# Patient Record
Sex: MALE | Race: WHITE | NOT HISPANIC OR LATINO | Employment: OTHER | URBAN - METROPOLITAN AREA
[De-identification: names, ages, dates, MRNs, and addresses within clinical notes are randomized per-mention and may not be internally consistent; named-entity substitution may affect disease eponyms.]

---

## 2017-01-02 DIAGNOSIS — E78.5 HYPERLIPIDEMIA WITH TARGET LDL LESS THAN 130: Primary | ICD-10-CM

## 2017-01-02 NOTE — TELEPHONE ENCOUNTER
zocor     Last Written Prescription Date: 06/10/16  Last Fill Quantity: 90, # refills: 0  Last Office Visit with [facility], [facility] or  Health prescribing provider: 06/21/16  Next 5 appointments (look out 90 days)     Ruddy 10, 2017  9:00 AM   Return Visit with Peggy Williamson PA-C   [facility]    [address] [place] [id number]   [phone]                   CHOL      121   9/17/2015  HDL       28   9/17/2015  LDL       64   9/17/2015  TRIG      147   9/17/2015  CHOLHDLRATIO      4.3   9/17/2015

## 2017-01-04 RX ORDER — SIMVASTATIN 20 MG
TABLET ORAL
Qty: 30 TABLET | Refills: 0 | Status: SHIPPED | OUTPATIENT
Start: 2017-01-04 | End: 2017-02-10

## 2017-01-04 NOTE — TELEPHONE ENCOUNTER
Routing refill request to provider for review/approval because:  Labs not current:  FLP  A break in medication-should've been out 9/2016.    Cora Jauregui, RN, BSN

## 2017-01-25 ENCOUNTER — ONCOLOGY VISIT (OUTPATIENT)
Dept: ONCOLOGY | Facility: CLINIC | Age: 67
End: 2017-01-25
Payer: MEDICARE

## 2017-01-25 VITALS
SYSTOLIC BLOOD PRESSURE: 134 MMHG | HEIGHT: 74 IN | TEMPERATURE: 98.2 F | DIASTOLIC BLOOD PRESSURE: 70 MMHG | RESPIRATION RATE: 18 BRPM | HEART RATE: 84 BPM | BODY MASS INDEX: 36.46 KG/M2 | OXYGEN SATURATION: 94 % | WEIGHT: 284.1 LBS

## 2017-01-25 DIAGNOSIS — C82.80 LYMPHOMA, SMALL LYMPHOID, FOLLICULAR (H): Primary | ICD-10-CM

## 2017-01-25 LAB
ALBUMIN SERPL-MCNC: 3.9 G/DL (ref 3.4–5)
ALP SERPL-CCNC: 126 U/L (ref 40–150)
ALT SERPL W P-5'-P-CCNC: 52 U/L (ref 0–70)
ANION GAP SERPL CALCULATED.3IONS-SCNC: 9 MMOL/L (ref 3–14)
AST SERPL W P-5'-P-CCNC: 40 U/L (ref 0–45)
BASOPHILS # BLD AUTO: 0 10E9/L (ref 0–0.2)
BASOPHILS NFR BLD AUTO: 1.9 %
BILIRUB SERPL-MCNC: 0.6 MG/DL (ref 0.2–1.3)
BUN SERPL-MCNC: 18 MG/DL (ref 7–30)
CALCIUM SERPL-MCNC: 8.5 MG/DL (ref 8.5–10.1)
CHLORIDE SERPL-SCNC: 106 MMOL/L (ref 94–109)
CO2 SERPL-SCNC: 27 MMOL/L (ref 20–32)
CREAT SERPL-MCNC: 0.94 MG/DL (ref 0.66–1.25)
DIFFERENTIAL METHOD BLD: ABNORMAL
EOSINOPHIL # BLD AUTO: 0.1 10E9/L (ref 0–0.7)
EOSINOPHIL NFR BLD AUTO: 6.3 %
ERYTHROCYTE [DISTWIDTH] IN BLOOD BY AUTOMATED COUNT: 13.4 % (ref 10–15)
GFR SERPL CREATININE-BSD FRML MDRD: 80 ML/MIN/1.7M2
GLUCOSE SERPL-MCNC: 116 MG/DL (ref 70–99)
HCT VFR BLD AUTO: 41.1 % (ref 40–53)
HGB BLD-MCNC: 14.2 G/DL (ref 13.3–17.7)
IMM GRANULOCYTES # BLD: 0 10E9/L (ref 0–0.4)
IMM GRANULOCYTES NFR BLD: 0.5 %
LDH SERPL L TO P-CCNC: 174 U/L (ref 85–227)
LYMPHOCYTES # BLD AUTO: 0.4 10E9/L (ref 0.8–5.3)
LYMPHOCYTES NFR BLD AUTO: 19.8 %
MCH RBC QN AUTO: 30.3 PG (ref 26.5–33)
MCHC RBC AUTO-ENTMCNC: 34.5 G/DL (ref 31.5–36.5)
MCV RBC AUTO: 88 FL (ref 78–100)
MONOCYTES # BLD AUTO: 0.6 10E9/L (ref 0–1.3)
MONOCYTES NFR BLD AUTO: 27.1 %
NEUTROPHILS # BLD AUTO: 0.9 10E9/L (ref 1.6–8.3)
NEUTROPHILS NFR BLD AUTO: 44.4 %
PLATELET # BLD AUTO: 151 10E9/L (ref 150–450)
PLATELET # BLD EST: NORMAL 10*3/UL
POTASSIUM SERPL-SCNC: 4.1 MMOL/L (ref 3.4–5.3)
PROT SERPL-MCNC: 6.5 G/DL (ref 6.8–8.8)
RBC # BLD AUTO: 4.68 10E12/L (ref 4.4–5.9)
RBC MORPH BLD: ABNORMAL
SODIUM SERPL-SCNC: 142 MMOL/L (ref 133–144)
WBC # BLD AUTO: 2.1 10E9/L (ref 4–11)

## 2017-01-25 PROCEDURE — 86704 HEP B CORE ANTIBODY TOTAL: CPT | Performed by: INTERNAL MEDICINE

## 2017-01-25 PROCEDURE — 85025 COMPLETE CBC W/AUTO DIFF WBC: CPT | Performed by: INTERNAL MEDICINE

## 2017-01-25 PROCEDURE — 99214 OFFICE O/P EST MOD 30 MIN: CPT | Performed by: INTERNAL MEDICINE

## 2017-01-25 PROCEDURE — 83615 LACTATE (LD) (LDH) ENZYME: CPT | Performed by: INTERNAL MEDICINE

## 2017-01-25 PROCEDURE — 36415 COLL VENOUS BLD VENIPUNCTURE: CPT | Performed by: INTERNAL MEDICINE

## 2017-01-25 PROCEDURE — 86803 HEPATITIS C AB TEST: CPT | Performed by: INTERNAL MEDICINE

## 2017-01-25 PROCEDURE — 80053 COMPREHEN METABOLIC PANEL: CPT | Performed by: INTERNAL MEDICINE

## 2017-01-25 ASSESSMENT — PAIN SCALES - GENERAL: PAINLEVEL: NO PAIN (0)

## 2017-01-25 NOTE — MR AVS SNAPSHOT
"              After Visit Summary   1/25/2017    Deandre Merchant    MRN: [id number]           Patient Information     Date Of Birth          1950        Visit Information        Provider Department      1/25/2017 10:00 AM Yasmani Coello MD [facility]        Today's Diagnoses     Lymphoma, small lymphoid, follicular (H)    -  1       Care Instructions      Please follow up with Dr. Ballard or Dr. Coello in 3 months with lab and CT review.  Please schedule labs prior to CT scan.    Lab Date/Time: 1/24/17 at 10:00    CT Scan Date/Time: 4/24/17 at 10:45 - Check in at 10:30  Please see \"Getting Ready for Your CT Scan\" for details.  Nothing to eat or drink for 4 hours prior to scan except oral prep.  You will need to  the prep from the Radiology department prior to the day of the scan.    Follow Up Date/Time:     If you have any questions or concerns please feel free to call.    Markell Mistry RN, BSN   Oncology Care Coordinator [facility]  [phone]            Follow-ups after your visit        Your next 10 appointments already scheduled     Jan 25, 2017 10:00 AM   Return Visit with Yasmani Coello MD   [facility] ([address] [address]   [phone]            Apr 24, 2017 10:00 AM   LAB with [facility] [address] [address]   [phone]           Patient must bring picture ID.  Patient should be prepared to give a urine specimen  Please do not eat 10-12 hours before your appointment if you are coming in fasting for labs on lipids, cholesterol, or glucose (sugar).  Pregnant women should follow their Care Team instructions. Water with medications is okay. Do not drink coffee or other fluids.   If you have concerns about taking  your medications, please ask at office or if scheduling via SPARQ, send a message by " clicking on Secure Messaging, Message Your Care Team.            Apr 24, 2017 10:45 AM   CT CHEST ABDOMEN PELVIS W/O & W CONTRAST with PHCT1   [facility] CT Scan ([facility])     [facility] [address] [phone]           Please bring any scans or X-rays taken at other hospitals, if similar tests were done. Also bring a list of your medicines, including vitamins, minerals and over-the-counter drugs. It is safest to leave personal items at home.  Be sure to tell your doctor:   If you have any allergies.   If there s any chance you are pregnant.   If you are breastfeeding.   If you have any special needs.  You may have contrast for this exam. To prepare:   Do not eat or drink for 2 hours before your exam. If you need to take medicine, you may take it with small sips of water. (We may ask you to take liquid medicine as well.)   The day before your exam, drink extra fluids at least six 8-ounce glasses (unless your doctor tells you to restrict your fluids).  Patients over 70 or patients with diabetes or kidney problems:   If you haven t had a blood test (creatinine test) within the last 30 days, go to your clinic or Diagnostic Imaging Department for this test.  If you have diabetes:   If your kidney function is normal, continue taking your metformin (Avandamet, Glucophage, Glucovance, Metaglip) on the day of your exam.   If your kidney function is abnormal, wait 48 hours before restarting this medicine.  You will have oral contrast for this exam:   You will drink the contrast at home. Get this from your clinic or Diagnostic Imaging Department. Please follow the directions given.  Please wear loose clothing, such as a sweat suit or jogging clothes. Avoid snaps, zippers and other metal. We may ask you to undress and put on a hospital gown.  If you have any questions, please call the Imaging Department where you will have your exam.              Future tests that were  "ordered for you today     Open Future Orders        Priority Expected Expires Ordered    CT Chest Abdomen Pelvis w/o & w Contrast Routine 2017    CBC with platelets and differential Routine 2017    Comprehensive metabolic panel Routine 2017            Who to contact     If you have questions or need follow up information about today's clinic visit or your schedule please contact [facility] directly at [phone].  Normal or non-critical lab and imaging results will be communicated to you by [email or web address], letter or phone within 4 business days after the clinic has received the results. If you do not hear from us within 7 days, please contact the clinic through HEXIO or phone. If you have a critical or abnormal lab result, we will notify you by phone as soon as possible.  Submit refill requests through HEXIO or call your pharmacy and they will forward the refill request to us. Please allow 3 business days for your refill to be completed.          Additional Information About Your Visit        HEXIO Information     HEXIO lets you send messages to your doctor, view your test results, renew your prescriptions, schedule appointments and more. To sign up, go to [email or web address] . Click on \"Log in\" on the left side of the screen, which will take you to the Welcome page. Then click on \"Sign up Now\" on the right side of the page.     You will be asked to enter the access code listed below, as well as some personal information. Please follow the directions to create your username and password.     Your access code is: [email or web address]  Expires: 2017  8:37 AM     Your access code will  in 90 days. If you need help or a new code, please call your [place] clinic or [phone].        Care EveryWhere ID     This is your Care EveryWhere ID. This could be used by other organizations to access your [place] medical " "records  [id number]        Your Vitals Were     Pulse Temperature Respirations Height BMI (Body Mass Index) Pulse Oximetry    84 98.2  F (36.8  C) (Temporal) 18 1.88 m (6' 2\") 36.46 kg/m2 94%       Blood Pressure from Last 3 Encounters:   01/25/17 134/70   10/04/16 124/69   10/04/16 136/74    Weight from Last 3 Encounters:   01/25/17 128.867 kg (284 lb 1.6 oz)   10/04/16 125.465 kg (276 lb 9.6 oz)   10/04/16 125.465 kg (276 lb 9.6 oz)              We Performed the Following     CBC with platelets differential     Comprehensive metabolic panel     Hepatitis B core antibody     Hepatitis C antibody     Lactate Dehydrogenase          Today's Medication Changes          These changes are accurate as of: 1/25/17  8:37 AM.  If you have any questions, ask your nurse or doctor.               These medicines have changed or have updated prescriptions.        Dose/Directions    triamcinolone 0.1 % paste   Commonly known as:  KENALOG   This may have changed:    - when to take this  - reasons to take this   Used for:  Ulcer mouth        Take by mouth 2 times daily   Quantity:  5 g   Refills:  0                Primary Care Provider Office Phone # Fax #    Felix Sevilla [phone]       [facility] [address] [place] [address]        Thank you!     Thank you for choosing [facility]  for your care. Our goal is always to provide you with excellent care. Hearing back from our patients is one way we can continue to improve our services. Please take a few minutes to complete the written survey that you may receive in the mail after your visit with us. Thank you!             Your Updated Medication List - Protect others around you: Learn how to safely use, store and throw away your medicines at www.disposemymeds.org.          This list is accurate as of: 1/25/17  8:37 AM.  Always use your most recent med list.                   Brand Name Dispense Instructions for use    " aspirin 81 MG tablet      Take 81 mg by mouth daily       cetirizine 10 MG tablet    zyrTEC     Take 10 mg by mouth daily       lidocaine 2 % solution    XYLOCAINE    100 mL    Take 15 mLs by mouth every 2 hours as needed for moderate pain swish and spit every 3-8 hours as needed; max 8 doses/24 hour period       simvastatin 20 MG tablet    ZOCOR    30 tablet    TAKE 1 TABLET BY MOUTH ISSA Y AT BEDTIME       triamcinolone 0.1 % paste    KENALOG    5 g    Take by mouth 2 times daily       TYLENOL PO      Take 1,000 mg by mouth       valACYclovir 500 MG tablet    VALTREX    90 tablet    Take 1 tablet (500 mg) by mouth daily

## 2017-01-25 NOTE — PATIENT INSTRUCTIONS
"  Please follow up with Dr. Ballard or Dr. Coello in 3 months with lab and CT review.  Please schedule labs prior to CT scan.    Lab Date/Time: 1/24/17 at 10:00    CT Scan Date/Time: 4/24/17 at 10:45 - Check in at 10:30  Please see \"Getting Ready for Your CT Scan\" for details.  Nothing to eat or drink for 4 hours prior to scan except oral prep.  You will need to  the prep from the Radiology department prior to the day of the scan.    Follow Up Date/Time:     If you have any questions or concerns please feel free to call.    Markell Mistry, RN, BSN   Oncology Care Coordinator RN  [facility]  [phone]      "

## 2017-01-25 NOTE — PROGRESS NOTES
FOLLOW-UP VISIT NOTE    PATIENT NAME: Deandre Merchant MRN # [id number]  DATE OF VISIT: Jan 25, 2017 YOB: 1950    REFERRING PROVIDER: No referring provider defined for this encounter.    CANCER TYPE:  Non-Hodgkin's lymphoma      SUBJECTIVE     Deandre Merchant is a 66 year old male.  History includes:    #1.  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    #2April 2014 through September 2014 had bendamustine and Rituxan and went into remission.    #3 since December 2014 has been on every 2 month months maintenance Rituxan.      Patient is currently doing well he has no symptoms of concern. He has questions about hepatitis testing, from what he has seen on the television. He is planning to go to [facility] on a mission trip and will be having a physical prior to travel.        PAST MEDICAL HISTORY     Past Medical History   Diagnosis Date     Lymphadenopathy      mediastinal & retroperitoneal     Polyps, colonic      BPH (benign prostatic hyperplasia)      Retinal detachment            CURRENT OUTPATIENT MEDICATIONS     Current Outpatient Prescriptions   Medication Sig Dispense Refill     simvastatin (ZOCOR) 20 MG tablet TAKE 1 TABLET BY MOUTH ISSA Y AT BEDTIME 30 tablet 0     triamcinolone (KENALOG) 0.1 % paste Take by mouth 2 times daily (Patient taking differently: Take by mouth 2 times daily as needed ) 5 g 0     lidocaine (XYLOCAINE) 2 % solution (viscous) Take 15 mLs by mouth every 2 hours as needed for moderate pain swish and spit every 3-8 hours as needed; max 8 doses/24 hour period 100 mL 0     cetirizine (ZYRTEC) 10 MG tablet Take 10 mg by mouth daily       valACYclovir (VALTREX) 500 MG tablet Take 1 tablet (500 mg) by mouth daily 90 tablet 3     aspirin 81 MG tablet Take 81 mg by mouth daily       Acetaminophen (TYLENOL PO) Take 1,000 mg by mouth          ALLERGIES     Allergies   Allergen Reactions     Allopurinol Rash         REVIEW OF SYSTEMS   As above in the HPI, remainder of the review of systems is negative.     PHYSICAL EXAM   B/P: 134/70, T: 98.2, P: 84, R: 18  SpO2 Readings from Last 4 Encounters:   01/25/17 94%   10/04/16 97%   08/02/16 97%   05/24/16 97%     Wt Readings from Last 3 Encounters:   01/25/17 128.867 kg (284 lb 1.6 oz)   10/04/16 125.465 kg (276 lb 9.6 oz)   10/04/16 125.465 kg (276 lb 9.6 oz)     GEN: NAD  HEENT: Eyes visibly normal, no icterus, injection or pallor. Oropharynx is clear.  NECK: no cervical or supraclavicular lymphadenopathy  LUNGS: clear bilaterally  CV: regular, no murmurs, rubs, or gallops  ABDOMEN: soft, non-tender, non-distended, normal bowel sounds, no hepatosplenomegaly by percussion or palpation  EXT: warm, well perfused, no edema  NEURO: alert  SKIN: no rashes   LABORATORY AND IMAGING STUDIES     Recent Labs   Lab Test  01/25/17   0739  10/04/16   0924   05/06/14   0752   05/02/13   0731   NA  142  140   < >  140   < >   --    POTASSIUM  4.1  3.9   < >  4.3   < >   --    CHLORIDE  106  107   < >  103   < >   --    BUN  18  22   < >  28   < >   --    CR  0.94  0.90   < >  1.06   < >   --    GLC  116*  153*   < >  137*   < >   --    ANDRÉS  8.5  8.2*   < >  8.8   < >   --    MAG   --    --    --   2.1   --   2.3   PHOS   --    --    --   3.7   --   4.3    < > = values in this interval not displayed.     Recent Labs   Lab Test  01/25/17   0739  10/04/16   0924  08/02/16   0753  05/24/16   0859   WBC  2.1*  2.6*  2.4*  2.0*   HGB  14.2  14.0  13.4  13.6   PLT  151  163  146*  139*   MCV  88  89  90  90   NEUTROPHIL  44.4   --   60.2  60.3     Recent Labs   Lab Test  01/25/17   0739  10/04/16   0924  08/02/16   0753   BILITOTAL  0.6  0.8  0.9   ALKPHOS  126  109  108   ALT  52  43  39   AST  40  33  34   ALBUMIN  3.9  3.7  3.7   LDH  174  179  172     TSH   Date Value Ref Range Status   05/06/2014 1.60 0.4 - 5.0 mU/L Final   ]       ASSESSMENT AND PLAN     1. Stage IV low-grade lymphoma  currently completed chemotherapy followed by maintenance Rituxan. Clinically he appears to remain in remission.    2.  Chronic neutropenia. This could still be the residual of his Rituxan treatment or chemotherapy. This will be further monitored.    3.  Questions about hepatitis status.His liver function tests have always been normal. I added a hepatitis B and C antibody to today's labs. It would be valuable to know his status, and he may be a candidate for vaccination prior to going to [facility].    We'll plan to see him back in 3 months with lab and CT scan.    Yasmani Coello MD

## 2017-01-25 NOTE — NURSING NOTE
"Deandre Merchant is a 66 year old male who presents for:  Chief Complaint   Patient presents with     Oncology Clinic Visit     follow up- Lymphoma, small lymphoid, follicular         Initial Vitals:  /70 mmHg  Pulse 84  Temp(Src) 98.2  F (36.8  C) (Temporal)  Resp 18  Ht 1.88 m (6' 2\")  Wt 128.867 kg (284 lb 1.6 oz)  BMI 36.46 kg/m2  SpO2 94% Estimated body mass index is 36.46 kg/(m^2) as calculated from the following:    Height as of this encounter: 1.88 m (6' 2\").    Weight as of this encounter: 128.867 kg (284 lb 1.6 oz).. Body surface area is 2.59 meters squared. BP completed using cuff size: regular  No Pain (0) No LMP for male patient. Allergies and medications reviewed.     Medications: Medication refills not needed today.  Pharmacy name entered into EPIC:      [id number] - [address]    Comments:      minutes for nursing intake (face to face time)   Daly Welsh          "

## 2017-01-26 LAB
HBV CORE AB SERPL QL IA: NONREACTIVE
HCV AB SERPL QL IA: NORMAL

## 2017-02-03 NOTE — PATIENT INSTRUCTIONS
Take the typhoid vaccine at least 2 weeks before you leave.    Start doxycycline 2 days before your trip and take daily until 4 weeks after your trip.    If you need the Cipro for traveller's diarrhea, and it's not improving, then you should be evaluated (possibly for cholera).    If you'd like MMR, cholera, or polio vaccination, let me know.    Try patellar tracking brace for your knee for now.  If not improving, we could do some more investigation or have you see ortho.    We'll let you know your lab results as soon as we can.     Have a nice day!    Dr. Sevilla       Preventive Health Recommendations:       Male Ages 65 and over    Yearly exam:             See your health care provider every year in order to  o   Review health changes.   o   Discuss preventive care.    o   Review your medicines if your doctor has prescribed any.    Talk with your health care provider about whether you should have a test to screen for prostate cancer (PSA).    Every 3 years, have a diabetes test (fasting glucose). If you are at risk for diabetes, you should have this test more often.    Every 5 years, have a cholesterol test. Have this test more often if you are at risk for high cholesterol or heart disease.     Every 10 years, have a colonoscopy. Or, have a yearly FIT test (stool test). These exams will check for colon cancer.    Talk to with your health care provider about screening for Abdominal Aortic Aneurysm if you have a family history of AAA or have a history of smoking.  Shots:     Get a flu shot each year.     Get a tetanus shot every 10 years.     Talk to your doctor about your pneumonia vaccines. There are now two you should receive - Pneumovax (PPSV 23) and Prevnar (PCV 13).    Talk to your doctor about a shingles vaccine.     Talk to your doctor about the hepatitis B vaccine.  Nutrition:     Eat at least 5 servings of fruits and vegetables each day.     Eat whole-grain bread, whole-wheat pasta and brown rice instead of  white grains and rice.     Talk to your doctor about Calcium and Vitamin D.   Lifestyle    Exercise for at least 150 minutes a week (30 minutes a day, 5 days a week). This will help you control your weight and prevent disease.     Limit alcohol to one drink per day.     No smoking.     Wear sunscreen to prevent skin cancer.     See your dentist every six months for an exam and cleaning.     See your eye doctor every 1 to 2 years to screen for conditions such as glaucoma, macular degeneration and cataracts.

## 2017-02-03 NOTE — PROGRESS NOTES
SUBJECTIVE:                                                            Deandre Merchant is a 66 year old male who presents for Preventive Visit.    Are you in the first 12 months of your Medicare Part B coverage?  No  Healthy Habits:    Do you get at least three servings of calcium containing foods daily (dairy, green leafy vegetables, etc.)? yes    Amount of exercise or daily activities, outside of work: last week and a half has been getting on stationary bike    Problems taking medications regularly No    Medication side effects: No    Have you had an eye exam in the past two years? Yes 2 years ago    Do you see a dentist twice per year? Yes actually 4 times    Do you have sleep apnea, excessive snoring or daytime drowsiness?daytime drowsiness in winter because of cold weather    COGNITIVE SCREEN  1) Repeat 3 items (Banana, Sunrise, Chair)      2) Clock draw:   NORMAL  3) 3 item recall: Recalls 1 object   Results: NORMAL clock, 1-2 items recalled: COGNITIVE IMPAIRMENT LESS LIKELY    Mini-CogTM Copyright AAKASH Gagnon. Licensed by the author for use in [facility]; reprinted with permission (emmanuelle@[facility]). All rights reserved.        Eye exam with ophthalmology on this date: 2 years ago      Hyperlipidemia Follow-Up      Rate your low fat/cholesterol diet?: good    Taking statin?  Yes, no muscle aches from statin    Other lipid medications/supplements?:  none       All Histories reviewed and updated in [facility] as appropriate.  Social History   Substance Use Topics     Smoking status: Never Smoker      Smokeless tobacco: Never Used     Alcohol Use: 2.0 oz/week      Comment: occasionally weekends       The patient does not drink >3 drinks per day nor >7 drinks per week.    Today's PHQ-2 Score:   PHQ-2 ( 1999 [employer]) 2/10/2017 6/10/2016   Q1: Little interest or pleasure in doing things 0 0   Q2: Feeling down, depressed or hopeless 0 0   PHQ-2 Score 0 0       Do you feel safe in your environment - Yes    Do you  have a Health Care Directive?: Yes: Patient states has Advance Directive and will bring in a copy to clinic.    Current providers sharing in care for this patient include:   Patient Care Team:  Felix Sevilla MD as PCP - General (Family Practice)  Néstor Ballard MD as MD (Hematology & Oncology)  Markell Mistry, RN as Registered Nurse (Hematology & Oncology)      Hearing impairment: Yes, Difficulty following a conversation in a noisy restaurant or crowded room.    Feel that people are mumbling or not speaking clearly.    Need to ask people to speak up or repeat themselves.    Difficulty understanding soft or whispered speech.    Ability to successfully perform activities of daily living: Yes, no assistance needed     Fall risk:  Fallen 2 or more times in the past year?: No  Any fall with injury in the past year?: No    Home safety:  none identified      The following health maintenance items are reviewed in Epic and correct as of today:  Health Maintenance   Topic Date Due     OP ANNUAL INR REFERRAL  05/06/2015     FALL RISK ASSESSMENT  02/26/2016     INFLUENZA VACCINE (SYSTEM ASSIGNED)  09/01/2016     LIPID MONITORING Q1 YEAR( NO INBASKET)  09/17/2016     PNEUMOCOCCAL (2 of 2 - PPSV23) 09/17/2016     COLONOSCOPY Q5 YR INBASKET MESSAGE  03/20/2017     ADVANCE DIRECTIVE PLANNING Q5 YRS (NO INBASKET)  09/17/2020     TETANUS IMMUNIZATION (SYSTEM ASSIGNED)  09/17/2025     AORTIC ANEURYSM SCREENING (SYSTEM ASSIGNED)  Completed     HEPATITIS C SCREENING  Completed           ROS:  Constitutional, HEENT, cardiovascular, pulmonary, gi and gu systems are negative, except as otherwise noted.  Sore knee    BP Readings from Last 3 Encounters:   02/10/17 120/84   01/25/17 134/70   10/04/16 124/69    Wt Readings from Last 3 Encounters:   02/10/17 279 lb 6.4 oz (126.735 kg)   01/25/17 284 lb 1.6 oz (128.867 kg)   10/04/16 276 lb 9.6 oz (125.465 kg)                  Current Outpatient Prescriptions   Medication Sig  Dispense Refill     Loratadine (CLARITIN PO)        simvastatin (ZOCOR) 20 MG tablet TAKE 1 TABLET BY MOUTH ISSA Y AT BEDTIME 90 tablet 3     typhoid (VIVOTIF) CR capsule Take 1 capsule by mouth every other day 4 capsule 0     doxycycline (VIBRAMYCIN) 100 MG capsule Take 1 capsule (100 mg) by mouth daily 40 capsule 0     ciprofloxacin (CIPRO) 500 MG tablet Take 1 tablet (500 mg) by mouth 2 times daily 6 tablet 0     aspirin 81 MG tablet Take 81 mg by mouth daily       [DISCONTINUED] simvastatin (ZOCOR) 20 MG tablet TAKE 1 TABLET BY MOUTH ISSA Y AT BEDTIME 30 tablet 0     triamcinolone (KENALOG) 0.1 % paste Take by mouth 2 times daily (Patient taking differently: Take by mouth 2 times daily as needed ) 5 g 0     lidocaine (XYLOCAINE) 2 % solution (viscous) Take 15 mLs by mouth every 2 hours as needed for moderate pain swish and spit every 3-8 hours as needed; max 8 doses/24 hour period 100 mL 0     cetirizine (ZYRTEC) 10 MG tablet Take 10 mg by mouth daily       valACYclovir (VALTREX) 500 MG tablet Take 1 tablet (500 mg) by mouth daily 90 tablet 3     Acetaminophen (TYLENOL PO) Take 1,000 mg by mouth       Recent Labs   Lab Test  01/25/17   0739  10/04/16   0924  08/02/16   0753   09/17/15   1038   05/06/14   0855   04/17/13   1821   02/16/12   0931   02/23/10   1051   A1C   --    --    --    --   5.4   --    --    --    --    --    --    --    --    LDL   --    --    --    --   64   --    --    --    --    --   93   --   103   HDL   --    --    --    --   28*   --    --    --    --    --   31*   --   36*   TRIG   --    --    --    --   147   --    --    --    --    --   117   --   166*   ALT  52  43  39   < >   --    < >   --    < >  35   < >  31   --    --    CR  0.94  0.90  0.90   < >   --    < >   --    < >  2.41*   < >  1.00   --    --    GFRESTIMATED  80  85  85   < >   --    < >   --    < >  27*   < >  76   < >   --    GFRESTBLACK  >90   GFR Calc    >90   GFR Calc     ">90   GFR Calc     < >   --    < >   --    < >  33*   < >  >90   < >   --    POTASSIUM  4.1  3.9  3.9   < >   --    < >   --    < >  4.0   < >  4.6   --    --    TSH   --    --    --    --    --    --   1.60   --   3.57   --    --    --   2.15    < > = values in this interval not displayed.      OBJECTIVE:                                                            /84 mmHg  Pulse 84  Temp(Src) 98.3  F (36.8  C) (Temporal)  Resp 20  Ht 6' 0.5\" (1.842 m)  Wt 279 lb 6.4 oz (126.735 kg)  BMI 37.35 kg/m2 Estimated body mass index is 37.35 kg/(m^2) as calculated from the following:    Height as of this encounter: 6' 0.5\" (1.842 m).    Weight as of this encounter: 279 lb 6.4 oz (126.735 kg).  EXAM:   GENERAL: healthy, alert and no distress  EYES: Eyes grossly normal to inspection, PERRL and conjunctivae and sclerae normal  HENT: ear canals and TM's normal, nose and mouth without ulcers or lesions  NECK: no adenopathy, no asymmetry, masses, or scars and thyroid normal to palpation  RESP: lungs clear to auscultation - no rales, rhonchi or wheezes  CV: regular rate and rhythm, normal S1 S2, no S3 or S4, no murmur, click or rub, no peripheral edema and peripheral pulses strong  ABDOMEN: soft, nontender, no hepatosplenomegaly, no masses and bowel sounds normal  RECTAL: normal sphincter tone, no rectal masses, prostate normal size, smooth, nontender without nodules or masses  MS: no gross musculoskeletal defects noted, no edema  SKIN: no suspicious lesions or rashes  NEURO: Normal strength and tone, mentation intact and speech normal  PSYCH: mentation appears normal, affect normal/bright    ASSESSMENT / PLAN:                                                            1. Routine general medical examination at a health care facility  Reviewed recommended screenings and ordered appropriate testing for pt's risks and per pt's request(s).     2. Benign neoplasm of colon, unspecified part of colon  Will " refer for colonoscopy.  Due in March.  - GASTROENTEROLOGY ADULT REF PROCEDURE ONLY    3. Hyperlipidemia with target LDL less than 130  Currently Controlled.  Continue current regimen.  Check labs.  Call/return if any problems or questions arise.   - Lipid panel reflex to direct LDL  - simvastatin (ZOCOR) 20 MG tablet; TAKE 1 TABLET BY MOUTH ISSA Y AT BEDTIME  Dispense: 90 tablet; Refill: 3  - Comprehensive metabolic panel    4. Travel advice encounter  Discussed risks in [place].  Will give Cipro for traveller's diarrhea if this occurs.  Recommended caution with drinking water.  Will also provider malaria prophylaxis and other immunizations.  Recommended he check in his MMR and polio vaccinations to be sure.  - typhoid (VIVOTIF) CR capsule; Take 1 capsule by mouth every other day  Dispense: 4 capsule; Refill: 0  - doxycycline (VIBRAMYCIN) 100 MG capsule; Take 1 capsule (100 mg) by mouth daily  Dispense: 40 capsule; Refill: 0  - ciprofloxacin (CIPRO) 500 MG tablet; Take 1 tablet (500 mg) by mouth 2 times daily  Dispense: 6 tablet; Refill: 0    5. Need for malaria prophylaxis  Discussed options. Pt was interested in doxycycline given his history of lymphoma (was worried about effects on bone marrow of chloroquine)  - doxycycline (VIBRAMYCIN) 100 MG capsule; Take 1 capsule (100 mg) by mouth daily  Dispense: 40 capsule; Refill: 0    6. Requires typhoid vaccination  Discussed options.  He wanted to try oral vaccine.  Discussed how to take it.  - typhoid (VIVOTIF) CR capsule; Take 1 capsule by mouth every other day  Dispense: 4 capsule; Refill: 0    7. Need for prophylactic vaccination and inoculation against influenza  - FLU VACCINE, INCREASED ANTIGEN, PRESV FREE, AGE 65+ [43698]  - ADMIN INFLUENZA[] (For MEDICARE Patients ONLY)     8. Need for prophylactic vaccination against Streptococcus pneumoniae (pneumococcus)    9. Need for vaccination  - HEPATITIS A VACCINE, ADULT  [59675]  - HEPATITIS B VACCINE, Adult   "[28711]  - Pneumococcal vaccine 23 valent (Pneumovax) [84907]  - 1st  Administration  [82981]  - Each additional admin.  (Right click and add QUANTITY)  [49809]    End of Life Planning:  Patient currently has an advanced directive: Yes.  Practitioner is supportive of decision.    COUNSELING:  Reviewed preventive health counseling, as reflected in patient instructions       Regular exercise       Healthy diet/nutrition       Vaccinated for: Hepatitis A, Hepatitis B, Influenza and Pneumococcal       Aspirin Prophylaxsis       Colon cancer screening       Osteoporosis Prevention/Bone Health       The ASCVD Risk score ([place] HERNÁNDEZ Jr., et al., 2013) failed to calculate for the following reasons:    The valid total cholesterol range is 130 to 320 mg/dL        Estimated body mass index is 37.35 kg/(m^2) as calculated from the following:    Height as of this encounter: 6' 0.5\" (1.842 m).    Weight as of this encounter: 279 lb 6.4 oz (126.735 kg).  Weight management plan: Discussed healthy diet and exercise guidelines and patient will follow up in 6 months in clinic to re-evaluate.   reports that he has never smoked. He has never used smokeless tobacco.      Appropriate preventive services were discussed with this patient, including applicable screening as appropriate for cardiovascular disease, diabetes, osteopenia/osteoporosis, and glaucoma.  As appropriate for age/gender, discussed screening for colorectal cancer, prostate cancer, breast cancer, and cervical cancer. Checklist reviewing preventive services available has been given to the patient.    Reviewed patients plan of care and provided an AVS. The Basic Care Plan (routine screening as documented in Health Maintenance) for Deandre meets the Care Plan requirement. This Care Plan has been established and reviewed with the Patient.    Counseling Resources:  ATP IV Guidelines  Pooled Cohorts Equation Calculator  Breast Cancer Risk Calculator  FRAX Risk Assessment  ICSI " Preventive Guidelines  Dietary Guidelines for Americans, 2010  USDA's MyPlate  ASA Prophylaxis  Lung CA Screening    Felix Sevilla MD, MD  [facility]

## 2017-02-10 ENCOUNTER — OFFICE VISIT (OUTPATIENT)
Dept: FAMILY MEDICINE | Facility: OTHER | Age: 67
End: 2017-02-10
Payer: MEDICARE

## 2017-02-10 ENCOUNTER — TELEPHONE (OUTPATIENT)
Dept: FAMILY MEDICINE | Facility: OTHER | Age: 67
End: 2017-02-10

## 2017-02-10 VITALS
SYSTOLIC BLOOD PRESSURE: 120 MMHG | HEART RATE: 84 BPM | BODY MASS INDEX: 37.03 KG/M2 | TEMPERATURE: 98.3 F | RESPIRATION RATE: 20 BRPM | WEIGHT: 279.4 LBS | HEIGHT: 73 IN | DIASTOLIC BLOOD PRESSURE: 84 MMHG

## 2017-02-10 DIAGNOSIS — Z23 REQUIRES TYPHOID VACCINATION: ICD-10-CM

## 2017-02-10 DIAGNOSIS — Z29.89 NEED FOR MALARIA PROPHYLAXIS: ICD-10-CM

## 2017-02-10 DIAGNOSIS — E78.5 HYPERLIPIDEMIA WITH TARGET LDL LESS THAN 130: ICD-10-CM

## 2017-02-10 DIAGNOSIS — Z00.00 ROUTINE GENERAL MEDICAL EXAMINATION AT A HEALTH CARE FACILITY: Primary | ICD-10-CM

## 2017-02-10 DIAGNOSIS — Z23 NEED FOR PROPHYLACTIC VACCINATION AND INOCULATION AGAINST INFLUENZA: ICD-10-CM

## 2017-02-10 DIAGNOSIS — Z71.84 TRAVEL ADVICE ENCOUNTER: ICD-10-CM

## 2017-02-10 DIAGNOSIS — Z23 NEED FOR PROPHYLACTIC VACCINATION AGAINST STREPTOCOCCUS PNEUMONIAE (PNEUMOCOCCUS): ICD-10-CM

## 2017-02-10 DIAGNOSIS — D12.6 BENIGN NEOPLASM OF COLON, UNSPECIFIED PART OF COLON: ICD-10-CM

## 2017-02-10 DIAGNOSIS — Z23 NEED FOR VACCINATION: ICD-10-CM

## 2017-02-10 LAB
ALBUMIN SERPL-MCNC: 4.1 G/DL (ref 3.4–5)
ALP SERPL-CCNC: 116 U/L (ref 40–150)
ALT SERPL W P-5'-P-CCNC: 55 U/L (ref 0–70)
ANION GAP SERPL CALCULATED.3IONS-SCNC: 6 MMOL/L (ref 3–14)
AST SERPL W P-5'-P-CCNC: 44 U/L (ref 0–45)
BILIRUB SERPL-MCNC: 0.9 MG/DL (ref 0.2–1.3)
BUN SERPL-MCNC: 19 MG/DL (ref 7–30)
CALCIUM SERPL-MCNC: 8.8 MG/DL (ref 8.5–10.1)
CHLORIDE SERPL-SCNC: 106 MMOL/L (ref 94–109)
CHOLEST SERPL-MCNC: 121 MG/DL
CO2 SERPL-SCNC: 28 MMOL/L (ref 20–32)
CREAT SERPL-MCNC: 0.86 MG/DL (ref 0.66–1.25)
GFR SERPL CREATININE-BSD FRML MDRD: 90 ML/MIN/1.7M2
GLUCOSE SERPL-MCNC: 99 MG/DL (ref 70–99)
HDLC SERPL-MCNC: 27 MG/DL
LDLC SERPL CALC-MCNC: 48 MG/DL
NONHDLC SERPL-MCNC: 94 MG/DL
POTASSIUM SERPL-SCNC: 4.4 MMOL/L (ref 3.4–5.3)
PROT SERPL-MCNC: 6.8 G/DL (ref 6.8–8.8)
SODIUM SERPL-SCNC: 140 MMOL/L (ref 133–144)
TRIGL SERPL-MCNC: 229 MG/DL

## 2017-02-10 PROCEDURE — 90662 IIV NO PRSV INCREASED AG IM: CPT | Performed by: FAMILY MEDICINE

## 2017-02-10 PROCEDURE — 90632 HEPA VACCINE ADULT IM: CPT | Performed by: FAMILY MEDICINE

## 2017-02-10 PROCEDURE — 90746 HEPB VACCINE 3 DOSE ADULT IM: CPT | Performed by: FAMILY MEDICINE

## 2017-02-10 PROCEDURE — 90732 PPSV23 VACC 2 YRS+ SUBQ/IM: CPT | Performed by: FAMILY MEDICINE

## 2017-02-10 PROCEDURE — 80061 LIPID PANEL: CPT | Performed by: FAMILY MEDICINE

## 2017-02-10 PROCEDURE — 36415 COLL VENOUS BLD VENIPUNCTURE: CPT | Performed by: FAMILY MEDICINE

## 2017-02-10 PROCEDURE — G0009 ADMIN PNEUMOCOCCAL VACCINE: HCPCS | Performed by: FAMILY MEDICINE

## 2017-02-10 PROCEDURE — 90472 IMMUNIZATION ADMIN EACH ADD: CPT | Performed by: FAMILY MEDICINE

## 2017-02-10 PROCEDURE — G0008 ADMIN INFLUENZA VIRUS VAC: HCPCS | Performed by: FAMILY MEDICINE

## 2017-02-10 PROCEDURE — G0438 PPPS, INITIAL VISIT: HCPCS | Performed by: FAMILY MEDICINE

## 2017-02-10 PROCEDURE — 99213 OFFICE O/P EST LOW 20 MIN: CPT | Mod: 25 | Performed by: FAMILY MEDICINE

## 2017-02-10 PROCEDURE — 80053 COMPREHEN METABOLIC PANEL: CPT | Performed by: FAMILY MEDICINE

## 2017-02-10 RX ORDER — DOXYCYCLINE 100 MG/1
100 CAPSULE ORAL DAILY
Qty: 40 CAPSULE | Refills: 0 | Status: SHIPPED | OUTPATIENT
Start: 2017-02-10 | End: 2017-06-19

## 2017-02-10 RX ORDER — CIPROFLOXACIN 500 MG/1
500 TABLET, FILM COATED ORAL 2 TIMES DAILY
Qty: 6 TABLET | Refills: 0 | Status: SHIPPED | OUTPATIENT
Start: 2017-02-10 | End: 2017-06-19

## 2017-02-10 RX ORDER — SIMVASTATIN 20 MG
TABLET ORAL
Qty: 90 TABLET | Refills: 3 | Status: SHIPPED | OUTPATIENT
Start: 2017-02-10 | End: 2018-03-28

## 2017-02-10 ASSESSMENT — PAIN SCALES - GENERAL: PAINLEVEL: NO PAIN (0)

## 2017-02-10 NOTE — NURSING NOTE
"Chief Complaint   Patient presents with     Wellness Visit     Panel Management     pneumovax, flu shot, fall risk, INR referral, lipids       Initial /84 mmHg  Pulse 84  Temp(Src) 98.3  F (36.8  C) (Temporal)  Resp 20  Ht 6' 0.5\" (1.842 m)  Wt 279 lb 6.4 oz (126.735 kg)  BMI 37.35 kg/m2 Estimated body mass index is 37.35 kg/(m^2) as calculated from the following:    Height as of this encounter: 6' 0.5\" (1.842 m).    Weight as of this encounter: 279 lb 6.4 oz (126.735 kg).  Medication Reconciliation: complete]  Siva Montague, CMA    "

## 2017-02-10 NOTE — PROGRESS NOTES
Injectable Influenza Immunization Documentation    1.  Is the person to be vaccinated sick today?  No    2. Does the person to be vaccinated have an allergy to eggs or to a component of the vaccine?  No    3. Has the person to be vaccinated today ever had a serious reaction to influenza vaccine in the past?  No    4. Has the person to be vaccinated ever had Guillain-Franklin syndrome?  No     Form completed by Siva Montague CMA  Prior to injection verified patient identity using patient's name and date of birth.  Per orders of Dr. Sevilla, injection of Fluzone given by Siva Montague. Patient instructed to remain in clinic for 20 minutes afterwards, and to report any adverse reaction to me immediately.

## 2017-02-10 NOTE — MR AVS SNAPSHOT
After Visit Summary   2/10/2017    Deandre Merchant    MRN: [id number]           Patient Information     Date Of Birth          1950        Visit Information        Provider Department      2/10/2017 9:45 AM Felix Sevilla MD [facility]        Today's Diagnoses     Need for prophylactic vaccination and inoculation against influenza    -  1     Need for prophylactic vaccination against Streptococcus pneumoniae (pneumococcus)         Hyperlipidemia with target LDL less than 130         Requires typhoid vaccination         Travel advice encounter         Need for malaria prophylaxis         Routine general medical examination at a health care facility         Benign neoplasm of colon, unspecified part of colon         Need for vaccination           Care Instructions    Take the typhoid vaccine at least 2 weeks before you leave.    Start doxycycline 2 days before your trip and take daily until 4 weeks after your trip.    If you need the Cipro for traveller's diarrhea, and it's not improving, then you should be evaluated (possibly for cholera).    If you'd like MMR, cholera, or polio vaccination, let me know.    Try patellar tracking brace for your knee for now.  If not improving, we could do some more investigation or have you see ortho.    We'll let you know your lab results as soon as we can.     Have a nice day!    Dr. Sevilla       Preventive Health Recommendations:       Male Ages 65 and over    Yearly exam:             See your health care provider every year in order to  o   Review health changes.   o   Discuss preventive care.    o   Review your medicines if your doctor has prescribed any.    Talk with your health care provider about whether you should have a test to screen for prostate cancer (PSA).    Every 3 years, have a diabetes test (fasting glucose). If you are at risk for diabetes, you should have this test more often.    Every 5 years, have a cholesterol test. Have  this test more often if you are at risk for high cholesterol or heart disease.     Every 10 years, have a colonoscopy. Or, have a yearly FIT test (stool test). These exams will check for colon cancer.    Talk to with your health care provider about screening for Abdominal Aortic Aneurysm if you have a family history of AAA or have a history of smoking.  Shots:     Get a flu shot each year.     Get a tetanus shot every 10 years.     Talk to your doctor about your pneumonia vaccines. There are now two you should receive - Pneumovax (PPSV 23) and Prevnar (PCV 13).    Talk to your doctor about a shingles vaccine.     Talk to your doctor about the hepatitis B vaccine.  Nutrition:     Eat at least 5 servings of fruits and vegetables each day.     Eat whole-grain bread, whole-wheat pasta and brown rice instead of white grains and rice.     Talk to your doctor about Calcium and Vitamin D.   Lifestyle    Exercise for at least 150 minutes a week (30 minutes a day, 5 days a week). This will help you control your weight and prevent disease.     Limit alcohol to one drink per day.     No smoking.     Wear sunscreen to prevent skin cancer.     See your dentist every six months for an exam and cleaning.     See your eye doctor every 1 to 2 years to screen for conditions such as glaucoma, macular degeneration and cataracts.        Follow-ups after your visit        Additional Services     GASTROENTEROLOGY ADULT REF PROCEDURE ONLY       Last Lab Result: CREATININE (mg/dL)       Date                     Value                 01/25/2017               0.94             ----------  Body mass index is 37.35 kg/(m^2).     Needed:  No  Language:  English    Patient will be contacted to schedule procedure.     Please be aware that coverage of these services is subject to the terms and limitations of your health insurance plan.  Call member services at your health plan with any benefit or coverage questions.  Any procedures must be  performed at a [facility] OR coordinated by your clinic's referral office.    Please bring the following with you to your appointment:    (1) Any X-Rays, CTs or MRIs which have been performed.  Contact the facility where they were done to arrange for  prior to your scheduled appointment.    (2) List of current medications   (3) This referral request   (4) Any documents/labs given to you for this referral                  Your next 10 appointments already scheduled     Apr 24, 2017 10:00 AM   LAB with NL LAB [facility] ([facility])    [address] [place] [id number] [phone]           Patient must bring picture ID.  Patient should be prepared to give a urine specimen  Please do not eat 10-12 hours before your appointment if you are coming in fasting for labs on lipids, cholesterol, or glucose (sugar).  Pregnant women should follow their Care Team instructions. Water with medications is okay. Do not drink coffee or other fluids.   If you have concerns about taking  your medications, please ask at office or if scheduling via TeleDNA, send a message by clicking on Secure Messaging, Message Your Care Team.            Apr 24, 2017 10:45 AM   CT CHEST ABDOMEN PELVIS W/O & W CONTRAST with PHCT1   [facility] CT Scan ([facility])    [address] [place] [id number] [phone]           Please bring any scans or X-rays taken at other hospitals, if similar tests were done. Also bring a list of your medicines, including vitamins, minerals and over-the-counter drugs. It is safest to leave personal items at home.  Be sure to tell your doctor:   If you have any allergies.   If there s any chance you are pregnant.   If you are breastfeeding.   If you have any special needs.  You may have contrast for this exam. To prepare:   Do not eat or drink for 2 hours before your exam. If you need to take medicine, you may take it  with small sips of water. (We may ask you to take liquid medicine as well.)   The day before your exam, drink extra fluids at least six 8-ounce glasses (unless your doctor tells you to restrict your fluids).  Patients over 70 or patients with diabetes or kidney problems:   If you haven t had a blood test (creatinine test) within the last 30 days, go to your clinic or Diagnostic Imaging Department for this test.  If you have diabetes:   If your kidney function is normal, continue taking your metformin (Avandamet, Glucophage, Glucovance, Metaglip) on the day of your exam.   If your kidney function is abnormal, wait 48 hours before restarting this medicine.  You will have oral contrast for this exam:   You will drink the contrast at home. Get this from your clinic or Diagnostic Imaging Department. Please follow the directions given.  Please wear loose clothing, such as a sweat suit or jogging clothes. Avoid snaps, zippers and other metal. We may ask you to undress and put on a hospital gown.  If you have any questions, please call the Imaging Department where you will have your exam.            Apr 27, 2017  8:00 AM   [facility] Visit with [facility]mani Coello MD   [facility]    [address] [place] [id number] [phone]              Who to contact     If you have questions or need follow up information about today's clinic visit or your schedule please contact [facility] directly at [phone].  Normal or non-critical lab and imaging results will be communicated to you by MyChart, letter or phone within 4 business days after the clinic has received the results. If you do not hear from us within 7 days, please contact the clinic through MyChart or phone. If you have a critical or abnormal lab result, we will notify you by phone as soon as possible.  Submit refill requests through Peer[employer] or call your pharmacy and they will forward the refill  "request to us. Please allow 3 business days for your refill to be completed.          Additional Information About Your Visit        [email or web address] Information     [employer] lets you send messages to your doctor, view your test results, renew your prescriptions, schedule appointments and more. To sign up, go to [email or web address] . Click on \"Log in\" on the left side of the screen, which will take you to the Welcome page. Then click on \"Sign up Now\" on the right side of the page.     You will be asked to enter the access code listed below, as well as some personal information. Please follow the directions to create your username and password.     Your access code is: [email or web address]  Expires: 2017  8:37 AM     Your access code will  in 90 days. If you need help or a new code, please call your [facility] clinic or [phone].        Care EveryWhere ID     This is your Care EveryWhere ID. This could be used by other organizations to access your [facility] [place] medical records  [id number]        Your Vitals Were     Pulse Temperature Respirations Height BMI (Body Mass Index)       84 98.3  F (36.8  C) (Temporal) 20 6' 0.5\" (1.842 m) 37.35 kg/m2        Blood Pressure from Last 3 Encounters:   02/10/17 120/84   17 134/70   10/04/16 124/69    Weight from Last 3 Encounters:   02/10/17 279 lb 6.4 oz (126.735 kg)   17 284 lb 1.6 oz (128.867 kg)   10/04/16 276 lb 9.6 oz (125.465 kg)              We Performed the Following     1st  Administration  [09715]     ADMIN INFLUENZA[] (For MEDICARE Patients ONLY)      Comprehensive metabolic panel     Each additional admin.  (Right click and add QUANTITY)  [40121]     FLU VACCINE, INCREASED ANTIGEN, PRESV FREE, AGE 65+ [77954]     GASTROENTEROLOGY ADULT REF PROCEDURE ONLY     HEPATITIS A VACCINE, ADULT  [73007]     HEPATITIS B VACCINE, Adult  [37619]     Lipid panel reflex to direct LDL     Pneumococcal vaccine 23 valent (Pneumovax) [09137]          Today's Medication " Changes          These changes are accurate as of: 2/10/17 11:01 AM.  If you have any questions, ask your nurse or doctor.               Start taking these medicines.        Dose/Directions    ciprofloxacin 500 MG tablet   Commonly known as:  CIPRO   Used for:  Travel advice encounter   Started by:  Felix Sevilla MD        Dose:  500 mg   Take 1 tablet (500 mg) by mouth 2 times daily   Quantity:  6 tablet   Refills:  0       doxycycline 100 MG capsule   Commonly known as:  VIBRAMYCIN   Used for:  Need for malaria prophylaxis, Travel advice encounter   Started by:  Felix Sevilla MD        Dose:  100 mg   Take 1 capsule (100 mg) by mouth daily   Quantity:  40 capsule   Refills:  0       typhoid CR capsule   Commonly known as:  VIVOTIF   Used for:  Requires typhoid vaccination, Travel advice encounter   Started by:  Felix Sevilla MD        Dose:  1 capsule   Take 1 capsule by mouth every other day   Quantity:  4 capsule   Refills:  0         These medicines have changed or have updated prescriptions.        Dose/Directions    simvastatin 20 MG tablet   Commonly known as:  ZOCOR   This may have changed:  See the new instructions.   Used for:  Hyperlipidemia with target LDL less than 130   Changed by:  Felix Sevilla MD        TAKE 1 TABLET BY MOUTH ISSA Y AT BEDTIME   Quantity:  90 tablet   Refills:  3       triamcinolone 0.1 % paste   Commonly known as:  KENALOG   This may have changed:    - when to take this  - reasons to take this   Used for:  Ulcer mouth        Take by mouth 2 times daily   Quantity:  5 g   Refills:  0            Where to get your medicines      These medications were sent to [id number] - [place], [place] - [address] [place] [address]  [address] [place], [place] [address] [address]    Hours:  M-F 8:30-6:30; Sat 9-4; closed Sunday Phone:  [phone]    - ciprofloxacin 500 MG tablet  - doxycycline 100 MG capsule  - simvastatin 20 MG tablet  - typhoid CR capsule             Primary  Care Provider Office Phone # Fax #    Felix Sevilla [phone]       [facility] [address] [place] [address]        Thank you!     Thank you for choosing [facility]  for your care. Our goal is always to provide you with excellent care. Hearing back from our patients is one way we can continue to improve our services. Please take a few minutes to complete the written survey that you may receive in the mail after your visit with us. Thank you!             Your Updated Medication List - Protect others around you: Learn how to safely use, store and throw away your medicines at www.disposemymeds.org.          This list is accurate as of: 2/10/17 11:01 AM.  Always use your most recent med list.                   Brand Name Dispense Instructions for use    aspirin 81 MG tablet      Take 81 mg by mouth daily       cetirizine 10 MG tablet    zyrTEC     Take 10 mg by mouth daily       ciprofloxacin 500 MG tablet    CIPRO    6 tablet    Take 1 tablet (500 mg) by mouth 2 times daily       CLARITIN PO          doxycycline 100 MG capsule    VIBRAMYCIN    40 capsule    Take 1 capsule (100 mg) by mouth daily       lidocaine 2 % solution    XYLOCAINE    100 mL    Take 15 mLs by mouth every 2 hours as needed for moderate pain swish and spit every 3-8 hours as needed; max 8 doses/24 hour period       simvastatin 20 MG tablet    ZOCOR    90 tablet    TAKE 1 TABLET BY MOUTH ISSA Y AT BEDTIME       triamcinolone 0.1 % paste    KENALOG    5 g    Take by mouth 2 times daily       TYLENOL PO      Take 1,000 mg by mouth       typhoid CR capsule    VIVOTIF    4 capsule    Take 1 capsule by mouth every other day       valACYclovir 500 MG tablet    VALTREX    90 tablet    Take 1 tablet (500 mg) by mouth daily

## 2017-02-10 NOTE — PROGRESS NOTES
Quick Note:    All of your labs were normal for you.    Have a nice day!    Dr. Sevilla     ______

## 2017-02-10 NOTE — Clinical Note
[facility]  [address] [place] [id number] [phone]             February 10, 2017    Deandre Merchant  [address] [place] [address]              Dear Deandre,    The results of your recent tests were normal for you.  Enclosed is a copy of the results.  It was a pleasure to see you at your last appointment.  Results for orders placed or performed in visit on 02/10/17   Lipid panel reflex to direct LDL   Result Value Ref Range    Cholesterol 121 <200 mg/dL    Triglycerides 229 (H) <150 mg/dL    HDL Cholesterol 27 (L) >39 mg/dL    LDL Cholesterol Calculated 48 <100 mg/dL    Non HDL Cholesterol 94 <130 mg/dL   Comprehensive metabolic panel   Result Value Ref Range    Sodium 140 133 - 144 mmol/L    Potassium 4.4 3.4 - 5.3 mmol/L    Chloride 106 94 - 109 mmol/L    Carbon Dioxide 28 20 - 32 mmol/L    Anion Gap 6 3 - 14 mmol/L    Glucose 99 70 - 99 mg/dL    Urea Nitrogen 19 7 - 30 mg/dL    Creatinine 0.86 0.66 - 1.25 mg/dL    GFR Estimate 90 >60 mL/min/1.7m2    GFR Estimate If Black >90   GFR Calc   >60 mL/min/1.7m2    Calcium 8.8 8.5 - 10.1 mg/dL    Bilirubin Total 0.9 0.2 - 1.3 mg/dL    Albumin 4.1 3.4 - 5.0 g/dL    Protein Total 6.8 6.8 - 8.8 g/dL    Alkaline Phosphatase 116 40 - 150 U/L    ALT 55 0 - 70 U/L    AST 44 0 - 45 U/L         If you have any questions or concerns, please call myself or my nurse at [phone].      Sincerely,      Felix Sevilla MD

## 2017-02-10 NOTE — NURSING NOTE
Prior to injection verified patient identity using patient's name and date of birth.  Screening Questionnaire for Adult Immunization    Are you sick today?   No   Do you have allergies to medications, food, a vaccine component or latex?   No   Have you ever had a serious reaction after receiving a vaccination?   No   Do you have a long-term health problem with heart disease, lung disease, asthma, kidney disease, metabolic disease (e.g. diabetes), anemia, or other blood disorder?   No   Do you have cancer, leukemia, HIV/AIDS, or any other immune system problem?   No   In the past 3 months, have you taken medications that affect  your immune system, such as prednisone, other steroids, or anticancer drugs; drugs for the treatment of rheumatoid arthritis, Crohn s disease, or psoriasis; or have you had radiation treatments?   Yes, chemo in September   Have you had a seizure, or a brain or other nervous system problem?   No   During the past year, have you received a transfusion of blood or blood     products, or been given immune (gamma) globulin or antiviral drug?   No   For women: Are you pregnant or is there a chance you could become        pregnant during the next month?   No   Have you received any vaccinations in the past 4 weeks?   No     Immunization questionnaire answers: one was yes and Dr. Sevilla is aware of his cancer.     MNVFC doesn't apply on this patient    Per orders of Dr. Sevilla, injection of Hep A, Hep B, Pneumovax 23, Flu shot given by Siva Montague. Patient instructed to remain in clinic for 20 minutes afterwards, and to report any adverse reaction to me immediately.       Screening performed by Siva Montague on 2/10/2017 at 10:57 AM.  Siva Montague, [facility]

## 2017-02-13 ENCOUNTER — TELEPHONE (OUTPATIENT)
Dept: FAMILY MEDICINE | Facility: OTHER | Age: 67
End: 2017-02-13

## 2017-02-20 ENCOUNTER — TELEPHONE (OUTPATIENT)
Dept: FAMILY MEDICINE | Facility: OTHER | Age: 67
End: 2017-02-20

## 2017-02-20 DIAGNOSIS — K12.0 CANKER SORES ORAL: ICD-10-CM

## 2017-02-20 RX ORDER — DIPHENHYDRAMINE HYDROCHLORIDE AND LIDOCAINE HYDROCHLORIDE AND ALUMINUM HYDROXIDE AND MAGNESIUM HYDRO
5-10 KIT EVERY 4 HOURS PRN
Qty: 237 ML | Refills: 1 | Status: SHIPPED | OUTPATIENT
Start: 2017-02-20 | End: 2017-07-28

## 2017-02-20 NOTE — TELEPHONE ENCOUNTER
Reason for call:  Medication  Reason for Call:  Medication or medication refill:    Do you use a [facility]?  Name of the pharmacy and phone number for the current request:  [facility] [place]    Name of the medication requested: He would like to get medication for sores in his mouth.  He states he has received this medication 1 1/2 years ago    Other request: no    Can we leave a detailed message on this number? YES    Phone number patient can be reached at: Cell number on file:    Telephone Information:   Mobile [phone]       Best Time:  anytime    Call taken on 2/20/2017 at 9:25 AM by Ruddy Spann

## 2017-02-20 NOTE — TELEPHONE ENCOUNTER
Pharmacy calling in regards to the Mouth wash that was just sent over. Pharmacy would like to know how much of each ingredient should be used every 4 hours? Provider please review and advise.  Taylor Jordan MA

## 2017-03-01 ENCOUNTER — SURGERY (OUTPATIENT)
Age: 67
End: 2017-03-01

## 2017-03-01 ENCOUNTER — HOSPITAL ENCOUNTER (OUTPATIENT)
Facility: CLINIC | Age: 67
Discharge: HOME | End: 2017-03-01
Attending: INTERNAL MEDICINE | Admitting: INTERNAL MEDICINE
Payer: MEDICARE

## 2017-03-01 VITALS
WEIGHT: 279.4 LBS | BODY MASS INDEX: 37.03 KG/M2 | OXYGEN SATURATION: 94 % | HEIGHT: 73 IN | DIASTOLIC BLOOD PRESSURE: 98 MMHG | TEMPERATURE: 97.5 F | RESPIRATION RATE: 20 BRPM | SYSTOLIC BLOOD PRESSURE: 114 MMHG

## 2017-03-01 LAB — COLONOSCOPY: NORMAL

## 2017-03-01 PROCEDURE — 45378 DIAGNOSTIC COLONOSCOPY: CPT | Performed by: INTERNAL MEDICINE

## 2017-03-01 PROCEDURE — 25000125 ZZHC RX 250: Performed by: INTERNAL MEDICINE

## 2017-03-01 PROCEDURE — 25000128 H RX IP 250 OP 636: Performed by: INTERNAL MEDICINE

## 2017-03-01 PROCEDURE — 40000296 ZZH STATISTIC ENDO RECOVERY CLASS 1:2 FIRST HOUR: Performed by: INTERNAL MEDICINE

## 2017-03-01 PROCEDURE — G0105 COLORECTAL SCRN; HI RISK IND: HCPCS | Performed by: INTERNAL MEDICINE

## 2017-03-01 RX ORDER — LIDOCAINE 40 MG/G
CREAM TOPICAL
Status: DISCONTINUED | OUTPATIENT
Start: 2017-03-01 | End: 2017-03-01 | Stop reason: HOSPADM

## 2017-03-01 RX ORDER — FENTANYL CITRATE 50 UG/ML
INJECTION, SOLUTION INTRAMUSCULAR; INTRAVENOUS PRN
Status: DISCONTINUED | OUTPATIENT
Start: 2017-03-01 | End: 2017-03-01 | Stop reason: HOSPADM

## 2017-03-01 RX ORDER — ONDANSETRON 2 MG/ML
4 INJECTION INTRAMUSCULAR; INTRAVENOUS
Status: DISCONTINUED | OUTPATIENT
Start: 2017-03-01 | End: 2017-03-01 | Stop reason: HOSPADM

## 2017-03-01 RX ADMIN — MIDAZOLAM HYDROCHLORIDE 1 MG: 1 INJECTION, SOLUTION INTRAMUSCULAR; INTRAVENOUS at 11:02

## 2017-03-01 RX ADMIN — MIDAZOLAM HYDROCHLORIDE 1 MG: 1 INJECTION, SOLUTION INTRAMUSCULAR; INTRAVENOUS at 11:06

## 2017-03-01 RX ADMIN — MIDAZOLAM HYDROCHLORIDE 1 MG: 1 INJECTION, SOLUTION INTRAMUSCULAR; INTRAVENOUS at 11:05

## 2017-03-01 RX ADMIN — MIDAZOLAM HYDROCHLORIDE 1 MG: 1 INJECTION, SOLUTION INTRAMUSCULAR; INTRAVENOUS at 11:11

## 2017-03-01 RX ADMIN — LIDOCAINE HYDROCHLORIDE 1 ML: 10 INJECTION, SOLUTION EPIDURAL; INFILTRATION; INTRACAUDAL; PERINEURAL at 10:02

## 2017-03-01 RX ADMIN — FENTANYL CITRATE 50 MCG: 50 INJECTION, SOLUTION INTRAMUSCULAR; INTRAVENOUS at 11:02

## 2017-03-01 RX ADMIN — MIDAZOLAM HYDROCHLORIDE 1 MG: 1 INJECTION, SOLUTION INTRAMUSCULAR; INTRAVENOUS at 11:03

## 2017-03-01 NOTE — CONSULTS
"[facility] GI Pre-Procedure Physical Assessment    Deandre Merchant MRN# [id number]   Age: 67 year old YOB: 1950      Date of Surgery: 3/1/2017  Location [facility]      Date of Exam 3/1/2017 Facility (Same day)       Primary care provider: Felix Sevilla         Active problem list:   Patient Active Problem List   Diagnosis     Impotence of organic origin     Retinal detachment     Dermatophytosis of body     BPH (benign prostatic hyperplasia)     Hyperlipidemia with target LDL less than 130     Coloboma, optic disc, congenital, bilateral     Mediastinal lymphadenopathy     Mesenteric lymphadenopathy     Intra-abdominal lymphadenopathy     Non-Hodgkin's lymphoma of multiple sites (H)     Lymphoma, small lymphoid, follicular (H)     Inguinal hernia, incarcerated     Acute renal failure (ARF) (H)     New onset atrial fibrillation (H)     Acute respiratory distress (H)     Small bowel obstruction (H)     Follicular lymphoma (H)     Atrial fibrillation (H)     Counseling regarding advanced directives            Medications (include herbals and vitamins):   Any Plavix use in the last 7 days?  No     Current Facility-Administered Medications   Medication     lidocaine 1 % 1 mL     lidocaine (LMX4) kit     sodium chloride (PF) 0.9% PF flush 3 mL     sodium chloride (PF) 0.9% PF flush 3 mL     sodium chloride (PF) 0.9% PF flush 3 mL     ondansetron (ZOFRAN) injection 4 mg             Allergies:      Allergies   Allergen Reactions     Allopurinol Rash     Allergy to Latex?  No  Allergy to tape?    No          Social History:     Social History   Substance Use Topics     Smoking status: Never Smoker     Smokeless tobacco: Never Used     Alcohol use 2.0 oz/week      Comment: occasionally weekends            Physical Exam:   All vitals have been reviewed  Blood pressure (!) 146/91, temperature 97.5  F (36.4  C), temperature source Oral, resp. rate 14, height 6' 0.5\" (1.842 m), weight " 279 lb 6.4 oz (126.7 kg), SpO2 96 %.  Airway assessment:   Patient is able to stick out tongue      Lungs:   No increased work of breathing, good air exchange, clear to auscultation bilaterally, no crackles or wheezing      Cardiovascular:   Normal apical impulse, regular rate and rhythm, normal S1 and S2, no S3 or S4, and no murmur noted           Lab / Radiology Results:   All laboratory data reviewed          Assessment:   Appropriately NPO  Chief complaint or anatomic assessment of involved area: Screening         Plan:   Moderate (conscious) sedation     Risks, benefits, alternatives to sedation and blood explained and consent obtained  Risks, benefits, alternatives to procedure explained and consent obtained  Orders and progress notes are in the chart  Discharge from Phase 1 and / or Phase 2 recovery when patient meets criteria    I have reviewed the history and physical, lab finding(s), diagnostic data, medicaitons, and the plan for sedation.  I have determined this patient to be an appropriate candidate for the planned sedation / procedure and have reassessed the patient immediately prior to sedation / procedure.    I have personally and medically directed the administration of medications used.    Dakota Wall MD, MD

## 2017-03-01 NOTE — IP AVS SNAPSHOT
MRN:[id number]                      After Visit Summary   3/1/2017    Deandre Merchant    MRN: [id number]           Thank you!     Thank you for choosing [facility] for your care. Our goal is always to provide you with excellent care. Hearing back from our patients is one way we can continue to improve our services. Please take a few minutes to complete the written survey that you may receive in the mail after you visit with us. Thank you!        Patient Information     Date Of Birth          1950        About your hospital stay     You were admitted on:  March 1, 2017 You last received care in the:  [facility]    You were discharged on:  March 1, 2017       Who to Call     For medical emergencies, please call 911.  For non-urgent questions about your medical care, please call your primary care provider or clinic, [phone]  For questions related to your surgery, please call your surgery clinic        Attending Provider     Provider Dakota Garnett MD Gastroenterology       Primary Care Provider Office Phone # Fax #    Felix Sevilla [phone]        [facility] [address] [place] [address]        Your next 10 appointments already scheduled     Apr 24, 2017 10:00 AM CDT   LAB with NL LAB [facility] ([facility])    [address] [place] [address] [phone]           Patient must bring picture ID.  Patient should be prepared to give a urine specimen  Please do not eat 10-12 hours before your appointment if you are coming in fasting for labs on lipids, cholesterol, or glucose (sugar).  Pregnant women should follow their Care Team instructions. Water with medications is okay. Do not drink coffee or other fluids.   If you have concerns about taking  your medications, please ask at office or if scheduling via [employer], send a message by clicking on Secure Messaging, Message  Your Care Team.            Apr 24, 2017 10:45 AM CDT   CT CHEST ABDOMEN PELVIS W/O & W CONTRAST with PHCT1   [facility] CT Scan ([facility])    [address] [facility] [address] [phone]           Please bring any scans or X-rays taken at other hospitals, if similar tests were done. Also bring a list of your medicines, including vitamins, minerals and over-the-counter drugs. It is safest to leave personal items at home.  Be sure to tell your doctor:   If you have any allergies.   If there s any chance you are pregnant.   If you are breastfeeding.   If you have any special needs.  You may have contrast for this exam. To prepare:   Do not eat or drink for 2 hours before your exam. If you need to take medicine, you may take it with small sips of water. (We may ask you to take liquid medicine as well.)   The day before your exam, drink extra fluids at least six 8-ounce glasses (unless your doctor tells you to restrict your fluids).  Patients over 70 or patients with diabetes or kidney problems:   If you haven t had a blood test (creatinine test) within the last 30 days, go to your clinic or Diagnostic Imaging Department for this test.  If you have diabetes:   If your kidney function is normal, continue taking your metformin (Avandamet, Glucophage, Glucovance, Metaglip) on the day of your exam.   If your kidney function is abnormal, wait 48 hours before restarting this medicine.  You will have oral contrast for this exam:   You will drink the contrast at home. Get this from your clinic or Diagnostic Imaging Department. Please follow the directions given.  Please wear loose clothing, such as a sweat suit or jogging clothes. Avoid snaps, zippers and other metal. We may ask you to undress and put on a hospital gown.  If you have any questions, please call the Imaging Department where you will have your exam.            Apr 27, 2017  8:00 AM CDT   [facility] with Yasmani [facility]ello,  "[facility]   [facility]    [address] [facility] [address]   [phone]              Pending Results     No orders found from 2017 to 3/2/2017.            Admission Information     Date & Time Provider Department Dept. Phone    3/1/2017 Dakota Wall MD [facility] [phone]      Your Vitals Were     Blood Pressure Temperature Respirations Height Weight Pulse Oximetry    129/77 97.5  F (36.4  C) (Oral) 16 1.842 m (6' 0.5\") 126.7 kg (279 lb 6.4 oz) 94%    BMI (Body Mass Index)                   37.37 kg/m2           MyChart Information     [employer] lets you send messages to your doctor, view your test results, renew your prescriptions, schedule appointments and more. To sign up, go to [email or web address] . Click on \"Log in\" on the left side of the screen, which will take you to the Welcome page. Then click on \"Sign up Now\" on the right side of the page.     You will be asked to enter the access code listed below, as well as some personal information. Please follow the directions to create your username and password.     Your access code is: [email or web address]  Expires: 2017  8:37 AM     Your access code will  in 90 days. If you need help or a new code, please call your [facility] clinic or [phone].        Care EveryWhere ID     This is your Care EveryWhere ID. This could be used by other organizations to access your [facility] medical records  [id number]           Review of your medicines      CONTINUE these medicines which may have CHANGED, or have new prescriptions. If we are uncertain of the size of tablets/capsules you have at home, strength may be listed as something that might have changed.        Dose / Directions    triamcinolone 0.1 % paste   Commonly known as:  KENALOG   This may have changed:    - when to take this  - reasons to take this   Used for:  Ulcer mouth        Take by mouth 2 times daily   Quantity:  5 g "   Refills:  0         CONTINUE these medicines which have NOT CHANGED        Dose / Directions    aspirin 81 MG tablet        Dose:  81 mg   Take 81 mg by mouth daily   Refills:  0       cetirizine 10 MG tablet   Commonly known as:  zyrTEC        Dose:  10 mg   Take 10 mg by mouth daily   Refills:  0       ciprofloxacin 500 MG tablet   Commonly known as:  CIPRO   Used for:  Travel advice encounter        Dose:  500 mg   Take 1 tablet (500 mg) by mouth 2 times daily   Quantity:  6 tablet   Refills:  0       CLARITIN PO        Refills:  0       doxycycline 100 MG capsule   Commonly known as:  VIBRAMYCIN   Used for:  Need for malaria prophylaxis, Travel advice encounter        Dose:  100 mg   Take 1 capsule (100 mg) by mouth daily   Quantity:  40 capsule   Refills:  0       FIRST-MOUTHWASH BLM Susp   Used for:  Canker sores oral        Dose:  5-10 mL   Swish and spit 5-10 mLs in mouth every 4 hours as needed   Quantity:  237 mL   Refills:  1       lidocaine 2 % solution   Commonly known as:  XYLOCAINE   Used for:  Ulcer mouth        Dose:  15 mL   Take 15 mLs by mouth every 2 hours as needed for moderate pain swish and spit every 3-8 hours as needed; max 8 doses/24 hour period   Quantity:  100 mL   Refills:  0       simvastatin 20 MG tablet   Commonly known as:  ZOCOR   Used for:  Hyperlipidemia with target LDL less than 130        TAKE 1 TABLET BY MOUTH ISSA Y AT BEDTIME   Quantity:  90 tablet   Refills:  3       TYLENOL PO        Dose:  1000 mg   Take 1,000 mg by mouth   Refills:  0       typhoid CR capsule   Commonly known as:  VIVOTIF   Used for:  Requires typhoid vaccination, Travel advice encounter        Dose:  1 capsule   Take 1 capsule by mouth every other day   Quantity:  4 capsule   Refills:  0       valACYclovir 500 MG tablet   Commonly known as:  VALTREX   Used for:  Herpes simplex virus infection        Dose:  500 mg   Take 1 tablet (500 mg) by mouth daily   Quantity:  90 tablet   Refills:  3                 Protect others around you: Learn how to safely use, store and throw away your medicines at www.disposemymeds.org.             Medication List: This is a list of all your medications and when to take them. Check marks below indicate your daily home schedule. Keep this list as a reference.      Medications           Morning Afternoon Evening Bedtime As Needed    aspirin 81 MG tablet   Take 81 mg by mouth daily                                cetirizine 10 MG tablet   Commonly known as:  zyrTEC   Take 10 mg by mouth daily                                ciprofloxacin 500 MG tablet   Commonly known as:  CIPRO   Take 1 tablet (500 mg) by mouth 2 times daily                                CLARITIN PO                                doxycycline 100 MG capsule   Commonly known as:  VIBRAMYCIN   Take 1 capsule (100 mg) by mouth daily                                FIRST-MOUTHWASH BLM Susp   Swish and spit 5-10 mLs in mouth every 4 hours as needed                                lidocaine 2 % solution   Commonly known as:  XYLOCAINE   Take 15 mLs by mouth every 2 hours as needed for moderate pain swish and spit every 3-8 hours as needed; max 8 doses/24 hour period                                simvastatin 20 MG tablet   Commonly known as:  ZOCOR   TAKE 1 TABLET BY MOUTH ISSA Y AT BEDTIME                                triamcinolone 0.1 % paste   Commonly known as:  KENALOG   Take by mouth 2 times daily                                TYLENOL PO   Take 1,000 mg by mouth                                typhoid CR capsule   Commonly known as:  VIVOTIF   Take 1 capsule by mouth every other day                                valACYclovir 500 MG tablet   Commonly known as:  VALTREX   Take 1 tablet (500 mg) by mouth daily

## 2017-03-01 NOTE — IP AVS SNAPSHOT
[facility]    [address] [facility] [address]    Phone:  [phone]                                       After Visit Summary   3/1/2017    Deandre Merchant    MRN: [id number]           After Visit Summary Signature Page     I have received my discharge instructions, and my questions have been answered. I have discussed any challenges I see with this plan with the nurse or doctor.    ..........................................................................................................................................  Patient/Patient Representative Signature      ..........................................................................................................................................  Patient Representative Print Name and Relationship to Patient    ..................................................               ................................................  Date                                            Time    ..........................................................................................................................................  Reviewed by Signature/Title    ...................................................              ..............................................  Date                                                            Time

## 2017-04-24 ENCOUNTER — HOSPITAL ENCOUNTER (OUTPATIENT)
Dept: CT IMAGING | Facility: CLINIC | Age: 67
Discharge: HOME | End: 2017-04-24
Attending: INTERNAL MEDICINE | Admitting: INTERNAL MEDICINE
Payer: MEDICARE

## 2017-04-24 DIAGNOSIS — C82.80 LYMPHOMA, SMALL LYMPHOID, FOLLICULAR (H): ICD-10-CM

## 2017-04-24 LAB
ALBUMIN SERPL-MCNC: 3.7 G/DL (ref 3.4–5)
ALP SERPL-CCNC: 111 U/L (ref 40–150)
ALT SERPL W P-5'-P-CCNC: 35 U/L (ref 0–70)
ANION GAP SERPL CALCULATED.3IONS-SCNC: 10 MMOL/L (ref 3–14)
AST SERPL W P-5'-P-CCNC: 26 U/L (ref 0–45)
BASOPHILS # BLD AUTO: 0 10E9/L (ref 0–0.2)
BASOPHILS NFR BLD AUTO: 1.1 %
BILIRUB SERPL-MCNC: 0.7 MG/DL (ref 0.2–1.3)
BUN SERPL-MCNC: 18 MG/DL (ref 7–30)
CALCIUM SERPL-MCNC: 8.6 MG/DL (ref 8.5–10.1)
CHLORIDE SERPL-SCNC: 106 MMOL/L (ref 94–109)
CO2 SERPL-SCNC: 24 MMOL/L (ref 20–32)
CREAT SERPL-MCNC: 0.81 MG/DL (ref 0.66–1.25)
DIFFERENTIAL METHOD BLD: ABNORMAL
EOSINOPHIL # BLD AUTO: 0.2 10E9/L (ref 0–0.7)
EOSINOPHIL NFR BLD AUTO: 5.7 %
ERYTHROCYTE [DISTWIDTH] IN BLOOD BY AUTOMATED COUNT: 13.6 % (ref 10–15)
GFR SERPL CREATININE-BSD FRML MDRD: ABNORMAL ML/MIN/1.7M2
GLUCOSE SERPL-MCNC: 106 MG/DL (ref 70–99)
HCT VFR BLD AUTO: 40.4 % (ref 40–53)
HGB BLD-MCNC: 13.4 G/DL (ref 13.3–17.7)
IMM GRANULOCYTES # BLD: 0 10E9/L (ref 0–0.4)
IMM GRANULOCYTES NFR BLD: 1.1 %
LYMPHOCYTES # BLD AUTO: 0.5 10E9/L (ref 0.8–5.3)
LYMPHOCYTES NFR BLD AUTO: 18.1 %
MCH RBC QN AUTO: 29.9 PG (ref 26.5–33)
MCHC RBC AUTO-ENTMCNC: 33.2 G/DL (ref 31.5–36.5)
MCV RBC AUTO: 90 FL (ref 78–100)
MONOCYTES # BLD AUTO: 0.7 10E9/L (ref 0–1.3)
MONOCYTES NFR BLD AUTO: 24.5 %
NEUTROPHILS # BLD AUTO: 1.3 10E9/L (ref 1.6–8.3)
NEUTROPHILS NFR BLD AUTO: 49.5 %
PLATELET # BLD AUTO: 160 10E9/L (ref 150–450)
PLATELET # BLD EST: NORMAL 10*3/UL
POTASSIUM SERPL-SCNC: 4.4 MMOL/L (ref 3.4–5.3)
PROT SERPL-MCNC: 6.3 G/DL (ref 6.8–8.8)
RBC # BLD AUTO: 4.48 10E12/L (ref 4.4–5.9)
RBC MORPH BLD: NORMAL
SODIUM SERPL-SCNC: 140 MMOL/L (ref 133–144)
WBC # BLD AUTO: 2.7 10E9/L (ref 4–11)

## 2017-04-24 PROCEDURE — 85025 COMPLETE CBC W/AUTO DIFF WBC: CPT | Performed by: INTERNAL MEDICINE

## 2017-04-24 PROCEDURE — 36415 COLL VENOUS BLD VENIPUNCTURE: CPT | Performed by: INTERNAL MEDICINE

## 2017-04-24 PROCEDURE — 71260 CT THORAX DX C+: CPT

## 2017-04-24 PROCEDURE — 25500064 ZZH RX 255 OP 636: Performed by: RADIOLOGY

## 2017-04-24 PROCEDURE — 80053 COMPREHEN METABOLIC PANEL: CPT | Performed by: INTERNAL MEDICINE

## 2017-04-24 PROCEDURE — 74177 CT ABD & PELVIS W/CONTRAST: CPT

## 2017-04-24 PROCEDURE — 25000125 ZZHC RX 250: Performed by: RADIOLOGY

## 2017-04-24 RX ORDER — IOPAMIDOL 755 MG/ML
100 INJECTION, SOLUTION INTRAVASCULAR ONCE
Status: COMPLETED | OUTPATIENT
Start: 2017-04-24 | End: 2017-04-24

## 2017-04-24 RX ADMIN — IOPAMIDOL 100 ML: 755 INJECTION, SOLUTION INTRAVENOUS at 10:54

## 2017-04-24 RX ADMIN — SODIUM CHLORIDE 60 ML: 9 INJECTION, SOLUTION INTRAVENOUS at 10:54

## 2017-04-27 ENCOUNTER — ONCOLOGY VISIT (OUTPATIENT)
Dept: ONCOLOGY | Facility: CLINIC | Age: 67
End: 2017-04-27
Payer: MEDICARE

## 2017-04-27 VITALS
HEART RATE: 87 BPM | TEMPERATURE: 97.7 F | RESPIRATION RATE: 18 BRPM | WEIGHT: 277.2 LBS | SYSTOLIC BLOOD PRESSURE: 124 MMHG | OXYGEN SATURATION: 95 % | HEIGHT: 73 IN | DIASTOLIC BLOOD PRESSURE: 80 MMHG | BODY MASS INDEX: 36.74 KG/M2

## 2017-04-27 DIAGNOSIS — B00.1 HERPES LABIALIS: Primary | ICD-10-CM

## 2017-04-27 PROCEDURE — 99214 OFFICE O/P EST MOD 30 MIN: CPT | Performed by: INTERNAL MEDICINE

## 2017-04-27 RX ORDER — VALACYCLOVIR HYDROCHLORIDE 1 G/1
2000 TABLET, FILM COATED ORAL 2 TIMES DAILY
Qty: 4 TABLET | Refills: 3 | Status: SHIPPED | OUTPATIENT
Start: 2017-04-27 | End: 2017-07-28

## 2017-04-27 ASSESSMENT — PAIN SCALES - GENERAL: PAINLEVEL: MILD PAIN (2)

## 2017-04-27 NOTE — NURSING NOTE
DISCHARGE PLAN:  Next appointments: See patient instruction section  Departure Mode: Ambulatory  Accompanied by: self  5 minutes for nursing discharge (face to face time)     Deandre Merchant is here today for 3 month follow up.  Writing nurse seen patient after Medical Oncology appointment to address questions/concerns/coordinate care. Patient to follow up in 6 months with labs prior. Patient ambulated by nurse to  to schedule follow up and/or lab appointments. See patient instructions and Oncologist's Progress note for further details. Questions and concerns addressed to patient's satisfaction. Patient verbalized and demonstrated understanding of plan.  Contact information provided and patient is encouraged to call with any that arise in the interim of care.    Markell Mistry, RN, BSN, OCN   Oncology Care Coordinator RN  [facility]  [phone]  4/27/2017, 8:38 AM

## 2017-04-27 NOTE — NURSING NOTE
"Oncology Rooming Note    April 27, 2017 8:03 AM   Deandre Merchant is a 53 year old male who presents for: Oncology Clinic Visit and Results    Initial Vitals: /80 (BP Location: Right arm, Patient Position: Chair, Cuff Size: Adult Large)  Pulse 87  Temp 97.7  F (36.5  C) (Temporal)  Resp 18  Ht 1.842 m (6' 0.5\")  Wt 125.7 kg (277 lb 3.2 oz)  SpO2 95%  BMI 37.08 kg/m2 Estimated body mass index is 37.08 kg/(m^2) as calculated from the following:    Height as of this encounter: 1.842 m (6' 0.5\").    Weight as of this encounter: 125.7 kg (277 lb 3.2 oz). Body surface area is 2.54 meters squared.  Mild Pain (2) Comment: mouth   No LMP for male patient.  Allergies reviewed: Yes  Medications reviewed: Yes    Medications: Medication refills not needed today.  Pharmacy name entered into [employer]:    [facility] - [place], [place] - [address] [place] [address] [address] - [place], [place] - [address] [place] [address]    Clinical concerns:Concerned about sores in mouth. Dr. Coello was notified.        Vika Licea MA                "

## 2017-04-27 NOTE — PATIENT INSTRUCTIONS
Please follow up in 6 months with labs.  Please come 15-30 minutes prior to follow up for labs or schedule 1-5 days prior.    Lab Date/Time:    Follow Up Date/Time:     If you have any questions or concerns please feel free to call.    Markell Mistry RN, BSN   Oncology Care Coordinator RN  [facility]  [phone]

## 2017-04-27 NOTE — PROGRESS NOTES
"FOLLOW-UP VISIT NOTE    PATIENT NAME: Deandre Merchant MRN # [id number]  DATE OF VISIT: Apr 27, 2017 YOB: 1950    REFERRING PROVIDER: No referring provider defined for this encounter.    CANCER TYPE: Stage IV low-grade lymphoma      SUBJECTIVE     Deandre Merchant is a 67 year old male returns for his first follow-up after completing chemotherapy and maintenance Rituxan for stage IV low-grade lymphoma.  #1. 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     #2April 2014 through September 2014 had bendamustine and Rituxan and went into remission.     #3 since December 2014 has been on every 2 month months maintenance Rituxan. He completed that treatment with last dose 10/4/16.    Deandre is currently doing well. He is bothered by recurring \"cold sores\" he currently has a sore on his left tongue and left lip. He did a one-week trip to [facility]. He denies fever night sweats or weight loss.       PAST MEDICAL HISTORY     Past Medical History:   Diagnosis Date     BPH (benign prostatic hyperplasia)      Lymphadenopathy     mediastinal & retroperitoneal     Polyps, colonic      Retinal detachment            CURRENT OUTPATIENT MEDICATIONS     Current Outpatient Prescriptions   Medication Sig Dispense Refill     valACYclovir (VALTREX) 1000 mg tablet Take 2 tablets (2,000 mg) by mouth 2 times daily For 2 doses 4 tablet 3     DPH-Lido-AlHydr-MgHydr-Simeth (FIRST-MOUTHWASH BLM) SUSP Swish and spit 5-10 mLs in mouth every 4 hours as needed 237 mL 1     Loratadine (CLARITIN PO)        simvastatin (ZOCOR) 20 MG tablet TAKE 1 TABLET BY MOUTH ISSA Y AT BEDTIME 90 tablet 3     typhoid (VIVOTIF) CR capsule Take 1 capsule by mouth every other day 4 capsule 0     doxycycline (VIBRAMYCIN) 100 MG capsule Take 1 capsule (100 mg) by mouth daily 40 capsule 0     ciprofloxacin (CIPRO) 500 MG tablet Take 1 tablet (500 mg) by mouth 2 times daily 6 tablet 0     " triamcinolone (KENALOG) 0.1 % paste Take by mouth 2 times daily (Patient taking differently: Take by mouth 2 times daily as needed ) 5 g 0     lidocaine (XYLOCAINE) 2 % solution (viscous) Take 15 mLs by mouth every 2 hours as needed for moderate pain swish and spit every 3-8 hours as needed; max 8 doses/24 hour period 100 mL 0     cetirizine (ZYRTEC) 10 MG tablet Take 10 mg by mouth daily       aspirin 81 MG tablet Take 81 mg by mouth daily       Acetaminophen (TYLENOL PO) Take 1,000 mg by mouth       [DISCONTINUED] valACYclovir (VALTREX) 500 MG tablet Take 1 tablet (500 mg) by mouth daily 90 tablet 3        ALLERGIES     Allergies   Allergen Reactions     Allopurinol Rash        REVIEW OF SYSTEMS   As above in the HPI, remainder of the review of systems is negative.     PHYSICAL EXAM   B/P: 124/80, T: 97.7, P: 87, R: 18  SpO2 Readings from Last 4 Encounters:   04/27/17 95%   03/01/17 94%   01/25/17 94%   10/04/16 97%     Wt Readings from Last 3 Encounters:   04/27/17 125.7 kg (277 lb 3.2 oz)   03/01/17 126.7 kg (279 lb 6.4 oz)   02/10/17 126.7 kg (279 lb 6.4 oz)     GEN: NAD  HEENT: Eyes visibly normal, no icterus, injection or pallor. Left lower lip with small herpetic appearing sore.  NECK: no cervical or supraclavicular lymphadenopathy  LUNGS: clear bilaterally  CV: regular, no murmurs, rubs, or gallops  ABDOMEN: soft, non-tender, non-distended, normal bowel sounds, no hepatosplenomegaly by percussion or palpation  EXT: warm, well perfused, no edema  NEURO: alert  SKIN: no rashes   LABORATORY AND IMAGING STUDIES     Recent Labs   Lab Test  04/24/17   1004  02/10/17   1107   05/06/14   0752   05/02/13   0731   NA  140  140   < >  140   < >   --    POTASSIUM  4.4  4.4   < >  4.3   < >   --    CHLORIDE  106  106   < >  103   < >   --    BUN  18  19   < >  28   < >   --    CR  0.81  0.86   < >  1.06   < >   --    GLC  106*  99   < >  137*   < >   --    ANDRÉS  8.6  8.8   < >  8.8   < >   --    MAG   --    --    --    2.1   --   2.3   PHOS   --    --    --   3.7   --   4.3    < > = values in this interval not displayed.     Recent Labs   Lab Test  04/24/17   1004  01/25/17   0739  10/04/16   0924  08/02/16   0753   WBC  2.7*  2.1*  2.6*  2.4*   HGB  13.4  14.2  14.0  13.4   PLT  160  151  163  146*   MCV  90  88  89  90   NEUTROPHIL  49.5  44.4   --   60.2     Recent Labs   Lab Test  04/24/17   1004  02/10/17   1107  01/25/17   0739  10/04/16   0924  08/02/16   0753   BILITOTAL  0.7  0.9  0.6  0.8  0.9   ALKPHOS  111  116  126  109  108   ALT  35  55  52  43  39   AST  26  44  40  33  34   ALBUMIN  3.7  4.1  3.9  3.7  3.7   LDH   --    --   174  179  172     TSH   Date Value Ref Range Status   05/06/2014 1.60 0.4 - 5.0 mU/L Final   ]    All laboratory data reviewed    Results for orders placed or performed during the hospital encounter of 04/24/17   CT Chest/Abdomen/Pelvis w Contrast    Narrative    CT CHEST/ABDOMEN/PELVIS WITH CONTRAST  4/24/2017 11:14 AM    HISTORY:  Lymphoma. Other types of follicular lymphoma, unspecified  site.    TECHNIQUE: Scans obtained of the chest, abdomen, and pelvis with oral  and IV contrast. 100 mL, Isovue 370 injected. Radiation dose for this  scan was reduced using automated exposure control, adjustment of the  mA and/or kV according to patient size, or iterative reconstruction  technique.    COMPARISON: CT chest, abdomen and pelvis dated 4/2/2015 and 3/31/2014.    FINDINGS:   Chest: Atelectasis or scarring is slightly more prominent in the  anterior left upper lobe superiorly (image 36 series 3). Small nodular  density in the right upper lobe (image 27 series 3) measures 0.2 x 0.7  cm, unchanged since the prior CT dated 4/2/2015 and significantly  improved since the prior study dated 3/31/2014. The lungs are  otherwise grossly clear without other significant nodule, mass,  infiltrate, effusion, or pneumothorax.    The heart is normal in size. Prominence of the ascending aorta is  again noted  measuring up to 4.6 cm in diameter. The descending aorta  is of normal caliber and there are atherosclerotic calcifications of  the descending aorta. Central pulmonary arteries demonstrate no  filling defects to suggest pulmonary artery embolism. No mediastinal,  hilar, or axillary lymphadenopathy is identified.    Sclerosis is seen in the bilateral SI joints indicating chronic  sacroiliitis and mild degenerative change. Degenerative changes are  seen throughout the spine, but are worst in the lower lumbar spine. No  aggressive osseous lesions are seen. Evidence of chronic left anterior  rib fracture (through the chondral cartilage) is again seen at the  sixth rib. No acute fractures are identified.    Abdomen and pelvis:  Diffuse fatty infiltration of the liver is again  noted. The liver, gallbladder, pancreas, spleen, bilateral adrenal  glands, and bilateral kidneys otherwise enhance normally. No  hydronephrosis, nephrolithiasis, hydroureter, or ureteral calculus is  identified. Urinary bladder is grossly unremarkable.    Retroperitoneal lymph nodes measure up to 0.9 cm (image 82 series 2)  unchanged since the most recent prior study. Minimally prominent  mesenteric lymph nodes are noted and are slightly less prominent than  on the most recent prior study. No other evidence for lymphadenopathy  is identified.    There is haziness in the adipose tissue of the mesenteric root.      Impression    IMPRESSION:  1. Minimal lymph node prominence in the retroperitoneum is stable as  compared to the most recent prior CT dated 4/2/2015 measuring up to  0.9 cm in short axis. No mesenteric lymphadenopathy is identified.  This has significantly improved since the prior study dated 3/31/2014.  2. Mild haziness of the adipose tissue in the mesenteric root is  similar to the most recent prior study.  3. No new metastases are identified.  4. Diffuse fatty infiltration of the liver is again noted.  5. Nonaneurysmal  atherosclerosis.  6. Stable tiny nodular density in the right middle lobe since the most  recent prior study dated 4/2/2015. This is significantly improved as  compared to the prior study dated 3/31/2014.  7. Ectasia of the ascending thoracic aorta (measuring up to 4.6 cm in  transaxial dimension).    SEBASTIEN LARA MD         ASSESSMENT AND PLAN     1. Stage IV low-grade lymphoma status post bendamustine with Rituxan and 2 years of maintenance Rituxan. Currently remaining in remission.  2. Persistent mild leukopenia, improving, likely secondary to his treatment.  3. Recurring herpes labialis, possibly related to his immunosuppression. Will treat with Valtrex 2 g b.i.d. for one day, and make the prescription refillable if he has further recurrence.    Plan he'll return in 6 months for follow-up with exam and lab.    Sebastien Coello MD

## 2017-04-27 NOTE — MR AVS SNAPSHOT
"              After Visit Summary   4/27/2017    Deandre Merchant    MRN: [id number]           Patient Information     Date Of Birth          1950        Visit Information        Provider Department      4/27/2017 8:00 AM Yasmani Coello MD [facility]        Care Instructions      Please follow up in 6 months with labs.  Please come 15-30 minutes prior to follow up for labs or schedule 1-5 days prior.    Lab Date/Time:    Follow Up Date/Time:     If you have any questions or concerns please feel free to call.    Markell Mistry RN, BSN   Oncology Care Coordinator RN  [facility]  [phone]            Follow-ups after your visit        Who to contact     If you have questions or need follow up information about today's clinic visit or your schedule please contact [facility] directly at [phone].  Normal or non-critical lab and imaging results will be communicated to you by MyChart, letter or phone within 4 business days after the clinic has received the results. If you do not hear from us within 7 days, please contact the clinic through MyChart or phone. If you have a critical or abnormal lab result, we will notify you by phone as soon as possible.  Submit refill requests through [employer] or call your pharmacy and they will forward the refill request to us. Please allow 3 business days for your refill to be completed.          Additional Information About Your Visit        MyChart Information     [employer] lets you send messages to your doctor, view your test results, renew your prescriptions, schedule appointments and more. To sign up, go to [email or web address] . Click on \"Log in\" on the left side of the screen, which will take you to the Welcome page. Then click on \"Sign up Now\" on the right side of the page.     You will be asked to enter the access code listed below, as well as some personal information. Please follow the directions to create your username and " "password.     Your access code is: [email or web address]  Expires: 2017  8:24 AM     Your access code will  in 90 days. If you need help or a new code, please call your [facility] or [phone].        Care EveryWhere ID     This is your Care EveryWhere ID. This could be used by other organizations to access your [place] medical records  [id number]        Your Vitals Were     Pulse Temperature Respirations Height Pulse Oximetry BMI (Body Mass Index)    87 97.7  F (36.5  C) (Temporal) 18 1.842 m (6' 0.5\") 95% 37.08 kg/m2       Blood Pressure from Last 3 Encounters:   17 124/80   17 (!) 114/98   02/10/17 120/84    Weight from Last 3 Encounters:   17 125.7 kg (277 lb 3.2 oz)   17 126.7 kg (279 lb 6.4 oz)   02/10/17 126.7 kg (279 lb 6.4 oz)              Today, you had the following     No orders found for display         Today's Medication Changes          These changes are accurate as of: 17  8:24 AM.  If you have any questions, ask your nurse or doctor.               These medicines have changed or have updated prescriptions.        Dose/Directions    triamcinolone 0.1 % paste   Commonly known as:  KENALOG   This may have changed:    - when to take this  - reasons to take this   Used for:  Ulcer mouth        Take by mouth 2 times daily   Quantity:  5 g   Refills:  0                Primary Care Provider Office Phone # Fax #    Felix Sevilla [phone]       [facility] [address] [place] [address]        Thank you!     Thank you for choosing [facility]  for your care. Our goal is always to provide you with excellent care. Hearing back from our patients is one way we can continue to improve our services. Please take a few minutes to complete the written survey that you may receive in the mail after your visit with us. Thank you!             Your Updated Medication List - Protect others around you: Learn how to safely use, " store and throw away your medicines at www.disposemymeds.org.          This list is accurate as of: 4/27/17  8:24 AM.  Always use your most recent med list.                   Brand Name Dispense Instructions for use    aspirin 81 MG tablet      Take 81 mg by mouth daily       cetirizine 10 MG tablet    zyrTEC     Take 10 mg by mouth daily       ciprofloxacin 500 MG tablet    CIPRO    6 tablet    Take 1 tablet (500 mg) by mouth 2 times daily       CLARITIN PO          doxycycline 100 MG capsule    VIBRAMYCIN    40 capsule    Take 1 capsule (100 mg) by mouth daily       lidocaine 2 % solution    XYLOCAINE    100 mL    Take 15 mLs by mouth every 2 hours as needed for moderate pain swish and spit every 3-8 hours as needed; max 8 doses/24 hour period       lidocaine visc 2% & diphenhydramine 12.5mg/5mL & maalox/mylanta w/simethicone (1:1:1 v/v/v) Susp compounding kit     237 mL    Swish and spit 5-10 mLs in mouth every 4 hours as needed       simvastatin 20 MG tablet    ZOCOR    90 tablet    TAKE 1 TABLET BY MOUTH ISSA Y AT BEDTIME       triamcinolone 0.1 % paste    KENALOG    5 g    Take by mouth 2 times daily       TYLENOL PO      Take 1,000 mg by mouth       typhoid CR capsule    VIVOTIF    4 capsule    Take 1 capsule by mouth every other day       valACYclovir 500 MG tablet    VALTREX    90 tablet    Take 1 tablet (500 mg) by mouth daily

## 2017-06-14 NOTE — PROGRESS NOTES
SUBJECTIVE:                                                    Deandre Merchant is a 67 year old male who presents to clinic today for the following health issues:  /64 (Cuff Size: Adult Large)  Pulse 76  Temp 98.3  F (36.8  C) (Temporal)  Resp 18  Wt 280 lb (127 kg)  BMI 37.45 kg/m2    Rash     Onset: 2 months    Description:   Location: both legs  Character: round, flakey, dark in color  Itching (Pruritis): YES    Progression of Symptoms:  same    Accompanying Signs & Symptoms:  Fever: no   Body aches or joint pain: no   Sore throat symptoms: no   Recent cold symptoms: no    History:   Previous similar rash: YES    Precipitating factors:   Exposure to similar rash: no   New exposures: None   Recent travel: YES- April- went to [facility], Just returned from [place]    Alleviating factors:  none     Therapies Tried and outcome: lotion, Benadryl      Problem list and histories reviewed & adjusted, as indicated.  Additional history: as documented    Current Outpatient Prescriptions   Medication Sig Dispense Refill     triamcinolone (KENALOG) 0.1 % cream Apply sparingly to affected area two times daily for 5 to 7 days. 30 g 0     simvastatin (ZOCOR) 20 MG tablet TAKE 1 TABLET BY MOUTH ISSA Y AT BEDTIME 90 tablet 3     cetirizine (ZYRTEC) 10 MG tablet Take 10 mg by mouth daily       aspirin 81 MG tablet Take 81 mg by mouth daily       Acetaminophen (TYLENOL PO) Take 1,000 mg by mouth       valACYclovir (VALTREX) 1000 mg tablet Take 2 tablets (2,000 mg) by mouth 2 times daily For 2 doses (Patient not taking: Reported on 6/19/2017) 4 tablet 3     DPH-Lido-AlHydr-MgHydr-Simeth (FIRST-MOUTHWASH BLM) SUSP Swish and spit 5-10 mLs in mouth every 4 hours as needed (Patient not taking: Reported on 6/19/2017) 237 mL 1     Loratadine (CLARITIN PO)        typhoid (VIVOTIF) CR capsule Take 1 capsule by mouth every other day (Patient not taking: Reported on 6/19/2017) 4 capsule 0     triamcinolone (KENALOG) 0.1 % paste Take by  mouth 2 times daily (Patient not taking: Reported on 6/19/2017) 5 g 0     lidocaine (XYLOCAINE) 2 % solution (viscous) Take 15 mLs by mouth every 2 hours as needed for moderate pain swish and spit every 3-8 hours as needed; max 8 doses/24 hour period (Patient not taking: Reported on 6/19/2017) 100 mL 0     Allergies   Allergen Reactions     Allopurinol Rash       Reviewed and updated as needed this visit by clinical staff       Reviewed and updated as needed this visit by Provider         ROS:  Constitutional, HEENT, cardiovascular, pulmonary, gi and gu systems are negative, except as otherwise noted. Patient is being treated for Lymphoma, also complain of ingrown toe nail, left great toe, going on on and off for several days.    OBJECTIVE:                                                    /64 (Cuff Size: Adult Large)  Pulse 76  Temp 98.3  F (36.8  C) (Temporal)  Resp 18  Wt 280 lb (127 kg)  BMI 37.45 kg/m2  Body mass index is 37.45 kg/(m^2).  GENERAL: healthy, alert and no distress  MS: no gross musculoskeletal defects noted, no edema  NEURO: Normal strength and tone, mentation intact and speech normal  Location: both legs, Character: round, flakey, dark in color, Itching (Pruritis): YES  Ingrown left great toenail with mild erythema     ASSESSMENT/PLAN:                                                        ICD-10-CM    1. Rash and nonspecific skin eruption R21 triamcinolone (KENALOG) 0.1 % cream   2. Ingrowing left great toenail L60.0      The patient understood the rational for the diagnosis and treatment plan.   All questions were answered to best of my ability and the patient's satisfaction.  Risks, benefits and alternatives of treatments discussed. Plan agreed on.    Will call, return to clinic, or go to ED if worsening or symptoms not improving as discussed. See patient instructions.       Patient Instructions       Ingrown Toenail, Not Infected (Home Treatment)  An ingrown toenail occurs when  the nail grows sideways into the skin next to the nail. This can cause pain, especially when wearing tight shoes. It can also lead to an infection with redness, swelling, and pus drainage. Most people respond to the treatments described here. Sometimes surgery is needed, however.  Home care  The following guidelines will help you care for your toenail at home:    Soak the painful toe in warm water twice a day for 10 to 20 minutes each time. Wash the entire foot with an antibacterial soap.    If there is redness or swelling around the toenail, apply an antibiotic ointment three times a day.    Insert a small piece of rolled-up cotton under the corner of the nail to promote growth of the nail outward, away from the cuticle.    Wear shoes that do not put pressure on the toes, such as a sandal or open shoe. Closed shoes should be big enough in the toes so that there is no pressure on the painful toe.    You may use acetaminophen or ibuprofen for pain, unless another pain medicine was prescribed. Talk with your healthcare provider before using these medicines if you have chronic liver or kidney disease. Also tell your healthcare provider if you have ever had a stomach ulcer or GI bleeding.  Prevention  The following tips will help you prevent ingrown toenails:    Avoid pointed, tight, or narrow shoes.    Trim toenails once a month so they don t grow too long. Cut the nail straight across.  Follow-up care  Follow up with your healthcare provider or as advised.  When to seek medical advice  Call your health care provider right away if any of these occur:    Increasing redness, pain, or swelling of the toe    Tender red streaks in the skin leading toward the ankle    Pus or fluid drainage from the toe    Fever of 100.4 F (38 C) or higher  Date Last Reviewed: 5/14/2015 2000-2017 [employer]. [address] [place] [address], [place] [address]. All rights reserved. This information is not intended as a substitute for  professional medical care. Always follow your healthcare professional's instructions.        Understanding Ingrown Toenails    An ingrown nail is the result of a nail growing into the skin that surrounds it. This often occurs at either edge of the big toe. Ingrown nails may be caused by improper trimming, inherited nail deformities, injuries, fungal infections, or pressure.  Symptoms  Ingrown nails may cause pain at the tip of the toe or all the way to the base of the toe. The pain is often worse while walking. An ingrown nail may also lead to infection, inflammation, or a more serious condition. If it s infected, you might see pus or redness.  Evaluation  To determine the extent of your problem, your healthcare provider examines and possibly presses the painful area. If other problems are suspected, blood tests, cultures, and X-rays may be done as well.  Treatment  If the nail isn t infected, your healthcare provider may trim the corner of it to help relieve your symptoms. He or she may need to remove one side of your nail back to the cuticle. The base of the nail may then be treated with a chemical to keep the ingrown part from growing back. Severe infections or ingrown nails may require antibiotics and temporary or permanent removal of a portion of the nail. To prevent pain, a local anesthetic may be used in these procedures. This treatment is usually done at your healthcare provider's office.  Prevention  Many nail problems can be prevented by wearing the right shoes and trimming your nails properly. To help avoid infection, keep your feet clean and dry. If you have diabetes, talk with your healthcare provider before doing any foot self-care.    The right shoes: Get your feet measured (your size may change as you age). Wear shoes that are supportive and roomy enough for your toes to wiggle. Look for shoes made of natural materials such as leather, which allow your feet to breathe.    Proper trimming: To avoid  problems, trim your toenails straight across without cutting down into the corners. If you can t trim your own nails, ask your healthcare provider to do so for you.  Date Last Reviewed: 10/1/2016    7147-5897 [employer]. [address] [place] [address], [place] [address]. All rights reserved. This information is not intended as a substitute for professional medical care. Always follow your healthcare professional's instructions.        Chigger Bite  Chiggers are tiny mites that attach themselves to the skin to feed on skin cells. It is only the larvae (babies) of the chigger that bite people. When a chigger bites you, it attaches tiny mouth parts to the skin, usually near a skin pore or hair follicle. The bite is painless. They do not burrow under your skin or suck your blood.  After biting, they inject saliva (spit) into the skin. The saliva has an enzyme that breaks down skin cells into a liquid. This is what they eat. To prevent the saliva from spreading, the skin cells around the saliva harden. This causes the redness, swelling, and severe itching. In [place], chigger bites do not transmit disease. But they do cause significant itching as the bite heals. This may take a week or longer.     Chiggers often bite on the feet or around the waist as they crawl until they reach constrictive clothing.   Home care  The healthcare provider may recommend over-the-counter (OTC) medicines containing antihistamines to help relieve itching and swelling. Use each medicine according to the directions on the package. If the bite becomes infected, an antibiotic will be prescribed. This may be in pill form taken by mouth or as an ointment or cream put directly on the skin. Be sure to use this medicine exactly as prescribed. You should continue using your antibiotic until you are told you can stop, even if you re feeling better.  The following are general care guidelines:      Symptoms usually go away on their own within  1 to 2 weeks.    Wash all the clothing you were using when you were bitten, since there may still be mites in them. The same goes for blankets or sleeping bags. Use hot water.    To help prevent infection, avoid scratching and picking as much as possible.    To help relieve itching and swelling, apply ice wrapped in a thin towel to the bites. Do this for up to 10 minutes at a time. Be careful not to freeze your skin, as this will damage it.    An OTC anti-itch cream like calamine, diphenhydramine, or hydrocortisone may be helpful.    Avoid hot baths or showers. This can make itching worse.    Contrary to popular belief, chiggers do not burrow in the skin. Do not apply alcohol, heat, or other home remedies to the skin in an attempt to remove chiggers.  Avoiding chigger bites  These are things you can do to prevent chigger bites:    Wear socks, long sleeves and long pants when you are outdoors where chiggers have likely gathered, such as in tall grass. Also use insect repellent. The most effective repellents contain DEET (10% to 30%). Children should not use more than 10% strength of DEET. Infants and pregnant women in the first trimester should not use DEET.    You can spray your clothing with a repellent containing DEET or permethrin. If you do this, you do not need to put repellent on the skin under clothing that has been sprayed. [place] it around the openings of your clothes. This includes the cuffs, waistband, shirt neck, and tops of boots.    Sulfur powder can also be applied to clothing. But sulfur can have a strong odor, especially when mixed with sweat. It can also irritate sensitive skin.  Follow-up care  Follow up with your healthcare provider, or as advised.  When to seek medical advice  Call your healthcare provider right away if any of the following occur:    Shortness of breath or difficulty breathing    Fever of 100.4 F (38 C) or higher    Dizziness, weakness, or fainting    Headache, fever, chills,  muscle or joint aching, or vomiting    New rash    Signs of infection, such as increased swelling and pain, warmth, red streaks, or drainage    Drainage from the bite area  Date Last Reviewed: 1/12/2016 2000-2017 [employer]. [address] [place] [address], [place] [address]. All rights reserved. This information is not intended as a substitute for professional medical care. Always follow your healthcare professional's instructions.        Self-Care for Skin Rashes     Pat your skin dry. Do not rub.     When your skin reacts to a substance your body is sensitive to, it can cause a rash. You can treat most rashes at home by keeping the skin clean and dry. Many rashes may get better on their own within 2 to 3 days. You may need medical attention if your rash itches, drains, or hurts, particularly if the rash is getting worse.  What can cause a skin rash?    Sun poisoning, caused by too much exposure to the sun    An irritant or allergic reaction to a certain type of food, plant, or chemical, such as  shellfish, poison ivy, and or cleaning products    An infection caused by a fungus (ringworm), virus (chickenpox), or bacteria (strep)    Bites or infestation caused by insects or pests, such as ticks, lice, or mites    Dry skin, which is often seen during the winter months and in older people  How can I control itching and skin damage?    Take soothing lukewarm baths in a colloidal oatmeal product. You can buy this at the [employer].    Do your best not to scratch. Clip fingernails short, especially in young children, to reduce skin damage if scratching does occur.    Use moisturizing skin lotion instead of scratching your dry skin.    Use sunscreen whenever going out into direct sun.    Use only mild cleansing agents whenever possible.    Wash with mild, nonirritating soap and warm water.    Wear clothing that breathes, such as cotton shirts or canvas shoes.    If fluid is seeping from the rash, cover it loosely  with clean gauze to absorb the discharge.    Many rashes are contagious. Prevent the rash from spreading to others by washing your hands often before or after touching others with any skin rash.  Use medicine    Antihistamines such as diphenhydramine can help control itching. But use with caution because they can make you drowsy.    Using over-the-counter hydrocortisone cream on small rashes may help reduce swelling and itching    Most over-the-counter antifungal medicines can treat athlete s foot and many other fungal infections of the skin.  Check with your healthcare provider  Call your healthcare provider if:    You were told that you have a fungal infection on your skin to make sure you have the correct type of medicine.    You have questions or concerns about medicines or their side effects.     Call 911  Call 911 if either of these occur:    Your tongue or lips start to swell    You have difficulty breathing      Call your healthcare provider  Call your healthcare provider if any of these occur:    Temperature of more than 101.0 F (38.3 C), or as directed    Sore throat, a cough, or unusual fatigue    Red, oozy, or painful rash gets worse. These are signs of infection.    Rash covers your face, genitals, or most of your body    Crusty sores or red rings that begin to spread    You were exposed to someone who has a contagious rash, such as scabies or lice.    Red bull s-eye rash with a white center (a sign of Lyme disease)    You were told that you have resistant bacteria (MRSA) on your skin.   Date Last Reviewed: 5/12/2015 2000-2017 [employer]. [address] [place] [address], [place] [address]. All rights reserved. This information is not intended as a substitute for professional medical care. Always follow your healthcare professional's instructions.            Sarah Holloway MD  [facility]

## 2017-06-19 ENCOUNTER — OFFICE VISIT (OUTPATIENT)
Dept: FAMILY MEDICINE | Facility: OTHER | Age: 67
End: 2017-06-19
Payer: MEDICARE

## 2017-06-19 VITALS
DIASTOLIC BLOOD PRESSURE: 64 MMHG | TEMPERATURE: 98.3 F | SYSTOLIC BLOOD PRESSURE: 120 MMHG | WEIGHT: 280 LBS | BODY MASS INDEX: 37.45 KG/M2 | HEART RATE: 76 BPM | RESPIRATION RATE: 18 BRPM

## 2017-06-19 DIAGNOSIS — R21 RASH AND NONSPECIFIC SKIN ERUPTION: Primary | ICD-10-CM

## 2017-06-19 DIAGNOSIS — L60.0 INGROWING LEFT GREAT TOENAIL: ICD-10-CM

## 2017-06-19 PROCEDURE — 99213 OFFICE O/P EST LOW 20 MIN: CPT | Performed by: FAMILY MEDICINE

## 2017-06-19 RX ORDER — TRIAMCINOLONE ACETONIDE 1 MG/G
CREAM TOPICAL
Qty: 30 G | Refills: 0 | Status: SHIPPED | OUTPATIENT
Start: 2017-06-19 | End: 2018-01-29

## 2017-06-19 ASSESSMENT — PAIN SCALES - GENERAL: PAINLEVEL: NO PAIN (0)

## 2017-06-19 NOTE — MR AVS SNAPSHOT
After Visit Summary   6/19/2017    Deandre Merchant    MRN: [id number]           Patient Information     Date Of Birth          1950        Visit Information        Provider Department      6/19/2017 11:30 AM Sarah Holloway MD [facility]        Care Instructions      Ingrown Toenail, Not Infected (Home Treatment)  An ingrown toenail occurs when the nail grows sideways into the skin next to the nail. This can cause pain, especially when wearing tight shoes. It can also lead to an infection with redness, swelling, and pus drainage. Most people respond to the treatments described here. Sometimes surgery is needed, however.  Home care  The following guidelines will help you care for your toenail at home:    Soak the painful toe in warm water twice a day for 10 to 20 minutes each time. Wash the entire foot with an antibacterial soap.    If there is redness or swelling around the toenail, apply an antibiotic ointment three times a day.    Insert a small piece of rolled-up cotton under the corner of the nail to promote growth of the nail outward, away from the cuticle.    Wear shoes that do not put pressure on the toes, such as a sandal or open shoe. Closed shoes should be big enough in the toes so that there is no pressure on the painful toe.    You may use acetaminophen or ibuprofen for pain, unless another pain medicine was prescribed. Talk with your healthcare provider before using these medicines if you have chronic liver or kidney disease. Also tell your healthcare provider if you have ever had a stomach ulcer or GI bleeding.  Prevention  The following tips will help you prevent ingrown toenails:    Avoid pointed, tight, or narrow shoes.    Trim toenails once a month so they don t grow too long. Cut the nail straight across.  Follow-up care  Follow up with your healthcare provider or as advised.  When to seek medical advice  Call your health care provider right away if any of  these occur:    Increasing redness, pain, or swelling of the toe    Tender red streaks in the skin leading toward the ankle    Pus or fluid drainage from the toe    Fever of 100.4 F (38 C) or higher  Date Last Reviewed: 5/14/2015 2000-2017 [employer]. [address] [place] [id number]. All rights reserved. This information is not intended as a substitute for professional medical care. Always follow your healthcare professional's instructions.        Understanding Ingrown Toenails    An ingrown nail is the result of a nail growing into the skin that surrounds it. This often occurs at either edge of the big toe. Ingrown nails may be caused by improper trimming, inherited nail deformities, injuries, fungal infections, or pressure.  Symptoms  Ingrown nails may cause pain at the tip of the toe or all the way to the base of the toe. The pain is often worse while walking. An ingrown nail may also lead to infection, inflammation, or a more serious condition. If it s infected, you might see pus or redness.  Evaluation  To determine the extent of your problem, your healthcare provider examines and possibly presses the painful area. If other problems are suspected, blood tests, cultures, and X-rays may be done as well.  Treatment  If the nail isn t infected, your healthcare provider may trim the corner of it to help relieve your symptoms. He or she may need to remove one side of your nail back to the cuticle. The base of the nail may then be treated with a chemical to keep the ingrown part from growing back. Severe infections or ingrown nails may require antibiotics and temporary or permanent removal of a portion of the nail. To prevent pain, a local anesthetic may be used in these procedures. This treatment is usually done at your healthcare provider's office.  Prevention  Many nail problems can be prevented by wearing the right shoes and trimming your nails properly. To help avoid infection, keep  your feet clean and dry. If you have diabetes, talk with your healthcare provider before doing any foot self-care.    The right shoes: Get your feet measured (your size may change as you age). Wear shoes that are supportive and roomy enough for your toes to wiggle. Look for shoes made of natural materials such as leather, which allow your feet to breathe.    Proper trimming: To avoid problems, trim your toenails straight across without cutting down into the corners. If you can t trim your own nails, ask your healthcare provider to do so for you.  Date Last Reviewed: 10/1/2016    8110-6757 [employer]. [address] [place] [address], [place] [address]. All rights reserved. This information is not intended as a substitute for professional medical care. Always follow your healthcare professional's instructions.        Chigger Bite  Chiggers are tiny mites that attach themselves to the skin to feed on skin cells. It is only the larvae (babies) of the chigger that bite people. When a chigger bites you, it attaches tiny mouth parts to the skin, usually near a skin pore or hair follicle. The bite is painless. They do not burrow under your skin or suck your blood.  After biting, they inject saliva (spit) into the skin. The saliva has an enzyme that breaks down skin cells into a liquid. This is what they eat. To prevent the saliva from spreading, the skin cells around the saliva harden. This causes the redness, swelling, and severe itching. In [place], chigger bites do not transmit disease. But they do cause significant itching as the bite heals. This may take a week or longer.     Chiggers often bite on the feet or around the waist as they crawl until they reach constrictive clothing.   Home care  The healthcare provider may recommend over-the-counter (OTC) medicines containing antihistamines to help relieve itching and swelling. Use each medicine according to the directions on the package. If the bite becomes  infected, an antibiotic will be prescribed. This may be in pill form taken by mouth or as an ointment or cream put directly on the skin. Be sure to use this medicine exactly as prescribed. You should continue using your antibiotic until you are told you can stop, even if you re feeling better.  The following are general care guidelines:      Symptoms usually go away on their own within 1 to 2 weeks.    Wash all the clothing you were using when you were bitten, since there may still be mites in them. The same goes for blankets or sleeping bags. Use hot water.    To help prevent infection, avoid scratching and picking as much as possible.    To help relieve itching and swelling, apply ice wrapped in a thin towel to the bites. Do this for up to 10 minutes at a time. Be careful not to freeze your skin, as this will damage it.    An OTC anti-itch cream like calamine, diphenhydramine, or hydrocortisone may be helpful.    Avoid hot baths or showers. This can make itching worse.    Contrary to popular belief, chiggers do not burrow in the skin. Do not apply alcohol, heat, or other home remedies to the skin in an attempt to remove chiggers.  Avoiding chigger bites  These are things you can do to prevent chigger bites:    Wear socks, long sleeves and long pants when you are outdoors where chiggers have likely gathered, such as in tall grass. Also use insect repellent. The most effective repellents contain DEET (10% to 30%). Children should not use more than 10% strength of DEET. Infants and pregnant women in the first trimester should not use DEET.    You can spray your clothing with a repellent containing DEET or permethrin. If you do this, you do not need to put repellent on the skin under clothing that has been sprayed. [place] it around the openings of your clothes. This includes the cuffs, waistband, shirt neck, and tops of boots.    Sulfur powder can also be applied to clothing. But sulfur can have a strong odor,  especially when mixed with sweat. It can also irritate sensitive skin.  Follow-up care  Follow up with your healthcare provider, or as advised.  When to seek medical advice  Call your healthcare provider right away if any of the following occur:    Shortness of breath or difficulty breathing    Fever of 100.4 F (38 C) or higher    Dizziness, weakness, or fainting    Headache, fever, chills, muscle or joint aching, or vomiting    New rash    Signs of infection, such as increased swelling and pain, warmth, red streaks, or drainage    Drainage from the bite area  Date Last Reviewed: 1/12/2016 2000-2017 [employer]. [address] [place], [place] [id number]. All rights reserved. This information is not intended as a substitute for professional medical care. Always follow your healthcare professional's instructions.        Self-Care for Skin Rashes     Pat your skin dry. Do not rub.     When your skin reacts to a substance your body is sensitive to, it can cause a rash. You can treat most rashes at home by keeping the skin clean and dry. Many rashes may get better on their own within 2 to 3 days. You may need medical attention if your rash itches, drains, or hurts, particularly if the rash is getting worse.  What can cause a skin rash?    Sun poisoning, caused by too much exposure to the sun    An irritant or allergic reaction to a certain type of food, plant, or chemical, such as  shellfish, poison ivy, and or cleaning products    An infection caused by a fungus (ringworm), virus (chickenpox), or bacteria (strep)    Bites or infestation caused by insects or pests, such as ticks, lice, or mites    Dry skin, which is often seen during the winter months and in older people  How can I control itching and skin damage?    Take soothing lukewarm baths in a colloidal oatmeal product. You can buy this at the [employer].    Do your best not to scratch. Clip fingernails short, especially in young children, to  reduce skin damage if scratching does occur.    Use moisturizing skin lotion instead of scratching your dry skin.    Use sunscreen whenever going out into direct sun.    Use only mild cleansing agents whenever possible.    Wash with mild, nonirritating soap and warm water.    Wear clothing that breathes, such as cotton shirts or canvas shoes.    If fluid is seeping from the rash, cover it loosely with clean gauze to absorb the discharge.    Many rashes are contagious. Prevent the rash from spreading to others by washing your hands often before or after touching others with any skin rash.  Use medicine    Antihistamines such as diphenhydramine can help control itching. But use with caution because they can make you drowsy.    Using over-the-counter hydrocortisone cream on small rashes may help reduce swelling and itching    Most over-the-counter antifungal medicines can treat athlete s foot and many other fungal infections of the skin.  Check with your healthcare provider  Call your healthcare provider if:    You were told that you have a fungal infection on your skin to make sure you have the correct type of medicine.    You have questions or concerns about medicines or their side effects.     Call 911  Call 911 if either of these occur:    Your tongue or lips start to swell    You have difficulty breathing      Call your healthcare provider  Call your healthcare provider if any of these occur:    Temperature of more than 101.0 F (38.3 C), or as directed    Sore throat, a cough, or unusual fatigue    Red, oozy, or painful rash gets worse. These are signs of infection.    Rash covers your face, genitals, or most of your body    Crusty sores or red rings that begin to spread    You were exposed to someone who has a contagious rash, such as scabies or lice.    Red bull s-eye rash with a white center (a sign of Lyme disease)    You were told that you have resistant bacteria (MRSA) on your skin.   Date Last Reviewed:  "2015-2017 [employer]. [address] [place] [address], [place] [address]. All rights reserved. This information is not intended as a substitute for professional medical care. Always follow your healthcare professional's instructions.                Follow-ups after your visit        Who to contact     If you have questions or need follow up information about today's clinic visit or your schedule please contact [facility] directly at [phone].  Normal or non-critical lab and imaging results will be communicated to you by MyChart, letter or phone within 4 business days after the clinic has received the results. If you do not hear from us within 7 days, please contact the clinic through [employer] or phone. If you have a critical or abnormal lab result, we will notify you by phone as soon as possible.  Submit refill requests through [employer] or call your pharmacy and they will forward the refill request to us. Please allow 3 business days for your refill to be completed.          Additional Information About Your Visit        [employer] Information     [employer] lets you send messages to your doctor, view your test results, renew your prescriptions, schedule appointments and more. To sign up, go to [email or web address] . Click on \"Log in\" on the left side of the screen, which will take you to the Welcome page. Then click on \"Sign up Now\" on the right side of the page.     You will be asked to enter the access code listed below, as well as some personal information. Please follow the directions to create your username and password.     Your access code is: [email or web address]  Expires: 2017  8:24 AM     Your access code will  in 90 days. If you need help or a new code, please call your [facility] or [phone].        Care EveryWhere ID     This is your Care EveryWhere ID. This could be used by other organizations to access your [facility] medical records  [id number]      "   Your Vitals Were     Pulse Temperature Respirations BMI (Body Mass Index)          76 98.3  F (36.8  C) (Temporal) 18 37.45 kg/m2         Blood Pressure from Last 3 Encounters:   06/19/17 120/64   04/27/17 124/80   03/01/17 (!) 114/98    Weight from Last 3 Encounters:   06/19/17 280 lb (127 kg)   04/27/17 277 lb 3.2 oz (125.7 kg)   03/01/17 279 lb 6.4 oz (126.7 kg)              Today, you had the following     No orders found for display       Primary Care Provider Office Phone # Fax #    Felix Sevilla [phone]       [facility] [address] [place] [address]        Thank you!     Thank you for choosing [facility]  for your care. Our goal is always to provide you with excellent care. Hearing back from our patients is one way we can continue to improve our services. Please take a few minutes to complete the written survey that you may receive in the mail after your visit with us. Thank you!             Your Updated Medication List - Protect others around you: Learn how to safely use, store and throw away your medicines at www.disposemymeds.org.          This list is accurate as of: 6/19/17 12:00 PM.  Always use your most recent med list.                   Brand Name Dispense Instructions for use    aspirin 81 MG tablet      Take 81 mg by mouth daily       cetirizine 10 MG tablet    zyrTEC     Take 10 mg by mouth daily       CLARITIN PO          lidocaine 2 % solution    XYLOCAINE    100 mL    Take 15 mLs by mouth every 2 hours as needed for moderate pain swish and spit every 3-8 hours as needed; max 8 doses/24 hour period       lidocaine visc 2% & diphenhydramine 12.5mg/5mL & maalox/mylanta w/simethicone (1:1:1 v/v/v) Susp compounding kit     237 mL    Swish and spit 5-10 mLs in mouth every 4 hours as needed       simvastatin 20 MG tablet    ZOCOR    90 tablet    TAKE 1 TABLET BY MOUTH ISSA Y AT BEDTIME       triamcinolone 0.1 % paste    KENALOG    5 g     Take by mouth 2 times daily       TYLENOL PO      Take 1,000 mg by mouth       typhoid CR capsule    VIVOTIF    4 capsule    Take 1 capsule by mouth every other day       valACYclovir 1000 mg tablet    VALTREX    4 tablet    Take 2 tablets (2,000 mg) by mouth 2 times daily For 2 doses

## 2017-06-19 NOTE — PATIENT INSTRUCTIONS
Ingrown Toenail, Not Infected (Home Treatment)  An ingrown toenail occurs when the nail grows sideways into the skin next to the nail. This can cause pain, especially when wearing tight shoes. It can also lead to an infection with redness, swelling, and pus drainage. Most people respond to the treatments described here. Sometimes surgery is needed, however.  Home care  The following guidelines will help you care for your toenail at home:    Soak the painful toe in warm water twice a day for 10 to 20 minutes each time. Wash the entire foot with an antibacterial soap.    If there is redness or swelling around the toenail, apply an antibiotic ointment three times a day.    Insert a small piece of rolled-up cotton under the corner of the nail to promote growth of the nail outward, away from the cuticle.    Wear shoes that do not put pressure on the toes, such as a sandal or open shoe. Closed shoes should be big enough in the toes so that there is no pressure on the painful toe.    You may use acetaminophen or ibuprofen for pain, unless another pain medicine was prescribed. Talk with your healthcare provider before using these medicines if you have chronic liver or kidney disease. Also tell your healthcare provider if you have ever had a stomach ulcer or GI bleeding.  Prevention  The following tips will help you prevent ingrown toenails:    Avoid pointed, tight, or narrow shoes.    Trim toenails once a month so they don t grow too long. Cut the nail straight across.  Follow-up care  Follow up with your healthcare provider or as advised.  When to seek medical advice  Call your health care provider right away if any of these occur:    Increasing redness, pain, or swelling of the toe    Tender red streaks in the skin leading toward the ankle    Pus or fluid drainage from the toe    Fever of 100.4 F (38 C) or higher  Date Last Reviewed: 5/14/2015 2000-2017 [employer]. [address] [place] [address], [place]  [address]. All rights reserved. This information is not intended as a substitute for professional medical care. Always follow your healthcare professional's instructions.        Understanding Ingrown Toenails    An ingrown nail is the result of a nail growing into the skin that surrounds it. This often occurs at either edge of the big toe. Ingrown nails may be caused by improper trimming, inherited nail deformities, injuries, fungal infections, or pressure.  Symptoms  Ingrown nails may cause pain at the tip of the toe or all the way to the base of the toe. The pain is often worse while walking. An ingrown nail may also lead to infection, inflammation, or a more serious condition. If it s infected, you might see pus or redness.  Evaluation  To determine the extent of your problem, your healthcare provider examines and possibly presses the painful area. If other problems are suspected, blood tests, cultures, and X-rays may be done as well.  Treatment  If the nail isn t infected, your healthcare provider may trim the corner of it to help relieve your symptoms. He or she may need to remove one side of your nail back to the cuticle. The base of the nail may then be treated with a chemical to keep the ingrown part from growing back. Severe infections or ingrown nails may require antibiotics and temporary or permanent removal of a portion of the nail. To prevent pain, a local anesthetic may be used in these procedures. This treatment is usually done at your healthcare provider's office.  Prevention  Many nail problems can be prevented by wearing the right shoes and trimming your nails properly. To help avoid infection, keep your feet clean and dry. If you have diabetes, talk with your healthcare provider before doing any foot self-care.    The right shoes: Get your feet measured (your size may change as you age). Wear shoes that are supportive and roomy enough for your toes to wiggle. Look for shoes made of natural materials  such as leather, which allow your feet to breathe.    Proper trimming: To avoid problems, trim your toenails straight across without cutting down into the corners. If you can t trim your own nails, ask your healthcare provider to do so for you.  Date Last Reviewed: 10/1/2016    4929-5589 [employer]. [address] [place] [address], [place] [address]. All rights reserved. This information is not intended as a substitute for professional medical care. Always follow your healthcare professional's instructions.        Chigger Bite  Chiggers are tiny mites that attach themselves to the skin to feed on skin cells. It is only the larvae (babies) of the chigger that bite people. When a chigger bites you, it attaches tiny mouth parts to the skin, usually near a skin pore or hair follicle. The bite is painless. They do not burrow under your skin or suck your blood.  After biting, they inject saliva (spit) into the skin. The saliva has an enzyme that breaks down skin cells into a liquid. This is what they eat. To prevent the saliva from spreading, the skin cells around the saliva harden. This causes the redness, swelling, and severe itching. In [place], chigger bites do not transmit disease. But they do cause significant itching as the bite heals. This may take a week or longer.     Chiggers often bite on the feet or around the waist as they crawl until they reach constrictive clothing.   Home care  The healthcare provider may recommend over-the-counter (OTC) medicines containing antihistamines to help relieve itching and swelling. Use each medicine according to the directions on the package. If the bite becomes infected, an antibiotic will be prescribed. This may be in pill form taken by mouth or as an ointment or cream put directly on the skin. Be sure to use this medicine exactly as prescribed. You should continue using your antibiotic until you are told you can stop, even if you re feeling better.  The  following are general care guidelines:      Symptoms usually go away on their own within 1 to 2 weeks.    Wash all the clothing you were using when you were bitten, since there may still be mites in them. The same goes for blankets or sleeping bags. Use hot water.    To help prevent infection, avoid scratching and picking as much as possible.    To help relieve itching and swelling, apply ice wrapped in a thin towel to the bites. Do this for up to 10 minutes at a time. Be careful not to freeze your skin, as this will damage it.    An OTC anti-itch cream like calamine, diphenhydramine, or hydrocortisone may be helpful.    Avoid hot baths or showers. This can make itching worse.    Contrary to popular belief, chiggers do not burrow in the skin. Do not apply alcohol, heat, or other home remedies to the skin in an attempt to remove chiggers.  Avoiding chigger bites  These are things you can do to prevent chigger bites:    Wear socks, long sleeves and long pants when you are outdoors where chiggers have likely gathered, such as in tall grass. Also use insect repellent. The most effective repellents contain DEET (10% to 30%). Children should not use more than 10% strength of DEET. Infants and pregnant women in the first trimester should not use DEET.    You can spray your clothing with a repellent containing DEET or permethrin. If you do this, you do not need to put repellent on the skin under clothing that has been sprayed. [place] it around the openings of your clothes. This includes the cuffs, waistband, shirt neck, and tops of boots.    Sulfur powder can also be applied to clothing. But sulfur can have a strong odor, especially when mixed with sweat. It can also irritate sensitive skin.  Follow-up care  Follow up with your healthcare provider, or as advised.  When to seek medical advice  Call your healthcare provider right away if any of the following occur:    Shortness of breath or difficulty breathing    Fever of  100.4 F (38 C) or higher    Dizziness, weakness, or fainting    Headache, fever, chills, muscle or joint aching, or vomiting    New rash    Signs of infection, such as increased swelling and pain, warmth, red streaks, or drainage    Drainage from the bite area  Date Last Reviewed: 1/12/2016 2000-2017 [employer]. [address] [place] [address]. All rights reserved. This information is not intended as a substitute for professional medical care. Always follow your healthcare professional's instructions.        Self-Care for Skin Rashes     Pat your skin dry. Do not rub.     When your skin reacts to a substance your body is sensitive to, it can cause a rash. You can treat most rashes at home by keeping the skin clean and dry. Many rashes may get better on their own within 2 to 3 days. You may need medical attention if your rash itches, drains, or hurts, particularly if the rash is getting worse.  What can cause a skin rash?    Sun poisoning, caused by too much exposure to the sun    An irritant or allergic reaction to a certain type of food, plant, or chemical, such as  shellfish, poison ivy, and or cleaning products    An infection caused by a fungus (ringworm), virus (chickenpox), or bacteria (strep)    Bites or infestation caused by insects or pests, such as ticks, lice, or mites    Dry skin, which is often seen during the winter months and in older people  How can I control itching and skin damage?    Take soothing lukewarm baths in a colloidal oatmeal product. You can buy this at the [employer].    Do your best not to scratch. Clip fingernails short, especially in young children, to reduce skin damage if scratching does occur.    Use moisturizing skin lotion instead of scratching your dry skin.    Use sunscreen whenever going out into direct sun.    Use only mild cleansing agents whenever possible.    Wash with mild, nonirritating soap and warm water.    Wear clothing that breathes, such  as cotton shirts or canvas shoes.    If fluid is seeping from the rash, cover it loosely with clean gauze to absorb the discharge.    Many rashes are contagious. Prevent the rash from spreading to others by washing your hands often before or after touching others with any skin rash.  Use medicine    Antihistamines such as diphenhydramine can help control itching. But use with caution because they can make you drowsy.    Using over-the-counter hydrocortisone cream on small rashes may help reduce swelling and itching    Most over-the-counter antifungal medicines can treat athlete s foot and many other fungal infections of the skin.  Check with your healthcare provider  Call your healthcare provider if:    You were told that you have a fungal infection on your skin to make sure you have the correct type of medicine.    You have questions or concerns about medicines or their side effects.     Call 911  Call 911 if either of these occur:    Your tongue or lips start to swell    You have difficulty breathing      Call your healthcare provider  Call your healthcare provider if any of these occur:    Temperature of more than 101.0 F (38.3 C), or as directed    Sore throat, a cough, or unusual fatigue    Red, oozy, or painful rash gets worse. These are signs of infection.    Rash covers your face, genitals, or most of your body    Crusty sores or red rings that begin to spread    You were exposed to someone who has a contagious rash, such as scabies or lice.    Red bull s-eye rash with a white center (a sign of Lyme disease)    You were told that you have resistant bacteria (MRSA) on your skin.   Date Last Reviewed: 5/12/2015 2000-2017 [employer]. [address] [place] [address], [place] [address]. All rights reserved. This information is not intended as a substitute for professional medical care. Always follow your healthcare professional's instructions.

## 2017-06-19 NOTE — NURSING NOTE
"Chief Complaint   Patient presents with     Derm Problem     Panel Management     INR, mychart        Initial /64 (Cuff Size: Adult Large)  Pulse 76  Temp 98.3  F (36.8  C) (Temporal)  Resp 18  Wt 280 lb (127 kg)  BMI 37.45 kg/m2 Estimated body mass index is 37.45 kg/(m^2) as calculated from the following:    Height as of 4/27/17: 6' 0.5\" (1.842 m).    Weight as of this encounter: 280 lb (127 kg).  Medication Reconciliation: complete   Aishwarya Gonzalez CMA (AAMA)    "

## 2017-07-21 ENCOUNTER — TELEPHONE (OUTPATIENT)
Dept: FAMILY MEDICINE | Facility: OTHER | Age: 67
End: 2017-07-21

## 2017-07-21 DIAGNOSIS — M25.562 CHRONIC PAIN OF LEFT KNEE: Primary | ICD-10-CM

## 2017-07-21 DIAGNOSIS — G89.29 CHRONIC PAIN OF LEFT KNEE: Primary | ICD-10-CM

## 2017-07-21 NOTE — TELEPHONE ENCOUNTER
Patient stated his left knee has been bothering him since January. He has difficulty bending and has noticed some stiffness. He states he had a physical with Dr. Sevilla and discussed this. Please advise on referral for patient since DJ is out of office.     Penelope Spann MA

## 2017-07-21 NOTE — TELEPHONE ENCOUNTER
ICD-10-CM    1. Chronic pain of left knee M25.562 ORTHO  REFERRAL    G89.29      I have placed the order.  Usually I encourage patient to see a PCP and evaluate vefore sending to Ortho for referral.  If any questions, I can call.  Thank you.    Electronically signed:  Sarah Holloway M.D.

## 2017-07-21 NOTE — TELEPHONE ENCOUNTER
Last OV 06/19/2017 with AM.  Will route to AM as DJ is out of clinic.    Adrian Frost, RN, BSN

## 2017-07-21 NOTE — TELEPHONE ENCOUNTER
Reason for Call: Request for an order or referral:    Order or referral being requested: referral    Date needed: as soon as possible    Has the patient been seen by the PCP for this problem? YES    Additional comments: patient would like to get a referral to ortho for his knee    Phone number Patient can be reached at:    Telephone Information:   Mobile [phone]       Best Time:  Any time    Can we leave a detailed message on this number?  YES    Call taken on 7/21/2017 at 8:38 AM by Malika Palomares

## 2017-07-21 NOTE — TELEPHONE ENCOUNTER
Please ask which leg is hurting and if he has any other symptoms.  If any questions, I can call him.  Thank you.  Electronically signed:  Sarah Holloway M.D.

## 2017-07-28 ENCOUNTER — RADIANT APPOINTMENT (OUTPATIENT)
Dept: GENERAL RADIOLOGY | Facility: OTHER | Age: 67
End: 2017-07-28
Attending: PHYSICAL MEDICINE & REHABILITATION
Payer: MEDICARE

## 2017-07-28 ENCOUNTER — OFFICE VISIT (OUTPATIENT)
Dept: ORTHOPEDICS | Facility: OTHER | Age: 67
End: 2017-07-28
Payer: MEDICARE

## 2017-07-28 VITALS
DIASTOLIC BLOOD PRESSURE: 80 MMHG | WEIGHT: 276 LBS | BODY MASS INDEX: 36.58 KG/M2 | HEIGHT: 73 IN | SYSTOLIC BLOOD PRESSURE: 136 MMHG

## 2017-07-28 DIAGNOSIS — M17.0 BILATERAL PRIMARY OSTEOARTHRITIS OF KNEE: ICD-10-CM

## 2017-07-28 DIAGNOSIS — M25.562 CHRONIC PAIN OF LEFT KNEE: ICD-10-CM

## 2017-07-28 DIAGNOSIS — G89.29 CHRONIC PAIN OF LEFT KNEE: ICD-10-CM

## 2017-07-28 DIAGNOSIS — G89.29 CHRONIC PAIN OF LEFT KNEE: Primary | ICD-10-CM

## 2017-07-28 DIAGNOSIS — M25.562 CHRONIC PAIN OF LEFT KNEE: Primary | ICD-10-CM

## 2017-07-28 PROCEDURE — 73562 X-RAY EXAM OF KNEE 3: CPT

## 2017-07-28 PROCEDURE — 99203 OFFICE O/P NEW LOW 30 MIN: CPT | Mod: 25 | Performed by: PHYSICAL MEDICINE & REHABILITATION

## 2017-07-28 PROCEDURE — 20610 DRAIN/INJ JOINT/BURSA W/O US: CPT | Mod: LT | Performed by: PHYSICAL MEDICINE & REHABILITATION

## 2017-07-28 RX ORDER — TRIAMCINOLONE ACETONIDE 40 MG/ML
40 INJECTION, SUSPENSION INTRA-ARTICULAR; INTRAMUSCULAR ONCE
Qty: 1 ML | Refills: 0 | OUTPATIENT
Start: 2017-07-28 | End: 2017-07-28

## 2017-07-28 NOTE — PROGRESS NOTES
Sports Medicine Clinic Visit    PCP: Felix Sevilla    CC: Patient presents with:  Knee left      HPI:  Deandre Merchant is a 67 year old male who is seen in consultation at the request of Sarah Holloway MD.   He notes pain that began 7 months ago with insidious onset.  Pain is located on the left knee, anteriorly.  He rates the pain at a 6/10 at its worst and a 2/10 currently. Symptoms are relieved with compression and worsened by stairs and uneven ground. He endorses snapping and denies swelling, bruising, popping, grinding, catching, locking, instability, numbness, tingling, weakness, pain in other joints and fever, chills.  Other treatment has included bracing (not helpful) and Tylenol.       Review of Systems:  Musculoskeletal: as above  Remainder of review of systems is negative including constitutional, eyes, ENT, CV, pulmonary, GI, , endocrine, skin, hematologic, and neurologic except as noted in HPI or medical history.    History reviewed. No pertinent past surgical/medical/family/social history.    Past Medical History:   Diagnosis Date     BPH (benign prostatic hyperplasia)      Lymphadenopathy     mediastinal & retroperitoneal     Polyps, colonic      Retinal detachment      Past Surgical History:   Procedure Laterality Date     COLONOSCOPY N/A 3/1/2017    Procedure: COLONOSCOPY;  Surgeon: Dakota Wall MD;  Location:  GI     ESOPHAGOSCOPY, GASTROSCOPY, DUODENOSCOPY (EGD), COMBINED  4/21/2013    Procedure: COMBINED ESOPHAGOSCOPY, GASTROSCOPY, DUODENOSCOPY (EGD), BIOPSY SINGLE OR MULTIPLE;;  Surgeon: Torrey Cosme MD;  Location:  GI     ESOPHAGOSCOPY, GASTROSCOPY, DUODENOSCOPY (EGD), COMBINED  10/9/2013    Procedure: COMBINED ESOPHAGOSCOPY, GASTROSCOPY, DUODENOSCOPY (EGD), BIOPSY SINGLE OR MULTIPLE;  ESOPHAGOSCOPY, GASTROSCOPY, DUODENOSCOPY (EGD) with multiple biopsies;  Surgeon: Shay Sauer MD;  Location:  GI     HC COLONOSCOPY W/WO BRUSH/WASH  07/12/06     LAPAROTOMY  "EXPLORATORY  4/24/2013    Procedure: LAPAROTOMY EXPLORATORY;  Exploratory Laparotomy and lysis of adhesions.;  Surgeon: Genevieve Barros MD;  Location: [facility] OR     Family History   Problem Relation Age of Onset     CANCER Father      liver/kidney     C.A.D. Father      Pacemaker Brother      Arthritis Mother      RA     Social History     Social History     Marital status:      Spouse name: Cristel     Number of children: 2     Years of education: N/A     Occupational History     Not on file.     Social History Main Topics     Smoking status: Never Smoker     Smokeless tobacco: Never Used     Alcohol use 2.0 oz/week      Comment: occasionally weekends     Drug use: No     Sexual activity: Yes     Partners: Female     Other Topics Concern     Caffeine Concern No     Sleep Concern No     Stress Concern No     Weight Concern No     Exercise Yes     Seat Belt Yes     Social History Narrative       He is retired.   At baseline, he does not need assistance with ambulation      Current Outpatient Prescriptions   Medication     triamcinolone (KENALOG) 0.1 % cream     Loratadine (CLARITIN PO)     simvastatin (ZOCOR) 20 MG tablet     triamcinolone (KENALOG) 0.1 % paste     cetirizine (ZYRTEC) 10 MG tablet     aspirin 81 MG tablet     Acetaminophen (TYLENOL PO)     No current facility-administered medications for this visit.      Allergies   Allergen Reactions     Allopurinol Rash         Objective:  /80  Ht 6' 0.5\" (1.842 m)  Wt 276 lb (125.2 kg)  BMI 36.92 kg/m2    General: Alert and in no distress    Head: Normocephalic, atraumatic  Eyes: no scleral icterus or conjunctival erythema   Oropharynx:  Mucous membranes moist  Skin: no erythema, ecchymosis, petechiae, or jaundice  CV: regular rhythm by palpation, 2+ distal pulses  Resp: normal respiratory effort without conversational dyspnea   Psych: normal mood and affect    Gait: Antalgic  Neuro: Motor strength and sensation as noted " below    Musculoskeletal:    Bilateral Knee exam    Inspection:  Left knee joint effusion    Palpation: Mildly tender over the left lateral patellar facet    Knee ROM: Five-ten degree extension lag on the left (actively and passively).  Left knee flexion is decreased actively, full passively.  Left knee flexion is painful.    Hip ROM: Decreased internal rotation bilaterally.  External rotation is full bilaterally.  Hip ROM is not painful.      Patellar Motion:      Normal patellar tracking noted through range of motion    Strength:  5/5 Hip flexion/abduction/adduction, knee extension/flexion, ankle plantarflexion/dorsiflexion, great toe extension, toe flexion    Special Tests: Negative log roll, negative anterior/posterior drawer, negative Lachman's, no varus/valgus instability at 0 and 30 degrees, Leslie's test negative for pain or popping at the lateral/medial joint line    Sensation:  Reports tingling in the left posterior distal thigh    Radiology:  X-rays ordered and independent visualization of images performed.  Shows mild medial joint space narrowing, patellofemoral narrowing, lateral sitting patellae, and patellar irregularities suggestive of degenerative changes.      Procedure:  Discussed steroid injection, including risks, potential benefits, and alternatives.  The patient expressed understanding.  Obtained verbal and written consent and the patient elected to proceed.  Procedure: A steroid injection was performed under aseptic technique at the left knee using 2 mL of 1% plain Lidocaine, 2 mL of 0.5% Marcaine, and 1 mL of 40 mg/mL Kenalog.  Bandage applied. This was well tolerated.    Assessment:  1. Chronic pain of left knee    2. Bilateral primary osteoarthritis of knee        Plan:  Discussed the assessment with the patient. We discussed the following treatment options: symptom treatment, activity modification/rest, imaging and rehab. Following discussion, plan:  -Steroid injection performed today.   We also discussed aspiration to remove some of the swelling but he elected to just do the steroid injection today. Take it easy over the next few days. Keep in mind that the steroid may take up to 3 days to start working and up to 2 weeks to reach maximal effect.  Ice 15-20 minutes as needed for soreness.  Tylenol as needed for pain relief.  -Rehab: Physical Therapy: [facility] - [phone]. Please do 5-6 days of exercises per week between formal sessions and the home exercises they provide.  -Recommend using the brace as needed for comfort and stability.    -Ice 15-20 minutes for pain relief or swelling as needed  -Elevate the knees much as possible above the heart    Follow Up: 4-6 weeks or sooner if symptoms fail to improve or worsen. Call with any questions or concerns.     Christelle Ureña MD, CAQ  [facility]

## 2017-07-28 NOTE — NURSING NOTE
"Chief Complaint   Patient presents with     Knee left       Initial /80  Ht 6' 0.5\" (1.842 m) Estimated body mass index is 37.45 kg/(m^2) as calculated from the following:    Height as of 4/27/17: 6' 0.5\" (1.842 m).    Weight as of 6/19/17: 280 lb (127 kg).  Medication Reconciliation: complete     Skylar Claros M.Ed., ATR, ATC      "

## 2017-07-28 NOTE — PATIENT INSTRUCTIONS
Today's Plan of Care:  -Steroid injection performed today.  Take it easy over the next few days. Keep in mind that the steroid may take up to 3 days to start working and up to 2 weeks to reach maximal effect.  Ice 15-20 minutes as needed for soreness.  Tylenol as needed for pain relief.  -Rehab: Physical Therapy: [facility] - [phone]. Please do 5-6 days of exercises per week between formal sessions and the home exercises they provide.  -Brace as needed. Recommended for activity  -Ice 15-20 minutes for pain relief or swelling as needed  -Elevate the kneeas much as possible above the heart    Follow Up: 4-6 weeks or sooner if symptoms fail to improve or worsen. Call with any questions or concerns.

## 2017-07-28 NOTE — MR AVS SNAPSHOT
After Visit Summary   7/28/2017    Deandre Merchant    MRN: [id number]           Patient Information     Date Of Birth          1950        Visit Information        Provider Department      7/28/2017 8:40 AM Marialuisa Ureña MD [facility]        Today's Diagnoses     Chronic pain of left knee    -  1      Care Instructions    Today's Plan of Care:  -Steroid injection performed today.  Take it easy over the next few days. Keep in mind that the steroid may take up to 3 days to start working and up to 2 weeks to reach maximal effect.  Ice 15-20 minutes as needed for soreness.  Tylenol as needed for pain relief.  -Rehab: Physical Therapy: [facility] - [phone]. Please do 5-6 days of exercises per week between formal sessions and the home exercises they provide.  -Brace as needed. Recommended for activity  -Ice 15-20 minutes for pain relief or swelling as needed  -Elevate the kneeas much as possible above the heart    Follow Up: 4-6 weeks or sooner if symptoms fail to improve or worsen. Call with any questions or concerns.               Follow-ups after your visit        Additional Services     PHYSICAL THERAPY REFERRAL       *This therapy referral will be filtered to a centralized scheduling office at [facility] and the patient will receive a call to schedule an appointment at a [facility] location most convenient for them. *     [facility] provides Physical Therapy evaluation and treatment and many specialty services across the [facility] system.  If requesting a specialty program, please choose from the list below.    If you have not heard from the scheduling office within 2 business days, please call [phone] for all locations, with the exception of [place], please call [phone].  Treatment: Evaluation & Treatment  Special Instructions/Modalities: none  Special Programs: None    Please be aware that coverage of  "these services is subject to the terms and limitations of your health insurance plan.  Call member services at your health plan with any benefit or coverage questions.      **Note to Provider:  If you are referring outside of [place] for the therapy appointment, please list the name of the location in the \"special instructions\" above, print the referral and give to the patient to schedule the appointment.                  Future tests that were ordered for you today     Open Future Orders        Priority Expected Expires Ordered    XR Knee Standing AP Bilat [place] Bilat Lat Left Routine 7/28/2017 7/28/2018 7/28/2017    XR Knee Standing AP Bilat [place] Bilat Lat Left Routine 7/28/2017 7/28/2018 7/28/2017            Who to contact     If you have questions or need follow up information about today's clinic visit or your schedule please contact [facility] directly at [phone].  Normal or non-critical lab and imaging results will be communicated to you by MyChart, letter or phone within 4 business days after the clinic has received the results. If you do not hear from us within 7 days, please contact the clinic through InLive Interactivehart or phone. If you have a critical or abnormal lab result, we will notify you by phone as soon as possible.  Submit refill requests through [email or web address] or call your pharmacy and they will forward the refill request to us. Please allow 3 business days for your refill to be completed.          Additional Information About Your Visit        InLive Interactivehart Information     [email or web address] lets you send messages to your doctor, view your test results, renew your prescriptions, schedule appointments and more. To sign up, go to [email or web address] . Click on \"Log in\" on the left side of the screen, which will take you to the Welcome page. Then click on \"Sign up Now\" on the right side of the page.     You will be asked to enter the access code listed below, as well as some personal information. " "Please follow the directions to create your username and password.     Your access code is: [email or web address]  Expires: 10/26/2017  9:44 AM     Your access code will  in 90 days. If you need help or a new code, please call your [place] clinic or [phone].        Care EveryWhere ID     This is your Care EveryWhere ID. This could be used by other organizations to access your [place] medical records  [id number]        Your Vitals Were     Height                   6' 0.5\" (1.842 m)            Blood Pressure from Last 3 Encounters:   17 136/80   17 120/64   17 124/80    Weight from Last 3 Encounters:   17 280 lb (127 kg)   17 277 lb 3.2 oz (125.7 kg)   17 279 lb 6.4 oz (126.7 kg)              We Performed the Following     PHYSICAL THERAPY REFERRAL        Primary Care Provider Office Phone # Fax #    Felix Edgar Sevilla [phone]       [facility] [address] [place] [address]        Equal Access to Services     [facility] AH: Hadii aad ku hadasho Soomaali, waaxda luqadaha, qaybta kaalmada adeegyada, rajat wood hayolgan junior mares . So [facility] [phone].    ATENCIÓN: Si habla español, tiene a kerr disposición servicios gratuitos de asistencia lingüística. Llame al [phone].    We comply with applicable federal civil rights laws and [place] laws. We do not discriminate on the basis of race, color, national origin, age, disability sex, sexual orientation or gender identity.            Thank you!     Thank you for choosing [facility]  for your care. Our goal is always to provide you with excellent care. Hearing back from our patients is one way we can continue to improve our services. Please take a few minutes to complete the written survey that you may receive in the mail after your visit with us. Thank you!             Your Updated Medication List - Protect others around you: Learn how to safely use, store and " throw away your medicines at www.disposemymeds.org.          This list is accurate as of: 7/28/17  9:44 AM.  Always use your most recent med list.                   Brand Name Dispense Instructions for use Diagnosis    aspirin 81 MG tablet      Take 81 mg by mouth daily        cetirizine 10 MG tablet    zyrTEC     Take 10 mg by mouth daily        CLARITIN PO           simvastatin 20 MG tablet    ZOCOR    90 tablet    TAKE 1 TABLET BY MOUTH ISSA Y AT BEDTIME    Hyperlipidemia with target LDL less than 130       triamcinolone 0.1 % cream    KENALOG    30 g    Apply sparingly to affected area two times daily for 5 to 7 days.    Rash and nonspecific skin eruption       triamcinolone 0.1 % paste    KENALOG    5 g    Take by mouth 2 times daily    Ulcer mouth       TYLENOL PO      Take 1,000 mg by mouth

## 2017-10-09 ENCOUNTER — OFFICE VISIT (OUTPATIENT)
Dept: PODIATRY | Facility: CLINIC | Age: 67
End: 2017-10-09
Payer: MEDICARE

## 2017-10-09 ENCOUNTER — TELEPHONE (OUTPATIENT)
Dept: ONCOLOGY | Facility: CLINIC | Age: 67
End: 2017-10-09

## 2017-10-09 VITALS — HEIGHT: 73 IN | WEIGHT: 284 LBS | TEMPERATURE: 97.4 F | BODY MASS INDEX: 37.64 KG/M2

## 2017-10-09 DIAGNOSIS — L60.0 INGROWING NAIL: Primary | ICD-10-CM

## 2017-10-09 DIAGNOSIS — L60.3 ONYCHODYSTROPHY: ICD-10-CM

## 2017-10-09 PROCEDURE — 99203 OFFICE O/P NEW LOW 30 MIN: CPT | Performed by: PODIATRIST

## 2017-10-09 ASSESSMENT — PAIN SCALES - GENERAL: PAINLEVEL: NO PAIN (0)

## 2017-10-09 NOTE — TELEPHONE ENCOUNTER
Reason for Call:  Other call back    Detailed comments: Patient stopped in to schedule a 6 month follow up with Dr Krishnamurthy and asked if he should come early for labs or if he needs a CT scan?  I informed him we would call and let him know.    Phone Number Patient can be reached at: Home number on file [phone] (home)    Best Time: any    Can we leave a detailed message on this number? YES    Call taken on 10/9/2017 at 9:19 AM by Modesta Mata

## 2017-10-09 NOTE — MR AVS SNAPSHOT
"              After Visit Summary   10/9/2017    Deandre Merchant    MRN: [id number]           Patient Information     Date Of Birth          1950        Visit Information        Provider Department      10/9/2017 8:45 AM Dedrick Saucedo DPM [facility]        Today's Diagnoses     Ingrowing nail    -  1    Onychodystrophy          Care Instructions    Nail Debridement    A high quality instrument makes trimming toenails MUCH easier.  Search ebay for any 5\" nail nipper manufactured by reliable brands such as Miltex, Integra or Jarit as these quality instruments will help manage difficult nails more effectively and comfortably. A physician is not necessary to trim nails even if you are taking blood thinners or are diabetic.  Your family or care givers may help manage your toenails.      Trim or sand the nails once weekly.  Do not wait until they are long and painful or trimming will become too difficult and painful and will increase your risk of complications or infection.  A course file or 120 grit sandpaper on a block can be helpful.  For very thick nails many people prefer battery operated rodas such as an Amope', Personal Pedi and Emjoi for regular use or heavy painful callouses or thick toenails.    Trim or skive any portion of nail that is thick, loose, crumbling, or not well attached. Do not tear the nail away, but rather cut them with a nail nipper.  You may follow up with your Podiatric Physician if you have pain, bleeding, infection, questions or other concerns.      You may also contact the following Registered Nurses for further help with nail debridement and minor hygiene concnerns.  They will come to your home, trim your toenails and soak your feet, as well as monitor for any complications that would require evaluation by a Physician.        [facility]  Maggy Muñoz RN  Office [phone]    [facility] by Marcela Rothman RN  [phone]  Call or text " for appointment    [facility]  [phone]  [facility]  Mikhail    [facility]  [phone]  [email or web address]  [facility]          INGROWN TOENAIL POSTOPERATIVE INSTRUCTIONS   (Nail avulsion or chemical matrixectomy)   1.  Go directly home and elevate the affected foot on one or two pillows for the remainder of the day & evening. Your toe may stay numb for 2-8 hours.   2.  Take Tylenol, ibuprofen or another anti-inflammatory as needed for pain.   3.  Use oral antibiotic if that was prescribed at your doctor visit. Take the entire prescription even if your symptoms have improved.   4.  Keep dressing dry and intact the day of the procedure. The morning after the procedure, remove entire dressing and soak or wash the affected area in lukewarm water for 5-10 minutes.  You may add Epsom salt to soothe the area and help it become drier. Do this twice a day or more until the surgical site remains dry without drainage.  This may take 1-2 weeks if a small part of your nail was removed or 4-8 weeks if the entire nail was removed. You may count showering or bathing as one soak.  After each soak, pat the area dry with a clean towel or gauze and then allow to air dry for a few minutes. You may apply topical antibiotics, 3-4 drops of Cortisporin if it was prescribed at your visit or apply a very small amount of ointment like Bacitracin or Neosporin to the area.  Then cover with a cloth or fabric bandaid.    5.  You may walk and pursue everyday activities as tolerated with either an open toe shoe or cut-out shoe as needed. You may wear regular roomy shoes if no pain is noted.  No swimming in the lake, river, pool or hot tub until the wound has been dry for 3 days.   7.  Watch for any signs or symptoms of infection such as: red streaks going up the foot/leg, swelling, pus or foul odor. For patients that have had a phenol procedure, the toe will drain longer and may look similar to infection  "because it is a chemical burn.  Please call with questions.  8.  Follow up in my office in 1-2 weeks if the surgical site has not become dry or if other complications occur.  9.  There is 5-10% chance of complications such as infection or formation of another nail or a thick scared nail.              Follow-ups after your visit        Who to contact     If you have questions or need follow up information about today's clinic visit or your schedule please contact [facility] directly at [phone].  Normal or non-critical lab and imaging results will be communicated to you by Portable Internethart, letter or phone within 4 business days after the clinic has received the results. If you do not hear from us within 7 days, please contact the clinic through Backchannelmediat or phone. If you have a critical or abnormal lab result, we will notify you by phone as soon as possible.  Submit refill requests through Yorder or call your pharmacy and they will forward the refill request to us. Please allow 3 business days for your refill to be completed.          Additional Information About Your Visit        Yorder Information     Yorder lets you send messages to your doctor, view your test results, renew your prescriptions, schedule appointments and more. To sign up, go to [email or web address] . Click on \"Log in\" on the left side of the screen, which will take you to the Welcome page. Then click on \"Sign up Now\" on the right side of the page.     You will be asked to enter the access code listed below, as well as some personal information. Please follow the directions to create your username and password.     Your access code is: [email or web address]  Expires: 10/26/2017  9:44 AM     Your access code will  in 90 days. If you need help or a new code, please call your [place] clinic or [phone].        Care EveryWhere ID     This is your Care EveryWhere ID. This could be used by other organizations to access your [place] " "medical records  [id number]        Your Vitals Were     Temperature Height BMI (Body Mass Index)             97.4  F (36.3  C) (Temporal) 6' 0.5\" (1.842 m) 37.99 kg/m2          Blood Pressure from Last 3 Encounters:   07/28/17 136/80   06/19/17 120/64   04/27/17 124/80    Weight from Last 3 Encounters:   10/09/17 284 lb (128.8 kg)   07/28/17 276 lb (125.2 kg)   06/19/17 280 lb (127 kg)              Today, you had the following     No orders found for display       Primary Care Provider Office Phone # Fax #    Felix Sevilla [phone] [address] [place] [address]        Equal Access to Services     [facility]: Jasson barker Sokelli, waaxda luqadaha, qaybta kaalmada adeegyada, rajat mares . So [facility] [phone].    ATENCIÓN: Si habla español, tiene a kerr disposición servicios gratuitos de asistencia lingüística. Llame al [phone].    We comply with applicable federal civil rights laws and [place] laws. We do not discriminate on the basis of race, color, national origin, age, disability, sex, sexual orientation, or gender identity.            Thank you!     Thank you for choosing [facility]  for your care. Our goal is always to provide you with excellent care. Hearing back from our patients is one way we can continue to improve our services. Please take a few minutes to complete the written survey that you may receive in the mail after your visit with us. Thank you!             Your Updated Medication List - Protect others around you: Learn how to safely use, store and throw away your medicines at www.disposemymeds.org.          This list is accurate as of: 10/9/17  9:07 AM.  Always use your most recent med list.                   Brand Name Dispense Instructions for use Diagnosis    aspirin 81 MG tablet      Take 81 mg by mouth daily        cetirizine 10 MG tablet    zyrTEC     Take 10 mg by mouth daily        " CLARITIN PO           order for DME     1 Device    Equipment being ordered: knee sleeve with horseshoe, XL    Chronic pain of left knee       simvastatin 20 MG tablet    ZOCOR    90 tablet    TAKE 1 TABLET BY MOUTH ISSA Y AT BEDTIME    Hyperlipidemia with target LDL less than 130       triamcinolone 0.1 % cream    KENALOG    30 g    Apply sparingly to affected area two times daily for 5 to 7 days.    Rash and nonspecific skin eruption       triamcinolone 0.1 % paste    KENALOG    5 g    Take by mouth 2 times daily    Ulcer mouth       TYLENOL PO      Take 1,000 mg by mouth

## 2017-10-09 NOTE — PATIENT INSTRUCTIONS
"Nail Debridement    A high quality instrument makes trimming toenails MUCH easier.  Search ebay for any 5\" nail nipper manufactured by reliable brands such as Miltex, Integra or Jarit as these quality instruments will help manage difficult nails more effectively and comfortably. A physician is not necessary to trim nails even if you are taking blood thinners or are diabetic.  Your family or care givers may help manage your toenails.      Trim or sand the nails once weekly.  Do not wait until they are long and painful or trimming will become too difficult and painful and will increase your risk of complications or infection.  A course file or 120 grit sandpaper on a block can be helpful.  For very thick nails many people prefer battery operated rodas such as an Amope', Personal Pedi and Emjoi for regular use or heavy painful callouses or thick toenails.    Trim or skive any portion of nail that is thick, loose, crumbling, or not well attached. Do not tear the nail away, but rather cut them with a nail nipper.  You may follow up with your Podiatric Physician if you have pain, bleeding, infection, questions or other concerns.      You may also contact the following Registered Nurses for further help with nail debridement and minor hygiene concnerns.  They will come to your home, trim your toenails and soak your feet, as well as monitor for any complications that would require evaluation by a Physician.        [facility]  Maggy Muñoz RN  Office [phone]    [facility] by Marcela Rothman RN  [phone]  Call or text for appointment    [facility]  [phone]  [facility]  [phone]  [email or web address]  [facility]          INGROWN TOENAIL POSTOPERATIVE INSTRUCTIONS   (Nail avulsion or chemical matrixectomy)   1.  Go directly home and elevate the affected foot on one or two pillows for the remainder of the day & evening. Your " toe may stay numb for 2-8 hours.   2.  Take Tylenol, ibuprofen or another anti-inflammatory as needed for pain.   3.  Use oral antibiotic if that was prescribed at your doctor visit. Take the entire prescription even if your symptoms have improved.   4.  Keep dressing dry and intact the day of the procedure. The morning after the procedure, remove entire dressing and soak or wash the affected area in lukewarm water for 5-10 minutes.  You may add Epsom salt to soothe the area and help it become drier. Do this twice a day or more until the surgical site remains dry without drainage.  This may take 1-2 weeks if a small part of your nail was removed or 4-8 weeks if the entire nail was removed. You may count showering or bathing as one soak.  After each soak, pat the area dry with a clean towel or gauze and then allow to air dry for a few minutes. You may apply topical antibiotics, 3-4 drops of Cortisporin if it was prescribed at your visit or apply a very small amount of ointment like Bacitracin or Neosporin to the area.  Then cover with a cloth or fabric bandaid.    5.  You may walk and pursue everyday activities as tolerated with either an open toe shoe or cut-out shoe as needed. You may wear regular roomy shoes if no pain is noted.  No swimming in the lake, river, pool or hot tub until the wound has been dry for 3 days.   7.  Watch for any signs or symptoms of infection such as: red streaks going up the foot/leg, swelling, pus or foul odor. For patients that have had a phenol procedure, the toe will drain longer and may look similar to infection because it is a chemical burn.  Please call with questions.  8.  Follow up in my office in 1-2 weeks if the surgical site has not become dry or if other complications occur.  9.  There is 5-10% chance of complications such as infection or formation of another nail or a thick scared nail.

## 2017-10-09 NOTE — NURSING NOTE
"Chief Complaint   Patient presents with     Consult     Left great toenail curving inward; new pt       Initial Temp 97.4  F (36.3  C) (Temporal)  Ht 6' 0.5\" (1.842 m)  Wt 284 lb (128.8 kg)  BMI 37.99 kg/m2 Estimated body mass index is 37.99 kg/(m^2) as calculated from the following:    Height as of this encounter: 6' 0.5\" (1.842 m).    Weight as of this encounter: 284 lb (128.8 kg).  BP completed using cuff size: NA (Not Taken)  Medication Reconciliation: complete    Carina Trinh CMA, October 9, 2017    "

## 2017-10-09 NOTE — PROGRESS NOTES
HPI:  Deandre Merchant is a 67 year old male who is seen in consultation at the request of self.    Pt presents for eval of:   (Onset, Location, L/R, Character, Treatments, Injury if yes)     Ongoing for about a year, Left great toenail curving inward. Getting worse over past few months.  Intermittent redness, discolored, pain w/pressure    Retired.    Weight management plan: Patient was referred to their PCP to discuss a diet and exercise plan.     ROS:  10 point ROS neg other than the symptoms noted above in the HPI.    PAST MEDICAL HISTORY:   Past Medical History:   Diagnosis Date     BPH (benign prostatic hyperplasia)      Lymphadenopathy     mediastinal & retroperitoneal     Polyps, colonic      Retinal detachment         PAST SURGICAL HISTORY:   Past Surgical History:   Procedure Laterality Date     COLONOSCOPY N/A 3/1/2017    Procedure: COLONOSCOPY;  Surgeon: Dakota Wall MD;  Location:  GI     ESOPHAGOSCOPY, GASTROSCOPY, DUODENOSCOPY (EGD), COMBINED  4/21/2013    Procedure: COMBINED ESOPHAGOSCOPY, GASTROSCOPY, DUODENOSCOPY (EGD), BIOPSY SINGLE OR MULTIPLE;;  Surgeon: Torrey Cosme MD;  Location:  GI     ESOPHAGOSCOPY, GASTROSCOPY, DUODENOSCOPY (EGD), COMBINED  10/9/2013    Procedure: COMBINED ESOPHAGOSCOPY, GASTROSCOPY, DUODENOSCOPY (EGD), BIOPSY SINGLE OR MULTIPLE;  ESOPHAGOSCOPY, GASTROSCOPY, DUODENOSCOPY (EGD) with multiple biopsies;  Surgeon: Shay Sauer MD;  Location:  GI     HC COLONOSCOPY W/WO BRUSH/WASH  07/12/06     LAPAROTOMY EXPLORATORY  4/24/2013    Procedure: LAPAROTOMY EXPLORATORY;  Exploratory Laparotomy and lysis of adhesions.;  Surgeon: Genevieve Barros MD;  Location:  OR        MEDICATIONS:   Current Outpatient Prescriptions:      simvastatin (ZOCOR) 20 MG tablet, TAKE 1 TABLET BY MOUTH ISSA Y AT BEDTIME, Disp: 90 tablet, Rfl: 3     cetirizine (ZYRTEC) 10 MG tablet, Take 10 mg by mouth daily, Disp: , Rfl:      aspirin 81 MG tablet, Take 81 mg by mouth daily,  "Disp: , Rfl:      order for DME, Equipment being ordered: knee sleeve with horseshoe, XL, Disp: 1 Device, Rfl: 0     triamcinolone (KENALOG) 0.1 % cream, Apply sparingly to affected area two times daily for 5 to 7 days., Disp: 30 g, Rfl: 0     Loratadine (CLARITIN PO), , Disp: , Rfl:      triamcinolone (KENALOG) 0.1 % paste, Take by mouth 2 times daily (Patient not taking: Reported on 6/19/2017), Disp: 5 g, Rfl: 0     Acetaminophen (TYLENOL PO), Take 1,000 mg by mouth, Disp: , Rfl:      ALLERGIES:    Allergies   Allergen Reactions     Allopurinol Rash        SOCIAL HISTORY:   Social History     Social History     Marital status:      Spouse name: Cristel     Number of children: 2     Years of education: N/A     Occupational History     Not on file.     Social History Main Topics     Smoking status: Never Smoker     Smokeless tobacco: Never Used     Alcohol use 2.0 oz/week      Comment: occasionally weekends     Drug use: No     Sexual activity: Yes     Partners: Female     Other Topics Concern     Caffeine Concern No     Sleep Concern No     Stress Concern No     Weight Concern No     Exercise Yes     Seat Belt Yes     Social History Narrative        FAMILY HISTORY:   Family History   Problem Relation Age of Onset     CANCER Father      liver/kidney     C.A.D. Father      Pacemaker Brother      Arthritis Mother      RA        EXAM:Vitals: Temp 97.4  F (36.3  C) (Temporal)  Ht 6' 0.5\" (1.842 m)  Wt 284 lb (128.8 kg)  BMI 37.99 kg/m2  BMI= Body mass index is 37.99 kg/(m^2).    General appearance: Patient is alert and fully cooperative with history & exam.  No sign of distress is noted during the visit.     Psychiatric: Affect is pleasant & appropriate.  Patient appears motivated to improve health.     Respiratory: Breathing is regular & unlabored while sitting.     HEENT: Hearing is intact to spoken word.  Speech is clear.  No gross evidence of visual impairment that would impact ambulation.     Vascular: DP " & PT pulses are intact & regular bilaterally.  No significant edema or varicosities noted.  CFT and skin temperature is normal to both lower extremities.     Neurologic: Lower extremity sensation is intact to light touch.  No evidence of weakness or contracture in the lower extremities.  No evidence of neuropathy.    Dermatologic: Skin is intact to both lower extremities with adequate texture, turgor and tone about the integument.  Left hallux nail is quite dystrophic incurvated causing a pincer nail. No significant subungual debris are lysing. Mild localized erythema but no abscess or drainage or paronychia.    Musculoskeletal: Patient is ambulatory without assistive device or brace.  No gross ankle deformity noted.  No foot or ankle joint effusion is noted.       ASSESSMENT:       ICD-10-CM    1. Ingrowing nail L60.0    2. Onychodystrophy L60.3         PLAN:  Reviewed patient's chart in [facility].      10/9/2017   Recommended matrixectomy of any border that is chronically ingrown are difficult to manage. Discussed appropriate hygiene with written instructions. Follow up for matrixectomy if he is unable to manage this with these changes and hygiene. Offered matrixectomy today but he would rather continue hygiene. All questions answered.    Dedrick Saucedo DPM

## 2017-10-10 NOTE — TELEPHONE ENCOUNTER
Patient only needs labs prior to follow up.  Called patient and instructed him to come 15-30 minutes early.    Markell Mistry RN, BSN, OCN  10/10/2017, 1:03 PM

## 2017-10-11 DIAGNOSIS — C85.90 NON-HODGKIN'S LYMPHOMA, UNSPECIFIED BODY REGION, UNSPECIFIED NON-HODGKIN LYMPHOMA TYPE (H): Primary | ICD-10-CM

## 2017-10-18 ENCOUNTER — ONCOLOGY VISIT (OUTPATIENT)
Dept: ONCOLOGY | Facility: CLINIC | Age: 67
End: 2017-10-18
Payer: MEDICARE

## 2017-10-18 VITALS
WEIGHT: 281.6 LBS | HEIGHT: 73 IN | SYSTOLIC BLOOD PRESSURE: 129 MMHG | RESPIRATION RATE: 18 BRPM | DIASTOLIC BLOOD PRESSURE: 76 MMHG | OXYGEN SATURATION: 95 % | BODY MASS INDEX: 37.32 KG/M2 | HEART RATE: 78 BPM | TEMPERATURE: 97.5 F

## 2017-10-18 DIAGNOSIS — C85.90 NON-HODGKIN'S LYMPHOMA, UNSPECIFIED BODY REGION, UNSPECIFIED NON-HODGKIN LYMPHOMA TYPE (H): ICD-10-CM

## 2017-10-18 LAB
ALBUMIN SERPL-MCNC: 3.9 G/DL (ref 3.4–5)
ALP SERPL-CCNC: 102 U/L (ref 40–150)
ALT SERPL W P-5'-P-CCNC: 29 U/L (ref 0–70)
ANION GAP SERPL CALCULATED.3IONS-SCNC: 6 MMOL/L (ref 3–14)
AST SERPL W P-5'-P-CCNC: 26 U/L (ref 0–45)
BASOPHILS # BLD AUTO: 0 10E9/L (ref 0–0.2)
BASOPHILS NFR BLD AUTO: 0.6 %
BILIRUB SERPL-MCNC: 0.9 MG/DL (ref 0.2–1.3)
BUN SERPL-MCNC: 20 MG/DL (ref 7–30)
CALCIUM SERPL-MCNC: 8.5 MG/DL (ref 8.5–10.1)
CHLORIDE SERPL-SCNC: 106 MMOL/L (ref 94–109)
CO2 SERPL-SCNC: 28 MMOL/L (ref 20–32)
CREAT SERPL-MCNC: 1 MG/DL (ref 0.66–1.25)
DIFFERENTIAL METHOD BLD: ABNORMAL
EOSINOPHIL # BLD AUTO: 0.1 10E9/L (ref 0–0.7)
EOSINOPHIL NFR BLD AUTO: 2.8 %
ERYTHROCYTE [DISTWIDTH] IN BLOOD BY AUTOMATED COUNT: 13.5 % (ref 10–15)
GFR SERPL CREATININE-BSD FRML MDRD: 75 ML/MIN/1.7M2
GLUCOSE SERPL-MCNC: 125 MG/DL (ref 70–99)
HCT VFR BLD AUTO: 41.4 % (ref 40–53)
HGB BLD-MCNC: 13.7 G/DL (ref 13.3–17.7)
IMM GRANULOCYTES # BLD: 0 10E9/L (ref 0–0.4)
IMM GRANULOCYTES NFR BLD: 0.2 %
LYMPHOCYTES # BLD AUTO: 0.6 10E9/L (ref 0.8–5.3)
LYMPHOCYTES NFR BLD AUTO: 13.2 %
MCH RBC QN AUTO: 30.2 PG (ref 26.5–33)
MCHC RBC AUTO-ENTMCNC: 33.1 G/DL (ref 31.5–36.5)
MCV RBC AUTO: 91 FL (ref 78–100)
MONOCYTES # BLD AUTO: 0.6 10E9/L (ref 0–1.3)
MONOCYTES NFR BLD AUTO: 12.6 %
NEUTROPHILS # BLD AUTO: 3.3 10E9/L (ref 1.6–8.3)
NEUTROPHILS NFR BLD AUTO: 70.6 %
PLATELET # BLD AUTO: 167 10E9/L (ref 150–450)
POTASSIUM SERPL-SCNC: 3.8 MMOL/L (ref 3.4–5.3)
PROT SERPL-MCNC: 6.5 G/DL (ref 6.8–8.8)
RBC # BLD AUTO: 4.54 10E12/L (ref 4.4–5.9)
SODIUM SERPL-SCNC: 140 MMOL/L (ref 133–144)
WBC # BLD AUTO: 4.6 10E9/L (ref 4–11)

## 2017-10-18 PROCEDURE — 99214 OFFICE O/P EST MOD 30 MIN: CPT | Performed by: INTERNAL MEDICINE

## 2017-10-18 PROCEDURE — 85025 COMPLETE CBC W/AUTO DIFF WBC: CPT | Performed by: INTERNAL MEDICINE

## 2017-10-18 PROCEDURE — 36415 COLL VENOUS BLD VENIPUNCTURE: CPT | Performed by: INTERNAL MEDICINE

## 2017-10-18 PROCEDURE — 80053 COMPREHEN METABOLIC PANEL: CPT | Performed by: INTERNAL MEDICINE

## 2017-10-18 ASSESSMENT — PAIN SCALES - GENERAL: PAINLEVEL: NO PAIN (0)

## 2017-10-18 NOTE — MR AVS SNAPSHOT
"              After Visit Summary   10/18/2017    Deandre Merchant    MRN: [id number]           Patient Information     Date Of Birth          1950        Visit Information        Provider Department      10/18/2017 9:00 AM Ge Krishnamurthy MD [facility]        Today's Diagnoses     Non-Hodgkin's lymphoma, unspecified body region, unspecified non-Hodgkin lymphoma type (H)          Care Instructions      Please follow up with Dr. Krishnamurthy in 6 months with Scan and lab results.  Please schedule follow up about a week after CT Scan.      Lab Date/Time:  4/18/18 at 8:00    CT Scan Date/Time:  4/18/18 at 8:30  Please see \"Getting Ready for Your CT Scan\" for details.  Nothing to eat or drink for 2 hours prior to scan except contrast prep which will start at 6:00.  Please  oral prep from the Radiology Department prior to day of scan.    Follow Up Date/Time:     If you have any questions or concerns please feel free to call.    If you need to reschedule please call:  Clinic or Lab Appointment - [phone]  Infusion - [phone]  Imaging - [phone]    Markell Mistry RN, BSN, OCN   Oncology Care Coordinator RN  [facility]  [phone]              Follow-ups after your visit        Follow-up notes from your care team     Return in about 6 months (around 4/18/2018) for Lab Work, Routine Visit.      Your next 10 appointments already scheduled     Apr 18, 2018  8:00 AM CDT   CT ABDOMEN PELVIS W CONTRAST with PHCT1   [facility] CT Scan ([facility])    [address] [place] [id number] [phone]           Please bring any scans or X-rays taken at other hospitals, if similar tests were done. Also bring a list of your medicines, including vitamins, minerals and over-the-counter drugs. It is safest to leave personal items at home.  Be sure to tell your doctor:   If you have any allergies.   If there s any chance you are pregnant.   If you are " breastfeeding.   If you have any special needs.  You may have contrast for this exam. To prepare:   Do not eat or drink for 2 hours before your exam. If you need to take medicine, you may take it with small sips of water. (We may ask you to take liquid medicine as well.)   The day before your exam, drink extra fluids at least six 8-ounce glasses (unless your doctor tells you to restrict your fluids).  Patients over 70 or patients with diabetes or kidney problems:   If you haven t had a blood test (creatinine test) within the last 30 days, go to your clinic or Diagnostic Imaging Department for this test.  If you have diabetes:   If your kidney function is normal, continue taking your metformin (Avandamet, Glucophage, Glucovance, Metaglip) on the day of your exam.   If your kidney function is abnormal, wait 48 hours before restarting this medicine.  You will have oral contrast for this exam:   You will drink the contrast at home. Get this from your clinic or Diagnostic Imaging Department. Please follow the directions given.  Please wear loose clothing, such as a sweat suit or jogging clothes. Avoid snaps, zippers and other metal. We may ask you to undress and put on a hospital gown.  If you have any questions, please call the Imaging Department where you will have your exam.            Apr 18, 2018  8:00 AM CDT   LAB with NL LAB PMC   [facility]    [address] [place] [id number] [phone]           Patient must bring picture ID. Patient should be prepared to give a urine specimen  Please do not eat 10-12 hours before your appointment if you are coming in fasting for labs on lipids, cholesterol, or glucose (sugar). Pregnant women should follow their Care Team instructions. Water with medications is okay. Do not drink coffee or other fluids. If you have concerns about taking  your medications, please ask at office or if scheduling via [employer], send a  message by clicking on Secure Messaging, Message Your Care Team.            Apr 18, 2018  8:30 AM CDT   CT CHEST W CONTRAST with PHCT1   [facility] CT Scan ([facility])    [address] [place] [id number] [phone]           Please bring any scans or X-rays taken at other hospitals, if similar tests were done. Also bring a list of your medicines, including vitamins, minerals and over-the-counter drugs. It is safest to leave personal items at home.  Be sure to tell your doctor:   If you have any allergies.   If there s any chance you are pregnant.   If you are breastfeeding.   If you have any special needs.  You will have contrast for this exam. To prepare:   Do not eat or drink for 2 hours before your exam. If you need to take medicine, you may take it with small sips of water. (We may ask you to take liquid medicine as well.)   The day before your exam, drink extra fluids at least six 8-ounce glasses (unless your doctor tells you to restrict your fluids).  Patients over 70 or patients with diabetes or kidney problems:   If you haven t had a blood test (creatinine test) within the last 30 days, go to your clinic or Diagnostic Imaging Department for this test.  If you have diabetes:   If your kidney function is normal, continue taking your metformin (Avandamet, Glucophage, Glucovance, Metaglip) on the day of your exam.   If your kidney function is abnormal, wait 48 hours before restarting this medicine.  Please wear loose clothing, such as a sweat suit or jogging clothes. Avoid snaps, zippers and other metal. We may ask you to undress and put on a hospital gown.  If you have any questions, please call the Imaging Department where you will have your exam.              Future tests that were ordered for you today     Open Future Orders        Priority Expected Expires Ordered    CBC with platelets differential Routine 4/11/2018 10/18/2018 10/18/2017    Comprehensive metabolic  "panel Routine 2018 10/18/2018 10/18/2017    Lactate Dehydrogenase Routine 2018 10/18/2018 10/18/2017    CT Abdomen Pelvis w Contrast Routine  10/18/2018 10/18/2017    CT Chest w Contrast Routine  10/18/2018 10/18/2017            Who to contact     If you have questions or need follow up information about today's clinic visit or your schedule please contact [facility] directly at [phone].  Normal or non-critical lab and imaging results will be communicated to you by SteelCloud[employer], letter or phone within 4 business days after the clinic has received the results. If you do not hear from us within 7 days, please contact the clinic through [employer] or phone. If you have a critical or abnormal lab result, we will notify you by phone as soon as possible.  Submit refill requests through [employer] or call your pharmacy and they will forward the refill request to us. Please allow 3 business days for your refill to be completed.          Additional Information About Your Visit        [facility] Information     Wordeo lets you send messages to your doctor, view your test results, renew your prescriptions, schedule appointments and more. To sign up, go to [email or web address] . Click on \"Log in\" on the left side of the screen, which will take you to the Welcome page. Then click on \"Sign up Now\" on the right side of the page.     You will be asked to enter the access code listed below, as well as some personal information. Please follow the directions to create your username and password.     Your access code is: [email or web address]  Expires: 10/26/2017  9:44 AM     Your access code will  in 90 days. If you need help or a new code, please call your [facility] clinic or [phone].        Care EveryWhere ID     This is your Care EveryWhere ID. This could be used by other organizations to access your [facility] medical records  [id number]        Your Vitals Were     Pulse Temperature Respirations Height Pulse " "Oximetry BMI (Body Mass Index)    78 97.5  F (36.4  C) (Temporal) 18 1.842 m (6' 0.5\") 95% 37.67 kg/m2       Blood Pressure from Last 3 Encounters:   10/18/17 129/76   07/28/17 136/80   06/19/17 120/64    Weight from Last 3 Encounters:   10/18/17 127.7 kg (281 lb 9.6 oz)   10/09/17 128.8 kg (284 lb)   07/28/17 125.2 kg (276 lb)              We Performed the Following     CBC with platelets differential     Comprehensive metabolic panel        Primary Care Provider Office Phone # Fax #    Felix Sevilla [phone] [address] [place] [address]        Equal Access to Services     MARISELA LANDRUM : Hadii aad ku hadasho Soomaali, waaxda luqadaha, qaybta kaalmada adeegyada, rajat wood hayceline mares . So [facility] [phone].    ATENCIÓN: Si habla español, tiene a kerr disposición servicios gratuitos de asistencia lingüística. Llame al [phone].    We comply with applicable federal civil rights laws and [place] laws. We do not discriminate on the basis of race, color, national origin, age, disability, sex, sexual orientation, or gender identity.            Thank you!     Thank you for choosing [facility]  for your care. Our goal is always to provide you with excellent care. Hearing back from our patients is one way we can continue to improve our services. Please take a few minutes to complete the written survey that you may receive in the mail after your visit with us. Thank you!             Your Updated Medication List - Protect others around you: Learn how to safely use, store and throw away your medicines at www.disposemymeds.org.          This list is accurate as of: 10/18/17  9:36 AM.  Always use your most recent med list.                   Brand Name Dispense Instructions for use Diagnosis    aspirin 81 MG tablet      Take 81 mg by mouth daily        cetirizine 10 MG tablet    zyrTEC     Take 10 mg by mouth daily        CLARITIN PO           order " for DME     1 Device    Equipment being ordered: knee sleeve with horseshoe, XL    Chronic pain of left knee       simvastatin 20 MG tablet    ZOCOR    90 tablet    TAKE 1 TABLET BY MOUTH ISSA Y AT BEDTIME    Hyperlipidemia with target LDL less than 130       triamcinolone 0.1 % cream    KENALOG    30 g    Apply sparingly to affected area two times daily for 5 to 7 days.    Rash and nonspecific skin eruption       triamcinolone 0.1 % paste    KENALOG    5 g    Take by mouth 2 times daily    Ulcer mouth       TYLENOL PO      Take 1,000 mg by mouth

## 2017-10-18 NOTE — PATIENT INSTRUCTIONS
"  Please follow up with Dr. Krishnamurthy in 6 months with Scan and lab results.  Please schedule follow up about a week after CT Scan.      Lab Date/Time:  4/18/18 at 8:00    CT Scan Date/Time:  4/18/18 at 8:30  Please see \"Getting Ready for Your CT Scan\" for details.  Nothing to eat or drink for 2 hours prior to scan except contrast prep which will start at 6:00.  Please  oral prep from the Radiology Department prior to day of scan.    Follow Up Date/Time:     If you have any questions or concerns please feel free to call.    If you need to reschedule please call:  Clinic or Lab Appointment - [phone]  Infusion - [phone]  Imaging - [phone]    Markell Mistry RN, BSN, OCN   Oncology Care Coordinator RN  [facility]  [phone]      "

## 2017-10-18 NOTE — PROGRESS NOTES
FOLLOW-UP VISIT NOTE    PATIENT NAME: Deandre Merchant MRN # [id number]  DATE OF VISIT: Oct 18, 2017 YOB: 1950    REFERRING PROVIDER: No referring provider defined for this encounter.    CANCER TYPE:Follicular Lymphoma- In Remission  STAGE: IV( axillary, mediastinal, retroperitoneal, stomach, bone marrow)  ECOG PS: 0    ONCOLOGY HISTORY:  67-year-old male who in 2012 presented with mediastinal, retroperitoneal and mesenteric lymph nodes. Was clinically asymptomatic with normal blood counts. CT-guided lymph node biopsy on 6/21/2012 was consistent with follicular lymphoma, grade 1-2. He was observed at that point.    In 2013, presented to the hospital with ileus and  abdominal distention. EGD showed gastric antrum and duodenal ulcers which were biopsied and showed follicular lymphoma. CT scans showed bulky  lymphadenopathy with increasing size. 04/13 Bone marrow biopsy was positive with evidence of follicular lymphoma. He received bendamustine and Rituxan regimen april -September 2014.   In December 14 , he was started on maintenance Rituxan every 2 months for 2 years which he completed in October 2016.   He was last seen in April 2017 and was in remission.      SUBJECTIVE   Presents to the clinic today for a a six-month follow-up and transfer care. Continues to do well with no active complaints. Denies abdominal pain, nausea vomiting, fatigue, fever, night sweats, weight loss.      PAST MEDICAL HISTORY     Past Medical History:   Diagnosis Date     BPH (benign prostatic hyperplasia)      Lymphadenopathy     mediastinal & retroperitoneal     Polyps, colonic      Retinal detachment          CURRENT OUTPATIENT MEDICATIONS     Current Outpatient Prescriptions   Medication Sig Dispense Refill     order for DME Equipment being ordered: knee sleeve with horseshoe, XL 1 Device 0     triamcinolone (KENALOG) 0.1 % cream Apply sparingly to affected area two times daily for 5 to 7 days. 30 g 0     Loratadine  (CLARITIN PO)        simvastatin (ZOCOR) 20 MG tablet TAKE 1 TABLET BY MOUTH ISSA Y AT BEDTIME 90 tablet 3     triamcinolone (KENALOG) 0.1 % paste Take by mouth 2 times daily 5 g 0     cetirizine (ZYRTEC) 10 MG tablet Take 10 mg by mouth daily       aspirin 81 MG tablet Take 81 mg by mouth daily       Acetaminophen (TYLENOL PO) Take 1,000 mg by mouth          ALLERGIES     Allergies   Allergen Reactions     Allopurinol Rash        REVIEW OF SYSTEMS   As above in the HPI, o/w complete 14-point ROS was negative.     PHYSICAL EXAM   B/P: 129/76, T: 97.5, P: 78, R: 18  SpO2 Readings from Last 4 Encounters:   10/18/17 95%   04/27/17 95%   03/01/17 94%   01/25/17 94%     Wt Readings from Last 3 Encounters:   10/18/17 127.7 kg (281 lb 9.6 oz)   10/09/17 128.8 kg (284 lb)   07/28/17 125.2 kg (276 lb)     GEN: NAD  EYES:PERRLA  Mouth/ENT: Oropharynx is clear.  NECK: no cervical or supraclavicular lymphadenopathy  LUNGS: clear bilaterally  CV: regular, no murmurs, rubs, or gallops  ABDOMEN: soft, non-tender, non-distended, normal bowel sounds, no hepatosplenomegaly by percussion or palpation  EXT: warm, well perfused, no edema  NEURO: alert  SKIN: no rashes     LABORATORY AND IMAGING STUDIES     Recent Labs   Lab Test  10/18/17   0828  04/24/17   1004   05/06/14   0752   05/02/13   0731   NA  140  140   < >  140   < >   --    POTASSIUM  3.8  4.4   < >  4.3   < >   --    CHLORIDE  106  106   < >  103   < >   --    CO2  28  24   < >  22   < >   --    ANIONGAP  6  10   < >  14.2   < >   --    BUN  20  18   < >  28   < >   --    CR  1.00  0.81   < >  1.06   < >   --    GLC  125*  106*   < >  137*   < >   --    ANDRÉS  8.5  8.6   < >  8.8   < >   --    MAG   --    --    --   2.1   --   2.3   PHOS   --    --    --   3.7   --   4.3    < > = values in this interval not displayed.     Recent Labs   Lab Test  10/18/17   0828  04/24/17   1004  01/25/17   0739   WBC  4.6  2.7*  2.1*   HGB  13.7  13.4  14.2   PLT  167  160  151   MCV  91  90   88   NEUTROPHIL  70.6  49.5  44.4     Recent Labs   Lab Test  10/18/17   0828  04/24/17   1004  02/10/17   1107  01/25/17   0739  10/04/16   0924  08/02/16   0753   BILITOTAL  0.9  0.7  0.9  0.6  0.8  0.9   ALKPHOS  102  111  116  126  109  108   ALT  29  35  55  52  43  39   AST  26  26  44  40  33  34   ALBUMIN  3.9  3.7  4.1  3.9  3.7  3.7   LDH   --    --    --   174  179  172     TSH   Date Value Ref Range Status   05/06/2014 1.60 0.4 - 5.0 mU/L Final   ]    All laboratory data reviewed    CT 04/24/17  IMPRESSION:  1. Minimal lymph node prominence in the retroperitoneum is stable as  compared to the most recent prior CT dated 4/2/2015 measuring up to  0.9 cm in short axis. No mesenteric lymphadenopathy is identified.  This has significantly improved since the prior study dated 3/31/2014.  2. Mild haziness of the adipose tissue in the mesenteric root is  similar to the most recent prior study.  3. No new metastases are identified.  4. Diffuse fatty infiltration of the liver is again noted.  5. Nonaneurysmal atherosclerosis.  6. Stable tiny nodular density in the right middle lobe since the most  recent prior study dated 4/2/2015. This is significantly improved as  compared to the prior study dated 3/31/2014.  7. Ectasia of the ascending thoracic aorta (measuring up to 4.6 cm in  transaxial dimension).      ASSESSMENT AND PLAN     67-year-old male with stage IV follicular lymphoma who is status post treatment with bendamustine/Rituxan (04-09/2014) followed by maintenance rituximab for two years- completed in October 2016.    Clinically in remission with no active complaints.    NCCN guidelines were reviewed.    We'll see him back in 6 months with labs and CT scans prior  Patient understands and agrees with the plan of care      Ge Krishnamurthy MD  Attending Physician   Hematology/Medical Oncology

## 2017-10-18 NOTE — NURSING NOTE
"Oncology Rooming Note    October 18, 2017 9:09 AM   Deandre Merchant is a 67 year old male who presents for:    Chief Complaint   Patient presents with     Oncology Clinic Visit     6 month follow up for NHL     Results     Labs today     Initial Vitals: /76 (BP Location: Right arm, Patient Position: Chair, Cuff Size: Adult Large)  Pulse 78  Temp 97.5  F (36.4  C) (Temporal)  Resp 18  Ht 1.842 m (6' 0.5\")  Wt 127.7 kg (281 lb 9.6 oz)  SpO2 95%  BMI 37.67 kg/m2 Estimated body mass index is 37.67 kg/(m^2) as calculated from the following:    Height as of this encounter: 1.842 m (6' 0.5\").    Weight as of this encounter: 127.7 kg (281 lb 9.6 oz). Body surface area is 2.56 meters squared.  No Pain (0) Comment: Data Unavailable   No LMP for male patient.  Allergies reviewed: Yes  Medications reviewed: Yes    Medications: Medication refills not needed today.  Pharmacy name entered into [employer]:    [facility] [place] - [address] [facility] [facility] - [place], [place] - [address] [place] [address]    Clinical concerns: None. Dr. Krishnamurthy was notified.      Vika Licea MA              "

## 2017-10-18 NOTE — NURSING NOTE
"DISCHARGE PLAN:  Next appointments: See patient instruction section  Departure Mode: Ambulatory  Accompanied by: self  8 minutes for nursing discharge (face to face time)     Deandre Merchant is here today for Oncology follow up.  Writing nurse seen patient after Medical Oncology appointment to address questions/concerns/coordinate care. Patient to follow up in 6 months with CT and lab results.  CT and lab scheduled with instructions and \"Getting Ready for Your CT Scan\". Patient ambulated by nurse to  to schedule follow up and/or lab appointments. See patient instructions and Oncologist's Progress note for further details. Questions and concerns addressed to patient's satisfaction. Patient verbalized and demonstrated understanding of plan.  Contact information provided and patient is encouraged to call with any that arise in the interim of care.    Markell Mistry, RN, BSN, OCN   Oncology Care Coordinator RN  [facility]  [phone]  10/18/2017, 11:26 AM        "

## 2018-01-29 ENCOUNTER — OFFICE VISIT (OUTPATIENT)
Dept: FAMILY MEDICINE | Facility: OTHER | Age: 68
End: 2018-01-29
Payer: MEDICARE

## 2018-01-29 VITALS
SYSTOLIC BLOOD PRESSURE: 140 MMHG | BODY MASS INDEX: 37.64 KG/M2 | RESPIRATION RATE: 20 BRPM | TEMPERATURE: 98.5 F | WEIGHT: 284 LBS | HEART RATE: 86 BPM | HEIGHT: 73 IN | OXYGEN SATURATION: 95 % | DIASTOLIC BLOOD PRESSURE: 76 MMHG

## 2018-01-29 DIAGNOSIS — Z23 NEED FOR PROPHYLACTIC VACCINATION AND INOCULATION AGAINST INFLUENZA: ICD-10-CM

## 2018-01-29 DIAGNOSIS — R05.8 UPPER AIRWAY COUGH SYNDROME: ICD-10-CM

## 2018-01-29 DIAGNOSIS — C82.80 LYMPHOMA, SMALL LYMPHOID, FOLLICULAR (H): ICD-10-CM

## 2018-01-29 DIAGNOSIS — J06.9 UPPER RESPIRATORY TRACT INFECTION, UNSPECIFIED TYPE: Primary | ICD-10-CM

## 2018-01-29 DIAGNOSIS — R03.0 ELEVATED BLOOD PRESSURE READING WITHOUT DIAGNOSIS OF HYPERTENSION: ICD-10-CM

## 2018-01-29 PROCEDURE — G0008 ADMIN INFLUENZA VIRUS VAC: HCPCS | Performed by: FAMILY MEDICINE

## 2018-01-29 PROCEDURE — 90662 IIV NO PRSV INCREASED AG IM: CPT | Performed by: FAMILY MEDICINE

## 2018-01-29 PROCEDURE — 99214 OFFICE O/P EST MOD 30 MIN: CPT | Mod: 25 | Performed by: FAMILY MEDICINE

## 2018-01-29 RX ORDER — FLUTICASONE PROPIONATE 50 MCG
1-2 SPRAY, SUSPENSION (ML) NASAL DAILY
Qty: 16 G | Refills: 11 | Status: SHIPPED | OUTPATIENT
Start: 2018-01-29 | End: 2019-07-09

## 2018-01-29 RX ORDER — VALACYCLOVIR HYDROCHLORIDE 1 G/1
TABLET, FILM COATED ORAL
Refills: 3 | COMMUNITY
Start: 2017-04-27 | End: 2018-02-22

## 2018-01-29 RX ORDER — ALBUTEROL SULFATE 90 UG/1
2 AEROSOL, METERED RESPIRATORY (INHALATION) EVERY 6 HOURS PRN
Qty: 1 INHALER | Refills: 1 | Status: SHIPPED | OUTPATIENT
Start: 2018-01-29 | End: 2018-03-28

## 2018-01-29 ASSESSMENT — PAIN SCALES - GENERAL: PAINLEVEL: MILD PAIN (2)

## 2018-01-29 NOTE — MR AVS SNAPSHOT
After Visit Summary   1/29/2018    Deandre Merchant    MRN: [id number]           Patient Information     Date Of Birth          1950        Visit Information        Provider Department      1/29/2018 10:00 AM Felix Sevilla MD [facility]        Today's Diagnoses     Upper respiratory tract infection, unspecified type    -  1    Upper airway cough syndrome        Lymphoma, small lymphoid, follicular (H)          Care Instructions    Thank you for visiting [facility]    Try a nasal steroid spray like Flonase to help with your cough.  It will take a few days for this to fully kick in.      Can use albuterol to help with your wheezing and cough as well.    If not improving by later this week, would consider a course of Zithromax or some prednisone.    Please see me in 1-2 weeks for follow up if not improving.  Also recommend follow up on cholesterol in about a month.    Your blood pressure was high today.  Please come back in 1-4 weeks for a nurse visit blood pressure check.     If you had imaging scheduled please refer to your radiology prep sheet.    Appointment    Date_______________     Time_____________    Day:   M TU W TH F    With____________________________    Location_________________________    If you need medication refills, please contact your pharmacy 3 days before your prescriptions runs out. If you are out of refills, your pharmacy will contact contact the clinic.    Contact us or return if questions or concerns.     -Your Care Team:  MD Debi Olivera PA-C Joel De Haan, PA-C Elizabeth McLean, APRN CNP    General information about your clinic      Clinic hours:     Lab hours:  Phone [phone]  Monday 7:30 am-7 pm    Monday 8:30 am-6:30 pm  Tuesday-Friday 7:30 am-5 pm   Tuesday-Friday 8:30 am-4:30 pm    Pharmacy hours:  Phone [phone]  Monday 8:30 am-7pm  Tuesday-Friday 8:30am-6 pm                                        Zulay assistance [phone]        We would like to hear from you, how was your visit today?    Heather Weiner  Patient Information Supervisor   Patient Care Supervisor  [facility] [place], [facility] [place], and [facility]  [phone]             Follow-ups after your visit        Your next 10 appointments already scheduled     Apr 18, 2018  8:00 AM CDT   LAB with NL LAB PMC   [facility] ([facility]    [address] [place] [id number] [phone]           Please do not eat 10-12 hours before your appointment if you are coming in fasting for labs on lipids, cholesterol, or glucose (sugar). This does not apply to pregnant women. Water, hot tea and black coffee (with nothing added) are okay. Do not drink other fluids, diet soda or chew gum.            Apr 18, 2018  8:30 AM CDT   CT ABDOMEN PELVIS W CONTRAST with PHCT1   [facility] CT Scan ([facility])    [address] [id number] [phone]           Please bring any scans or X-rays taken at other hospitals, if similar tests were done. Also bring a list of your medicines, including vitamins, minerals and over-the-counter drugs. It is safest to leave personal items at home.  Be sure to tell your doctor:   If you have any allergies.   If there s any chance you are pregnant.   If you are breastfeeding.   If you have any special needs.  You may have contrast for this exam. To prepare:   Do not eat or drink for 2 hours before your exam. If you need to take medicine, you may take it with small sips of water. (We may ask you to take liquid medicine as well.)   The day before your exam, drink extra fluids at least six 8-ounce glasses (unless your doctor tells you to restrict your fluids).  Patients over 70 or patients with diabetes or kidney problems:   If you haven t had a blood test (creatinine  test) within the last 30 days, go to your clinic or Diagnostic Imaging Department for this test.  If you have diabetes:   If your kidney function is normal, continue taking your metformin (Avandamet, Glucophage, Glucovance, Metaglip) on the day of your exam.   If your kidney function is abnormal, wait 48 hours before restarting this medicine.  You will have oral contrast for this exam:   You will drink the contrast at home. Get this from your clinic or Diagnostic Imaging Department. Please follow the directions given.  Please wear loose clothing, such as a sweat suit or jogging clothes. Avoid snaps, zippers and other metal. We may ask you to undress and put on a hospital gown.  If you have any questions, please call the Imaging Department where you will have your exam.            Apr 18, 2018  9:00 AM CDT   CT CHEST W CONTRAST with PHCT1   [facility] CT Scan ([facility])    [address] [place] [id number] [phone]           Please bring any scans or X-rays taken at other hospitals, if similar tests were done. Also bring a list of your medicines, including vitamins, minerals and over-the-counter drugs. It is safest to leave personal items at home.  Be sure to tell your doctor:   If you have any allergies.   If there s any chance you are pregnant.   If you are breastfeeding.   If you have any special needs.  You will have contrast for this exam. To prepare:   Do not eat or drink for 2 hours before your exam. If you need to take medicine, you may take it with small sips of water. (We may ask you to take liquid medicine as well.)   The day before your exam, drink extra fluids at least six 8-ounce glasses (unless your doctor tells you to restrict your fluids).  Patients over 70 or patients with diabetes or kidney problems:   If you haven t had a blood test (creatinine test) within the last 30 days, go to your clinic or Diagnostic Imaging Department for this test.  If you have  "diabetes:   If your kidney function is normal, continue taking your metformin (Avandamet, Glucophage, Glucovance, Metaglip) on the day of your exam.   If your kidney function is abnormal, wait 48 hours before restarting this medicine.  Please wear loose clothing, such as a sweat suit or jogging clothes. Avoid snaps, zippers and other metal. We may ask you to undress and put on a hospital gown.  If you have any questions, please call the Imaging Department where you will have your exam.            Apr 25, 2018  8:00 AM CDT   [facility] Visit with [facility]   [facility]    [address] [place] [id number] [phone]              Who to contact     If you have questions or need follow up information about today's clinic visit or your schedule please contact [facility] directly at [phone].  Normal or non-critical lab and imaging results will be communicated to you by MyChart, letter or phone within 4 business days after the clinic has received the results. If you do not hear from us within 7 days, please contact the clinic through [employer] or phone. If you have a critical or abnormal lab result, we will notify you by phone as soon as possible.  Submit refill requests through [employer] or call your pharmacy and they will forward the refill request to us. Please allow 3 business days for your refill to be completed.          Additional Information About Your Visit        [employer] Information     [employer] lets you send messages to your doctor, view your test results, renew your prescriptions, schedule appointments and more. To sign up, go to [email or web address] . Click on \"Log in\" on the left side of the screen, which will take you to the Welcome page. Then click on \"Sign up Now\" on the right side of the page.     You will be asked to enter the access code listed below, as well as some personal information. Please follow the directions to " "create your username and password.     Your access code is: [email or web address]  Expires: 2018 10:26 AM     Your access code will  in 90 days. If you need help or a new code, please call your [place] clinic or [phone].        Care EveryWhere ID     This is your Care EveryWhere ID. This could be used by other organizations to access your [place] medical records  [id number]        Your Vitals Were     Pulse Temperature Respirations Height Pulse Oximetry BMI (Body Mass Index)    86 98.5  F (36.9  C) (Temporal) 20 6' 0.5\" (1.842 m) 95% 37.99 kg/m2       Blood Pressure from Last 3 Encounters:   18 144/76   10/18/17 129/76   17 136/80    Weight from Last 3 Encounters:   18 284 lb (128.8 kg)   10/18/17 281 lb 9.6 oz (127.7 kg)   10/09/17 284 lb (128.8 kg)              Today, you had the following     No orders found for display         Today's Medication Changes          These changes are accurate as of 18 10:26 AM.  If you have any questions, ask your nurse or doctor.               Start taking these medicines.        Dose/Directions    albuterol 108 (90 BASE) MCG/ACT Inhaler   Commonly known as:  PROAIR HFA/PROVENTIL HFA/VENTOLIN HFA   Used for:  Upper respiratory tract infection, unspecified type, Upper airway cough syndrome   Started by:  Felix Sevilla MD        Dose:  2 puff   Inhale 2 puffs into the lungs every 6 hours as needed for shortness of breath / dyspnea or wheezing   Quantity:  1 Inhaler   Refills:  1       fluticasone 50 MCG/ACT spray   Commonly known as:  FLONASE   Used for:  Upper respiratory tract infection, unspecified type, Upper airway cough syndrome   Started by:  Felix Sevilla MD        Dose:  1-2 spray   Spray 1-2 sprays into both nostrils daily   Quantity:  16 g   Refills:  11            Where to get your medicines      These medications were sent to [id number] - [place], [place] - [address] [place] [address]  [address] [place] [address]    " Hours:  M-F 8:30-6:30; Sat 9-4; closed Sunday Phone:  [phone]     albuterol 108 (90 BASE) MCG/ACT Inhaler    fluticasone 50 MCG/ACT spray                Primary Care Provider Office Phone # Fax #    Felix Edgar Sevilla [phone] [address] [place] [address]        Equal Access to Services     [facility]: Hadii aad ku hadasho Soomaali, waaxda luqadaha, qaybta kaalmada adeegyada, waxay idiin hayaan adeeg khdoritash la'aan ah. So [facility] [phone].    ATENCIÓN: Si jazmyne espshae, tiene a kerr disposición servicios gratuitos de asistencia lingüística. Llame al [phone].    We comply with applicable federal civil rights laws and [place] laws. We do not discriminate on the basis of race, color, national origin, age, disability, sex, sexual orientation, or gender identity.            Thank you!     Thank you for choosing [facility]  for your care. Our goal is always to provide you with excellent care. Hearing back from our patients is one way we can continue to improve our services. Please take a few minutes to complete the written survey that you may receive in the mail after your visit with us. Thank you!             Your Updated Medication List - Protect others around you: Learn how to safely use, store and throw away your medicines at www.disposemymeds.org.          This list is accurate as of 1/29/18 10:26 AM.  Always use your most recent med list.                   Brand Name Dispense Instructions for use Diagnosis    albuterol 108 (90 BASE) MCG/ACT Inhaler    PROAIR HFA/PROVENTIL HFA/VENTOLIN HFA    1 Inhaler    Inhale 2 puffs into the lungs every 6 hours as needed for shortness of breath / dyspnea or wheezing    Upper respiratory tract infection, unspecified type, Upper airway cough syndrome       aspirin 81 MG tablet      Take 81 mg by mouth daily        cetirizine 10 MG tablet    zyrTEC     Take 10 mg by mouth daily        CLARITIN PO           fluticasone  50 MCG/ACT spray    FLONASE    16 g    Spray 1-2 sprays into both nostrils daily    Upper respiratory tract infection, unspecified type, Upper airway cough syndrome       order for DME     1 Device    Equipment being ordered: knee sleeve with horseshoe, XL    Chronic pain of left knee       simvastatin 20 MG tablet    ZOCOR    90 tablet    TAKE 1 TABLET BY MOUTH ISSA Y AT BEDTIME    Hyperlipidemia with target LDL less than 130       TYLENOL PO      Take 1,000 mg by mouth        valACYclovir 1000 mg tablet    VALTREX     TAKE 2 TABLETS (2,000 MG) BY MOUTH 2 TIMES DAILY FOR 2 DOSES

## 2018-01-29 NOTE — NURSING NOTE
"Chief Complaint   Patient presents with     URI     Cough     Panel Management       Initial /76 (BP Location: Left arm, Patient Position: Chair, Cuff Size: Adult Large)  Pulse 86  Temp 98.5  F (36.9  C) (Temporal)  Resp 20  Ht 6' 0.5\" (1.842 m)  Wt 284 lb (128.8 kg)  SpO2 95%  BMI 37.99 kg/m2 Estimated body mass index is 37.99 kg/(m^2) as calculated from the following:    Height as of this encounter: 6' 0.5\" (1.842 m).    Weight as of this encounter: 284 lb (128.8 kg).  Medication Reconciliation: complete  Siva Myers, PAUL    "

## 2018-01-29 NOTE — PROGRESS NOTES
Prior to injection verified patient identity using patient's name and date of birth.  Injectable Influenza Immunization Documentation    1.  Is the person to be vaccinated sick today?   No    2. Does the person to be vaccinated have an allergy to a component   of the vaccine?   No  Egg Allergy Algorithm Link    3. Has the person to be vaccinated ever had a serious reaction   to influenza vaccine in the past?   No    4. Has the person to be vaccinated ever had Guillain-Barré syndrome?   No    Form completed by Yvonne Garcia CMA ([facility])    Due to injection administration, patient instructed to remain in clinic for 15 minutes  afterwards, and to report any adverse reaction to me immediately.    Yvonne Garcia CMA (AAMA)

## 2018-02-22 ENCOUNTER — RADIANT APPOINTMENT (OUTPATIENT)
Dept: GENERAL RADIOLOGY | Facility: OTHER | Age: 68
End: 2018-02-22
Attending: FAMILY MEDICINE
Payer: MEDICARE

## 2018-02-22 ENCOUNTER — OFFICE VISIT (OUTPATIENT)
Dept: FAMILY MEDICINE | Facility: OTHER | Age: 68
End: 2018-02-22
Payer: MEDICARE

## 2018-02-22 ENCOUNTER — TELEPHONE (OUTPATIENT)
Dept: FAMILY MEDICINE | Facility: OTHER | Age: 68
End: 2018-02-22

## 2018-02-22 VITALS
TEMPERATURE: 97.8 F | DIASTOLIC BLOOD PRESSURE: 68 MMHG | SYSTOLIC BLOOD PRESSURE: 110 MMHG | OXYGEN SATURATION: 96 % | RESPIRATION RATE: 24 BRPM | WEIGHT: 283.5 LBS | HEART RATE: 80 BPM | HEIGHT: 73 IN | BODY MASS INDEX: 37.57 KG/M2

## 2018-02-22 DIAGNOSIS — R05.9 COUGH: ICD-10-CM

## 2018-02-22 DIAGNOSIS — J18.9 PNEUMONIA OF RIGHT LOWER LOBE DUE TO INFECTIOUS ORGANISM: Primary | ICD-10-CM

## 2018-02-22 DIAGNOSIS — R09.89 LUNG CRACKLES: ICD-10-CM

## 2018-02-22 DIAGNOSIS — E66.01 MORBID OBESITY (H): ICD-10-CM

## 2018-02-22 DIAGNOSIS — C85.98 NON-HODGKIN'S LYMPHOMA OF MULTIPLE SITES (H): ICD-10-CM

## 2018-02-22 DIAGNOSIS — E78.5 HYPERLIPIDEMIA WITH TARGET LDL LESS THAN 130: ICD-10-CM

## 2018-02-22 PROCEDURE — 99214 OFFICE O/P EST MOD 30 MIN: CPT | Performed by: FAMILY MEDICINE

## 2018-02-22 PROCEDURE — 71046 X-RAY EXAM CHEST 2 VIEWS: CPT | Mod: FY

## 2018-02-22 RX ORDER — AZITHROMYCIN 250 MG/1
TABLET, FILM COATED ORAL
Qty: 6 TABLET | Refills: 0 | Status: SHIPPED | OUTPATIENT
Start: 2018-02-22 | End: 2018-03-28

## 2018-02-22 ASSESSMENT — PAIN SCALES - GENERAL: PAINLEVEL: MILD PAIN (2)

## 2018-02-22 NOTE — PATIENT INSTRUCTIONS
Thank you for visiting [facility]    Take the antibiotics until they're gone.    OK to continue your other medications.    Let me know if problems or not improving.    Please see me in 1-2 weeks for follow up if needed.       If you had imaging scheduled please refer to your radiology prep sheet.    Appointment    Date_______________     Time_____________    Day:   M TU W TH F    With____________________________    Location_________________________    If you need medication refills, please contact your pharmacy 3 days before your prescriptions runs out. If you are out of refills, your pharmacy will contact contact the clinic.    Contact us or return if questions or concerns.     -Your Care Team:  MD Debi Olivera PA-C Joel De Haan, PA-C Elizabeth McLean, APRN CNP    General information about your clinic      Clinic hours:     Lab hours:  Phone [phone]  Monday 7:30 am-7 pm    Monday 8:30 am-6:30 pm  Tuesday-Friday 7:30 am-5 pm   Tuesday-Friday 8:30 am-4:30 pm    Pharmacy hours:  Phone [phone]  Monday 8:30 am-7pm  Tuesday-Friday 8:30am-6 pm                                       Mych[employer] assistance [phone]        We would like to hear from you, how was your visit today?    Heather Weiner  Patient Information Supervisor   Patient Care Supervisor  [facility], and \[facility]\"", and [facility]  [phone]

## 2018-02-22 NOTE — TELEPHONE ENCOUNTER
Left message asking pt to return our call.  Please inform him of the message below.  Siva Myers CMA  Official x-ray report indicates no evidence of pneumonia.  If not improving, let me know.    Have a nice day!    Dr. Sevilla

## 2018-02-22 NOTE — PROGRESS NOTES
Official x-ray report indicates no evidence of pneumonia.  If not improving, let me know.    Have a nice day!    Dr. Sevilla

## 2018-02-22 NOTE — PROGRESS NOTES
SUBJECTIVE:   Deandre Merchant is a 67 year old male who presents to clinic today for the following health issues:    HPI  Acute Illness   Acute illness concerns: cough and now causing little black outs  Onset: 3+ weeks going on now    Fever: no    Chills/Sweats: no    Headache (location?): YES    Sinus Pressure:YES    Conjunctivitis:  no    Ear Pain: YES    Rhinorrhea: no    Congestion: YES    Sore Throat: YES- little bit     Cough: YES    Wheeze: YES- little bit    Decreased Appetite: YES- little bit    Nausea: no    Vomiting: no    Diarrhea:  YES    Dysuria/Freq.: no    Fatigue/Achiness: YES    Sick/Strep Exposure: no     Therapies Tried and outcome: flonase, inhaler, delsum, tylenol    Problem list and histories reviewed & adjusted, as indicated.  Additional history: as documented    Pt has been coughing for 3 weeks now.  Lots of phlegm production.  Has been using Delsym, Claritin, ASA, Flonase, and his inhaler.  Cough syrup helps a bit.  Not sure if the others are helping enough.      He had felt nearly back to normal after his last visit and treatments, but then got much worse starting Saturday.  Hasn't improved since then much at all.      Current Outpatient Prescriptions   Medication Sig Dispense Refill     fluticasone (FLONASE) 50 MCG/ACT spray Spray 1-2 sprays into both nostrils daily 16 g 11     albuterol (PROAIR HFA/PROVENTIL HFA/VENTOLIN HFA) 108 (90 BASE) MCG/ACT Inhaler Inhale 2 puffs into the lungs every 6 hours as needed for shortness of breath / dyspnea or wheezing 1 Inhaler 1     order for DME Equipment being ordered: knee sleeve with horseshoe, XL 1 Device 0     Loratadine (CLARITIN PO)        simvastatin (ZOCOR) 20 MG tablet TAKE 1 TABLET BY MOUTH ISSA Y AT BEDTIME 90 tablet 3     cetirizine (ZYRTEC) 10 MG tablet Take 10 mg by mouth daily       aspirin 81 MG tablet Take 81 mg by mouth daily       Acetaminophen (TYLENOL PO) Take 1,000 mg by mouth       Recent Labs   Lab Test  10/18/17   5151   "04/24/17   1004  02/10/17   1107   09/17/15   1038   05/06/14   0855   04/17/13   1821   02/16/12   0931   A1C   --    --    --    --   5.4   --    --    --    --    --    --    LDL   --    --   48   --   64   --    --    --    --    --   93   HDL   --    --   27*   --   28*   --    --    --    --    --   31*   TRIG   --    --   229*   --   147   --    --    --    --    --   117   ALT  29  35  55   < >   --    < >   --    < >  35   < >  31   CR  1.00  0.81  0.86   < >   --    < >   --    < >  2.41*   < >  1.00   GFRESTIMATED  75  >90  Non  GFR Calc    90   < >   --    < >   --    < >  27*   < >  76   GFRESTBLACK  >90  >90  African American GFR Calc    >90   GFR Calc     < >   --    < >   --    < >  33*   < >  >90   POTASSIUM  3.8  4.4  4.4   < >   --    < >   --    < >  4.0   < >  4.6   TSH   --    --    --    --    --    --   1.60   --   3.57   --    --     < > = values in this interval not displayed.      BP Readings from Last 3 Encounters:   02/22/18 110/68   01/29/18 140/76   10/18/17 129/76    Wt Readings from Last 3 Encounters:   02/22/18 283 lb 8 oz (128.6 kg)   01/29/18 284 lb (128.8 kg)   10/18/17 281 lb 9.6 oz (127.7 kg)                    ROS:  Constitutional, HEENT, cardiovascular, pulmonary, gi and gu systems are negative, except as otherwise noted.  As above.  Some loose BMs.      OBJECTIVE:     /68 (BP Location: Left arm, Patient Position: Chair, Cuff Size: Adult Large)  Pulse 80  Temp 97.8  F (36.6  C) (Temporal)  Resp 24  Ht 6' 0.5\" (1.842 m)  Wt 283 lb 8 oz (128.6 kg)  BMI 37.92 kg/m2  Body mass index is 37.92 kg/(m^2).  GENERAL: healthy, alert and no distress  NECK: no adenopathy, no asymmetry, masses, or scars and thyroid normal to palpation  RESP: bibasilar crackles, left greater than right.  CV: regular rate and rhythm, normal S1 S2, no S3 or S4, no murmur, click or rub, no peripheral edema and peripheral pulses strong  ABDOMEN: soft, nontender, no " "hepatosplenomegaly, no masses and bowel sounds normal  MS: no gross musculoskeletal defects noted, no edema    Diagnostic Test Results:  Results for orders placed or performed in visit on 10/18/17   CBC with platelets differential   Result Value Ref Range    WBC 4.6 4.0 - 11.0 10e9/L    RBC Count 4.54 4.4 - 5.9 10e12/L    Hemoglobin 13.7 13.3 - 17.7 g/dL    Hematocrit 41.4 40.0 - 53.0 %    MCV 91 78 - 100 fl    MCH 30.2 26.5 - 33.0 pg    MCHC 33.1 31.5 - 36.5 g/dL    RDW 13.5 10.0 - 15.0 %    Platelet Count 167 150 - 450 10e9/L    Diff Method Automated Method     % Neutrophils 70.6 %    % Lymphocytes 13.2 %    % Monocytes 12.6 %    % Eosinophils 2.8 %    % Basophils 0.6 %    % Immature Granulocytes 0.2 %    Absolute Neutrophil 3.3 1.6 - 8.3 10e9/L    Absolute Lymphocytes 0.6 (L) 0.8 - 5.3 10e9/L    Absolute Monocytes 0.6 0.0 - 1.3 10e9/L    Absolute Eosinophils 0.1 0.0 - 0.7 10e9/L    Absolute Basophils 0.0 0.0 - 0.2 10e9/L    Abs Immature Granulocytes 0.0 0 - 0.4 10e9/L   Comprehensive metabolic panel   Result Value Ref Range    Sodium 140 133 - 144 mmol/L    Potassium 3.8 3.4 - 5.3 mmol/L    Chloride 106 94 - 109 mmol/L    Carbon Dioxide 28 20 - 32 mmol/L    Anion Gap 6 3 - 14 mmol/L    Glucose 125 (H) 70 - 99 mg/dL    Urea Nitrogen 20 7 - 30 mg/dL    Creatinine 1.00 0.66 - 1.25 mg/dL    GFR Estimate 75 >60 mL/min/1.7m2    GFR Estimate If Black >90 >60 mL/min/1.7m2    Calcium 8.5 8.5 - 10.1 mg/dL    Bilirubin Total 0.9 0.2 - 1.3 mg/dL    Albumin 3.9 3.4 - 5.0 g/dL    Protein Total 6.5 (L) 6.8 - 8.8 g/dL    Alkaline Phosphatase 102 40 - 150 U/L    ALT 29 0 - 70 U/L    AST 26 0 - 45 U/L       ASSESSMENT/PLAN:     BMI:   Estimated body mass index is 37.92 kg/(m^2) as calculated from the following:    Height as of this encounter: 6' 0.5\" (1.842 m).    Weight as of this encounter: 283 lb 8 oz (128.6 kg).   Weight management plan: Discussed healthy diet and exercise guidelines and patient will follow up in 6 months in " clinic to re-evaluate.        ICD-10-CM    1. Pneumonia of right lower lobe due to infectious organism (H) J18.1 azithromycin (ZITHROMAX) 250 MG tablet   2. Cough R05 XR Chest 2 Views     azithromycin (ZITHROMAX) 250 MG tablet   3. Lung crackles R09.89 XR Chest 2 Views     azithromycin (ZITHROMAX) 250 MG tablet   4. Non-Hodgkin's lymphoma of multiple sites (H) C85.98 azithromycin (ZITHROMAX) 250 MG tablet   5. Morbid obesity (H) E66.01    6. Hyperlipidemia with target LDL less than 130 E78.5 Lipid panel reflex to direct LDL Fasting     1, 2, 3.  His exam and chest x-ray on my reading are suspicious for a pneumonia.  I also have a low threshold for treatment given his history of lymphoma.  Will treat with azithromycin.  Await formal report on his chest x-ray.  4.  In remission.  Consider in determining treatment regimen.  5.  Encouraged lifestyle changes to lower his weight.  We will follow and assist as able.  6.  Due for labs.  These were ordered today.  Follow-up after results.    Portions of this note were completed using Dragon dictation software.  Although reviewed, there may be typographical and other inadvertent errors that remain.               Patient Instructions   Thank you for visiting [facility]    Take the antibiotics until they're gone.    OK to continue your other medications.    Let me know if problems or not improving.    Please see me in 1-2 weeks for follow up if needed.       If you had imaging scheduled please refer to your radiology prep sheet.    Appointment    Date_______________     Time_____________    Day:   M TU W TH F    With____________________________    Location_________________________    If you need medication refills, please contact your pharmacy 3 days before your prescriptions runs out. If you are out of refills, your pharmacy will contact contact the clinic.    Contact us or return if questions or concerns.     -Your Care Team:  Felix Sevilla MD        DAVID Honeycutt PA-C Elizabeth McLean, APRN CNP    General information about your clinic      Clinic hours:     Lab hours:  Phone [phone]  Monday 7:30 am-7 pm    Monday 8:30 am-6:30 pm  Tuesday-Friday 7:30 am-5 pm   Tuesday-Friday 8:30 am-4:30 pm    Pharmacy hours:  Phone [phone]  Monday 8:30 am-7pm  Tuesday-Friday 8:30am-6 pm                                       Mychart assistance [phone]        We would like to hear from you, how was your visit today?    Heather Weiner  Patient Information Supervisor   Patient Care Supervisor  [facility]  [phone]         Felix Sevilla MD, MD  [facility]

## 2018-02-22 NOTE — NURSING NOTE
"Chief Complaint   Patient presents with     Cough     Panel Management       Initial /68 (BP Location: Left arm, Patient Position: Chair, Cuff Size: Adult Large)  Pulse 80  Temp 97.8  F (36.6  C) (Temporal)  Resp 24  Ht 6' 0.5\" (1.842 m)  Wt 283 lb 8 oz (128.6 kg)  SpO2 96%  BMI 37.92 kg/m2 Estimated body mass index is 37.92 kg/(m^2) as calculated from the following:    Height as of this encounter: 6' 0.5\" (1.842 m).    Weight as of this encounter: 283 lb 8 oz (128.6 kg).  Medication Reconciliation: complete  Siva Myers, PAUL    "

## 2018-02-23 NOTE — TELEPHONE ENCOUNTER
Called patient and informed of results.    Ludivina Sampson, [facility]  February 23, 2018

## 2018-03-01 DIAGNOSIS — E78.5 HYPERLIPIDEMIA WITH TARGET LDL LESS THAN 130: ICD-10-CM

## 2018-03-01 LAB
CHOLEST SERPL-MCNC: 92 MG/DL
HDLC SERPL-MCNC: 29 MG/DL
LDLC SERPL CALC-MCNC: 25 MG/DL
NONHDLC SERPL-MCNC: 63 MG/DL
TRIGL SERPL-MCNC: 192 MG/DL

## 2018-03-01 PROCEDURE — 36415 COLL VENOUS BLD VENIPUNCTURE: CPT | Performed by: FAMILY MEDICINE

## 2018-03-01 PROCEDURE — 80061 LIPID PANEL: CPT | Performed by: FAMILY MEDICINE

## 2018-03-01 NOTE — LETTER
March 1, 2018      Deandre RAE Mayur  [address] [place] [address]        Dear ,    We are writing to inform you of your test results.    All of your labs were normal for you.    Resulted Orders   Lipid panel reflex to direct LDL Fasting   Result Value Ref Range    Cholesterol 92 <200 mg/dL    Triglycerides 192 (H) <150 mg/dL      Comment:      Borderline high:  150-199 mg/dl  High:             200-499 mg/dl  Very high:       >499 mg/dl      HDL Cholesterol 29 (L) >39 mg/dL    LDL Cholesterol Calculated 25 <100 mg/dL      Comment:      Desirable:       <100 mg/dl    Non HDL Cholesterol 63 <130 mg/dL       If you have any questions or concerns, please call the clinic at the number listed above.       Sincerely,        Felix Sevilla MD

## 2018-03-26 ENCOUNTER — TELEPHONE (OUTPATIENT)
Dept: FAMILY MEDICINE | Facility: OTHER | Age: 68
End: 2018-03-26

## 2018-03-26 DIAGNOSIS — R05.3 CHRONIC COUGH: Primary | ICD-10-CM

## 2018-03-26 NOTE — TELEPHONE ENCOUNTER
Patient would like referral to a specialist for his pulmonary issues, he isn't sure what you would recommend for him to do as things are not any better. He saw you in February.   Please call when you can.  Thank you!

## 2018-03-26 NOTE — TELEPHONE ENCOUNTER
"Spoke with patient informed him of message below  Appointment scheduled 4/18/2018 11am, he would like to be worked in earlier if there are cancellations, or if you are willing to work him in, please advise  He has been using his flonase regularly, he states the only thing that seems to make it \"liveable\" is tylenol   Thanks  Alfreda Araujo RT (R)        "

## 2018-03-26 NOTE — TELEPHONE ENCOUNTER
Referral placed.  I also recommend that pt come in for follow up visit with me since he will probably have a long wait until he sees pulmonology.  Be sure that he's using his Flonase regularly.  Keep track of what makes his symptoms better or worse.

## 2018-03-28 ENCOUNTER — OFFICE VISIT (OUTPATIENT)
Dept: FAMILY MEDICINE | Facility: OTHER | Age: 68
End: 2018-03-28
Payer: MEDICARE

## 2018-03-28 VITALS
WEIGHT: 284.8 LBS | TEMPERATURE: 97.9 F | HEIGHT: 73 IN | RESPIRATION RATE: 20 BRPM | SYSTOLIC BLOOD PRESSURE: 139 MMHG | BODY MASS INDEX: 37.74 KG/M2 | DIASTOLIC BLOOD PRESSURE: 78 MMHG | HEART RATE: 80 BPM | OXYGEN SATURATION: 94 %

## 2018-03-28 DIAGNOSIS — Z00.00 ROUTINE GENERAL MEDICAL EXAMINATION AT A HEALTH CARE FACILITY: Primary | ICD-10-CM

## 2018-03-28 DIAGNOSIS — R06.2 WHEEZING: ICD-10-CM

## 2018-03-28 DIAGNOSIS — C85.98 NON-HODGKIN'S LYMPHOMA OF MULTIPLE SITES (H): ICD-10-CM

## 2018-03-28 DIAGNOSIS — E66.01 MORBID OBESITY (H): ICD-10-CM

## 2018-03-28 DIAGNOSIS — E78.5 HYPERLIPIDEMIA WITH TARGET LDL LESS THAN 130: ICD-10-CM

## 2018-03-28 DIAGNOSIS — R05.8 UPPER AIRWAY COUGH SYNDROME: ICD-10-CM

## 2018-03-28 LAB
FEF 25/75: NORMAL
FEV-1: NORMAL
FEV1/FVC: NORMAL
FVC: NORMAL

## 2018-03-28 PROCEDURE — G0439 PPPS, SUBSEQ VISIT: HCPCS | Performed by: FAMILY MEDICINE

## 2018-03-28 PROCEDURE — 99214 OFFICE O/P EST MOD 30 MIN: CPT | Mod: 25 | Performed by: FAMILY MEDICINE

## 2018-03-28 PROCEDURE — 94010 BREATHING CAPACITY TEST: CPT | Performed by: FAMILY MEDICINE

## 2018-03-28 RX ORDER — SIMVASTATIN 20 MG
TABLET ORAL
Qty: 90 TABLET | Refills: 3 | Status: SHIPPED | OUTPATIENT
Start: 2018-03-28 | End: 2019-05-13

## 2018-03-28 RX ORDER — PREDNISONE 20 MG/1
40 TABLET ORAL DAILY
Qty: 10 TABLET | Refills: 0 | Status: SHIPPED | OUTPATIENT
Start: 2018-03-28 | End: 2018-04-02

## 2018-03-28 RX ORDER — ALBUTEROL SULFATE 90 UG/1
2 AEROSOL, METERED RESPIRATORY (INHALATION) EVERY 6 HOURS PRN
Qty: 1 INHALER | Refills: 1 | Status: SHIPPED | OUTPATIENT
Start: 2018-03-28 | End: 2019-07-09

## 2018-03-28 ASSESSMENT — PAIN SCALES - GENERAL: PAINLEVEL: NO PAIN (1)

## 2018-03-28 NOTE — PATIENT INSTRUCTIONS
Try the prednisone to help with your symptoms.  Let me know if not improving at all.    Could also consider a trial of Pepcid or Zantac to see if that might help.  These will need to be used for up to a month before you see response.    Contact us or return if questions or concerns.     Have a nice day!    Dr. Sevilla       Preventive Health Recommendations:   Male Ages 65 and over    Yearly exam:             See your health care provider every year in order to  o   Review health changes.   o   Discuss preventive care.    o   Review your medicines if your doctor has prescribed any.    Talk with your health care provider about whether you should have a test to screen for prostate cancer (PSA).    Every 3 years, have a diabetes test (fasting glucose). If you are at risk for diabetes, you should have this test more often.    Every 5 years, have a cholesterol test. Have this test more often if you are at risk for high cholesterol or heart disease.     Every 10 years, have a colonoscopy. Or, have a yearly FIT test (stool test). These exams will check for colon cancer.    Talk to with your health care provider about screening for Abdominal Aortic Aneurysm if you have a family history of AAA or have a history of smoking.    Shots:     Get a flu shot each year.     Get a tetanus shot every 10 years.     Talk to your doctor about your pneumonia vaccines. There are now two you should receive - Pneumovax (PPSV 23) and Prevnar (PCV 13).     Talk to your doctor about a shingles vaccine.     Talk to your doctor about the hepatitis B vaccine.  Nutrition:     Eat at least 5 servings of fruits and vegetables each day.     Eat whole-grain bread, whole-wheat pasta and brown rice instead of white grains and rice.     Talk to your provider about Calcium and Vitamin D.   Lifestyle    Exercise for at least 150 minutes a week (30 minutes a day, 5 days a week). This will help you control your weight and prevent disease.     Limit alcohol to  one drink per day.     No smoking.     Wear sunscreen to prevent skin cancer.     See your dentist every six months for an exam and cleaning.     See your eye doctor every 1 to 2 years to screen for conditions such as glaucoma, macular degeneration, cataracts, etc     Preventive Health Recommendations:   Male Ages 65 and over    Yearly exam:             See your health care provider every year in order to  o   Review health changes.   o   Discuss preventive care.    o   Review your medicines if your doctor has prescribed any.    Talk with your health care provider about whether you should have a test to screen for prostate cancer (PSA).    Every 3 years, have a diabetes test (fasting glucose). If you are at risk for diabetes, you should have this test more often.    Every 5 years, have a cholesterol test. Have this test more often if you are at risk for high cholesterol or heart disease.     Every 10 years, have a colonoscopy. Or, have a yearly FIT test (stool test). These exams will check for colon cancer.    Talk to with your health care provider about screening for Abdominal Aortic Aneurysm if you have a family history of AAA or have a history of smoking.    Shots:     Get a flu shot each year.     Get a tetanus shot every 10 years.     Talk to your doctor about your pneumonia vaccines. There are now two you should receive - Pneumovax (PPSV 23) and Prevnar (PCV 13).     Talk to your doctor about a shingles vaccine.     Talk to your doctor about the hepatitis B vaccine.  Nutrition:     Eat at least 5 servings of fruits and vegetables each day.     Eat whole-grain bread, whole-wheat pasta and brown rice instead of white grains and rice.     Talk to your provider about Calcium and Vitamin D.   Lifestyle    Exercise for at least 150 minutes a week (30 minutes a day, 5 days a week). This will help you control your weight and prevent disease.     Limit alcohol to one drink per day.     No smoking.     Wear sunscreen to  prevent skin cancer.     See your dentist every six months for an exam and cleaning.     See your eye doctor every 1 to 2 years to screen for conditions such as glaucoma, macular degeneration, cataracts, etc

## 2018-03-28 NOTE — MR AVS SNAPSHOT
After Visit Summary   3/28/2018    Deandre RAE Myaur    MRN: [id number]           Patient Information     Date Of Birth          1950        Visit Information        Provider Department      3/28/2018 11:00 AM Felix Sevilla MD [facility]'s Diagnoses     Routine general medical examination at a health care facility    -  1    Hyperlipidemia with target LDL less than 130        Upper airway cough syndrome        Wheezing        Non-Hodgkin's lymphoma of multiple sites (H)        Morbid obesity (H)          Care Instructions    Try the prednisone to help with your symptoms.  Let me know if not improving at all.    Could also consider a trial of Pepcid or Zantac to see if that might help.  These will need to be used for up to a month before you see response.    Contact us or return if questions or concerns.     Have a nice day!    Dr. Sevilla       Preventive Health Recommendations:   Male Ages 65 and over    Yearly exam:             See your health care provider every year in order to  o   Review health changes.   o   Discuss preventive care.    o   Review your medicines if your doctor has prescribed any.    Talk with your health care provider about whether you should have a test to screen for prostate cancer (PSA).    Every 3 years, have a diabetes test (fasting glucose). If you are at risk for diabetes, you should have this test more often.    Every 5 years, have a cholesterol test. Have this test more often if you are at risk for high cholesterol or heart disease.     Every 10 years, have a colonoscopy. Or, have a yearly FIT test (stool test). These exams will check for colon cancer.    Talk to with your health care provider about screening for Abdominal Aortic Aneurysm if you have a family history of AAA or have a history of smoking.    Shots:     Get a flu shot each year.     Get a tetanus shot every 10 years.     Talk to your doctor about your pneumonia  vaccines. There are now two you should receive - Pneumovax (PPSV 23) and Prevnar (PCV 13).     Talk to your doctor about a shingles vaccine.     Talk to your doctor about the hepatitis B vaccine.  Nutrition:     Eat at least 5 servings of fruits and vegetables each day.     Eat whole-grain bread, whole-wheat pasta and brown rice instead of white grains and rice.     Talk to your provider about Calcium and Vitamin D.   Lifestyle    Exercise for at least 150 minutes a week (30 minutes a day, 5 days a week). This will help you control your weight and prevent disease.     Limit alcohol to one drink per day.     No smoking.     Wear sunscreen to prevent skin cancer.     See your dentist every six months for an exam and cleaning.     See your eye doctor every 1 to 2 years to screen for conditions such as glaucoma, macular degeneration, cataracts, etc     Preventive Health Recommendations:   Male Ages 65 and over    Yearly exam:             See your health care provider every year in order to  o   Review health changes.   o   Discuss preventive care.    o   Review your medicines if your doctor has prescribed any.    Talk with your health care provider about whether you should have a test to screen for prostate cancer (PSA).    Every 3 years, have a diabetes test (fasting glucose). If you are at risk for diabetes, you should have this test more often.    Every 5 years, have a cholesterol test. Have this test more often if you are at risk for high cholesterol or heart disease.     Every 10 years, have a colonoscopy. Or, have a yearly FIT test (stool test). These exams will check for colon cancer.    Talk to with your health care provider about screening for Abdominal Aortic Aneurysm if you have a family history of AAA or have a history of smoking.    Shots:     Get a flu shot each year.     Get a tetanus shot every 10 years.     Talk to your doctor about your pneumonia vaccines. There are now two you should receive -  Pneumovax (PPSV 23) and Prevnar (PCV 13).     Talk to your doctor about a shingles vaccine.     Talk to your doctor about the hepatitis B vaccine.  Nutrition:     Eat at least 5 servings of fruits and vegetables each day.     Eat whole-grain bread, whole-wheat pasta and brown rice instead of white grains and rice.     Talk to your provider about Calcium and Vitamin D.   Lifestyle    Exercise for at least 150 minutes a week (30 minutes a day, 5 days a week). This will help you control your weight and prevent disease.     Limit alcohol to one drink per day.     No smoking.     Wear sunscreen to prevent skin cancer.     See your dentist every six months for an exam and cleaning.     See your eye doctor every 1 to 2 years to screen for conditions such as glaucoma, macular degeneration, cataracts, etc           Follow-ups after your visit        Your next 10 appointments already scheduled     Apr 18, 2018  8:00 AM CDT   LAB with [facility] ([facility])    [address] [place] [id number] [phone]           Please do not eat 10-12 hours before your appointment if you are coming in fasting for labs on lipids, cholesterol, or glucose (sugar). This does not apply to pregnant women. Water, hot tea and black coffee (with nothing added) are okay. Do not drink other fluids, diet soda or chew gum.            Apr 18, 2018  8:30 AM CDT   CT ABDOMEN PELVIS W CONTRAST with PHCT1   [facility] CT Scan ([facility])    [address] [place] [id number]   [phone]           Please bring any scans or X-rays taken at other hospitals, if similar tests were done. Also bring a list of your medicines, including vitamins, minerals and over-the-counter drugs. It is safest to leave personal items at home.  Be sure to tell your doctor:   If you have any allergies.   If there s any chance you are pregnant.   If you are breastfeeding.  You may have  contrast for this exam. To prepare:   Do not eat or drink for 2 hours before your exam. If you need to take medicine, you may take it with small sips of water. (We may ask you to take liquid medicine as well.)   The day before your exam, drink extra fluids at least six 8-ounce glasses (unless your doctor tells you to restrict your fluids).   You will be given instructions on how to drink the contrast.  Patients over 70 or patients with diabetes or kidney problems:   If you haven t had a blood test (creatinine test) within the last 30 days, the Cardiologist/Radiologist may require you to get this test prior to your exam.  If you have diabetes:   Continue to take your metformin medication on the day of your exam  Please wear loose clothing, such as a sweat suit or jogging clothes. Avoid snaps, zippers and other metal. We may ask you to undress and put on a hospital gown.  If you have any questions, please call the Imaging Department where you will have your exam.            Apr 18, 2018  9:00 AM CDT   CT CHEST W CONTRAST with PHCT1   [facility] CT Scan ([facility])    [address] [place] [id number] [phone]           Please bring any scans or X-rays taken at other hospitals, if similar tests were done. Also bring a list of your medicines, including vitamins, minerals and over-the-counter drugs. It is safest to leave personal items at home.  Be sure to tell your doctor:   If you have any allergies.   If there s any chance you are pregnant.   If you are breastfeeding.    If you have diabetes as your medication may need to be adjusted for this exam.  You will have contrast for this exam. To prepare:   Do not eat or drink for 2 hours before your exam. If you need to take medicine, you may take it with small sips of water. (We may ask you to take liquid medicine as well.)   The day before your exam, drink extra fluids at least six 8-ounce glasses (unless your doctor tells you to  restrict your fluids).  Patients over 70 or patients with diabetes or kidney problems:   If you haven t had a blood test (creatinine test) within the last 30 days, the Cardiologist/Radiologist may require you to get this test prior to your exam.  Please wear loose clothing, such as a sweat suit or jogging clothes. Avoid snaps, zippers and other metal. We may ask you to undress and put on a hospital gown.  If you have any questions, please call the Imaging Department where you will have your exam.            Apr 18, 2018 11:00 AM CDT   Office Visit with Felix Sevilla MD   [facility] ([facility]    [id number] [place] [id number] [phone]           Bring a current list of meds and any records pertaining to this visit. For Physicals, please bring immunization records and any forms needing to be filled out. Please arrive 10 minutes early to complete paperwork.            Apr 25, 2018  8:00 AM CDT   Return Visit with Ge Krishnamurthy MD   [facility] ([facility])    [address] [place] [id number] [phone]            May 11, 2018  1:30 PM CDT   New Visit with Sarabjit Mahmood MD   [facility] ([address] [phone] [phone]              Who to contact     If you have questions or need follow up information about today's clinic visit or your schedule please contact [facility] directly at [phone].  Normal or non-critical lab and imaging results will be communicated to you by MyChart, letter or phone within 4 business days after the clinic has received the results. If you do not hear from us within 7 days, please contact the clinic through MyChart or phone. If you have a critical or abnormal lab result, we will notify you by phone as soon as possible.  Submit refill requests through [employer] or call your pharmacy and they  "will forward the refill request to us. Please allow 3 business days for your refill to be completed.          Additional Information About Your Visit        [email or web address] Information     [employer] lets you send messages to your doctor, view your test results, renew your prescriptions, schedule appointments and more. To sign up, go to [email or web address] . Click on \"Log in\" on the left side of the screen, which will take you to the Welcome page. Then click on \"Sign up Now\" on the right side of the page.     You will be asked to enter the access code listed below, as well as some personal information. Please follow the directions to create your username and password.     Your access code is: [email or web address]  Expires: 2018 11:26 AM     Your access code will  in 90 days. If you need help or a new code, please call your [facility] clinic or [phone].        Care EveryWhere ID     This is your [facility] EveryWhere ID. This could be used by other organizations to access your [facility] medical records  [id number]        Your Vitals Were     Pulse Temperature Respirations Height Pulse Oximetry BMI (Body Mass Index)    80 97.9  F (36.6  C) (Temporal) 20 6' 0.5\" (1.842 m) 94% 38.09 kg/m2       Blood Pressure from Last 3 Encounters:   18 139/78   18 110/68   18 140/76    Weight from Last 3 Encounters:   18 284 lb 12.8 oz (129.2 kg)   18 283 lb 8 oz (128.6 kg)   18 284 lb (128.8 kg)              We Performed the Following     Spirometry, Breathing Capacity: Normal Order, Clinic Performed          Today's Medication Changes          These changes are accurate as of 3/28/18 11:59 AM.  If you have any questions, ask your nurse or doctor.               Start taking these medicines.        Dose/Directions    predniSONE 20 MG tablet   Commonly known as:  DELTASONE   Used for:  Wheezing, Routine general medical examination at a health care facility, Upper airway cough syndrome   Started by:  Roel " Feilx Hernández MD        Dose:  40 mg   Take 2 tablets (40 mg) by mouth daily for 5 days   Quantity:  10 tablet   Refills:  0            Where to get your medicines      These medications were sent to [id number] - [place], [place] - [address] [place] [address]  [address] [place], [place] [address] [address]    Hours:  M-F 8:30-6:30; Sat 9-4; closed Sunday Phone:  [phone]     albuterol 108 (90 BASE) MCG/ACT Inhaler    predniSONE 20 MG tablet    simvastatin 20 MG tablet                Primary Care Provider Office Phone # Fax #    Felix Sevilla [phone] [address] [place] [address]        Equal Access to Services     [facility] : Hadii jo king hadasho Sokelli, waaxda luqadaha, qaybta kaalmada adeegyada, rajat osmanin joe mares . So [facility] [phone].    ATENCIÓN: Si habla español, tiene a kerr disposición servicios gratuitos de asistencia lingüística. [facility] [phone].    We comply with applicable federal civil rights laws and [place] laws. We do not discriminate on the basis of race, color, national origin, age, disability, sex, sexual orientation, or gender identity.            Thank you!     Thank you for choosing [facility]  for your care. Our goal is always to provide you with excellent care. Hearing back from our patients is one way we can continue to improve our services. Please take a few minutes to complete the written survey that you may receive in the mail after your visit with us. Thank you!             Your Updated Medication List - Protect others around you: Learn how to safely use, store and throw away your medicines at www.disposemymeds.org.          This list is accurate as of 3/28/18 11:59 AM.  Always use your most recent med list.                   Brand Name Dispense Instructions for use Diagnosis    albuterol 108 (90 BASE) MCG/ACT Inhaler    PROAIR HFA/PROVENTIL HFA/VENTOLIN HFA    1 Inhaler    Inhale 2 puffs into the lungs every 6  hours as needed for shortness of breath / dyspnea or wheezing    Upper airway cough syndrome       aspirin 81 MG tablet      Take 81 mg by mouth daily        cetirizine 10 MG tablet    zyrTEC     Take 10 mg by mouth daily        CLARITIN PO           fluticasone 50 MCG/ACT spray    FLONASE    16 g    Spray 1-2 sprays into both nostrils daily    Upper respiratory tract infection, unspecified type, Upper airway cough syndrome       order for DME     1 Device    Equipment being ordered: knee sleeve with horseshoe, XL    Chronic pain of left knee       predniSONE 20 MG tablet    DELTASONE    10 tablet    Take 2 tablets (40 mg) by mouth daily for 5 days    Wheezing, Routine general medical examination at a health care facility, Upper airway cough syndrome       simvastatin 20 MG tablet    ZOCOR    90 tablet    TAKE 1 TABLET BY MOUTH ISSA Y AT BEDTIME    Hyperlipidemia with target LDL less than 130       TYLENOL PO      Take 1,000 mg by mouth

## 2018-03-28 NOTE — PROGRESS NOTES
Acute Illness   Acute illness concerns: cough and phlegm  Onset: middle of January 2018 for this case    Fever: no    Chills/Sweats: no    Headache (location?): no    Sinus Pressure:no    Conjunctivitis:  no    Ear Pain: no    Rhinorrhea: no    Congestion: no    Sore Throat: no     Cough: YES and phlegm sometimes yellow most of the time white    Wheeze: YES last week    Decreased Appetite: no    Nausea: no    Vomiting: no    Diarrhea:  no    Dysuria/Freq.: no    Fatigue/Achiness: YES- tired    Sick/Strep Exposure: no     Therapies Tried and outcome: claritin, flonase, inhaler, tylenol, in the am takes aspirin with the zyrtec    Woke up this am at 5 and could feel phlegm building so took zyrtec good until 8 am then took a tylenol    Tylenol seems to be controlling his cough.  Claritin also seems to help, but only briefly.      He feels like he has a shelf in his throat that loads up, and then he'll have to unload the phlegm with a series of coughs.  If he gets the phlegm out, the cough will typically subside.    Cough drops help a bit.  Drinks lots of water.  He is taking Flonase regularly.      Albuterol helps with wheezing.  Takes 2-3 times/day.      Zithromax didn't help much with his symptoms at all.    His granddaughter spent the night at his house in his wife's bed.  He was in another room and woke them both up with his coughing fit.    His cough basically started in January like it is now.  He states he's always had a cough (for 20-30 years).  5 years ago, started Claritin and Flonase, which helped considerably.        SUBJECTIVE:   Deandre Merchant is a 68 year old male who presents for Preventive Visit.      Are you in the first 12 months of your Medicare Part B coverage?  No    Healthy Habits:    Do you get at least three servings of calcium containing foods daily (dairy, green leafy vegetables, etc.)? yes    Amount of exercise or daily activities, outside of work: active every day     Problems taking  medications regularly No    Medication side effects: No    Have you had an eye exam in the past two years? yes    Do you see a dentist twice per year? yes    Do you have sleep apnea, excessive snoring or daytime drowsiness?when he can get phlegm down he is going to see a sleep clinic-he doesn't think he has sleep apnea though      Ability to successfully perform activities of daily living: Yes, no assistance needed    Home safety:  none identified     Hearing impairment: No little hard of hearing from work    Fall risk:  Fall Risk Assessment not completed.        COGNITIVE SCREEN  1) Repeat 3 items (Banana, Sunrise, Chair)      2) Clock draw:   NORMAL  3) 3 item recall:   Recalls 1 object   Results: NORMAL clock, 1-2 items recalled: COGNITIVE IMPAIRMENT LESS LIKELY    Mini-CogTM Copyright S Kalin. Licensed by the author for use in [facility]; reprinted with permission (emmanuelle@[facility]). All rights reserved.              Reviewed and updated as needed this visit by clinical staff  Tobacco  Allergies  Med Hx  Surg Hx  Fam Hx  Soc Hx        Reviewed and updated as needed this visit by Provider        Social History   Substance Use Topics     Smoking status: Never Smoker     Smokeless tobacco: Never Used     Alcohol use 2.0 oz/week      Comment: occasionally weekends       If you drink alcohol do you typically have >3 drinks per day or >7 drinks per week? No                        Today's PHQ-2 Score:   PHQ-2 ( 1999 Pfizer) 3/28/2018 2/22/2018   Q1: Little interest or pleasure in doing things 0 0   Q2: Feeling down, depressed or hopeless 0 0   PHQ-2 Score 0 0       Do you feel safe in your environment - Yes    Do you have a Health Care Directive?: No: Advance care planning reviewed with patient; information given to patient to review.    Current providers sharing in care for this patient include:   Patient Care Team:  Felix Sevilla MD as PCP - General (Family Practice)  Néstor Ballard MD as  MD (Hematology & Oncology)  Markell Mistry, RN as Registered Nurse (Hematology & Oncology)    The following health maintenance items are reviewed in Epic and correct as of today:  Health Maintenance   Topic Date Due     OP ANNUAL INR REFERRAL  05/06/2015     INFLUENZA VACCINE (SYSTEM ASSIGNED)  09/01/2018     FALL RISK ASSESSMENT  01/29/2019     LIPID MONITORING Q1 YEAR  03/01/2019     ADVANCE DIRECTIVE PLANNING Q5 YRS  09/17/2020     COLONOSCOPY Q5 YR  03/01/2022     TETANUS IMMUNIZATION (SYSTEM ASSIGNED)  09/17/2025     PNEUMOCOCCAL  Completed     AORTIC ANEURYSM SCREENING (SYSTEM ASSIGNED)  Completed     HEPATITIS C SCREENING  Completed     BP Readings from Last 3 Encounters:   03/28/18 139/78   02/22/18 110/68   01/29/18 140/76    Wt Readings from Last 3 Encounters:   03/28/18 284 lb 12.8 oz (129.2 kg)   02/22/18 283 lb 8 oz (128.6 kg)   01/29/18 284 lb (128.8 kg)                  Current Outpatient Prescriptions   Medication Sig Dispense Refill     fluticasone (FLONASE) 50 MCG/ACT spray Spray 1-2 sprays into both nostrils daily 16 g 11     albuterol (PROAIR HFA/PROVENTIL HFA/VENTOLIN HFA) 108 (90 BASE) MCG/ACT Inhaler Inhale 2 puffs into the lungs every 6 hours as needed for shortness of breath / dyspnea or wheezing 1 Inhaler 1     order for DME Equipment being ordered: knee sleeve with horseshoe, XL 1 Device 0     Loratadine (CLARITIN PO)        simvastatin (ZOCOR) 20 MG tablet TAKE 1 TABLET BY MOUTH ISSA Y AT BEDTIME 90 tablet 3     aspirin 81 MG tablet Take 81 mg by mouth daily       Acetaminophen (TYLENOL PO) Take 1,000 mg by mouth       cetirizine (ZYRTEC) 10 MG tablet Take 10 mg by mouth daily       Recent Labs   Lab Test  03/01/18   0830  10/18/17   0828  04/24/17   1004  02/10/17   1107   09/17/15   1038   05/06/14   0855   04/17/13   1821   A1C   --    --    --    --    --   5.4   --    --    --    --    LDL  25   --    --   48   --   64   --    --    --    --    HDL  29*   --    --   27*   --   28*   " --    --    --    --    TRIG  192*   --    --   229*   --   147   --    --    --    --    ALT   --   29  35  55   < >   --    < >   --    < >  35   CR   --   1.00  0.81  0.86   < >   --    < >   --    < >  2.41*   GFRESTIMATED   --   75  >90  Non  GFR Calc    90   < >   --    < >   --    < >  27*   GFRESTBLACK   --   >90  >90  African American GFR Calc    >90   GFR Calc     < >   --    < >   --    < >  33*   POTASSIUM   --   3.8  4.4  4.4   < >   --    < >   --    < >  4.0   TSH   --    --    --    --    --    --    --   1.60   --   3.57    < > = values in this interval not displayed.          ROS:  Constitutional, HEENT, cardiovascular, pulmonary, gi and gu systems are negative, except as otherwise noted.  As above.  Sore throat.    OBJECTIVE:   /78 (BP Location: Left arm, Patient Position: Chair, Cuff Size: Adult Large)  Pulse 80  Temp 97.9  F (36.6  C) (Temporal)  Resp 20  Ht 6' 0.5\" (1.842 m)  Wt 284 lb 12.8 oz (129.2 kg)  SpO2 94%  BMI 38.09 kg/m2 Estimated body mass index is 38.09 kg/(m^2) as calculated from the following:    Height as of this encounter: 6' 0.5\" (1.842 m).    Weight as of this encounter: 284 lb 12.8 oz (129.2 kg).  EXAM:   GENERAL: healthy, alert and no distress  EYES: Eyes grossly normal to inspection, PERRL and conjunctivae and sclerae normal  HENT: ear canals and TM's normal, nose and mouth without ulcers or lesions  NECK: no adenopathy, no asymmetry, masses, or scars and thyroid normal to palpation  RESP: lungs clear to auscultation - no rales, rhonchi or wheezes  CV: regular rate and rhythm, normal S1 S2, no S3 or S4, no murmur, click or rub, no peripheral edema and peripheral pulses strong  ABDOMEN: soft, nontender, no hepatosplenomegaly, no masses and bowel sounds normal  MS: no gross musculoskeletal defects noted, no edema  SKIN: no suspicious lesions or rashes  NEURO: Normal strength and tone, mentation intact and speech normal  PSYCH: " mentation appears normal, affect normal/bright    ASSESSMENT / PLAN:   1. Routine general medical examination at a health care facility  Reviewed recommended screenings and ordered appropriate testing for pt's risks and per pt's request(s).   - predniSONE (DELTASONE) 20 MG tablet; Take 2 tablets (40 mg) by mouth daily for 5 days  Dispense: 10 tablet; Refill: 0    2. Upper airway cough syndrome  Still fits most with this, but may have additional current airway irritation.  Will do a  Trial of prednisone.  If not improving, consider treating for GERD/laryngeal reflux.  - albuterol (PROAIR HFA/PROVENTIL HFA/VENTOLIN HFA) 108 (90 BASE) MCG/ACT Inhaler; Inhale 2 puffs into the lungs every 6 hours as needed for shortness of breath / dyspnea or wheezing  Dispense: 1 Inhaler; Refill: 1  - Spirometry, Breathing Capacity: Normal Order, Clinic Performed  - predniSONE (DELTASONE) 20 MG tablet; Take 2 tablets (40 mg) by mouth daily for 5 days  Dispense: 10 tablet; Refill: 0    3. Wheezing  Likely related to recent infection and bronchospasm, but could also be some COPD.  Spirometry indicated more restrictive lung disease.  Encouraged pt to lose some weight to help with this, but I'm not sure this is the only contributor to his restrictive pattern.  - Spirometry, Breathing Capacity: Normal Order, Clinic Performed  - predniSONE (DELTASONE) 20 MG tablet; Take 2 tablets (40 mg) by mouth daily for 5 days  Dispense: 10 tablet; Refill: 0    4. Non-Hodgkin's lymphoma of multiple sites (H)  Currently quiescent.  Will continue to follow and intervene as needed.    5. Morbid obesity (H)  Encouraged lifestyle changes.  Will follow and assist.  Consider medications or surgical referral if clinically indicated moving forward.    6. Hyperlipidemia with target LDL less than 130  Currently Controlled.  Continue current regimen.  Call/return if any problems or questions arise.   - simvastatin (ZOCOR) 20 MG tablet; TAKE 1 TABLET BY MOUTH ISSA BELL  "BEDTIME  Dispense: 90 tablet; Refill: 3    End of Life Planning:  Patient currently has an advanced directive: Yes.  Practitioner is supportive of decision.    COUNSELING:  Reviewed preventive health counseling, as reflected in patient instructions       Regular exercise       Healthy diet/nutrition       Aspirin Prophylaxsis       Osteoporosis Prevention/Bone Health       The ASCVD Risk score (Mervin HERNÁNDEZ Jr, et al., 2013) failed to calculate for the following reasons:    The valid total cholesterol range is 130 to 320 mg/dL    BP Screening:   Last 3 BP Readings:    BP Readings from Last 3 Encounters:   03/28/18 139/78   02/22/18 110/68   01/29/18 140/76       The following was recommended to the patient:  Re-screen BP within a year and recommended lifestyle modifications    Estimated body mass index is 38.09 kg/(m^2) as calculated from the following:    Height as of this encounter: 6' 0.5\" (1.842 m).    Weight as of this encounter: 284 lb 12.8 oz (129.2 kg).  Weight management plan: Discussed healthy diet and exercise guidelines and patient will follow up in 6 months in clinic to re-evaluate.     reports that he has never smoked. He has never used smokeless tobacco.      Appropriate preventive services were discussed with this patient, including applicable screening as appropriate for cardiovascular disease, diabetes, osteopenia/osteoporosis, and glaucoma.  As appropriate for age/gender, discussed screening for colorectal cancer, prostate cancer, breast cancer, and cervical cancer. Checklist reviewing preventive services available has been given to the patient.    Reviewed patients plan of care and provided an AVS. The Basic Care Plan (routine screening as documented in Health Maintenance) for Deandre meets the Care Plan requirement. This Care Plan has been established and reviewed with the Patient.    Counseling Resources:  ATP IV Guidelines  Pooled Cohorts Equation Calculator  Breast Cancer Risk Calculator  FRAX " Risk Assessment  ICSI Preventive Guidelines  Dietary Guidelines for Americans, 2010  USDA's MyPlate  ASA Prophylaxis  Lung CA Screening    Felix eSvilla MD, MD  [facility]    Patient Instructions   Try the prednisone to help with your symptoms.  Let me know if not improving at all.    Could also consider a trial of Pepcid or Zantac to see if that might help.  These will need to be used for up to a month before you see response.

## 2018-03-28 NOTE — PROGRESS NOTES
"  SUBJECTIVE:   Deandre Merchant is a 68 year old male who presents to clinic today for the following health issues:      HPI  Acute Illness   Acute illness concerns: cough and phlegm  Onset: middle of January 2018 for this case    Fever: no    Chills/Sweats: no    Headache (location?): no    Sinus Pressure:no    Conjunctivitis:  no    Ear Pain: no    Rhinorrhea: no    Congestion: no    Sore Throat: no     Cough: YES and phlegm sometimes yellow most of the time white    Wheeze: YES last week    Decreased Appetite: no    Nausea: no    Vomiting: no    Diarrhea:  no    Dysuria/Freq.: no    Fatigue/Achiness: YES- tired    Sick/Strep Exposure: no     Therapies Tried and outcome: zyrtec, flonase, inhaler, tylenol, in the am takes aspirin with the zyrtec    Woke up this am at 5 and could feel phlegm building so took zyrtec good until 8 am then took a tylenol    Problem list and histories reviewed & adjusted, as indicated.  Additional history: {NONE - AS DOCUMENTED:794888::\"as documented\"}    {ACUTE Problem SUPERLIST - brief histories:686631}    {HIST REVIEW/ LINKS 2:251516}    {PROVIDER CHARTING PREFERENCE:856174}    SUBJECTIVE:   CC: Deandre Merchant is an 68 year old male who presents for preventative health visit.     Healthy Habits:    Do you get at least three servings of calcium containing foods daily (dairy, green leafy vegetables, etc.)? {YES/NO, DAIRY INTAKE:238410::\"yes\"}    Amount of exercise or daily activities, outside of work: {AMOUNT EXERCISE:875867}    Problems taking medications regularly {Yes /No default:178438::\"No\"}    Medication side effects: {Yes /No default.:452674::\"No\"}    Have you had an eye exam in the past two years? {YESNOBLANK:420504}    Do you see a dentist twice per year? {YESNOBLANK:448521}    Do you have sleep apnea, excessive snoring or daytime drowsiness?{YESNOBLANK:299399}  {Outside tests to abstract? :894034}     {additional problems to add (Optional):900940}    Today's PHQ-2 Score: " "  PHQ-2 ( 1999 Pfizer) 2/22/2018 1/29/2018   Q1: Little interest or pleasure in doing things 0 0   Q2: Feeling down, depressed or hopeless 0 0   PHQ-2 Score 0 0     {PHQ-2 LOOK IN ASSESSMENTS :693243}  Abuse: Current or Past(Physical, Sexual or Emotional)- {YES/NO/NA:329963}  Do you feel safe in your environment - {YES/NO/NA:364036}    Social History   Substance Use Topics     Smoking status: Never Smoker     Smokeless tobacco: Never Used     Alcohol use 2.0 oz/week      Comment: occasionally weekends      If you drink alcohol do you typically have >3 drinks per day or >7 drinks per week? {ETOH :486633}                      Last PSA:   PSA   Date Value Ref Range Status   09/17/2015 1.90 0 - 4 ug/L Final       Reviewed orders with patient. Reviewed health maintenance and updated orders accordingly - {Yes/No:856831::\"Yes\"}  {Chronicprobdata (Optional):698419}    Reviewed and updated as needed this visit by clinical staff         Reviewed and updated as needed this visit by Provider        {HISTORY OPTIONS (Optional):847304}    ROS:  {MALE ROS-adult preventive care package:441665::\"C: NEGATIVE for fever, chills, change in weight\",\"I: NEGATIVE for worrisome rashes, moles or lesions\",\"E: NEGATIVE for vision changes or irritation\",\"ENT: NEGATIVE for ear, mouth and throat problems\",\"R: NEGATIVE for significant cough or SOB\",\"CV: NEGATIVE for chest pain, palpitations or peripheral edema\",\"GI: NEGATIVE for nausea, abdominal pain, heartburn, or change in bowel habits\",\" male: negative for dysuria, hematuria, decreased urinary stream, erectile dysfunction, urethral discharge\",\"M: NEGATIVE for significant arthralgias or myalgia\",\"N: NEGATIVE for weakness, dizziness or paresthesias\",\"P: NEGATIVE for changes in mood or affect\"}    OBJECTIVE:   There were no vitals taken for this visit.  EXAM:  {Exam Choices:470992}    ASSESSMENT/PLAN:   {Diag Picklist:405629}    COUNSELING:  {MALE COUNSELING MESSAGES:317922::\"Reviewed " "preventive health counseling, as reflected in patient instructions\"}    {BP Counseling- Complete if BP >= 120/80  (Optional):105610}   reports that he has never smoked. He has never used smokeless tobacco.  {Tobacco Cessation -- Complete if patient is a smoker (Optional):661139}  Estimated body mass index is 37.92 kg/(m^2) as calculated from the following:    Height as of 2/22/18: 6' 0.5\" (1.842 m).    Weight as of 2/22/18: 283 lb 8 oz (128.6 kg).   {Weight Management Plan (ACO) Complete if BMI is abnormal-  Ages 18-64  BMI >24.9.  Age 65+ with BMI <23 or >30 (Optional):858418}    Counseling Resources:  ATP IV Guidelines  Pooled Cohorts Equation Calculator  FRAX Risk Assessment  ICSI Preventive Guidelines  Dietary Guidelines for Americans, 2010  USDA's MyPlate  ASA Prophylaxis  Lung CA Screening    Felix Sevilla MD, MD  [facility]  "

## 2018-03-28 NOTE — TELEPHONE ENCOUNTER
Spoke with patient.  Able to come in at 11 today.  Appointment made.    Adrian Frost, RN, BSN

## 2018-03-28 NOTE — NURSING NOTE
"Chief Complaint   Patient presents with     Cough     Nasal Congestion     Panel Management     op annual inr referral       Initial /78 (BP Location: Left arm, Patient Position: Chair, Cuff Size: Adult Large)  Pulse 80  Temp 97.9  F (36.6  C) (Temporal)  Resp 20  Ht 6' 0.5\" (1.842 m)  Wt 284 lb 12.8 oz (129.2 kg)  SpO2 94%  BMI 38.09 kg/m2 Estimated body mass index is 38.09 kg/(m^2) as calculated from the following:    Height as of this encounter: 6' 0.5\" (1.842 m).    Weight as of this encounter: 284 lb 12.8 oz (129.2 kg).  Medication Reconciliation: complete  Siva Myers, PAUL    "

## 2018-04-11 ENCOUNTER — TELEPHONE (OUTPATIENT)
Dept: FAMILY MEDICINE | Facility: OTHER | Age: 68
End: 2018-04-11

## 2018-04-11 DIAGNOSIS — K21.9 LARYNGOPHARYNGEAL REFLUX: Primary | ICD-10-CM

## 2018-04-11 RX ORDER — FAMOTIDINE 20 MG/1
20 TABLET, FILM COATED ORAL 2 TIMES DAILY
Qty: 60 TABLET | Refills: 1 | Status: SHIPPED | OUTPATIENT
Start: 2018-04-11 | End: 2019-07-09

## 2018-04-11 NOTE — TELEPHONE ENCOUNTER
Reason for Call:  Other FYI    Detailed comments: Patient is calling to let you know that the prednisone is didn't really help, he is starting to get a little better.  Still coughing some but he did get in to see Pulmonologist on 5/11/18.  He just wanted to give you a update if you have any concerns or questions please feel free to call him.  He also wanted to let you know that he was taking Zyrtec not Claritin.      Phone Number Patient can be reached at: Cell number on file:    Telephone Information:   Mobile [phone]       Best Time: any    Can we leave a detailed message on this number? YES    Call taken on 4/11/2018 at 8:52 AM by Maylin Phelps

## 2018-04-11 NOTE — TELEPHONE ENCOUNTER
Let me know if you want Rx for acid blockade for possible reflux.  Otherwise, can wait until you see the specialist.

## 2018-04-18 ENCOUNTER — HOSPITAL ENCOUNTER (OUTPATIENT)
Dept: CT IMAGING | Facility: CLINIC | Age: 68
End: 2018-04-18
Attending: INTERNAL MEDICINE
Payer: MEDICARE

## 2018-04-18 DIAGNOSIS — C85.90 NON-HODGKIN'S LYMPHOMA, UNSPECIFIED BODY REGION, UNSPECIFIED NON-HODGKIN LYMPHOMA TYPE (H): ICD-10-CM

## 2018-04-18 LAB
ALBUMIN SERPL-MCNC: 3.9 G/DL (ref 3.4–5)
ALP SERPL-CCNC: 114 U/L (ref 40–150)
ALT SERPL W P-5'-P-CCNC: 30 U/L (ref 0–70)
ANION GAP SERPL CALCULATED.3IONS-SCNC: 7 MMOL/L (ref 3–14)
AST SERPL W P-5'-P-CCNC: 24 U/L (ref 0–45)
BASOPHILS # BLD AUTO: 0.1 10E9/L (ref 0–0.2)
BASOPHILS NFR BLD AUTO: 1 %
BILIRUB SERPL-MCNC: 0.8 MG/DL (ref 0.2–1.3)
BUN SERPL-MCNC: 16 MG/DL (ref 7–30)
CALCIUM SERPL-MCNC: 8.3 MG/DL (ref 8.5–10.1)
CHLORIDE SERPL-SCNC: 105 MMOL/L (ref 94–109)
CO2 SERPL-SCNC: 27 MMOL/L (ref 20–32)
CREAT SERPL-MCNC: 0.82 MG/DL (ref 0.66–1.25)
DIFFERENTIAL METHOD BLD: NORMAL
EOSINOPHIL # BLD AUTO: 0.1 10E9/L (ref 0–0.7)
EOSINOPHIL NFR BLD AUTO: 2.9 %
ERYTHROCYTE [DISTWIDTH] IN BLOOD BY AUTOMATED COUNT: 13.3 % (ref 10–15)
GFR SERPL CREATININE-BSD FRML MDRD: >90 ML/MIN/1.7M2
GLUCOSE SERPL-MCNC: 114 MG/DL (ref 70–99)
HCT VFR BLD AUTO: 43.3 % (ref 40–53)
HGB BLD-MCNC: 14.6 G/DL (ref 13.3–17.7)
IMM GRANULOCYTES # BLD: 0 10E9/L (ref 0–0.4)
IMM GRANULOCYTES NFR BLD: 0.2 %
LDH SERPL L TO P-CCNC: 175 U/L (ref 85–227)
LYMPHOCYTES # BLD AUTO: 0.9 10E9/L (ref 0.8–5.3)
LYMPHOCYTES NFR BLD AUTO: 18.6 %
MCH RBC QN AUTO: 30.6 PG (ref 26.5–33)
MCHC RBC AUTO-ENTMCNC: 33.7 G/DL (ref 31.5–36.5)
MCV RBC AUTO: 91 FL (ref 78–100)
MONOCYTES # BLD AUTO: 0.6 10E9/L (ref 0–1.3)
MONOCYTES NFR BLD AUTO: 12.8 %
NEUTROPHILS # BLD AUTO: 3.1 10E9/L (ref 1.6–8.3)
NEUTROPHILS NFR BLD AUTO: 64.5 %
PLATELET # BLD AUTO: 156 10E9/L (ref 150–450)
POTASSIUM SERPL-SCNC: 4.3 MMOL/L (ref 3.4–5.3)
PROT SERPL-MCNC: 6.6 G/DL (ref 6.8–8.8)
RBC # BLD AUTO: 4.77 10E12/L (ref 4.4–5.9)
SODIUM SERPL-SCNC: 139 MMOL/L (ref 133–144)
WBC # BLD AUTO: 4.8 10E9/L (ref 4–11)

## 2018-04-18 PROCEDURE — 83615 LACTATE (LD) (LDH) ENZYME: CPT | Performed by: INTERNAL MEDICINE

## 2018-04-18 PROCEDURE — 85025 COMPLETE CBC W/AUTO DIFF WBC: CPT | Performed by: INTERNAL MEDICINE

## 2018-04-18 PROCEDURE — 25000128 H RX IP 250 OP 636: Performed by: RADIOLOGY

## 2018-04-18 PROCEDURE — 80053 COMPREHEN METABOLIC PANEL: CPT | Performed by: INTERNAL MEDICINE

## 2018-04-18 PROCEDURE — 25000125 ZZHC RX 250: Performed by: RADIOLOGY

## 2018-04-18 PROCEDURE — 36415 COLL VENOUS BLD VENIPUNCTURE: CPT | Performed by: INTERNAL MEDICINE

## 2018-04-18 PROCEDURE — 74177 CT ABD & PELVIS W/CONTRAST: CPT

## 2018-04-18 RX ORDER — IOPAMIDOL 755 MG/ML
500 INJECTION, SOLUTION INTRAVASCULAR ONCE
Status: COMPLETED | OUTPATIENT
Start: 2018-04-18 | End: 2018-04-18

## 2018-04-18 RX ADMIN — SODIUM CHLORIDE 60 ML: 9 INJECTION, SOLUTION INTRAVENOUS at 08:30

## 2018-04-18 RX ADMIN — IOPAMIDOL 100 ML: 755 INJECTION, SOLUTION INTRAVENOUS at 08:30

## 2018-04-25 ENCOUNTER — ONCOLOGY VISIT (OUTPATIENT)
Dept: ONCOLOGY | Facility: CLINIC | Age: 68
End: 2018-04-25
Payer: MEDICARE

## 2018-04-25 VITALS
TEMPERATURE: 97.8 F | OXYGEN SATURATION: 95 % | DIASTOLIC BLOOD PRESSURE: 74 MMHG | RESPIRATION RATE: 20 BRPM | WEIGHT: 283.8 LBS | HEART RATE: 82 BPM | SYSTOLIC BLOOD PRESSURE: 120 MMHG | BODY MASS INDEX: 37.61 KG/M2 | HEIGHT: 73 IN

## 2018-04-25 DIAGNOSIS — C82.90 FOLLICULAR LYMPHOMA, UNSPECIFIED FOLLICULAR LYMPHOMA TYPE, UNSPECIFIED BODY REGION (H): Primary | ICD-10-CM

## 2018-04-25 PROCEDURE — 99214 OFFICE O/P EST MOD 30 MIN: CPT | Performed by: INTERNAL MEDICINE

## 2018-04-25 ASSESSMENT — PAIN SCALES - GENERAL: PAINLEVEL: NO PAIN (0)

## 2018-04-25 NOTE — NURSING NOTE
"Oncology Rooming Note    April 25, 2018 8:10 AM   Deandre Merchant is a 68 year old male who presents for:    Chief Complaint   Patient presents with     Oncology Clinic Visit     6 month follow up for Non-Hodgkin's lymphoma     Results     Review CT scan and lab work done 4/18/18     Initial Vitals: /74 (BP Location: Left arm, Patient Position: Sitting, Cuff Size: Adult Large)  Pulse 82  Temp 97.8  F (36.6  C) (Temporal)  Resp 20  Ht 1.842 m (6' 0.5\")  Wt 128.7 kg (283 lb 12.8 oz)  SpO2 95%  BMI 37.96 kg/m2 Estimated body mass index is 37.96 kg/(m^2) as calculated from the following:    Height as of this encounter: 1.842 m (6' 0.5\").    Weight as of this encounter: 128.7 kg (283 lb 12.8 oz). Body surface area is 2.57 meters squared.  No Pain (0) Comment: Data Unavailable   No LMP for male patient.  Allergies reviewed: Yes  Medications reviewed: Yes    Medications: Medication refills not needed today.  Pharmacy name entered into [employer]:    [facility] [place], [place] - [address]   [facility] [place] [address] - [address]     Clinical concerns: none   provider was notified.    5 minutes for nursing intake (face to face time)     Chester Rodriguez MA              "

## 2018-04-25 NOTE — PROGRESS NOTES
FOLLOW-UP VISIT NOTE    PATIENT NAME: Deandre Merchant MRN # [id number]  DATE OF VISIT: Apr 25, 2018 YOB: 1950    REFERRING PROVIDER: No referring provider defined for this encounter.    CANCER TYPE:Follicular Lymphoma- In Remission  STAGE: IV( axillary, mediastinal, retroperitoneal, stomach, bone marrow)  ECOG PS: 0    ONCOLOGY HISTORY:  67-year-old male who in 2012 presented with mediastinal, retroperitoneal and mesenteric lymph nodes. Was clinically asymptomatic with normal blood counts. CT-guided lymph node biopsy on 6/21/2012 was consistent with follicular lymphoma, grade 1-2. He was observed at that point.    In 2013, presented to the hospital with ileus and  abdominal distention. EGD showed gastric antrum and duodenal ulcers which were biopsied and showed follicular lymphoma. CT scans showed bulky  lymphadenopathy with increasing size. 04/13 Bone marrow biopsy was positive with evidence of follicular lymphoma. He received bendamustine and Rituxan regimen april -September 2014.   In December 14 , he was started on maintenance Rituxan every 2 months for 2 years which he completed in October 2016.   He was last seen in April 2017 and was in remission.      SUBJECTIVE   Presents to the clinic today for a a six-month follow-up. No new complaints. Denies fever/chills, night sweats, weight loss, fatigue, abdominal pain, dyspnea or any other complaints. Current appetite and energy levels.      PAST MEDICAL HISTORY     Past Medical History:   Diagnosis Date     BPH (benign prostatic hyperplasia)      Lymphadenopathy     mediastinal & retroperitoneal     Polyps, colonic      Retinal detachment          CURRENT OUTPATIENT MEDICATIONS     Current Outpatient Prescriptions   Medication Sig Dispense Refill     Acetaminophen (TYLENOL PO) Take 1,000 mg by mouth       albuterol (PROAIR HFA/PROVENTIL HFA/VENTOLIN HFA) 108 (90 BASE) MCG/ACT Inhaler Inhale 2 puffs into the lungs every 6 hours as needed for  shortness of breath / dyspnea or wheezing 1 Inhaler 1     aspirin 81 MG tablet Take 81 mg by mouth daily       cetirizine (ZYRTEC) 10 MG tablet Take 10 mg by mouth daily       famotidine (PEPCID) 20 MG tablet Take 1 tablet (20 mg) by mouth 2 times daily 60 tablet 1     fluticasone (FLONASE) 50 MCG/ACT spray Spray 1-2 sprays into both nostrils daily 16 g 11     Loratadine (CLARITIN PO)        order for DME Equipment being ordered: knee sleeve with horseshoe, XL 1 Device 0     simvastatin (ZOCOR) 20 MG tablet TAKE 1 TABLET BY MOUTH ISSA Y AT BEDTIME 90 tablet 3        ALLERGIES     Allergies   Allergen Reactions     Allopurinol Rash        REVIEW OF SYSTEMS   As above in the HPI, o/w complete 14-point ROS was negative.     PHYSICAL EXAM   B/P: 129/76, T: 97.5, P: 78, R: 18  SpO2 Readings from Last 4 Encounters:   04/25/18 95%   03/28/18 94%   02/22/18 96%   01/29/18 95%     Wt Readings from Last 3 Encounters:   04/25/18 128.7 kg (283 lb 12.8 oz)   03/28/18 129.2 kg (284 lb 12.8 oz)   02/22/18 128.6 kg (283 lb 8 oz)     GEN: NAD  EYES:PERRLA  Mouth/ENT: Oropharynx is clear.  NECK: no cervical or supraclavicular lymphadenopathy  LUNGS: clear bilaterally  CV: regular, no murmurs, rubs, or gallops  ABDOMEN: soft, non-tender, non-distended, normal bowel sounds  EXT: warm, well perfused, no edema  NEURO: alert  SKIN: no rashes     LABORATORY AND IMAGING STUDIES     Lab Results   Component Value Date    WBC 4.8 04/18/2018     Lab Results   Component Value Date    RBC 4.77 04/18/2018     Lab Results   Component Value Date    HGB 14.6 04/18/2018     Lab Results   Component Value Date    HCT 43.3 04/18/2018     No components found for: MCT  Lab Results   Component Value Date    MCV 91 04/18/2018     Lab Results   Component Value Date    MCH 30.6 04/18/2018     Lab Results   Component Value Date    MCHC 33.7 04/18/2018     Lab Results   Component Value Date    RDW 13.3 04/18/2018     Lab Results   Component Value Date      04/18/2018     Recent Labs   Lab Test  04/18/18   0807  10/18/17   0828   NA  139  140   POTASSIUM  4.3  3.8   CHLORIDE  105  106   CO2  27  28   ANIONGAP  7  6   GLC  114*  125*   BUN  16  20   CR  0.82  1.00   ANDRÉS  8.3*  8.5       All laboratory data reviewed    04/18/18         IMPRESSION: Stable CT of the chest, abdomen, and pelvis.      ASSESSMENT AND PLAN     67-year-old male with stage IV follicular lymphoma who is status post treatment with bendamustine/Rituxan (04-09/2014) followed by maintenance rituximab for two years- completed in October 2016.    NCCN guidelines were reviewed.    Clinically in remission with no active complaints. CT scan and lab work reviewed with no evidence of recurrence/relapse. The patient was informed of the above findings    We'll see him back in 6 months with labs. CT scans in one year.    Patient understands and agrees with the plan of care      Ge Krishnamurthy MD  Attending Physician   Hematology/Medical Oncology

## 2018-04-25 NOTE — PATIENT INSTRUCTIONS
Please follow up with Dr. Krishnamurthy in 6 months with labs.      Follow Up Date/Time:   Please come 15-30 minutes early for labs    If you have any questions or concerns please feel free to call.    If you need to reschedule please call:  Clinic or Lab Appointment - [phone]  Infusion - [phone]  Imaging - [phone]    Markell Mistry, RN, BSN, OCN   Oncology Care Coordinator RN  [facility]  [phone]

## 2018-04-25 NOTE — MR AVS SNAPSHOT
After Visit Summary   4/25/2018    Deandre Merchant    MRN: [id number]           Patient Information     Date Of Birth          1950        Visit Information        Provider Department      4/25/2018 8:00 AM Ge Krishnamurthy MD [facility]        Today's Diagnoses     Follicular lymphoma, unspecified follicular lymphoma type, unspecified body region (H)    -  1      Care Instructions      Please follow up with Dr. Krishnamurthy in 6 months with labs.      Follow Up Date/Time:   Please come 15-30 minutes early for labs    If you have any questions or concerns please feel free to call.    If you need to reschedule please call:  Clinic or Lab Appointment - [phone]  Infusion - [phone]  Imaging - [phone]    Markell Mistry RN, BSN, OCN   Oncology Care Coordinator RN  [facility]  [phone]              Follow-ups after your visit        Your next 10 appointments already scheduled     May 11, 2018  1:30 PM CDT   New Visit with Sarabjit Mahmood MD   [facility]    [address] [place] [id number] [phone]              Future tests that were ordered for you today     Open Future Orders        Priority Expected Expires Ordered    CBC with platelets differential Routine 10/25/2018 4/25/2019 4/25/2018    Comprehensive metabolic panel Routine 10/25/2018 4/25/2019 4/25/2018    Lactate Dehydrogenase Routine 10/25/2018 4/25/2019 4/25/2018            Who to contact     If you have questions or need follow up information about today's clinic visit or your schedule please contact [facility] directly at [phone].  Normal or non-critical lab and imaging results will be communicated to you by MyChart, letter or phone within 4 business days after the clinic has received the results. If you do not hear from us within 7 days, please contact the clinic through MyChart or phone. If you have a critical or  "abnormal lab result, we will notify you by phone as soon as possible.  Submit refill requests through [employer] or call your pharmacy and they will forward the refill request to us. Please allow 3 business days for your refill to be completed.          Additional Information About Your Visit        MyChart Information     [employer] lets you send messages to your doctor, view your test results, renew your prescriptions, schedule appointments and more. To sign up, go to [email or web address] . Click on \"Log in\" on the left side of the screen, which will take you to the Welcome page. Then click on \"Sign up Now\" on the right side of the page.     You will be asked to enter the access code listed below, as well as some personal information. Please follow the directions to create your username and password.     Your access code is: [email or web address]  Expires: 2018 11:26 AM     Your access code will  in 90 days. If you need help or a new code, please call your [place] clinic or [phone].        Care EveryWhere ID     This is your Care EveryWhere ID. This could be used by other organizations to access your [place] medical records  [id number]        Your Vitals Were     Pulse Temperature Respirations Height Pulse Oximetry BMI (Body Mass Index)    82 97.8  F (36.6  C) (Temporal) 20 1.842 m (6' 0.5\") 95% 37.96 kg/m2       Blood Pressure from Last 3 Encounters:   18 120/74   18 139/78   18 110/68    Weight from Last 3 Encounters:   18 128.7 kg (283 lb 12.8 oz)   18 129.2 kg (284 lb 12.8 oz)   18 128.6 kg (283 lb 8 oz)               Primary Care Provider Office Phone # Fax #    Felix Sevilla [phone] [address] [place] [address]        Equal Access to Services     ANAID LANDRUM AH: Jasson Gonzalez, felicity brown, rajat milianarash la'aan ah. [facility] [phone].    ATENCIÓN: Si habla " español, tiene a kerr disposición servicios gratuitos de asistencia lingüística. Michael crenshaw [phone].    We comply with applicable federal civil rights laws and [place] laws. We do not discriminate on the basis of race, color, national origin, age, disability, sex, sexual orientation, or gender identity.            Thank you!     Thank you for choosing [facility]  for your care. Our goal is always to provide you with excellent care. Hearing back from our patients is one way we can continue to improve our services. Please take a few minutes to complete the written survey that you may receive in the mail after your visit with us. Thank you!             Your Updated Medication List - Protect others around you: Learn how to safely use, store and throw away your medicines at www.disposemymeds.org.          This list is accurate as of 4/25/18  8:34 AM.  Always use your most recent med list.                   Brand Name Dispense Instructions for use Diagnosis    albuterol 108 (90 Base) MCG/ACT Inhaler    PROAIR HFA/PROVENTIL HFA/VENTOLIN HFA    1 Inhaler    Inhale 2 puffs into the lungs every 6 hours as needed for shortness of breath / dyspnea or wheezing    Upper airway cough syndrome       aspirin 81 MG tablet      Take 81 mg by mouth daily        cetirizine 10 MG tablet    zyrTEC     Take 10 mg by mouth daily        CLARITIN PO           famotidine 20 MG tablet    PEPCID    60 tablet    Take 1 tablet (20 mg) by mouth 2 times daily    Laryngopharyngeal reflux       fluticasone 50 MCG/ACT spray    FLONASE    16 g    Spray 1-2 sprays into both nostrils daily    Upper respiratory tract infection, unspecified type, Upper airway cough syndrome       order for DME     1 Device    Equipment being ordered: knee sleeve with horseshoe, XL    Chronic pain of left knee       simvastatin 20 MG tablet    ZOCOR    90 tablet    TAKE 1 TABLET BY MOUTH ISSA Y AT BEDTIME    Hyperlipidemia with target LDL less than 130        TYLENOL PO      Take 1,000 mg by mouth

## 2018-04-25 NOTE — Clinical Note
4/25/2018         RE: Deandre Merchant  [id number]  [facility] [address]        Dear Colleague,    Thank you for referring your patient, Deandre Merchant, to the [facility]. Please see a copy of my visit note below.      FOLLOW-UP VISIT NOTE    PATIENT NAME: Deandre Merchant MRN # [id number]  DATE OF VISIT: Apr 25, 2018 YOB: 1950    REFERRING PROVIDER: No referring provider defined for this encounter.    CANCER TYPE:Follicular Lymphoma- In Remission  STAGE: IV( axillary, mediastinal, retroperitoneal, stomach, bone marrow)  ECOG PS: 0    ONCOLOGY HISTORY:  67-year-old male who in 2012 presented with mediastinal, retroperitoneal and mesenteric lymph nodes. Was clinically asymptomatic with normal blood counts. CT-guided lymph node biopsy on 6/21/2012 was consistent with follicular lymphoma, grade 1-2. He was observed at that point.    In 2013, presented to the hospital with ileus and  abdominal distention. EGD showed gastric antrum and duodenal ulcers which were biopsied and showed follicular lymphoma. CT scans showed bulky  lymphadenopathy with increasing size. 04/13 Bone marrow biopsy was positive with evidence of follicular lymphoma. He received bendamustine and Rituxan regimen april -September 2014.   In December 14 , he was started on maintenance Rituxan every 2 months for 2 years which he completed in October 2016.   He was last seen in April 2017 and was in remission.      SUBJECTIVE   Presents to the clinic today for a a six-month follow-up. No new complaints. Denies fever/chills, night sweats, weight loss, fatigue, abdominal pain, dyspnea or any other complaints. Current appetite and energy levels.      PAST MEDICAL HISTORY     Past Medical History:   Diagnosis Date     BPH (benign prostatic hyperplasia)      Lymphadenopathy     mediastinal & retroperitoneal     Polyps, colonic      Retinal detachment          CURRENT OUTPATIENT MEDICATIONS     Current Outpatient  Prescriptions   Medication Sig Dispense Refill     Acetaminophen (TYLENOL PO) Take 1,000 mg by mouth       albuterol (PROAIR HFA/PROVENTIL HFA/VENTOLIN HFA) 108 (90 BASE) MCG/ACT Inhaler Inhale 2 puffs into the lungs every 6 hours as needed for shortness of breath / dyspnea or wheezing 1 Inhaler 1     aspirin 81 MG tablet Take 81 mg by mouth daily       cetirizine (ZYRTEC) 10 MG tablet Take 10 mg by mouth daily       famotidine (PEPCID) 20 MG tablet Take 1 tablet (20 mg) by mouth 2 times daily 60 tablet 1     fluticasone (FLONASE) 50 MCG/ACT spray Spray 1-2 sprays into both nostrils daily 16 g 11     Loratadine (CLARITIN PO)        order for DME Equipment being ordered: knee sleeve with horseshoe, XL 1 Device 0     simvastatin (ZOCOR) 20 MG tablet TAKE 1 TABLET BY MOUTH ISSA Y AT BEDTIME 90 tablet 3        ALLERGIES     Allergies   Allergen Reactions     Allopurinol Rash        REVIEW OF SYSTEMS   As above in the HPI, o/w complete 14-point ROS was negative.     PHYSICAL EXAM   B/P: 129/76, T: 97.5, P: 78, R: 18  SpO2 Readings from Last 4 Encounters:   04/25/18 95%   03/28/18 94%   02/22/18 96%   01/29/18 95%     Wt Readings from Last 3 Encounters:   04/25/18 128.7 kg (283 lb 12.8 oz)   03/28/18 129.2 kg (284 lb 12.8 oz)   02/22/18 128.6 kg (283 lb 8 oz)     GEN: NAD  EYES:PERRLA  Mouth/ENT: Oropharynx is clear.  NECK: no cervical or supraclavicular lymphadenopathy  LUNGS: clear bilaterally  CV: regular, no murmurs, rubs, or gallops  ABDOMEN: soft, non-tender, non-distended, normal bowel sounds  EXT: warm, well perfused, no edema  NEURO: alert  SKIN: no rashes     LABORATORY AND IMAGING STUDIES     Lab Results   Component Value Date    WBC 4.8 04/18/2018     Lab Results   Component Value Date    RBC 4.77 04/18/2018     Lab Results   Component Value Date    HGB 14.6 04/18/2018     Lab Results   Component Value Date    HCT 43.3 04/18/2018     No components found for: MCT  Lab Results   Component Value Date    MCV 91  04/18/2018     Lab Results   Component Value Date    MCH 30.6 04/18/2018     Lab Results   Component Value Date    MCHC 33.7 04/18/2018     Lab Results   Component Value Date    RDW 13.3 04/18/2018     Lab Results   Component Value Date     04/18/2018     Recent Labs   Lab Test  04/18/18   0807  10/18/17   0828   NA  139  140   POTASSIUM  4.3  3.8   CHLORIDE  105  106   CO2  27  28   ANIONGAP  7  6   GLC  114*  125*   BUN  16  20   CR  0.82  1.00   ANDRÉS  8.3*  8.5       All laboratory data reviewed    04/18/18         IMPRESSION: Stable CT of the chest, abdomen, and pelvis.      ASSESSMENT AND PLAN     67-year-old male with stage IV follicular lymphoma who is status post treatment with bendamustine/Rituxan (04-09/2014) followed by maintenance rituximab for two years- completed in October 2016.    NCCN guidelines were reviewed.    Clinically in remission with no active complaints. CT scan and lab work reviewed with no evidence of recurrence/relapse. The patient was informed of the above findings    We'll see him back in 6 months with labs. CT scans in one year.    Patient understands and agrees with the plan of care      Ge Krishnamurthy MD  Attending Physician   Hematology/Medical Oncology    Again, thank you for allowing me to participate in the care of your patient.        Sincerely,        Ge Krishnamurthy MD

## 2018-04-25 NOTE — NURSING NOTE
DISCHARGE PLAN:  Next appointments: See patient instruction section  Departure Mode: Ambulatory  Accompanied by: self  8 minutes for nursing discharge (face to face time)     Deandre Merchant is here today for Oncology follow up.  Writing nurse seen patient after Medical Oncology appointment to address questions/concerns/coordinate care. Patient discussed troubles with cough lately.  He reports that he is seeing Pulmonology soon and that they did try antacids.  He reports that he thought it worked at first and then stopped.  Reviewed trying to sleep with the HOB elevated a bit and rationale.  Patient to follow up in 6 months with labs.  Will come early. Patient ambulated by nurse to  to schedule follow up and/or lab appointments. See patient instructions and Oncologist's Progress note for further details. Questions and concerns addressed to patient's satisfaction. Patient verbalized and demonstrated understanding of plan.  Contact information provided and patient is encouraged to call with any that arise in the interim of care.    Markell Mistry, RN, BSN, OCN   Oncology Care Coordinator RN  [facility]  [phone]  4/25/2018, 8:41 AM

## 2018-05-11 ENCOUNTER — HOSPITAL ENCOUNTER (OUTPATIENT)
Dept: CT IMAGING | Facility: CLINIC | Age: 68
Discharge: HOME | End: 2018-05-11
Payer: MEDICARE

## 2018-05-11 ENCOUNTER — OFFICE VISIT (OUTPATIENT)
Dept: PULMONOLOGY | Facility: CLINIC | Age: 68
End: 2018-05-11
Payer: MEDICARE

## 2018-05-11 VITALS
OXYGEN SATURATION: 94 % | HEIGHT: 73 IN | RESPIRATION RATE: 18 BRPM | WEIGHT: 284.5 LBS | BODY MASS INDEX: 37.71 KG/M2 | TEMPERATURE: 97.8 F | HEART RATE: 90 BPM | SYSTOLIC BLOOD PRESSURE: 138 MMHG | DIASTOLIC BLOOD PRESSURE: 80 MMHG

## 2018-05-11 DIAGNOSIS — R05.3 CHRONIC COUGH: ICD-10-CM

## 2018-05-11 DIAGNOSIS — R05.3 CHRONIC COUGH: Primary | ICD-10-CM

## 2018-05-11 DIAGNOSIS — J32.0 CHRONIC MAXILLARY SINUSITIS: ICD-10-CM

## 2018-05-11 PROCEDURE — 99205 OFFICE O/P NEW HI 60 MIN: CPT

## 2018-05-11 PROCEDURE — 70486 CT MAXILLOFACIAL W/O DYE: CPT

## 2018-05-11 ASSESSMENT — PAIN SCALES - GENERAL: PAINLEVEL: NO PAIN (0)

## 2018-05-11 NOTE — LETTER
5/11/2018         RE: Deandre Merchant  [id number]  [facility] [address]        Dear Colleague,    Thank you for referring your patient, Deandre Merchant, to the [facility]. Please see a copy of my visit note below.    [facility]  Pulmonary Medicine  Visit Clinic Note  May 11, 2018         ASSESSMENT & PLAN       Chronic cough: Reasonable possibilities include post viral infectious cough, sinusitis, or gastroesophageal reflux disease.  Although his symptoms are protracted after his initial viral infection that he caught from his wife, it is not unheard of for a post viral cough to last a handful of months.  Time will tell if this is the answer at should get better in time.  He does endorse some sinus pressure, and so I believe sinusitis would be a reasonable option to consider.  I will get a sinus CT scan.  If there is any sign for sinusitis, will treat with a 3 week course of antibiotics.  In the meantime he can start taking a first generation antihistamine called chlorpheniramine which has been helpful to treat upper airway causes of cough.    When I took a look at his CAT scan of his lungs, he had a patulous esophagus.  Looks fluid-filled in certain parts.  Gastroesophageal reflux disease can cause cough.  If his sinus workup is negative and he does not have any response to chlorpheniramine, we can try a trial of a proton pump inhibitor rather than his H2 blocker.    Finally I do believe we should get full pulmonary function testing to look for the possibility of lung restriction, and workup is low FEV1 and FVC.    Bolivar Mahmood MD     I spent a total of 60 minutes face-to-face with Deandre Merchant during today's office visit.  Over 50% of this time was spent counseling the patient and/or coordinating care regarding possibilities for cough, and a both a diagnostic and treatment strategy.  See note for details.       Today's visit note:     Referred by Dr. Roel Centeno  Complaint: Deandre Merchant is a 68 year old year old male who is being seen for Consult (chronic cough, cough since Janaury- states it has gotten better but it is still there)      HISTORY OF PRESENT ILLNESS:  This is a 68-year-old male with a history of non-Hodgkin's lymphoma who is presenting to the pulmonary clinic today for evaluation of chronic cough.    Says that he has had issues with cough for about 30 years.  However her several of the recent years he had pretty good handle on it.  However this last January things got bad again when his wife developed viral upper respiratory tract infection.  He ended up catching this as well, and since that time he is had a chronic cough with sputum production.     He says the cough is a little bit better with Tylenol.  He does take Tylenol PM at night to help him get to sleep.  However the cough is throughout the day as well.  Coughs up yellow green mucus at times, but it also can be white.  He notes when he wakes up in the morning he has to cough up a lot of mucus.    He is tried a lot of other therapies as well with his primary care physician.  He has been using cough syrup, cetirizine, Pepcid, Zantac, azithromycin, and prednisone.  He says the cough syrup may help a little bit, but the rest of the indications have not helped.    He notes some sinus pressure both in his maxillary region as well as his forehead.  He has been using Flonase to help with any sort of rhinitis, but he does not feel like it is been helping too much.  He does not believe that he has any allergies.    Denies any fevers chills or hemoptysis.           Past Medical and Surgical History:     Past Medical History:   Diagnosis Date     BPH (benign prostatic hyperplasia)      Lymphadenopathy     mediastinal & retroperitoneal     Non Hodgkin's lymphoma (H)      Polyps, colonic      Retinal detachment      Past Surgical History:   Procedure Laterality Date     COLONOSCOPY N/A 3/1/2017    Procedure:  COLONOSCOPY;  Surgeon: Dakota Wall MD;  Location: [facility]     ESOPHAGOSCOPY, GASTROSCOPY, DUODENOSCOPY (EGD), COMBINED  4/21/2013    Procedure: COMBINED ESOPHAGOSCOPY, GASTROSCOPY, DUODENOSCOPY (EGD), BIOPSY SINGLE OR MULTIPLE;;  Surgeon: Torrey Cosme MD;  Location: [facility]     ESOPHAGOSCOPY, GASTROSCOPY, DUODENOSCOPY (EGD), COMBINED  10/9/2013    Procedure: COMBINED ESOPHAGOSCOPY, GASTROSCOPY, DUODENOSCOPY (EGD), BIOPSY SINGLE OR MULTIPLE;  ESOPHAGOSCOPY, GASTROSCOPY, DUODENOSCOPY (EGD) with multiple biopsies;  Surgeon: Shay Sauer MD;  Location: [facility]     HC COLONOSCOPY W/WO BRUSH/WASH  07/12/06     LAPAROTOMY EXPLORATORY  4/24/2013    Procedure: LAPAROTOMY EXPLORATORY;  Exploratory Laparotomy and lysis of adhesions.;  Surgeon: Genevieve Barros MD;  Location:  OR           Family History:     Family History   Problem Relation Age of Onset     CANCER Father      liver/kidney     C.A.D. Father      Pacemaker Brother      Arthritis Mother      RA              Social History:     Social History     Social History     Marital status:      Spouse name: Cristel     Number of children: 2     Years of education: N/A     Occupational History     Not on file.     Social History Main Topics     Smoking status: Never Smoker     Smokeless tobacco: Never Used     Alcohol use 2.0 oz/week      Comment: occasionally weekends     Drug use: No     Sexual activity: Not Currently     Partners: Female     Other Topics Concern     Caffeine Concern No     Sleep Concern No     Stress Concern No     Weight Concern No     Exercise Yes     Seat Belt Yes     Social History Narrative   worked in a welding shop as youth.          Medications:     Current Outpatient Prescriptions   Medication     Acetaminophen (TYLENOL PO)     albuterol (PROAIR HFA/PROVENTIL HFA/VENTOLIN HFA) 108 (90 BASE) MCG/ACT Inhaler     aspirin 81 MG tablet     cetirizine (ZYRTEC) 10 MG tablet     famotidine (PEPCID) 20 MG tablet     fluticasone  "(FLONASE) 50 MCG/ACT spray     order for DME     simvastatin (ZOCOR) 20 MG tablet     No current facility-administered medications for this visit.             Review of Systems:       A complete review of systems was otherwise negative except as noted in the HPI.      PHYSICAL EXAM:  /80 (BP Location: Right arm, Patient Position: Chair, Cuff Size: Adult Regular)  Pulse 90  Temp 97.8  F (36.6  C) (Temporal)  Resp 18  Ht 6' 0.5\" (1.842 m)  Wt 284 lb 8 oz (129 kg)  SpO2 94%  BMI 38.05 kg/m2     General: Well developed, well nourished, No apparent distress  Eyes: Anicteric  Nose: Nasal mucosa with slight redness and edema.  No polyps  Ears: Hearing grossly normal  Mouth: Oral mucosa is moist, without any lesions. No oropharyngeal exudate.  Neck: supple, no thyromegaly  Lymphatics: No cervical or supraclavicular nodes  Respiratory: Good air movement. No crackles. No rhonchi. No wheezes  Cardiac: RRR, normal S1, S2. No murmurs. No JVD  Abdomen: Soft, NT/ND  Musculoskeletal: Extremities normal. No clubbing. No cyanosis.  Trace ankle edema.  Skin: No rash on limited exam  Neuro: Normal mentation. Normal speech.  Psych:Normal affect           Data:   All laboratory and imaging data reviewed.      PFT:    Spirometry in March 2018 shows an FEV1/FVC ratio of 80%.  His FEV1 is 2.2 L which is 58% predicted, and his FVC is 2.71 L which is 57% predicted.    PFT Interpretation:  No airflow obstruction.  Low FEV1 and FVC suggest restriction.  Valid Maneuver    Chest CT: I have independently reviewed the chest CT scan from April 2018 and agree with the radiologist interpretation below.  In addition there are some scattered subcentimeter pulmonary nodules, as well as bibasilar patchy opacities that either represent atelectasis or fibrosis.:    Chest: A 6 mm nodule in the right upper lobe, image 26, is unchanged.  There are no new pulmonary nodules or masses. The ascending aorta is  prominent in size, measuring 4.6 cm at " the level of the main pulmonary  artery. This is stable. There are mild atherosclerotic changes in the  aorta. Cardiac size is normal. There is coronary arterial  calcification. There are no enlarged axillary, mediastinal, or hilar  lymph nodes. No pleural effusion.                                          Again, thank you for allowing me to participate in the care of your patient.        Sincerely,        Sarabjit Mahmood MD

## 2018-05-11 NOTE — MR AVS SNAPSHOT
After Visit Summary   2018    Deandre Merchant    MRN: [id number]           Patient Information     Date Of Birth          1950        Visit Information        Provider Department      2018 1:30 PM Sarabjit Mahmood MD [facility]        Today's Diagnoses     Chronic cough    -  1    Chronic maxillary sinusitis          Care Instructions    Thank you for coming in today to see Bolivar Mahmood MD.      Bolivar Mahmood MD has requested that you have the following tests before your next appointment with him:    <>Imaging appointment date/time: Today, CT scan of Sinuses   <>PFT testing.  They will contact you to schedule this test.    Dr. Mahmood will contact you with results and decide on follow up at that time.        If you have any questions or concerns please feel free to call.    Clinic: [phone]  [phone]       [facility] (Pulmonology)   at [facility]  [phone]                Follow-ups after your visit        Your next 10 appointments already scheduled     May 11, 2018  3:00 PM CDT   CT MAXILLOFACIAL W/O CONTRAST with PHCT1   [facility] CT Scan ([facility])    [address] [place] [id number] [phone]           Please bring any scans or X-rays taken at other hospitals, if similar tests were done. Also bring a list of your medicines, including vitamins, minerals and over-the-counter drugs. It is safest to leave personal items at home.  Be sure to tell your doctor:   If you have any allergies.   If there s any chance you are pregnant.   If you are breastfeeding.  You do not need to do anything special to prepare for this exam.  Please wear loose clothing, such as a sweat suit or jogging clothes. Avoid snaps, zippers and other metal. We may ask you to undress and put on a hospital gown.            Oct 25, 2018  8:00 AM CDT   Return Visit with Ge Krishnamurthy MD   [facility]  "[place] ([facility])    [address] [place] [id number]   [phone]              Future tests that were ordered for you today     Open Future Orders        Priority Expected Expires Ordered    CT Maxillofacial w/o Contrast Routine  2019    Pulmonary Function Test Routine  2019            Who to contact     If you have questions or need follow up information about today's clinic visit or your schedule please contact [facility] directly at [phone].  Normal or non-critical lab and imaging results will be communicated to you by Avesohart, letter or phone within 4 business days after the clinic has received the results. If you do not hear from us within 7 days, please contact the clinic through i2wet or phone. If you have a critical or abnormal lab result, we will notify you by phone as soon as possible.  Submit refill requests through [email or web address] or call your pharmacy and they will forward the refill request to us. Please allow 3 business days for your refill to be completed.          Additional Information About Your Visit        [email or web address] Information     [email or web address] lets you send messages to your doctor, view your test results, renew your prescriptions, schedule appointments and more. To sign up, go to [email or web address] . Click on \"Log in\" on the left side of the screen, which will take you to the Welcome page. Then click on \"Sign up Now\" on the right side of the page.     You will be asked to enter the access code listed below, as well as some personal information. Please follow the directions to create your username and password.     Your access code is: [email or web address]  Expires: 2018  2:52 PM     Your access code will  in 90 days. If you need help or a new code, please call your [facility] or [phone].        Care EveryWhere ID     This is your Care EveryWhere ID. This could be used by other organizations to access your " "[facility] medical records  [id number]        Your Vitals Were     Pulse Temperature Respirations Height Pulse Oximetry BMI (Body Mass Index)    90 97.8  F (36.6  C) (Temporal) 18 6' 0.5\" (1.842 m) 94% 38.05 kg/m2       Blood Pressure from Last 3 Encounters:   05/11/18 138/80   04/25/18 120/74   03/28/18 139/78    Weight from Last 3 Encounters:   05/11/18 284 lb 8 oz (129 kg)   04/25/18 283 lb 12.8 oz (128.7 kg)   03/28/18 284 lb 12.8 oz (129.2 kg)                 Today's Medication Changes          These changes are accurate as of 5/11/18  2:52 PM.  If you have any questions, ask your nurse or doctor.               Start taking these medicines.        Dose/Directions    Chlorpheniramine-Phenylephrine 4-10 MG Tabs   Used for:  Chronic cough   Started by:  Sarabjit Mahmood MD        Dose:  1 tablet   Take 1 tablet by mouth every 6 hours as needed   Quantity:  60 tablet   Refills:  0            Where to get your medicines      These medications were sent to [id number] - [place], [place] - [address] [place] [address]  [address] [place] [address]    Hours:  M-F 8:30-6:30; Sat 9-4; closed Sunday Phone:  [phone]     Chlorpheniramine-Phenylephrine 4-10 MG Tabs                Primary Care Provider Office Phone # Fax #    Felix Sevilla [phone] [address] [place] [address]        Equal Access to Services     [facility] DIALOL AH: Hadii jo schreibero Sokelli, waaxda luqadaha, qaybta kaalmada rajat fitzpatrick idifredy moore. So [facility] [phone].    ATENCIÓN: Si habla español, tiene a kerr disposición servicios gratuitos de asistencia lingüística. Llame al [phone].    We comply with applicable federal civil rights laws and [place] laws. We do not discriminate on the basis of race, color, national origin, age, disability, sex, sexual orientation, or gender identity.            Thank you!     Thank you for choosing [facility]  for your care. " Our goal is always to provide you with excellent care. Hearing back from our patients is one way we can continue to improve our services. Please take a few minutes to complete the written survey that you may receive in the mail after your visit with us. Thank you!             Your Updated Medication List - Protect others around you: Learn how to safely use, store and throw away your medicines at www.disposemymeds.org.          This list is accurate as of 5/11/18  2:52 PM.  Always use your most recent med list.                   Brand Name Dispense Instructions for use Diagnosis    albuterol 108 (90 Base) MCG/ACT Inhaler    PROAIR HFA/PROVENTIL HFA/VENTOLIN HFA    1 Inhaler    Inhale 2 puffs into the lungs every 6 hours as needed for shortness of breath / dyspnea or wheezing    Upper airway cough syndrome       aspirin 81 MG tablet      Take 81 mg by mouth daily        cetirizine 10 MG tablet    zyrTEC     Take 10 mg by mouth daily        Chlorpheniramine-Phenylephrine 4-10 MG Tabs     60 tablet    Take 1 tablet by mouth every 6 hours as needed    Chronic cough       famotidine 20 MG tablet    PEPCID    60 tablet    Take 1 tablet (20 mg) by mouth 2 times daily    Laryngopharyngeal reflux       fluticasone 50 MCG/ACT spray    FLONASE    16 g    Spray 1-2 sprays into both nostrils daily    Upper respiratory tract infection, unspecified type, Upper airway cough syndrome       order for DME     1 Device    Equipment being ordered: knee sleeve with horseshoe, XL    Chronic pain of left knee       simvastatin 20 MG tablet    ZOCOR    90 tablet    TAKE 1 TABLET BY MOUTH ISSA Y AT BEDTIME    Hyperlipidemia with target LDL less than 130       TYLENOL PO      Take 1,000 mg by mouth

## 2018-05-11 NOTE — PATIENT INSTRUCTIONS
Thank you for coming in today to see Bolivar Mahmood MD.      Bolivar Mahmood MD has requested that you have the following tests before your next appointment with him:    <>Imaging appointment date/time: Today, CT scan of Sinuses   <>PFT testing.  They will contact you to schedule this test.    Dr. Mahmood will contact you with results and decide on follow up at that time.        If you have any questions or concerns please feel free to call.    Clinic: [phone]  [phone]       [facility] (Pulmonology)   at [facility]  [phone]

## 2018-05-11 NOTE — PROGRESS NOTES
[facility]  Pulmonary Medicine  Visit Clinic Note  May 11, 2018         ASSESSMENT & PLAN       Chronic cough: Reasonable possibilities include post viral infectious cough, sinusitis, or gastroesophageal reflux disease.  Although his symptoms are protracted after his initial viral infection that he caught from his wife, it is not unheard of for a post viral cough to last a handful of months.  Time will tell if this is the answer at should get better in time.  He does endorse some sinus pressure, and so I believe sinusitis would be a reasonable option to consider.  I will get a sinus CT scan.  If there is any sign for sinusitis, will treat with a 3 week course of antibiotics.  In the meantime he can start taking a first generation antihistamine called chlorpheniramine which has been helpful to treat upper airway causes of cough.    When I took a look at his CAT scan of his lungs, he had a patulous esophagus.  Looks fluid-filled in certain parts.  Gastroesophageal reflux disease can cause cough.  If his sinus workup is negative and he does not have any response to chlorpheniramine, we can try a trial of a proton pump inhibitor rather than his H2 blocker.    Finally I do believe we should get full pulmonary function testing to look for the possibility of lung restriction, and workup is low FEV1 and FVC.    Bolivar Mahmood MD     I spent a total of 60 minutes face-to-face with Deandre Merchant during today's office visit.  Over 50% of this time was spent counseling the patient and/or coordinating care regarding possibilities for cough, and a both a diagnostic and treatment strategy.  See note for details.       Today's visit note:     Referred by Dr. Sevilla    Chief Complaint: Deandre Merchant is a 68 year old year old male who is being seen for Consult (chronic cough, cough since Janaury-Pt states it has gotten better but it is still there)      HISTORY OF PRESENT ILLNESS:  This is a 68-year-old male with  a history of non-Hodgkin's lymphoma who is presenting to the pulmonary clinic today for evaluation of chronic cough.    Says that he has had issues with cough for about 30 years.  However her several of the recent years he had pretty good handle on it.  However this last January things got bad again when his wife developed viral upper respiratory tract infection.  He ended up catching this as well, and since that time he is had a chronic cough with sputum production.     He says the cough is a little bit better with Tylenol.  He does take Tylenol PM at night to help him get to sleep.  However the cough is throughout the day as well.  Coughs up yellow green mucus at times, but it also can be white.  He notes when he wakes up in the morning he has to cough up a lot of mucus.    He is tried a lot of other therapies as well with his primary care physician.  He has been using cough syrup, cetirizine, Pepcid, Zantac, azithromycin, and prednisone.  He says the cough syrup may help a little bit, but the rest of the indications have not helped.    He notes some sinus pressure both in his maxillary region as well as his forehead.  He has been using Flonase to help with any sort of rhinitis, but he does not feel like it is been helping too much.  He does not believe that he has any allergies.    Denies any fevers chills or hemoptysis.           Past Medical and Surgical History:     Past Medical History:   Diagnosis Date     BPH (benign prostatic hyperplasia)      Lymphadenopathy     mediastinal & retroperitoneal     Non Hodgkin's lymphoma (H)      Polyps, colonic      Retinal detachment      Past Surgical History:   Procedure Laterality Date     COLONOSCOPY N/A 3/1/2017    Procedure: COLONOSCOPY;  Surgeon: Dakota Wall MD;  Location:  GI     ESOPHAGOSCOPY, GASTROSCOPY, DUODENOSCOPY (EGD), COMBINED  4/21/2013    Procedure: COMBINED ESOPHAGOSCOPY, GASTROSCOPY, DUODENOSCOPY (EGD), BIOPSY SINGLE OR MULTIPLE;;  Surgeon:  Torrey Cosme MD;  Location:  GI     ESOPHAGOSCOPY, GASTROSCOPY, DUODENOSCOPY (EGD), COMBINED  10/9/2013    Procedure: COMBINED ESOPHAGOSCOPY, GASTROSCOPY, DUODENOSCOPY (EGD), BIOPSY SINGLE OR MULTIPLE;  ESOPHAGOSCOPY, GASTROSCOPY, DUODENOSCOPY (EGD) with multiple biopsies;  Surgeon: Shay Sauer MD;  Location:  GI     HC COLONOSCOPY W/WO BRUSH/WASH  07/12/06     LAPAROTOMY EXPLORATORY  4/24/2013    Procedure: LAPAROTOMY EXPLORATORY;  Exploratory Laparotomy and lysis of adhesions.;  Surgeon: Genevieve Barros MD;  Location:  OR           Family History:     Family History   Problem Relation Age of Onset     CANCER Father      liver/kidney     C.A.D. Father      Pacemaker Brother      Arthritis Mother      RA              Social History:     Social History     Social History     Marital status:      Spouse name: Cristel     Number of children: 2     Years of education: N/A     Occupational History     Not on file.     Social History Main Topics     Smoking status: Never Smoker     Smokeless tobacco: Never Used     Alcohol use 2.0 oz/week      Comment: occasionally weekends     Drug use: No     Sexual activity: Not Currently     Partners: Female     Other Topics Concern     Caffeine Concern No     Sleep Concern No     Stress Concern No     Weight Concern No     Exercise Yes     Seat Belt Yes     Social History Narrative   worked in a welding shop as youth.          Medications:     Current Outpatient Prescriptions   Medication     Acetaminophen (TYLENOL PO)     albuterol (PROAIR HFA/PROVENTIL HFA/VENTOLIN HFA) 108 (90 BASE) MCG/ACT Inhaler     aspirin 81 MG tablet     cetirizine (ZYRTEC) 10 MG tablet     famotidine (PEPCID) 20 MG tablet     fluticasone (FLONASE) 50 MCG/ACT spray     order for DME     simvastatin (ZOCOR) 20 MG tablet     No current facility-administered medications for this visit.             Review of Systems:       A complete review of systems was otherwise negative  "except as noted in the HPI.      PHYSICAL EXAM:  /80 (BP Location: Right arm, Patient Position: Chair, Cuff Size: Adult Regular)  Pulse 90  Temp 97.8  F (36.6  C) (Temporal)  Resp 18  Ht 6' 0.5\" (1.842 m)  Wt 284 lb 8 oz (129 kg)  SpO2 94%  BMI 38.05 kg/m2     General: Well developed, well nourished, No apparent distress  Eyes: Anicteric  Nose: Nasal mucosa with slight redness and edema.  No polyps  Ears: Hearing grossly normal  Mouth: Oral mucosa is moist, without any lesions. No oropharyngeal exudate.  Neck: supple, no thyromegaly  Lymphatics: No cervical or supraclavicular nodes  Respiratory: Good air movement. No crackles. No rhonchi. No wheezes  Cardiac: RRR, normal S1, S2. No murmurs. No JVD  Abdomen: Soft, NT/ND  Musculoskeletal: Extremities normal. No clubbing. No cyanosis.  Trace ankle edema.  Skin: No rash on limited exam  Neuro: Normal mentation. Normal speech.  Psych:Normal affect           Data:   All laboratory and imaging data reviewed.      PFT:    Spirometry in March 2018 shows an FEV1/FVC ratio of 80%.  His FEV1 is 2.2 L which is 58% predicted, and his FVC is 2.71 L which is 57% predicted.    PFT Interpretation:  No airflow obstruction.  Low FEV1 and FVC suggest restriction.  Valid Maneuver    Chest CT: I have independently reviewed the chest CT scan from April 2018 and agree with the radiologist interpretation below.  In addition there are some scattered subcentimeter pulmonary nodules, as well as bibasilar patchy opacities that either represent atelectasis or fibrosis.:    Chest: A 6 mm nodule in the right upper lobe, image 26, is unchanged.  There are no new pulmonary nodules or masses. The ascending aorta is  prominent in size, measuring 4.6 cm at the level of the main pulmonary  artery. This is stable. There are mild atherosclerotic changes in the  aorta. Cardiac size is normal. There is coronary arterial  calcification. There are no enlarged axillary, mediastinal, or hilar  lymph " nodes. No pleural effusion.

## 2018-05-14 ENCOUNTER — TELEPHONE (OUTPATIENT)
Dept: PULMONOLOGY | Facility: CLINIC | Age: 68
End: 2018-05-14

## 2018-05-14 DIAGNOSIS — J32.3 CHRONIC SPHENOIDAL SINUSITIS: Primary | ICD-10-CM

## 2018-05-14 DIAGNOSIS — J32.2 CHRONIC ETHMOIDAL SINUSITIS: ICD-10-CM

## 2018-05-14 NOTE — TELEPHONE ENCOUNTER
Sinus CT with ethmoidal sinusitis.  Will treat with 21 days augmentin.  Can hold the chlorpheniramine while taking the antibiotic in order to limit polypharmacy.   He will call back after treatment course to let me know how he is feeling.

## 2018-10-25 ENCOUNTER — ONCOLOGY VISIT (OUTPATIENT)
Dept: ONCOLOGY | Facility: CLINIC | Age: 68
End: 2018-10-25
Payer: MEDICARE

## 2018-10-25 VITALS
HEART RATE: 72 BPM | OXYGEN SATURATION: 96 % | DIASTOLIC BLOOD PRESSURE: 66 MMHG | TEMPERATURE: 96.9 F | HEIGHT: 73 IN | RESPIRATION RATE: 18 BRPM | BODY MASS INDEX: 37.34 KG/M2 | SYSTOLIC BLOOD PRESSURE: 142 MMHG | WEIGHT: 281.7 LBS

## 2018-10-25 DIAGNOSIS — C82.90 FOLLICULAR LYMPHOMA, UNSPECIFIED FOLLICULAR LYMPHOMA TYPE, UNSPECIFIED BODY REGION (H): ICD-10-CM

## 2018-10-25 DIAGNOSIS — C85.98 NON-HODGKIN'S LYMPHOMA OF MULTIPLE SITES (H): Primary | ICD-10-CM

## 2018-10-25 LAB
ALBUMIN SERPL-MCNC: 3.8 G/DL (ref 3.4–5)
ALP SERPL-CCNC: 115 U/L (ref 40–150)
ALT SERPL W P-5'-P-CCNC: 29 U/L (ref 0–70)
ANION GAP SERPL CALCULATED.3IONS-SCNC: 6 MMOL/L (ref 3–14)
AST SERPL W P-5'-P-CCNC: 25 U/L (ref 0–45)
BASOPHILS # BLD AUTO: 0 10E9/L (ref 0–0.2)
BASOPHILS NFR BLD AUTO: 0.8 %
BILIRUB SERPL-MCNC: 0.8 MG/DL (ref 0.2–1.3)
BUN SERPL-MCNC: 15 MG/DL (ref 7–30)
CALCIUM SERPL-MCNC: 8.2 MG/DL (ref 8.5–10.1)
CHLORIDE SERPL-SCNC: 105 MMOL/L (ref 94–109)
CO2 SERPL-SCNC: 28 MMOL/L (ref 20–32)
CREAT SERPL-MCNC: 0.99 MG/DL (ref 0.66–1.25)
DIFFERENTIAL METHOD BLD: NORMAL
EOSINOPHIL NFR BLD AUTO: 2.1 %
ERYTHROCYTE [DISTWIDTH] IN BLOOD BY AUTOMATED COUNT: 12.5 % (ref 10–15)
GFR SERPL CREATININE-BSD FRML MDRD: 75 ML/MIN/1.7M2
GLUCOSE SERPL-MCNC: 112 MG/DL (ref 70–99)
HCT VFR BLD AUTO: 41.5 % (ref 40–53)
HGB BLD-MCNC: 14.1 G/DL (ref 13.3–17.7)
IMM GRANULOCYTES # BLD: 0 10E9/L (ref 0–0.4)
IMM GRANULOCYTES NFR BLD: 0.4 %
LDH SERPL L TO P-CCNC: 174 U/L (ref 85–227)
LYMPHOCYTES # BLD AUTO: 0.8 10E9/L (ref 0.8–5.3)
LYMPHOCYTES NFR BLD AUTO: 14.3 %
MCH RBC QN AUTO: 30.4 PG (ref 26.5–33)
MCHC RBC AUTO-ENTMCNC: 34 G/DL (ref 31.5–36.5)
MCV RBC AUTO: 89 FL (ref 78–100)
MONOCYTES # BLD AUTO: 0.6 10E9/L (ref 0–1.3)
MONOCYTES NFR BLD AUTO: 11.3 %
NEUTROPHILS # BLD AUTO: 3.8 10E9/L (ref 1.6–8.3)
NEUTROPHILS NFR BLD AUTO: 71.1 %
NRBC # BLD AUTO: 0 10*3/UL
NRBC BLD AUTO-RTO: 0 /100
PLATELET # BLD AUTO: 151 10E9/L (ref 150–450)
POTASSIUM SERPL-SCNC: 4.1 MMOL/L (ref 3.4–5.3)
PROT SERPL-MCNC: 6.7 G/DL (ref 6.8–8.8)
RBC # BLD AUTO: 4.64 10E12/L (ref 4.4–5.9)
SODIUM SERPL-SCNC: 139 MMOL/L (ref 133–144)
WBC # BLD AUTO: 5.3 10E9/L (ref 4–11)

## 2018-10-25 PROCEDURE — 99213 OFFICE O/P EST LOW 20 MIN: CPT | Performed by: INTERNAL MEDICINE

## 2018-10-25 PROCEDURE — 85025 COMPLETE CBC W/AUTO DIFF WBC: CPT | Performed by: INTERNAL MEDICINE

## 2018-10-25 PROCEDURE — 80053 COMPREHEN METABOLIC PANEL: CPT | Performed by: INTERNAL MEDICINE

## 2018-10-25 PROCEDURE — 83615 LACTATE (LD) (LDH) ENZYME: CPT | Performed by: INTERNAL MEDICINE

## 2018-10-25 PROCEDURE — 36415 COLL VENOUS BLD VENIPUNCTURE: CPT | Performed by: INTERNAL MEDICINE

## 2018-10-25 ASSESSMENT — PAIN SCALES - GENERAL: PAINLEVEL: NO PAIN (0)

## 2018-10-25 NOTE — PATIENT INSTRUCTIONS
"Please follow up with Dr. Krishnamurthy in 6 months.  Please complete CT scan and labs prior to follow up appointment.    CT Scan Date/Time: 2/18/19 check in at 8 am and scan to start 8:30am  Please see \"Getting Ready for Your CT Scan\" for details.  Nothing to eat or drink for 2 hours prior to scan.  You will check in 30 minutes early to start a \"water prep\" prior to scan instead of oral contrast.  Radiology will call you with different instructions if oral contrast needed.    Lab Date/Time: 2/19/18 9:15am    Follow Up Date/Time: 2/24/19 at 8am      If you have any questions or concerns please feel free to call.    If you need to reschedule please call:  Clinic or Lab Appointment - [phone]  Infusion - [phone]  Imaging - [phone]    Daly PRIETO [facility]  Oncology/Hematology at [facility]      "

## 2018-10-25 NOTE — NURSING NOTE
"Oncology Rooming Note    October 25, 2018 8:04 AM   Deandre Merchant is a 68 year old male who presents for:    Chief Complaint   Patient presents with     Oncology Clinic Visit     Non-Hodgkin's lymphoma     Results     labs today     Initial Vitals: /66 (BP Location: Right arm, Patient Position: Chair, Cuff Size: Adult Regular)  Pulse 72  Temp 96.9  F (36.1  C) (Temporal)  Resp 18  Ht 1.842 m (6' 0.5\")  Wt 127.8 kg (281 lb 11.2 oz)  SpO2 96%  BMI 37.68 kg/m2 Estimated body mass index is 37.68 kg/(m^2) as calculated from the following:    Height as of this encounter: 1.842 m (6' 0.5\").    Weight as of this encounter: 127.8 kg (281 lb 11.2 oz). Body surface area is 2.56 meters squared.  No Pain (0) Comment: Data Unavailable   No LMP for male patient.  Allergies reviewed: Yes  Medications reviewed: Yes    Medications: Medication refills not needed today.  Pharmacy name entered into Punct[facility]:    [facility] [place], [place] - [address] [facility] [id number] - [place], [place] - [address] [place] [address]      8 minutes for nursing intake (face to face time)     Daly FLORES CMA              "

## 2018-10-25 NOTE — PROGRESS NOTES
FOLLOW-UP VISIT NOTE    PATIENT NAME: Deandre Merchant MRN # [id number]  DATE OF VISIT: Oct 25, 2018 YOB: 1950    REFERRING PROVIDER: No referring provider defined for this encounter.    CANCER TYPE:Follicular Lymphoma- In Remission  STAGE: IV( axillary, mediastinal, retroperitoneal, stomach, bone marrow)  ECOG PS: 0    ONCOLOGY HISTORY:  67-year-old male who in 2012 presented with mediastinal, retroperitoneal and mesenteric lymph nodes. Was clinically asymptomatic with normal blood counts. CT-guided lymph node biopsy on 6/21/2012 was consistent with follicular lymphoma, grade 1-2. He was observed at that point.    In 2013, presented to the hospital with ileus and  abdominal distention. EGD showed gastric antrum and duodenal ulcers which were biopsied and showed follicular lymphoma. CT scans showed bulky  lymphadenopathy with increasing size. 04/13 Bone marrow biopsy was positive with evidence of follicular lymphoma. He received bendamustine and Rituxan regimen april -September 2014.   In December 14 , he was started on maintenance Rituxan every 2 months for 2 years which he completed in October 2016.     In remission since.    SUBJECTIVE     Presents to the clinic today for a a six-month follow-up. No new complaints. Denies fever/chills, night sweats, weight loss, fatigue, abdominal pain, dyspnea or any other complaints. Stable appetite and energy levels.      PAST MEDICAL HISTORY     Past Medical History:   Diagnosis Date     BPH (benign prostatic hyperplasia)      Lymphadenopathy     mediastinal & retroperitoneal     Non Hodgkin's lymphoma (H)      Polyps, colonic      Retinal detachment          CURRENT OUTPATIENT MEDICATIONS     Current Outpatient Prescriptions   Medication Sig Dispense Refill     aspirin 81 MG tablet Take 81 mg by mouth daily       cetirizine (ZYRTEC) 10 MG tablet Take 10 mg by mouth daily       famotidine (PEPCID) 20 MG tablet Take 1 tablet (20 mg) by mouth 2 times daily 60  tablet 1     simvastatin (ZOCOR) 20 MG tablet TAKE 1 TABLET BY MOUTH ISSA Y AT BEDTIME 90 tablet 3     Acetaminophen (TYLENOL PO) Take 1,000 mg by mouth       albuterol (PROAIR HFA/PROVENTIL HFA/VENTOLIN HFA) 108 (90 BASE) MCG/ACT Inhaler Inhale 2 puffs into the lungs every 6 hours as needed for shortness of breath / dyspnea or wheezing (Patient not taking: Reported on 10/25/2018) 1 Inhaler 1     Chlorpheniramine-Phenylephrine 4-10 MG TABS Take 1 tablet by mouth every 6 hours as needed (Patient not taking: Reported on 10/25/2018) 60 tablet 0     fluticasone (FLONASE) 50 MCG/ACT spray Spray 1-2 sprays into both nostrils daily (Patient not taking: Reported on 10/25/2018) 16 g 11     order for DME Equipment being ordered: knee sleeve with horseshoe, XL (Patient not taking: Reported on 10/25/2018) 1 Device 0        ALLERGIES     Allergies   Allergen Reactions     Allopurinol Rash        REVIEW OF SYSTEMS   As above in the HPI, o/w complete 14-point ROS was negative.     PHYSICAL EXAM   B/P: 129/76, T: 97.5, P: 78, R: 18  SpO2 Readings from Last 4 Encounters:   10/25/18 96%   05/11/18 94%   04/25/18 95%   03/28/18 94%     Wt Readings from Last 3 Encounters:   10/25/18 127.8 kg (281 lb 11.2 oz)   05/11/18 129 kg (284 lb 8 oz)   04/25/18 128.7 kg (283 lb 12.8 oz)     GEN: NAD  Mouth/ENT: Oropharynx is clear.  NECK: no cervical or supraclavicular lymphadenopathy  LUNGS: clear bilaterally  CV: regular  ABDOMEN: soft, non-tender, non-distended,  EXT: warm, well perfused, no edema  NEURO: alert  SKIN: no rashes     LABORATORY AND IMAGING STUDIES     Lab Results   Component Value Date    WBC 5.3 10/25/2018     Lab Results   Component Value Date    RBC 4.64 10/25/2018     Lab Results   Component Value Date    HGB 14.1 10/25/2018     Lab Results   Component Value Date    HCT 41.5 10/25/2018     No components found for: MCT  Lab Results   Component Value Date    MCV 89 10/25/2018     Lab Results   Component Value Date    MCH 30.4  10/25/2018     Lab Results   Component Value Date    MCHC 34.0 10/25/2018     Lab Results   Component Value Date    RDW 12.5 10/25/2018     Lab Results   Component Value Date     10/25/2018     Recent Labs   Lab Test  10/25/18   0740  04/18/18   0807   NA  139  139   POTASSIUM  4.1  4.3   CHLORIDE  105  105   CO2  28  27   ANIONGAP  6  7   GLC  112*  114*   BUN  15  16   CR  0.99  0.82   ANDRÉS  8.2*  8.3*           ASSESSMENT AND PLAN     67-year-old male with stage IV follicular lymphoma who is status post treatment with bendamustine/Rituxan (04-09/2014) followed by maintenance rituximab for two years- completed in October 2016.    NCCN guidelines were reviewed.    Clinically in remission with no active complaints/B symptoms.Lab work reviewed were unremarkable.      We'll see him back in 6 months with labs ,CT scans    Patient understands and agrees with the plan of care      Ge Krishnamurthy MD  Attending Physician   Hematology/Medical Oncology

## 2018-10-25 NOTE — Clinical Note
10/25/2018         RE: Deandre Merchant  [id number]  [place] [address]        Dear Colleague,    Thank you for referring your patient, Deandre Merchant, to the [facility]. Please see a copy of my visit note below.      FOLLOW-UP VISIT NOTE    PATIENT NAME: Deandre Merchant MRN # [id number]  DATE OF VISIT: Oct 25, 2018 YOB: 1950    REFERRING PROVIDER: No referring provider defined for this encounter.    CANCER TYPE:Follicular Lymphoma- In Remission  STAGE: IV( axillary, mediastinal, retroperitoneal, stomach, bone marrow)  ECOG PS: 0    ONCOLOGY HISTORY:  67-year-old male who in 2012 presented with mediastinal, retroperitoneal and mesenteric lymph nodes. Was clinically asymptomatic with normal blood counts. CT-guided lymph node biopsy on 6/21/2012 was consistent with follicular lymphoma, grade 1-2. He was observed at that point.    In 2013, presented to the hospital with ileus and  abdominal distention. EGD showed gastric antrum and duodenal ulcers which were biopsied and showed follicular lymphoma. CT scans showed bulky  lymphadenopathy with increasing size. 04/13 Bone marrow biopsy was positive with evidence of follicular lymphoma. He received bendamustine and Rituxan regimen april -September 2014.   In December 14 , he was started on maintenance Rituxan every 2 months for 2 years which he completed in October 2016.     In remission since.    SUBJECTIVE     Presents to the clinic today for a a six-month follow-up. No new complaints. Denies fever/chills, night sweats, weight loss, fatigue, abdominal pain, dyspnea or any other complaints. Stable appetite and energy levels.      PAST MEDICAL HISTORY     Past Medical History:   Diagnosis Date     BPH (benign prostatic hyperplasia)      Lymphadenopathy     mediastinal & retroperitoneal     Non Hodgkin's lymphoma (H)      Polyps, colonic      Retinal detachment          CURRENT OUTPATIENT MEDICATIONS     Current Outpatient  Prescriptions   Medication Sig Dispense Refill     aspirin 81 MG tablet Take 81 mg by mouth daily       cetirizine (ZYRTEC) 10 MG tablet Take 10 mg by mouth daily       famotidine (PEPCID) 20 MG tablet Take 1 tablet (20 mg) by mouth 2 times daily 60 tablet 1     simvastatin (ZOCOR) 20 MG tablet TAKE 1 TABLET BY MOUTH ISSA Y AT BEDTIME 90 tablet 3     Acetaminophen (TYLENOL PO) Take 1,000 mg by mouth       albuterol (PROAIR HFA/PROVENTIL HFA/VENTOLIN HFA) 108 (90 BASE) MCG/ACT Inhaler Inhale 2 puffs into the lungs every 6 hours as needed for shortness of breath / dyspnea or wheezing (Patient not taking: Reported on 10/25/2018) 1 Inhaler 1     Chlorpheniramine-Phenylephrine 4-10 MG TABS Take 1 tablet by mouth every 6 hours as needed (Patient not taking: Reported on 10/25/2018) 60 tablet 0     fluticasone (FLONASE) 50 MCG/ACT spray Spray 1-2 sprays into both nostrils daily (Patient not taking: Reported on 10/25/2018) 16 g 11     order for DME Equipment being ordered: knee sleeve with horseshoe, XL (Patient not taking: Reported on 10/25/2018) 1 Device 0        ALLERGIES     Allergies   Allergen Reactions     Allopurinol Rash        REVIEW OF SYSTEMS   As above in the HPI, o/w complete 14-point ROS was negative.     PHYSICAL EXAM   B/P: 129/76, T: 97.5, P: 78, R: 18  SpO2 Readings from Last 4 Encounters:   10/25/18 96%   05/11/18 94%   04/25/18 95%   03/28/18 94%     Wt Readings from Last 3 Encounters:   10/25/18 127.8 kg (281 lb 11.2 oz)   05/11/18 129 kg (284 lb 8 oz)   04/25/18 128.7 kg (283 lb 12.8 oz)     GEN: NAD  Mouth/ENT: Oropharynx is clear.  NECK: no cervical or supraclavicular lymphadenopathy  LUNGS: clear bilaterally  CV: regular  ABDOMEN: soft, non-tender, non-distended,  EXT: warm, well perfused, no edema  NEURO: alert  SKIN: no rashes     LABORATORY AND IMAGING STUDIES     Lab Results   Component Value Date    WBC 5.3 10/25/2018     Lab Results   Component Value Date    RBC 4.64 10/25/2018     Lab Results    Component Value Date    HGB 14.1 10/25/2018     Lab Results   Component Value Date    HCT 41.5 10/25/2018     No components found for: MCT  Lab Results   Component Value Date    MCV 89 10/25/2018     Lab Results   Component Value Date    MCH 30.4 10/25/2018     Lab Results   Component Value Date    MCHC 34.0 10/25/2018     Lab Results   Component Value Date    RDW 12.5 10/25/2018     Lab Results   Component Value Date     10/25/2018     Recent Labs   Lab Test  10/25/18   0740  04/18/18   0807   NA  139  139   POTASSIUM  4.1  4.3   CHLORIDE  105  105   CO2  28  27   ANIONGAP  6  7   GLC  112*  114*   BUN  15  16   CR  0.99  0.82   ANDRÉS  8.2*  8.3*           ASSESSMENT AND PLAN     67-year-old male with stage IV follicular lymphoma who is status post treatment with bendamustine/Rituxan (04-09/2014) followed by maintenance rituximab for two years- completed in October 2016.    NCCN guidelines were reviewed.    Clinically in remission with no active complaints/B symptoms.Lab work reviewed were unremarkable.      We'll see him back in 6 months with labs ,CT scans    Patient understands and agrees with the plan of care      Ge Krishnamurthy MD  Attending Physician   Hematology/Medical Oncology    Again, thank you for allowing me to participate in the care of your patient.        Sincerely,        Ge Krishnamurthy MD

## 2018-10-25 NOTE — NURSING NOTE
DISCHARGE PLAN:  Next appointments: See patient instruction section  Departure Mode: Ambulatory  Accompanied by: self  10 minutes for nursing discharge (face to face time)     Deandre Merchant is here today for a 6 month follow up.  Writing nurse seen patient after Medical Oncology appointment to address questions/concerns/coordinate care. Appointments scheduled. Patient to follow up in 6 months with CT scan and labs prior. CT scan instructions given. See patient instructions and Oncologist's Progress note for further details. Questions and concerns addressed to patient's satisfaction. Patient verbalized and demonstrated understanding of plan.  Contact information provided and patient is encouraged to call with any that arise in the interim of care.    Daly FLORES [facility]    Oncology/Hematology at [facility]  [phone]  10/25/2018, 8:37 AM

## 2018-10-25 NOTE — MR AVS SNAPSHOT
"              After Visit Summary   10/25/2018    Deandre Merchant    MRN: [id number]           Patient Information     Date Of Birth          1950        Visit Information        Provider Department      10/25/2018 8:00 AM Ge Krishnamurthy MD [facility]        Today's Diagnoses     Non-Hodgkin's lymphoma of multiple sites (H)    -  1    Follicular lymphoma, unspecified follicular lymphoma type, unspecified body region (H)          Care Instructions    Please follow up with Dr. Krishnamurthy in 6 months.  Please complete CT scan and labs prior to follow up appointment.    CT Scan Date/Time: 2/18/19 check in at 8 am and scan to start 8:30am  Please see \"Getting Ready for Your CT Scan\" for details.  Nothing to eat or drink for 2 hours prior to scan.  You will check in 30 minutes early to start a \"water prep\" prior to scan instead of oral contrast.  Radiology will call you with different instructions if oral contrast needed.    Lab Date/Time: 2/19/18 9:15am    Follow Up Date/Time: 2/24/19 at 8am      If you have any questions or concerns please feel free to call.    If you need to reschedule please call:  Clinic or Lab Appointment - [phone]  Infusion - [phone]  Imaging - [phone]    Daly PRIETO [facility]  Oncology/Hematology at [facility]              Follow-ups after your visit        Your next 10 appointments already scheduled     Oct 26, 2018  1:00 PM CDT   [facility]    [address]  [facility] [address]   [phone]            Oct 26, 2018  3:30 PM CDT   Nurse Only with NL FLU SHOT [facility]   [facility]    [address]  [facility] [address]   [phone]            Apr 18, 2019  8:30 AM CDT   CT CHEST/ABDOMEN/PELVIS W CONTRAST with PHCT1   [facility] CT Scan ([facility])    [address]  [facility] [address] [phone]           How do I prepare for my exam? (Food and drink instructions) To prepare: Do not eat or drink for 2 hours before your exam. If you need to take medicine, you may take it with small sips of water. (We may ask you to take liquid medicine as well.)  How do I prepare for my exam? (Other instructions) Please arrive 30 minutes early for your CT.  Once in the department you might be asked to drink water 15-20 minutes prior to your exam.  If indicated you may be asked to drink an oral contrast in advance of your CT.  If this is the case, the imaging team will let you know or be in contact with you prior to your appointment  Patients over 70 or patients with diabetes or kidney problems: If you haven t had a blood test (creatinine test) within the last 30 days, the Cardiologist/Radiologist may require you to get this test prior to your exam.  If you have diabetes:  Continue to take your metformin medication on the day of your exam  What should I wear: Please wear loose clothing, such as a sweat suit or jogging clothes. Avoid snaps, zippers and other metal. We may ask you to undress and put on a hospital gown.  How long does the exam take: Most scans take less than 20 minutes.  What should I bring: Please bring any scans or X-rays taken at other hospitals, if similar tests were done. Also bring a list of your medicines, including vitamins, minerals and over-the-counter drugs. It is safest to leave personal items at home.  Do I need a : No  is needed.  What do I need to tell my doctor? Be sure to tell your doctor: * If you have any allergies. * If there s any chance you are pregnant. * If you are breastfeeding.  What should I do after the exam: No restrictions, You may resume normal activities.  What is this test: A CT (computed tomography) scan is a series of pictures that allows us to look inside your body. The scanner creates images of the body in cross  sections, much like slices of bread. This helps us see any problems more clearly. You may receive contrast (X-ray dye) before or during your scan. You will be asked to drink the contrast.  Who should I call with questions: If you have any questions, please call the Imaging Department where you will have your exam. Directions, parking instructions, and other information is available on our website, [email or web address].            Apr 18, 2019  9:15 AM CDT   LAB with NL LAB [facility]   [facility]    [address] [place] [address]   [phone]           Please do not eat 10-12 hours before your appointment if you are coming in fasting for labs on lipids, cholesterol, or glucose (sugar). This does not apply to pregnant women. Water, hot tea and black coffee (with nothing added) are okay. Do not drink other fluids, diet soda or chew gum.            Apr 24, 2019  8:00 AM CDT   Return Visit with Ge Krishnamurthy MD   [facility]    [address] [place] [address] [phone]              Future tests that were ordered for you today     Open Future Orders        Priority Expected Expires Ordered    CBC with platelets differential Routine  10/25/2019 10/25/2018    Comprehensive metabolic panel Routine  10/25/2019 10/25/2018    CT Chest/Abdomen/Pelvis w Contrast Routine  10/25/2019 10/25/2018            Who to contact     If you have questions or need follow up information about today's clinic visit or your schedule please contact [facility] directly at [phone].  Normal or non-critical lab and imaging results will be communicated to you by MyChart, letter or phone within 4 business days after the clinic has received the results. If you do not hear from us within 7 days, please contact the clinic through MyChart or phone. If you have a critical or abnormal lab result, we will notify you by phone  "as soon as possible.  Submit refill requests through [employer] or call your pharmacy and they will forward the refill request to us. Please allow 3 business days for your refill to be completed.          Additional Information About Your Visit        Care EveryWhere ID     This is your Care EveryWhere ID. This could be used by other organizations to access your [place] medical records  [id number]        Your Vitals Were     Pulse Temperature Respirations Height Pulse Oximetry BMI (Body Mass Index)    72 96.9  F (36.1  C) (Temporal) 18 1.842 m (6' 0.5\") 96% 37.68 kg/m2       Blood Pressure from Last 3 Encounters:   10/25/18 142/66   05/11/18 138/80   04/25/18 120/74    Weight from Last 3 Encounters:   10/25/18 127.8 kg (281 lb 11.2 oz)   05/11/18 129 kg (284 lb 8 oz)   04/25/18 128.7 kg (283 lb 12.8 oz)              We Performed the Following     CBC with platelets differential     Comprehensive metabolic panel     Lactate Dehydrogenase     Lactate Dehydrogenase        Primary Care Provider Office Phone # Fax #    Felix Edgar Sevilla [phone] [address] [place] [address]        Equal Access to Services     [facility] : Hadii jo king hadasho Soomaali, waaxda luqadaha, qaybta kaalmada adeegyada, rajat moore. So [facility] [phone].    ATENCIÓN: Si habla español, tiene a kerr disposición servicios gratuitos de asistencia lingüística. Llame al [phone].    We comply with applicable federal civil rights laws and [place] laws. We do not discriminate on the basis of race, color, national origin, age, disability, sex, sexual orientation, or gender identity.            Thank you!     Thank you for choosing [facility]  for your care. Our goal is always to provide you with excellent care. Hearing back from our patients is one way we can continue to improve our services. Please take a few minutes to complete the written survey that you may " receive in the mail after your visit with us. Thank you!             Your Updated Medication List - Protect others around you: Learn how to safely use, store and throw away your medicines at www.disposemymeds.org.          This list is accurate as of 10/25/18  8:31 AM.  Always use your most recent med list.                   Brand Name Dispense Instructions for use Diagnosis    albuterol 108 (90 Base) MCG/ACT inhaler    PROAIR HFA/PROVENTIL HFA/VENTOLIN HFA    1 Inhaler    Inhale 2 puffs into the lungs every 6 hours as needed for shortness of breath / dyspnea or wheezing    Upper airway cough syndrome       aspirin 81 MG tablet      Take 81 mg by mouth daily        cetirizine 10 MG tablet    zyrTEC     Take 10 mg by mouth daily        Chlorpheniramine-Phenylephrine 4-10 MG Tabs     60 tablet    Take 1 tablet by mouth every 6 hours as needed    Chronic cough       famotidine 20 MG tablet    PEPCID    60 tablet    Take 1 tablet (20 mg) by mouth 2 times daily    Laryngopharyngeal reflux       fluticasone 50 MCG/ACT spray    FLONASE    16 g    Spray 1-2 sprays into both nostrils daily    Upper respiratory tract infection, unspecified type, Upper airway cough syndrome       order for DME     1 Device    Equipment being ordered: knee sleeve with horseshoe, XL    Chronic pain of left knee       simvastatin 20 MG tablet    ZOCOR    90 tablet    TAKE 1 TABLET BY MOUTH ISSA Y AT BEDTIME    Hyperlipidemia with target LDL less than 130       TYLENOL PO      Take 1,000 mg by mouth

## 2018-10-25 NOTE — NURSING NOTE
Oncology Symptom Checklist    Unusual Bruising/Bleeding: No  Skin issues or swelling: No Concerns  Fever/Chills:  No  Nausea/Vomiting: No  Hard or Loose stools: No  SOB/Respiratory(Cough): No Concerns  Mouth Sores: No  Able to drink 6 to 8 glasses of fluid in a day: No Concerns  Weight loss:  No Concerns  Weakness: Yes (Please explain):   Fatigue: Yes (Please explain):   Light Headed or Dizzy: No  Cognitive Disturbance-Memory: No  Neuropathy/Numbness&Tingling-Hands/Feet: No  Night Sweats:  No  Sleep Concerns: No Concerns    Clinical concerns: See above. Concerns reviewed with Dr. Raghu FLORES CMA

## 2018-10-26 ENCOUNTER — OFFICE VISIT (OUTPATIENT)
Dept: ORTHOPEDICS | Facility: OTHER | Age: 68
End: 2018-10-26
Payer: MEDICARE

## 2018-10-26 VITALS — SYSTOLIC BLOOD PRESSURE: 132 MMHG | HEIGHT: 73 IN | BODY MASS INDEX: 37.68 KG/M2 | DIASTOLIC BLOOD PRESSURE: 76 MMHG

## 2018-10-26 DIAGNOSIS — M17.0 BILATERAL PRIMARY OSTEOARTHRITIS OF KNEE: ICD-10-CM

## 2018-10-26 DIAGNOSIS — M25.562 CHRONIC PAIN OF LEFT KNEE: Primary | ICD-10-CM

## 2018-10-26 DIAGNOSIS — G89.29 CHRONIC PAIN OF LEFT KNEE: Primary | ICD-10-CM

## 2018-10-26 PROCEDURE — 20610 DRAIN/INJ JOINT/BURSA W/O US: CPT | Mod: LT | Performed by: PHYSICAL MEDICINE & REHABILITATION

## 2018-10-26 RX ORDER — TRIAMCINOLONE ACETONIDE 40 MG/ML
40 INJECTION, SUSPENSION INTRA-ARTICULAR; INTRAMUSCULAR
Status: DISCONTINUED | OUTPATIENT
Start: 2018-10-26 | End: 2020-07-07

## 2018-10-26 RX ADMIN — TRIAMCINOLONE ACETONIDE 40 MG: 40 INJECTION, SUSPENSION INTRA-ARTICULAR; INTRAMUSCULAR at 13:40

## 2018-10-26 NOTE — MR AVS SNAPSHOT
After Visit Summary   10/26/2018    Deandre Merchant    MRN: [id number]           Patient Information     Date Of Birth          1950        Visit Information        Provider Department      10/26/2018 1:00 PM Marialuisa Ureña MD [facility]        Today's Diagnoses     Chronic pain of left knee    -  1    Bilateral primary osteoarthritis of knee          Care Instructions    -Steroid injection performed today.  Take it easy over the next few days. Keep in mind that the steroid may take up to 3 days to start working and up to 2 weeks to reach maximal effect.  Ice 15-20 minutes as needed for soreness.  Patient's preferred over the counter medication as needed for pain as directed on packaging.    -Follow up as needed if symptoms fail to improve or worsen.  Please call with questions or concerns.                Follow-ups after your visit        Your next 10 appointments already scheduled     Oct 26, 2018  3:30 PM CDT   Nurse Only with NL FLU SHOT [facility] ([facility])    [address] [facility] [id number] [phone]            Apr 18, 2019  8:30 AM CDT   CT CHEST/ABDOMEN/PELVIS W CONTRAST with PHCT1   [facility] CT Scan ([facility])    [address] [facility] [id number] [phone]           How do I prepare for my exam? (Food and drink instructions) To prepare: Do not eat or drink for 2 hours before your exam. If you need to take medicine, you may take it with small sips of water. (We may ask you to take liquid medicine as well.)  How do I prepare for my exam? (Other instructions) Please arrive 30 minutes early for your CT.  Once in the department you might be asked to drink water 15-20 minutes prior to your exam.  If indicated you may be asked to drink an oral contrast in advance of your CT.  If this is the case, the imaging team will let you know or be in contact with you prior to  your appointment  Patients over 70 or patients with diabetes or kidney problems: If you haven t had a blood test (creatinine test) within the last 30 days, the Cardiologist/Radiologist may require you to get this test prior to your exam.  If you have diabetes:  Continue to take your metformin medication on the day of your exam  What should I wear: Please wear loose clothing, such as a sweat suit or jogging clothes. Avoid snaps, zippers and other metal. We may ask you to undress and put on a hospital gown.  How long does the exam take: Most scans take less than 20 minutes.  What should I bring: Please bring any scans or X-rays taken at other hospitals, if similar tests were done. Also bring a list of your medicines, including vitamins, minerals and over-the-counter drugs. It is safest to leave personal items at home.  Do I need a : No  is needed.  What do I need to tell my doctor? Be sure to tell your doctor: * If you have any allergies. * If there s any chance you are pregnant. * If you are breastfeeding.  What should I do after the exam: No restrictions, You may resume normal activities.  What is this test: A CT (computed tomography) scan is a series of pictures that allows us to look inside your body. The scanner creates images of the body in cross sections, much like slices of bread. This helps us see any problems more clearly. You may receive contrast (X-ray dye) before or during your scan. You will be asked to drink the contrast.  Who should I call with questions: If you have any questions, please call the Imaging Department where you will have your exam. Directions, parking instructions, and other information is available on our website, [email or web address].            Apr 18, 2019  9:15 AM CDT   LAB with NL LAB PMC   [facility]    [address] [address] [phone]           Please do not eat 10-12 hours before your appointment  "if you are coming in fasting for labs on lipids, cholesterol, or glucose (sugar). This does not apply to pregnant women. Water, hot tea and black coffee (with nothing added) are okay. Do not drink other fluids, diet soda or chew gum.            Apr 24, 2019  8:00 AM CDT   [facility] Visit with [facility]   [facility]    [address] [place] [id number] [phone]              Future tests that were ordered for you today     Open Future Orders        Priority Expected Expires Ordered    CBC with platelets differential Routine  10/25/2019 10/25/2018    Comprehensive metabolic panel Routine  10/25/2019 10/25/2018    CT Chest/Abdomen/Pelvis w Contrast Routine  10/25/2019 10/25/2018            Who to contact     If you have questions or need follow up information about today's clinic visit or your schedule please contact [facility] directly at [phone].  Normal or non-critical lab and imaging results will be communicated to you by MyChart, letter or phone within 4 business days after the clinic has received the results. If you do not hear from us within 7 days, please contact the clinic through [employer] or phone. If you have a critical or abnormal lab result, we will notify you by phone as soon as possible.  Submit refill requests through [employer] or call your pharmacy and they will forward the refill request to us. Please allow 3 business days for your refill to be completed.          Additional Information About Your Visit        Care EveryWhere ID     This is your Care EveryWhere ID. This could be used by other organizations to access your [place] medical records  [id number]        Your Vitals Were     Height BMI (Body Mass Index)                6' 0.5\" (1.842 m) 37.68 kg/m2           Blood Pressure from Last 3 Encounters:   10/25/18 142/66   05/11/18 138/80   04/25/18 120/74    Weight from Last 3 Encounters:   10/25/18 281 lb 11.2 oz " (127.8 kg)   05/11/18 284 lb 8 oz (129 kg)   04/25/18 283 lb 12.8 oz (128.7 kg)              Today, you had the following     No orders found for display       Primary Care Provider Office Phone # Fax #    Felix Sevilla [phone] [address] [place] [address]        Equal Access to Services     [facility]: Hadii aad ku hadasho Soomaali, waaxda luqadaha, qaybta kaalmada adeegyada, waxay idiin hayaan adeeg kharash la'aan . So [facility] [phone].    ATENCIÓN: Si habla español, tiene a kerr disposición servicios gratuitos de asistencia lingüística. Llame al [phone].    We comply with applicable federal civil rights laws and [place] laws. We do not discriminate on the basis of race, color, national origin, age, disability, sex, sexual orientation, or gender identity.            Thank you!     Thank you for choosing [facility]  for your care. Our goal is always to provide you with excellent care. Hearing back from our patients is one way we can continue to improve our services. Please take a few minutes to complete the written survey that you may receive in the mail after your visit with us. Thank you!             Your Updated Medication List - Protect others around you: Learn how to safely use, store and throw away your medicines at www.disposemymeds.org.          This list is accurate as of 10/26/18  1:10 PM.  Always use your most recent med list.                   Brand Name Dispense Instructions for use Diagnosis    albuterol 108 (90 Base) MCG/ACT inhaler    PROAIR HFA/PROVENTIL HFA/VENTOLIN HFA    1 Inhaler    Inhale 2 puffs into the lungs every 6 hours as needed for shortness of breath / dyspnea or wheezing    Upper airway cough syndrome       aspirin 81 MG tablet      Take 81 mg by mouth daily        cetirizine 10 MG tablet    zyrTEC     Take 10 mg by mouth daily        Chlorpheniramine-Phenylephrine 4-10 MG Tabs     60 tablet    Take 1 tablet by mouth  every 6 hours as needed    Chronic cough       famotidine 20 MG tablet    PEPCID    60 tablet    Take 1 tablet (20 mg) by mouth 2 times daily    Laryngopharyngeal reflux       fluticasone 50 MCG/ACT spray    FLONASE    16 g    Spray 1-2 sprays into both nostrils daily    Upper respiratory tract infection, unspecified type, Upper airway cough syndrome       order for DME     1 Device    Equipment being ordered: knee sleeve with horseshoe, XL    Chronic pain of left knee       simvastatin 20 MG tablet    ZOCOR    90 tablet    TAKE 1 TABLET BY MOUTH ISSA Y AT BEDTIME    Hyperlipidemia with target LDL less than 130       TYLENOL PO      Take 1,000 mg by mouth

## 2018-10-26 NOTE — PROGRESS NOTES
Sports Medicine Clinic Visit - Interim History October 26, 2018    Initial Visit Date 7/28/17      PCP: Felix Sevilla Mayur is a 68 year old male who is seen in follow up for left knee pain. Since last visit on 7/28/2017 patient has had a return of pain. He notes that he began to get pain again 3-4 months ago. He reports he got 90-95% relief from the steroid injection on 7/28/2017.  He rates the pain at a 2/10 currently.  Symptoms are relieved with past CSI, Tylenol and compression.  Symptoms are worsened by stairs and uneven ground. He is hoping to get another steroid injection today. He denies any injuries or new symptoms in the interim.      Review of Systems  Musculoskeletal: as above  Remainder of review of systems is negative including constitutional, eyes, ENT, CV, pulmonary, GI, , endocrine, skin, hematologic, and neurologic except as noted in HPI or medical history.    History reviewed. No pertinent past surgical/medical/family/social history other than as mentioned in HPI.    Patient Active Problem List   Diagnosis     Impotence of organic origin     Retinal detachment     Dermatophytosis of body     BPH (benign prostatic hyperplasia)     Hyperlipidemia with target LDL less than 130     Coloboma, optic disc, congenital, bilateral     Mediastinal lymphadenopathy     Mesenteric lymphadenopathy     Intra-abdominal lymphadenopathy     Non-Hodgkin's lymphoma of multiple sites (H)     Lymphoma, small lymphoid, follicular (H)     Inguinal hernia, incarcerated     Acute renal failure (ARF) (H)     New onset atrial fibrillation (H)     Acute respiratory distress     Small bowel obstruction (H)     Follicular lymphoma (H)     Atrial fibrillation (H)     Counseling regarding advanced directives     Morbid obesity (H)     Chronic cough     Sinusitis chronic, ethmoidal     Past Medical History:   Diagnosis Date     BPH (benign prostatic hyperplasia)      Lymphadenopathy     mediastinal &  retroperitoneal     Non Hodgkin's lymphoma (H)      Polyps, colonic      Retinal detachment      Past Surgical History:   Procedure Laterality Date     COLONOSCOPY N/A 3/1/2017    Procedure: COLONOSCOPY;  Surgeon: Dakota Wall MD;  Location: [facility]     ESOPHAGOSCOPY, GASTROSCOPY, DUODENOSCOPY (EGD), COMBINED  4/21/2013    Procedure: COMBINED ESOPHAGOSCOPY, GASTROSCOPY, DUODENOSCOPY (EGD), BIOPSY SINGLE OR MULTIPLE;;  Surgeon: Torrey Cosme MD;  Location: [facility]     ESOPHAGOSCOPY, GASTROSCOPY, DUODENOSCOPY (EGD), COMBINED  10/9/2013    Procedure: COMBINED ESOPHAGOSCOPY, GASTROSCOPY, DUODENOSCOPY (EGD), BIOPSY SINGLE OR MULTIPLE;  ESOPHAGOSCOPY, GASTROSCOPY, DUODENOSCOPY (EGD) with multiple biopsies;  Surgeon: Shay Sauer MD;  Location: [facility]     HC COLONOSCOPY W/WO BRUSH/WASH  07/12/06     LAPAROTOMY EXPLORATORY  4/24/2013    Procedure: LAPAROTOMY EXPLORATORY;  Exploratory Laparotomy and lysis of adhesions.;  Surgeon: Genevieve Barros MD;  Location: [facility]     Family History   Problem Relation Age of Onset     Cancer Father      liver/kidney     C.A.D. Father      Pacemaker Brother      Arthritis Mother      RA     Social History     Social History     Marital status:      Spouse name: Cristel     Number of children: 2     Years of education: N/A     Occupational History     Not on file.     Social History Main Topics     Smoking status: Never Smoker     Smokeless tobacco: Never Used     Alcohol use 2.0 oz/week      Comment: occasionally weekends     Drug use: No     Sexual activity: Not Currently     Partners: Female     Other Topics Concern     Caffeine Concern No     Sleep Concern No     Stress Concern No     Weight Concern No     Exercise Yes     Seat Belt Yes     Social History Narrative       Current Outpatient Prescriptions   Medication     Acetaminophen (TYLENOL PO)     albuterol (PROAIR HFA/PROVENTIL HFA/VENTOLIN HFA) 108 (90 BASE) MCG/ACT Inhaler     aspirin 81 MG tablet      "cetirizine (ZYRTEC) 10 MG tablet     Chlorpheniramine-Phenylephrine 4-10 MG TABS     famotidine (PEPCID) 20 MG tablet     fluticasone (FLONASE) 50 MCG/ACT spray     order for DME     simvastatin (ZOCOR) 20 MG tablet     No current facility-administered medications for this visit.      Allergies   Allergen Reactions     Allopurinol Rash         Objective:  /76  Ht 6' 0.5\" (1.842 m)  BMI 37.68 kg/m2    General: Alert and in no distress    Head: Normocephalic, atraumatic  Eyes: no scleral icterus or conjunctival erythema   Oropharynx:  Mucous membranes moist  Skin: no erythema, petechiae, or jaundice  Resp: normal respiratory effort without conversational dyspnea   Psych: normal mood and affect    Gait: Slight limp   Neuro: Motor strength and sensation as noted below    Musculoskeletal:    Bilateral Knee exam    Inspection:  No erythema or ecchymosis.  Left knee swelling.    Palpation: No tenderness to palpation over the bilateral knees.      Knee ROM: Left knee extension is mildly decreased.    Strength:  5/5 Hip flexion/abduction/adduction, knee extension/flexion, ankle plantarflexion/dorsiflexion    Sensation:  Intact to light touch in the bilateral lower extremities      Radiology:  No new x-rays obtained today    Large Joint Injection/Arthocentesis  Date/Time: 10/26/2018 1:40 PM  Performed by: BRIONNA WOODARD  Authorized by: BRIONNA WOODARD     Indications:  Pain  Needle Size:  25 G  Guidance: landmark guided    Approach:  Anterolateral  Location:  Knee  Site:  L knee joint  Medications:  40 mg triamcinolone acetonide 40 MG/ML  Outcome:  Tolerated well, no immediate complications  Procedure discussed: discussed risks, benefits, and alternatives    Consent Given by:  Patient   MEDICATION: Triamcinolone Acetonide 40 ROUTE: intra articular injection  SITE: left knee  DOSE: 40mg LOT #: [id number] : Amneal EXPIRATION DATE:  04/01/2020 NDC: [address]-3            Assessment:  1. " Chronic pain of left knee    2. Bilateral primary osteoarthritis of knee        Plan:  Discussed the assessment with the patient and developed a plan together:  -Steroid injection performed today.  Take it easy over the next few days. Keep in mind that the steroid may take up to 3 days to start working and up to 2 weeks to reach maximal effect.  Ice 15-20 minutes as needed for soreness.  Patient's preferred over the counter medication as needed for pain as directed on packaging.    -Follow up as needed if symptoms fail to improve or worsen.  Please call with questions or concerns.       hCristelle Ureña MD, CAQ Sports Medicine  [facility]

## 2018-10-26 NOTE — LETTER
10/26/2018         RE: Deandre Merchant  [id number]  [place] [address]        Dear Colleague,    Thank you for referring your patient, Deandre Merchant, to the [facility]. Please see a copy of my visit note below.    Sports Medicine Clinic Visit - Interim History October 26, 2018    Initial Visit Date 7/28/17      PCP: Felix Sevilla    Deandre Merchant is a 68 year old male who is seen in follow up for left knee pain. Since last visit on 7/28/2017 patient has had a return of pain. He notes that he began to get pain again 3-4 months ago. He reports he got 90-95% relief from the steroid injection on 7/28/2017.  He rates the pain at a 2/10 currently.  Symptoms are relieved with past CSI, Tylenol and compression.  Symptoms are worsened by stairs and uneven ground. He is hoping to get another steroid injection today. He denies any injuries or new symptoms in the interim.      Review of Systems  Musculoskeletal: as above  Remainder of review of systems is negative including constitutional, eyes, ENT, CV, pulmonary, GI, , endocrine, skin, hematologic, and neurologic except as noted in HPI or medical history.    History reviewed. No pertinent past surgical/medical/family/social history other than as mentioned in HPI.    Patient Active Problem List   Diagnosis     Impotence of organic origin     Retinal detachment     Dermatophytosis of body     BPH (benign prostatic hyperplasia)     Hyperlipidemia with target LDL less than 130     Coloboma, optic disc, congenital, bilateral     Mediastinal lymphadenopathy     Mesenteric lymphadenopathy     Intra-abdominal lymphadenopathy     Non-Hodgkin's lymphoma of multiple sites (H)     Lymphoma, small lymphoid, follicular (H)     Inguinal hernia, incarcerated     Acute renal failure (ARF) (H)     New onset atrial fibrillation (H)     Acute respiratory distress     Small bowel obstruction (H)     Follicular lymphoma (H)     Atrial fibrillation (H)      Counseling regarding advanced directives     Morbid obesity (H)     Chronic cough     Sinusitis chronic, ethmoidal     Past Medical History:   Diagnosis Date     BPH (benign prostatic hyperplasia)      Lymphadenopathy     mediastinal & retroperitoneal     Non Hodgkin's lymphoma (H)      Polyps, colonic      Retinal detachment      Past Surgical History:   Procedure Laterality Date     COLONOSCOPY N/A 3/1/2017    Procedure: COLONOSCOPY;  Surgeon: Dakota Wall MD;  [facility]     ESOPHAGOSCOPY, GASTROSCOPY, DUODENOSCOPY (EGD), COMBINED  4/21/2013    Procedure: COMBINED ESOPHAGOSCOPY, GASTROSCOPY, DUODENOSCOPY (EGD), BIOPSY SINGLE OR MULTIPLE;;  Surgeon: Torrey Cosme MD;  Location: [facility]     ESOPHAGOSCOPY, GASTROSCOPY, DUODENOSCOPY (EGD), COMBINED  10/9/2013    Procedure: COMBINED ESOPHAGOSCOPY, GASTROSCOPY, DUODENOSCOPY (EGD), BIOPSY SINGLE OR MULTIPLE;  ESOPHAGOSCOPY, GASTROSCOPY, DUODENOSCOPY (EGD) with multiple biopsies;  Surgeon: Shay Sauer MD;  Location: [facility]     HC COLONOSCOPY W/WO BRUSH/WASH  07/12/06     LAPAROTOMY EXPLORATORY  4/24/2013    Procedure: LAPAROTOMY EXPLORATORY;  Exploratory Laparotomy and lysis of adhesions.;  Surgeon: Genevieve Barros MD;  Location: [facility]     Family History   Problem Relation Age of Onset     Cancer Father      liver/kidney     C.A.D. Father      Pacemaker Brother      Arthritis Mother      RA     Social History     Social History     Marital status:      Spouse name: Cristel     Number of children: 2     Years of education: N/A     Occupational History     Not on file.     Social History Main Topics     Smoking status: Never Smoker     Smokeless tobacco: Never Used     Alcohol use 2.0 oz/week      Comment: occasionally weekends     Drug use: No     Sexual activity: Not Currently     Partners: Female     Other Topics Concern     Caffeine Concern No     Sleep Concern No     Stress Concern No     Weight Concern No     Exercise Yes     Seat Belt  "Yes     Social History Narrative       Current Outpatient Prescriptions   Medication     Acetaminophen (TYLENOL PO)     albuterol (PROAIR HFA/PROVENTIL HFA/VENTOLIN HFA) 108 (90 BASE) MCG/ACT Inhaler     aspirin 81 MG tablet     cetirizine (ZYRTEC) 10 MG tablet     Chlorpheniramine-Phenylephrine 4-10 MG TABS     famotidine (PEPCID) 20 MG tablet     fluticasone (FLONASE) 50 MCG/ACT spray     order for DME     simvastatin (ZOCOR) 20 MG tablet     No current facility-administered medications for this visit.      Allergies   Allergen Reactions     Allopurinol Rash         Objective:  /76  Ht 6' 0.5\" (1.842 m)  BMI 37.68 kg/m2    General: Alert and in no distress    Head: Normocephalic, atraumatic  Eyes: no scleral icterus or conjunctival erythema   Oropharynx:  Mucous membranes moist  Skin: no erythema, petechiae, or jaundice  Resp: normal respiratory effort without conversational dyspnea   Psych: normal mood and affect    Gait: Slight limp   Neuro: Motor strength and sensation as noted below    Musculoskeletal:    Bilateral Knee exam    Inspection:  No erythema or ecchymosis.  Left knee swelling.    Palpation: No tenderness to palpation over the bilateral knees.      Knee ROM: Left knee extension is mildly decreased.    Strength:  5/5 Hip flexion/abduction/adduction, knee extension/flexion, ankle plantarflexion/dorsiflexion    Sensation:  Intact to light touch in the bilateral lower extremities      Radiology:  No new x-rays obtained today    Large Joint Injection/Arthocentesis  Date/Time: 10/26/2018 1:40 PM  Performed by: BRIONNA WOODARD  Authorized by: BRIONNA WOODARD     Indications:  Pain  Needle Size:  25 G  Guidance: landmark guided    Approach:  Anterolateral  Location:  Knee  Site:  L knee joint  Medications:  40 mg triamcinolone acetonide 40 MG/ML  Outcome:  Tolerated well, no immediate complications  Procedure discussed: discussed risks, benefits, and alternatives    Consent Given " by:  Patient   MEDICATION: Triamcinolone Acetonide 40 ROUTE: intra articular injection  SITE: left knee  DOSE: 40mg LOT #: [id number] : Amneal EXPIRATION DATE:  04/01/2020 NDC: [address]-9            Assessment:  1. Chronic pain of left knee    2. Bilateral primary osteoarthritis of knee        Plan:  Discussed the assessment with the patient and developed a plan together:  -Steroid injection performed today.  Take it easy over the next few days. Keep in mind that the steroid may take up to 3 days to start working and up to 2 weeks to reach maximal effect.  Ice 15-20 minutes as needed for soreness.  Patient's preferred over the counter medication as needed for pain as directed on packaging.    -Follow up as needed if symptoms fail to improve or worsen.  Please call with questions or concerns.       Christelle Ureña MD, [facility] Sports Medicine  [facility]        Again, thank you for allowing me to participate in the care of your patient.        Sincerely,        Marialuisa Ureña MD

## 2018-12-17 ENCOUNTER — TELEPHONE (OUTPATIENT)
Facility: CLINIC | Age: 68
End: 2018-12-17

## 2018-12-17 DIAGNOSIS — R05.3 CHRONIC COUGH: ICD-10-CM

## 2018-12-17 NOTE — TELEPHONE ENCOUNTER
"Patient last saw you on 5/11/18 and was prescribed chlorpheniramine which he felt like helped well. He ran out of this medication in July and the cough came back and about a month ago became much more productive like it used to be.  He has been using OTC \"mucus relief\" and tylenol with minimal improvement. He has an upcoming appt with you on 1/18/19 and is hoping that he could get a refill on the chlorpheniramine.  Informed patient the Dr. Mahmood is not in clinic anymore today and returns Thursday.     Rema Blount RN. . .  12/17/2018, 1:05 PM        "

## 2018-12-17 NOTE — TELEPHONE ENCOUNTER
Reason for Call:  Other call back    Detailed comments: Pt stated that he is having the same symptoms and would like another RX for what you gave him last time. Please call him and let him know.     Phone Number Patient can be reached at: Home number on file [phone] (home)    Best Time: NA    Can we leave a detailed message on this number? Not Applicable    Call taken on 12/17/2018 at 11:39 AM by Margie Spann

## 2019-01-18 ENCOUNTER — OFFICE VISIT (OUTPATIENT)
Dept: PULMONOLOGY | Facility: CLINIC | Age: 69
End: 2019-01-18
Payer: MEDICARE

## 2019-01-18 VITALS
TEMPERATURE: 98.3 F | SYSTOLIC BLOOD PRESSURE: 122 MMHG | WEIGHT: 282 LBS | HEART RATE: 80 BPM | OXYGEN SATURATION: 93 % | DIASTOLIC BLOOD PRESSURE: 80 MMHG | HEIGHT: 73 IN | BODY MASS INDEX: 37.37 KG/M2

## 2019-01-18 DIAGNOSIS — J32.2 SINUSITIS CHRONIC, ETHMOIDAL: Primary | ICD-10-CM

## 2019-01-18 PROCEDURE — 99214 OFFICE O/P EST MOD 30 MIN: CPT

## 2019-01-18 ASSESSMENT — MIFFLIN-ST. JEOR: SCORE: 2100.08

## 2019-01-18 NOTE — PROGRESS NOTES
[facility]  Pulmonary Medicine  Visit Clinic Note  January 18, 2019         ASSESSMENT & PLAN       Chronic sinusitis: The previous maxillofacial CT scan shows an opacified ethmoid sinus and some other mucosal thickening.  He has had an excellent response to chlorpheniramine, which is an antihistamine.  This is an over-the-counter medication.  Fortunately he does not get sleepy with the medication.  I told him that he can continue this medication for an indefinite period of time on an as-needed basis depending on his symptoms.    Suspected restrictive lung disease: Previous spirometry show a low forced vital capacity and forced expiratory volume in 1 second.  He did not get the previous pulmonary function testing performed.  At some point, we may want to get that done.  However he does have a repeat chest CT scan scheduled for April of this year to follow-up on his lymphoma.  I have asked him to give my office a call when that CT scan is performed so that I can review the images and see if there is any progressive lung disease going on.  I will call him after I have reviewed the chest CT scan.      Bolivar Mahmood MD          Today's visit note:     Chief Complaint: Deandre Merchant is a 68 year old year old male who is being seen for RECHECK (discuss care about cough and phlem and medication, chlorpheniramine )      HISTORY OF PRESENT ILLNESS:    Is a 68-year-old male with a history of lymphoma who is presenting to the pulmonary clinic today for a follow-up visit on cough.    Last visit was about 8 months ago.  At that time he is endorsing a lot of sinus symptoms.  We gave him a trial of chlorpheniramine, an old antihistamine, to see if it helps out with his cough and sinus symptoms.  In fact it helped out quite a lot.  He has been taking this on and off since our last visit.  Currently he is taking it about twice a day, and not having any sinus symptoms or significant cough.    He denies any  shortness of breath or chest pressure.  The medication is not making him sleepy.           Past Medical and Surgical History:     Past Medical History:   Diagnosis Date     BPH (benign prostatic hyperplasia)      Lymphadenopathy     mediastinal & retroperitoneal     Non Hodgkin's lymphoma (H)      Polyps, colonic      Retinal detachment      Past Surgical History:   Procedure Laterality Date     COLONOSCOPY N/A 3/1/2017    Procedure: COLONOSCOPY;  Surgeon: Dakota Wall MD;  Location:  [facility]     ESOPHAGOSCOPY, GASTROSCOPY, DUODENOSCOPY (EGD), COMBINED  4/21/2013    Procedure: COMBINED ESOPHAGOSCOPY, GASTROSCOPY, DUODENOSCOPY (EGD), BIOPSY SINGLE OR MULTIPLE;;  Surgeon: Torrey Cosme MD;  Location:  [facility]     ESOPHAGOSCOPY, GASTROSCOPY, DUODENOSCOPY (EGD), COMBINED  10/9/2013    Procedure: COMBINED ESOPHAGOSCOPY, GASTROSCOPY, DUODENOSCOPY (EGD), BIOPSY SINGLE OR MULTIPLE;  ESOPHAGOSCOPY, GASTROSCOPY, DUODENOSCOPY (EGD) with multiple biopsies;  Surgeon: Shay Sauer MD;  Location:  GI     HC COLONOSCOPY W/WO BRUSH/WASH  07/12/06     LAPAROTOMY EXPLORATORY  4/24/2013    Procedure: LAPAROTOMY EXPLORATORY;  Exploratory Laparotomy and lysis of adhesions.;  Surgeon: Genevieve Barros MD;  Location:  OR           Family History:     Family History   Problem Relation Age of Onset     Cancer Father         liver/kidney     C.A.D. Father      Pacemaker Brother      Arthritis Mother         RA              Social History:     Social History     Socioeconomic History     Marital status:      Spouse name: Cristel     Number of children: 2     Years of education: Not on file     Highest education level: Not on file   Social Needs     Financial resource strain: Not on file     Food insecurity - worry: Not on file     Food insecurity - inability: Not on file     Transportation needs - medical: Not on file     Transportation needs - non-medical: Not on file   Occupational History     Not on file   Tobacco  "Use     Smoking status: Never Smoker     Smokeless tobacco: Never Used   Substance and Sexual Activity     Alcohol use: Yes     Alcohol/week: 2.0 oz     Comment: occasionally weekends     Drug use: No     Sexual activity: Not Currently     Partners: Female   Other Topics Concern      Service Not Asked     Blood Transfusions Not Asked     Caffeine Concern No     Occupational Exposure Not Asked     Hobby Hazards Not Asked     Sleep Concern No     Stress Concern No     Weight Concern No     Special Diet Not Asked     Back Care Not Asked     Exercise Yes     Bike Helmet Not Asked     Seat Belt Yes     Self-Exams Not Asked     Parent/sibling w/ CABG, MI or angioplasty before 65F 55M? Not Asked   Social History Narrative     Not on file            Medications:     Current Outpatient Medications   Medication     simvastatin (ZOCOR) 20 MG tablet     Acetaminophen (TYLENOL PO)     albuterol (PROAIR HFA/PROVENTIL HFA/VENTOLIN HFA) 108 (90 BASE) MCG/ACT Inhaler     aspirin 81 MG tablet     cetirizine (ZYRTEC) 10 MG tablet     Chlorpheniramine-Phenylephrine 4-10 MG TABS     famotidine (PEPCID) 20 MG tablet     fluticasone (FLONASE) 50 MCG/ACT spray     order for DME     Current Facility-Administered Medications   Medication     triamcinolone acetonide (KENALOG-40) injection 40 mg            Review of Systems:       A complete review of systems was otherwise negative except as noted in the HPI.      PHYSICAL EXAM:  /80   Pulse 80   Temp 98.3  F (36.8  C) (Temporal)   Ht 1.842 m (6' 0.5\")   Wt 127.9 kg (282 lb)   SpO2 93%   BMI 37.72 kg/m       General: Well developed, well nourished, No apparent distress  Eyes: Anicteric  Nose: Nasal mucosa with no edema or hyperemia.  No polyps  Ears: Hearing grossly normal  Mouth: Oral mucosa is moist, without any lesions. No oropharyngeal exudate.  Neck: supple, no thyromegaly  Lymphatics: No cervical or supraclavicular nodes  Respiratory: Good air movement. No crackles. " No rhonchi. No wheezes  Cardiac: RRR, normal S1, S2. No murmurs. No JVD  Musculoskeletal: Extremities normal. No clubbing. No cyanosis. No edema.  Skin: No rash on limited exam  Neuro: Normal mentation. Normal speech.  Psych:Normal affect           Data:   All laboratory and imaging data reviewed.      PFT:   Spirometry shows an FEV1/FVC ratio of 0.81.  FVC is 2.71 L which is 57% predicted, and the FEV1 is 2.2 L which is 58% predicted.    PFT Interpretation:  No airflow obstruction.  Low FVC and FEV1 suggest restriction.  Valid Maneuver    Chest CT: I personally reviewed the chest CT scan images from April 2018 and agree with the radiologist interpretation below.  I will add that in bilateral bases of the lungs there is either atelectasis or early fibrosis:    Chest: A 6 mm nodule in the right upper lobe, image 26, is unchanged.  There are no new pulmonary nodules or masses. The ascending aorta is  prominent in size, measuring 4.6 cm at the level of the main pulmonary  artery. This is stable. There are mild atherosclerotic changes in the  aorta. Cardiac size is normal. There is coronary arterial  calcification. There are no enlarged axillary, mediastinal, or hilar  lymph nodes. No pleural effusion.  Maxillofacial CT: I have personally reviewed the CT scan images and agree with the radiologist interpretation below:  Frontal sinuses:  Mild mucosal thickening in the inferior aspect of  the right frontal sinus.     Ethmoid sinuses:  Right ethmoid sinuses are nearly completely  opacified.     Right maxillary sinus:  Right maxillary sinus is clear except for mild  mucosal thickening in the right infundibulum.     Left maxillary sinus:  There is a 1.9 cm polyp or retention cyst in  the inferior aspect of the left maxillary sinus. The left ostium and  infundibulum are aerated.     Sphenoid sinus:   Normal.      Nasal septum:  Nasal septum is deviated towards the left and there is  a spur off the left side of the nasal  septum.     Turbinates and nasal cavity:   Normal.      Laminae papyracea and cribriform plate: Normal.      Other findings: Orbits, sella, maxilla and nasopharynx appear normal.   Bony temporomandibular joints appear normal.                                                                      IMPRESSION:    1. Right ethmoid sinuses are nearly completely opacified. There is  also mucosal thickening in the inferior right frontal sinus.  2. 1.8 cm polyp or retention cyst left maxillary sinus.

## 2019-02-25 ENCOUNTER — ALLIED HEALTH/NURSE VISIT (OUTPATIENT)
Dept: FAMILY MEDICINE | Facility: OTHER | Age: 69
End: 2019-02-25
Payer: MEDICARE

## 2019-02-25 DIAGNOSIS — Z23 NEED FOR VACCINATION: Primary | ICD-10-CM

## 2019-02-25 PROCEDURE — 90750 HZV VACC RECOMBINANT IM: CPT

## 2019-02-25 PROCEDURE — 90471 IMMUNIZATION ADMIN: CPT

## 2019-02-25 NOTE — PROGRESS NOTES
Screening Questionnaire for Adult Immunization    Are you sick today?   No   Do you have allergies to medications, food, a vaccine component or latex?   No   Have you ever had a serious reaction after receiving a vaccination?   No   Do you have a long-term health problem with heart disease, lung disease, asthma, kidney disease, metabolic disease (e.g. diabetes), anemia, or other blood disorder?   No   Do you have cancer, leukemia, HIV/AIDS, or any other immune system problem?   No   In the past 3 months, have you taken medications that affect  your immune system, such as prednisone, other steroids, or anticancer drugs; drugs for the treatment of rheumatoid arthritis, Crohn s disease, or psoriasis; or have you had radiation treatments?   No   Have you had a seizure, or a brain or other nervous system problem?   No   During the past year, have you received a transfusion of blood or blood     products, or been given immune (gamma) globulin or antiviral drug?   No   For women: Are you pregnant or is there a chance you could become        pregnant during the next month?   No   Have you received any vaccinations in the past 4 weeks?   No     Immunization questionnaire answers were all negative.        Per orders of Dr. Matta, injection of shingrix given by Jessie Peña. Patient instructed to remain in clinic for 15 minutes afterwards, and to report any adverse reaction to me immediately.       Screening performed by Jessie Peña on 2/25/2019 at 9:09 AM.

## 2019-04-18 ENCOUNTER — HOSPITAL ENCOUNTER (OUTPATIENT)
Dept: CT IMAGING | Facility: CLINIC | Age: 69
Discharge: HOME | End: 2019-04-18
Attending: INTERNAL MEDICINE | Admitting: INTERNAL MEDICINE
Payer: MEDICARE

## 2019-04-18 DIAGNOSIS — C85.98 NON-HODGKIN'S LYMPHOMA OF MULTIPLE SITES (H): ICD-10-CM

## 2019-04-18 DIAGNOSIS — C82.90 FOLLICULAR LYMPHOMA, UNSPECIFIED FOLLICULAR LYMPHOMA TYPE, UNSPECIFIED BODY REGION (H): ICD-10-CM

## 2019-04-18 LAB
ALBUMIN SERPL-MCNC: 4 G/DL (ref 3.4–5)
ALP SERPL-CCNC: 117 U/L (ref 40–150)
ALT SERPL W P-5'-P-CCNC: 36 U/L (ref 0–70)
ANION GAP SERPL CALCULATED.3IONS-SCNC: 9 MMOL/L (ref 3–14)
AST SERPL W P-5'-P-CCNC: 33 U/L (ref 0–45)
BASOPHILS # BLD AUTO: 0.1 10E9/L (ref 0–0.2)
BASOPHILS NFR BLD AUTO: 1.8 %
BILIRUB SERPL-MCNC: 0.8 MG/DL (ref 0.2–1.3)
BUN SERPL-MCNC: 17 MG/DL (ref 7–30)
CALCIUM SERPL-MCNC: 8.3 MG/DL (ref 8.5–10.1)
CHLORIDE SERPL-SCNC: 105 MMOL/L (ref 94–109)
CO2 SERPL-SCNC: 25 MMOL/L (ref 20–32)
CREAT SERPL-MCNC: 0.94 MG/DL (ref 0.66–1.25)
DIFFERENTIAL METHOD BLD: NORMAL
EOSINOPHIL NFR BLD AUTO: 2.1 %
ERYTHROCYTE [DISTWIDTH] IN BLOOD BY AUTOMATED COUNT: 12.6 % (ref 10–15)
GFR SERPL CREATININE-BSD FRML MDRD: 83 ML/MIN/{1.73_M2}
GLUCOSE SERPL-MCNC: 119 MG/DL (ref 70–99)
HCT VFR BLD AUTO: 43 % (ref 40–53)
HGB BLD-MCNC: 14.6 G/DL (ref 13.3–17.7)
IMM GRANULOCYTES # BLD: 0 10E9/L (ref 0–0.4)
IMM GRANULOCYTES NFR BLD: 0.5 %
LYMPHOCYTES # BLD AUTO: 0.9 10E9/L (ref 0.8–5.3)
LYMPHOCYTES NFR BLD AUTO: 14.4 %
MCH RBC QN AUTO: 30.7 PG (ref 26.5–33)
MCHC RBC AUTO-ENTMCNC: 34 G/DL (ref 31.5–36.5)
MCV RBC AUTO: 91 FL (ref 78–100)
MONOCYTES # BLD AUTO: 0.8 10E9/L (ref 0–1.3)
MONOCYTES NFR BLD AUTO: 11.8 %
NEUTROPHILS # BLD AUTO: 4.5 10E9/L (ref 1.6–8.3)
NEUTROPHILS NFR BLD AUTO: 69.4 %
NRBC # BLD AUTO: 0 10*3/UL
NRBC BLD AUTO-RTO: 0 /100
PLATELET # BLD AUTO: 170 10E9/L (ref 150–450)
POTASSIUM SERPL-SCNC: 4 MMOL/L (ref 3.4–5.3)
PROT SERPL-MCNC: 6.7 G/DL (ref 6.8–8.8)
RBC # BLD AUTO: 4.75 10E12/L (ref 4.4–5.9)
SODIUM SERPL-SCNC: 139 MMOL/L (ref 133–144)
WBC # BLD AUTO: 6.5 10E9/L (ref 4–11)

## 2019-04-18 PROCEDURE — 36415 COLL VENOUS BLD VENIPUNCTURE: CPT | Performed by: INTERNAL MEDICINE

## 2019-04-18 PROCEDURE — 80053 COMPREHEN METABOLIC PANEL: CPT | Performed by: INTERNAL MEDICINE

## 2019-04-18 PROCEDURE — 25000128 H RX IP 250 OP 636: Performed by: INTERNAL MEDICINE

## 2019-04-18 PROCEDURE — 85025 COMPLETE CBC W/AUTO DIFF WBC: CPT | Performed by: INTERNAL MEDICINE

## 2019-04-18 PROCEDURE — 71260 CT THORAX DX C+: CPT

## 2019-04-18 PROCEDURE — 74177 CT ABD & PELVIS W/CONTRAST: CPT

## 2019-04-18 PROCEDURE — 25000125 ZZHC RX 250: Performed by: INTERNAL MEDICINE

## 2019-04-18 RX ORDER — IOPAMIDOL 755 MG/ML
100 INJECTION, SOLUTION INTRAVASCULAR ONCE
Status: COMPLETED | OUTPATIENT
Start: 2019-04-18 | End: 2019-04-18

## 2019-04-18 RX ADMIN — IOPAMIDOL 100 ML: 755 INJECTION, SOLUTION INTRAVENOUS at 08:08

## 2019-04-18 RX ADMIN — SODIUM CHLORIDE 60 ML: 9 INJECTION, SOLUTION INTRAVENOUS at 08:08

## 2019-05-08 ENCOUNTER — ONCOLOGY VISIT (OUTPATIENT)
Dept: ONCOLOGY | Facility: CLINIC | Age: 69
End: 2019-05-08
Payer: MEDICARE

## 2019-05-08 VITALS
HEIGHT: 73 IN | SYSTOLIC BLOOD PRESSURE: 140 MMHG | RESPIRATION RATE: 16 BRPM | HEART RATE: 91 BPM | WEIGHT: 288.4 LBS | TEMPERATURE: 97.4 F | BODY MASS INDEX: 38.22 KG/M2 | OXYGEN SATURATION: 97 % | DIASTOLIC BLOOD PRESSURE: 76 MMHG

## 2019-05-08 DIAGNOSIS — C82.90 FOLLICULAR LYMPHOMA, UNSPECIFIED FOLLICULAR LYMPHOMA TYPE, UNSPECIFIED BODY REGION (H): Primary | ICD-10-CM

## 2019-05-08 PROCEDURE — 99213 OFFICE O/P EST LOW 20 MIN: CPT | Performed by: INTERNAL MEDICINE

## 2019-05-08 ASSESSMENT — MIFFLIN-ST. JEOR: SCORE: 2124.11

## 2019-05-08 NOTE — PROGRESS NOTES
FOLLOW-UP VISIT NOTE    PATIENT NAME: Deandre Merchant MRN # [id number]  DATE OF VISIT: May 8, 2019 YOB: 1950    REFERRING PROVIDER: No referring provider defined for this encounter.    CANCER TYPE:Follicular Lymphoma- In Remission  STAGE: IV( axillary, mediastinal, retroperitoneal, stomach, bone marrow)  ECOG PS: 0    ONCOLOGY HISTORY:  69-year-old male who in 2012 presented with mediastinal, retroperitoneal and mesenteric lymph nodes. Was clinically asymptomatic with normal blood counts. CT-guided lymph node biopsy on 6/21/2012 was consistent with follicular lymphoma, grade 1-2. He was observed at that point.    In 2013, presented to the hospital with ileus and  abdominal distention. EGD showed gastric antrum and duodenal ulcers which were biopsied and showed follicular lymphoma. CT scans showed bulky  lymphadenopathy with increasing size. 04/13 Bone marrow biopsy was positive with evidence of follicular lymphoma. He received bendamustine and Rituxan regimen april -September 2014.   In December 14 , he was started on maintenance Rituxan every 2 months for 2 years which he completed in October 2016.     In remission since.    SUBJECTIVE     Presents to the clinic today for a  six-month follow-up. No new complaints. Denies fever/chills, night sweats, weight loss, fatigue, abdominal pain, dyspnea or any other complaints. Stable appetite and energy levels.      PAST MEDICAL HISTORY     Past Medical History:   Diagnosis Date     BPH (benign prostatic hyperplasia)      Lymphadenopathy     mediastinal & retroperitoneal     Non Hodgkin's lymphoma (H)      Polyps, colonic      Retinal detachment          CURRENT OUTPATIENT MEDICATIONS     Current Outpatient Medications   Medication Sig Dispense Refill     Acetaminophen (TYLENOL PO) Take 1,000 mg by mouth       aspirin 81 MG tablet Take 81 mg by mouth daily       cetirizine (ZYRTEC) 10 MG tablet Take 10 mg by mouth daily       famotidine (PEPCID) 20 MG tablet  Take 1 tablet (20 mg) by mouth 2 times daily 60 tablet 1     fluticasone (FLONASE) 50 MCG/ACT spray Spray 1-2 sprays into both nostrils daily 16 g 11     order for DME Equipment being ordered: knee sleeve with horseshoe, XL 1 Device 0     simvastatin (ZOCOR) 20 MG tablet TAKE 1 TABLET BY MOUTH ISSA Y AT BEDTIME 90 tablet 3     albuterol (PROAIR HFA/PROVENTIL HFA/VENTOLIN HFA) 108 (90 BASE) MCG/ACT Inhaler Inhale 2 puffs into the lungs every 6 hours as needed for shortness of breath / dyspnea or wheezing (Patient not taking: Reported on 10/25/2018) 1 Inhaler 1     Chlorpheniramine-Phenylephrine 4-10 MG TABS Take 1 tablet by mouth every 6 hours as needed (cough) (Patient not taking: Reported on 5/8/2019) 60 tablet 3        ALLERGIES     Allergies   Allergen Reactions     Allopurinol Rash        REVIEW OF SYSTEMS   As above in the HPI, o/w complete 14-point ROS was negative.     PHYSICAL EXAM   B/P: 129/76, T: 97.5, P: 78, R: 18  SpO2 Readings from Last 4 Encounters:   10/25/18 96%   05/11/18 94%   04/25/18 95%   03/28/18 94%     Wt Readings from Last 3 Encounters:   05/08/19 130.8 kg (288 lb 6.4 oz)   01/18/19 127.9 kg (282 lb)   10/25/18 127.8 kg (281 lb 11.2 oz)     GEN: NAD  Mouth/ENT: Oropharynx is clear.  NECK: no cervical or supraclavicular lymphadenopathy  LUNGS: clear bilaterally  CV: regular  ABDOMEN: soft, non-tender, non-distended,  EXT: warm, well perfused, no edema  NEURO: alert  SKIN: no rashes     LABORATORY AND IMAGING STUDIES     Lab Results   Component Value Date    WBC 6.5 04/18/2019     Lab Results   Component Value Date    RBC 4.75 04/18/2019     Lab Results   Component Value Date    HGB 14.6 04/18/2019     Lab Results   Component Value Date    HCT 43.0 04/18/2019     No components found for: MCT  Lab Results   Component Value Date    MCV 91 04/18/2019     Lab Results   Component Value Date    MCH 30.7 04/18/2019     Lab Results   Component Value Date    MCHC 34.0 04/18/2019     Lab Results    Component Value Date    RDW 12.6 04/18/2019     Lab Results   Component Value Date     04/18/2019     Recent Labs   Lab Test 04/18/19  0737 10/25/18  0740    139   POTASSIUM 4.0 4.1   CHLORIDE 105 105   CO2 25 28   ANIONGAP 9 6   * 112*   BUN 17 15   CR 0.94 0.99   ANDRÉS 8.3* 8.2*       CT CHEST/ABDOMEN/PELVIS WITH CONTRAST  4/18/2019 8:16 AM     HISTORY:  Follicular lymphoma-follow up. Follicular lymphoma,  unspecified follicular lymphoma type, unspecified body region (H).  Non-Hodgkin's lymphoma of multiple sites (H).     TECHNIQUE: Scans obtained of the chest, abdomen, and pelvis with IV  contrast. Isovue 370, 100mL injected.  Radiation dose for this scan was reduced using automated exposure  control, adjustment of the mA and/or kV according to patient size, or  iterative reconstruction technique.     COMPARISON: CT chest, abdomen and pelvis dated 4/18/2018.     FINDINGS:   Chest: The lungs are clear. No nodule, mass, infiltrate, effusion, or  pneumothorax. There is dependent atelectasis in the posterior  bilateral lungs.     Heart is upper normal size. There are coronary artery and aortic  calcifications. No aortic dissection or aneurysm. No mediastinal,  hilar, or axillary lymphadenopathy. Visualized portions of the thyroid  are unremarkable. No aggressive osseous lesions are seen. No acute  fracture is identified. Degenerative changes are noted in the spine.  There is fusion of the right anterior sacroiliac joint.     Abdomen and pelvis:  There is diffuse fatty infiltration of the liver.  The liver, gallbladder, pancreas, spleen, bilateral adrenal glands and  bilateral kidneys otherwise enhance normally. No hydronephrosis,  nephrolithiasis, hydroureter or ureteral calculus is identified. The  left ureter is mildly prominent.     No adenopathy, free fluid or free air is seen in the peritoneal  cavity. There is hazy density in the mesenteric adipose tissue and  there are mildly prominent  lymph nodes in the mesenteric root. These  are less than the CT pathologic size criterion and are unchanged since  prior study dated 4/18/2018.     Prostate gland is mildly enlarged, contains dystrophic calcifications  and indents the posteroinferior aspect of the urinary bladder.     Colon is grossly of normal caliber without pericolonic inflammatory  change to suggest acute diverticulitis. Appendix is normal in  appearance. Small bowel is of normal caliber. Stomach contains a small  amount of fluid and air but is otherwise unremarkable.     There is a moderate-sized ventral abdominal wall hernia containing  adipose tissue (image 95 series 2). There are several tiny  fat-containing ventral abdominal hernias (images 81-88 series 2).                                                                      IMPRESSION:  1. Diffuse fatty infiltration of liver.  2. Atherosclerosis including the coronary arteries and aorta. No  evidence for dissection.  3. Hazy density in the adipose tissue of the mesenteric root with  minimally prominent lymph nodes in this region. These lymph nodes are  still within the normal size criterion and are unchanged since the  prior study dated 4/18/2018. No evidence for lymphadenopathy.  4. No definite evidence for recurrent malignancy.  5. Moderate-sized fat-containing and several tiny fat-containing  ventral abdominal wall hernias are again noted.     SEBASTIEN LARA MD      ASSESSMENT AND PLAN     67-year-old male with stage IV follicular lymphoma who is status post treatment with bendamustine/Rituxan (04-09/2014) followed by maintenance rituximab for two years- completed in October 2016.    NCCN guidelines were reviewed.    Clinically in remission with no active complaints/B symptoms.Lab work/CT scans reviewed and are unremarkable with no concerns for recurrence.      We'll see him back in 12 months with labs ,CT scans. Return sooner prn    Patient understands and agrees with the plan of  care      Ge Krishnamurthy MD  Attending Physician   Hematology/Medical Oncology

## 2019-05-08 NOTE — Clinical Note
5/8/2019         RE: Deandre Merchant  [address]  [facility] [id number]        Dear Colleague,    Thank you for referring your patient, Deandre Merchant, to the [facility]. Please see a copy of my visit note below.      FOLLOW-UP VISIT NOTE    PATIENT NAME: Deandre Merchant MRN # [id number]  DATE OF VISIT: May 8, 2019 YOB: 1950    REFERRING PROVIDER: No referring provider defined for this encounter.    CANCER TYPE:Follicular Lymphoma- In Remission  STAGE: IV( axillary, mediastinal, retroperitoneal, stomach, bone marrow)  ECOG PS: 0    ONCOLOGY HISTORY:  69-year-old male who in 2012 presented with mediastinal, retroperitoneal and mesenteric lymph nodes. Was clinically asymptomatic with normal blood counts. CT-guided lymph node biopsy on 6/21/2012 was consistent with follicular lymphoma, grade 1-2. He was observed at that point.    In 2013, presented to the hospital with ileus and  abdominal distention. EGD showed gastric antrum and duodenal ulcers which were biopsied and showed follicular lymphoma. CT scans showed bulky  lymphadenopathy with increasing size. 04/13 Bone marrow biopsy was positive with evidence of follicular lymphoma. He received bendamustine and Rituxan regimen april -September 2014.   In December 14 , he was started on maintenance Rituxan every 2 months for 2 years which he completed in October 2016.     In remission since.    SUBJECTIVE     Presents to the clinic today for a  six-month follow-up. No new complaints. Denies fever/chills, night sweats, weight loss, fatigue, abdominal pain, dyspnea or any other complaints. Stable appetite and energy levels.      PAST MEDICAL HISTORY     Past Medical History:   Diagnosis Date     BPH (benign prostatic hyperplasia)      Lymphadenopathy     mediastinal & retroperitoneal     Non Hodgkin's lymphoma (H)      Polyps, colonic      Retinal detachment          CURRENT OUTPATIENT MEDICATIONS     Current Outpatient Medications    Medication Sig Dispense Refill     Acetaminophen (TYLENOL PO) Take 1,000 mg by mouth       aspirin 81 MG tablet Take 81 mg by mouth daily       cetirizine (ZYRTEC) 10 MG tablet Take 10 mg by mouth daily       famotidine (PEPCID) 20 MG tablet Take 1 tablet (20 mg) by mouth 2 times daily 60 tablet 1     fluticasone (FLONASE) 50 MCG/ACT spray Spray 1-2 sprays into both nostrils daily 16 g 11     order for DME Equipment being ordered: knee sleeve with horseshoe, XL 1 Device 0     simvastatin (ZOCOR) 20 MG tablet TAKE 1 TABLET BY MOUTH ISSA Y AT BEDTIME 90 tablet 3     albuterol (PROAIR HFA/PROVENTIL HFA/VENTOLIN HFA) 108 (90 BASE) MCG/ACT Inhaler Inhale 2 puffs into the lungs every 6 hours as needed for shortness of breath / dyspnea or wheezing (Patient not taking: Reported on 10/25/2018) 1 Inhaler 1     Chlorpheniramine-Phenylephrine 4-10 MG TABS Take 1 tablet by mouth every 6 hours as needed (cough) (Patient not taking: Reported on 5/8/2019) 60 tablet 3        ALLERGIES     Allergies   Allergen Reactions     Allopurinol Rash        REVIEW OF SYSTEMS   As above in the HPI, o/w complete 14-point ROS was negative.     PHYSICAL EXAM   B/P: 129/76, T: 97.5, P: 78, R: 18  SpO2 Readings from Last 4 Encounters:   10/25/18 96%   05/11/18 94%   04/25/18 95%   03/28/18 94%     Wt Readings from Last 3 Encounters:   05/08/19 130.8 kg (288 lb 6.4 oz)   01/18/19 127.9 kg (282 lb)   10/25/18 127.8 kg (281 lb 11.2 oz)     GEN: NAD  Mouth/ENT: Oropharynx is clear.  NECK: no cervical or supraclavicular lymphadenopathy  LUNGS: clear bilaterally  CV: regular  ABDOMEN: soft, non-tender, non-distended,  EXT: warm, well perfused, no edema  NEURO: alert  SKIN: no rashes     LABORATORY AND IMAGING STUDIES     Lab Results   Component Value Date    WBC 6.5 04/18/2019     Lab Results   Component Value Date    RBC 4.75 04/18/2019     Lab Results   Component Value Date    HGB 14.6 04/18/2019     Lab Results   Component Value Date    HCT 43.0  04/18/2019     No components found for: MCT  Lab Results   Component Value Date    MCV 91 04/18/2019     Lab Results   Component Value Date    MCH 30.7 04/18/2019     Lab Results   Component Value Date    MCHC 34.0 04/18/2019     Lab Results   Component Value Date    RDW 12.6 04/18/2019     Lab Results   Component Value Date     04/18/2019     Recent Labs   Lab Test 04/18/19  0737 10/25/18  0740    139   POTASSIUM 4.0 4.1   CHLORIDE 105 105   CO2 25 28   ANIONGAP 9 6   * 112*   BUN 17 15   CR 0.94 0.99   ANDRÉS 8.3* 8.2*       CT CHEST/ABDOMEN/PELVIS WITH CONTRAST  4/18/2019 8:16 AM     HISTORY:  Follicular lymphoma-follow up. Follicular lymphoma,  unspecified follicular lymphoma type, unspecified body region (H).  Non-Hodgkin's lymphoma of multiple sites (H).     TECHNIQUE: Scans obtained of the chest, abdomen, and pelvis with IV  contrast. Isovue 370, 100mL injected.  Radiation dose for this scan was reduced using automated exposure  control, adjustment of the mA and/or kV according to patient size, or  iterative reconstruction technique.     COMPARISON: CT chest, abdomen and pelvis dated 4/18/2018.     FINDINGS:   Chest: The lungs are clear. No nodule, mass, infiltrate, effusion, or  pneumothorax. There is dependent atelectasis in the posterior  bilateral lungs.     Heart is upper normal size. There are coronary artery and aortic  calcifications. No aortic dissection or aneurysm. No mediastinal,  hilar, or axillary lymphadenopathy. Visualized portions of the thyroid  are unremarkable. No aggressive osseous lesions are seen. No acute  fracture is identified. Degenerative changes are noted in the spine.  There is fusion of the right anterior sacroiliac joint.     Abdomen and pelvis:  There is diffuse fatty infiltration of the liver.  The liver, gallbladder, pancreas, spleen, bilateral adrenal glands and  bilateral kidneys otherwise enhance normally. No hydronephrosis,  nephrolithiasis, hydroureter  or ureteral calculus is identified. The  left ureter is mildly prominent.     No adenopathy, free fluid or free air is seen in the peritoneal  cavity. There is hazy density in the mesenteric adipose tissue and  there are mildly prominent lymph nodes in the mesenteric root. These  are less than the CT pathologic size criterion and are unchanged since  prior study dated 4/18/2018.     Prostate gland is mildly enlarged, contains dystrophic calcifications  and indents the posteroinferior aspect of the urinary bladder.     Colon is grossly of normal caliber without pericolonic inflammatory  change to suggest acute diverticulitis. Appendix is normal in  appearance. Small bowel is of normal caliber. Stomach contains a small  amount of fluid and air but is otherwise unremarkable.     There is a moderate-sized ventral abdominal wall hernia containing  adipose tissue (image 95 series 2). There are several tiny  fat-containing ventral abdominal hernias (images 81-88 series 2).                                                                      IMPRESSION:  1. Diffuse fatty infiltration of liver.  2. Atherosclerosis including the coronary arteries and aorta. No  evidence for dissection.  3. Hazy density in the adipose tissue of the mesenteric root with  minimally prominent lymph nodes in this region. These lymph nodes are  still within the normal size criterion and are unchanged since the  prior study dated 4/18/2018. No evidence for lymphadenopathy.  4. No definite evidence for recurrent malignancy.  5. Moderate-sized fat-containing and several tiny fat-containing  ventral abdominal wall hernias are again noted.     SEBASTIEN LARA MD      ASSESSMENT AND PLAN     67-year-old male with stage IV follicular lymphoma who is status post treatment with bendamustine/Rituxan (04-09/2014) followed by maintenance rituximab for two years- completed in October 2016.    NCCN guidelines were reviewed.    Clinically in remission with no  active complaints/B symptoms.Lab work/CT scans reviewed and are unremarkable with no concerns for recurrence.      We'll see him back in 12 months with labs ,CT scans. Return sooner prn    Patient understands and agrees with the plan of care      Ge Krishnamurthy MD  Attending Physician   Hematology/Medical Oncology    Again, thank you for allowing me to participate in the care of your patient.        Sincerely,        Ge Krishnamurthy MD

## 2019-05-08 NOTE — NURSING NOTE
"Oncology Rooming Note    May 8, 2019 12:14 PM   Deandre Merchant is a 69 year old male who presents for:    Chief Complaint   Patient presents with     Oncology Clinic Visit     Follicular Lymphoma- In Remission     Results     labs and CT completed on 02/18/19     Initial Vitals: /76   Pulse 91   Temp 97.4  F (36.3  C) (Temporal)   Resp 16   Ht 1.842 m (6' 0.5\")   Wt 130.8 kg (288 lb 6.4 oz)   SpO2 97%   BMI 38.58 kg/m   Estimated body mass index is 38.58 kg/m  as calculated from the following:    Height as of this encounter: 1.842 m (6' 0.5\").    Weight as of this encounter: 130.8 kg (288 lb 6.4 oz). Body surface area is 2.59 meters squared.  Data Unavailable Comment: Data Unavailable   No LMP for male patient.  Allergies reviewed: Yes  Medications reviewed: Yes    Medications: Medication refills not needed today.  Pharmacy name entered into [facility]:    [facility] [place] [address] [place] [address]  [id number] [place] - [address] [place] [address]    Clinical concerns: none.   5 minutes for nursing intake (face to face time)     Daly FLORES CMA              "

## 2019-05-08 NOTE — NURSING NOTE
DISCHARGE PLAN:  Next appointments: See patient instruction section  Departure Mode: Ambulatory  Accompanied by: self  5 minutes for nursing discharge (face to face time)     Deandre Merchant is here today for 6 month follow up.  Writing nurse seen patient after Medical Oncology appointment to address questions/concerns/coordinate care. Patient ambulated by nurse to  to schedule follow up and/or lab appointments.  Imaging scheduled by . Patient to follow up in1 year follow up with labs & ct prior. See patient instructions and Oncologist's Progress note for further details. Questions and concerns addressed to patient's satisfaction. Patient verbalized and demonstrated understanding of plan.  Contact information provided and patient is encouraged to call with any that arise in the interim of care.    Daly FLORES [facility]    Oncology/Hematology at [facility]  [phone]  5/8/2019, 12:15 PM

## 2019-05-08 NOTE — PATIENT INSTRUCTIONS
"- Please follow up with Dr. Krishnamurthy in 1 year.  Please schedule CT scan and lab work 2-3 days prior to follow up appointment.    Lab Date/Time:    CT Scan Date/Time:  Please see \"Getting Ready for Your CT Scan\" for details.  Nothing to eat or drink for 2 hours prior to scan.  You will check in 30 minutes early to start a \"water prep\" prior to scan instead of oral contrast.  Radiology will call you with different instructions if oral contrast needed.    Office visit follow up with Dr. Krishnamurthy Date/Time:    If you have any questions or concerns please feel free to call.    If you need to reschedule please call:  Clinic or Lab Appointment - [phone]  Infusion - [phone]  Imaging - [phone]    Daly PRIETO [facility]  Oncology/Hematology at [facility]  [phone]          "

## 2019-05-13 DIAGNOSIS — E78.5 HYPERLIPIDEMIA WITH TARGET LDL LESS THAN 130: ICD-10-CM

## 2019-05-14 RX ORDER — SIMVASTATIN 20 MG
TABLET ORAL
Qty: 30 TABLET | Refills: 0 | Status: SHIPPED | OUTPATIENT
Start: 2019-05-14 | End: 2019-05-30

## 2019-05-24 NOTE — PROGRESS NOTES
"Roberto Merchant is a 69 year old male who presents to clinic today for the following health issues:    HPI   {SUPERLIST (Optional):994855}  {additonal problems for provider to add (Optional):222884}    {HIST REVIEW/ LINKS 2 (Optional):337651}    Reviewed and updated as needed this visit by Provider         Review of Systems   {ROS COMP (Optional):701132}      Objective    There were no vitals taken for this visit.  There is no height or weight on file to calculate BMI.  Physical Exam   {Exam List (Optional):214270}    {Diagnostic Test Results (Optional):955905::\"Diagnostic Test Results:\",\"Labs reviewed in Epic\"}        {PROVIDER CHARTING PREFERENCE:381208}    "

## 2019-05-30 ENCOUNTER — OFFICE VISIT (OUTPATIENT)
Dept: FAMILY MEDICINE | Facility: OTHER | Age: 69
End: 2019-05-30
Payer: MEDICARE

## 2019-05-30 VITALS
RESPIRATION RATE: 16 BRPM | OXYGEN SATURATION: 96 % | TEMPERATURE: 97.7 F | HEART RATE: 84 BPM | SYSTOLIC BLOOD PRESSURE: 154 MMHG | BODY MASS INDEX: 38.19 KG/M2 | HEIGHT: 72 IN | DIASTOLIC BLOOD PRESSURE: 86 MMHG | WEIGHT: 282 LBS

## 2019-05-30 DIAGNOSIS — Z00.00 ROUTINE GENERAL MEDICAL EXAMINATION AT A HEALTH CARE FACILITY: Primary | ICD-10-CM

## 2019-05-30 DIAGNOSIS — G47.9 SLEEP DISTURBANCE: ICD-10-CM

## 2019-05-30 DIAGNOSIS — I48.91 ATRIAL FIBRILLATION, UNSPECIFIED TYPE (H): ICD-10-CM

## 2019-05-30 DIAGNOSIS — Z12.5 ENCOUNTER FOR SCREENING FOR MALIGNANT NEOPLASM OF PROSTATE: ICD-10-CM

## 2019-05-30 DIAGNOSIS — Z13.220 SCREENING FOR HYPERLIPIDEMIA: ICD-10-CM

## 2019-05-30 DIAGNOSIS — R03.0 ELEVATED BLOOD PRESSURE READING WITHOUT DIAGNOSIS OF HYPERTENSION: ICD-10-CM

## 2019-05-30 DIAGNOSIS — E66.01 MORBID OBESITY (H): ICD-10-CM

## 2019-05-30 DIAGNOSIS — E78.5 HYPERLIPIDEMIA WITH TARGET LDL LESS THAN 130: ICD-10-CM

## 2019-05-30 DIAGNOSIS — R06.83 SNORING: ICD-10-CM

## 2019-05-30 DIAGNOSIS — R79.89 OTHER SPECIFIED ABNORMAL FINDINGS OF BLOOD CHEMISTRY: ICD-10-CM

## 2019-05-30 DIAGNOSIS — R15.1 FECAL SMEARING: ICD-10-CM

## 2019-05-30 PROCEDURE — G0439 PPPS, SUBSEQ VISIT: HCPCS | Performed by: PHYSICIAN ASSISTANT

## 2019-05-30 PROCEDURE — 99214 OFFICE O/P EST MOD 30 MIN: CPT | Mod: 25 | Performed by: PHYSICIAN ASSISTANT

## 2019-05-30 RX ORDER — SIMVASTATIN 20 MG
TABLET ORAL
Qty: 90 TABLET | Refills: 1 | Status: SHIPPED | OUTPATIENT
Start: 2019-05-30 | End: 2019-12-19

## 2019-05-30 ASSESSMENT — ENCOUNTER SYMPTOMS
CONSTIPATION: 0
HEMATURIA: 0
FEVER: 0
NERVOUS/ANXIOUS: 0
FREQUENCY: 0
WEAKNESS: 1
COUGH: 1
EYE PAIN: 1
ABDOMINAL PAIN: 0
NAUSEA: 0
CHILLS: 0
ARTHRALGIAS: 1
PARESTHESIAS: 0
JOINT SWELLING: 0
DIARRHEA: 0
SORE THROAT: 0
DIZZINESS: 1
HEADACHES: 0
DYSURIA: 0
HEMATOCHEZIA: 0
HEARTBURN: 0
MYALGIAS: 1
PALPITATIONS: 0
SHORTNESS OF BREATH: 1

## 2019-05-30 ASSESSMENT — ACTIVITIES OF DAILY LIVING (ADL): CURRENT_FUNCTION: NO ASSISTANCE NEEDED

## 2019-05-30 ASSESSMENT — PAIN SCALES - GENERAL: PAINLEVEL: NO PAIN (0)

## 2019-05-30 ASSESSMENT — MIFFLIN-ST. JEOR: SCORE: 2094.14

## 2019-05-30 NOTE — PROGRESS NOTES
"SUBJECTIVE:   Deandre Merchant is a 69 year old male who presents for Preventive Visit.      Are you in the first 12 months of your Medicare coverage?  No    Healthy Habits:     In general, how would you rate your overall health?  Good    Frequency of exercise:  None    Do you usually eat at least 4 servings of fruit and vegetables a day, include whole grains    & fiber and avoid regularly eating high fat or \"junk\" foods?  No    Taking medications regularly:  Yes    Medication side effects:  None    Ability to successfully perform activities of daily living:  No assistance needed    Home Safety:  No safety concerns identified    Hearing Impairment:  Need to ask people to speak up or repeat themselves    In the past 6 months, have you been bothered by leaking of urine? Yes    In general, how would you rate your overall mental or emotional health?  Good      PHQ-2 Total Score: 0    Additional concerns today:  Yes    Do you feel safe in your environment? Yes    Do you have a Health Care Directive? Yes: Patient states has Advance Directive and will bring in a copy to clinic.      Fall risk  Fallen 2 or more times in the past year?: No  Any fall with injury in the past year?: No    Cognitive Screening   1) Repeat 3 items (Leader, Season, Table)    2) Clock draw: ABNORMAL   3) 3 item recall: Recalls 3 objects  Results: ABNORMAL clock, 1-2 items recalled: PROBABLE COGNITIVE IMPAIRMENT, **INFORM PROVIDER**    Mini-CogTM Copyright AAKASH Gagnon. Licensed by the author for use in [facility]; reprinted with permission ([email or web address]@.[email or web address]). All rights reserved.      Do you have sleep apnea, excessive snoring or daytime drowsiness?: Possible-would like to talk to you about it.     Reviewed and updated as needed this visit by clinical staff         Reviewed and updated as needed this visit by Provider        Social History     Tobacco Use     Smoking status: Never Smoker     Smokeless tobacco: Never Used   Substance Use Topics "     Alcohol use: Yes     Alcohol/week: 2.0 oz     Comment: occasionally weekends     If you drink alcohol do you typically have >3 drinks per day or >7 drinks per week? No    Alcohol Use 5/30/2019   Prescreen: >3 drinks/day or >7 drinks/week? No   Prescreen: >3 drinks/day or >7 drinks/week? -   No flowsheet data found.    Patient has concerns for obstructive sleep apnea and would like a sleep study done sometime in the near future.  Discussed the fact that we certainly have sleep specialist available.  He is inquiring whether or not he could benefit from a home sleep study.  He does snore and does have pauses in his breathing that have removed him from the marital bed some years ago as his wife could no longer have him sleeping in the same bed due to his snoring.  I encouraged him to have one done at a certified facility so that we make sure that we do not have to think about doing it over again if the home study was not within normal limits.  He also has a history of atrial fibrillation and has not had an echo done in quite some time.  His cholesterol has been excellent at last check but in my opinion we should be rechecking his cardiac function due to his long-standing history.    States he was recently more concerned with some anal leakage that he has.  But he states that he has had anal leakage since he was a young child.  Encouraged him to do a thorough cleanout with MiraLAX and then start working on Keagle maneuvers follow-up with proctology may be necessary in the future.    Current providers sharing in care for this patient include:     Patient Care Team:  Felix Sevilla MD as PCP - General (Family Practice)  Néstor Ballard MD as MD (Hematology & Oncology)  Markell Mistry RN as Registered Nurse (Hematology & Oncology)  Felix Sevilla MD as Assigned PCP    The following health maintenance items are reviewed in Epic and correct as of today:  Health Maintenance   Topic Date Due     PHQ-2   01/01/2019     FALL RISK ASSESSMENT  01/29/2019     MEDICARE ANNUAL WELLNESS VISIT  03/28/2019     ZOSTER IMMUNIZATION (2 of 2) 04/22/2019     ADVANCED DIRECTIVE PLANNING  09/17/2020     COLONOSCOPY  03/01/2022     LIPID  03/01/2023     DTAP/TDAP/TD IMMUNIZATION (4 - Td) 09/17/2025     HEPATITIS C SCREENING  Completed     INFLUENZA VACCINE  Completed     AORTIC ANEURYSM SCREENING (SYSTEM ASSIGNED)  Completed     IPV IMMUNIZATION  Aged Out     MENINGITIS IMMUNIZATION  Aged Out     Lab work is in process  Labs reviewed in EPIC  BP Readings from Last 3 Encounters:   05/30/19 154/86   05/08/19 140/76   01/18/19 122/80    Wt Readings from Last 3 Encounters:   05/30/19 127.9 kg (282 lb)   05/08/19 130.8 kg (288 lb 6.4 oz)   01/18/19 127.9 kg (282 lb)                  Patient Active Problem List   Diagnosis     Impotence of organic origin     Retinal detachment     Dermatophytosis of body     BPH (benign prostatic hyperplasia)     Hyperlipidemia with target LDL less than 130     Coloboma, optic disc, congenital, bilateral     Mediastinal lymphadenopathy     Mesenteric lymphadenopathy     Intra-abdominal lymphadenopathy     Non-Hodgkin's lymphoma of multiple sites (H)     Lymphoma, small lymphoid, follicular (H)     Inguinal hernia, incarcerated     Acute renal failure (ARF) (H)     New onset atrial fibrillation (H)     Small bowel obstruction (H)     Follicular lymphoma (H)     Atrial fibrillation (H)     Counseling regarding advanced directives     Morbid obesity (H)     Chronic cough     Sinusitis chronic, ethmoidal     Sleep disturbance     Snoring     Past Surgical History:   Procedure Laterality Date     COLONOSCOPY N/A 3/1/2017    Procedure: COLONOSCOPY;  Surgeon: Dakota Wall MD;  Location:  GI     ESOPHAGOSCOPY, GASTROSCOPY, DUODENOSCOPY (EGD), COMBINED  4/21/2013    Procedure: COMBINED ESOPHAGOSCOPY, GASTROSCOPY, DUODENOSCOPY (EGD), BIOPSY SINGLE OR MULTIPLE;;  Surgeon: Torrey Cosme MD;  Location:   GI     ESOPHAGOSCOPY, GASTROSCOPY, DUODENOSCOPY (EGD), COMBINED  10/9/2013    Procedure: COMBINED ESOPHAGOSCOPY, GASTROSCOPY, DUODENOSCOPY (EGD), BIOPSY SINGLE OR MULTIPLE;  ESOPHAGOSCOPY, GASTROSCOPY, DUODENOSCOPY (EGD) with multiple biopsies;  Surgeon: Shay Sauer MD;  Location:  GI     HC COLONOSCOPY W/WO BRUSH/WASH  07/12/06     LAPAROTOMY EXPLORATORY  4/24/2013    Procedure: LAPAROTOMY EXPLORATORY;  Exploratory Laparotomy and lysis of adhesions.;  Surgeon: Genevieve Barros MD;  Location: [facility]       Social History     Tobacco Use     Smoking status: Never Smoker     Smokeless tobacco: Never Used   Substance Use Topics     Alcohol use: Yes     Alcohol/week: 2.0 oz     Comment: occasionally weekends     Family History   Problem Relation Age of Onset     Cancer Father         liver/kidney     C.A.D. Father      Pacemaker Brother      Arthritis Mother         RA         Current Outpatient Medications   Medication Sig Dispense Refill     Acetaminophen (TYLENOL PO) Take 1,000 mg by mouth       aspirin 81 MG tablet Take 81 mg by mouth daily       cetirizine (ZYRTEC) 10 MG tablet Take 10 mg by mouth daily       Chlorpheniramine-Phenylephrine 4-10 MG TABS Take 1 tablet by mouth every 6 hours as needed (cough) 60 tablet 3     order for DME Equipment being ordered: knee sleeve with horseshoe, XL 1 Device 0     simvastatin (ZOCOR) 20 MG tablet TAKE 1 TABLET BY MOUTH DAILY AT BEDTIME 90 tablet 1     albuterol (PROAIR HFA/PROVENTIL HFA/VENTOLIN HFA) 108 (90 BASE) MCG/ACT Inhaler Inhale 2 puffs into the lungs every 6 hours as needed for shortness of breath / dyspnea or wheezing (Patient not taking: Reported on 10/25/2018) 1 Inhaler 1     famotidine (PEPCID) 20 MG tablet Take 1 tablet (20 mg) by mouth 2 times daily (Patient not taking: Reported on 5/30/2019) 60 tablet 1     fluticasone (FLONASE) 50 MCG/ACT spray Spray 1-2 sprays into both nostrils daily (Patient not taking: Reported on 5/30/2019) 16 g 11      Allergies   Allergen Reactions     Allopurinol Rash     Recent Labs   Lab Test 04/18/19  0737 10/25/18  0740 04/18/18  0807 03/01/18  0830  02/10/17  1107  09/17/15  1038  05/06/14  0855  04/17/13  1821   A1C  --   --   --   --   --   --   --  5.4  --   --   --   --    LDL  --   --   --  25  --  48  --  64  --   --   --   --    HDL  --   --   --  29*  --  27*  --  28*  --   --   --   --    TRIG  --   --   --  192*  --  229*  --  147  --   --   --   --    ALT 36 29 30  --    < > 55   < >  --    < >  --    < > 35   CR 0.94 0.99 0.82  --    < > 0.86   < >  --    < >  --    < > 2.41*   GFRESTIMATED 83 75 >90  --    < > 90   < >  --    < >  --    < > 27*   GFRESTBLACK >90 >90 >90  --    < > >90   GFR Calc     < >  --    < >  --    < > 33*   POTASSIUM 4.0 4.1 4.3  --    < > 4.4   < >  --    < >  --    < > 4.0   TSH  --   --   --   --   --   --   --   --   --  1.60  --  3.57    < > = values in this interval not displayed.        Review of Systems   Constitutional: Negative for chills and fever.   HENT: Positive for congestion and hearing loss. Negative for ear pain and sore throat.    Eyes: Positive for pain and visual disturbance.   Respiratory: Positive for cough and shortness of breath.    Cardiovascular: Negative for chest pain, palpitations and peripheral edema.   Gastrointestinal: Negative for abdominal pain, constipation, diarrhea, heartburn, hematochezia and nausea.   Genitourinary: Positive for impotence and urgency. Negative for discharge, dysuria, frequency, genital sores and hematuria.   Musculoskeletal: Positive for arthralgias and myalgias. Negative for joint swelling.   Skin: Negative for rash.   Neurological: Positive for dizziness and weakness. Negative for headaches and paresthesias.   Psychiatric/Behavioral: Negative for mood changes. The patient is not nervous/anxious.        OBJECTIVE:   There were no vitals taken for this visit. Estimated body mass index is 38.58 kg/m  as  "calculated from the following:    Height as of 5/8/19: 1.842 m (6' 0.5\").    Weight as of 5/8/19: 130.8 kg (288 lb 6.4 oz).  Physical Exam  GENERAL: healthy, alert and no distress  EYES: Eyes grossly normal to inspection, PERRL and conjunctivae and sclerae normal  HENT: ear canals and TM's normal, nose and mouth without ulcers or lesions  NECK: no adenopathy, no asymmetry, masses, or scars and thyroid normal to palpation  RESP: lungs clear to auscultation - no rales, rhonchi or wheezes  CV: regular rate and rhythm, normal S1 S2, no S3 or S4, no murmur, click or rub, no peripheral edema and peripheral pulses strong  ABDOMEN: soft, nontender, no hepatosplenomegaly, no masses and bowel sounds normal  MS: no gross musculoskeletal defects noted, no edema  SKIN: no suspicious lesions or rashes  NEURO: Normal strength and tone, mentation intact and speech normal  PSYCH: mentation appears normal, affect normal/bright    Diagnostic Test Results:  Labs reviewed in Epic  No results found for this or any previous visit (from the past 24 hour(s)).    ASSESSMENT / PLAN:   1. Routine general medical examination at a health care facility  - Lipid panel reflex to direct LDL Fasting; Future  - Comprehensive metabolic panel; Future  - PSA, screen; Future    2. Screening for hyperlipidemia  - Lipid panel reflex to direct LDL Fasting; Future    3. Hyperlipidemia with target LDL less than 130  - simvastatin (ZOCOR) 20 MG tablet; TAKE 1 TABLET BY MOUTH DAILY AT BEDTIME  Dispense: 90 tablet; Refill: 1  - Echocardiogram Exercise Stress; Future    4. Atrial fibrillation, unspecified type (H)  - Lipid panel reflex to direct LDL Fasting; Future  - simvastatin (ZOCOR) 20 MG tablet; TAKE 1 TABLET BY MOUTH DAILY AT BEDTIME  Dispense: 90 tablet; Refill: 1  - Comprehensive metabolic panel; Future  - Echocardiogram Exercise Stress; Future    5. Morbid obesity (H)  6. Sleep disturbance  7. Snoring  Further evaluation per sleep specialist but I would " "encourage a sleep study be done.  - SLEEP EVALUATION & MANAGEMENT REFERRAL - ADULT -[facility] [place] [phone] (Age 14 if over 100 lbs); Future  - Echocardiogram Exercise Stress; Future    8. Other specified abnormal findings of blood chemistry   - Lipid panel reflex to direct LDL Fasting; Future    9. Encounter for screening for malignant neoplasm of prostate   - PSA, screen; Future    10. Fecal smearing  Advised that he trial Keagle maneuvers on a regular basis and can consider seeing a proctologist in the near future if this does not help.    11. Elevated blood pressure reading without diagnosis of hypertension  Recheck in 3-4 weeks after ECHO is completed.      End of Life Planning:  Patient currently has an advanced directive: No.  I have verified the patient's ablity to prepare an advanced directive/make health care decisions.  Literature was provided to assist patient in preparing an advanced directive.    COUNSELING:  Reviewed preventive health counseling, as reflected in patient instructions       Consider AAA screening for ages 65-75 and smoking history       Regular exercise       Healthy diet/nutrition       Vision screening       Hearing screening       Dental care       Bladder control       Fall risk prevention    Estimated body mass index is 38.58 kg/m  as calculated from the following:    Height as of 5/8/19: 1.842 m (6' 0.5\").    Weight as of 5/8/19: 130.8 kg (288 lb 6.4 oz).    Weight management plan: Discussed healthy diet and exercise guidelines     reports that he has never smoked. He has never used smokeless tobacco.      Appropriate preventive services were discussed with this patient, including applicable screening as appropriate for cardiovascular disease, diabetes, osteopenia/osteoporosis, and glaucoma.  As appropriate for age/gender, discussed screening for colorectal cancer, prostate cancer, breast cancer, and cervical cancer. Checklist reviewing preventive services " available has been given to the patient.    Reviewed patients plan of care and provided an AVS. The Intermediate Care Plan ( asthma action plan, low back pain action plan, and migraine action plan) for Deandre meets the Care Plan requirement. This Care Plan has been established and reviewed with the Patient.    Counseling Resources:  ATP IV Guidelines  Pooled Cohorts Equation Calculator  Breast Cancer Risk Calculator  FRAX Risk Assessment  ICSI Preventive Guidelines  Dietary Guidelines for Americans, 2010  USDA's MyPlate  ASA Prophylaxis  Lung CA Screening    Sly Urias PA-C  [facility]    Identified Health Risks:

## 2019-06-03 ENCOUNTER — HOSPITAL ENCOUNTER (OUTPATIENT)
Dept: CARDIOLOGY | Facility: CLINIC | Age: 69
Discharge: HOME | End: 2019-06-03
Attending: PHYSICIAN ASSISTANT | Admitting: PHYSICIAN ASSISTANT
Payer: MEDICARE

## 2019-06-03 DIAGNOSIS — G47.9 SLEEP DISTURBANCE: ICD-10-CM

## 2019-06-03 DIAGNOSIS — R06.83 SNORING: ICD-10-CM

## 2019-06-03 DIAGNOSIS — E66.01 MORBID OBESITY (H): ICD-10-CM

## 2019-06-03 DIAGNOSIS — I48.91 ATRIAL FIBRILLATION, UNSPECIFIED TYPE (H): ICD-10-CM

## 2019-06-03 DIAGNOSIS — E78.5 HYPERLIPIDEMIA WITH TARGET LDL LESS THAN 130: ICD-10-CM

## 2019-06-03 PROCEDURE — 93018 CV STRESS TEST I&R ONLY: CPT | Performed by: INTERNAL MEDICINE

## 2019-06-03 PROCEDURE — 93350 STRESS TTE ONLY: CPT | Mod: 26 | Performed by: INTERNAL MEDICINE

## 2019-06-03 PROCEDURE — 93325 DOPPLER ECHO COLOR FLOW MAPG: CPT | Mod: 26 | Performed by: INTERNAL MEDICINE

## 2019-06-03 PROCEDURE — 93016 CV STRESS TEST SUPVJ ONLY: CPT | Performed by: INTERNAL MEDICINE

## 2019-06-03 PROCEDURE — 93321 DOPPLER ECHO F-UP/LMTD STD: CPT | Mod: 26 | Performed by: INTERNAL MEDICINE

## 2019-06-03 PROCEDURE — 40000264 ECHO STRESS ECHOCARDIOGRAM

## 2019-06-03 PROCEDURE — 25500064 ZZH RX 255 OP 636: Performed by: INTERNAL MEDICINE

## 2019-06-03 RX ADMIN — HUMAN ALBUMIN MICROSPHERES AND PERFLUTREN 4 ML: 10; .22 INJECTION, SOLUTION INTRAVENOUS at 10:14

## 2019-06-10 ENCOUNTER — TELEPHONE (OUTPATIENT)
Dept: FAMILY MEDICINE | Facility: OTHER | Age: 69
End: 2019-06-10

## 2019-06-10 DIAGNOSIS — R94.39 ABNORMAL CARDIOVASCULAR STRESS TEST: Primary | ICD-10-CM

## 2019-06-10 DIAGNOSIS — R03.0 ELEVATED BLOOD PRESSURE READING WITHOUT DIAGNOSIS OF HYPERTENSION: ICD-10-CM

## 2019-06-10 DIAGNOSIS — G47.9 SLEEP DISTURBANCE: ICD-10-CM

## 2019-06-10 DIAGNOSIS — I48.91 NEW ONSET ATRIAL FIBRILLATION (H): ICD-10-CM

## 2019-06-10 DIAGNOSIS — I48.19 PERSISTENT ATRIAL FIBRILLATION (H): ICD-10-CM

## 2019-06-10 NOTE — PROGRESS NOTES
Left a message for a call back.  We need to discuss results and the need for a nuclear med test to check for cardiac ischemia.  It would appear that he has a CT pending through his oncologist and that can take a look at his aorta as delineated by the cardiologist who read his stress test.  Also placed a order for a cardiology consult in [place] for further evaluation and treatment options.  He may indeed benefit from a beta-blocker.  Electronically signed:    Sly Urias PA-C

## 2019-06-10 NOTE — TELEPHONE ENCOUNTER
Patient is scheduled for Lexiscan for 6/13/19 arrive at 8:00 [place].  No caffeiine 12 hours prior, NPO 3 hours prior.     Patient scheduled for cardiology consult for 7/3/19 at 10:[address] [place].      Left message for patient to call back to let him know.

## 2019-06-10 NOTE — TELEPHONE ENCOUNTER
Notes recorded by Sly Forbes PA-C on 6/10/2019 at 9:07 AM CDT  Spoke with the patient and discussed getting a nuclear medicine Lexiscan test done as well as a cardiology consult.  Will have staff reach out and help him arrange both of these things in follow-up.  Electronically signed:    Sly Forbes PA-C

## 2019-06-13 ENCOUNTER — HOSPITAL ENCOUNTER (OUTPATIENT)
Dept: NUCLEAR MEDICINE | Facility: CLINIC | Age: 69
Setting detail: NUCLEAR MEDICINE
Discharge: HOME | End: 2019-06-13
Attending: PHYSICIAN ASSISTANT | Admitting: PHYSICIAN ASSISTANT
Payer: MEDICARE

## 2019-06-13 DIAGNOSIS — I48.91 NEW ONSET ATRIAL FIBRILLATION (H): ICD-10-CM

## 2019-06-13 DIAGNOSIS — R79.89 OTHER SPECIFIED ABNORMAL FINDINGS OF BLOOD CHEMISTRY: ICD-10-CM

## 2019-06-13 DIAGNOSIS — R94.39 ABNORMAL CARDIOVASCULAR STRESS TEST: ICD-10-CM

## 2019-06-13 DIAGNOSIS — Z12.5 ENCOUNTER FOR SCREENING FOR MALIGNANT NEOPLASM OF PROSTATE: ICD-10-CM

## 2019-06-13 DIAGNOSIS — I48.19 PERSISTENT ATRIAL FIBRILLATION (H): ICD-10-CM

## 2019-06-13 DIAGNOSIS — G47.9 SLEEP DISTURBANCE: ICD-10-CM

## 2019-06-13 DIAGNOSIS — I48.91 ATRIAL FIBRILLATION, UNSPECIFIED TYPE (H): ICD-10-CM

## 2019-06-13 DIAGNOSIS — Z13.220 SCREENING FOR HYPERLIPIDEMIA: ICD-10-CM

## 2019-06-13 DIAGNOSIS — Z00.00 ROUTINE GENERAL MEDICAL EXAMINATION AT A HEALTH CARE FACILITY: ICD-10-CM

## 2019-06-13 DIAGNOSIS — R03.0 ELEVATED BLOOD PRESSURE READING WITHOUT DIAGNOSIS OF HYPERTENSION: ICD-10-CM

## 2019-06-13 LAB
ALBUMIN SERPL-MCNC: 3.9 G/DL (ref 3.4–5)
ALP SERPL-CCNC: 107 U/L (ref 40–150)
ALT SERPL W P-5'-P-CCNC: 34 U/L (ref 0–70)
ANION GAP SERPL CALCULATED.3IONS-SCNC: 8 MMOL/L (ref 3–14)
AST SERPL W P-5'-P-CCNC: 32 U/L (ref 0–45)
BILIRUB SERPL-MCNC: 1 MG/DL (ref 0.2–1.3)
BUN SERPL-MCNC: 17 MG/DL (ref 7–30)
CALCIUM SERPL-MCNC: 8.4 MG/DL (ref 8.5–10.1)
CHLORIDE SERPL-SCNC: 105 MMOL/L (ref 94–109)
CHOLEST SERPL-MCNC: 92 MG/DL
CO2 SERPL-SCNC: 26 MMOL/L (ref 20–32)
CREAT SERPL-MCNC: 1.01 MG/DL (ref 0.66–1.25)
GFR SERPL CREATININE-BSD FRML MDRD: 76 ML/MIN/{1.73_M2}
GLUCOSE SERPL-MCNC: 117 MG/DL (ref 70–99)
HDLC SERPL-MCNC: 30 MG/DL
LDLC SERPL CALC-MCNC: 31 MG/DL
NONHDLC SERPL-MCNC: 62 MG/DL
POTASSIUM SERPL-SCNC: 4 MMOL/L (ref 3.4–5.3)
PROT SERPL-MCNC: 6.5 G/DL (ref 6.8–8.8)
PSA SERPL-ACNC: 2.22 UG/L (ref 0–4)
SODIUM SERPL-SCNC: 139 MMOL/L (ref 133–144)
TRIGL SERPL-MCNC: 155 MG/DL

## 2019-06-13 PROCEDURE — 34300033 ZZH RX 343: Performed by: PHYSICIAN ASSISTANT

## 2019-06-13 PROCEDURE — 36415 COLL VENOUS BLD VENIPUNCTURE: CPT | Performed by: PHYSICIAN ASSISTANT

## 2019-06-13 PROCEDURE — 80053 COMPREHEN METABOLIC PANEL: CPT | Performed by: PHYSICIAN ASSISTANT

## 2019-06-13 PROCEDURE — A9502 TC99M TETROFOSMIN: HCPCS | Performed by: PHYSICIAN ASSISTANT

## 2019-06-13 PROCEDURE — 93016 CV STRESS TEST SUPVJ ONLY: CPT | Performed by: INTERNAL MEDICINE

## 2019-06-13 PROCEDURE — 78452 HT MUSCLE IMAGE SPECT MULT: CPT

## 2019-06-13 PROCEDURE — G0103 PSA SCREENING: HCPCS | Performed by: PHYSICIAN ASSISTANT

## 2019-06-13 PROCEDURE — 78452 HT MUSCLE IMAGE SPECT MULT: CPT | Mod: 26 | Performed by: INTERNAL MEDICINE

## 2019-06-13 PROCEDURE — 93018 CV STRESS TEST I&R ONLY: CPT | Performed by: INTERNAL MEDICINE

## 2019-06-13 PROCEDURE — 25000128 H RX IP 250 OP 636: Performed by: PHYSICIAN ASSISTANT

## 2019-06-13 PROCEDURE — 80061 LIPID PANEL: CPT | Performed by: PHYSICIAN ASSISTANT

## 2019-06-13 PROCEDURE — 93017 CV STRESS TEST TRACING ONLY: CPT | Performed by: INTERNAL MEDICINE

## 2019-06-13 RX ORDER — REGADENOSON 0.08 MG/ML
0.4 INJECTION, SOLUTION INTRAVENOUS ONCE
Status: COMPLETED | OUTPATIENT
Start: 2019-06-13 | End: 2019-06-13

## 2019-06-13 RX ADMIN — REGADENOSON 0.4 MG: 0.08 INJECTION, SOLUTION INTRAVENOUS at 10:17

## 2019-06-13 RX ADMIN — Medication 34.1 MILLICURIE: at 09:15

## 2019-06-18 ENCOUNTER — HOSPITAL ENCOUNTER (OUTPATIENT)
Dept: NUCLEAR MEDICINE | Facility: CLINIC | Age: 69
Setting detail: NUCLEAR MEDICINE
Discharge: HOME | End: 2019-06-18
Attending: PHYSICIAN ASSISTANT | Admitting: PHYSICIAN ASSISTANT
Payer: MEDICARE

## 2019-06-18 PROCEDURE — A9502 TC99M TETROFOSMIN: HCPCS | Performed by: PHYSICIAN ASSISTANT

## 2019-06-18 PROCEDURE — 34300033 ZZH RX 343: Performed by: PHYSICIAN ASSISTANT

## 2019-06-18 RX ADMIN — Medication 32.9 MILLICURIE: at 08:55

## 2019-07-02 ENCOUNTER — OFFICE VISIT (OUTPATIENT)
Dept: CARDIOLOGY | Facility: CLINIC | Age: 69
End: 2019-07-02
Payer: MEDICARE

## 2019-07-02 VITALS
RESPIRATION RATE: 18 BRPM | HEART RATE: 97 BPM | DIASTOLIC BLOOD PRESSURE: 78 MMHG | HEIGHT: 72 IN | WEIGHT: 286.2 LBS | OXYGEN SATURATION: 93 % | BODY MASS INDEX: 38.76 KG/M2 | SYSTOLIC BLOOD PRESSURE: 140 MMHG

## 2019-07-02 DIAGNOSIS — I10 ESSENTIAL HYPERTENSION: ICD-10-CM

## 2019-07-02 DIAGNOSIS — I77.810 ASCENDING AORTA DILATATION (H): Primary | ICD-10-CM

## 2019-07-02 DIAGNOSIS — E78.5 HYPERLIPIDEMIA WITH TARGET LDL LESS THAN 130: ICD-10-CM

## 2019-07-02 PROCEDURE — 99203 OFFICE O/P NEW LOW 30 MIN: CPT | Performed by: INTERNAL MEDICINE

## 2019-07-02 RX ORDER — METOPROLOL SUCCINATE 50 MG/1
50 TABLET, EXTENDED RELEASE ORAL DAILY
Qty: 90 TABLET | Refills: 3 | Status: SHIPPED | OUTPATIENT
Start: 2019-07-02 | End: 2020-07-01

## 2019-07-02 ASSESSMENT — PAIN SCALES - GENERAL: PAINLEVEL: NO PAIN (0)

## 2019-07-02 ASSESSMENT — MIFFLIN-ST. JEOR: SCORE: 2106.83

## 2019-07-02 NOTE — PROGRESS NOTES
HISTORY:    Deandre Merchant is a very pleasant 69-year-old male with a history of paroxysmal atrial fibrillation complicating chemotherapy 5 years ago.  At the time he had B-cell lymphoma and was undergoin therapy with several episodes of asymptomatic atrial fibrillation seen on monitor.  He was anticoagulated for about 6 months but a 30-day monitor showed no going atrial fibrillation episodes and his anticoagulation was discontinued.  He has had no other cardiac issues.  At the time of his fibrillation his echo showed an grid and an MRI showed a size of 4.7 cm ascending aorta.    The patient recently was seen in clinic for routine visit and was noted to have a history of hyperlipidemia for which she has been on simvastatin for many years.  A stress echo was ordered, although no symptoms of angina were recorded.  That stress echo was of suboptimal quality due to the patient's large size, but there was a suspicion of possible apical ischemia noted.  A nuclear stress test was therefore done.  That study showed no inducible or reversible ischemia but there was some apical thinning noted.  The patient was asked to see cardiology for further evaluation.    Mr. Merchant reports that he has been doing well.  He denies any exertional chest, arm, neck discomfort.  He tells that his energy level is good.  He has had some sleeping issues with loud snoring and some apparent apneic spells for which he is scheduled to see a sleep specialist in the near future.  He has not had any sense of palpitations and he denies any symptoms of syncope/near syncope, strokelike symptoms, orthostasis, PND/orthopnea, or claudication.  He does have some mild peripheral edema which is unchanged in recent years and is dependent.  Overall, from a cardiovascular standpoint, he is completely asymptomatic.    Cardiac risk factors include a family history of coronary artery disease with his father undergoing bypass surgery in his 60s, as well as a history of  apparent hyperlipidemia for which she has been using simvastatin for many years.  Current lipid profile is excellent with a total cholesterol of 92 and an LDL of just 31 with an HDL of 30.  Patient has never been a smoker and has no history of diabetes or hypertension.  The recent past his blood pressure has been noted to be marginally increased.      ASSESSMENT/PLAN:    1.  Abnormal stress echo.  The nuclear stress test does not show any inducible ischemia and the patient has no symptoms consistent with coronary artery disease.  The nuclear stress test is reassuring and no further studies are necessary.  2.  Enlarged a sending aorta.  This was once again seen on the stress echo.  An MRA of the aorta will be arranged to reevaluate size.  His last study done in 2014 showed the aortic size of about 4.7 cm, which had been stable over the previous 6 months.  The stress echo suggested approximately the same size of this aorta now, so hopefully there has been no further enlargement.  Given his borderline blood pressure and that we initiate beta-blocker for blood pressure control.  I have started him on Toprol-XL 50 mg daily and warned him of potential side effects.  3.  History of atrial fibrillation.  This occurred at the time he was undergoing chemotherapy but this likely indicates an increased risk of atrial fibrillation in the future.  He was, unfortunately, asymptomatic when he had it so it is unlikely that he will recognize it if it occurs again.  I taught him how to check his own pulse and suggested that he do so every evening when he brushes his teeth.  He will let us know if he senses an irregular pulse.    Thank you for inviting me to participate in your patient's care.  Please do not hesitate to call if I can be of further assistance.  One-year follow-up will be planned.    Orders Placed This Encounter   Procedures     MRA Chest wo & w Contrast     Follow-Up with Cardiologist     Orders Placed This Encounter    Medications     metoprolol succinate ER (TOPROL-XL) 50 MG 24 hr tablet     Sig: Take 1 tablet (50 mg) by mouth daily     Dispense:  90 tablet     Refill:  3     There are no discontinued medications.    10 year ASCVD risk: The ASCVD Risk score ([place] HERNÁNDEZ Jr., et al., 2013) failed to calculate for the following reasons:    The valid total cholesterol range is 130 to 320 mg/dL    Encounter Diagnoses   Name Primary?     Ascending aorta dilatation (H) Yes     Hyperlipidemia with target LDL less than 130      Essential hypertension        CURRENT MEDICATIONS:  Current Outpatient Medications   Medication Sig Dispense Refill     Acetaminophen (TYLENOL PO) Take 1,000 mg by mouth       aspirin 81 MG tablet Take 81 mg by mouth daily        cetirizine (ZYRTEC) 10 MG tablet Take 10 mg by mouth daily       Chlorpheniramine-Phenylephrine 4-10 MG TABS Take 1 tablet by mouth every 6 hours as needed (cough) 60 tablet 3     metoprolol succinate ER (TOPROL-XL) 50 MG 24 hr tablet Take 1 tablet (50 mg) by mouth daily 90 tablet 3     simvastatin (ZOCOR) 20 MG tablet TAKE 1 TABLET BY MOUTH DAILY AT BEDTIME 90 tablet 1     albuterol (PROAIR HFA/PROVENTIL HFA/VENTOLIN HFA) 108 (90 BASE) MCG/ACT Inhaler Inhale 2 puffs into the lungs every 6 hours as needed for shortness of breath / dyspnea or wheezing (Patient not taking: Reported on 10/25/2018) 1 Inhaler 1     famotidine (PEPCID) 20 MG tablet Take 1 tablet (20 mg) by mouth 2 times daily (Patient not taking: Reported on 5/30/2019) 60 tablet 1     fluticasone (FLONASE) 50 MCG/ACT spray Spray 1-2 sprays into both nostrils daily (Patient not taking: Reported on 5/30/2019) 16 g 11     order for DME Equipment being ordered: knee sleeve with horseshoe, XL 1 Device 0       ALLERGIES     Allergies   Allergen Reactions     Allopurinol Rash       PAST MEDICAL HISTORY:  Past Medical History:   Diagnosis Date     BPH (benign prostatic hyperplasia)      Lymphadenopathy     mediastinal & retroperitoneal      Non Hodgkin's lymphoma (H)      Polyps, colonic      Retinal detachment        PAST SURGICAL HISTORY:  Past Surgical History:   Procedure Laterality Date     COLONOSCOPY N/A 3/1/2017    Procedure: COLONOSCOPY;  Surgeon: Dakota Wall MD;  Location:  GI     ESOPHAGOSCOPY, GASTROSCOPY, DUODENOSCOPY (EGD), COMBINED  4/21/2013    Procedure: COMBINED ESOPHAGOSCOPY, GASTROSCOPY, DUODENOSCOPY (EGD), BIOPSY SINGLE OR MULTIPLE;;  Surgeon: Torrey Cosme MD;  Location:  GI     ESOPHAGOSCOPY, GASTROSCOPY, DUODENOSCOPY (EGD), COMBINED  10/9/2013    Procedure: COMBINED ESOPHAGOSCOPY, GASTROSCOPY, DUODENOSCOPY (EGD), BIOPSY SINGLE OR MULTIPLE;  ESOPHAGOSCOPY, GASTROSCOPY, DUODENOSCOPY (EGD) with multiple biopsies;  Surgeon: Shay Sauer MD;  Location:  GI     HC COLONOSCOPY W/WO BRUSH/WASH  07/12/06     LAPAROTOMY EXPLORATORY  4/24/2013    Procedure: LAPAROTOMY EXPLORATORY;  Exploratory Laparotomy and lysis of adhesions.;  Surgeon: Genevieve Barros MD;  Location:  OR       FAMILY HISTORY:  Family History   Problem Relation Age of Onset     Cancer Father         liver/kidney     C.A.D. Father      Pacemaker Brother      Arthritis Mother         RA       SOCIAL HISTORY:  Social History     Socioeconomic History     Marital status:      Spouse name: Cristel     Number of children: 2     Years of education: None     Highest education level: None   Occupational History     None   Social Needs     Financial resource strain: None     Food insecurity:     Worry: None     Inability: None     Transportation needs:     Medical: None     Non-medical: None   Tobacco Use     Smoking status: Never Smoker     Smokeless tobacco: Never Used   Substance and Sexual Activity     Alcohol use: Yes     Alcohol/week: 2.0 oz     Comment: occasionally weekends     Drug use: No     Sexual activity: Not Currently     Partners: Female   Lifestyle     Physical activity:     Days per week: None     Minutes per session: None  "    Stress: None   Relationships     Social connections:     Talks on phone: None     Gets together: None     Attends Gnosticist service: None     Active member of club or organization: None     Attends meetings of clubs or organizations: None     Relationship status: None     Intimate partner violence:     Fear of current or ex partner: None     Emotionally abused: None     Physically abused: None     Forced sexual activity: None   Other Topics Concern      Service Not Asked     Blood Transfusions Not Asked     Caffeine Concern No     Occupational Exposure Not Asked     Hobby Hazards Not Asked     Sleep Concern No     Stress Concern No     Weight Concern No     Special Diet Not Asked     Back Care Not Asked     Exercise Yes     Bike Helmet Not Asked     Seat Belt Yes     Self-Exams Not Asked     Parent/sibling w/ CABG, MI or angioplasty before 65F 55M? Not Asked   Social History Narrative     None       Review of Systems:  Skin:  Negative     Eyes:  Positive for glasses  ENT:  Negative    Respiratory:  Positive for mucoid expectorant;clear expectorant  Cardiovascular:  Negative for;palpitations;chest pain;syncope or near-syncope;cyanosis;exercise intolerance;fatigue;lightheadedness;dizziness Positive for;edema  Gastroenterology: Negative    Genitourinary:  Negative    Musculoskeletal:  Negative    Neurologic:  Negative    Psychiatric:  Negative    Heme/Lymph/Imm:  Negative    Endocrine:  Negative      Physical Exam:  Vitals: /78   Pulse 97   Resp 18   Ht 1.83 m (6' 0.04\")   Wt 129.8 kg (286 lb 3.2 oz)   SpO2 93%   BMI 38.77 kg/m      Constitutional:  cooperative, alert and oriented, well developed, well nourished, in no acute distress overweight      Skin:  warm and dry to the touch        Head:  normocephalic, no masses or lesions        Eyes:  no xanthalasma        ENT:  no pallor or cyanosis        Neck:  carotid pulses are full and equal bilaterally, JVP normal, no carotid bruit        Chest: "  normal breath sounds, clear to auscultation, normal A-P diameter, normal symmetry, normal respiratory excursion, no use of accessory muscles        Cardiac: regular rhythm, normal S1/S2, no S3 or S4, apical impulse not displaced, no murmurs, gallops or rubs                  Abdomen:  abdomen soft;BS normoactive        Vascular: pulses full and equal                                      Extremities and Back:           Neurological:  no gross motor deficits          Recent Lab Results:  LIPID RESULTS:  Lab Results   Component Value Date    CHOL 92 06/13/2019    HDL 30 (L) 06/13/2019    LDL 31 06/13/2019    TRIG 155 (H) 06/13/2019    CHOLHDLRATIO 4.3 09/17/2015       LIVER ENZYME RESULTS:  Lab Results   Component Value Date    AST 32 06/13/2019    ALT 34 06/13/2019       CBC RESULTS:  Lab Results   Component Value Date    WBC 6.5 04/18/2019    RBC 4.75 04/18/2019    HGB 14.6 04/18/2019    HCT 43.0 04/18/2019    MCV 91 04/18/2019    MCH 30.7 04/18/2019    MCHC 34.0 04/18/2019    RDW 12.6 04/18/2019     04/18/2019       BMP RESULTS:  Lab Results   Component Value Date     06/13/2019    POTASSIUM 4.0 06/13/2019    CHLORIDE 105 06/13/2019    CO2 26 06/13/2019    ANIONGAP 8 06/13/2019     (H) 06/13/2019    BUN 17 06/13/2019    CR 1.01 06/13/2019    GFRESTIMATED 76 06/13/2019    GFRESTBLACK 88 06/13/2019    ANDRÉS 8.4 (L) 06/13/2019        A1C RESULTS:  Lab Results   Component Value Date    A1C 5.4 09/17/2015       INR RESULTS:  Lab Results   Component Value Date    INR 2.2 (H) 08/26/2014    INR 1.5 (A) 08/19/2014    INR 2.0 (H) 07/29/2014    INR 3.2 (A) 07/14/2014    INR 1.26 (H) 05/01/2013    INR 1.50 (H) 04/26/2013         Eddie Trujillo MD, FACC    CC  Sly Forbes PA-C  [id number] [place] [address]

## 2019-07-02 NOTE — LETTER
7/2/2019    [place]  [address] [place] [address]    RE: Deandre Merchant       Dear Colleague,    I had the pleasure of seeing Deandre Merchant in the [facility].    HISTORY:    Deandre Merchant is a very pleasant 69-year-old male with a history of paroxysmal atrial fibrillation complicating chemotherapy 5 years ago.  At the time he had B-cell lymphoma and was undergoin therapy with several episodes of asymptomatic atrial fibrillation seen on monitor.  He was anticoagulated for about 6 months but a 30-day monitor showed no going atrial fibrillation episodes and his anticoagulation was discontinued.  He has had no other cardiac issues.  At the time of his fibrillation his echo showed an grid and an MRI showed a size of 4.7 cm ascending aorta.    The patient recently was seen in clinic for routine visit and was noted to have a history of hyperlipidemia for which she has been on simvastatin for many years.  A stress echo was ordered, although no symptoms of angina were recorded.  That stress echo was of suboptimal quality due to the patient's large size, but there was a suspicion of possible apical ischemia noted.  A nuclear stress test was therefore done.  That study showed no inducible or reversible ischemia but there was some apical thinning noted.  The patient was asked to see cardiology for further evaluation.    Mr. Merchant reports that he has been doing well.  He denies any exertional chest, arm, neck discomfort.  He tells that his energy level is good.  He has had some sleeping issues with loud snoring and some apparent apneic spells for which he is scheduled to see a sleep specialist in the near future.  He has not had any sense of palpitations and he denies any symptoms of syncope/near syncope, strokelike symptoms, orthostasis, PND/orthopnea, or claudication.  He does have some mild peripheral edema which is unchanged in recent years and is dependent.   Overall, from a cardiovascular standpoint, he is completely asymptomatic.    Cardiac risk factors include a family history of coronary artery disease with his father undergoing bypass surgery in his 60s, as well as a history of apparent hyperlipidemia for which she has been using simvastatin for many years.  Current lipid profile is excellent with a total cholesterol of 92 and an LDL of just 31 with an HDL of 30.  Patient has never been a smoker and has no history of diabetes or hypertension.  The recent past his blood pressure has been noted to be marginally increased.      ASSESSMENT/PLAN:    1.  Abnormal stress echo.  The nuclear stress test does not show any inducible ischemia and the patient has no symptoms consistent with coronary artery disease.  The nuclear stress test is reassuring and no further studies are necessary.  2.  Enlarged a sending aorta.  This was once again seen on the stress echo.  An MRA of the aorta will be arranged to reevaluate size.  His last study done in 2014 showed the aortic size of about 4.7 cm, which had been stable over the previous 6 months.  The stress echo suggested approximately the same size of this aorta now, so hopefully there has been no further enlargement.  Given his borderline blood pressure and that we initiate beta-blocker for blood pressure control.  I have started him on Toprol-XL 50 mg daily and warned him of potential side effects.  3.  History of atrial fibrillation.  This occurred at the time he was undergoing chemotherapy but this likely indicates an increased risk of atrial fibrillation in the future.  He was, unfortunately, asymptomatic when he had it so it is unlikely that he will recognize it if it occurs again.  I taught him how to check his own pulse and suggested that he do so every evening when he brushes his teeth.  He will let us know if he senses an irregular pulse.    Thank you for inviting me to participate in your patient's care.  Please do not  hesitate to call if I can be of further assistance.  One-year follow-up will be planned.    Orders Placed This Encounter   Procedures     MRA Chest wo & w Contrast     Follow-Up with Cardiologist     Orders Placed This Encounter   Medications     metoprolol succinate ER (TOPROL-XL) 50 MG 24 hr tablet     Sig: Take 1 tablet (50 mg) by mouth daily     Dispense:  90 tablet     Refill:  3     There are no discontinued medications.    10 year ASCVD risk: The ASCVD Risk score ([place] EDGAR Jr., et al., 2013) failed to calculate for the following reasons:    The valid total cholesterol range is 130 to 320 mg/dL    Encounter Diagnoses   Name Primary?     Ascending aorta dilatation (H) Yes     Hyperlipidemia with target LDL less than 130      Essential hypertension        CURRENT MEDICATIONS:  Current Outpatient Medications   Medication Sig Dispense Refill     Acetaminophen (TYLENOL PO) Take 1,000 mg by mouth       aspirin 81 MG tablet Take 81 mg by mouth daily        cetirizine (ZYRTEC) 10 MG tablet Take 10 mg by mouth daily       Chlorpheniramine-Phenylephrine 4-10 MG TABS Take 1 tablet by mouth every 6 hours as needed (cough) 60 tablet 3     metoprolol succinate ER (TOPROL-XL) 50 MG 24 hr tablet Take 1 tablet (50 mg) by mouth daily 90 tablet 3     simvastatin (ZOCOR) 20 MG tablet TAKE 1 TABLET BY MOUTH DAILY AT BEDTIME 90 tablet 1     albuterol (PROAIR HFA/PROVENTIL HFA/VENTOLIN HFA) 108 (90 BASE) MCG/ACT Inhaler Inhale 2 puffs into the lungs every 6 hours as needed for shortness of breath / dyspnea or wheezing (Patient not taking: Reported on 10/25/2018) 1 Inhaler 1     famotidine (PEPCID) 20 MG tablet Take 1 tablet (20 mg) by mouth 2 times daily (Patient not taking: Reported on 5/30/2019) 60 tablet 1     fluticasone (FLONASE) 50 MCG/ACT spray Spray 1-2 sprays into both nostrils daily (Patient not taking: Reported on 5/30/2019) 16 g 11     order for DME Equipment being ordered: knee sleeve with horseshoe, XL 1 Device 0        ALLERGIES     Allergies   Allergen Reactions     Allopurinol Rash       PAST MEDICAL HISTORY:  Past Medical History:   Diagnosis Date     BPH (benign prostatic hyperplasia)      Lymphadenopathy     mediastinal & retroperitoneal     Non Hodgkin's lymphoma (H)      Polyps, colonic      Retinal detachment        PAST SURGICAL HISTORY:  Past Surgical History:   Procedure Laterality Date     COLONOSCOPY N/A 3/1/2017    Procedure: COLONOSCOPY;  Surgeon: Dakota Wall MD;  Location: [facility]     ESOPHAGOSCOPY, GASTROSCOPY, DUODENOSCOPY (EGD), COMBINED  4/21/2013    Procedure: COMBINED ESOPHAGOSCOPY, GASTROSCOPY, DUODENOSCOPY (EGD), BIOPSY SINGLE OR MULTIPLE;;  Surgeon: Torrey Cosme MD;  Location: [facility]     ESOPHAGOSCOPY, GASTROSCOPY, DUODENOSCOPY (EGD), COMBINED  10/9/2013    Procedure: COMBINED ESOPHAGOSCOPY, GASTROSCOPY, DUODENOSCOPY (EGD), BIOPSY SINGLE OR MULTIPLE;  ESOPHAGOSCOPY, GASTROSCOPY, DUODENOSCOPY (EGD) with multiple biopsies;  Surgeon: Shay Sauer MD;  Location: [facility]     HC COLONOSCOPY W/WO BRUSH/WASH  07/12/06     LAPAROTOMY EXPLORATORY  4/24/2013    Procedure: LAPAROTOMY EXPLORATORY;  Exploratory Laparotomy and lysis of adhesions.;  Surgeon: Genevieve Barros MD;  Location[facility]       FAMILY HISTORY:  Family History   Problem Relation Age of Onset     Cancer Father         liver/kidney     C.A.D. Father      Pacemaker Brother      Arthritis Mother         RA       SOCIAL HISTORY:  Social History     Socioeconomic History     Marital status:      Spouse name: Cristel     Number of children: 2     Years of education: None     Highest education level: None   Occupational History     None   Social Needs     Financial resource strain: None     Food insecurity:     Worry: None     Inability: None     Transportation needs:     Medical: None     Non-medical: None   Tobacco Use     Smoking status: Never Smoker     Smokeless tobacco: Never Used   Substance and Sexual Activity      "Alcohol use: Yes     Alcohol/week: 2.0 oz     Comment: occasionally weekends     Drug use: No     Sexual activity: Not Currently     Partners: Female   Lifestyle     Physical activity:     Days per week: None     Minutes per session: None     Stress: None   Relationships     Social connections:     Talks on phone: None     Gets together: None     Attends Bahai service: None     Active member of club or organization: None     Attends meetings of clubs or organizations: None     Relationship status: None     Intimate partner violence:     Fear of current or ex partner: None     Emotionally abused: None     Physically abused: None     Forced sexual activity: None   Other Topics Concern      Service Not Asked     Blood Transfusions Not Asked     Caffeine Concern No     Occupational Exposure Not Asked     Hobby Hazards Not Asked     Sleep Concern No     Stress Concern No     Weight Concern No     Special Diet Not Asked     Back Care Not Asked     Exercise Yes     Bike Helmet Not Asked     Seat Belt Yes     Self-Exams Not Asked     Parent/sibling w/ CABG, MI or angioplasty before 65F 55M? Not Asked   Social History Narrative     None       Review of Systems:  Skin:  Negative     Eyes:  Positive for glasses  ENT:  Negative    Respiratory:  Positive for mucoid expectorant;clear expectorant  Cardiovascular:  Negative for;palpitations;chest pain;syncope or near-syncope;cyanosis;exercise intolerance;fatigue;lightheadedness;dizziness Positive for;edema  Gastroenterology: Negative    Genitourinary:  Negative    Musculoskeletal:  Negative    Neurologic:  Negative    Psychiatric:  Negative    Heme/Lymph/Imm:  Negative    Endocrine:  Negative      Physical Exam:  Vitals: /78   Pulse 97   Resp 18   Ht 1.83 m (6' 0.04\")   Wt 129.8 kg (286 lb 3.2 oz)   SpO2 93%   BMI 38.77 kg/m       Constitutional:  cooperative, alert and oriented, well developed, well nourished, in no acute distress overweight      Skin:  " warm and dry to the touch        Head:  normocephalic, no masses or lesions        Eyes:  no xanthalasma        ENT:  no pallor or cyanosis        Neck:  carotid pulses are full and equal bilaterally, JVP normal, no carotid bruit        Chest:  normal breath sounds, clear to auscultation, normal A-P diameter, normal symmetry, normal respiratory excursion, no use of accessory muscles        Cardiac: regular rhythm, normal S1/S2, no S3 or S4, apical impulse not displaced, no murmurs, gallops or rubs                  Abdomen:  abdomen soft;BS normoactive        Vascular: pulses full and equal                                      Extremities and Back:           Neurological:  no gross motor deficits          Recent Lab Results:  LIPID RESULTS:  Lab Results   Component Value Date    CHOL 92 06/13/2019    HDL 30 (L) 06/13/2019    LDL 31 06/13/2019    TRIG 155 (H) 06/13/2019    CHOLHDLRATIO 4.3 09/17/2015       LIVER ENZYME RESULTS:  Lab Results   Component Value Date    AST 32 06/13/2019    ALT 34 06/13/2019       CBC RESULTS:  Lab Results   Component Value Date    WBC 6.5 04/18/2019    RBC 4.75 04/18/2019    HGB 14.6 04/18/2019    HCT 43.0 04/18/2019    MCV 91 04/18/2019    MCH 30.7 04/18/2019    MCHC 34.0 04/18/2019    RDW 12.6 04/18/2019     04/18/2019       BMP RESULTS:  Lab Results   Component Value Date     06/13/2019    POTASSIUM 4.0 06/13/2019    CHLORIDE 105 06/13/2019    CO2 26 06/13/2019    ANIONGAP 8 06/13/2019     (H) 06/13/2019    BUN 17 06/13/2019    CR 1.01 06/13/2019    GFRESTIMATED 76 06/13/2019    GFRESTBLACK 88 06/13/2019    ANDRÉS 8.4 (L) 06/13/2019        A1C RESULTS:  Lab Results   Component Value Date    A1C 5.4 09/17/2015       INR RESULTS:  Lab Results   Component Value Date    INR 2.2 (H) 08/26/2014    INR 1.5 (A) 08/19/2014    INR 2.0 (H) 07/29/2014    INR 3.2 (A) 07/14/2014    INR 1.26 (H) 05/01/2013    INR 1.50 (H) 04/26/2013           Thank you for allowing me to participate  in the care of your patient.    Sincerely,     Eddie Trujillo MD     [facility]    cc:   Sly Forbes PA-C  [address] [place] [address]

## 2019-07-03 NOTE — PROGRESS NOTES
[facility]  [id number] [place] [id number] [phone]  Dept: [phone]    PRE-OP EVALUATION:  Today's date: 2019    Deandre Merchant (: 1950) presents for pre-operative evaluation assessment as requested by Dr. Bridges.  He requires evaluation and anesthesia risk assessment prior to undergoing surgery/procedure for treatment of left cataract.    Fax number for surgical facility: [phone]   Primary Physician: Felix Sevilla  Type of Anesthesia Anticipated: Local with MAC    Patient has a Health Care Directive or Living Will:  YES Not current    Preop Questions 2019   Who is doing your surgery? redeil   What are you having done? cataract   Date of Surgery/Procedure:    Facility or Hospital where procedure/surgery will be performed: [facility]   1.  Do you have a history of Heart attack, stroke, stent, coronary bypass surgery, or other heart surgery? No   2.  Do you ever have any pain or discomfort in your chest? No   3.  Do you have a history of  Heart Failure? No   4.   Are you troubled by shortness of breath when:  walking on a level surface, or up a slight hill, or at night? YES -    5.  Do you currently have a cold, bronchitis or other respiratory infection? No   6.  Do you have a cough, shortness of breath, or wheezing? YES -    7.  Do you sometimes get pains in the calves of your legs when you walk? No   8. Do you or anyone in your family have previous history of blood clots? No   9.  Do you or does anyone in your family have a serious bleeding problem such as prolonged bleeding following surgeries or cuts? No   10. Have you ever had problems with anemia or been told to take iron pills? No   11. Have you had any abnormal blood loss such as black, tarry or bloody stools? No   12. Have you ever had a blood transfusion? UNKNOWN -    13. Have you or any of your relatives ever had problems with anesthesia? No   14. Do you have sleep apnea, excessive  snoring or daytime drowsiness? No   15. Do you have any prosthetic heart valves? No   16. Do you have prosthetic joints? No         HPI:     HPI related to upcoming procedure: left cataract      See problem list for active medical problems.  Problems all longstanding and stable, except as noted/documented.  See ROS for pertinent symptoms related to these conditions.      MEDICAL HISTORY:     Patient Active Problem List    Diagnosis Date Noted     Sleep disturbance 05/30/2019     Priority: Medium     Snoring 05/30/2019     Priority: Medium     Fecal smearing 05/30/2019     Priority: Medium     Elevated blood pressure reading without diagnosis of hypertension 05/30/2019     Priority: Medium     Sinusitis chronic, ethmoidal 05/14/2018     Priority: Medium     Chronic cough 03/26/2018     Priority: Medium     Morbid obesity (H) 02/22/2018     Priority: Medium     Counseling regarding advanced directives 09/17/2015     Priority: Medium     Diagnosis updated by automated process. Provider to review and confirm.       Atrial fibrillation (H) 07/28/2014     Priority: Medium     Follicular lymphoma (H) 04/22/2014     Priority: Medium     Small bowel obstruction (H) 04/24/2013     Priority: Medium     Inguinal hernia, incarcerated 04/18/2013     Priority: Medium     Acute renal failure (ARF) (H) 04/18/2013     Priority: Medium     New onset atrial fibrillation (H) 04/18/2013     Priority: Medium     Non-Hodgkin's lymphoma of multiple sites (H) 06/26/2012     Priority: Medium     Lymphoma, small lymphoid, follicular (H) 06/26/2012     Priority: Medium     Intra-abdominal lymphadenopathy 03/15/2012     Priority: Medium     suspect lymphoma       Mediastinal lymphadenopathy 03/07/2012     Priority: Medium     Mesenteric lymphadenopathy 03/07/2012     Priority: Medium     Coloboma, optic disc, congenital, 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 Provider to review and confirm.       BPH (benign prostatic hyperplasia) 02/24/2010     Priority: Medium     Impotence of organic origin 03/14/2006     Priority: Medium     Retinal detachment 03/14/2006     Priority: Medium     Problem list name updated by automated process. Provider to review       Dermatophytosis of body 03/14/2006     Priority: Medium     Problem list name updated by automated process. Provider to review        Past Medical History:   Diagnosis Date     BPH (benign prostatic hyperplasia)      Lymphadenopathy     mediastinal & retroperitoneal     Non Hodgkin's lymphoma (H)      Polyps, colonic      Retinal detachment      Past Surgical History:   Procedure Laterality Date     COLONOSCOPY N/A 3/1/2017    Procedure: COLONOSCOPY;  Surgeon: Dakota Wall MD;  Location:  GI     ESOPHAGOSCOPY, GASTROSCOPY, DUODENOSCOPY (EGD), COMBINED  4/21/2013    Procedure: COMBINED ESOPHAGOSCOPY, GASTROSCOPY, DUODENOSCOPY (EGD), BIOPSY SINGLE OR MULTIPLE;;  Surgeon: Torrey Cosme MD;  Location:  GI     ESOPHAGOSCOPY, GASTROSCOPY, DUODENOSCOPY (EGD), COMBINED  10/9/2013    Procedure: COMBINED ESOPHAGOSCOPY, GASTROSCOPY, DUODENOSCOPY (EGD), BIOPSY SINGLE OR MULTIPLE;  ESOPHAGOSCOPY, GASTROSCOPY, DUODENOSCOPY (EGD) with multiple biopsies;  Surgeon: Shay Sauer MD;  Location:  GI     HC COLONOSCOPY W/WO BRUSH/WASH  07/12/06     LAPAROTOMY EXPLORATORY  4/24/2013    Procedure: LAPAROTOMY EXPLORATORY;  Exploratory Laparotomy and lysis of adhesions.;  Surgeon: Genevieve Barros MD;  Location: [facility] OR     Current Outpatient Medications   Medication Sig Dispense Refill     Acetaminophen (TYLENOL PO) Take 1,000 mg by mouth       albuterol (PROAIR HFA/PROVENTIL HFA/VENTOLIN HFA) 108 (90 BASE) MCG/ACT Inhaler Inhale 2 puffs into the lungs every 6 hours as needed for shortness of breath / dyspnea or wheezing (Patient not taking: Reported on 10/25/2018) 1 Inhaler 1      aspirin 81 MG tablet Take 81 mg by mouth daily        cetirizine (ZYRTEC) 10 MG tablet Take 10 mg by mouth daily       Chlorpheniramine-Phenylephrine 4-10 MG TABS Take 1 tablet by mouth every 6 hours as needed (cough) 60 tablet 3     famotidine (PEPCID) 20 MG tablet Take 1 tablet (20 mg) by mouth 2 times daily (Patient not taking: Reported on 5/30/2019) 60 tablet 1     fluticasone (FLONASE) 50 MCG/ACT spray Spray 1-2 sprays into both nostrils daily (Patient not taking: Reported on 5/30/2019) 16 g 11     metoprolol succinate ER (TOPROL-XL) 50 MG 24 hr tablet Take 1 tablet (50 mg) by mouth daily 90 tablet 3     order for DME Equipment being ordered: knee sleeve with horseshoe, XL 1 Device 0     simvastatin (ZOCOR) 20 MG tablet TAKE 1 TABLET BY MOUTH DAILY AT BEDTIME 90 tablet 1     OTC products: None, except as noted above    Allergies   Allergen Reactions     Allopurinol Rash      Latex Allergy: NO    Social History     Tobacco Use     Smoking status: Never Smoker     Smokeless tobacco: Never Used   Substance Use Topics     Alcohol use: Yes     Alcohol/week: 2.0 oz     Comment: occasionally weekends     History   Drug Use No       REVIEW OF SYSTEMS:   CONSTITUTIONAL: NEGATIVE for fever, chills, change in weight  INTEGUMENTARY/SKIN: NEGATIVE for worrisome rashes, moles or lesions  EYES: NEGATIVE for vision changes or irritation  ENT/MOUTH: NEGATIVE for ear, mouth and throat problems  RESP: NEGATIVE for significant cough or SOB  BREAST: NEGATIVE for masses, tenderness or discharge  CV: NEGATIVE for chest pain, palpitations or peripheral edema  GI: NEGATIVE for nausea, abdominal pain, heartburn, or change in bowel habits  : NEGATIVE for frequency, dysuria, or hematuria  MUSCULOSKELETAL: NEGATIVE for significant arthralgias or myalgia  NEURO: NEGATIVE for weakness, dizziness or paresthesias  ENDOCRINE: NEGATIVE for temperature intolerance, skin/hair changes  HEME: NEGATIVE for bleeding problems  PSYCHIATRIC:  NEGATIVE for changes in mood or affect    EXAM:   /78 (Cuff Size: Adult Large)   Pulse 72   Temp 97.2  F (36.2  C) (Temporal)   Resp 18   Wt 130.7 kg (288 lb 3.2 oz)   SpO2 95%   BMI 39.04 kg/m      GENERAL APPEARANCE: healthy, alert and no distress     EYES: EOMI,  PERRL     HENT: ear canals and TM's normal and nose and mouth without ulcers or lesions     NECK: no adenopathy and trachea midline and normal to palpation     RESP: rales R lower posterior and this clearwed with congested NPC and decreased breath sounds in both bases.     CV: regular rates and rhythm, normal S1 S2, no S3 or S4 and no murmur, click or rub     ABDOMEN:  soft, nontender, no HSM or masses and bowel sounds normal     MS: extremities normal- no gross deformities noted, no evidence of inflammation in joints, FROM in all extremities.     SKIN: no suspicious lesions or rashes     NEURO: Normal strength and tone, sensory exam grossly normal, mentation intact and speech normal     PSYCH: mentation appears normal. and affect normal/bright     LYMPHATICS: No cervical adenopathy    DIAGNOSTICS:   No labs or EKG required for low risk surgery (cataract, skin procedure, breast biopsy, etc)    Recent Labs   Lab Test 06/13/19  0842 04/18/19  0737 10/25/18  0740  09/17/15  1038  08/26/14  0733 08/19/14   HGB  --  14.6 14.1   < >  --    < > 12.4*  --    PLT  --  170 151   < >  --    < > 140*  --    INR  --   --   --   --   --   --  2.2* 1.5*    139 139   < >  --    < > 140  --    POTASSIUM 4.0 4.0 4.1   < >  --    < > 4.1  --    CR 1.01 0.94 0.99   < >  --    < > 0.89  --    A1C  --   --   --   --  5.4  --   --   --     < > = values in this interval not displayed.        IMPRESSION:   Reason for surgery/procedure: cataract correction  Diagnosis/reason for consult: anesthesia / surgical clearance.    The proposed surgical procedure is considered LOW risk.    REVISED CARDIAC RISK INDEX  The patient has the following serious cardiovascular  risks for perioperative complications such as (MI, PE, VFib and 3  AV Block):  No serious cardiac risks  INTERPRETATION: 1 risks: Class II (low risk - 0.9% complication rate)    The patient has the following additional risks for perioperative complications:  No identified additional risks  The ASCVD Risk score ([place] HERNÁNDEZ Jr., et al., 2013) failed to calculate for the following reasons:    The valid total cholesterol range is 130 to 320 mg/dL      ICD-10-CM    1. Preop general physical exam Z01.818        RECOMMENDATIONS:       Obstructive Sleep Apnea (or suspected sleep apnea)  Hospital staff are advised to monitor for sleep related oxygen desaturations due to suspicion of WINSTON      --Patient is to take all scheduled medications on the day of surgery EXCEPT for modifications listed below.    APPROVAL GIVEN to proceed with proposed procedure, without further diagnostic evaluation       Signed Electronically by: Sly Urias PA-C    Copy of this evaluation report is provided to requesting physician.    [facility] Preop Guidelines    Revised Cardiac Risk Index

## 2019-07-05 ENCOUNTER — HOSPITAL ENCOUNTER (OUTPATIENT)
Dept: MRI IMAGING | Facility: CLINIC | Age: 69
Discharge: HOME | End: 2019-07-05
Attending: INTERNAL MEDICINE | Admitting: INTERNAL MEDICINE
Payer: MEDICARE

## 2019-07-05 DIAGNOSIS — I77.810 ASCENDING AORTA DILATATION (H): ICD-10-CM

## 2019-07-05 PROCEDURE — 25500064 ZZH RX 255 OP 636: Performed by: INTERNAL MEDICINE

## 2019-07-05 PROCEDURE — 71555 MRI ANGIO CHEST W OR W/O DYE: CPT

## 2019-07-05 PROCEDURE — A9585 GADOBUTROL INJECTION: HCPCS | Performed by: INTERNAL MEDICINE

## 2019-07-05 PROCEDURE — 25000125 ZZHC RX 250: Performed by: INTERNAL MEDICINE

## 2019-07-05 RX ORDER — GADOBUTROL 604.72 MG/ML
15 INJECTION INTRAVENOUS ONCE
Status: COMPLETED | OUTPATIENT
Start: 2019-07-05 | End: 2019-07-05

## 2019-07-05 RX ADMIN — GADOBUTROL 13 ML: 604.72 INJECTION INTRAVENOUS at 18:17

## 2019-07-05 RX ADMIN — SODIUM CHLORIDE 50 ML: 9 INJECTION, SOLUTION INTRAVENOUS at 18:17

## 2019-07-09 ENCOUNTER — OFFICE VISIT (OUTPATIENT)
Dept: FAMILY MEDICINE | Facility: OTHER | Age: 69
End: 2019-07-09
Payer: MEDICARE

## 2019-07-09 VITALS
SYSTOLIC BLOOD PRESSURE: 130 MMHG | WEIGHT: 288.2 LBS | RESPIRATION RATE: 18 BRPM | TEMPERATURE: 97.2 F | DIASTOLIC BLOOD PRESSURE: 78 MMHG | BODY MASS INDEX: 39.04 KG/M2 | OXYGEN SATURATION: 95 % | HEART RATE: 72 BPM

## 2019-07-09 DIAGNOSIS — Z01.818 PREOP GENERAL PHYSICAL EXAM: Primary | ICD-10-CM

## 2019-07-09 DIAGNOSIS — R03.0 ELEVATED BLOOD PRESSURE READING WITHOUT DIAGNOSIS OF HYPERTENSION: ICD-10-CM

## 2019-07-09 DIAGNOSIS — E78.5 HYPERLIPIDEMIA WITH TARGET LDL LESS THAN 130: ICD-10-CM

## 2019-07-09 DIAGNOSIS — R09.82 POST-NASAL DRIP: ICD-10-CM

## 2019-07-09 DIAGNOSIS — J32.2 SINUSITIS CHRONIC, ETHMOIDAL: ICD-10-CM

## 2019-07-09 DIAGNOSIS — I48.91 ATRIAL FIBRILLATION, UNSPECIFIED TYPE (H): ICD-10-CM

## 2019-07-09 PROCEDURE — 99214 OFFICE O/P EST MOD 30 MIN: CPT | Performed by: PHYSICIAN ASSISTANT

## 2019-07-09 ASSESSMENT — PAIN SCALES - GENERAL: PAINLEVEL: NO PAIN (0)

## 2019-07-10 ENCOUNTER — OFFICE VISIT (OUTPATIENT)
Dept: SLEEP MEDICINE | Facility: CLINIC | Age: 69
End: 2019-07-10
Payer: MEDICARE

## 2019-07-10 VITALS
SYSTOLIC BLOOD PRESSURE: 136 MMHG | WEIGHT: 288 LBS | BODY MASS INDEX: 39.01 KG/M2 | OXYGEN SATURATION: 95 % | DIASTOLIC BLOOD PRESSURE: 60 MMHG | HEART RATE: 77 BPM | HEIGHT: 72 IN

## 2019-07-10 DIAGNOSIS — G47.33 OSA (OBSTRUCTIVE SLEEP APNEA): Primary | ICD-10-CM

## 2019-07-10 PROCEDURE — 99205 OFFICE O/P NEW HI 60 MIN: CPT | Performed by: INTERNAL MEDICINE

## 2019-07-10 ASSESSMENT — MIFFLIN-ST. JEOR: SCORE: 2114.36

## 2019-07-10 NOTE — PATIENT INSTRUCTIONS
Your BMI is Body mass index is 39.06 kg/m .  Weight management is a personal decision.  If you are interested in exploring weight loss strategies, the following discussion covers the approaches that may be successful. Body mass index (BMI) is one way to tell whether you are at a healthy weight, overweight, or obese. It measures your weight in relation to your height.  A BMI of 18.5 to 24.9 is in the healthy range. A person with a BMI of 25 to 29.9 is considered overweight, and someone with a BMI of 30 or greater is considered obese. More than two-thirds of American adults are considered overweight or obese.  Being overweight or obese increases the risk for further weight gain. Excess weight may lead to heart disease and diabetes.  Creating and following plans for healthy eating and physical activity may help you improve your health.  Weight control is part of healthy lifestyle and includes exercise, emotional health, and healthy eating habits. Careful eating habits lifelong are the mainstay of weight control. Though there are significant health benefits from weight loss, long-term weight loss with diet alone may be very difficult to achieve- studies show long-term success with dietary management in less than 10% of people. Attaining a healthy weight may be especially difficult to achieve in those with severe obesity. In some cases, medications, devices and surgical management might be considered.  What can you do?  If you are overweight or obese and are interested in methods for weight loss, you should discuss this with your provider.     Consider reducing daily calorie intake by 500 calories.     Keep a food journal.     Avoiding skipping meals, consider cutting portions instead.    Diet combined with exercise helps maintain muscle while optimizing fat loss. Strength training is particularly important for building and maintaining muscle mass. Exercise helps reduce stress, increase energy, and improves fitness.  Increasing exercise without diet control, however, may not burn enough calories to loose weight.       Start walking three days a week 10-20 minutes at a time    Work towards walking thirty minutes five days a week     Eventually, increase the speed of your walking for 1-2 minutes at time    In addition, we recommend that you review healthy lifestyles and methods for weight loss available through the National Institutes of Health patient information sites:  http://win.niddk.nih.gov/publications/index.htm    And look into health and wellness programs that may be available through your health insurance provider, employer, local community center, or ceci club.    Weight management plan: Patient was referred to their PCP to discuss a diet and exercise plan.     Drive Safe... Drive Alive     Sleep health profoundly affects your health, mood, and your safety. 33% of the population (one in three of us) is not getting enough sleep and many have a sleep disorder. Not getting enough sleep or having an untreated / undertreated sleep condition may make us sleepy without even knowing it. In fact, our driving could be dramatically impaired due to our sleep health. As your provider, here are some things I would like you to know about driving:     Here are some warning signs for impairment and dangerous drowsy driving:              -Having been awake more than 16 hours               -Looking tired               -Eyelid drooping              -Head nodding (it could be too late at this point)              -Driving for more than 30 minutes     Some things you could do to make the driving safer if you are experiencing some drowsiness:              -Stop driving and rest              -Call for transportation              -Make sure your sleep disorder is adequately treated     Some things that have been shown NOT to work when experiencing drowsiness while driving:              -Turning on the radio              -Opening windows               -Eating any  distracting  /  entertaining  foods (e.g., sunflower seeds, candy, or any other)              -Talking on the phone      Your decision may not only impact your life, but also the life of others. Please, remember to drive safe for yourself and all of us.    Hany Fierro

## 2019-07-10 NOTE — NURSING NOTE
Weight management plan: Patient was referred to their PCP to discuss a diet and exercise plan.     Does Deandre have a CPAP/Bipap?  No

## 2019-07-10 NOTE — PROGRESS NOTES
SLEEP MEDICINE CLINIC NOTE   [facility]  Deandre RAE Mayur 69 year old male  : 1950  MRN: [id number]      PRIMARY CARE PROVIDER: Felix Sevilla    Visit Date:   07/10/2019      REASON FOR VISIT:  Evaluation of sleep apnea.      HISTORY OF PRESENT ILLNESS:  The patient is a very pleasant 69-year-old gentleman with history of hypertension, hyperlipidemia, morbid obesity, atrial fibrillation, status post cardioversion, follicular lymphoma currently in remission, who is seen in clinic for evaluation of sleep-disordered breathing given concerns for snoring and abnormal breathing at night.      The patient describes his current routine as going to bed around 11:00 p.m.  Prior to that, he is usually in his living room watching TV and he frequently dozes off.  When he comes to bed, it takes him just about 10-15 minutes to fall asleep.  He reports waking up 2-3 times throughout the night because his mouth is dry or for the need to go to the bathroom.  It is easy for him to return to sleep.  He finally wakes up around 7:00 a.m. spontaneously.  He feels rested upon awakening.  He does not have any significant sleep inertia; however, he reports excessive sleepiness during the day.  His [place] Sleepiness Score is 8 without any chance of falling sleep while driving.  He reports taking a nap about 3 days a week for about 2 hours or so.  The naps are refreshing.  He considers himself a morning person.      He denies any features of motor restlessness suggestive of restless legs syndrome.  He denies any frequent dreams, nightmares, dream enactment behavior, parasomnias or bruxism.      He denies waking up with a headache in the morning.  He reports snoring which is loud in intensity, but not very loud.  He reports rare snort arousals, but frequent witnessed apneas.  He frequently wakes up with a dry mouth, but does not have any difficulty breathing through the nose during the day.  He does  not have GERD.  He prefers to sleep on his left side but frequently rolls over throughout the night.      He denies any features of hypocretin deficiency including cataplexy, sleep paralysis or hypnogogic or hypnopompic hallucinations.      SOCIAL HISTORY:  The patient is , lives at home with his wife.  He denies smoking or any significant alcohol use.  He used to work as a commercial , but retired 4 years ago.      FAMILY HISTORY:  He reports that his father passed away from liver cancer.  He denies any significant history of sleep-related disorders in his family.      PAST SURGICAL HISTORY:  Includes exploratory laparotomy in 2013.      PAST MEDICAL HISTORY:  Includes follicular lymphoma, BPH, retinal detachment, atrial fibrillation, hyperlipidemia, hypertension, obesity.      MEDICATIONS:  Simvastatin, acetaminophen, metoprolol, cetirizine, chlorpheniramine, phenylephrine tablets.      REVIEW OF SYSTEMS:  A complete 14-point review of systems was performed and found negative except as noted above.      PHYSICAL EXAMINATION:   /60   Pulse 77   Ht 1.829 m (6')   Wt 130.6 kg (288 lb)   SpO2 95%   BMI 39.06 kg/m    GENERAL:  Pleasant gentleman sitting comfortably and speaking in full sentences without any discomfort.   HEENT:  Mallampati class 3 airway with a large tongue and prominent peripheral scalloping.  Tonsils not visualized.  No obstruction to flow in the nares.   NECK:  Circumference 48 cm.  No cervical or submandibular lymphadenopathy.   PULMONARY:  Normal intensity breath sounds bilaterally.  No added sounds.   CARDIOVASCULAR:  S1, S2 normal, no murmur, rubs or gallops.  Regular rate and rhythm.   ABDOMEN:  Soft, nontender, nondistended, normoactive bowel sounds.   NEUROLOGIC:  Grossly intact.   PSYCHIATRIC:  Normal affect.     MUSCULOSKELETAL:  1+ pedal edema bilaterally, no upper extremity tremors, clubbing, or cyanosis.   SKIN:  No rashes on limited exam.       ASSESSMENT AND PLAN:  The patient is a 69-year-old gentleman with multiple comorbidities who is being evaluated for sleep-disordered breathing.  We reviewed in detail the pathophysiology of WINSTON as well as its various treatment options.  We will order an overnight polysomnography  according to split-night protocol.  The patient will return to clinic after the PSG is completed to review the results and discuss treatment options.  He was counseled regarding the importance of weight loss and not driving if he is drowsy or sleepy.         I spent a total of 60 minutes face to face with Deandre Merchant during today's office visit. Over 50% of this time was spent counseling the patient and/or coordinating care regarding their sleep disorder.     Hany Fierro MD   of Medicine  Pulmonary, Critical Care and Sleep Medicine  [facility]  Pager: [phone]              D: 07/10/2019   T: 07/10/2019   MT:       Name:     DEANDRE MERCHANT   MRN:      [id number]        Account:      [id number]   :      1950           Visit Date:   07/10/2019      Document: [id number]

## 2019-07-18 ENCOUNTER — TELEPHONE (OUTPATIENT)
Dept: CARDIOLOGY | Facility: CLINIC | Age: 69
End: 2019-07-18

## 2019-07-18 NOTE — TELEPHONE ENCOUNTER
MRA Chest completed on 07-05-19.     IMPRESSION:  Unchanged ascending thoracic aortic aneurysm measuring 47 x 47 mm      Eddie Trujillo MD Garbers, Trang, RN      Since the aortic root size has not changed in the last 5 years we do not need to follow this very carefully.  I will plan on seeing him in another year and will probably do echoes every other year going forward.        ~ Patient to see Dr. Trujillo next year in July 2020.    ~ Called patient, left a message, awaiting call back.     Rosanna LUCAS    -----------------------------------------------------------------------------------------    Addendum 08-15-19    No call back. Results (letter) mailed to patient.     Rosanna LUCAS, BSN

## 2019-07-18 NOTE — LETTER
August 15, 2019       TO: Deandre Merchant   [address] [place] [address]       Dear Mr. Merchant,    The results of your recent MRA of the Chest.     Results for orders placed or performed during the hospital encounter of 19   MRA Chest wo & w Contrast    Narrative    PROCEDURE:  MRA of the chest    DATE OF PROCEDURE:  2019 6:58 PM    CLINICAL HISTORY/INDICATION:  Thoracic aortic aneurysm     COMPARISON:  CT 2019, 2018, MRA 10/15/2014     TECHNIQUE:   Multiplanar multisequence MRI images of the chest were performed  following the administration of gadolinium.    FINDINGS:   Conventional three-vessel aortic arch branch anatomy. The great vessel  origins are patent. The thoracic aortic measurements are as follows:    Aortic annulus: 32 mm  Sinus of Valsalva: 46 mm  Sinotubular junction: 45 mm  Ascending aorta: 47 x 47 mm  Mid arch: 33 mm  Proximal descendin mm  Diaphragmatic lalo: 28 mm    Visualized portions of the liver, gallbladder, spleen, kidneys,  adrenal glands and pancreas are unremarkable.      Impression    IMPRESSION:   Unchanged ascending thoracic aortic aneurysm measuring 47 x 47 mm.    SEBASTIEN ZIMMERMAN MD     -----------------------------------------------------------------------------------------------    Abiel Wilkinson,     I was not able to reach by phone. Your test results fall within the expected ranges. Your aortic root size has not changed in the last 5 years. Dr. Trujillo would like to see you next year in 2020 for follow up. Please continue your current treatment plan. If you have any questions, please contact me at  [phone].     Ashley RN, BSN/Dr. Trujillo's Nurse    [facility]

## 2019-08-12 NOTE — PROGRESS NOTES
[facility]  [address] [facility] [address]  [phone]  Dept: [phone]    PRE-OP EVALUATION:  Today's date: 8/15/2019    Deandre Merchant (: 1950) presents for pre-operative evaluation assessment as requested by Dr. Wooten.  He requires evaluation and anesthesia risk assessment prior to undergoing surgery/procedure for treatment of Cataract .    Proposed Surgery/ Procedure: Left Eye Cataracts  Date of Surgery/ Procedure: 19  Time of Surgery/ Procedure: 1020  Hospital/Surgical Facility: [facility]  Fax number for surgical facility: [phone]  Primary Physician: Felix Sevilla  Type of Anesthesia Anticipated: Local with MAC    Patient has a Health Care Directive or Living Will:  YES Not Current    1. NO - Do you have a history of heart attack, stroke, stent, bypass or surgery on an artery in the head, neck, heart or legs?  2. NO - Do you ever have any pain or discomfort in your chest?  3. NO - Do you have a history of  Heart Failure?  4. NO - Are you troubled by shortness of breath when: walking on the level, up a slight hill or at night?  6. YES - DO YOU HAVE A COUGH, SHORTNESS OF BREATH OR WHEEZING?   6. NO - Do you have a cough, shortness of breath or wheezing?  7. NO - Do you sometimes get pains in the calves of your legs when you walk?  8. NO - Do you or anyone in your family have previous history of blood clots?  9. NO - Do you or does anyone in your family have a serious bleeding problem such as prolonged bleeding following surgeries or cuts?  10. NO - Have you ever had problems with anemia or been told to take iron pills?  11. NO - Have you had any abnormal blood loss such as black, tarry or bloody stools, or abnormal vaginal bleeding?  12. YES - HAVE YOU EVER HAD A BLOOD TRANSFUSION?   13. NO - Have you or any of your relatives ever had problems with anesthesia?  14. NO - Do you have sleep apnea, excessive snoring or daytime  drowsiness?  15. NO - Do you have any prosthetic heart valves?  16. NO - Do you have prosthetic joints?  17. NO - Is there any chance that you may be pregnant?      HPI:     HPI related to upcoming procedure: left cataract      See problem list for active medical problems.  Problems all longstanding and stable, except as noted/documented.  See ROS for pertinent symptoms related to these conditions.      MEDICAL HISTORY:     Patient Active Problem List    Diagnosis Date Noted     Post-nasal drip 07/09/2019     Priority: Medium     Sleep disturbance 05/30/2019     Priority: Medium     Snoring 05/30/2019     Priority: Medium     Fecal smearing 05/30/2019     Priority: Medium     Elevated blood pressure reading without diagnosis of hypertension 05/30/2019     Priority: Medium     Sinusitis chronic, ethmoidal 05/14/2018     Priority: Medium     Chronic cough 03/26/2018     Priority: Medium     Morbid obesity (H) 02/22/2018     Priority: Medium     Counseling regarding advanced directives 09/17/2015     Priority: Medium     Diagnosis updated by automated process. Provider to review and confirm.       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 Provider to review and confirm.       BPH (benign prostatic hyperplasia) 02/24/2010     Priority: Medium     Impotence of organic origin 03/14/2006     Priority: Medium     Retinal detachment 03/14/2006     Priority: Medium     Problem list name updated by automated process. Provider to review       Dermatophytosis of body 03/14/2006     Priority: Medium     Problem list name updated by automated process. Provider to review        Past Medical History:   Diagnosis Date     BPH (benign prostatic hyperplasia)      Lymphadenopathy     mediastinal & retroperitoneal     Non Hodgkin's lymphoma (H)      Polyps, colonic      Retinal detachment      Past Surgical History:   Procedure Laterality Date     COLONOSCOPY N/A 3/1/2017    Procedure: COLONOSCOPY;  Surgeon: Dakota Wall MD;  Location:  GI     ESOPHAGOSCOPY, GASTROSCOPY, DUODENOSCOPY (EGD), COMBINED  4/21/2013    Procedure: COMBINED ESOPHAGOSCOPY, GASTROSCOPY, DUODENOSCOPY (EGD), BIOPSY SINGLE OR MULTIPLE;;  Surgeon: Torrey Cosme MD;  Location:  GI     ESOPHAGOSCOPY, GASTROSCOPY, DUODENOSCOPY (EGD), COMBINED  10/9/2013    Procedure: COMBINED ESOPHAGOSCOPY, GASTROSCOPY, DUODENOSCOPY (EGD), BIOPSY SINGLE OR MULTIPLE;  ESOPHAGOSCOPY, GASTROSCOPY, DUODENOSCOPY (EGD) with multiple biopsies;  Surgeon: Shay Sauer MD;  Location:  GI     HC COLONOSCOPY W/WO BRUSH/WASH  07/12/06     LAPAROTOMY EXPLORATORY  4/24/2013    Procedure: LAPAROTOMY EXPLORATORY;  Exploratory Laparotomy and lysis of adhesions.;  Surgeon: Genevieve Barros MD;  Location: [facility] OR     Current Outpatient Medications   Medication Sig Dispense Refill     Acetaminophen (TYLENOL PO) Take 1,000 mg by mouth       cetirizine (ZYRTEC) 10 MG tablet Take 10 mg by mouth daily       Chlorpheniramine-Phenylephrine 4-10 MG TABS Take 1 tablet by mouth every 6 hours as  needed (cough) 60 tablet 3     metoprolol succinate ER (TOPROL-XL) 50 MG 24 hr tablet Take 1 tablet (50 mg) by mouth daily 90 tablet 3     simvastatin (ZOCOR) 20 MG tablet TAKE 1 TABLET BY MOUTH DAILY AT BEDTIME 90 tablet 1     OTC products: None, except as noted above    Allergies   Allergen Reactions     Allopurinol Rash      Latex Allergy: NO    Social History     Tobacco Use     Smoking status: Never Smoker     Smokeless tobacco: Never Used   Substance Use Topics     Alcohol use: Yes     Alcohol/week: 2.0 oz     Comment: occasionally weekends     History   Drug Use No       REVIEW OF SYSTEMS:   CONSTITUTIONAL: NEGATIVE for fever, chills, change in weight  INTEGUMENTARY/SKIN: NEGATIVE for worrisome rashes, moles or lesions  EYES: NEGATIVE for vision changes or irritation  ENT/MOUTH: NEGATIVE for ear, mouth and throat problems  RESP: NEGATIVE for significant cough or SOB  BREAST: NEGATIVE for masses, tenderness or discharge  CV: NEGATIVE for chest pain, palpitations or peripheral edema  GI: NEGATIVE for nausea, abdominal pain, heartburn, or change in bowel habits  : NEGATIVE for frequency, dysuria, or hematuria  MUSCULOSKELETAL: NEGATIVE for significant arthralgias or myalgia  NEURO: NEGATIVE for weakness, dizziness or paresthesias  ENDOCRINE: NEGATIVE for temperature intolerance, skin/hair changes  HEME: NEGATIVE for bleeding problems  PSYCHIATRIC: NEGATIVE for changes in mood or affect    EXAM:   There were no vitals taken for this visit.    GENERAL APPEARANCE: healthy, alert and no distress     EYES: EOMI,  PERRL     HENT: ear canals and TM's normal and nose and mouth without ulcers or lesions     NECK: no adenopathy, no asymmetry, masses, or scars and thyroid normal to palpation     RESP: lungs clear to auscultation - no rales, rhonchi or wheezes     CV: regular rates and rhythm, normal S1 S2, no S3 or S4 and no murmur, click or rub     ABDOMEN:  soft, nontender, no HSM or masses and bowel sounds normal      MS: extremities normal- no gross deformities noted, no evidence of inflammation in joints, FROM in all extremities.     SKIN: no suspicious lesions or rashes     NEURO: Normal strength and tone, sensory exam grossly normal, mentation intact and speech normal     PSYCH: mentation appears normal. and affect normal/bright     LYMPHATICS: No cervical adenopathy    DIAGNOSTICS:   EKG: appears normal, NSR, normal axis, normal intervals, no acute ST/T changes c/w ischemia, no LVH by voltage criteria, unchanged from previous tracings    Recent Labs   Lab Test 06/13/19  0842 04/18/19  0737 10/25/18  0740  09/17/15  1038  08/26/14  0733 08/19/14   HGB  --  14.6 14.1   < >  --    < > 12.4*  --    PLT  --  170 151   < >  --    < > 140*  --    INR  --   --   --   --   --   --  2.2* 1.5*    139 139   < >  --    < > 140  --    POTASSIUM 4.0 4.0 4.1   < >  --    < > 4.1  --    CR 1.01 0.94 0.99   < >  --    < > 0.89  --    A1C  --   --   --   --  5.4  --   --   --     < > = values in this interval not displayed.        IMPRESSION:   Reason for surgery/procedure: left cataract  Diagnosis/reason for consult: anesthesia clearance.    The proposed surgical procedure is considered INTERMEDIATE risk.    REVISED CARDIAC RISK INDEX  The patient has the following serious cardiovascular risks for perioperative complications such as (MI, PE, VFib and 3  AV Block):  No serious cardiac risks  INTERPRETATION: 1 risks: Class II (low risk - 0.9% complication rate)    The patient has the following additional risks for perioperative complications:  No identified additional risks  The ASCVD Risk score (Mervin EDGAR Jr., et al., 2013) failed to calculate for the following reasons:    The valid total cholesterol range is 130 to 320 mg/dL      ICD-10-CM    1. Preop general physical exam Z01.818 EKG 12-lead complete w/read - Clinics   2. Atrial fibrillation, unspecified type (H) I48.91    3. Cortical age-related cataract of left eye H25.012    4.  Coloboma, optic disc, congenital, bilateral Q14.2        RECOMMENDATIONS:     --Patient is to take all scheduled medications on the day of surgery EXCEPT for modifications listed below.    APPROVAL GIVEN to proceed with proposed procedure, without further diagnostic evaluation       Signed Electronically by: Sly Urias PA-C    Copy of this evaluation report is provided to requesting physician.    [place] Preop Guidelines    Revised Cardiac Risk Index

## 2019-08-15 ENCOUNTER — OFFICE VISIT (OUTPATIENT)
Dept: FAMILY MEDICINE | Facility: OTHER | Age: 69
End: 2019-08-15
Payer: MEDICARE

## 2019-08-15 VITALS
SYSTOLIC BLOOD PRESSURE: 130 MMHG | DIASTOLIC BLOOD PRESSURE: 76 MMHG | WEIGHT: 288.2 LBS | TEMPERATURE: 96.8 F | RESPIRATION RATE: 24 BRPM | BODY MASS INDEX: 39.09 KG/M2 | OXYGEN SATURATION: 94 % | HEART RATE: 70 BPM

## 2019-08-15 DIAGNOSIS — I48.91 ATRIAL FIBRILLATION, UNSPECIFIED TYPE (H): ICD-10-CM

## 2019-08-15 DIAGNOSIS — Z01.818 PREOP GENERAL PHYSICAL EXAM: Primary | ICD-10-CM

## 2019-08-15 DIAGNOSIS — H25.012 CORTICAL AGE-RELATED CATARACT OF LEFT EYE: ICD-10-CM

## 2019-08-15 DIAGNOSIS — Q14.2: ICD-10-CM

## 2019-08-15 PROCEDURE — 93000 ELECTROCARDIOGRAM COMPLETE: CPT | Performed by: PHYSICIAN ASSISTANT

## 2019-08-15 PROCEDURE — 99214 OFFICE O/P EST MOD 30 MIN: CPT | Performed by: PHYSICIAN ASSISTANT

## 2019-08-15 RX ORDER — KETOROLAC TROMETHAMINE 5 MG/ML
SOLUTION OPHTHALMIC
Refills: 0 | COMMUNITY
Start: 2019-07-31 | End: 2019-09-13

## 2019-08-15 RX ORDER — GATIFLOXACIN 5 MG/ML
SOLUTION/ DROPS OPHTHALMIC
Refills: 0 | COMMUNITY
Start: 2019-07-29 | End: 2019-09-13

## 2019-08-15 RX ORDER — PREDNISOLONE ACETATE 10 MG/ML
SUSPENSION/ DROPS OPHTHALMIC
Refills: 0 | COMMUNITY
Start: 2019-07-25 | End: 2019-09-13

## 2019-08-15 ASSESSMENT — PAIN SCALES - GENERAL: PAINLEVEL: NO PAIN (0)

## 2019-08-29 ENCOUNTER — THERAPY VISIT (OUTPATIENT)
Dept: SLEEP MEDICINE | Facility: CLINIC | Age: 69
End: 2019-08-29
Payer: MEDICARE

## 2019-08-29 DIAGNOSIS — G47.33 OSA (OBSTRUCTIVE SLEEP APNEA): ICD-10-CM

## 2019-08-29 PROCEDURE — 95810 POLYSOM 6/> YRS 4/> PARAM: CPT | Performed by: INTERNAL MEDICINE

## 2019-08-31 PROBLEM — G47.33 OSA (OBSTRUCTIVE SLEEP APNEA): Status: ACTIVE | Noted: 2019-08-31

## 2019-08-31 NOTE — PROCEDURES
SLEEP STUDY INTERPRETATION  DIAGNOSTIC POLYSOMNOGRAPHY REPORT      Patient: CURTIS AVELAR  YOB: 1950  Study Date: 8/29/2019  MRN: [id number]  Referring Provider: Self  Ordering Provider: Hany Fierro MD    Indications for Polysomnography: The patient is a 69 y old Male who is 6' and weighs 288.0 lbs. His BMI is 39.4, [place] sleepiness scale 8.0 and neck circumference is 48.0 cm. Relevant medical history includes hypertension, hyperlipidemia, morbid obesity, atrial fibrillation, status post cardioversion, follicular lymphoma currently in remission. A diagnostic polysomnogram was performed to evaluate for sleep apnea.    Polysomnogram Data: A full night polysomnogram recorded the standard physiologic parameters including EEG, EOG, EMG, ECG, nasal and oral airflow. Respiratory parameters of chest and abdominal movements were recorded with respiratory inductance plethysmography. Oxygen saturation was recorded by pulse oximetry. Hypopnea scoring rule used: 1B 4%.    Sleep Architecture: All stages of sleep seen. Frequent sleep disruption resulted in reduced sleep efficiency.   The total recording time of the polysomnogram was 466.6 minutes. The total sleep time was 305.5 minutes. Sleep latency was normal at 9.0 minutes without the use of a sleep aid. REM latency was 78.0 minutes. Arousal index was increased at 50.9 arousals per hour. Sleep efficiency was decreased at 65.5%. Wake after sleep onset was 148.0 minutes. The patient spent 15.9% of total sleep time in Stage N1, 40.6% in Stage N2, 26.0% in Stage N3, and 17.5% in REM. Time in REM supine was 21.0 minutes.    Respiration: Mild WINSTON (AHI 13.9, central AHI 2.7, REM AHI 25.8, RDI 34) noted. Moderately loud snoring noted intermittently throughout the night including during lateral position. No significant hypoxemia noted (livia SpO2 86%, 1.5 minutes <88%).     Events ? The polysomnogram revealed a presence of 26 obstructive, 14 central, and 1 mixed  apneas resulting in an apnea index of 8.1 events per hour. There were 30 obstructive hypopneas and 0 central hypopneas resulting in an obstructive hypopnea index of 5.9 and central hypopnea index of 0 events per hour. The combined apnea/hypopnea index was 13.9 events per hour (central apnea/hypopnea index was 2.7 events per hour). The REM AHI was 25.8 events per hour. The supine AHI was 14.3 events per hour. The RERA index was 20.0 events per hour.  The RDI was 34.0 events per hour.    Snoring - was reported as moderately loud and intermittent throughout the night.    Respiratory rate and pattern - was notable for normal respiratory rate and pattern.    Sustained Sleep Associated Hypoventilation - Transcutaneous carbon dioxide monitoring was not used, however significant hypoventilation was not suggested by oximetry.    Sleep Associated Hypoxemia - (Greater than 5 minutes O2 sat at or below 88%) was not present. Baseline oxygen saturation was 92.9%. Lowest oxygen saturation was 86.0%. Time spent less than or equal to 88% was 1.5 minutes. Time spent less than or equal to 89% was 3.3 minutes.    Movement Activity: No movement abnormalities noted.     Periodic Limb Activity - There were 15 PLMs during the entire study. The PLM index was 2.9 movements per hour. The PLM Arousal Index was 1.4 per hour.    REM EMG Activity - Excessive transient/sustained muscle activity was not present.    Nocturnal Behavior - Abnormal sleep related behaviors were not noted during/arising out of NREM / REM sleep.     Bruxism - None apparent.    Cardiac Summary: Rare PVCs noted.   The average pulse rate was 64.3 bpm. The minimum pulse rate was 52.7 bpm while the maximum pulse rate was 91.4 bpm.  Arrhythmias were noted - rare PVCs      Assessment:     Mild WINSTON (AHI 13.9, central AHI 2.7, REM AHI 25.8, RDI 34) noted. Moderately loud snoring noted intermittently throughout the night including during lateral position. No significant hypoxemia  noted (livia SpO2 86%, 1.5 minutes <88%).    All stages of sleep seen. Frequent sleep disruption resulted in reduced sleep efficiency.    No movement abnormalities noted.    Rare PVCs noted.    Recommendations:    Treatment consideration for mild WINSTON include use of auto CPAP 5-15 cmH2O and mandibular advancement device. Given significant comorbidities and disrupted sleep, treatment is strongly recommended.     Advice regarding the risks of drowsy driving.    Suggest optimizing sleep schedule and avoiding sleep deprivation.    Weight management (BMI > 30).    Diagnostic Codes:   Obstructive Sleep Apnea G47.33      Electronically Signed By: Hany Fierro MD (8/31/19)           Range(%) Time in range (min)   0.0 - 89.0 3.3   0.0 - 88.0 1.5         Stage Min(mm Hg) Max(mm Hg)   Wake - -   NREM(1+2+3) - -   REM - -       Range(mmHg) Time in range (min)   55.0 - 100.0 -   Excluded data <20.0 & >65.0 467.0

## 2019-09-03 ENCOUNTER — TELEPHONE (OUTPATIENT)
Dept: SLEEP MEDICINE | Facility: CLINIC | Age: 69
End: 2019-09-03

## 2019-09-03 LAB — SLPCOMP: NORMAL

## 2019-09-13 ENCOUNTER — OFFICE VISIT (OUTPATIENT)
Dept: FAMILY MEDICINE | Facility: CLINIC | Age: 69
End: 2019-09-13
Payer: MEDICARE

## 2019-09-13 VITALS
TEMPERATURE: 97.3 F | RESPIRATION RATE: 18 BRPM | HEART RATE: 82 BPM | BODY MASS INDEX: 38.88 KG/M2 | WEIGHT: 286.7 LBS | SYSTOLIC BLOOD PRESSURE: 114 MMHG | OXYGEN SATURATION: 95 % | DIASTOLIC BLOOD PRESSURE: 70 MMHG

## 2019-09-13 DIAGNOSIS — H65.01 NON-RECURRENT ACUTE SEROUS OTITIS MEDIA OF RIGHT EAR: Primary | ICD-10-CM

## 2019-09-13 PROCEDURE — 99213 OFFICE O/P EST LOW 20 MIN: CPT | Performed by: NURSE PRACTITIONER

## 2019-09-13 ASSESSMENT — PAIN SCALES - GENERAL: PAINLEVEL: MILD PAIN (2)

## 2019-09-13 NOTE — PROGRESS NOTES
Roberto Merchant is a 69 year old male who presents to clinic today for the following health issues;    Kyle comes in today to address right-sided face and sinus pressure that has been current occurring for about the last week.  He admitted he actually has been sick with sinus congestion for about 3 weeks but was having more significant pain in the right side of his face and again right forehead over the last week it is now beginning to bother him to the point that it is affecting his sleep.    No visual changes with this no fever of any kind continues to have sinus congestion he does have a productive cough and laying down it is better during the day more fatigued than usual, has had some soft stools thinks related to some of the over the counter cough medicine that he has been taking that has been upsetting his stomach.  No nausea or vomiting just the loose stools no sore throat fever no chills.    HPI     Acute Illness   Acute illness concerns: uri  Onset: about 1 week    Fever: no    Chills/Sweats: no    Headache (location?): YES- right side    Sinus Pressure:YES- right side    Conjunctivitis:  YES: right, runny    Ear Pain: no    Rhinorrhea: YES    Congestion: no    Sore Throat: no     Cough: YES-productive of clear sputum    Wheeze: YES- with lying down    Decreased Appetite: no    Nausea: no    Vomiting: no    Diarrhea:  YES    Dysuria/Freq.: no    Fatigue/Achiness: YES- run down    Sick/Strep Exposure: no     Therapies Tried and outcome: zyrtec helps, cold and sinus meds help, benadryl last night helped      Patient Active Problem List   Diagnosis     Impotence of organic origin     Retinal detachment     Dermatophytosis of body     BPH (benign prostatic hyperplasia)     Hyperlipidemia with target LDL less than 130     Coloboma, optic disc, congenital, bilateral     Mediastinal lymphadenopathy     Mesenteric lymphadenopathy     Intra-abdominal lymphadenopathy     Non-Hodgkin's lymphoma of  multiple sites (H)     Lymphoma, small lymphoid, follicular (H)     Inguinal hernia, incarcerated     Acute renal failure (ARF) (H)     New onset atrial fibrillation (H)     Small bowel obstruction (H)     Follicular lymphoma (H)     Atrial fibrillation (H)     Counseling regarding advanced directives     Morbid obesity (H)     Chronic cough     Sinusitis chronic, ethmoidal     Sleep disturbance     Snoring     Fecal smearing     Elevated blood pressure reading without diagnosis of hypertension     Post-nasal drip     Cortical age-related cataract of left eye     WINSTON (obstructive sleep apnea)     Past Surgical History:   Procedure Laterality Date     COLONOSCOPY N/A 3/1/2017    Procedure: COLONOSCOPY;  Surgeon: Dakota Wall MD;  Location:  GI     ESOPHAGOSCOPY, GASTROSCOPY, DUODENOSCOPY (EGD), COMBINED  4/21/2013    Procedure: COMBINED ESOPHAGOSCOPY, GASTROSCOPY, DUODENOSCOPY (EGD), BIOPSY SINGLE OR MULTIPLE;;  Surgeon: Torrey Cosme MD;  Location:  GI     ESOPHAGOSCOPY, GASTROSCOPY, DUODENOSCOPY (EGD), COMBINED  10/9/2013    Procedure: COMBINED ESOPHAGOSCOPY, GASTROSCOPY, DUODENOSCOPY (EGD), BIOPSY SINGLE OR MULTIPLE;  ESOPHAGOSCOPY, GASTROSCOPY, DUODENOSCOPY (EGD) with multiple biopsies;  Surgeon: Shay Sauer MD;  Location: [facility]     HC COLONOSCOPY W/WO BRUSH/WASH  07/12/06     LAPAROTOMY EXPLORATORY  4/24/2013    Procedure: LAPAROTOMY EXPLORATORY;  Exploratory Laparotomy and lysis of adhesions.;  Surgeon: Genevieve Barros MD;  Location:  OR       Social History     Tobacco Use     Smoking status: Never Smoker     Smokeless tobacco: Never Used   Substance Use Topics     Alcohol use: Yes     Alcohol/week: 2.0 oz     Comment: occasionally weekends     Family History   Problem Relation Age of Onset     Cancer Father         liver/kidney     C.A.D. Father      Pacemaker Brother      Arthritis Mother         RA         Current Outpatient Medications   Medication Sig Dispense Refill      Acetaminophen (TYLENOL PO) Take 1,000 mg by mouth       amoxicillin-clavulanate (AUGMENTIN) 875-125 MG tablet Take 1 tablet by mouth 2 times daily for 10 days 20 tablet 0     cetirizine (ZYRTEC) 10 MG tablet Take 10 mg by mouth daily       metoprolol succinate ER (TOPROL-XL) 50 MG 24 hr tablet Take 1 tablet (50 mg) by mouth daily 90 tablet 3     simvastatin (ZOCOR) 20 MG tablet TAKE 1 TABLET BY MOUTH DAILY AT BEDTIME 90 tablet 1     Allergies   Allergen Reactions     Allopurinol Rash       Reviewed and updated as needed this visit by Provider         Review of Systems   ROS COMP: Constitutional, HEENT, cardiovascular, pulmonary, gi and gu systems are negative, except as otherwise noted.      Objective    /70   Pulse 82   Temp 97.3  F (36.3  C) (Temporal)   Resp 18   Wt 130 kg (286 lb 11.2 oz)   SpO2 95%   BMI 38.88 kg/m    Body mass index is 38.88 kg/m .  Physical Exam   GENERAL: alert and no distress  EYES: Eyes grossly normal to inspection  HENT: right ear: bulging membrane and mucopurulent effusion, left ear: normal: no effusions, no erythema, normal landmarks and nose and mouth without ulcers or lesions  NECK: no asymmetry, masses, or scars  RESP: no rhonchi and no wheezes  CV: regular rates and rhythm and no peripheral edema  ABDOMEN: soft, nontender  MS: no gross musculoskeletal defects noted, no edema  SKIN: no suspicious lesions or rashes  NEURO: Normal strength and tone, mentation intact and speech normal            Assessment & Plan     1. Non-recurrent acute serous otitis media of right ear  -He has a significant infection in the right ear mucopurulent right effusion, bulging membrane.  I am somewhat concerned that that membrane might perforate so I reviewed the signs and the symptoms of this with him.  -I will give him a strong course of amoxicillin over the next 10 days reviewed side effect profile of the drugs reminded him he needs to take this medication on a full stomach.  -I let him know  that if he does not feel better despite the beginning of next week he is to be seen back in clinic.    -Patient verbalized understanding plan of care and  and is in agreement with current plan of care  - amoxicillin-clavulanate (AUGMENTIN) 875-125 MG tablet; Take 1 tablet by mouth 2 times daily for 10 days  Dispense: 20 tablet; Refill: 0       Return if symptoms worsen or fail to improve.    Toy Flowers NP  [facility]

## 2019-09-13 NOTE — PATIENT INSTRUCTIONS
You have a significant right ear infection. This is why you have so much pressure in the right side of you face and head.    I will start you on a strong course of antibiotics over the next 10 days.     If symptoms do not improve in the next 3-5 days then call clinic or present to ER with high fever or severe pain.     Toy Flowers CNP      Take the antibiotic with food and have lots of yogurt the next 10 days.       Patient Education     Middle Ear Infection (Adult)  You have an infection of the middle ear, the space behind the eardrum. This is also called acute otitis media (AOM). Sometimes it is caused by the common cold. This is because congestion can block the internal passage (eustachian tube) that drains fluid from the middle ear. When the middle ear fills with fluid, bacteria can grow there and cause an infection. Oral antibiotics are used to treat this illness, not ear drops. Symptoms usually start to improve within 1 to 2 days of treatment.    Home care  The following are general care guidelines:    Finish all of the antibiotic medicine given, even though you may feel better after the first few days.    You may use over-the-counter medicine, such as acetaminophen or ibuprofen, to control pain and fever, unless something else was prescribed. If you have chronic liver or kidney disease or have ever had a stomach ulcer or gastrointestinal bleeding, talk with your healthcare provider before using these medicines. Do not give aspirin to anyone under 18 years of age who has a fever. It may cause severe illness or death.  Follow-up care  Follow up with your healthcare provider, or as advised, in 2 weeks if all symptoms have not gotten better, or if hearing doesn't go back to normal within 1 month.  When to seek medical advice  Call your healthcare provider right away if any of these occur:    Ear pain gets worse or does not improve after 3 days of treatment    Unusual drowsiness or confusion    Neck pain, stiff  neck, or headache    Fluid or blood draining from the ear canal    Fever of 100.4 F (38 C) or as advised     Seizure  Date Last Reviewed: 6/1/2016 2000-2018 [employer]. [address] [place] [address], [place] [address]. All rights reserved. This information is not intended as a substitute for professional medical care. Always follow your healthcare professional's instructions.

## 2019-09-18 ENCOUNTER — OFFICE VISIT (OUTPATIENT)
Dept: SLEEP MEDICINE | Facility: CLINIC | Age: 69
End: 2019-09-18
Payer: MEDICARE

## 2019-09-18 VITALS
WEIGHT: 286 LBS | HEIGHT: 72 IN | OXYGEN SATURATION: 96 % | HEART RATE: 85 BPM | DIASTOLIC BLOOD PRESSURE: 80 MMHG | SYSTOLIC BLOOD PRESSURE: 136 MMHG | BODY MASS INDEX: 38.74 KG/M2

## 2019-09-18 DIAGNOSIS — G47.33 OSA (OBSTRUCTIVE SLEEP APNEA): Primary | ICD-10-CM

## 2019-09-18 PROCEDURE — 99213 OFFICE O/P EST LOW 20 MIN: CPT | Performed by: INTERNAL MEDICINE

## 2019-09-18 ASSESSMENT — MIFFLIN-ST. JEOR: SCORE: 2105.29

## 2019-09-18 NOTE — PATIENT INSTRUCTIONS
Your Body mass index is 38.79 kg/m .  Weight management is a personal decision.  If you are interested in exploring weight loss strategies, the following discussion covers the approaches that may be successful. Body mass index (BMI) is one way to tell whether you are at a healthy weight, overweight, or obese. It measures your weight in relation to your height.  A BMI of 18.5 to 24.9 is in the healthy range. A person with a BMI of 25 to 29.9 is considered overweight, and someone with a BMI of 30 or greater is considered obese. More than two-thirds of American adults are considered overweight or obese.  Being overweight or obese increases the risk for further weight gain. Excess weight may lead to heart disease and diabetes.  Creating and following plans for healthy eating and physical activity may help you improve your health.  Weight control is part of healthy lifestyle and includes exercise, emotional health, and healthy eating habits. Careful eating habits lifelong are the mainstay of weight control. Though there are significant health benefits from weight loss, long-term weight loss with diet alone may be very difficult to achieve- studies show long-term success with dietary management in less than 10% of people. Attaining a healthy weight may be especially difficult to achieve in those with severe obesity. In some cases, medications, devices and surgical management might be considered.  What can you do?  If you are overweight or obese and are interested in methods for weight loss, you should discuss this with your provider.     Consider reducing daily calorie intake by 500 calories.     Keep a food journal.     Avoiding skipping meals, consider cutting portions instead.    Diet combined with exercise helps maintain muscle while optimizing fat loss. Strength training is particularly important for building and maintaining muscle mass. Exercise helps reduce stress, increase energy, and improves fitness.  Increasing exercise without diet control, however, may not burn enough calories to loose weight.       Start walking three days a week 10-20 minutes at a time    Work towards walking thirty minutes five days a week     Eventually, increase the speed of your walking for 1-2 minutes at time    In addition, we recommend that you review healthy lifestyles and methods for weight loss available through the National Institutes of Health patient information sites:  http://win.niddk.nih.gov/publications/index.htm    And look into health and wellness programs that may be available through your health insurance provider, employer, local community center, or ceci club.    Weight management plan: Patient was referred to their PCP to discuss a diet and exercise plan.

## 2019-09-18 NOTE — PROGRESS NOTES
SLEEP MEDICINE CLINIC NOTE   [facility]  Deandre RAE Mayur 69 year old male  : 1950  MRN: [id number]      PRIMARY CARE PROVIDER: Felix Sevilla    Visit Date:   2019      REASON FOR VISIT:  Followup of recent overnight polysomnography.      HISTORY OF PRESENT ILLNESS:  The patient is a very pleasant 69-year-old gentleman with history of hypertension, hyperlipidemia, morbid obesity, atrial fibrillation, status post cardioversion, follicular lymphoma, currently in remission and right eye blindness, who was initially seen in our clinic approximately 2 months ago for evaluation of disordered breathing.  Given typical symptoms, he was suggested to undergo an overnight polysomnography.  He comes in today to review the results.      The patient denies any significant changes to his sleep since he was last seen in our clinic.  He does report that he was recently diagnosed with a right ear infection and was started on antibiotics approximately 5 days ago, but does not necessarily feel better yet.      ASSESSMENT AND PLAN:  The patient's PSG performed on 2019 was reviewed.  This showed an overall apnea-hypopnea index of 13.9 events per hour, respiratory disturbance index was 34 events per hour.  Arousal index was 50.9 events per hour.  Limb movement index was 2.9 events per hour.  His sleep efficiency was reduced at 65.5% with total sleep time of 305 minutes.  Sleep latency was 9 minutes.  Wake after sleep onset was 148 minutes.  All stages of sleep in normal proportion were noted.  No cardiac abnormalities were noted.  His supine AHI was 14.3.  REM AHI was 25.8.  His central AHI was approximately 20% of total.  His livia oxygen saturation was 86% with 1.5 minutes spent below 88%.  Moderately loud snoring was noted intermittently through the night.  Occasional PVCs were noted.      Overall, the patient has at least mild obstructive sleep apnea, although the respiratory  disturbance index is in the severe range.  He did not have any significant sleep-associated hypoxemia, but had moderate intermittent snoring throughout the night.  We discussed the implication of this result.  Given his comorbidities, it is strongly recommended that we consider treatment.  We discussed in detail the treatment options including a mandibular advancement device and CPAP.  The patient is interested in trialing a CPAP.  An auto CPAP with a range of 5-15 cm of water was ordered for him.  The patient will be set up with this and will return to clinic in approximately 2 months to review his CPAP compliance.      He was advised not to drive or operate heavy machinery if drowsy or sleepy.  He was counseled regarding the importance of weight loss.         I spent a total of 25 minutes face to face with Deandre Merchant during today's office visit. Over 50% of this time was spent counseling the patient and/or coordinating care regarding their sleep disorder.     Hany Fierro MD   of Medicine  Pulmonary, Critical Care and Sleep Medicine  [facility]  Pager: [phone]               D: 2019   T: 2019   MT: RODRICK      Name:     DEANDRE MERCHANT   MRN:      [id number]        Account:      [id number]   :      1950           Visit Date:   2019      Document: [id number]

## 2019-09-19 ENCOUNTER — TELEPHONE (OUTPATIENT)
Dept: FAMILY MEDICINE | Facility: OTHER | Age: 69
End: 2019-09-19

## 2019-09-19 ENCOUNTER — DOCUMENTATION ONLY (OUTPATIENT)
Dept: SLEEP MEDICINE | Facility: CLINIC | Age: 69
End: 2019-09-19
Payer: MEDICARE

## 2019-09-19 DIAGNOSIS — G47.33 OSA (OBSTRUCTIVE SLEEP APNEA): Primary | ICD-10-CM

## 2019-09-19 NOTE — PROGRESS NOTES
Patient was offered choice of vendor and chose [facility].  Patient Deandre RAE Mayur was set up at [place] on September 19, 2019. Patient received a Resmed AirSense 10 Auto. Pressures were set at 5-15 cm H2O.   Patient s ramp is 5 cm H2O for Off and FLEX/EPR is EPR.  Patient received a Resmed Mask name: airfit f20  Full Face mask size Large, heated tubing and heated humidifier.  Patient is enrolled in the STM Program and does need to meet compliance. Patient has a follow up on tbd with Dr. Fierro.  Providers schedule is not out past Oct 15th so patient will be called by Ninfa MILIAN To schedule once his November schedule is up.  Julieta Curry

## 2019-09-19 NOTE — TELEPHONE ENCOUNTER
Reason for call:  Patient reporting a symptom    Symptom or request: still get severe headache on ear side that has infection, phlegm,     Duration (how long have symptoms been present):     Have you been treated for this before? Yes    Additional comments: Pt was reading his discharge papers from [facility] and it says to call and leave message if not feeling better. He's not feeling better. He's a bit better, but not like he thought he would be. Wondering what you want to do next? Please call and advise.     Phone Number patient can be reached at:  Cell number on file:    Telephone Information:   Mobile [phone]       Best Time:  anytime    Can we leave a detailed message on this number:  YES    Call taken on 9/19/2019 at 9:31 AM by Laura Montague

## 2019-09-20 ENCOUNTER — OFFICE VISIT (OUTPATIENT)
Dept: FAMILY MEDICINE | Facility: CLINIC | Age: 69
End: 2019-09-20
Payer: MEDICARE

## 2019-09-20 VITALS
DIASTOLIC BLOOD PRESSURE: 72 MMHG | HEART RATE: 82 BPM | BODY MASS INDEX: 38.41 KG/M2 | OXYGEN SATURATION: 94 % | RESPIRATION RATE: 18 BRPM | TEMPERATURE: 97.1 F | SYSTOLIC BLOOD PRESSURE: 138 MMHG | WEIGHT: 283.2 LBS

## 2019-09-20 DIAGNOSIS — H65.91 OME (OTITIS MEDIA WITH EFFUSION), RIGHT: Primary | ICD-10-CM

## 2019-09-20 PROCEDURE — 99213 OFFICE O/P EST LOW 20 MIN: CPT | Performed by: NURSE PRACTITIONER

## 2019-09-20 ASSESSMENT — PAIN SCALES - GENERAL: PAINLEVEL: NO PAIN (0)

## 2019-09-20 NOTE — PATIENT INSTRUCTIONS
Patient Education     Middle Ear Infection (Otitis Media) in Adults  What is a middle ear infection?  A middle ear infection occurs behind the eardrum. It is most often caused by a virus or bacteria. Most kids have at least one middle ear infection by the time they are 3 years old. But adults can also get them.  What causes middle ear infections?  Inflammation in the middle ear most often starts after you ve had a sore throat, cold, or other upper respiratory problem. The infection spreads to the middle ear and causes fluid buildup behind the eardrum.   What are the symptoms of a middle ear infection?  These are the most common symptoms of middle ear infections in adults:    Ear pain    Feeling of fullness in the hear    Fluid draining from the ear    Fever    Hearing loss  These symptoms may look like other conditions or health problems. Always talk with your healthcare provider for a diagnosis.  How is a middle ear infection diagnosed?  Your healthcare provider will review your health history and do a physical exam. He or she will check the outer ear and the eardrum using an otoscope. The otoscope is a lighted tool that lets the healthcare provider see inside the ear. A pneumatic otoscope blows a puff of air into the ear to test eardrum movement. When there is fluid or infection in the middle ear, movement is decreased.  Your provider may also do a tympanometry. This is a test that directs air and sound to the middle ear.  If you have ear infections often, your healthcare provider may suggest having a hearing test.  How is a middle ear infection treated?  Treatment will depend on your symptoms, age, and general health. It will also depend on how severe the condition is.  Treatment may include:    Antibiotics    Pain relievers    Placing small tubes in the eardrum for chronic ear infections   What are possible complications of a middle ear infection?  Untreated ear infections can lead to:    Infection in other  parts of the head    Lasting (permanent) hearing loss    Speech and language problems  Can middle ear infections be prevented?  Cold and allergy medicines don't seem to prevent ear infections. And currently there is no vaccine that can prevent the disease. But check with your healthcare provider and make sure your vaccines are up-to-date. Living in a home where cigarettes are smoked can increase the chances of ear infections.  Key points about middle ear infections    Middle ear infections can affect both children and adults.    Pain and fever can be the most common symptoms.    Without treatment, permanent hearing loss may happen.    Take antibiotics as prescribed and finish all of the prescription. This can help prevent antibiotic-resistant infections or incomplete treatment with the infection returning.    Next steps  Tips to help you get the most from a visit to your healthcare provider:    Know the reason for your visit and what you want to happen.    Before your visit, write down questions you want answered.    Bring someone with you to help you ask questions and remember what your provider tells you.    At the visit, write down the name of a new diagnosis, and any new medicines, treatments, or tests. Also write down any new instructions your provider gives you.    Know why a new medicine or treatment is prescribed, and how it will help you. Also know what the side effects are.    Ask if your condition can be treated in other ways.    Know why a test or procedure is recommended and what the results could mean.    Know what to expect if you do not take the medicine or have the test or procedure.    If you have a follow-up appointment, write down the date, time, and purpose for that visit.    Know how you can contact your provider if you have questions.      0810-2928 [employer]. [address] [place] [address], [place] [address]. All rights reserved. This information is not intended as a substitute for  professional medical care. Always follow your healthcare professional's instructions.

## 2019-09-20 NOTE — PROGRESS NOTES
Roberto Merchant is a 69 year old male who presents to clinic today for the following health issues:    Patient presents today for follow up of his right ear infection. He has been on 7 days of Augmentin but is still having significant ear pain and he is worried about being at the end of his prescription. No headaches, but lots of sinus pressure on the right. No bloody ear drainage, but lots of ear pressure.    HPI   Chief Complaint   Patient presents with     RECHECK     ear infection, headaches, not improving.            Patient Active Problem List   Diagnosis     Impotence of organic origin     Retinal detachment     Dermatophytosis of body     BPH (benign prostatic hyperplasia)     Hyperlipidemia with target LDL less than 130     Coloboma, optic disc, congenital, bilateral     Mediastinal lymphadenopathy     Mesenteric lymphadenopathy     Intra-abdominal lymphadenopathy     Non-Hodgkin's lymphoma of multiple sites (H)     Lymphoma, small lymphoid, follicular (H)     Inguinal hernia, incarcerated     Acute renal failure (ARF) (H)     New onset atrial fibrillation (H)     Small bowel obstruction (H)     Follicular lymphoma (H)     Atrial fibrillation (H)     Counseling regarding advanced directives     Morbid obesity (H)     Chronic cough     Sinusitis chronic, ethmoidal     Sleep disturbance     Snoring     Fecal smearing     Elevated blood pressure reading without diagnosis of hypertension     Post-nasal drip     Cortical age-related cataract of left eye     WINSTON (obstructive sleep apnea)     Past Surgical History:   Procedure Laterality Date     COLONOSCOPY N/A 3/1/2017    Procedure: COLONOSCOPY;  Surgeon: Dakota Wall MD;  Location:  GI     ESOPHAGOSCOPY, GASTROSCOPY, DUODENOSCOPY (EGD), COMBINED  4/21/2013    Procedure: COMBINED ESOPHAGOSCOPY, GASTROSCOPY, DUODENOSCOPY (EGD), BIOPSY SINGLE OR MULTIPLE;;  Surgeon: Torrey Cosme MD;  Location:  GI     ESOPHAGOSCOPY, GASTROSCOPY,  DUODENOSCOPY (EGD), COMBINED  10/9/2013    Procedure: COMBINED ESOPHAGOSCOPY, GASTROSCOPY, DUODENOSCOPY (EGD), BIOPSY SINGLE OR MULTIPLE;  ESOPHAGOSCOPY, GASTROSCOPY, DUODENOSCOPY (EGD) with multiple biopsies;  Surgeon: Shay Sauer MD;  Location:  GI      COLONOSCOPY W/WO BRUSH/WASH  07/12/06     LAPAROTOMY EXPLORATORY  4/24/2013    Procedure: LAPAROTOMY EXPLORATORY;  Exploratory Laparotomy and lysis of adhesions.;  Surgeon: Genevieve Barros MD;  Location:  OR       Social History     Tobacco Use     Smoking status: Never Smoker     Smokeless tobacco: Never Used   Substance Use Topics     Alcohol use: Yes     Alcohol/week: 2.0 oz     Comment: occasionally weekends     Family History   Problem Relation Age of Onset     Cancer Father         liver/kidney     C.A.D. Father      Pacemaker Brother      Arthritis Mother         RA         Current Outpatient Medications   Medication Sig Dispense Refill     Acetaminophen (TYLENOL PO) Take 1,000 mg by mouth       [START ON 9/23/2019] amoxicillin-clavulanate (AUGMENTIN) 875-125 MG tablet Take 1 tablet by mouth 2 times daily for 4 days 8 tablet 0     amoxicillin-clavulanate (AUGMENTIN) 875-125 MG tablet Take 1 tablet by mouth 2 times daily for 10 days 20 tablet 0     cetirizine (ZYRTEC) 10 MG tablet Take 10 mg by mouth daily       metoprolol succinate ER (TOPROL-XL) 50 MG 24 hr tablet Take 1 tablet (50 mg) by mouth daily 90 tablet 3     order for DME Equipment ordered: RESMED Auto PAP Mask type: Full face  Settings: 5-15 cm h2o       simvastatin (ZOCOR) 20 MG tablet TAKE 1 TABLET BY MOUTH DAILY AT BEDTIME 90 tablet 1     Allergies   Allergen Reactions     Allopurinol Rash         Reviewed and updated as needed this visit by Provider         Review of Systems   ROS COMP: Constitutional, HEENT, cardiovascular, pulmonary, gi and gu systems are negative, except as otherwise noted.      Objective    /72   Pulse 82   Temp 97.1  F (36.2  C) (Temporal)    Resp 18   Wt 128.5 kg (283 lb 3.2 oz)   SpO2 94%   BMI 38.41 kg/m    Body mass index is 38.41 kg/m .  Physical Exam   GENERAL: alert and no distress  EYES: Eyes grossly normal to inspection  HENT: right ear: mucopurulent effusion, left ear: clear effusion and nose and mouth without ulcers or lesions  NECK: no asymmetry, masses, or scars  MS: no gross musculoskeletal defects noted, no edema  SKIN: no suspicious lesions or rashes  NEURO: Normal strength and tone, mentation intact and speech normal            Assessment & Plan     1. OME (otitis media with effusion), right  -The effusion is not improved muco-purulent drainage in the right ear the tympanic membrane is bulging, lots of wax in the ear that I needed to remove just for a good assessment, ear was really tender.  -I will extend the antibiotic another 4 days and recheck them next week on Thursday.  - amoxicillin-clavulanate (AUGMENTIN) 875-125 MG tablet; Take 1 tablet by mouth 2 times daily for 4 days  Dispense: 8 tablet; Refill: 0     -Patient verbalized understanding of plan of care and agreement with current plan of care.  Reminded him to eat with the antibiotic and taking some good probiotics to help with his got with this high dose of antibiotic over 2-week..    -If I can get this infection cleared with the Augmentin patient is aware he needs may need to be referred to ENT.    Return in about 6 days (around 9/26/2019) for Routine Visit: follow up right ear.    Toy Flowers NP  [facility]

## 2019-09-20 NOTE — TELEPHONE ENCOUNTER
Pt was advised of the recommendations and verbalized understanding. Pt was scheduled and in agreement with appt time and date.

## 2019-09-23 ENCOUNTER — DOCUMENTATION ONLY (OUTPATIENT)
Dept: SLEEP MEDICINE | Facility: CLINIC | Age: 69
End: 2019-09-23
Payer: MEDICARE

## 2019-09-23 NOTE — PROGRESS NOTES
3 DAY STM VISIT    Diagnostic AHI: 13.9 PSG    Patient contacted for 3 day STM visit.  Subjective measures:  Patient states the CPAP bothers his head. Patient making CPAP work. Patient to have his ears get checked this Friday.      Device type: Auto-CPAP   PAP settings from order::  CPAP min 5 cm  H20       CPAP max 15 cm  H20        Mask type:    Full Face Mask     Device settings from machine      Min CPAP 5 cm H20           Max CPAP   15 cm H20          Assessment: Nightly usage over four hours.  Action plan: Pt to have f/u 14 day [facility] visit.  Patient has a follow up visit scheduled:   no.    Total time spent on remote patient monitoring data analysis and patient contact today:   14 minutes

## 2019-09-26 ENCOUNTER — OFFICE VISIT (OUTPATIENT)
Dept: FAMILY MEDICINE | Facility: CLINIC | Age: 69
End: 2019-09-26
Payer: MEDICARE

## 2019-09-26 VITALS
DIASTOLIC BLOOD PRESSURE: 72 MMHG | OXYGEN SATURATION: 94 % | BODY MASS INDEX: 39.05 KG/M2 | SYSTOLIC BLOOD PRESSURE: 122 MMHG | TEMPERATURE: 96.9 F | RESPIRATION RATE: 16 BRPM | WEIGHT: 287.9 LBS | HEART RATE: 86 BPM

## 2019-09-26 DIAGNOSIS — H66.91 OTITIS MEDIA TREATED WITH ANTIBIOTICS IN THE PAST 60 DAYS, RIGHT: Primary | ICD-10-CM

## 2019-09-26 DIAGNOSIS — H61.21 EXCESSIVE EAR WAX, RIGHT: ICD-10-CM

## 2019-09-26 PROCEDURE — 69209 REMOVE IMPACTED EAR WAX UNI: CPT | Mod: RT | Performed by: NURSE PRACTITIONER

## 2019-09-26 PROCEDURE — 99212 OFFICE O/P EST SF 10 MIN: CPT | Mod: 25 | Performed by: NURSE PRACTITIONER

## 2019-09-26 ASSESSMENT — PAIN SCALES - GENERAL: PAINLEVEL: NO PAIN (0)

## 2019-09-26 NOTE — PROGRESS NOTES
Subjective     Deandre Merchant is a 69 year old male who presents to clinic today for the following health issues:    Minh presents today for follow up of the right ear pain. We did extend the antibiotic to 14 days as the ear pain was not improving. He only has 1 day left. He is augmentin BID DS. Pain as of Saturday was much better. He only has some remaining minor fatigue, but other wise is close to baseline. No other systemic symptoms to include fever, chills, or body aches.     HPI   Chief Complaint   Patient presents with     RECHECK     right ear            Patient Active Problem List   Diagnosis     Impotence of organic origin     Retinal detachment     Dermatophytosis of body     BPH (benign prostatic hyperplasia)     Hyperlipidemia with target LDL less than 130     Coloboma, optic disc, congenital, bilateral     Mediastinal lymphadenopathy     Mesenteric lymphadenopathy     Intra-abdominal lymphadenopathy     Non-Hodgkin's lymphoma of multiple sites (H)     Lymphoma, small lymphoid, follicular (H)     Inguinal hernia, incarcerated     Acute renal failure (ARF) (H)     New onset atrial fibrillation (H)     Small bowel obstruction (H)     Follicular lymphoma (H)     Atrial fibrillation (H)     Counseling regarding advanced directives     Morbid obesity (H)     Chronic cough     Sinusitis chronic, ethmoidal     Sleep disturbance     Snoring     Fecal smearing     Elevated blood pressure reading without diagnosis of hypertension     Post-nasal drip     Cortical age-related cataract of left eye     WINSTON (obstructive sleep apnea)     Past Surgical History:   Procedure Laterality Date     COLONOSCOPY N/A 3/1/2017    Procedure: COLONOSCOPY;  Surgeon: Dakota Wall MD;  Location:  GI     ESOPHAGOSCOPY, GASTROSCOPY, DUODENOSCOPY (EGD), COMBINED  4/21/2013    Procedure: COMBINED ESOPHAGOSCOPY, GASTROSCOPY, DUODENOSCOPY (EGD), BIOPSY SINGLE OR MULTIPLE;;  Surgeon: Torrey Cosme MD;  Location:  GI      ESOPHAGOSCOPY, GASTROSCOPY, DUODENOSCOPY (EGD), COMBINED  10/9/2013    Procedure: COMBINED ESOPHAGOSCOPY, GASTROSCOPY, DUODENOSCOPY (EGD), BIOPSY SINGLE OR MULTIPLE;  ESOPHAGOSCOPY, GASTROSCOPY, DUODENOSCOPY (EGD) with multiple biopsies;  Surgeon: Shay Sauer MD;  Location:  GI      COLONOSCOPY W/WO BRUSH/WASH  07/12/06     LAPAROTOMY EXPLORATORY  4/24/2013    Procedure: LAPAROTOMY EXPLORATORY;  Exploratory Laparotomy and lysis of adhesions.;  Surgeon: Genevieve Barros MD;  Location:  OR       Social History     Tobacco Use     Smoking status: Never Smoker     Smokeless tobacco: Never Used   Substance Use Topics     Alcohol use: Yes     Alcohol/week: 3.3 standard drinks     Comment: occasionally weekends     Family History   Problem Relation Age of Onset     Cancer Father         liver/kidney     C.A.D. Father      Pacemaker Brother      Arthritis Mother         RA         Current Outpatient Medications   Medication Sig Dispense Refill     Acetaminophen (TYLENOL PO) Take 1,000 mg by mouth       amoxicillin-clavulanate (AUGMENTIN) 875-125 MG tablet Take 1 tablet by mouth 2 times daily for 4 days 8 tablet 0     cetirizine (ZYRTEC) 10 MG tablet Take 10 mg by mouth daily       metoprolol succinate ER (TOPROL-XL) 50 MG 24 hr tablet Take 1 tablet (50 mg) by mouth daily 90 tablet 3     order for DME Equipment ordered: RESMED Auto PAP Mask type: Full face  Settings: 5-15 cm h2o       simvastatin (ZOCOR) 20 MG tablet TAKE 1 TABLET BY MOUTH DAILY AT BEDTIME 90 tablet 1     Allergies   Allergen Reactions     Allopurinol Rash         Reviewed and updated as needed this visit by Provider  Tobacco  Allergies  Meds  Problems  Med Hx  Surg Hx  Fam Hx         Review of Systems   ROS COMP: Constitutional, HEENT, cardiovascular, pulmonary, gi and gu systems are negative, except as otherwise noted.      Objective    /72   Pulse 86   Temp 96.9  F (36.1  C) (Temporal)   Resp 16   Wt 130.6 kg (287 lb  14.4 oz)   SpO2 94%   BMI 39.05 kg/m    Body mass index is 39.05 kg/m .  Physical Exam   GENERAL: healthy, alert and no distress  EYES: Eyes grossly normal to inspection  HENT: normal cephalic/atraumatic, right ear: normal: no effusions, no erythema, normal - on initial exam had excessive wax in the right ear, unable to assess the tympanic membrane, wax removed and right ear drum WNL. landmarks, left ear: normal: no effusions, no erythema, normal landmarks, nose and mouth without ulcers or lesions, oropharynx clear and oral mucous membranes moist  NECK: no asymmetry, masses, or scars  SKIN: no suspicious lesions or rashes  NEURO: Normal strength and tone, mentation intact and speech normal            Assessment & Plan     1. Otitis media treated with antibiotics in the past 60 days, right  - Now resolved.   - Pain has resolved and hearing has improved.   -He will finish this last day of antibiotics.   - Discussed signs and symptoms to report should pain return.     2. Excessive ear wax, right  - Ear wax removed in the right ear with elephant irrigation system. Once removed tympanic membrane was visualized and was within normal limits, infection resolved.  - REMOVE IMPACTED CERUMEN    -Follow-up only as needed for return of symptoms.       Toy Flowers NP  [facility]

## 2019-10-04 ENCOUNTER — DOCUMENTATION ONLY (OUTPATIENT)
Dept: SLEEP MEDICINE | Facility: CLINIC | Age: 69
End: 2019-10-04

## 2019-10-04 NOTE — PROGRESS NOTES
14  DAY STM VISIT    Diagnostic AHI: 13.9   PSG    Subjective measures:   Pt feels that things going well. He is struggling a bit with mask leak.      Assessment: Pt meeting objective benchmarks.  Patient not  meeting subjective benchmarks. leak issues     Action plan: pt to have 30 day [facility] visit.   Pt to reach out to [facility] if he feels he needs to change masks.     Device type: Auto-CPAP    PAP settings: CPAP min 5 cm  H20       CPAP max 15 cm  H20           95th% pressure 14 cm  H20     Mask type:  Full Face Mask    Objective measures: 14 day rolling measures      Compliance  93 %      Leak  18.8 lpm  last  upload      AHI 3.3   last  upload           Objective measure goal  Compliance   Goal >70%  Leak   Goal < 24 lpm  AHI  Goal < 5  Usage  Goal >240      Total time spent on remote patient monitoring data analysis and patient contact today:   13  minutes

## 2019-10-21 ENCOUNTER — DOCUMENTATION ONLY (OUTPATIENT)
Dept: SLEEP MEDICINE | Facility: CLINIC | Age: 69
End: 2019-10-21
Payer: MEDICARE

## 2019-10-21 NOTE — PROGRESS NOTES
30 DAY STM VISIT    Diagnostic AHI: 13.9   PSG    Subjective measures:Pt feels things are going well.  He is having some dry mouth during the night.         Assessment: Pt meeting objective benchmarks.  Patient failing following subjective benchmarks: dryness    Action plan: pt to have 6 month STM visit  Patient has scheduled a follow up visit with Dr. Fierro on 11/5/2019.   Device type: Auto-CPAP  PAP settings: CPAP min 5.0 cm  H20     CPAP max 15.0 cm  H20        95th% pressure 13.6 cm  H20   Mask type:  Full Face Mask  Objective measures: 14 day rolling measures      Compliance  78 %      Leak  17.57 lpm  last  upload      AHI 3.83   last  upload      Average number of minutes 290      Objective measure goal  Compliance   Goal >70%  Leak   Goal < 24 lpm  AHI  Goal < 5  Usage  Goal >240      Total time spent on remote patient monitoring data analysis and patient contact today:   13 minutes      Total contact/remote patient monitoring for last 30 days:   40  min

## 2019-10-22 NOTE — PROGRESS NOTES
Patient called back and states he was getting too much humidity last night and requested to have humidity reduced.    Turned humidity setting to 5 remotely.  Advised  patient to increase tube temp if developing condensation in the tube.

## 2019-11-01 ENCOUNTER — NURSE TRIAGE (OUTPATIENT)
Dept: FAMILY MEDICINE | Facility: OTHER | Age: 69
End: 2019-11-01

## 2019-11-01 NOTE — TELEPHONE ENCOUNTER
Spoke to patient, has low back back x 3 weeks. Has been seeing chiropractor, no improvement in pain. Triaged.   - very low backpain near his tailbone   - moderate, dull, does not radiate  - requesting a cortisone injection, which has helped pain in other places for him in the past  - is doing stretches at home, which helps somewhat    Dipsosition: SEE WITHIN 3 DAYS IN OFFICE:  You need to be examined.  Let me give you an appointment.  - previously has seen Dr. Garcia in [place] for other pain, would like to see him again when he is next available.  - scheduled per his request  - cancelled OV with [facility] for 11/7/19  - home care advice provided: heat, tylenol, keep moving as able, call back if becoming worse before 11/7/19 ortho appt      Additional Information    Negative: Passed out (i.e., fainted, collapsed and was not responding)    Negative: Shock suspected (e.g., cold/pale/clammy skin, too weak to stand, low BP, rapid pulse)    Negative: Sounds like a life-threatening emergency to the triager    Negative: Major injury to the back (e.g., MVA, fall > 10 feet or 3 meters, penetrating injury, etc.)    Negative: Pain in the upper back over the ribs (rib cage) that radiates (travels) into the chest    Negative: Pain in the upper back over the ribs (rib cage) and worsened by coughing (or clearly increases with breathing)    Negative: SEVERE back pain of sudden onset and age > 60    Negative: SEVERE abdominal pain (e.g., excruciating)    Negative: Abdominal pain and age > 60    Negative: Unable to urinate (or only a few drops) and bladder feels very full    Negative: Loss of bladder or bowel control (urine or bowel incontinence; wetting self, leaking stool) of new onset    Negative: Numbness (loss of sensation) in groin or rectal area    Negative: Pain radiates into groin, scrotum    Negative: Blood in urine (red, pink, or tea-colored)    Negative: Vomiting and pain over lower ribs of back (i.e., flank - kidney area)     Negative: Weakness of a leg or foot (e.g., unable to bear weight, dragging foot)    Negative: Patient sounds very sick or weak to the triager    Negative: Fever > 100.5 F (38.1 C) and flank pain    Negative: Pain or burning with passing urine (urination)    Negative: SEVERE back pain (e.g., excruciating, unable to do any normal activities) and not improved after pain medicine and CARE ADVICE    Negative: Numbness in an arm or hand (i.e., loss of sensation) and upper back pain    Negative: Numbness in a leg or foot (i.e., loss of sensation)    Negative: High-risk adult (e.g., history of cancer, history of HIV, or history of IV drug abuse)    Negative: Painful rash with multiple small blisters grouped together (i.e., dermatomal distribution or 'band' or 'stripe')    Negative: Pain radiates into the thigh or further down the leg, and in both legs    Negative: Age > 50 and no history of prior similar back pain    MODERATE back pain (e.g., interferes with normal activities) and present > 3 days    Protocols used: BACK PAIN-A-OH    Tiffany Goldman, RN, BSN

## 2019-11-04 ENCOUNTER — TELEPHONE (OUTPATIENT)
Dept: ORTHOPEDICS | Facility: CLINIC | Age: 69
End: 2019-11-04

## 2019-11-04 NOTE — TELEPHONE ENCOUNTER
patient is scheduled to see Dr. Garcia  For low back.  Please call and reschedule to Dr. Ureña.    Thank you  Shonna/BRITTANY

## 2019-11-05 ENCOUNTER — OFFICE VISIT (OUTPATIENT)
Dept: SLEEP MEDICINE | Facility: CLINIC | Age: 69
End: 2019-11-05
Payer: MEDICARE

## 2019-11-05 VITALS
SYSTOLIC BLOOD PRESSURE: 126 MMHG | DIASTOLIC BLOOD PRESSURE: 68 MMHG | BODY MASS INDEX: 38.74 KG/M2 | WEIGHT: 286 LBS | HEART RATE: 84 BPM | OXYGEN SATURATION: 96 % | HEIGHT: 72 IN

## 2019-11-05 DIAGNOSIS — G47.33 OSA (OBSTRUCTIVE SLEEP APNEA): Primary | ICD-10-CM

## 2019-11-05 PROCEDURE — 99213 OFFICE O/P EST LOW 20 MIN: CPT | Performed by: INTERNAL MEDICINE

## 2019-11-05 ASSESSMENT — MIFFLIN-ST. JEOR: SCORE: 2105.29

## 2019-11-05 NOTE — PROGRESS NOTES
SLEEP MEDICINE CLINIC NOTE   [facility]  Deandre RAE Mayur 69 year old male  : 1950  MRN: [id number]      PRIMARY CARE PROVIDER: Felix Sevilla    Visit Date:   2019      REASON FOR VISIT:  Followup of CPAP compliance.      HISTORY OF PRESENT ILLNESS:  The patient is a very pleasant 69-year-old gentleman with a history of hypertension, hyperlipidemia, morbid obesity, atrial fibrillation, status post cardioversion, follicular lymphoma currently in remission and right eye blindness, who was initially seen in our clinic approximately 2 months ago, at which time we had reviewed his PSG that showed mild WINSTON with an apnea-hypopnea index of 13.9 events per hour with moderately loud snoring.  The patient had been set up with CPAP with a range of 5-15 cm of water.  He returns today to review his CPAP compliance.      He reports that he has been using the CPAP almost every night.  He has noted significant improvement in snoring, but does not feel any other significant changes to his daytime functioning with the use of CPAP.  His Hurley Sleepiness Score remains at 7/24.  He is currently using a full face mask and does not feel that leak is a problem.  He does not think that CPAP makes his sleep any worse.      His CPAP compliance over a 30-day period from  to 10/22/2019 was reviewed.  This showed that the device was used 29 out of those 30 days with 25 days of over 4 hours of use.  Average daily use is about 5 hours and 30 minutes.  The device is set between an auto setting of 5-15 cm of water, median pressure is 9.6, 95th percentile pressure is 14.  Median leak is 0 liters per minute.  AHI is 3.7 with a majority of those being unknown or central apneas.      ASSESSMENT AND PLAN:  Overall, the patient has at least mild obstructive sleep apnea which is currently being treated with auto CPAP.  The patient appears to be tolerating the CPAP well.  His sleep apnea has resolved  with the use of CPAP.  He was advised to continue using it every time he goes to sleep, including during naps if any.  He was counseled regarding the importance of weight loss and avoiding driving if drowsy or sleepy.  He will return to clinic in approximately 1 year.      I spent a total of 25 minutes face to face with Deandre Merchant during today's office visit. Over 50% of this time was spent counseling the patient and/or coordinating care regarding their sleep disorder.     Hany Fierro MD   of Medicine  Pulmonary, Critical Care and Sleep Medicine  [facility]  Pager: [phone]               D: 2019   T: 2019   MT: RODRICK      Name:     DEANDRE MERCHANT   MRN:      [id number]        Account:      [id number]   :      1950           Visit Date:   2019      Document: [id number]

## 2019-11-05 NOTE — NURSING NOTE
Weight management plan: Patient was referred to their PCP to discuss a diet and exercise plan.     Does Deandre have a CPAP/Bipap?  Yes             Type of mask: full      FMG: [facility] [phone]    [email or web address]

## 2019-11-09 NOTE — PROGRESS NOTES
Sports Medicine Clinic Visit    PCP: Felix Sevilla    CC: Patient presents with:  Musculoskeletal Problem: Right glute      HPI:  Deandre Merchant is a 69 year old male who is seen as a self referral.   He notes right glute pain that radiates down the anterior and posterior thigh for the past 4-5 weeks without injury. He notes Friday he had difficulty sleeping due to the pain. He denies any back pain. He rates the pain at a 9/10 at its worst and a 1/10 currently.  Symptoms are relieved with Tylenol and stretching temporarily . Symptoms are worsened by prolonged walking and prolonged standing. He endorses a sharp pain.   He denies popping, grinding, catching, numbness, tingling and weakness.  Other treatment has included chiropractic care (5 sessions - improvement with the first session however none after) and heat.       Review of Systems:  Musculoskeletal: as above  Remainder of review of systems is negative including constitutional, eyes, ENT, CV, pulmonary, GI, , endocrine, skin, hematologic, and neurologic except as noted in HPI or medical history.    History reviewed. No pertinent past surgical/medical/family/social history other than as mentioned in HPI.    Patient Active Problem List   Diagnosis     Impotence of organic origin     Retinal detachment     Dermatophytosis of body     BPH (benign prostatic hyperplasia)     Hyperlipidemia with target LDL less than 130     Coloboma, optic disc, congenital, bilateral     Mediastinal lymphadenopathy     Mesenteric lymphadenopathy     Intra-abdominal lymphadenopathy     Non-Hodgkin's lymphoma of multiple sites (H)     Lymphoma, small lymphoid, follicular (H)     Inguinal hernia, incarcerated     Acute renal failure (ARF) (H)     New onset atrial fibrillation (H)     Small bowel obstruction (H)     Follicular lymphoma (H)     Atrial fibrillation (H)     Counseling regarding advanced directives     Morbid obesity (H)     Chronic cough     Sinusitis chronic,  ethmoidal     Sleep disturbance     Snoring     Fecal smearing     Elevated blood pressure reading without diagnosis of hypertension     Post-nasal drip     Cortical age-related cataract of left eye     WINSTON (obstructive sleep apnea)     Past Medical History:   Diagnosis Date     BPH (benign prostatic hyperplasia)      Lymphadenopathy     mediastinal & retroperitoneal     Non Hodgkin's lymphoma (H)      Polyps, colonic      Retinal detachment      Past Surgical History:   Procedure Laterality Date     COLONOSCOPY N/A 3/1/2017    Procedure: COLONOSCOPY;  Surgeon: Dakota Wall MD;  Location:  GI     ESOPHAGOSCOPY, GASTROSCOPY, DUODENOSCOPY (EGD), COMBINED  4/21/2013    Procedure: COMBINED ESOPHAGOSCOPY, GASTROSCOPY, DUODENOSCOPY (EGD), BIOPSY SINGLE OR MULTIPLE;;  Surgeon: Torrey Cosme MD;  Location:  GI     ESOPHAGOSCOPY, GASTROSCOPY, DUODENOSCOPY (EGD), COMBINED  10/9/2013    Procedure: COMBINED ESOPHAGOSCOPY, GASTROSCOPY, DUODENOSCOPY (EGD), BIOPSY SINGLE OR MULTIPLE;  ESOPHAGOSCOPY, GASTROSCOPY, DUODENOSCOPY (EGD) with multiple biopsies;  Surgeon: Shay Sauer MD;  Location:  GI     HC COLONOSCOPY W/WO BRUSH/WASH  07/12/06     LAPAROTOMY EXPLORATORY  4/24/2013    Procedure: LAPAROTOMY EXPLORATORY;  Exploratory Laparotomy and lysis of adhesions.;  Surgeon: Genevieve Barros MD;  Location:  OR     Family History   Problem Relation Age of Onset     Cancer Father         liver/kidney     C.A.D. Father      Pacemaker Brother      Arthritis Mother         RA     Social History     Socioeconomic History     Marital status:      Spouse name: Cristel     Number of children: 2     Years of education: Not on file     Highest education level: Not on file   Occupational History     Not on file   Social Needs     Financial resource strain: Not on file     Food insecurity:     Worry: Not on file     Inability: Not on file     Transportation needs:     Medical: Not on file     Non-medical: Not  on file   Tobacco Use     Smoking status: Never Smoker     Smokeless tobacco: Never Used   Substance and Sexual Activity     Alcohol use: Yes     Alcohol/week: 3.3 standard drinks     Comment: occasionally weekends     Drug use: No     Sexual activity: Not Currently   Lifestyle     Physical activity:     Days per week: Not on file     Minutes per session: Not on file     Stress: Not on file   Relationships     Social connections:     Talks on phone: Not on file     Gets together: Not on file     Attends [employer] service: Not on file     Active member of club or organization: Not on file     Attends meetings of clubs or organizations: Not on file     Relationship status: Not on file     Intimate partner violence:     Fear of current or ex partner: Not on file     Emotionally abused: Not on file     Physically abused: Not on file     Forced sexual activity: Not on file   Other Topics Concern      Service Not Asked     Blood Transfusions Not Asked     Caffeine Concern No     Occupational Exposure Not Asked     Hobby Hazards Not Asked     Sleep Concern No     Stress Concern No     Weight Concern No     Special Diet Not Asked     Back Care Not Asked     Exercise Yes     Bike Helmet Not Asked     Seat Belt Yes     Self-Exams Not Asked     Parent/sibling w/ CABG, MI or angioplasty before 65F 55M? Not Asked   Social History Narrative     Not on file       He is retired.     Current Outpatient Medications   Medication     cetirizine (ZYRTEC) 10 MG tablet     cyclobenzaprine (FLEXERIL) 5 MG tablet     methylPREDNISolone (MEDROL DOSEPAK) 4 MG tablet therapy pack     metoprolol succinate ER (TOPROL-XL) 50 MG 24 hr tablet     simvastatin (ZOCOR) 20 MG tablet     Acetaminophen (TYLENOL PO)     order for DME     Current Facility-Administered Medications   Medication     triamcinolone acetonide (KENALOG-40) injection 40 mg     Allergies   Allergen Reactions     Allopurinol Rash         Objective:  /72   Ht 1.829 m  (6')   Wt 129.7 kg (286 lb)   BMI 38.79 kg/m      General: Alert and in no distress    Head: Normocephalic, atraumatic  Eyes: no scleral icterus or conjunctival erythema   Oropharynx:  Mucous membranes moist  Skin: no erythema, petechiae, or jaundice  CV: regular rhythm by palpation, 2+ distal pulses  Resp: normal respiratory effort without conversational dyspnea   Psych: normal mood and affect    Gait: Non-antalgic, appropriate coordination and balance   Neuro: Motor strength and sensation as noted below    Musculoskeletal:    Low back exam:    Inspection:     no visible deformity in the low back       normal skin       normal vascular       normal lymphatic       no asymmetry    Tenderness:  None    ROM: Lumbar flexion is decreased.  Full extension, rotation, and lateral flexion.  Lumbar ROM is not painful.    Strength:  5/5 hip flexion/abduction/adduction, knee flexion/extension, ankle dorsiflexion/plantarflexion, great toe extension, toe flexion}    Sensation:    grossly intact throughout lower extremities      Radiology:  No imaging obtained todat    Assessment:  1. Gluteal pain    2. Sciatica of right side        Plan:  Discussed the assessment with the patient and developed a plan together:  -Symptoms likely due to a combination of sciatic nerve impingement and gluteal/piriformis muscle etiology.    -Rehab: Physical [facility]: [facility] - [phone]. Please do 5-6 days of exercises per week between formal sessions and the home exercises they provide.  -Medrol dose pack and Flexeril prescribed. Cyclobenzaprine (Flexeril) may cause sedation. Do not take prior to driving  -Ice or heat 15-20 minutes as needed (Avoid sleeping on a heating pad or ice)  -Patient's preferred over the counter medication as directed on packaging as needed for pain or soreness.      Follow Up: As needed if symptoms fail to improve or worsen. Please call with any questions or concerns.       Christelle Ureña MD, [place] Sports  Medicine  [facility]

## 2019-11-12 ENCOUNTER — OFFICE VISIT (OUTPATIENT)
Dept: ORTHOPEDICS | Facility: CLINIC | Age: 69
End: 2019-11-12
Payer: MEDICARE

## 2019-11-12 VITALS
DIASTOLIC BLOOD PRESSURE: 72 MMHG | WEIGHT: 286 LBS | SYSTOLIC BLOOD PRESSURE: 124 MMHG | BODY MASS INDEX: 38.74 KG/M2 | HEIGHT: 72 IN

## 2019-11-12 DIAGNOSIS — M79.18 GLUTEAL PAIN: Primary | ICD-10-CM

## 2019-11-12 DIAGNOSIS — M54.31 SCIATICA OF RIGHT SIDE: ICD-10-CM

## 2019-11-12 PROCEDURE — 99214 OFFICE O/P EST MOD 30 MIN: CPT | Performed by: PHYSICAL MEDICINE & REHABILITATION

## 2019-11-12 RX ORDER — CYCLOBENZAPRINE HCL 5 MG
5 TABLET ORAL 3 TIMES DAILY PRN
Qty: 30 TABLET | Refills: 0 | Status: SHIPPED | OUTPATIENT
Start: 2019-11-12 | End: 2020-03-05

## 2019-11-12 RX ORDER — METHYLPREDNISOLONE 4 MG
TABLET, DOSE PACK ORAL
Qty: 21 TABLET | Refills: 0 | Status: SHIPPED | OUTPATIENT
Start: 2019-11-12 | End: 2020-03-05

## 2019-11-12 ASSESSMENT — MIFFLIN-ST. JEOR: SCORE: 2105.29

## 2019-11-12 NOTE — LETTER
11/12/2019         RE: Deandre Merchant  [address]  [facility] [address]        Dear Colleague,    Thank you for referring your patient, Deandre Merchant, to the [facility]. Please see a copy of my visit note below.    Sports Medicine Clinic Visit    PCP: Felix Sevilla    CC: Patient presents with:  Musculoskeletal Problem: Right glute      HPI:  Deandre Merchant is a 69 year old male who is seen as a self referral.   He notes right glute pain that radiates down the anterior and posterior thigh for the past 4-5 weeks without injury. He notes Friday he had difficulty sleeping due to the pain. He denies any back pain. He rates the pain at a 9/10 at its worst and a 1/10 currently.  Symptoms are relieved with Tylenol and stretching temporarily . Symptoms are worsened by prolonged walking and prolonged standing. He endorses a sharp pain.   He denies popping, grinding, catching, numbness, tingling and weakness.  Other treatment has included chiropractic care (5 sessions - improvement with the first session however none after) and heat.       Review of Systems:  Musculoskeletal: as above  Remainder of review of systems is negative including constitutional, eyes, ENT, CV, pulmonary, GI, , endocrine, skin, hematologic, and neurologic except as noted in HPI or medical history.    History reviewed. No pertinent past surgical/medical/family/social history other than as mentioned in HPI.    Patient Active Problem List   Diagnosis     Impotence of organic origin     Retinal detachment     Dermatophytosis of body     BPH (benign prostatic hyperplasia)     Hyperlipidemia with target LDL less than 130     Coloboma, optic disc, congenital, bilateral     Mediastinal lymphadenopathy     Mesenteric lymphadenopathy     Intra-abdominal lymphadenopathy     Non-Hodgkin's lymphoma of multiple sites (H)     Lymphoma, small lymphoid, follicular (H)     Inguinal hernia, incarcerated     Acute renal failure (ARF)  (H)     New onset atrial fibrillation (H)     Small bowel obstruction (H)     Follicular lymphoma (H)     Atrial fibrillation (H)     Counseling regarding advanced directives     Morbid obesity (H)     Chronic cough     Sinusitis chronic, ethmoidal     Sleep disturbance     Snoring     Fecal smearing     Elevated blood pressure reading without diagnosis of hypertension     Post-nasal drip     Cortical age-related cataract of left eye     WINSTON (obstructive sleep apnea)     Past Medical History:   Diagnosis Date     BPH (benign prostatic hyperplasia)      Lymphadenopathy     mediastinal & retroperitoneal     Non Hodgkin's lymphoma (H)      Polyps, colonic      Retinal detachment      Past Surgical History:   Procedure Laterality Date     COLONOSCOPY N/A 3/1/2017    Procedure: COLONOSCOPY;  Surgeon: Dakota Wall MD;  Location: [facility]     ESOPHAGOSCOPY, GASTROSCOPY, DUODENOSCOPY (EGD), COMBINED  4/21/2013    Procedure: COMBINED ESOPHAGOSCOPY, GASTROSCOPY, DUODENOSCOPY (EGD), BIOPSY SINGLE OR MULTIPLE;;  Surgeon: Torrey Cosme MD;  Location: [facility]     ESOPHAGOSCOPY, GASTROSCOPY, DUODENOSCOPY (EGD), COMBINED  10/9/2013    Procedure: COMBINED ESOPHAGOSCOPY, GASTROSCOPY, DUODENOSCOPY (EGD), BIOPSY SINGLE OR MULTIPLE;  ESOPHAGOSCOPY, GASTROSCOPY, DUODENOSCOPY (EGD) with multiple biopsies;  Surgeon: Shay Sauer MD;  Location: [facility]     HC COLONOSCOPY W/WO BRUSH/WASH  07/12/06     LAPAROTOMY EXPLORATORY  4/24/2013    Procedure: LAPAROTOMY EXPLORATORY;  Exploratory Laparotomy and lysis of adhesions.;  Surgeon: Genevieve Barros MD;  Location: [facility]     Family History   Problem Relation Age of Onset     Cancer Father         liver/kidney     C.A.D. Father      Pacemaker Brother      Arthritis Mother         RA     Social History     Socioeconomic History     Marital status:      Spouse name: Cristel     Number of children: 2     Years of education: Not on file     Highest education level: Not on file    Occupational History     Not on file   Social Needs     Financial resource strain: Not on file     Food insecurity:     Worry: Not on file     Inability: Not on file     Transportation needs:     Medical: Not on file     Non-medical: Not on file   Tobacco Use     Smoking status: Never Smoker     Smokeless tobacco: Never Used   Substance and Sexual Activity     Alcohol use: Yes     Alcohol/week: 3.3 standard drinks     Comment: occasionally weekends     Drug use: No     Sexual activity: Not Currently   Lifestyle     Physical activity:     Days per week: Not on file     Minutes per session: Not on file     Stress: Not on file   Relationships     Social connections:     Talks on phone: Not on file     Gets together: Not on file     Attends Oriental orthodox service: Not on file     Active member of club or organization: Not on file     Attends meetings of clubs or organizations: Not on file     Relationship status: Not on file     Intimate partner violence:     Fear of current or ex partner: Not on file     Emotionally abused: Not on file     Physically abused: Not on file     Forced sexual activity: Not on file   Other Topics Concern      Service Not Asked     Blood Transfusions Not Asked     Caffeine Concern No     Occupational Exposure Not Asked     Hobby Hazards Not Asked     Sleep Concern No     Stress Concern No     Weight Concern No     Special Diet Not Asked     Back Care Not Asked     Exercise Yes     Bike Helmet Not Asked     Seat Belt Yes     Self-Exams Not Asked     Parent/sibling w/ CABG, MI or angioplasty before 65F 55M? Not Asked   Social History Narrative     Not on file       He is retired.     Current Outpatient Medications   Medication     cetirizine (ZYRTEC) 10 MG tablet     cyclobenzaprine (FLEXERIL) 5 MG tablet     methylPREDNISolone (MEDROL DOSEPAK) 4 MG tablet therapy pack     metoprolol succinate ER (TOPROL-XL) 50 MG 24 hr tablet     simvastatin (ZOCOR) 20 MG tablet     Acetaminophen (TYLENOL  PO)     order for DME     Current Facility-Administered Medications   Medication     triamcinolone acetonide (KENALOG-40) injection 40 mg     Allergies   Allergen Reactions     Allopurinol Rash         Objective:  /72   Ht 1.829 m (6')   Wt 129.7 kg (286 lb)   BMI 38.79 kg/m       General: Alert and in no distress    Head: Normocephalic, atraumatic  Eyes: no scleral icterus or conjunctival erythema   Oropharynx:  Mucous membranes moist  Skin: no erythema, petechiae, or jaundice  CV: regular rhythm by palpation, 2+ distal pulses  Resp: normal respiratory effort without conversational dyspnea   Psych: normal mood and affect    Gait: Non-antalgic, appropriate coordination and balance   Neuro: Motor strength and sensation as noted below    Musculoskeletal:    Low back exam:    Inspection:     no visible deformity in the low back       normal skin       normal vascular       normal lymphatic       no asymmetry    Tenderness:  None    ROM: Lumbar flexion is decreased.  Full extension, rotation, and lateral flexion.  Lumbar ROM is not painful.    Strength:  5/5 hip flexion/abduction/adduction, knee flexion/extension, ankle dorsiflexion/plantarflexion, great toe extension, toe flexion}    Sensation:    grossly intact throughout lower extremities      Radiology:  No imaging obtained todat    Assessment:  1. Gluteal pain    2. Sciatica of right side        Plan:  Discussed the assessment with the patient and developed a plan together:  -Symptoms likely due to a combination of sciatic nerve impingement and gluteal/piriformis muscle etiology.    -Rehab: [facility]: [facility] - [phone]. Please do 5-6 days of exercises per week between formal sessions and the home exercises they provide.  -Medrol dose pack and Flexeril prescribed. Cyclobenzaprine (Flexeril) may cause sedation. Do not take prior to driving  -Ice or heat 15-20 minutes as needed (Avoid sleeping on a heating pad or ice)  -Patient's  preferred over the counter medication as directed on packaging as needed for pain or soreness.      Follow Up: As needed if symptoms fail to improve or worsen. Please call with any questions or concerns.       Christelle Ureña MD, [facility] Sports Medicine  [facility]    Again, thank you for allowing me to participate in the care of your patient.        Sincerely,        Marialuisa Ureña MD

## 2019-11-12 NOTE — PATIENT INSTRUCTIONS
Today's Plan of Care:  -Rehab: [facility] - [phone]. Please do 5-6 days of exercises per week between formal sessions and the home exercises they provide.  -Medrol dose pack and Flexeril prescribed. Cyclobenzaprine (Flexeril) may cause sedation. Do not take prior to driving  -Ice or heat 15-20 minutes as needed (Avoid sleeping on a heating pad or ice)  -Patient's preferred over the counter medication as directed on packaging as needed for pain or soreness.      Follow Up: As needed if symptoms fail to improve or worsen. Please call with any questions or concerns.

## 2019-11-19 ENCOUNTER — HOSPITAL ENCOUNTER (OUTPATIENT)
Dept: PHYSICAL THERAPY | Facility: OTHER | Age: 69
Setting detail: THERAPIES SERIES
End: 2019-11-19
Attending: PHYSICAL MEDICINE & REHABILITATION
Payer: MEDICARE

## 2019-11-19 PROCEDURE — 97161 PT EVAL LOW COMPLEX 20 MIN: CPT | Mod: GP | Performed by: PHYSICAL THERAPIST

## 2019-11-19 PROCEDURE — 97010 HOT OR COLD PACKS THERAPY: CPT | Mod: GP | Performed by: PHYSICAL THERAPIST

## 2019-11-19 PROCEDURE — 97014 ELECTRIC STIMULATION THERAPY: CPT | Mod: GP | Performed by: PHYSICAL THERAPIST

## 2019-11-19 PROCEDURE — 97110 THERAPEUTIC EXERCISES: CPT | Mod: GP | Performed by: PHYSICAL THERAPIST

## 2019-11-19 NOTE — PROGRESS NOTES
11/19/19 0900   General Information   Type of Visit Initial OP Ortho PT Evaluation   Start of Care Date 11/19/19   Referring Physician maggie garcia MD    Patient/Family Goals Statement back and right leg pain    Orders Evaluate and Treat   Date of Order 11/12/19   Certification Required? Yes   Medical Diagnosis gluteal pain M79.18 sciatica of right shide M54.31   Surgical/Medical history reviewed Yes   Precautions/Limitations no known precautions/limitations   Body Part(s)   Body Part(s) Lumbar Spine/SI   Presentation and Etiology   Pertinent history of current problem (include personal factors and/or comorbidities that impact the POC) patient reports that about 6 weeks has had right buttock and right leg pain . currently is improved is not having the right leg pain as much and is more just in right low back . PMH 30 years ago left low back and leg pain . . currently pain started in right low back and would go down the entire right leg to the ankle , denies numb or tinliging or incottenence   Impairments A. Pain   Functional Limitations perform activities of daily living;perform desired leisure / sports activities   Symptom Location right low back    Onset date of current episode/exacerbation 09/19/19   Chronicity New   Pain rating (0-10 point scale) Best (/10);Worst (/10)   Best (/10) 0/10   Worst (/10) 3/10   Pain quality B. Dull;C. Aching   Frequency of pain/symptoms C. With activity   Pain/symptoms eased by E. Changing positions;F. Certain positions   Progression of symptoms since onset: Improved   Prior Level of Function   Functional Level Prior Comment standing calf and trunk extension . hunting    Current Level of Function   Current Community Support Family/friend caregiver   Patient role/employment history F. Retired   Fall Risk Screen   Fall screen completed by PT   Have you fallen 2 or more times in the past year? No   Have you fallen and had an injury in the past year? No   Is patient a fall risk?  No   Lumbar Spine/SI Objective Findings   Posture tall and slightly flexed posture    Gait/Locomotion normal    Flexion ROM mod  no change in pain    Extension ROM mod  no change in pain    Right Side Bending ROM mod  no change in pain    Left Side Bending ROM mod  no change in pain    Hip Screen neg   Lumbar/Hip/Knee/Foot Strength Comments able to stand on heels and toes , grossly 4/5 in B LE    Hamstring Flexibility right 50 left 43    Quadricep Flexibility mod    SLR neg   Sensation Testing denies numb or tingling    Palpation right buttock / low back pain    Planned Therapy Interventions   Planned Therapy Interventions ADL retraining;ROM;strengthening;stretching   Planned Modality Interventions   Planned Modality Interventions Comments IFC and hot pack , modalities as needed    Clinical Impression   Criteria for Skilled Therapeutic Interventions Met yes, treatment indicated   PT Diagnosis right low back pain    Functional limitations due to impairments Airam choudhary MOISES 28.89   Clinical Presentation Stable/Uncomplicated   Clinical Presentation Rationale patient seen due to c/o right low back and leg pain , leg pain has resolved but continues to have back pain , decrease ROM and flexability , Rx is instruction in HEP and use of modalities    Clinical Decision Making (Complexity) Low complexity   Predicted Duration of Therapy Intervention (days/wks) 1x week x 1-2  months    Risk & Benefits of therapy have been explained Yes   Patient, Family & other staff in agreement with plan of care Yes   Education Assessment   Preferred Learning Style Listening;Reading;Demonstration   Barriers to Learning No barriers   ORTHO GOALS   PT Ortho Eval Goals 1;2;3   Ortho Goal 1   Goal Identifier 1   Goal Description instruction in HEP and compliant wiith it 5 of 7 days to improve quality of life    Target Date 01/19/20   Ortho Goal 2   Goal Identifier 2   Goal Description patient to have reduction in pain level currently 0-3/10 goal is  0-1/10    Target Date 01/19/20   Ortho Goal 3   Goal Identifier 3   Goal Description patient to have improved tolerance to activity currently MOISES 28.89 goal is less than 20    Target Date 01/19/20   Total Evaluation Time   PT Eval, Low Complexity Minutes (63218) 15   Therapy Certification   Certification date from 11/19/19   Certification date to 01/19/20   Medical Diagnosis gluteal pain M79.18 sciatica of right shide M54.31

## 2019-11-19 NOTE — PROGRESS NOTES
[facility]          OUTPATIENT PHYSICAL THERAPY ORTHOPEDIC EVALUATION  PLAN OF TREATMENT FOR OUTPATIENT REHABILITATION  (COMPLETE FOR INITIAL CLAIMS ONLY)  Patient's Last Name, First Name, M.I.  YOB: 1950  Deandre Merchant    Provider s Name:  [facility]   Medical Record No.  [id number]   Start of Care Date:  11/19/19   Onset Date:  09/19/19   Type:     _X__PT   ___OT   ___SLP Medical Diagnosis:  gluteal pain M79.18 sciatica of right shide M54.31     PT Diagnosis:  right low back pain    Visits from SOC:  1      _________________________________________________________________________________  Plan of Treatment/Functional Goals:  ADL retraining, ROM, strengthening, stretching        IFC and hot pack , modalities as needed   Goals  Goal Identifier: 1  Goal Description: instruction in HEP and compliant wiith it 5 of 7 days to improve quality of life   Target Date: 01/19/20    Goal Identifier: 2  Goal Description: patient to have reduction in pain level currently 0-3/10 goal is 0-1/10   Target Date: 01/19/20    Goal Identifier: 3  Goal Description: patient to have improved tolerance to activity currently MOISES 28.89 goal is less than 20   Target Date: 01/19/20                                                           Therapy Frequency:     Predicted Duration of Therapy Intervention:  1x week x 1-2  months     Lizzie Salmon, PT                 I CERTIFY THE NEED FOR THESE SERVICES FURNISHED UNDER        THIS PLAN OF TREATMENT AND WHILE UNDER MY CARE     (Physician co-signature of this document indicates review and certification of the therapy plan).                       Certification Date From:  11/19/19   Certification Date To:  01/19/20    Referring Provider:  maggie garcia MD     Initial Assessment        See Epic Evaluation Start of Care Date: 11/19/19

## 2019-11-25 ENCOUNTER — HOSPITAL ENCOUNTER (OUTPATIENT)
Dept: PHYSICAL THERAPY | Facility: OTHER | Age: 69
Setting detail: THERAPIES SERIES
End: 2019-11-25
Attending: ORTHOPAEDIC SURGERY
Payer: MEDICARE

## 2019-11-25 PROCEDURE — 97014 ELECTRIC STIMULATION THERAPY: CPT | Mod: GP | Performed by: PHYSICAL THERAPIST

## 2019-11-25 PROCEDURE — 97010 HOT OR COLD PACKS THERAPY: CPT | Mod: GP | Performed by: PHYSICAL THERAPIST

## 2019-12-04 ENCOUNTER — HOSPITAL ENCOUNTER (OUTPATIENT)
Dept: PHYSICAL THERAPY | Facility: OTHER | Age: 69
Setting detail: THERAPIES SERIES
End: 2019-12-04
Attending: ORTHOPAEDIC SURGERY
Payer: MEDICARE

## 2019-12-04 PROCEDURE — 97010 HOT OR COLD PACKS THERAPY: CPT | Mod: GP | Performed by: PHYSICAL THERAPIST

## 2019-12-04 PROCEDURE — 97014 ELECTRIC STIMULATION THERAPY: CPT | Mod: GP | Performed by: PHYSICAL THERAPIST

## 2019-12-16 NOTE — PROGRESS NOTES
Outpatient Physical Therapy Discharge Note     Patient: Deandre Merchant  : 1950    Beginning/End Dates of Reporting Period:  2019 to 2019    Referring Provider: maggie garcia MD     Therapy Diagnosis: low back pain      Client Self Report: feels like he is doing well and wants today to be last Rx     Objective Measurements:  Objective Measure: pain 0-3/10 renny low MOISES 28.89 at eval   Details: currently pain 0-2/10   renny low MOISES 17.78      patient seen for 3 Rx for use of hot pack and IFC in clinic to low back and instruction in HEP at last Rx he was completing the following 90/90 hold 10 x 3 , SKC , supine bent knee trunk rotation , prone knee flexion 10x         Goals:  Goal Identifier 1   Goal Description instruction in HEP and compliant wiith it 5 of 7 days to improve quality of life    Target Date 20   Date Met      Progress:     Goal Identifier 2   Goal Description patient to have reduction in pain level currently 0-3/10 goal is 0-1/10    Target Date 20   Date Met      Progress:     Goal Identifier 3   Goal Description patient to have improved tolerance to activity currently MOISES 28.89 goal is less than 20    Target Date 20   Date Met      Progress:     Goal Identifier     Goal Description     Target Date     Date Met      Progress:     Goal Identifier     Goal Description     Target Date     Date Met      Progress:     Goal Identifier     Goal Description     Target Date     Date Met      Progress:     Goal Identifier     Goal Description     Target Date     Date Met      Progress:     Goal Identifier     Goal Description     Target Date     Date Met      Progress:     Progress Toward Goals:   Progress this reporting period: patient felt he was meeting his therapy goals and ready for discharge to HEP           Plan:  Discharge from therapy.    Discharge:    Reason for Discharge: Patient has met all goals.  Patient chooses to discontinue therapy.    Equipment Issued:  none    Discharge Plan: Patient to continue home program.

## 2019-12-19 DIAGNOSIS — I48.91 ATRIAL FIBRILLATION, UNSPECIFIED TYPE (H): ICD-10-CM

## 2019-12-19 DIAGNOSIS — E78.5 HYPERLIPIDEMIA WITH TARGET LDL LESS THAN 130: ICD-10-CM

## 2019-12-19 RX ORDER — SIMVASTATIN 20 MG
TABLET ORAL
Qty: 90 TABLET | Refills: 1 | Status: SHIPPED | OUTPATIENT
Start: 2019-12-19 | End: 2020-06-17

## 2019-12-20 NOTE — TELEPHONE ENCOUNTER
Pres[place] per [facility] [place].  Adrian Frost, RN, BSN

## 2020-03-04 ENCOUNTER — NURSE TRIAGE (OUTPATIENT)
Dept: FAMILY MEDICINE | Facility: OTHER | Age: 70
End: 2020-03-04

## 2020-03-04 NOTE — TELEPHONE ENCOUNTER
Cough, mucus x 2 weeks. Takes mucus relief often which helps during the day. Doesn't wake him at night-takes a sleeping pill.  Appt made.      Reason for Disposition    [1] Nasal discharge AND [2] present > 10 days    Additional Information    Negative: Severe difficulty breathing (e.g., struggling for each breath, speaks in single words)    Negative: Bluish (or gray) lips or face now    Negative: [1] Difficulty breathing AND [2] exposure to flames, smoke, or fumes    Negative: [1] Stridor AND [2] difficulty breathing    Negative: Sounds like a life-threatening emergency to the triager    Negative: [1] Previous asthma attacks AND [2] this feels like asthma attack    Negative: Dry (non-productive) cough (i.e., no sputum or minimal clear sputum)    Negative: Chest pain  (Exception: MILD central chest pain, present only when coughing)    Negative: Difficulty breathing    Negative: Patient sounds very sick or weak to the triager    Negative: [1] Coughed up blood AND [2] > 1 tablespoon (15 ml) (Exception: blood-tinged sputum)    Negative: Fever > 103 F (39.4 C)    Negative: [1] Fever > 101 F (38.3 C) AND [2] age > 60    Negative: [1] Fever > 100.0 F (37.8 C) AND [2] bedridden (e.g., nursing home patient, CVA, chronic illness, recovering from surgery)    Negative: [1] Fever > 100.0 F (37.8 C) AND [2] diabetes mellitus or weak immune system (e.g., HIV positive, cancer chemo, splenectomy, chronic steroids)    Negative: Wheezing is present    Negative: SEVERE coughing spells (e.g., whooping sound after coughing, vomiting after coughing)    Negative: [1] Continuous (nonstop) coughing interferes with work or school AND [2] no improvement using cough treatment per Care Advice    Negative: Coughing up kwesi-colored (reddish-brown) sputum    Negative: Fever present > 3 days (72 hours)    Negative: [1] Fever returns after gone for over 24 hours AND [2] symptoms worse or not improved    Negative: [1] Using nasal washes and pain  "medicine > 24 hours AND [2] sinus pain (around cheekbone or eye) persists    Negative: Earache    Negative: [1] Known COPD or other severe lung disease (i.e., bronchiectasis, cystic fibrosis, lung surgery) AND [2] worsening symptoms (i.e., increased sputum purulence or amount, increased breathing difficulty    Answer Assessment - Initial Assessment Questions  1. ONSET: \"When did the cough begin?\"       Two weeks ago  2. SEVERITY: \"How bad is the cough today?\"       frequent  3. RESPIRATORY DISTRESS: \"Describe your breathing.\"       No breathing issues  4. FEVER: \"Do you have a fever?\" If so, ask: \"What is your temperature, how was it measured, and when did it start?\"      no  5. SPUTUM: \"Describe the color of your sputum\" (clear, white, yellow, green)      yellow  6. HEMOPTYSIS: \"Are you coughing up any blood?\" If so ask: \"How much?\" (flecks, streaks, tablespoons, etc.)      no  7. CARDIAC HISTORY: \"Do you have any history of heart disease?\" (e.g., heart attack, congestive heart failure)       no  8. LUNG HISTORY: \"Do you have any history of lung disease?\"  (e.g., pulmonary embolus, asthma, emphysema)      no  9. PE RISK FACTORS: \"Do you have a history of blood clots?\" (or: recent major surgery, recent prolonged travel, bedridden )      no  10. OTHER SYMPTOMS: \"Do you have any other symptoms?\" (e.g., runny nose, wheezing, chest pain)        Feeling run down.  11. PREGNANCY: \"Is there any chance you are pregnant?\" \"When was your last menstrual period?\"        no  12. TRAVEL: \"Have you traveled out of the country in the last month?\" (e.g., travel history, exposures)        Not asked.    Protocols used: COUGH - ACUTE [id number]    "

## 2020-03-04 NOTE — TELEPHONE ENCOUNTER
Reason for call:  Symptom  Reason for call:  Patient reporting a symptom    Symptom or request: head cold/ mucus    Duration (how long have symptoms been present): 2 weeks    Have you been treated for this before? Yes    Additional comments: Pt is calling and was informed he may have to come in to be checked but he is requesting an antibiotic due to his symptoms. He states he has a lot of mucus going on and he has tried a lot of the cover the counter things and they aren't working. Please advise.     Phone Number patient can be reached at:  Home number on file [phone] (home)    Best Time:  anytime    Can we leave a detailed message on this number:  YES    Call taken on 3/4/2020 at 10:12 AM by Rosi Pro

## 2020-03-05 ENCOUNTER — OFFICE VISIT (OUTPATIENT)
Dept: FAMILY MEDICINE | Facility: CLINIC | Age: 70
End: 2020-03-05
Payer: MEDICARE

## 2020-03-05 VITALS
TEMPERATURE: 98.8 F | BODY MASS INDEX: 40.5 KG/M2 | WEIGHT: 299 LBS | HEIGHT: 72 IN | DIASTOLIC BLOOD PRESSURE: 74 MMHG | OXYGEN SATURATION: 95 % | RESPIRATION RATE: 20 BRPM | HEART RATE: 86 BPM | SYSTOLIC BLOOD PRESSURE: 128 MMHG

## 2020-03-05 DIAGNOSIS — L20.9 ATOPIC DERMATITIS, UNSPECIFIED TYPE: Primary | ICD-10-CM

## 2020-03-05 DIAGNOSIS — J06.9 VIRAL URI: ICD-10-CM

## 2020-03-05 PROCEDURE — 99213 OFFICE O/P EST LOW 20 MIN: CPT | Performed by: FAMILY MEDICINE

## 2020-03-05 RX ORDER — TRIAMCINOLONE ACETONIDE 1 MG/G
OINTMENT TOPICAL
Qty: 15 G | Refills: 1 | Status: SHIPPED | OUTPATIENT
Start: 2020-03-05 | End: 2021-07-15

## 2020-03-05 ASSESSMENT — PAIN SCALES - GENERAL: PAINLEVEL: NO PAIN (0)

## 2020-03-05 ASSESSMENT — MIFFLIN-ST. JEOR: SCORE: 2159.26

## 2020-03-05 NOTE — PROGRESS NOTES
Roberto Merchant is a 70 year old male who presents to clinic today for the following health issues:    HPI   Acute Illness   Acute illness concerns: cough   Onset: 2 weeks     Fever: no    Chills/Sweats: no    Headache (location?): no    Sinus Pressure:YES    Conjunctivitis:  no    Ear Pain: YES: bilateral    Rhinorrhea: no    Congestion: YES    Sore Throat: no     Cough: YES-productive of yellow sputum    Wheeze: no    Decreased Appetite: no    Nausea: no    Vomiting: no    Diarrhea:  no    Dysuria/Freq.: no    Fatigue/Achiness: YES    Sick/Strep Exposure: no     Therapies Tried and outcome: zyrtec, OTC meds.             Reviewed and updated as needed this visit by Provider  Tobacco  Allergies  Meds  Problems  Med Hx  Surg Hx  Fam Hx         Review of Systems   ROS COMP: Constitutional, HEENT, cardiovascular, pulmonary, gi and gu systems are negative, except as otherwise noted.      Objective    /74   Pulse 86   Temp 98.8  F (37.1  C) (Temporal)   Resp 20   Ht 1.829 m (6')   Wt 135.6 kg (299 lb)   SpO2 95%   BMI 40.55 kg/m    Body mass index is 40.55 kg/m .  Physical Exam  Constitutional:       Appearance: He is well-developed.   HENT:      Head: Normocephalic.      Right Ear: Tympanic membrane, ear canal and external ear normal.      Left Ear: Tympanic membrane, ear canal and external ear normal.      Nose: Mucosal edema and rhinorrhea present.      Right Sinus: No maxillary sinus tenderness or frontal sinus tenderness.      Left Sinus: No maxillary sinus tenderness or frontal sinus tenderness.      Mouth/Throat:      Mouth: Mucous membranes are not dry.      Pharynx: Uvula midline. No oropharyngeal exudate.      Tonsils: No tonsillar exudate.   Eyes:      General: Lids are normal.         Right eye: No discharge.         Left eye: No discharge.      Conjunctiva/sclera: Conjunctivae normal.   Neck:      Musculoskeletal: Normal range of motion and neck supple.      Thyroid: No  thyromegaly.   Cardiovascular:      Rate and Rhythm: Normal rate and regular rhythm.      Heart sounds: Normal heart sounds, S1 normal and S2 normal. No murmur.   Pulmonary:      Effort: Pulmonary effort is normal. No respiratory distress.      Breath sounds: Normal breath sounds. No wheezing, rhonchi or rales.   Lymphadenopathy:      Cervical: No cervical adenopathy.   Skin:     General: Skin is warm.      Findings: Erythema and rash (hands have erythema wiht dry scale) present.   Neurological:      Mental Status: He is alert.                  Assessment & Plan     ASSESSMENT/ORDERS:    ICD-10-CM    1. Atopic dermatitis, unspecified type  L20.9 triamcinolone (KENALOG) 0.1 % external ointment   2. Viral URI  J06.9      PLAN:  1.  See below for patient instructions for recommendations and discussion on today's health issues.  2.  Steroid cream for treatment of rash, recommended hydration creams as well.     Patient Instructions   1.  Saline sinus rinse (one form comes in a squirt bottle form while another kind is known as a Neti Pots).  Follow directions on package.  May do this 1-2 times per day.  2.  Flonase (fluticasone) 45-60 minutes after sinus rinse.  Use it for 7-14 days.      3.  Antihistamines:  Daytime:  Zyrtec (cetirizine).  Nighttime:  Benadryl (diphenhydramine).  4.  Tylenol (acetaminophen) or Advil (ibuprofen) as needed for pain and/or fever.                      No follow-ups on file.    Mekhi Abbott MD  [facility]

## 2020-03-05 NOTE — PATIENT INSTRUCTIONS
1.  Saline sinus rinse (one form comes in a squirt bottle form while another kind is known as a Neti Pots).  Follow directions on package.  May do this 1-2 times per day.  2.  Flonase (fluticasone) 45-60 minutes after sinus rinse.  Use it for 7-14 days.      3.  Antihistamines:  Daytime:  Zyrtec (cetirizine).  Nighttime:  Benadryl (diphenhydramine).  4.  Tylenol (acetaminophen) or Advil (ibuprofen) as needed for pain and/or fever.

## 2020-03-24 ENCOUNTER — DOCUMENTATION ONLY (OUTPATIENT)
Dept: SLEEP MEDICINE | Facility: CLINIC | Age: 70
End: 2020-03-24

## 2020-03-24 NOTE — PROGRESS NOTES
6 month [facility] Recheck Visit     Diagnostic AHI: 13.9  PSG    Subjective measures:   Patient states that his CPAP usage has been going well for him. He did say that he will need new filter    Assessment: Pt meeting objective benchmarks. pt to follow up per provider request       Device type: Auto-CPAP  PAP settings: CPAP min 5.0 cm  H20     CPAP max 15.0 cm  H20    CPAP fixed  cm  H20    95th% pressure 13.5 cm  H20   Objective measures: 14 day rolling measures      Compliance  78 %      Leak  32.96 lpm  last  upload      AHI 3.67   last  upload      Average number of minutes 333      Objective measure goal  Compliance   Goal >70%  Leak   Goal < 24 lpm  AHI  Goal < 5  Usage  Goal >240    Total time spent on accessing, reviewing and interpreting remote patient PAP therapy data:   10 minutes      Total time spent with direct patient communication :   4 minutes

## 2020-04-22 ENCOUNTER — TELEPHONE (OUTPATIENT)
Dept: ONCOLOGY | Facility: CLINIC | Age: 70
End: 2020-04-22

## 2020-04-22 NOTE — TELEPHONE ENCOUNTER
Received call from Radiology for CT scan scheduled beginning of May.  Patient is scheduled to see Dr. Krishnamurthy who is no longer with [facility].  Will get patient rescheduled with a provider in  and review with them.    Markell Mistry RN, BSN, OCN  4/22/2020, 4:38 PM

## 2020-04-24 NOTE — TELEPHONE ENCOUNTER
Called patient to see if he was ok with seeing someone in [place].  Patient agrees but won't come in until July.  Will reschedule scans and schedule transfer of care in [place].  Scheduled to see Dr. Crespo on 7/14/20 at 11:30.  CT scan rescheduled for 7/10/20 at 10:00 with a 9:30 check in.  Labs scheduled for prior.  Called patient with update.    Markell Mistry RN, BSN, OCN  4/24/2020, 2:39 PM

## 2020-05-04 ENCOUNTER — TELEPHONE (OUTPATIENT)
Dept: SLEEP MEDICINE | Facility: CLINIC | Age: 70
End: 2020-05-04

## 2020-06-16 DIAGNOSIS — E78.5 HYPERLIPIDEMIA WITH TARGET LDL LESS THAN 130: ICD-10-CM

## 2020-06-16 DIAGNOSIS — I48.91 ATRIAL FIBRILLATION, UNSPECIFIED TYPE (H): ICD-10-CM

## 2020-06-17 RX ORDER — SIMVASTATIN 20 MG
TABLET ORAL
Qty: 90 TABLET | Refills: 0 | Status: SHIPPED | OUTPATIENT
Start: 2020-06-17 | End: 2020-07-07

## 2020-06-17 NOTE — TELEPHONE ENCOUNTER
Patient currently scheduled with PCP on July 7th.  Aishwarya Gonzalez CMA on 6/17/2020 at 10:09 AM

## 2020-06-17 NOTE — TELEPHONE ENCOUNTER
"Requested Prescriptions   Pending Prescriptions Disp Refills     simvastatin (ZOCOR) 20 MG tablet [Pharmacy Med Name: SIMVASTATIN 20MG TABLET] 90 tablet 1     Sig: TAKE 1 TABLET BY MOUTH DAILY AT BEDTIME       Statins Protocol Failed - 6/16/2020  9:39 AM        Failed - LDL on file in past 12 months     Recent Labs   Lab Test 06/13/19  0842   LDL 31             Passed - No abnormal creatine kinase in past 12 months     No lab results found.             Passed - Recent (12 mo) or future (30 days) visit within the authorizing provider's specialty     Patient has had an office visit with the authorizing provider or a provider within the authorizing providers department within the previous 12 mos or has a future within next 30 days. See \"Patient Info\" tab in inbasket, or \"Choose Columns\" in Meds & Orders section of the refill encounter.              Passed - Medication is active on med list        Passed - Patient is age 18 or older             Routing refill request to provider for review/approval because:  Labs not current:  LDL    Dirk Torres RN, BSN          "

## 2020-07-01 DIAGNOSIS — I77.810 ASCENDING AORTA DILATATION (H): ICD-10-CM

## 2020-07-01 DIAGNOSIS — I10 ESSENTIAL HYPERTENSION: ICD-10-CM

## 2020-07-01 RX ORDER — METOPROLOL SUCCINATE 50 MG/1
50 TABLET, EXTENDED RELEASE ORAL DAILY
Qty: 90 TABLET | Refills: 0 | Status: SHIPPED | OUTPATIENT
Start: 2020-07-01 | End: 2020-07-07

## 2020-07-07 ENCOUNTER — OFFICE VISIT (OUTPATIENT)
Dept: FAMILY MEDICINE | Facility: CLINIC | Age: 70
End: 2020-07-07
Payer: MEDICARE

## 2020-07-07 VITALS
OXYGEN SATURATION: 94 % | BODY MASS INDEX: 38.74 KG/M2 | HEART RATE: 79 BPM | SYSTOLIC BLOOD PRESSURE: 122 MMHG | RESPIRATION RATE: 18 BRPM | TEMPERATURE: 98.2 F | DIASTOLIC BLOOD PRESSURE: 74 MMHG | WEIGHT: 286 LBS | HEIGHT: 72 IN

## 2020-07-07 DIAGNOSIS — I77.810 ASCENDING AORTA DILATATION (H): ICD-10-CM

## 2020-07-07 DIAGNOSIS — Z00.00 ENCOUNTER FOR MEDICARE ANNUAL WELLNESS EXAM: Primary | ICD-10-CM

## 2020-07-07 DIAGNOSIS — E78.5 HYPERLIPIDEMIA WITH TARGET LDL LESS THAN 130: ICD-10-CM

## 2020-07-07 DIAGNOSIS — E66.01 SEVERE OBESITY (BMI 35.0-39.9) WITH COMORBIDITY (H): ICD-10-CM

## 2020-07-07 DIAGNOSIS — C82.90 FOLLICULAR LYMPHOMA, UNSPECIFIED FOLLICULAR LYMPHOMA TYPE, UNSPECIFIED BODY REGION (H): ICD-10-CM

## 2020-07-07 DIAGNOSIS — I10 ESSENTIAL HYPERTENSION: ICD-10-CM

## 2020-07-07 DIAGNOSIS — G47.33 OSA (OBSTRUCTIVE SLEEP APNEA): ICD-10-CM

## 2020-07-07 DIAGNOSIS — C82.80 LYMPHOMA, SMALL LYMPHOID, FOLLICULAR (H): ICD-10-CM

## 2020-07-07 PROBLEM — H25.012 CORTICAL AGE-RELATED CATARACT OF LEFT EYE: Status: RESOLVED | Noted: 2019-08-15 | Resolved: 2020-07-07

## 2020-07-07 PROBLEM — R03.0 ELEVATED BLOOD PRESSURE READING WITHOUT DIAGNOSIS OF HYPERTENSION: Status: RESOLVED | Noted: 2019-05-30 | Resolved: 2020-07-07

## 2020-07-07 PROBLEM — R15.1 FECAL SMEARING: Status: RESOLVED | Noted: 2019-05-30 | Resolved: 2020-07-07

## 2020-07-07 LAB
ALBUMIN SERPL-MCNC: 3.9 G/DL (ref 3.4–5)
ALP SERPL-CCNC: 107 U/L (ref 40–150)
ALT SERPL W P-5'-P-CCNC: 31 U/L (ref 0–70)
ANION GAP SERPL CALCULATED.3IONS-SCNC: 6 MMOL/L (ref 3–14)
AST SERPL W P-5'-P-CCNC: 32 U/L (ref 0–45)
BASOPHILS # BLD AUTO: 0.1 10E9/L (ref 0–0.2)
BASOPHILS NFR BLD AUTO: 1 %
BILIRUB SERPL-MCNC: 1 MG/DL (ref 0.2–1.3)
BUN SERPL-MCNC: 16 MG/DL (ref 7–30)
CALCIUM SERPL-MCNC: 8.7 MG/DL (ref 8.5–10.1)
CHLORIDE SERPL-SCNC: 106 MMOL/L (ref 94–109)
CHOLEST SERPL-MCNC: 96 MG/DL
CO2 SERPL-SCNC: 27 MMOL/L (ref 20–32)
CREAT SERPL-MCNC: 1.07 MG/DL (ref 0.66–1.25)
DIFFERENTIAL METHOD BLD: NORMAL
EOSINOPHIL NFR BLD AUTO: 1.9 %
ERYTHROCYTE [DISTWIDTH] IN BLOOD BY AUTOMATED COUNT: 12.5 % (ref 10–15)
GFR SERPL CREATININE-BSD FRML MDRD: 70 ML/MIN/{1.73_M2}
GLUCOSE SERPL-MCNC: 111 MG/DL (ref 70–99)
HCT VFR BLD AUTO: 41.2 % (ref 40–53)
HDLC SERPL-MCNC: 28 MG/DL
HGB BLD-MCNC: 14.1 G/DL (ref 13.3–17.7)
IMM GRANULOCYTES # BLD: 0 10E9/L (ref 0–0.4)
IMM GRANULOCYTES NFR BLD: 0.3 %
LDH SERPL L TO P-CCNC: NORMAL U/L (ref 85–227)
LDLC SERPL CALC-MCNC: 29 MG/DL
LYMPHOCYTES # BLD AUTO: 1.1 10E9/L (ref 0.8–5.3)
LYMPHOCYTES NFR BLD AUTO: 18.9 %
MCH RBC QN AUTO: 30.4 PG (ref 26.5–33)
MCHC RBC AUTO-ENTMCNC: 34.2 G/DL (ref 31.5–36.5)
MCV RBC AUTO: 89 FL (ref 78–100)
MONOCYTES # BLD AUTO: 0.7 10E9/L (ref 0–1.3)
MONOCYTES NFR BLD AUTO: 11.7 %
NEUTROPHILS # BLD AUTO: 3.9 10E9/L (ref 1.6–8.3)
NEUTROPHILS NFR BLD AUTO: 66.2 %
NONHDLC SERPL-MCNC: 68 MG/DL
NRBC # BLD AUTO: 0 10*3/UL
NRBC BLD AUTO-RTO: 0 /100
PLATELET # BLD AUTO: 176 10E9/L (ref 150–450)
POTASSIUM SERPL-SCNC: 4.2 MMOL/L (ref 3.4–5.3)
PROT SERPL-MCNC: 6.6 G/DL (ref 6.8–8.8)
RBC # BLD AUTO: 4.64 10E12/L (ref 4.4–5.9)
SODIUM SERPL-SCNC: 139 MMOL/L (ref 133–144)
TRIGL SERPL-MCNC: 194 MG/DL
WBC # BLD AUTO: 5.9 10E9/L (ref 4–11)

## 2020-07-07 PROCEDURE — 90746 HEPB VACCINE 3 DOSE ADULT IM: CPT | Performed by: FAMILY MEDICINE

## 2020-07-07 PROCEDURE — 36415 COLL VENOUS BLD VENIPUNCTURE: CPT | Performed by: INTERNAL MEDICINE

## 2020-07-07 PROCEDURE — 99214 OFFICE O/P EST MOD 30 MIN: CPT | Mod: 25 | Performed by: FAMILY MEDICINE

## 2020-07-07 PROCEDURE — 90632 HEPA VACCINE ADULT IM: CPT | Performed by: FAMILY MEDICINE

## 2020-07-07 PROCEDURE — G0439 PPPS, SUBSEQ VISIT: HCPCS | Performed by: FAMILY MEDICINE

## 2020-07-07 PROCEDURE — 90472 IMMUNIZATION ADMIN EACH ADD: CPT | Performed by: FAMILY MEDICINE

## 2020-07-07 PROCEDURE — 80061 LIPID PANEL: CPT | Performed by: INTERNAL MEDICINE

## 2020-07-07 PROCEDURE — G0010 ADMIN HEPATITIS B VACCINE: HCPCS | Performed by: FAMILY MEDICINE

## 2020-07-07 PROCEDURE — 85025 COMPLETE CBC W/AUTO DIFF WBC: CPT | Performed by: INTERNAL MEDICINE

## 2020-07-07 PROCEDURE — 80053 COMPREHEN METABOLIC PANEL: CPT | Performed by: INTERNAL MEDICINE

## 2020-07-07 RX ORDER — METOPROLOL SUCCINATE 50 MG/1
50 TABLET, EXTENDED RELEASE ORAL DAILY
Qty: 90 TABLET | Refills: 4 | Status: SHIPPED | OUTPATIENT
Start: 2020-07-07 | End: 2021-07-15

## 2020-07-07 RX ORDER — SIMVASTATIN 20 MG
20 TABLET ORAL AT BEDTIME
Qty: 90 TABLET | Refills: 4 | Status: SHIPPED | OUTPATIENT
Start: 2020-07-07 | End: 2021-07-15

## 2020-07-07 ASSESSMENT — MIFFLIN-ST. JEOR: SCORE: 2100.29

## 2020-07-07 ASSESSMENT — PAIN SCALES - GENERAL: PAINLEVEL: NO PAIN (0)

## 2020-07-07 NOTE — PATIENT INSTRUCTIONS
Patient Education   Personalized Prevention Plan  You are due for the preventive services outlined below.  Your care team is available to assist you in scheduling these services.  If you have already completed any of these items, please share that information with your care team to update in your medical record.  Health Maintenance Due   Topic Date Due     FALL RISK ASSESSMENT  05/30/2020     Cholesterol Lab  06/13/2020

## 2020-07-07 NOTE — PROGRESS NOTES
"SUBJECTIVE:   Deandre Merchant is a 70 year old male who presents for Preventive Visit.      Are you in the first 12 months of your Medicare Part B coverage?  No    Physical Health:    In general, how would you rate your overall physical health? good    Outside of work, how many days during the week do you exercise? none Active during the day, stay busy, gardening.     Outside of work, approximately how many minutes a day do you exercise?not applicable    If you drink alcohol do you typically have >3 drinks per day or >7 drinks per week? No    Do you usually eat at least 4 servings of fruit and vegetables a day, include whole grains & fiber and avoid regularly eating high fat or \"junk\" foods? Yes    Do you have any problems taking medications regularly?  No    Do you have any side effects from medications? none    Needs assistance for the following daily activities: no assistance needed    Which of the following safety concerns are present in your home?  none identified     Hearing impairment: Yes, has hearing aides, doesn't like them because of background noise.     In the past 6 months, have you been bothered by leaking of urine? yes    Mental Health:    In general, how would you rate your overall mental or emotional health? good  PHQ-2 Score: 0    Do you feel safe in your environment? Yes    Have you ever done Advance Care Planning? (For example, a Health Directive, POLST, or a discussion with a medical provider or your loved ones about your wishes): Yes, advance care planning is on file.    Additional concerns to address?  Yes:    Needs review of statin and metoprolol today.  History of hyperlipidemia and is due for lipid today.  Tolerating medication well.      History of ascending aortic aneurysm.  Cardiology note from 1 year ago reviewed.  It has been a stable issue and it was recommended for hypertension reasons for him to be on a beta blocker to prevent worsening of his aneurysm.  He is tolerating this well.  "     Patient has obstructive sleep apnea and is following with sleep clinic.    History of lymphoma and follows with oncology who ordered a monitoring CT scan.  This in the past has not noted an aneurysm.  The aneurysm was found when he had an echocardiogram done to follow-up on possible atrial fibrillation noted during chemotherapy but had resolved by the time patient was seen by cardiology.  Was not recommended to be on anticoagulant due to no longer in atrial fibrillation.    Fall risk:  Fallen 2 or more times in the past year?: No  Any fall with injury in the past year?: No    Cognitive Screenin) Repeat 3 items (Leader, Season, Table)  3  2) Clock draw: NORMAL  3) 3 item recall: Recalls 3 objects  Results: 3 items recalled: COGNITIVE IMPAIRMENT LESS LIKELY    Mini-CogTM Copyright S Kalin. Licensed by the author for use in [facility]; reprinted with permission (emmanuelle@[facility]). All rights reserved.      Do you have sleep apnea, excessive snoring or daytime drowsiness?: yes            Reviewed and updated as needed this visit by clinical staff  Tobacco  Allergies  Meds  Problems  Med Hx  Surg Hx  Fam Hx  Soc Hx          Reviewed and updated as needed this visit by Provider  Tobacco  Allergies  Meds  Problems  Med Hx  Surg Hx  Fam Hx        Social History     Tobacco Use     Smoking status: Never Smoker     Smokeless tobacco: Never Used   Substance Use Topics     Alcohol use: Yes     Alcohol/week: 3.3 standard drinks     Comment: occasionally weekends                           Current providers sharing in care for this patient include:   Patient Care Team:  Mekhi Abbott MD as PCP - General (Family Practice)  Néstor Ballard MD as MD (Hematology & Oncology)  Markell Mistry RN as Registered Nurse (Hematology & Oncology)  Toy Flowers NP as Assigned PCP    The following health maintenance items are reviewed in Epic and correct as of today:  Health Maintenance    Topic Date Due     FALL RISK ASSESSMENT  05/30/2020     LIPID  06/13/2020     INFLUENZA VACCINE (1) 09/01/2020     MEDICARE ANNUAL WELLNESS VISIT  07/07/2021     COLORECTAL CANCER SCREENING  03/01/2022     ADVANCE CARE PLANNING  07/07/2025     DTAP/TDAP/TD IMMUNIZATION (4 - Td) 09/17/2025     HEPATITIS C SCREENING  Completed     PHQ-2  Completed     PNEUMOCOCCAL IMMUNIZATION 65+ HIGH/HIGHEST RISK  Completed     ZOSTER IMMUNIZATION  Completed     AORTIC ANEURYSM SCREENING (SYSTEM ASSIGNED)  Completed     IPV IMMUNIZATION  Aged Out     MENINGITIS IMMUNIZATION  Aged Out     Labs reviewed in EPIC  Pneumonia Vaccine:9/2015 and 2/2017    ROS:  Constitutional, HEENT, cardiovascular, pulmonary, GI, , musculoskeletal, neuro, skin, endocrine and psych systems are negative, except as otherwise noted.    OBJECTIVE:   /74   Pulse 79   Temp 98.2  F (36.8  C) (Temporal)   Resp 18   Ht 1.829 m (6')   Wt 129.7 kg (286 lb)   SpO2 94%   BMI 38.79 kg/m   Estimated body mass index is 38.79 kg/m  as calculated from the following:    Height as of this encounter: 1.829 m (6').    Weight as of this encounter: 129.7 kg (286 lb).  EXAM:   Physical Exam  Constitutional:       Appearance: He is well-developed.   Cardiovascular:      Rate and Rhythm: Normal rate and regular rhythm.      Heart sounds: Normal heart sounds, S1 normal and S2 normal. No murmur.   Pulmonary:      Effort: Pulmonary effort is normal. No respiratory distress.      Breath sounds: Normal breath sounds. No wheezing, rhonchi or rales.   Neurological:      Mental Status: He is alert.              ASSESSMENT / PLAN:     ASSESSMENT/ORDERS:    ICD-10-CM    1. Encounter for Medicare annual wellness exam  Z00.00 HEPATITIS B VACCINE, ADULT, IM     HEPATITIS A VACCINE (ADULT)     ADMIN 1st VACCINE     EA ADD'L VACCINE   2. Lymphoma, small lymphoid, follicular (H)  C82.80 HEPATITIS B VACCINE, ADULT, IM   3. Hyperlipidemia with target LDL less than 130  E78.5  simvastatin (ZOCOR) 20 MG tablet     CANCELED: Lipid panel reflex to direct LDL Fasting   4. WINSTON (obstructive sleep apnea)  G47.33    5. Severe obesity (BMI 35.0-39.9) with comorbidity (H)  E66.01    6. Ascending aorta dilatation (H)  I77.810 metoprolol succinate ER (TOPROL-XL) 50 MG 24 hr tablet   7. Essential hypertension  I10 metoprolol succinate ER (TOPROL-XL) 50 MG 24 hr tablet     PLAN:  1.   Medications refilled for all other above noted stable conditions.  Lipid ordered.  2.  aneurysm has been stable and patient has CT scan upcoming for monitoring of lymphoma.  Patient informed he should contact me if oncology has further concerns about aneurysm.  Would consider MRA of the chest if those concerns are noted.  3.  Patient does not have have findings of atrial fibrillation today.    COUNSELING:  Reviewed preventive health counseling, as reflected in patient instructions    Estimated body mass index is 38.79 kg/m  as calculated from the following:    Height as of this encounter: 1.829 m (6').    Weight as of this encounter: 129.7 kg (286 lb).    Weight management plan: Discussed healthy diet and exercise guidelines     reports that he has never smoked. He has never used smokeless tobacco.      Appropriate preventive services were discussed with this patient, including applicable screening as appropriate for cardiovascular disease, diabetes, osteopenia/osteoporosis, and glaucoma.  As appropriate for age/gender, discussed screening for colorectal cancer, prostate cancer, breast cancer, and cervical cancer. Checklist reviewing preventive services available has been given to the patient.    Reviewed patients plan of care and provided an AVS. The Basic Care Plan (routine screening as documented in Health Maintenance) for Deandre meets the Care Plan requirement. This Care Plan has been established and reviewed with the Patient.    Counseling Resources:  ATP IV Guidelines  Pooled Cohorts Equation Calculator  Breast  Cancer Risk Calculator  FRAX Risk Assessment  ICSI Preventive Guidelines  Dietary Guidelines for Americans, 2010  USDA's MyPlate  ASA Prophylaxis  Lung CA Screening    Mekhi Abbott MD  [facility]

## 2020-07-10 ENCOUNTER — HOSPITAL ENCOUNTER (OUTPATIENT)
Dept: CT IMAGING | Facility: CLINIC | Age: 70
Discharge: HOME | End: 2020-07-10
Attending: INTERNAL MEDICINE | Admitting: INTERNAL MEDICINE
Payer: MEDICARE

## 2020-07-10 DIAGNOSIS — C82.90 FOLLICULAR LYMPHOMA, UNSPECIFIED FOLLICULAR LYMPHOMA TYPE, UNSPECIFIED BODY REGION (H): ICD-10-CM

## 2020-07-10 LAB — LDH SERPL L TO P-CCNC: 179 U/L (ref 85–227)

## 2020-07-10 PROCEDURE — 25000125 ZZHC RX 250: Performed by: RADIOLOGY

## 2020-07-10 PROCEDURE — 25000128 H RX IP 250 OP 636: Performed by: RADIOLOGY

## 2020-07-10 PROCEDURE — 71260 CT THORAX DX C+: CPT

## 2020-07-10 PROCEDURE — 83615 LACTATE (LD) (LDH) ENZYME: CPT | Performed by: INTERNAL MEDICINE

## 2020-07-10 PROCEDURE — 36415 COLL VENOUS BLD VENIPUNCTURE: CPT | Performed by: INTERNAL MEDICINE

## 2020-07-10 RX ORDER — IOPAMIDOL 755 MG/ML
100 INJECTION, SOLUTION INTRAVASCULAR ONCE
Status: DISCONTINUED | OUTPATIENT
Start: 2020-07-10 | End: 2020-07-10

## 2020-07-10 RX ORDER — IOPAMIDOL 755 MG/ML
500 INJECTION, SOLUTION INTRAVASCULAR ONCE
Status: COMPLETED | OUTPATIENT
Start: 2020-07-10 | End: 2020-07-10

## 2020-07-10 RX ADMIN — IOPAMIDOL 100 ML: 755 INJECTION, SOLUTION INTRAVENOUS at 10:44

## 2020-07-10 RX ADMIN — SODIUM CHLORIDE 60 ML: 9 INJECTION, SOLUTION INTRAVENOUS at 10:45

## 2020-07-14 ENCOUNTER — VIRTUAL VISIT (OUTPATIENT)
Dept: ONCOLOGY | Facility: CLINIC | Age: 70
End: 2020-07-14
Payer: MEDICARE

## 2020-07-14 DIAGNOSIS — Z12.5 ENCOUNTER FOR SCREENING FOR MALIGNANT NEOPLASM OF PROSTATE: ICD-10-CM

## 2020-07-14 DIAGNOSIS — R93.7 ABNORMAL FINDINGS ON DIAGNOSTIC IMAGING OF OTHER PARTS OF MUSCULOSKELETAL SYSTEM: ICD-10-CM

## 2020-07-14 DIAGNOSIS — C85.98 NON-HODGKIN'S LYMPHOMA OF MULTIPLE SITES (H): Primary | ICD-10-CM

## 2020-07-14 DIAGNOSIS — D49.2 LUMBAR SPINE TUMOR: ICD-10-CM

## 2020-07-14 PROCEDURE — 99214 OFFICE O/P EST MOD 30 MIN: CPT | Mod: 95 | Performed by: INTERNAL MEDICINE

## 2020-07-14 NOTE — LETTER
7/14/2020         RE: Deandre Merchant  [address]  [facility] [address]        Dear Colleague,    Thank you for referring your patient, Deandre Merchant, to the [facility]. Please see a copy of my visit note below.      FOLLOW-UP VISIT NOTE    PATIENT NAME: Deandre Merchant MRN # [id number]  DATE OF VISIT: Jul 14, 2020 YOB: 1950        CANCER TYPE:Follicular Lymphoma  STAGE: IV( axillary, mediastinal, retroperitoneal, stomach, bone marrow)      ONCOLOGY HISTORY:    Patient was being followed by Dr. Krishnamurthy but now she has transferred his care to me.  I reviewed his previous records and have copied and updated from prior notes.  70-year-old male who in 2012 presented with mediastinal, retroperitoneal and mesenteric lymph nodes. Was clinically asymptomatic with normal blood counts. CT-guided lymph node biopsy on 6/21/2012 was consistent with follicular lymphoma, grade 1-2. He was observed at that point.    In 2013, presented to the hospital with ileus and abdominal distention. EGD showed gastric antrum and duodenal ulcers which were biopsied and showed follicular lymphoma. CT scans showed bulky  lymphadenopathy with increasing size. 04/13 Bone marrow biopsy was positive with evidence of follicular lymphoma.   He received bendamustine and Rituxan regimen April 2014- -September 2014.   In December 14 , he was started on maintenance Rituxan every 2 months for 2 years which he completed in October 2016.     In remission and on observation since then.    7/14/2020 - established care with me.    SUBJECTIVE   This is video visit.  Patient was unable to connect through [employer]! so we connected through [facility] [employer].  He tells me that he has been doing well.  He denies any B symptoms.  Denies any pain, specifically denies any back pain.  Energy is decent.  No infections or shortness of breath.  Denies any neuropathy.  No bleeding.  No nausea vomiting diarrhea or constipation.    ECOG 0    ROS:  A  comprehensive ROS was otherwise neg    I reviewed other history in epic as below.      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     Lymphadenopathy     mediastinal & retroperitoneal     Non Hodgkin's lymphoma (H)      Non-Hodgkin's lymphoma of multiple sites (H) 6/26/2012     Polyps, colonic      Retinal detachment          CURRENT OUTPATIENT MEDICATIONS     Current Outpatient Medications   Medication Sig Dispense Refill     Acetaminophen (TYLENOL PO) Take 1,000 mg by mouth       cetirizine (ZYRTEC) 10 MG tablet Take 10 mg by mouth daily       metoprolol succinate ER (TOPROL-XL) 50 MG 24 hr tablet Take 1 tablet (50 mg) by mouth daily 90 tablet 4     order for DME Equipment ordered: RESMED Auto PAP Mask type: Full face  Settings: 5-15 cm h2o       simvastatin (ZOCOR) 20 MG tablet Take 1 tablet (20 mg) by mouth At Bedtime 90 tablet 4     triamcinolone (KENALOG) 0.1 % external ointment Apply sparingly to affected area twice daily as needed. 15 g 1        ALLERGIES     Allergies   Allergen Reactions     Allopurinol Rash     FAMILY HISTORY:  Family History   Problem Relation Age of Onset     Cancer Father         liver/kidney     C.A.D. Father      Pacemaker Brother      Arthritis Mother         RA     Dad had liver cancer at 74.  He has 2 children and they are healthy.    Social History     Socioeconomic History     Marital status:      Spouse name: Cristel     Number of children: 2     Years of education: Not on file     Highest education level: Not on file   Occupational History     Not on file   Social Needs     Financial resource strain: Not on file     Food insecurity     Worry: Not on file     Inability: Not on file     Transportation needs     Medical: Not on file     Non-medical: Not on file   Tobacco Use     Smoking status: Never Smoker     Smokeless tobacco: Never Used   Substance and  Sexual Activity     Alcohol use: Yes     Alcohol/week: 3.3 standard drinks     Comment: occasionally weekends     Drug use: No     Sexual activity: Yes   Lifestyle     Physical activity     Days per week: Not on file     Minutes per session: Not on file     Stress: Not on file   Relationships     Social connections     Talks on phone: Not on file     Gets together: Not on file     Attends Yarsanism service: Not on file     Active member of club or organization: Not on file     Attends meetings of clubs or organizations: Not on file     Relationship status: Not on file     Intimate partner violence     Fear of current or ex partner: Not on file     Emotionally abused: Not on file     Physically abused: Not on file     Forced sexual activity: Not on file   Other Topics Concern      Service Not Asked     Blood Transfusions Not Asked     Caffeine Concern No     Occupational Exposure Not Asked     Hobby Hazards Not Asked     Sleep Concern No     Stress Concern No     Weight Concern No     Special Diet Not Asked     Back Care Not Asked     Exercise Yes     Bike Helmet Not Asked     Seat Belt Yes     Self-Exams Not Asked     Parent/sibling w/ CABG, MI or angioplasty before 65F 55M? Not Asked   Social History Narrative     Not on file     Denies smoking. 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     PHYSICAL EXAM     There were no vitals taken for this visit.  Wt Readings from Last 4 Encounters:   07/07/20 129.7 kg (286 lb)   03/05/20 135.6 kg (299 lb)   11/12/19 129.7 kg (286 lb)   11/05/19 129.7 kg (286 lb)         Constitutional.  Does not seem to be in any acute distress.  Eyes.  No redness or discharge noted.  Respiratory.  Speaking in full sentences.  Breathing seems comfortable without any accessory use of muscles.    Skin.  Visualized his skin does not show any obvious rashes.  Musculoskeletal.  Range of motion for visualized areas is intact.  Neurological.  Alert and oriented  x3.  Psychiatric.  Mood, mentation and affect are normal.  Decision making capacity is intact.      The rest of a comprehensive physical examination is deferred due to Public Health Emergency video visit restrictions.     LABORATORY AND IMAGING STUDIES       Reviewed    Results for [place] AVELAR (MRN [id number]) as of 7/14/2020 11:21   Ref. Range 7/7/2020 10:44   Sodium Latest Ref Range: 133 - 144 mmol/L 139   Potassium Latest Ref Range: 3.4 - 5.3 mmol/L 4.2   Chloride Latest Ref Range: 94 - 109 mmol/L 106   Carbon Dioxide Latest Ref Range: 20 - 32 mmol/L 27   Urea Nitrogen Latest Ref Range: 7 - 30 mg/dL 16   Creatinine Latest Ref Range: 0.66 - 1.25 mg/dL 1.07   GFR Estimate Latest Ref Range: >60 mL/min/1.73_m2 70   GFR Estimate If Black Latest Ref Range: >60 mL/min/1.73_m2 81   Calcium Latest Ref Range: 8.5 - 10.1 mg/dL 8.7   Anion Gap Latest Ref Range: 3 - 14 mmol/L 6   Albumin Latest Ref Range: 3.4 - 5.0 g/dL 3.9   Protein Total Latest Ref Range: 6.8 - 8.8 g/dL 6.6 (L)   Bilirubin Total Latest Ref Range: 0.2 - 1.3 mg/dL 1.0   Alkaline Phosphatase Latest Ref Range: 40 - 150 U/L 107   ALT Latest Ref Range: 0 - 70 U/L 31   AST Latest Ref Range: 0 - 45 U/L 32   Cholesterol Latest Ref Range: <200 mg/dL 96   HDL Cholesterol Latest Ref Range: >39 mg/dL 28 (L)   Lactate Dehydrogenase Latest Ref Range: 85 - 227 U/L Canceled, Test credited   LDL Cholesterol Calculated Latest Ref Range: <100 mg/dL 29   Non HDL Cholesterol Latest Ref Range: <130 mg/dL 68   Triglycerides Latest Ref Range: <150 mg/dL 194 (H)   Glucose Latest Ref Range: 70 - 99 mg/dL 111 (H)   WBC Latest Ref Range: 4.0 - 11.0 10e9/L 5.9   Hemoglobin Latest Ref Range: 13.3 - 17.7 g/dL 14.1   Hematocrit Latest Ref Range: 40.0 - 53.0 % 41.2   Platelet Count Latest Ref Range: 150 - 450 10e9/L 176   RBC Count Latest Ref Range: 4.4 - 5.9 10e12/L 4.64   MCV Latest Ref Range: 78 - 100 fl 89   MCH Latest Ref Range: 26.5 - 33.0 pg 30.4   MCHC Latest Ref Range: 31.5 - 36.5  g/dL 34.2   RDW Latest Ref Range: 10.0 - 15.0 % 12.5   Diff Method Unknown Automated Method   % Neutrophils Latest Units: % 66.2   % Lymphocytes Latest Units: % 18.9   % Monocytes Latest Units: % 11.7   % Eosinophils Latest Units: % 1.9   % Basophils Latest Units: % 1.0   % Immature Granulocytes Latest Units: % 0.3   Nucleated RBCs Latest Ref Range: 0 /100 0   Absolute Neutrophil Latest Ref Range: 1.6 - 8.3 10e9/L 3.9   Absolute Lymphocytes Latest Ref Range: 0.8 - 5.3 10e9/L 1.1   Absolute Monocytes Latest Ref Range: 0.0 - 1.3 10e9/L 0.7   Absolute Basophils Latest Ref Range: 0.0 - 0.2 10e9/L 0.1   Abs Immature Granulocytes Latest Ref Range: 0 - 0.4 10e9/L 0.0   Absolute Nucleated RBC Unknown 0.0       CT CHEST/ABDOMEN/PELVIS WITH CONTRAST  7/10/2020 10:54 AM     HISTORY: Follicular lymphoma follow up. Follicular lymphoma,  unspecified follicular lymphoma type, unspecified body region (H).     TECHNIQUE: Scans obtained of the chest, abdomen, and pelvis with IV  contrast. 100 mL, Isovue 370 injected. Radiation dose for this scan  was reduced using automated exposure control, adjustment of the mA  and/or kV according to patient size, or iterative reconstruction  technique.     COMPARISON: CT chest, abdomen and pelvis dated 4/18/2019.     FINDINGS:   Chest: Mild groundglass densities in the posterior bilateral lungs are  most consistent with dependant atelectasis. Lungs are otherwise clear  without significant nodule, mass, infiltrate, effusion, or  pneumothorax.     Heart is normal in size. There are aortic and coronary artery  calcifications. Ectasia/mild aneurysmal dilatation of the ascending  thoracic aorta measures up to 4.8 cm in maximum dimension, similar to  the prior study. No central pulmonary artery filling defects to  suggest central pulmonary artery embolism. This study was not  optimized for pulmonary artery evaluation. There is no mediastinal,  hilar, or axillary lymphadenopathy. Visualized portions of  the thyroid  are unremarkable. Focal blastic area in the right inferior L2  vertebral body (image 78 series 2, image 65 series 8 and image 82  series 7) could be associated with patient's degenerative disc disease  at L2-3 but could also represent a blastic metastasis and is new or  increased in size since the most recent prior study. No other  aggressive osseous lesions are seen in the chest, abdomen or pelvis.  Degenerative changes are noted in the spine and SI joints.     There is mild diffuse fatty infiltration of the liver, similar to the  prior study. Very subtle layering density in the dependent aspect of  the gallbladder could represent tiny gallstones. These are unchanged.  Liver, gallbladder, pancreas, spleen, bilateral adrenal glands, and  bilateral kidneys otherwise enhance normally. No hydronephrosis,  nephrolithiasis, hydroureter, or ureteral calculus is identified.  Urinary bladder is grossly unremarkable.     Prostate gland is mildly enlarged and contains some dystrophic  calcifications but is otherwise unremarkable. There are mildly  prominent lymph nodes in the root of the mesentery most prominent in  the retroperitoneal region measuring up to approximately 1.0 cm in  short axis. These are similar to the prior study dated 4/18/2019 and  could be related to patient's history of non-Hodgkin's lymphoma.  Slight increased density in the adipose tissues of the mesenteric root  are also stable. No new lymphadenopathy is identified. No free fluid  or free air is seen in the peritoneal cavity.     The colon is grossly of normal caliber. No pericolonic inflammatory  change to suggest acute diverticulitis. Appendix is normal in  appearance and extends posteriorly, inferiorly and medially from the  cecum. Small bowel is of normal caliber. Stomach is mostly  decompressed but is otherwise unremarkable. Multiple ventral abdominal  wall fat-containing hernias are again noted, not appreciably changed.                                                                       IMPRESSION:  1. Focal blastic area in the right inferior L2 vertebral body could  represent a blastic metastasis but could also be associated with  adjacent degenerative change. This is new or increased in size since  the prior study dated 4/18/2019. No other new evidence for metastasis  is seen.  2. Mildly prominent lymph nodes in the retroperitoneum and in the root  of the mesentery are stable in appearance since the prior study and  are upper normal limits. These could be related to patient's lymphoma.  3. Mild groundglass densities in the root of the mesentery adipose  tissues are stable.  4. Diffuse fatty infiltration of the liver is again noted.  5. Atherosclerosis worst in the coronary arteries.  6. Mild aneurysmal dilatation of the ascending thoracic aorta is  stable.  7. No new metastasis or evidence for recurrent malignancy.  8. Stable fat-containing ventral abdominal hernias.        ASSESSMENT AND PLAN     70-year-old male with stage IV follicular lymphoma who is status post treatment with bendamustine/Rituxan (04-09/2014) followed by maintenance rituximab for two years- completed in October 2016.    Overall doing well.  CT scan is stable apart from an indeterminate area in L2 vertebral body.    Indeterminate L2 vertebral body lesion.  I reviewed the scans with the radiologist.  It could be degenerative changes versus osteoblastic metastasis.  I would like to do MRI of the lumbar spine to have a better look.  I would check PSA.    Assuming that the MRI and PSA are unremarkable, I will plan to repeat CT chest abdomen and pelvis and labs in 1 year for follow-up for follicular lymphoma.    All of her questions were answered to his satisfaction.  He is agreeable and comfortable with the plan.    Eduardo Crespo MD        Video start time. 11:30 AM    Video Stop time. 11:41 AM    Again, thank you for allowing me to participate in the care of your patient.         Sincerely,        Eduardo Crespo MD

## 2020-07-14 NOTE — NURSING NOTE
"Deandre Merchant is a 70 year old male who is being evaluated via a billable video visit.      The patient has been notified of following:     \"This video visit will be conducted via a call between you and your physician/provider. We have found that certain health care needs can be provided without the need for an in-person physical exam.  This service lets us provide the care you need with a video conversation.  If a prescription is necessary we can send it directly to your pharmacy.  If lab work is needed we can place an order for that and you can then stop by our lab to have the test done at a later time.    Video visits are billed at different rates depending on your insurance coverage.  Please reach out to your insurance provider with any questions.    If during the course of the call the physician/provider feels a video visit is not appropriate, you will not be charged for this service.\"    Patient has given verbal consent for Video visit? Yes  How would you like to obtain your AVS? Royce  Patient would like the video invitation sent by: [facility]  Will anyone else be joining your video visit? No      Video-Visit Details    Type of service:  Video Visit    Originating Location (pt. Location): Home    Distant Location (provider location):  [facility]     Platform used for Video Visit: Laci Antoine Checklist    Discuss Imaging Results    Pati Ramsay CMA        "

## 2020-07-14 NOTE — PROGRESS NOTES
FOLLOW-UP VISIT NOTE    PATIENT NAME: Deandre Merchant MRN # [id number]  DATE OF VISIT: Jul 14, 2020 YOB: 1950        CANCER TYPE:Follicular Lymphoma  STAGE: IV( axillary, mediastinal, retroperitoneal, stomach, bone marrow)      ONCOLOGY HISTORY:    Patient was being followed by Dr. Krishnamurthy but now she has transferred his care to me.  I reviewed his previous records and have copied and updated from prior notes.  70-year-old male who in 2012 presented with mediastinal, retroperitoneal and mesenteric lymph nodes. Was clinically asymptomatic with normal blood counts. CT-guided lymph node biopsy on 6/21/2012 was consistent with follicular lymphoma, grade 1-2. He was observed at that point.    In 2013, presented to the hospital with ileus and abdominal distention. EGD showed gastric antrum and duodenal ulcers which were biopsied and showed follicular lymphoma. CT scans showed bulky  lymphadenopathy with increasing size. 04/13 Bone marrow biopsy was positive with evidence of follicular lymphoma.   He received bendamustine and Rituxan regimen April 2014- -September 2014.   In December 14 , he was started on maintenance Rituxan every 2 months for 2 years which he completed in October 2016.     In remission and on observation since then.    7/14/2020 - established care with me.    SUBJECTIVE   This is video visit.  Patient was unable to connect through [email or web address] so we connected through [email or web address].  He tells me that he has been doing well.  He denies any B symptoms.  Denies any pain, specifically denies any back pain.  Energy is decent.  No infections or shortness of breath.  Denies any neuropathy.  No bleeding.  No nausea vomiting diarrhea or constipation.    ECOG 0    ROS:  A comprehensive ROS was otherwise neg    I reviewed other history in epic as below.      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     Lymphadenopathy     mediastinal & retroperitoneal     Non Hodgkin's lymphoma (H)      Non-Hodgkin's lymphoma of multiple sites (H) 6/26/2012     Polyps, colonic      Retinal detachment          CURRENT OUTPATIENT MEDICATIONS     Current Outpatient Medications   Medication Sig Dispense Refill     Acetaminophen (TYLENOL PO) Take 1,000 mg by mouth       cetirizine (ZYRTEC) 10 MG tablet Take 10 mg by mouth daily       metoprolol succinate ER (TOPROL-XL) 50 MG 24 hr tablet Take 1 tablet (50 mg) by mouth daily 90 tablet 4     order for DME Equipment ordered: RESMED Auto PAP Mask type: Full face  Settings: 5-15 cm h2o       simvastatin (ZOCOR) 20 MG tablet Take 1 tablet (20 mg) by mouth At Bedtime 90 tablet 4     triamcinolone (KENALOG) 0.1 % external ointment Apply sparingly to affected area twice daily as needed. 15 g 1        ALLERGIES     Allergies   Allergen Reactions     Allopurinol Rash     FAMILY HISTORY:  Family History   Problem Relation Age of Onset     Cancer Father         liver/kidney     C.A.D. Father      Pacemaker Brother      Arthritis Mother         RA     Dad had liver cancer at 74.  He has 2 children and they are healthy.    Social History     Socioeconomic History     Marital status:      Spouse name: Cristel     Number of children: 2     Years of education: Not on file     Highest education level: Not on file   Occupational History     Not on file   Social Needs     Financial resource strain: Not on file     Food insecurity     Worry: Not on file     Inability: Not on file     Transportation needs     Medical: Not on file     Non-medical: Not on file   Tobacco Use     Smoking status: Never Smoker     Smokeless tobacco: Never Used   Substance and Sexual Activity     Alcohol use: Yes     Alcohol/week: 3.3 standard drinks     Comment: occasionally weekends     Drug use: No     Sexual activity: Yes   Lifestyle     Physical activity     Days per week: Not on file     Minutes per  session: Not on file     Stress: Not on file   Relationships     Social connections     Talks on phone: Not on file     Gets together: Not on file     Attends Islam service: Not on file     Active member of club or organization: Not on file     Attends meetings of clubs or organizations: Not on file     Relationship status: Not on file     Intimate partner violence     Fear of current or ex partner: Not on file     Emotionally abused: Not on file     Physically abused: Not on file     Forced sexual activity: Not on file   Other Topics Concern      Service Not Asked     Blood Transfusions Not Asked     Caffeine Concern No     Occupational Exposure Not Asked     Hobby Hazards Not Asked     Sleep Concern No     Stress Concern No     Weight Concern No     Special Diet Not Asked     Back Care Not Asked     Exercise Yes     Bike Helmet Not Asked     Seat Belt Yes     Self-Exams Not Asked     Parent/sibling w/ CABG, MI or angioplasty before 65F 55M? Not Asked   Social History Narrative     Not on file     Denies smoking. 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     PHYSICAL EXAM     There were no vitals taken for this visit.  Wt Readings from Last 4 Encounters:   07/07/20 129.7 kg (286 lb)   03/05/20 135.6 kg (299 lb)   11/12/19 129.7 kg (286 lb)   11/05/19 129.7 kg (286 lb)         Constitutional.  Does not seem to be in any acute distress.  Eyes.  No redness or discharge noted.  Respiratory.  Speaking in full sentences.  Breathing seems comfortable without any accessory use of muscles.    Skin.  Visualized his skin does not show any obvious rashes.  Musculoskeletal.  Range of motion for visualized areas is intact.  Neurological.  Alert and oriented x3.  Psychiatric.  Mood, mentation and affect are normal.  Decision making capacity is intact.      The rest of a comprehensive physical examination is deferred due to Public Health Emergency video visit restrictions.     LABORATORY AND IMAGING  STUDIES       Reviewed    Results for [place] AVELAR (MRN [id number]) as of 7/14/2020 11:21   Ref. Range 7/7/2020 10:44   Sodium Latest Ref Range: 133 - 144 mmol/L 139   Potassium Latest Ref Range: 3.4 - 5.3 mmol/L 4.2   Chloride Latest Ref Range: 94 - 109 mmol/L 106   Carbon Dioxide Latest Ref Range: 20 - 32 mmol/L 27   Urea Nitrogen Latest Ref Range: 7 - 30 mg/dL 16   Creatinine Latest Ref Range: 0.66 - 1.25 mg/dL 1.07   GFR Estimate Latest Ref Range: >60 mL/min/1.73_m2 70   GFR Estimate If Black Latest Ref Range: >60 mL/min/1.73_m2 81   Calcium Latest Ref Range: 8.5 - 10.1 mg/dL 8.7   Anion Gap Latest Ref Range: 3 - 14 mmol/L 6   Albumin Latest Ref Range: 3.4 - 5.0 g/dL 3.9   Protein Total Latest Ref Range: 6.8 - 8.8 g/dL 6.6 (L)   Bilirubin Total Latest Ref Range: 0.2 - 1.3 mg/dL 1.0   Alkaline Phosphatase Latest Ref Range: 40 - 150 U/L 107   ALT Latest Ref Range: 0 - 70 U/L 31   AST Latest Ref Range: 0 - 45 U/L 32   Cholesterol Latest Ref Range: <200 mg/dL 96   HDL Cholesterol Latest Ref Range: >39 mg/dL 28 (L)   Lactate Dehydrogenase Latest Ref Range: 85 - 227 U/L Canceled, Test credited   LDL Cholesterol Calculated Latest Ref Range: <100 mg/dL 29   Non HDL Cholesterol Latest Ref Range: <130 mg/dL 68   Triglycerides Latest Ref Range: <150 mg/dL 194 (H)   Glucose Latest Ref Range: 70 - 99 mg/dL 111 (H)   WBC Latest Ref Range: 4.0 - 11.0 10e9/L 5.9   Hemoglobin Latest Ref Range: 13.3 - 17.7 g/dL 14.1   Hematocrit Latest Ref Range: 40.0 - 53.0 % 41.2   Platelet Count Latest Ref Range: 150 - 450 10e9/L 176   RBC Count Latest Ref Range: 4.4 - 5.9 10e12/L 4.64   MCV Latest Ref Range: 78 - 100 fl 89   MCH Latest Ref Range: 26.5 - 33.0 pg 30.4   MCHC Latest Ref Range: 31.5 - 36.5 g/dL 34.2   RDW Latest Ref Range: 10.0 - 15.0 % 12.5   Diff Method Unknown Automated Method   % Neutrophils Latest Units: % 66.2   % Lymphocytes Latest Units: % 18.9   % Monocytes Latest Units: % 11.7   % Eosinophils Latest Units: % 1.9   %  Basophils Latest Units: % 1.0   % Immature Granulocytes Latest Units: % 0.3   Nucleated RBCs Latest Ref Range: 0 /100 0   Absolute Neutrophil Latest Ref Range: 1.6 - 8.3 10e9/L 3.9   Absolute Lymphocytes Latest Ref Range: 0.8 - 5.3 10e9/L 1.1   Absolute Monocytes Latest Ref Range: 0.0 - 1.3 10e9/L 0.7   Absolute Basophils Latest Ref Range: 0.0 - 0.2 10e9/L 0.1   Abs Immature Granulocytes Latest Ref Range: 0 - 0.4 10e9/L 0.0   Absolute Nucleated RBC Unknown 0.0       CT CHEST/ABDOMEN/PELVIS WITH CONTRAST  7/10/2020 10:54 AM     HISTORY: Follicular lymphoma follow up. Follicular lymphoma,  unspecified follicular lymphoma type, unspecified body region (H).     TECHNIQUE: Scans obtained of the chest, abdomen, and pelvis with IV  contrast. 100 mL, Isovue 370 injected. Radiation dose for this scan  was reduced using automated exposure control, adjustment of the mA  and/or kV according to patient size, or iterative reconstruction  technique.     COMPARISON: CT chest, abdomen and pelvis dated 4/18/2019.     FINDINGS:   Chest: Mild groundglass densities in the posterior bilateral lungs are  most consistent with dependant atelectasis. Lungs are otherwise clear  without significant nodule, mass, infiltrate, effusion, or  pneumothorax.     Heart is normal in size. There are aortic and coronary artery  calcifications. Ectasia/mild aneurysmal dilatation of the ascending  thoracic aorta measures up to 4.8 cm in maximum dimension, similar to  the prior study. No central pulmonary artery filling defects to  suggest central pulmonary artery embolism. This study was not  optimized for pulmonary artery evaluation. There is no mediastinal,  hilar, or axillary lymphadenopathy. Visualized portions of the thyroid  are unremarkable. Focal blastic area in the right inferior L2  vertebral body (image 78 series 2, image 65 series 8 and image 82  series 7) could be associated with patient's degenerative disc disease  at L2-3 but could also  represent a blastic metastasis and is new or  increased in size since the most recent prior study. No other  aggressive osseous lesions are seen in the chest, abdomen or pelvis.  Degenerative changes are noted in the spine and SI joints.     There is mild diffuse fatty infiltration of the liver, similar to the  prior study. Very subtle layering density in the dependent aspect of  the gallbladder could represent tiny gallstones. These are unchanged.  Liver, gallbladder, pancreas, spleen, bilateral adrenal glands, and  bilateral kidneys otherwise enhance normally. No hydronephrosis,  nephrolithiasis, hydroureter, or ureteral calculus is identified.  Urinary bladder is grossly unremarkable.     Prostate gland is mildly enlarged and contains some dystrophic  calcifications but is otherwise unremarkable. There are mildly  prominent lymph nodes in the root of the mesentery most prominent in  the retroperitoneal region measuring up to approximately 1.0 cm in  short axis. These are similar to the prior study dated 4/18/2019 and  could be related to patient's history of non-Hodgkin's lymphoma.  Slight increased density in the adipose tissues of the mesenteric root  are also stable. No new lymphadenopathy is identified. No free fluid  or free air is seen in the peritoneal cavity.     The colon is grossly of normal caliber. No pericolonic inflammatory  change to suggest acute diverticulitis. Appendix is normal in  appearance and extends posteriorly, inferiorly and medially from the  cecum. Small bowel is of normal caliber. Stomach is mostly  decompressed but is otherwise unremarkable. Multiple ventral abdominal  wall fat-containing hernias are again noted, not appreciably changed.                                                                      IMPRESSION:  1. Focal blastic area in the right inferior L2 vertebral body could  represent a blastic metastasis but could also be associated with  adjacent degenerative change.  This is new or increased in size since  the prior study dated 4/18/2019. No other new evidence for metastasis  is seen.  2. Mildly prominent lymph nodes in the retroperitoneum and in the root  of the mesentery are stable in appearance since the prior study and  are upper normal limits. These could be related to patient's lymphoma.  3. Mild groundglass densities in the root of the mesentery adipose  tissues are stable.  4. Diffuse fatty infiltration of the liver is again noted.  5. Atherosclerosis worst in the coronary arteries.  6. Mild aneurysmal dilatation of the ascending thoracic aorta is  stable.  7. No new metastasis or evidence for recurrent malignancy.  8. Stable fat-containing ventral abdominal hernias.        ASSESSMENT AND PLAN     70-year-old male with stage IV follicular lymphoma who is status post treatment with bendamustine/Rituxan (04-09/2014) followed by maintenance rituximab for two years- completed in October 2016.    Overall doing well.  CT scan is stable apart from an indeterminate area in L2 vertebral body.    Indeterminate L2 vertebral body lesion.  I reviewed the scans with the radiologist.  It could be degenerative changes versus osteoblastic metastasis.  I would like to do MRI of the lumbar spine to have a better look.  I would check PSA.    Assuming that the MRI and PSA are unremarkable, I will plan to repeat CT chest abdomen and pelvis and labs in 1 year for follow-up for follicular lymphoma.    All of her questions were answered to his satisfaction.  He is agreeable and comfortable with the plan.    Eduardo Crespo MD        Video start time. 11:30 AM    Video Stop time. 11:41 AM

## 2020-07-22 ENCOUNTER — HOSPITAL ENCOUNTER (OUTPATIENT)
Dept: MRI IMAGING | Facility: CLINIC | Age: 70
Discharge: HOME | End: 2020-07-22
Attending: INTERNAL MEDICINE | Admitting: INTERNAL MEDICINE
Payer: MEDICARE

## 2020-07-22 DIAGNOSIS — D49.2 LUMBAR SPINE TUMOR: ICD-10-CM

## 2020-07-22 DIAGNOSIS — C85.98 NON-HODGKIN'S LYMPHOMA OF MULTIPLE SITES (H): ICD-10-CM

## 2020-07-22 PROCEDURE — A9585 GADOBUTROL INJECTION: HCPCS | Performed by: INTERNAL MEDICINE

## 2020-07-22 PROCEDURE — 72158 MRI LUMBAR SPINE W/O & W/DYE: CPT

## 2020-07-22 PROCEDURE — 25500064 ZZH RX 255 OP 636: Performed by: INTERNAL MEDICINE

## 2020-07-22 RX ORDER — GADOBUTROL 604.72 MG/ML
10 INJECTION INTRAVENOUS ONCE
Status: COMPLETED | OUTPATIENT
Start: 2020-07-22 | End: 2020-07-22

## 2020-07-22 RX ADMIN — GADOBUTROL 10 ML: 604.72 INJECTION INTRAVENOUS at 15:19

## 2020-07-28 ENCOUNTER — OFFICE VISIT (OUTPATIENT)
Dept: FAMILY MEDICINE | Facility: CLINIC | Age: 70
End: 2020-07-28
Payer: MEDICARE

## 2020-07-28 VITALS
HEIGHT: 72 IN | DIASTOLIC BLOOD PRESSURE: 72 MMHG | BODY MASS INDEX: 39.01 KG/M2 | OXYGEN SATURATION: 94 % | SYSTOLIC BLOOD PRESSURE: 136 MMHG | WEIGHT: 288 LBS | HEART RATE: 72 BPM | RESPIRATION RATE: 18 BRPM | TEMPERATURE: 98.2 F

## 2020-07-28 DIAGNOSIS — Z12.5 ENCOUNTER FOR SCREENING FOR MALIGNANT NEOPLASM OF PROSTATE: ICD-10-CM

## 2020-07-28 DIAGNOSIS — M65.30 TRIGGER FINGER, ACQUIRED: Primary | ICD-10-CM

## 2020-07-28 DIAGNOSIS — R93.7 ABNORMAL FINDINGS ON DIAGNOSTIC IMAGING OF OTHER PARTS OF MUSCULOSKELETAL SYSTEM: ICD-10-CM

## 2020-07-28 LAB — PSA SERPL-MCNC: 2.27 UG/L (ref 0–4)

## 2020-07-28 PROCEDURE — 36415 COLL VENOUS BLD VENIPUNCTURE: CPT | Performed by: INTERNAL MEDICINE

## 2020-07-28 PROCEDURE — 20550 NJX 1 TENDON SHEATH/LIGAMENT: CPT | Mod: LT | Performed by: FAMILY MEDICINE

## 2020-07-28 PROCEDURE — 84153 ASSAY OF PSA TOTAL: CPT | Performed by: INTERNAL MEDICINE

## 2020-07-28 RX ORDER — TRIAMCINOLONE ACETONIDE 40 MG/ML
20 INJECTION, SUSPENSION INTRA-ARTICULAR; INTRAMUSCULAR ONCE
Status: COMPLETED | OUTPATIENT
Start: 2020-07-28 | End: 2020-07-28

## 2020-07-28 RX ADMIN — TRIAMCINOLONE ACETONIDE 20 MG: 40 INJECTION, SUSPENSION INTRA-ARTICULAR; INTRAMUSCULAR at 08:43

## 2020-07-28 ASSESSMENT — PAIN SCALES - GENERAL: PAINLEVEL: NO PAIN (0)

## 2020-07-28 ASSESSMENT — MIFFLIN-ST. JEOR: SCORE: 2109.36

## 2020-07-28 NOTE — PROGRESS NOTES
Roberto Merchant is a 70 year old male who presents to clinic today for the following health issues:    HPI       Left hand, middle finger       Duration: Since May     Description (location/character/radiation): left hand, middle finger     Intensity:  mild    Accompanying signs and symptoms: finger gets stuck, gets caught     History (similar episodes/previous evaluation): None    Precipitating or alleviating factors: None    Therapies tried and outcome: He has tried taping it to another finger.            Finger occasionally locks and sticks in flexion and is unable to be extended fully.  Painful area on volar aspect of tendon at MCP joint.    Reviewed and updated as needed this visit by Provider  Tobacco  Allergies  Meds  Problems  Med Hx  Surg Hx  Fam Hx         Review of Systems   Constitutional, HEENT, cardiovascular, pulmonary, gi and gu systems are negative, except as otherwise noted.      Objective    /72   Pulse 72   Temp 98.2  F (36.8  C) (Temporal)   Resp 18   Ht 1.829 m (6')   Wt 130.6 kg (288 lb)   SpO2 94%   BMI 39.06 kg/m    Body mass index is 39.06 kg/m .  Physical Exam  Constitutional:       Appearance: Normal appearance.   Musculoskeletal:      Comments: Left long finger at the MCP joint has a mobile mass along the flexor tendon that is tender to palpation that is consistent with findings of a trigger finger.  Flexion/extension of the finger shows some sticking and catching.     Neurological:      Mental Status: He is alert.                    Assessment & Plan     ASSESSMENT/ORDERS:    ICD-10-CM    1. Trigger finger, acquired  M65.30 triamcinolone (KENALOG-40) injection 20 mg     INJECTION SINGLE TENDON SHEATH/LIGAMENT     PLAN:  1.  Injection of trigger finger of long finger of left hand:  20 mg of kenalog with 0.5mL of 1% lidocaine without epi was placed in the area of the flexor tendon at the volar aspect of the MCP joint of the long finger.  Patient tolerated  procedure well and received immediate relief of his pain.  Patient was informed that pain may recur 6-8 hours after anesthetic wears off, but pain should again resolve in 1-2 days.  Patient will follow-up for recheck as needed.             Return in about 6 weeks (around 9/8/2020) for recheck if symptoms worsen or fail to improve.    Mekhi Abbott MD  [facility]

## 2020-08-26 ENCOUNTER — TELEPHONE (OUTPATIENT)
Dept: ONCOLOGY | Facility: CLINIC | Age: 70
End: 2020-08-26

## 2020-08-26 NOTE — TELEPHONE ENCOUNTER
----- Message from Diamond José RN sent at 8/26/2020 11:44 AM CDT -----    ----- Message -----  From: Eduardo Crespo MD  Sent: 8/26/2020  11:20 AM CDT  To: Liza Valiente RN    Yes a video/phone visit is OK  ----- Message -----  From: Liza Valiente RN  Sent: 8/24/2020  12:51 PM CDT  To: Eduardo Crespo MD    Hi Dr. Crespo,  Patient called stating he looked at MRI spine report on MyChart results. He asked what the follow up plan is. He didn't understand the report. Perhaps a telephone visit?  Thanks,  Liza

## 2020-08-28 ENCOUNTER — PATIENT OUTREACH (OUTPATIENT)
Dept: ONCOLOGY | Facility: CLINIC | Age: 70
End: 2020-08-28

## 2020-08-28 NOTE — PROGRESS NOTES
Call placed to patient to schedule a telephone visit with Dr. Crespo to discuss Lumbar spine MRI results. Visit scheduled on 9/10/20 @ 3624.

## 2020-09-10 ENCOUNTER — VIRTUAL VISIT (OUTPATIENT)
Dept: ONCOLOGY | Facility: CLINIC | Age: 70
End: 2020-09-10
Attending: INTERNAL MEDICINE
Payer: MEDICARE

## 2020-09-10 VITALS — BODY MASS INDEX: 37.97 KG/M2 | WEIGHT: 280 LBS

## 2020-09-10 DIAGNOSIS — C85.98 NON-HODGKIN'S LYMPHOMA OF MULTIPLE SITES (H): ICD-10-CM

## 2020-09-10 DIAGNOSIS — D49.2 LUMBAR SPINE TUMOR: Primary | ICD-10-CM

## 2020-09-10 PROCEDURE — 99214 OFFICE O/P EST MOD 30 MIN: CPT | Mod: 95 | Performed by: INTERNAL MEDICINE

## 2020-09-10 PROCEDURE — 40001009 ZZH VIDEO/TELEPHONE VISIT; NO CHARGE

## 2020-09-10 NOTE — PROGRESS NOTES
FOLLOW-UP VISIT NOTE    PATIENT NAME: Deandre Merchant MRN # [id number]  DATE OF VISIT: Sep 10, 2020 YOB: 1950        CANCER TYPE:Follicular Lymphoma  STAGE: IV( axillary, mediastinal, retroperitoneal, stomach, bone marrow)      ONCOLOGY HISTORY:    Patient was being followed by Dr. Krishnamurthy but now she has transferred his care to me.  I reviewed his previous records and have copied and updated from prior notes.  70-year-old male who in 2012 presented with mediastinal, retroperitoneal and mesenteric lymph nodes. Was clinically asymptomatic with normal blood counts. CT-guided lymph node biopsy on 6/21/2012 was consistent with follicular lymphoma, grade 1-2. He was observed at that point.    In 2013, presented to the hospital with ileus and abdominal distention. EGD showed gastric antrum and duodenal ulcers which were biopsied and showed follicular lymphoma. CT scans showed bulky  lymphadenopathy with increasing size. 04/13 Bone marrow biopsy was positive with evidence of follicular lymphoma.   He received bendamustine and Rituxan regimen April 2014- -September 2014.   In December 14 , he was started on maintenance Rituxan every 2 months for 2 years which he completed in October 2016.     In remission and on observation since then.    7/14/2020 - established care with me.    SUBJECTIVE   This is video visit through [facility].  He is doing well.  He denies any pain.  Denies any back pain.  No new neurological problems.  Denies any nausea vomiting diarrhea or constipation.  He denies any weight loss or night sweats.  Energy is good.  Denies any fevers or infections or shortness of breath.  No neuropathy.    ECOG 0    ROS:  Rest of the comprehensive review of the system was essentially unremarkable.      I reviewed other history in epic as below.      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     Lymphadenopathy     mediastinal & retroperitoneal     Non Hodgkin's lymphoma (H)      Non-Hodgkin's lymphoma of multiple sites (H) 6/26/2012     Polyps, colonic      Retinal detachment          CURRENT OUTPATIENT MEDICATIONS     Current Outpatient Medications   Medication Sig Dispense Refill     Acetaminophen (TYLENOL PO) Take 1,000 mg by mouth       cetirizine (ZYRTEC) 10 MG tablet Take 10 mg by mouth daily       metoprolol succinate ER (TOPROL-XL) 50 MG 24 hr tablet Take 1 tablet (50 mg) by mouth daily 90 tablet 4     order for DME Equipment ordered: RESMED Auto PAP Mask type: Full face  Settings: 5-15 cm h2o       simvastatin (ZOCOR) 20 MG tablet Take 1 tablet (20 mg) by mouth At Bedtime 90 tablet 4     triamcinolone (KENALOG) 0.1 % external ointment Apply sparingly to affected area twice daily as needed. 15 g 1        ALLERGIES     Allergies   Allergen Reactions     Allopurinol Rash     FAMILY HISTORY:  Family History   Problem Relation Age of Onset     Cancer Father         liver/kidney     C.A.D. Father      Pacemaker Brother      Arthritis Mother         RA     Dad had liver cancer at 74.  He has 2 children and they are healthy.    Social History     Socioeconomic History     Marital status:      Spouse name: Cristel     Number of children: 2     Years of education: Not on file     Highest education level: Not on file   Occupational History     Not on file   Social Needs     Financial resource strain: Not on file     Food insecurity     Worry: Not on file     Inability: Not on file     Transportation needs     Medical: Not on file     Non-medical: Not on file   Tobacco Use     Smoking status: Never Smoker     Smokeless tobacco: Never Used   Substance and Sexual Activity     Alcohol use: Yes     Alcohol/week: 3.3 standard drinks     Comment: occasionally weekends     Drug use: No     Sexual activity: Yes   Lifestyle     Physical activity     Days per week: Not on file     Minutes per  session: Not on file     Stress: Not on file   Relationships     Social connections     Talks on phone: Not on file     Gets together: Not on file     Attends [employer] service: Not on file     Active member of club or organization: Not on file     Attends meetings of clubs or organizations: Not on file     Relationship status: Not on file     Intimate partner violence     Fear of current or ex partner: Not on file     Emotionally abused: Not on file     Physically abused: Not on file     Forced sexual activity: Not on file   Other Topics Concern      Service Not Asked     Blood Transfusions Not Asked     Caffeine Concern No     Occupational Exposure Not Asked     Hobby Hazards Not Asked     Sleep Concern No     Stress Concern No     Weight Concern No     Special Diet Not Asked     Back Care Not Asked     Exercise Yes     Bike Helmet Not Asked     Seat Belt Yes     Self-Exams Not Asked     Parent/sibling w/ CABG, MI or angioplasty before 65F 55M? Not Asked   Social History Narrative     Not on file     Denies smoking. 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     PHYSICAL EXAM     Wt 127 kg (280 lb)   BMI 37.97 kg/m    Wt Readings from Last 4 Encounters:   09/10/20 127 kg (280 lb)   07/28/20 130.6 kg (288 lb)   07/07/20 129.7 kg (286 lb)   03/05/20 135.6 kg (299 lb)       Constitutional.  Looks well and in no apparent distress.   Eyes.  Without eye redness or apparent jaundice.   Respiratory.  Non labored breathing. Speaking in full sentences.    Skin.  No concerning skin rashes on the skin visualized.   Neurological.  Is alert and oriented.  Psychiatric.  Mood and affect seem appropriate.      The rest of a comprehensive physical examination is deferred due to Public Health Emergency video visit restrictions.       LABORATORY AND IMAGING STUDIES       Reviewed    Results for [place] AVELAR (MRN [id number]) as of 7/14/2020 11:21   Ref. Range 7/7/2020 10:44   Sodium Latest Ref Range: 133 -  144 mmol/L 139   Potassium Latest Ref Range: 3.4 - 5.3 mmol/L 4.2   Chloride Latest Ref Range: 94 - 109 mmol/L 106   Carbon Dioxide Latest Ref Range: 20 - 32 mmol/L 27   Urea Nitrogen Latest Ref Range: 7 - 30 mg/dL 16   Creatinine Latest Ref Range: 0.66 - 1.25 mg/dL 1.07   GFR Estimate Latest Ref Range: >60 mL/min/1.73_m2 70   GFR Estimate If Black Latest Ref Range: >60 mL/min/1.73_m2 81   Calcium Latest Ref Range: 8.5 - 10.1 mg/dL 8.7   Anion Gap Latest Ref Range: 3 - 14 mmol/L 6   Albumin Latest Ref Range: 3.4 - 5.0 g/dL 3.9   Protein Total Latest Ref Range: 6.8 - 8.8 g/dL 6.6 (L)   Bilirubin Total Latest Ref Range: 0.2 - 1.3 mg/dL 1.0   Alkaline Phosphatase Latest Ref Range: 40 - 150 U/L 107   ALT Latest Ref Range: 0 - 70 U/L 31   AST Latest Ref Range: 0 - 45 U/L 32   Cholesterol Latest Ref Range: <200 mg/dL 96   HDL Cholesterol Latest Ref Range: >39 mg/dL 28 (L)   Lactate Dehydrogenase Latest Ref Range: 85 - 227 U/L Canceled, Test credited   LDL Cholesterol Calculated Latest Ref Range: <100 mg/dL 29   Non HDL Cholesterol Latest Ref Range: <130 mg/dL 68   Triglycerides Latest Ref Range: <150 mg/dL 194 (H)   Glucose Latest Ref Range: 70 - 99 mg/dL 111 (H)   WBC Latest Ref Range: 4.0 - 11.0 10e9/L 5.9   Hemoglobin Latest Ref Range: 13.3 - 17.7 g/dL 14.1   Hematocrit Latest Ref Range: 40.0 - 53.0 % 41.2   Platelet Count Latest Ref Range: 150 - 450 10e9/L 176   RBC Count Latest Ref Range: 4.4 - 5.9 10e12/L 4.64   MCV Latest Ref Range: 78 - 100 fl 89   MCH Latest Ref Range: 26.5 - 33.0 pg 30.4   MCHC Latest Ref Range: 31.5 - 36.5 g/dL 34.2   RDW Latest Ref Range: 10.0 - 15.0 % 12.5   Diff Method Unknown Automated Method   % Neutrophils Latest Units: % 66.2   % Lymphocytes Latest Units: % 18.9   % Monocytes Latest Units: % 11.7   % Eosinophils Latest Units: % 1.9   % Basophils Latest Units: % 1.0   % Immature Granulocytes Latest Units: % 0.3   Nucleated RBCs Latest Ref Range: 0 /100 0   Absolute Neutrophil Latest Ref  Range: 1.6 - 8.3 10e9/L 3.9   Absolute Lymphocytes Latest Ref Range: 0.8 - 5.3 10e9/L 1.1   Absolute Monocytes Latest Ref Range: 0.0 - 1.3 10e9/L 0.7   Absolute Basophils Latest Ref Range: 0.0 - 0.2 10e9/L 0.1   Abs Immature Granulocytes Latest Ref Range: 0 - 0.4 10e9/L 0.0   Absolute Nucleated RBC Unknown 0.0     MRI LUMBAR SPINE WITHOUT AND WITH CONTRAST   7/22/2020 3:51 PM      HISTORY: L2 vertebral body lesion. History of NHL (non-Hodgkin's  lymphoma) of multiple sites (H). Lumbar spine tumor.     TECHNIQUE: Multiplanar multisequence MRI of the lumbar spine without  and with 10 mL Gadavist.     COMPARISON: CT chest, abdomen and pelvis dated 7/10/2020.     FINDINGS: Five lumbar vertebral bodies are presumed. Slight reversal  of the normal lumbar lordosis centered at the L2-L3 level. Mild  dextroconvex curvature centered at L1-L2 and levoconvex curvature  centered at L3-L4. There appears to be right lateral listhesis of L2  on L3 measuring up to approximately 6-7 mm. No fracture. There is a  mildly heterogeneous ovoid/lobulated T2 and STIR heterogeneously  hyperintense, T1 predominantly hypointense, enhancing lesion in the  right aspect of the L2 inferior endplate, corresponding to the  sclerotic lesion noted on most recent CT. This lesion measures  approximately 17 mm craniocaudal x 14 mm AP x 14 mm transverse and is  nonspecific. No extraosseous soft tissue mass. No other concerning  marrow lesions seen. There is mild Modic type I degenerative endplate  change at the L4-L5 level with scattered Modic type II degenerative  endplate changes elsewhere at L2-L3, L4-L5 and L5-S1. The conus  terminates at L2-L3. Paraspinous soft tissues are unremarkable.     Segmental analysis:  T12-L1: Imaged only in the sagittal plane. There appears to be a left  foraminal disc herniation resulting in mild to moderate left neural  foraminal stenosis. No significant spinal stenosis or right neural  foraminal stenosis.     L1-L2: Disc  desiccation without significant disc height loss. There is  a diffuse disc bulge slightly eccentric to the left with associated  appositional/marginal osteophytes on the left. Moderate facet  arthropathy. No spinal stenosis. Contact of the traversing right L2  nerve root by facet hypertrophy on the right, without significant  nerve root displacement. Mild left neural foraminal stenosis. The  right neural foramen is not significantly narrowed.     L2-L3: Moderate disc height loss, with moderate to severe disc height  loss along the left aspect of the disc space. Marginal endplate  osteophytes, particularly on the left. Diffuse disc bulge. Mild to  moderate facet arthropathy. Minimal contact of the traversing right  more than left L3 nerve roots by the disc bulge/posterior endplate  without significant displacement. No spinal stenosis. Mild to moderate  left and mild right neural foraminal stenosis.     L3-L4: Mild disc height loss. Diffuse disc bulge with marginal  endplate osteophytes. Moderate facet arthropathy. Mild encroachment on  the right lateral recess. Minimal contact of the traversing left L4  nerve root by the disc without significant displacement of the nerve  roots. No significant spinal stenosis. Severe right neural foraminal  stenosis. Mild to moderate left neural foraminal stenosis.     L4-L5: Moderate disc height loss. Diffuse disc bulge. Bilateral  marginal endplate osteophytes. Moderate facet arthropathy. Contact of  the bilateral traversing nerve roots, right more than left, with mild  right lateral recess encroachment. No traversing nerve root  displacement. No central spinal stenosis. Severe right and moderate  left neural foraminal stenosis.     L5-S1: Moderate disc height loss. Diffuse disc bulge with marginal  endplate osteophytes. Moderate facet arthropathy. No spinal stenosis.  Moderate bilateral neural foraminal stenosis.                                                                       IMPRESSION:   1. Nonspecific enhancing lesion in the right aspect of the L2 inferior  endplate is indeterminate. If clinically warranted, consider further  characterization with either nuclear medicine bone scintigraphy, FDG  PET/CT, or follow-up MRI.  2. Multilevel degenerative changes of the lumbar spine, as described.  Please see the body of the report for details.  3. Severe right L3-L4 and L4-L5 neural foraminal stenosis, with lesser  degrees of neural foraminal narrowing elsewhere. No high-grade central  spinal canal stenosis.    CT CHEST/ABDOMEN/PELVIS WITH CONTRAST  7/10/2020 10:54 AM     HISTORY: Follicular lymphoma follow up. Follicular lymphoma,  unspecified follicular lymphoma type, unspecified body region (H).     TECHNIQUE: Scans obtained of the chest, abdomen, and pelvis with IV  contrast. 100 mL, Isovue 370 injected. Radiation dose for this scan  was reduced using automated exposure control, adjustment of the mA  and/or kV according to patient size, or iterative reconstruction  technique.     COMPARISON: CT chest, abdomen and pelvis dated 4/18/2019.     FINDINGS:   Chest: Mild groundglass densities in the posterior bilateral lungs are  most consistent with dependant atelectasis. Lungs are otherwise clear  without significant nodule, mass, infiltrate, effusion, or  pneumothorax.     Heart is normal in size. There are aortic and coronary artery  calcifications. Ectasia/mild aneurysmal dilatation of the ascending  thoracic aorta measures up to 4.8 cm in maximum dimension, similar to  the prior study. No central pulmonary artery filling defects to  suggest central pulmonary artery embolism. This study was not  optimized for pulmonary artery evaluation. There is no mediastinal,  hilar, or axillary lymphadenopathy. Visualized portions of the thyroid  are unremarkable. Focal blastic area in the right inferior L2  vertebral body (image 78 series 2, image 65 series 8 and image 82  series 7) could be  associated with patient's degenerative disc disease  at L2-3 but could also represent a blastic metastasis and is new or  increased in size since the most recent prior study. No other  aggressive osseous lesions are seen in the chest, abdomen or pelvis.  Degenerative changes are noted in the spine and SI joints.     There is mild diffuse fatty infiltration of the liver, similar to the  prior study. Very subtle layering density in the dependent aspect of  the gallbladder could represent tiny gallstones. These are unchanged.  Liver, gallbladder, pancreas, spleen, bilateral adrenal glands, and  bilateral kidneys otherwise enhance normally. No hydronephrosis,  nephrolithiasis, hydroureter, or ureteral calculus is identified.  Urinary bladder is grossly unremarkable.     Prostate gland is mildly enlarged and contains some dystrophic  calcifications but is otherwise unremarkable. There are mildly  prominent lymph nodes in the root of the mesentery most prominent in  the retroperitoneal region measuring up to approximately 1.0 cm in  short axis. These are similar to the prior study dated 4/18/2019 and  could be related to patient's history of non-Hodgkin's lymphoma.  Slight increased density in the adipose tissues of the mesenteric root  are also stable. No new lymphadenopathy is identified. No free fluid  or free air is seen in the peritoneal cavity.     The colon is grossly of normal caliber. No pericolonic inflammatory  change to suggest acute diverticulitis. Appendix is normal in  appearance and extends posteriorly, inferiorly and medially from the  cecum. Small bowel is of normal caliber. Stomach is mostly  decompressed but is otherwise unremarkable. Multiple ventral abdominal  wall fat-containing hernias are again noted, not appreciably changed.                                                                      IMPRESSION:  1. Focal blastic area in the right inferior L2 vertebral body could  represent a blastic  metastasis but could also be associated with  adjacent degenerative change. This is new or increased in size since  the prior study dated 4/18/2019. No other new evidence for metastasis  is seen.  2. Mildly prominent lymph nodes in the retroperitoneum and in the root  of the mesentery are stable in appearance since the prior study and  are upper normal limits. These could be related to patient's lymphoma.  3. Mild groundglass densities in the root of the mesentery adipose  tissues are stable.  4. Diffuse fatty infiltration of the liver is again noted.  5. Atherosclerosis worst in the coronary arteries.  6. Mild aneurysmal dilatation of the ascending thoracic aorta is  stable.  7. No new metastasis or evidence for recurrent malignancy.  8. Stable fat-containing ventral abdominal hernias.        ASSESSMENT AND PLAN     70-year-old male with stage IV follicular lymphoma who is status post treatment with bendamustine/Rituxan (04-09/2014) followed by maintenance rituximab for two years- completed in October 2016.    He has been doing very well and a CT scan in July 2020 was stable apart from an indeterminate L2 vertebral body lesion.  At that time we decided on proceeding with an MRI of the lumbar spine which was done which showed Nonspecific enhancing lesion in the right aspect of the L2 inferior endplate which remains indeterminate.    He is completely asymptomatic.  We discussed the situation in detail.  Our options going forward to follow-up on this indeterminate L2 vertebral body lesion proceeding with a bone scan or PET scan or repeating an MRI in the next few months.  After a thorough discussion he tells me that he does not want to do a bone scan or PET scan and he would be comfortable in repeating MRI in 3 months.  I told him that if he develops any symptoms whatsoever, then he should let me know immediately.  Otherwise we will plan on repeating MRI in early December and I will see him back after that.    Recent  PSA on 7/28/2020 was stable at 2.27.    If he continues to do well, I will plan to repeat CT chest abdomen and pelvis in July 2021 for follow-up for follicular lymphoma.    I will see him back after his MRI in December.    I answered all of his questions to his satisfaction.  He is agreeable and comfortable with the plan.    Eduardo Crespo MD

## 2020-09-10 NOTE — LETTER
"    9/10/2020         RE: Deandre Merchant  [address]  [facility] [address]        Dear Colleague,    Thank you for referring your patient, Deandre Merchant, to the [facility]. Please see a copy of my visit note below.    Deandre Merchant is a 70 year old male who is being evaluated via a billable video visit.      The patient has been notified of following:     \"This video visit will be conducted via a call between you and your physician/provider. We have found that certain health care needs can be provided without the need for an in-person physical exam.  This service lets us provide the care you need with a video conversation.  If a prescription is necessary we can send it directly to your pharmacy.  If lab work is needed we can place an order for that and you can then stop by our lab to have the test done at a later time.    Video visits are billed at different rates depending on your insurance coverage.  Please reach out to your insurance provider with any questions.    If during the course of the call the physician/provider feels a video visit is not appropriate, you will not be charged for this service.\"    Patient has given verbal consent for Video visit? Yes  How would you like to obtain your AVS? MyChart  If you are dropped from the video visit, the video invite should be resent to: Text to cell phone: [phone]  Will anyone else be joining your video visit? No      Video-Visit Details    Type of service:  Video Visit    Video Start Time: 9:37 AM  Video End Time: 9:49 AM    Originating Location (pt. Location): Home    Distant Location (provider location):  [facility]     Platform used for Video Visit: Kyle Crespo MD                  FOLLOW-UP VISIT NOTE    PATIENT NAME: Deandre Merchant MRN # [id number]  DATE OF VISIT: Sep 10, 2020 YOB: 1950        CANCER TYPE:Follicular Lymphoma  STAGE: IV( axillary, mediastinal, retroperitoneal, " stomach, bone marrow)      ONCOLOGY HISTORY:    Patient was being followed by Dr. Krishnamurthy but now she has transferred his care to me.  I reviewed his previous records and have copied and updated from prior notes.  70-year-old male who in 2012 presented with mediastinal, retroperitoneal and mesenteric lymph nodes. Was clinically asymptomatic with normal blood counts. CT-guided lymph node biopsy on 6/21/2012 was consistent with follicular lymphoma, grade 1-2. He was observed at that point.    In 2013, presented to the hospital with ileus and abdominal distention. EGD showed gastric antrum and duodenal ulcers which were biopsied and showed follicular lymphoma. CT scans showed bulky  lymphadenopathy with increasing size. 04/13 Bone marrow biopsy was positive with evidence of follicular lymphoma.   He received bendamustine and Rituxan regimen April 2014- -September 2014.   In December 14 , he was started on maintenance Rituxan every 2 months for 2 years which he completed in October 2016.     In remission and on observation since then.    7/14/2020 - established care with me.    SUBJECTIVE   This is video visit through [facility].  He is doing well.  He denies any pain.  Denies any back pain.  No new neurological problems.  Denies any nausea vomiting diarrhea or constipation.  He denies any weight loss or night sweats.  Energy is good.  Denies any fevers or infections or shortness of breath.  No neuropathy.    ECOG 0    ROS:  Rest of the comprehensive review of the system was essentially unremarkable.      I reviewed other history in epic as below.      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     Lymphadenopathy     mediastinal & retroperitoneal     Non Hodgkin's lymphoma (H)      Non-Hodgkin's lymphoma of multiple sites (H) 6/26/2012     Polyps, colonic      Retinal detachment          CURRENT OUTPATIENT  MEDICATIONS     Current Outpatient Medications   Medication Sig Dispense Refill     Acetaminophen (TYLENOL PO) Take 1,000 mg by mouth       cetirizine (ZYRTEC) 10 MG tablet Take 10 mg by mouth daily       metoprolol succinate ER (TOPROL-XL) 50 MG 24 hr tablet Take 1 tablet (50 mg) by mouth daily 90 tablet 4     order for DME Equipment ordered: RESMED Auto PAP Mask type: Full face  Settings: 5-15 cm h2o       simvastatin (ZOCOR) 20 MG tablet Take 1 tablet (20 mg) by mouth At Bedtime 90 tablet 4     triamcinolone (KENALOG) 0.1 % external ointment Apply sparingly to affected area twice daily as needed. 15 g 1        ALLERGIES     Allergies   Allergen Reactions     Allopurinol Rash     FAMILY HISTORY:  Family History   Problem Relation Age of Onset     Cancer Father         liver/kidney     C.A.D. Father      Pacemaker Brother      Arthritis Mother         RA     Dad had liver cancer at 74.  He has 2 children and they are healthy.    Social History     Socioeconomic History     Marital status:      Spouse name: Cristel     Number of children: 2     Years of education: Not on file     Highest education level: Not on file   Occupational History     Not on file   Social Needs     Financial resource strain: Not on file     Food insecurity     Worry: Not on file     Inability: Not on file     Transportation needs     Medical: Not on file     Non-medical: Not on file   Tobacco Use     Smoking status: Never Smoker     Smokeless tobacco: Never Used   Substance and Sexual Activity     Alcohol use: Yes     Alcohol/week: 3.3 standard drinks     Comment: occasionally weekends     Drug use: No     Sexual activity: Yes   Lifestyle     Physical activity     Days per week: Not on file     Minutes per session: Not on file     Stress: Not on file   Relationships     Social connections     Talks on phone: Not on file     Gets together: Not on file     Attends Jewish service: Not on file     Active member of club or organization:  Not on file     Attends meetings of clubs or organizations: Not on file     Relationship status: Not on file     Intimate partner violence     Fear of current or ex partner: Not on file     Emotionally abused: Not on file     Physically abused: Not on file     Forced sexual activity: Not on file   Other Topics Concern      Service Not Asked     Blood Transfusions Not Asked     Caffeine Concern No     Occupational Exposure Not Asked     Hobby Hazards Not Asked     Sleep Concern No     Stress Concern No     Weight Concern No     Special Diet Not Asked     Back Care Not Asked     Exercise Yes     Bike Helmet Not Asked     Seat Belt Yes     Self-Exams Not Asked     Parent/sibling w/ CABG, MI or angioplasty before 65F 55M? Not Asked   Social History Narrative     Not on file     Denies smoking. 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     PHYSICAL EXAM     Wt 127 kg (280 lb)   BMI 37.97 kg/m    Wt Readings from Last 4 Encounters:   09/10/20 127 kg (280 lb)   07/28/20 130.6 kg (288 lb)   07/07/20 129.7 kg (286 lb)   03/05/20 135.6 kg (299 lb)       Constitutional.  Looks well and in no apparent distress.   Eyes.  Without eye redness or apparent jaundice.   Respiratory.  Non labored breathing. Speaking in full sentences.    Skin.  No concerning skin rashes on the skin visualized.   Neurological.  Is alert and oriented.  Psychiatric.  Mood and affect seem appropriate.      The rest of a comprehensive physical examination is deferred due to Public Health Emergency video visit restrictions.       LABORATORY AND IMAGING STUDIES       Reviewed    Results for [place] AVELAR (MRN [id number]) as of 7/14/2020 11:21   Ref. Range 7/7/2020 10:44   Sodium Latest Ref Range: 133 - 144 mmol/L 139   Potassium Latest Ref Range: 3.4 - 5.3 mmol/L 4.2   Chloride Latest Ref Range: 94 - 109 mmol/L 106   Carbon Dioxide Latest Ref Range: 20 - 32 mmol/L 27   Urea Nitrogen Latest Ref Range: 7 - 30 mg/dL 16   Creatinine  Latest Ref Range: 0.66 - 1.25 mg/dL 1.07   GFR Estimate Latest Ref Range: >60 mL/min/1.73_m2 70   GFR Estimate If Black Latest Ref Range: >60 mL/min/1.73_m2 81   Calcium Latest Ref Range: 8.5 - 10.1 mg/dL 8.7   Anion Gap Latest Ref Range: 3 - 14 mmol/L 6   Albumin Latest Ref Range: 3.4 - 5.0 g/dL 3.9   Protein Total Latest Ref Range: 6.8 - 8.8 g/dL 6.6 (L)   Bilirubin Total Latest Ref Range: 0.2 - 1.3 mg/dL 1.0   Alkaline Phosphatase Latest Ref Range: 40 - 150 U/L 107   ALT Latest Ref Range: 0 - 70 U/L 31   AST Latest Ref Range: 0 - 45 U/L 32   Cholesterol Latest Ref Range: <200 mg/dL 96   HDL Cholesterol Latest Ref Range: >39 mg/dL 28 (L)   Lactate Dehydrogenase Latest Ref Range: 85 - 227 U/L Canceled, Test credited   LDL Cholesterol Calculated Latest Ref Range: <100 mg/dL 29   Non HDL Cholesterol Latest Ref Range: <130 mg/dL 68   Triglycerides Latest Ref Range: <150 mg/dL 194 (H)   Glucose Latest Ref Range: 70 - 99 mg/dL 111 (H)   WBC Latest Ref Range: 4.0 - 11.0 10e9/L 5.9   Hemoglobin Latest Ref Range: 13.3 - 17.7 g/dL 14.1   Hematocrit Latest Ref Range: 40.0 - 53.0 % 41.2   Platelet Count Latest Ref Range: 150 - 450 10e9/L 176   RBC Count Latest Ref Range: 4.4 - 5.9 10e12/L 4.64   MCV Latest Ref Range: 78 - 100 fl 89   MCH Latest Ref Range: 26.5 - 33.0 pg 30.4   MCHC Latest Ref Range: 31.5 - 36.5 g/dL 34.2   RDW Latest Ref Range: 10.0 - 15.0 % 12.5   Diff Method Unknown Automated Method   % Neutrophils Latest Units: % 66.2   % Lymphocytes Latest Units: % 18.9   % Monocytes Latest Units: % 11.7   % Eosinophils Latest Units: % 1.9   % Basophils Latest Units: % 1.0   % Immature Granulocytes Latest Units: % 0.3   Nucleated RBCs Latest Ref Range: 0 /100 0   Absolute Neutrophil Latest Ref Range: 1.6 - 8.3 10e9/L 3.9   Absolute Lymphocytes Latest Ref Range: 0.8 - 5.3 10e9/L 1.1   Absolute Monocytes Latest Ref Range: 0.0 - 1.3 10e9/L 0.7   Absolute Basophils Latest Ref Range: 0.0 - 0.2 10e9/L 0.1   Abs Immature  Granulocytes Latest Ref Range: 0 - 0.4 10e9/L 0.0   Absolute Nucleated RBC Unknown 0.0     MRI LUMBAR SPINE WITHOUT AND WITH CONTRAST   7/22/2020 3:51 PM      HISTORY: L2 vertebral body lesion. History of NHL (non-Hodgkin's  lymphoma) of multiple sites (H). Lumbar spine tumor.     TECHNIQUE: Multiplanar multisequence MRI of the lumbar spine without  and with 10 mL Gadavist.     COMPARISON: CT chest, abdomen and pelvis dated 7/10/2020.     FINDINGS: Five lumbar vertebral bodies are presumed. Slight reversal  of the normal lumbar lordosis centered at the L2-L3 level. Mild  dextroconvex curvature centered at L1-L2 and levoconvex curvature  centered at L3-L4. There appears to be right lateral listhesis of L2  on L3 measuring up to approximately 6-7 mm. No fracture. There is a  mildly heterogeneous ovoid/lobulated T2 and STIR heterogeneously  hyperintense, T1 predominantly hypointense, enhancing lesion in the  right aspect of the L2 inferior endplate, corresponding to the  sclerotic lesion noted on most recent CT. This lesion measures  approximately 17 mm craniocaudal x 14 mm AP x 14 mm transverse and is  nonspecific. No extraosseous soft tissue mass. No other concerning  marrow lesions seen. There is mild Modic type I degenerative endplate  change at the L4-L5 level with scattered Modic type II degenerative  endplate changes elsewhere at L2-L3, L4-L5 and L5-S1. The conus  terminates at L2-L3. Paraspinous soft tissues are unremarkable.     Segmental analysis:  T12-L1: Imaged only in the sagittal plane. There appears to be a left  foraminal disc herniation resulting in mild to moderate left neural  foraminal stenosis. No significant spinal stenosis or right neural  foraminal stenosis.     L1-L2: Disc desiccation without significant disc height loss. There is  a diffuse disc bulge slightly eccentric to the left with associated  appositional/marginal osteophytes on the left. Moderate facet  arthropathy. No spinal stenosis.  Contact of the traversing right L2  nerve root by facet hypertrophy on the right, without significant  nerve root displacement. Mild left neural foraminal stenosis. The  right neural foramen is not significantly narrowed.     L2-L3: Moderate disc height loss, with moderate to severe disc height  loss along the left aspect of the disc space. Marginal endplate  osteophytes, particularly on the left. Diffuse disc bulge. Mild to  moderate facet arthropathy. Minimal contact of the traversing right  more than left L3 nerve roots by the disc bulge/posterior endplate  without significant displacement. No spinal stenosis. Mild to moderate  left and mild right neural foraminal stenosis.     L3-L4: Mild disc height loss. Diffuse disc bulge with marginal  endplate osteophytes. Moderate facet arthropathy. Mild encroachment on  the right lateral recess. Minimal contact of the traversing left L4  nerve root by the disc without significant displacement of the nerve  roots. No significant spinal stenosis. Severe right neural foraminal  stenosis. Mild to moderate left neural foraminal stenosis.     L4-L5: Moderate disc height loss. Diffuse disc bulge. Bilateral  marginal endplate osteophytes. Moderate facet arthropathy. Contact of  the bilateral traversing nerve roots, right more than left, with mild  right lateral recess encroachment. No traversing nerve root  displacement. No central spinal stenosis. Severe right and moderate  left neural foraminal stenosis.     L5-S1: Moderate disc height loss. Diffuse disc bulge with marginal  endplate osteophytes. Moderate facet arthropathy. No spinal stenosis.  Moderate bilateral neural foraminal stenosis.                                                                      IMPRESSION:   1. Nonspecific enhancing lesion in the right aspect of the L2 inferior  endplate is indeterminate. If clinically warranted, consider further  characterization with either nuclear medicine bone scintigraphy,  FDG  PET/CT, or follow-up MRI.  2. Multilevel degenerative changes of the lumbar spine, as described.  Please see the body of the report for details.  3. Severe right L3-L4 and L4-L5 neural foraminal stenosis, with lesser  degrees of neural foraminal narrowing elsewhere. No high-grade central  spinal canal stenosis.    CT CHEST/ABDOMEN/PELVIS WITH CONTRAST  7/10/2020 10:54 AM     HISTORY: Follicular lymphoma follow up. Follicular lymphoma,  unspecified follicular lymphoma type, unspecified body region (H).     TECHNIQUE: Scans obtained of the chest, abdomen, and pelvis with IV  contrast. 100 mL, Isovue 370 injected. Radiation dose for this scan  was reduced using automated exposure control, adjustment of the mA  and/or kV according to patient size, or iterative reconstruction  technique.     COMPARISON: CT chest, abdomen and pelvis dated 4/18/2019.     FINDINGS:   Chest: Mild groundglass densities in the posterior bilateral lungs are  most consistent with dependant atelectasis. Lungs are otherwise clear  without significant nodule, mass, infiltrate, effusion, or  pneumothorax.     Heart is normal in size. There are aortic and coronary artery  calcifications. Ectasia/mild aneurysmal dilatation of the ascending  thoracic aorta measures up to 4.8 cm in maximum dimension, similar to  the prior study. No central pulmonary artery filling defects to  suggest central pulmonary artery embolism. This study was not  optimized for pulmonary artery evaluation. There is no mediastinal,  hilar, or axillary lymphadenopathy. Visualized portions of the thyroid  are unremarkable. Focal blastic area in the right inferior L2  vertebral body (image 78 series 2, image 65 series 8 and image 82  series 7) could be associated with patient's degenerative disc disease  at L2-3 but could also represent a blastic metastasis and is new or  increased in size since the most recent prior study. No other  aggressive osseous lesions are seen in the  chest, abdomen or pelvis.  Degenerative changes are noted in the spine and SI joints.     There is mild diffuse fatty infiltration of the liver, similar to the  prior study. Very subtle layering density in the dependent aspect of  the gallbladder could represent tiny gallstones. These are unchanged.  Liver, gallbladder, pancreas, spleen, bilateral adrenal glands, and  bilateral kidneys otherwise enhance normally. No hydronephrosis,  nephrolithiasis, hydroureter, or ureteral calculus is identified.  Urinary bladder is grossly unremarkable.     Prostate gland is mildly enlarged and contains some dystrophic  calcifications but is otherwise unremarkable. There are mildly  prominent lymph nodes in the root of the mesentery most prominent in  the retroperitoneal region measuring up to approximately 1.0 cm in  short axis. These are similar to the prior study dated 4/18/2019 and  could be related to patient's history of non-Hodgkin's lymphoma.  Slight increased density in the adipose tissues of the mesenteric root  are also stable. No new lymphadenopathy is identified. No free fluid  or free air is seen in the peritoneal cavity.     The colon is grossly of normal caliber. No pericolonic inflammatory  change to suggest acute diverticulitis. Appendix is normal in  appearance and extends posteriorly, inferiorly and medially from the  cecum. Small bowel is of normal caliber. Stomach is mostly  decompressed but is otherwise unremarkable. Multiple ventral abdominal  wall fat-containing hernias are again noted, not appreciably changed.                                                                      IMPRESSION:  1. Focal blastic area in the right inferior L2 vertebral body could  represent a blastic metastasis but could also be associated with  adjacent degenerative change. This is new or increased in size since  the prior study dated 4/18/2019. No other new evidence for metastasis  is seen.  2. Mildly prominent lymph nodes in  the retroperitoneum and in the root  of the mesentery are stable in appearance since the prior study and  are upper normal limits. These could be related to patient's lymphoma.  3. Mild groundglass densities in the root of the mesentery adipose  tissues are stable.  4. Diffuse fatty infiltration of the liver is again noted.  5. Atherosclerosis worst in the coronary arteries.  6. Mild aneurysmal dilatation of the ascending thoracic aorta is  stable.  7. No new metastasis or evidence for recurrent malignancy.  8. Stable fat-containing ventral abdominal hernias.        ASSESSMENT AND PLAN     70-year-old male with stage IV follicular lymphoma who is status post treatment with bendamustine/Rituxan (04-09/2014) followed by maintenance rituximab for two years- completed in October 2016.    He has been doing very well and a CT scan in July 2020 was stable apart from an indeterminate L2 vertebral body lesion.  At that time we decided on proceeding with an MRI of the lumbar spine which was done which showed Nonspecific enhancing lesion in the right aspect of the L2 inferior endplate which remains indeterminate.    He is completely asymptomatic.  We discussed the situation in detail.  Our options going forward to follow-up on this indeterminate L2 vertebral body lesion proceeding with a bone scan or PET scan or repeating an MRI in the next few months.  After a thorough discussion he tells me that he does not want to do a bone scan or PET scan and he would be comfortable in repeating MRI in 3 months.  I told him that if he develops any symptoms whatsoever, then he should let me know immediately.  Otherwise we will plan on repeating MRI in early December and I will see him back after that.    Recent PSA on 7/28/2020 was stable at 2.27.    If he continues to do well, I will plan to repeat CT chest abdomen and pelvis in July 2021 for follow-up for follicular lymphoma.    I will see him back after his MRI in December.    I  answered all of his questions to his satisfaction.  He is agreeable and comfortable with the plan.    Eduardo Crespo MD

## 2020-09-19 NOTE — PATIENT INSTRUCTIONS
"Subjective:       Patient ID: Dimple Win is a 21 y.o. female.    Vitals:  height is 5' 4" (1.626 m) and weight is 88 kg (194 lb 0.1 oz). Her temperature is 98.5 °F (36.9 °C). Her blood pressure is 118/72 and her pulse is 93. Her respiration is 18 and oxygen saturation is 99%.     Chief Complaint: COVID-19 Concerns    Patient here today with c/o loss of taste and smell this morning but since has come back. Pt denies SOB, chest pain, dizziness, heart palpitations, nausea and vomiting. Limited contact with possible covid-19 per [facility] emergency protocol. Pt verbalized agreement to limit contact to reduce exposure of Covid-19 and URIs. Covid-19 teaching complete.         Constitution: Negative for chills, fatigue and fever.   HENT: Negative for congestion and sore throat.    Neck: Negative for painful lymph nodes.   Cardiovascular: Negative for chest pain and leg swelling.   Eyes: Negative for double vision and blurred vision.   Respiratory: Negative for cough and shortness of breath.    Gastrointestinal: Negative for nausea, vomiting and diarrhea.   Genitourinary: Negative for dysuria, frequency, urgency and history of kidney stones.   Musculoskeletal: Negative for joint pain, joint swelling, muscle cramps and muscle ache.   Skin: Negative for color change, pale, rash and bruising.   Allergic/Immunologic: Negative for seasonal allergies.   Neurological: Negative for dizziness, history of vertigo, light-headedness, passing out and headaches.   Hematologic/Lymphatic: Negative for swollen lymph nodes.   Psychiatric/Behavioral: Negative for nervous/anxious, sleep disturbance and depression. The patient is not nervous/anxious.        Objective:      Physical Exam   Constitutional: She is oriented to person, place, and time. She appears well-developed. She is cooperative.  Non-toxic appearance. She does not appear ill. No distress.   HENT:   Head: Normocephalic and atraumatic.   Ears:   Right Ear: Hearing, " Thank you for visiting [facility] (formerly [facility]]    Try a nasal steroid spray like Flonase to help with your cough.  It will take a few days for this to fully kick in.      Can use albuterol to help with your wheezing and cough as well.    If not improving by later this week, would consider a course of Zithromax or some prednisone.    Please see me in 1-2 weeks for follow up if not improving.  Also recommend follow up on cholesterol in about a month.    Your blood pressure was high today.  Please come back in 1-4 weeks for a nurse visit blood pressure check.     If you had imaging scheduled please refer to your radiology prep sheet.    Appointment    Date_______________     Time_____________    Day:   M [facility] F    With____________________________    Location_________________________    If you need medication refills, please contact your pharmacy 3 days before your prescriptions runs out. If you are out of refills, your pharmacy will contact contact the clinic.    Contact us or return if questions or concerns.     -Your Care Team:  MD Debi Olivera PA-C Joel De Haan, PA-C Elizabeth McLean, APRN CNP    General information about your clinic      Clinic hours:     Lab hours:  Phone [phone]  Monday 7:30 am-7 pm    Monday 8:30 am-6:30 pm  Tuesday-Friday 7:30 am-5 pm   Tuesday-Friday 8:30 am-4:30 pm    Pharmacy hours:  Phone [phone]  Monday 8:30 am-7pm  Tuesday-Friday 8:30am-6 pm                                       Mychart assistance [phone]        We would like to hear from you, how was your visit today?    Heather Weiner  Patient Information Supervisor   Patient Care Supervisor  [facility] [place], and [facility] [place] [place], and [facility]  [phone]      tympanic membrane, external ear and ear canal normal.   Left Ear: Hearing, tympanic membrane, external ear and ear canal normal.   Nose: Nose normal. No mucosal edema, rhinorrhea or nasal deformity. No epistaxis. Right sinus exhibits no maxillary sinus tenderness and no frontal sinus tenderness. Left sinus exhibits no maxillary sinus tenderness and no frontal sinus tenderness.   Mouth/Throat: Uvula is midline, oropharynx is clear and moist and mucous membranes are normal. No trismus in the jaw. Normal dentition. No uvula swelling. Cobblestoning present. No posterior oropharyngeal erythema.       Eyes: Conjunctivae and lids are normal. Right eye exhibits no discharge. Left eye exhibits no discharge. No scleral icterus.   Neck: Trachea normal, normal range of motion, full passive range of motion without pain and phonation normal. Neck supple.   Cardiovascular: Normal rate, regular rhythm, normal heart sounds and normal pulses.   Pulmonary/Chest: Effort normal and breath sounds normal. No respiratory distress.   Abdominal: Soft. Normal appearance and bowel sounds are normal. She exhibits no distension, no pulsatile midline mass and no mass. There is no abdominal tenderness.   Musculoskeletal: Normal range of motion.         General: No deformity.   Neurological: She is alert and oriented to person, place, and time. She exhibits normal muscle tone. Coordination normal.   Skin: Skin is warm, dry, intact, not diaphoretic and not pale. Psychiatric: Her speech is normal and behavior is normal. Judgment and thought content normal.   Nursing note and vitals reviewed.        Assessment:       1. Loss of taste        Plan:         Loss of taste  -     POCT COVID-19 Rapid Screening

## 2020-10-26 NOTE — PROGRESS NOTES
"Deandre Merchant is a 70 year old male who is being evaluated via a billable telephone visit.      The patient has been notified of following:     \"This telephone visit will be conducted via a call between you and your physician/provider. We have found that certain health care needs can be provided without the need for a physical exam.  This service lets us provide the care you need with a short phone conversation.  If a prescription is necessary we can send it directly to your pharmacy.  If lab work is needed we can place an order for that and you can then stop by our lab to have the test done at a later time.    Telephone visits are billed at different rates depending on your insurance coverage. During this emergency period, for some insurers they may be billed the same as an in-person visit.  Please reach out to your insurance provider with any questions.    If during the course of the call the physician/provider feels a telephone visit is not appropriate, you will not be charged for this service.\"    Patient has given verbal consent for Telephone visit?  Yes    What phone number would you like to be contacted at? [phone]    How would you like to obtain your AVS? MyChart    Phone call duration: 8 minutes    Hany Fierro MD      Does Deandre have a CPAP/Bipap?  Yes       Type of mask: Full     [facility]: [facility] [phone]    [email or web address]     [place] Sleep Scale: 9  "

## 2020-10-27 ENCOUNTER — VIRTUAL VISIT (OUTPATIENT)
Dept: SLEEP MEDICINE | Facility: CLINIC | Age: 70
End: 2020-10-27
Payer: MEDICARE

## 2020-10-27 DIAGNOSIS — G47.33 OSA (OBSTRUCTIVE SLEEP APNEA): Primary | ICD-10-CM

## 2020-10-27 PROCEDURE — 99441 PR PHYSICIAN TELEPHONE EVALUATION 5-10 MIN: CPT | Performed by: INTERNAL MEDICINE

## 2020-10-27 NOTE — PROGRESS NOTES
SLEEP MEDICINE VIRTUAL CLINIC NOTE   [facility]  Deandre RAE Mayur 70 year old male  : 1950  MRN: [id number]      PRIMARY CARE PROVIDER: Mekhi Abbott    Visit Date:   10/27/2020      REASON FOR VISIT:  Followup of obstructive sleep apnea.      HISTORY OF PRESENT ILLNESS:  The patient is a very pleasant 70-year-old gentleman with history of hypertension, hyperlipidemia, morbid obesity, atrial fibrillation, status post cardioversion, follicular lymphoma, currently in remission and right eye blindness, who was diagnosed with obstructive sleep apnea approximately 1 year ago with an apnea-hypopnea index of 13.9 events per hour with moderately loud snoring.  The patient has been using an auto CPAP with a range of 5-15 cm of water pressure.  He was last seen in our clinic approximately 1 year ago and comes in today for a followup.      The patient reports that he is doing well from a sleep perspective.  He reports sleeping well at night.  He denies any difficulty initiating or maintaining sleep.  He generally feels rested upon awakening.  He reports taking a nap 2 days per week.  He currently uses a full face mask and denies any mask interface issues.  He does report that his lower jaw opens up at night, which increases the leak.  He has never tried a chin strap before.      He reports going to bed around midnight and sleeps without difficulty up until 5:00 a.m.  He usually takes his CPAP off at that time and sleeps for another 3 hours or so.      Besides this, no significant changes have occurred to his sleep or health in general since he was last seen in our clinic.      ASSESSMENT AND PLAN:   1.  Obstructive sleep apnea.  The patient has mild obstructive sleep apnea with an apnea-hypopnea index of 13.5 events per hour.  His CPAP compliance over the last 30 days showed that the device was used 27/30 days with an average daily use of 4 hours and 37 minutes; 21 of the days were  over 4 hours of use.  The device settings are 5-15 cm of water, median pressure is 9.5, 95th percentile pressure is 13.3.  Median leak is 0.4 liters per minute.  AHI is 3.2.      Overall, the patient's obstructive sleep apnea appears to be very well treated with the current CPAP; however, the patient is not using the CPAP adequately.  He was suggested to use it every night he goes to sleep, including during naps, if any, and use it throughout the night.  We also suggested using a chin strap.      The patient was advised to work on weight loss and avoid driving if drowsy or sleepy.  He would return to clinic in 1 year for followup.      I spent a total of 8 minutes with Deandre Merchant during today's virtual sleep clinic virtual visit. Additional 10 minutes were spent in preparation for consult and care coordination.     Hany Fierro MD   of Medicine  Pulmonary, Critical Care and Sleep Medicine  [facility]  Pager: [phone]            D: 10/27/2020   T: 10/27/2020   MT: VIVIAN      Name:     DEANDRE MERCHANT   MRN:      [id number]        Account:      [id number]   :      1950           Visit Date:   10/27/2020      Document: [id number]

## 2020-10-30 DIAGNOSIS — G47.33 OSA (OBSTRUCTIVE SLEEP APNEA): Primary | ICD-10-CM

## 2020-12-07 ENCOUNTER — HOSPITAL ENCOUNTER (OUTPATIENT)
Dept: MRI IMAGING | Facility: CLINIC | Age: 70
Discharge: HOME | End: 2020-12-07
Attending: INTERNAL MEDICINE | Admitting: INTERNAL MEDICINE
Payer: MEDICARE

## 2020-12-07 DIAGNOSIS — D49.2 LUMBAR SPINE TUMOR: ICD-10-CM

## 2020-12-07 DIAGNOSIS — C85.98 NON-HODGKIN'S LYMPHOMA OF MULTIPLE SITES (H): ICD-10-CM

## 2020-12-07 PROCEDURE — 255N000002 HC RX 255 OP 636: Performed by: INTERNAL MEDICINE

## 2020-12-07 PROCEDURE — A9585 GADOBUTROL INJECTION: HCPCS | Performed by: INTERNAL MEDICINE

## 2020-12-07 PROCEDURE — 72158 MRI LUMBAR SPINE W/O & W/DYE: CPT

## 2020-12-07 RX ORDER — GADOBUTROL 604.72 MG/ML
10 INJECTION INTRAVENOUS ONCE
Status: COMPLETED | OUTPATIENT
Start: 2020-12-07 | End: 2020-12-07

## 2020-12-07 RX ADMIN — GADOBUTROL 10 ML: 604.72 INJECTION INTRAVENOUS at 11:03

## 2020-12-09 ENCOUNTER — VIRTUAL VISIT (OUTPATIENT)
Dept: ONCOLOGY | Facility: CLINIC | Age: 70
End: 2020-12-09
Attending: INTERNAL MEDICINE
Payer: MEDICARE

## 2020-12-09 DIAGNOSIS — D49.2 LUMBAR SPINE TUMOR: Primary | ICD-10-CM

## 2020-12-09 DIAGNOSIS — C85.98 NON-HODGKIN'S LYMPHOMA OF MULTIPLE SITES (H): ICD-10-CM

## 2020-12-09 PROCEDURE — 99213 OFFICE O/P EST LOW 20 MIN: CPT | Mod: 95 | Performed by: INTERNAL MEDICINE

## 2020-12-09 ASSESSMENT — PAIN SCALES - GENERAL: PAINLEVEL: NO PAIN (0)

## 2020-12-09 NOTE — NURSING NOTE
"Deandre Merchant is a 70 year old male who is being evaluated via a billable video visit.      The patient has been notified of following:     \"This video visit will be conducted via a call between you and your physician/provider. We have found that certain health care needs can be provided without the need for an in-person physical exam.  This service lets us provide the care you need with a video conversation.  If a prescription is necessary we can send it directly to your pharmacy.  If lab work is needed we can place an order for that and you can then stop by our lab to have the test done at a later time.    Video visits are billed at different rates depending on your insurance coverage.  Please reach out to your insurance provider with any questions.    If during the course of the call the physician/provider feels a video visit is not appropriate, you will not be charged for this service.\"    Patient has given verbal consent for Video visit? Yes  How would you like to obtain your AVS? MyChart  If you are dropped from the video visit, the video invite should be resent to: Text to cell phone: [phone]  Will anyone else be joining your video visit? No      Video-Visit Details    Type of service:  Video Visit    Originating Location (pt. Location): Home    Distant Location (provider location):  [facility]     Platform used for Video Visit: Laci Ramsay CMA        "

## 2020-12-09 NOTE — LETTER
12/9/2020         RE: Deandre Merchant  [id number]  [facility] [id number]        Dear Colleague,    Thank you for referring your patient, Deandre Merchant, to the [facility]. Please see a copy of my visit note below.          FOLLOW-UP VISIT NOTE    PATIENT NAME: Deandre Merchant MRN # [id number]  DATE OF VISIT: Dec 9, 2020 YOB: 1950        CANCER TYPE:Follicular Lymphoma  STAGE: IV( axillary, mediastinal, retroperitoneal, stomach, bone marrow)      ONCOLOGY HISTORY:    Patient was being followed by Dr. Krishnamurthy but now she has transferred his care to me.  I reviewed his previous records and have copied and updated from prior notes.  70-year-old male who in 2012 presented with mediastinal, retroperitoneal and mesenteric lymph nodes. Was clinically asymptomatic with normal blood counts. CT-guided lymph node biopsy on 6/21/2012 was consistent with follicular lymphoma, grade 1-2. He was observed at that point.    In 2013, presented to the hospital with ileus and abdominal distention. EGD showed gastric antrum and duodenal ulcers which were biopsied and showed follicular lymphoma. CT scans showed bulky  lymphadenopathy with increasing size. 04/13 Bone marrow biopsy was positive with evidence of follicular lymphoma.   He received bendamustine and Rituxan regimen April 2014- -September 2014.   In December 14 , he was started on maintenance Rituxan every 2 months for 2 years which he completed in October 2016.     In remission and on observation since then.    7/14/2020 - established care with me.    CT scan in July 2020 was stable apart from an indeterminate L2 vertebral body lesion.    MRI of the lumbar spine showed Nonspecific enhancing lesion in the right aspect of the L2 inferior endplate which remains indeterminate.    12/7/2020.  Repeat MRI of the lumbar spine is the same.    SUBJECTIVE   This is video visit through [employer].  He feels good.  He denies any B symptoms.   He denies any issues with back pain, abnormal weakness or abnormal sensation of the lower limbs or any urinary or bowel problems.  Denies any new lumps bumps or swellings.  Energy is good.  Denies any fevers infections shortness of breath or nausea or vomiting.  No neuropathy.      ECOG 0    ROS:  A comprehensive ROS was otherwise neg      I reviewed other history in epic as below.      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     Lymphadenopathy     mediastinal & retroperitoneal     Non Hodgkin's lymphoma (H)      Non-Hodgkin's lymphoma of multiple sites (H) 6/26/2012     Polyps, colonic      Retinal detachment          CURRENT OUTPATIENT MEDICATIONS     Current Outpatient Medications   Medication Sig Dispense Refill     Acetaminophen (TYLENOL PO) Take 1,000 mg by mouth       cetirizine (ZYRTEC) 10 MG tablet Take 10 mg by mouth daily       metoprolol succinate ER (TOPROL-XL) 50 MG 24 hr tablet Take 1 tablet (50 mg) by mouth daily 90 tablet 4     order for DME Equipment ordered: RESMED Auto PAP Mask type: Full face  Settings: 5-15 cm h2o       simvastatin (ZOCOR) 20 MG tablet Take 1 tablet (20 mg) by mouth At Bedtime 90 tablet 4     triamcinolone (KENALOG) 0.1 % external ointment Apply sparingly to affected area twice daily as needed. 15 g 1        ALLERGIES     Allergies   Allergen Reactions     Allopurinol Rash     FAMILY HISTORY:  Family History   Problem Relation Age of Onset     Cancer Father         liver/kidney     C.A.D. Father      Pacemaker Brother      Arthritis Mother         RA     Dad had liver cancer at 74.  He has 2 children and they are healthy.    Social History     Socioeconomic History     Marital status:      Spouse name: Cristel     Number of children: 2     Years of education: Not on file     Highest education level: Not on file   Occupational History     Not on file    Social Needs     Financial resource strain: Not on file     Food insecurity     Worry: Not on file     Inability: Not on file     Transportation needs     Medical: Not on file     Non-medical: Not on file   Tobacco Use     Smoking status: Never Smoker     Smokeless tobacco: Never Used   Substance and Sexual Activity     Alcohol use: Yes     Alcohol/week: 3.3 standard drinks     Comment: occasionally weekends     Drug use: No     Sexual activity: Yes   Lifestyle     Physical activity     Days per week: Not on file     Minutes per session: Not on file     Stress: Not on file   Relationships     Social connections     Talks on phone: Not on file     Gets together: Not on file     Attends Shinto service: Not on file     Active member of club or organization: Not on file     Attends meetings of clubs or organizations: Not on file     Relationship status: Not on file     Intimate partner violence     Fear of current or ex partner: Not on file     Emotionally abused: Not on file     Physically abused: Not on file     Forced sexual activity: Not on file   Other Topics Concern      Service Not Asked     Blood Transfusions Not Asked     Caffeine Concern No     Occupational Exposure Not Asked     Hobby Hazards Not Asked     Sleep Concern No     Stress Concern No     Weight Concern No     Special Diet Not Asked     Back Care Not Asked     Exercise Yes     Bike Helmet Not Asked     Seat Belt Yes     Self-Exams Not Asked     Parent/sibling w/ CABG, MI or angioplasty before 65F 55M? Not Asked   Social History Narrative     Not on file     Denies smoking. 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     PHYSICAL EXAM     There were no vitals taken for this visit.  Wt Readings from Last 4 Encounters:   09/10/20 127 kg (280 lb)   07/28/20 130.6 kg (288 lb)   07/07/20 129.7 kg (286 lb)   03/05/20 135.6 kg (299 lb)           Constitutional.  Does not seem to be in any acute distress.  Eyes.  No redness or  discharge noted.  Respiratory.  Speaking in full sentences.  Breathing seems comfortable without any accessory use of muscles.    Skin.  Visualized his skin does not show any obvious rashes.  Musculoskeletal.  Range of motion for visualized areas is intact.  Neurological.  Alert and oriented x3.  Psychiatric.  Mood, mentation and affect are normal.  Decision making capacity is intact.      The rest of a comprehensive physical examination is deferred due to [facility] video visit restrictions.         LABORATORY AND IMAGING STUDIES       Reviewed    Results for [place] AVELAR (MRN [id number]) as of 7/14/2020 11:21   Ref. Range 7/7/2020 10:44   Sodium Latest Ref Range: 133 - 144 mmol/L 139   Potassium Latest Ref Range: 3.4 - 5.3 mmol/L 4.2   Chloride Latest Ref Range: 94 - 109 mmol/L 106   Carbon Dioxide Latest Ref Range: 20 - 32 mmol/L 27   Urea Nitrogen Latest Ref Range: 7 - 30 mg/dL 16   Creatinine Latest Ref Range: 0.66 - 1.25 mg/dL 1.07   GFR Estimate Latest Ref Range: >60 mL/min/1.73_m2 70   GFR Estimate If Black Latest Ref Range: >60 mL/min/1.73_m2 81   Calcium Latest Ref Range: 8.5 - 10.1 mg/dL 8.7   Anion Gap Latest Ref Range: 3 - 14 mmol/L 6   Albumin Latest Ref Range: 3.4 - 5.0 g/dL 3.9   Protein Total Latest Ref Range: 6.8 - 8.8 g/dL 6.6 (L)   Bilirubin Total Latest Ref Range: 0.2 - 1.3 mg/dL 1.0   Alkaline Phosphatase Latest Ref Range: 40 - 150 U/L 107   ALT Latest Ref Range: 0 - 70 U/L 31   AST Latest Ref Range: 0 - 45 U/L 32   Cholesterol Latest Ref Range: <200 mg/dL 96   HDL Cholesterol Latest Ref Range: >39 mg/dL 28 (L)   Lactate Dehydrogenase Latest Ref Range: 85 - 227 U/L Canceled, Test credited   LDL Cholesterol Calculated Latest Ref Range: <100 mg/dL 29   Non HDL Cholesterol Latest Ref Range: <130 mg/dL 68   Triglycerides Latest Ref Range: <150 mg/dL 194 (H)   Glucose Latest Ref Range: 70 - 99 mg/dL 111 (H)   WBC Latest Ref Range: 4.0 - 11.0 10e9/L 5.9   Hemoglobin Latest Ref Range:  13.3 - 17.7 g/dL 14.1   Hematocrit Latest Ref Range: 40.0 - 53.0 % 41.2   Platelet Count Latest Ref Range: 150 - 450 10e9/L 176   RBC Count Latest Ref Range: 4.4 - 5.9 10e12/L 4.64   MCV Latest Ref Range: 78 - 100 fl 89   MCH Latest Ref Range: 26.5 - 33.0 pg 30.4   MCHC Latest Ref Range: 31.5 - 36.5 g/dL 34.2   RDW Latest Ref Range: 10.0 - 15.0 % 12.5   Diff Method Unknown Automated Method   % Neutrophils Latest Units: % 66.2   % Lymphocytes Latest Units: % 18.9   % Monocytes Latest Units: % 11.7   % Eosinophils Latest Units: % 1.9   % Basophils Latest Units: % 1.0   % Immature Granulocytes Latest Units: % 0.3   Nucleated RBCs Latest Ref Range: 0 /100 0   Absolute Neutrophil Latest Ref Range: 1.6 - 8.3 10e9/L 3.9   Absolute Lymphocytes Latest Ref Range: 0.8 - 5.3 10e9/L 1.1   Absolute Monocytes Latest Ref Range: 0.0 - 1.3 10e9/L 0.7   Absolute Basophils Latest Ref Range: 0.0 - 0.2 10e9/L 0.1   Abs Immature Granulocytes Latest Ref Range: 0 - 0.4 10e9/L 0.0   Absolute Nucleated RBC Unknown 0.0     MRI OF THE LUMBAR SPINE WITHOUT AND WITH CONTRAST December 7, 2020  11:34 AM      HISTORY: Follow up on L2 lesion. History of NHL. Lumbar spine tumor.  Non-Hodgkin's lymphoma of multiple sites (H).     TECHNIQUE: Multiplanar, multisequence MRI images of the lumbar spine  were acquired without and with 10 mL Gadavist IV contrast.     COMPARISON: Lumbar spine MRI 7/22/2020.     FINDINGS: There are five lumbar-type vertebrae for the purposes of  this dictation.      Alignment of the lumbar vertebrae remains normal; however,  straightening of normal lumbar lordosis is again noted. A focal T2  hyperintense, T1 hypointense, enhancing nodule in the L2 vertebral  body measuring 1.7 cm long axis is again noted without change. There  is Modic 1 degenerative marrow signal change and enhancement in the  inferior endplate of L5. Marrow signal throughout the lumbar vertebrae  is otherwise within normal limits. No new lesions. No  fractures.     Mild posterior disc bulging at all levels of the lumbar spine again  noted.     The tip of the conus medullaris is at the mid L2 level which is within  normal limits. There is no evidence for intrathecal abnormality. No  abnormal intrathecal contrast enhancement.     No significant spinal canal stenosis at any level of the lumbar spine.  Moderate-severe degenerative neural foraminal stenosis bilaterally at  L3-L4, L4-L5 and L5-S1 again noted.                                                                      IMPRESSION:  1. Stable T2 hyperintense enhancing nodule in the L2 vertebral body.  No new lesions.  2. Degenerative changes of the lumbar spine again noted without change  from the recent comparison study.    MRI LUMBAR SPINE WITHOUT AND WITH CONTRAST   7/22/2020 3:51 PM      HISTORY: L2 vertebral body lesion. History of NHL (non-Hodgkin's  lymphoma) of multiple sites (H). Lumbar spine tumor.     TECHNIQUE: Multiplanar multisequence MRI of the lumbar spine without  and with 10 mL Gadavist.     COMPARISON: CT chest, abdomen and pelvis dated 7/10/2020.     FINDINGS: Five lumbar vertebral bodies are presumed. Slight reversal  of the normal lumbar lordosis centered at the L2-L3 level. Mild  dextroconvex curvature centered at L1-L2 and levoconvex curvature  centered at L3-L4. There appears to be right lateral listhesis of L2  on L3 measuring up to approximately 6-7 mm. No fracture. There is a  mildly heterogeneous ovoid/lobulated T2 and STIR heterogeneously  hyperintense, T1 predominantly hypointense, enhancing lesion in the  right aspect of the L2 inferior endplate, corresponding to the  sclerotic lesion noted on most recent CT. This lesion measures  approximately 17 mm craniocaudal x 14 mm AP x 14 mm transverse and is  nonspecific. No extraosseous soft tissue mass. No other concerning  marrow lesions seen. There is mild Modic type I degenerative endplate  change at the L4-L5 level with scattered  Modic type II degenerative  endplate changes elsewhere at L2-L3, L4-L5 and L5-S1. The conus  terminates at L2-L3. Paraspinous soft tissues are unremarkable.     Segmental analysis:  T12-L1: Imaged only in the sagittal plane. There appears to be a left  foraminal disc herniation resulting in mild to moderate left neural  foraminal stenosis. No significant spinal stenosis or right neural  foraminal stenosis.     L1-L2: Disc desiccation without significant disc height loss. There is  a diffuse disc bulge slightly eccentric to the left with associated  appositional/marginal osteophytes on the left. Moderate facet  arthropathy. No spinal stenosis. Contact of the traversing right L2  nerve root by facet hypertrophy on the right, without significant  nerve root displacement. Mild left neural foraminal stenosis. The  right neural foramen is not significantly narrowed.     L2-L3: Moderate disc height loss, with moderate to severe disc height  loss along the left aspect of the disc space. Marginal endplate  osteophytes, particularly on the left. Diffuse disc bulge. Mild to  moderate facet arthropathy. Minimal contact of the traversing right  more than left L3 nerve roots by the disc bulge/posterior endplate  without significant displacement. No spinal stenosis. Mild to moderate  left and mild right neural foraminal stenosis.     L3-L4: Mild disc height loss. Diffuse disc bulge with marginal  endplate osteophytes. Moderate facet arthropathy. Mild encroachment on  the right lateral recess. Minimal contact of the traversing left L4  nerve root by the disc without significant displacement of the nerve  roots. No significant spinal stenosis. Severe right neural foraminal  stenosis. Mild to moderate left neural foraminal stenosis.     L4-L5: Moderate disc height loss. Diffuse disc bulge. Bilateral  marginal endplate osteophytes. Moderate facet arthropathy. Contact of  the bilateral traversing nerve roots, right more than left, with  mild  right lateral recess encroachment. No traversing nerve root  displacement. No central spinal stenosis. Severe right and moderate  left neural foraminal stenosis.     L5-S1: Moderate disc height loss. Diffuse disc bulge with marginal  endplate osteophytes. Moderate facet arthropathy. No spinal stenosis.  Moderate bilateral neural foraminal stenosis.                                                                      IMPRESSION:   1. Nonspecific enhancing lesion in the right aspect of the L2 inferior  endplate is indeterminate. If clinically warranted, consider further  characterization with either nuclear medicine bone scintigraphy, FDG  PET/CT, or follow-up MRI.  2. Multilevel degenerative changes of the lumbar spine, as described.  Please see the body of the report for details.  3. Severe right L3-L4 and L4-L5 neural foraminal stenosis, with lesser  degrees of neural foraminal narrowing elsewhere. No high-grade central  spinal canal stenosis.    CT CHEST/ABDOMEN/PELVIS WITH CONTRAST  7/10/2020 10:54 AM     HISTORY: Follicular lymphoma follow up. Follicular lymphoma,  unspecified follicular lymphoma type, unspecified body region (H).     TECHNIQUE: Scans obtained of the chest, abdomen, and pelvis with IV  contrast. 100 mL, Isovue 370 injected. Radiation dose for this scan  was reduced using automated exposure control, adjustment of the mA  and/or kV according to patient size, or iterative reconstruction  technique.     COMPARISON: CT chest, abdomen and pelvis dated 4/18/2019.     FINDINGS:   Chest: Mild groundglass densities in the posterior bilateral lungs are  most consistent with dependant atelectasis. Lungs are otherwise clear  without significant nodule, mass, infiltrate, effusion, or  pneumothorax.     Heart is normal in size. There are aortic and coronary artery  calcifications. Ectasia/mild aneurysmal dilatation of the ascending  thoracic aorta measures up to 4.8 cm in maximum dimension, similar  to  the prior study. No central pulmonary artery filling defects to  suggest central pulmonary artery embolism. This study was not  optimized for pulmonary artery evaluation. There is no mediastinal,  hilar, or axillary lymphadenopathy. Visualized portions of the thyroid  are unremarkable. Focal blastic area in the right inferior L2  vertebral body (image 78 series 2, image 65 series 8 and image 82  series 7) could be associated with patient's degenerative disc disease  at L2-3 but could also represent a blastic metastasis and is new or  increased in size since the most recent prior study. No other  aggressive osseous lesions are seen in the chest, abdomen or pelvis.  Degenerative changes are noted in the spine and SI joints.     There is mild diffuse fatty infiltration of the liver, similar to the  prior study. Very subtle layering density in the dependent aspect of  the gallbladder could represent tiny gallstones. These are unchanged.  Liver, gallbladder, pancreas, spleen, bilateral adrenal glands, and  bilateral kidneys otherwise enhance normally. No hydronephrosis,  nephrolithiasis, hydroureter, or ureteral calculus is identified.  Urinary bladder is grossly unremarkable.     Prostate gland is mildly enlarged and contains some dystrophic  calcifications but is otherwise unremarkable. There are mildly  prominent lymph nodes in the root of the mesentery most prominent in  the retroperitoneal region measuring up to approximately 1.0 cm in  short axis. These are similar to the prior study dated 4/18/2019 and  could be related to patient's history of non-Hodgkin's lymphoma.  Slight increased density in the adipose tissues of the mesenteric root  are also stable. No new lymphadenopathy is identified. No free fluid  or free air is seen in the peritoneal cavity.     The colon is grossly of normal caliber. No pericolonic inflammatory  change to suggest acute diverticulitis. Appendix is normal in  appearance and extends  posteriorly, inferiorly and medially from the  cecum. Small bowel is of normal caliber. Stomach is mostly  decompressed but is otherwise unremarkable. Multiple ventral abdominal  wall fat-containing hernias are again noted, not appreciably changed.                                                                      IMPRESSION:  1. Focal blastic area in the right inferior L2 vertebral body could  represent a blastic metastasis but could also be associated with  adjacent degenerative change. This is new or increased in size since  the prior study dated 4/18/2019. No other new evidence for metastasis  is seen.  2. Mildly prominent lymph nodes in the retroperitoneum and in the root  of the mesentery are stable in appearance since the prior study and  are upper normal limits. These could be related to patient's lymphoma.  3. Mild groundglass densities in the root of the mesentery adipose  tissues are stable.  4. Diffuse fatty infiltration of the liver is again noted.  5. Atherosclerosis worst in the coronary arteries.  6. Mild aneurysmal dilatation of the ascending thoracic aorta is  stable.  7. No new metastasis or evidence for recurrent malignancy.  8. Stable fat-containing ventral abdominal hernias.        ASSESSMENT AND PLAN     70-year-old male with stage IV follicular lymphoma who is status post treatment with bendamustine/Rituxan (04-09/2014) followed by maintenance rituximab for two years- completed in October 2016.    He was doing very well and a CT scan in July 2020 was stable apart from an indeterminate L2 vertebral body lesion.  At that time we decided on proceeding with an MRI of the lumbar spine which was done which showed Nonspecific enhancing lesion in the right aspect of the L2 inferior endplate which remains indeterminate.    We discussed the situation in detail.  Our options going forward to follow-up on this indeterminate L2 vertebral body lesion proceeding with a bone scan or PET scan or repeating  an MRI in the next few months.  After a thorough discussion he told me that he did not want to do a bone scan or PET scan and he would be comfortable in repeating MRI in 3 months.    MRI done on 12/7/2020 as follow-up for less indeterminate L2 vertebral body lesion is stable but technically remains indeterminate.    He is completely asymptomatic.  We again discussed that this lesion is indeterminate and options would include doing a bone scan or PET scan or close follow-up with repeat MRI in 3 months.  He is willing to get a PET scan now.  We will schedule that in the next few days.  We will also repeat blood work at that time.  I told him that if he develops any symptoms of back pain or abnormal weakness or abnormal sensations in the lower extremities, then he should seek immediate medical attention.    Recent PSA on 7/28/2020 was stable at 2.27.    I will see him back in a few weeks after repeat labs and PET scan.    All questions answered.  He is agreeable and comfortable with the plan.    Eduardo Crespo MD    Video start time. 9:35 AM  Video stop time. 9:46 AM          Again, thank you for allowing me to participate in the care of your patient.        Sincerely,        Eduardo Crespo MD

## 2020-12-09 NOTE — PROGRESS NOTES
FOLLOW-UP VISIT NOTE    PATIENT NAME: Deandre Merchant MRN # [id number]  DATE OF VISIT: Dec 9, 2020 YOB: 1950        CANCER TYPE:Follicular Lymphoma  STAGE: IV( axillary, mediastinal, retroperitoneal, stomach, bone marrow)      ONCOLOGY HISTORY:    Patient was being followed by Dr. Krishnamurthy but now she has transferred his care to me.  I reviewed his previous records and have copied and updated from prior notes.  70-year-old male who in 2012 presented with mediastinal, retroperitoneal and mesenteric lymph nodes. Was clinically asymptomatic with normal blood counts. CT-guided lymph node biopsy on 6/21/2012 was consistent with follicular lymphoma, grade 1-2. He was observed at that point.    In 2013, presented to the hospital with ileus and abdominal distention. EGD showed gastric antrum and duodenal ulcers which were biopsied and showed follicular lymphoma. CT scans showed bulky  lymphadenopathy with increasing size. 04/13 Bone marrow biopsy was positive with evidence of follicular lymphoma.   He received bendamustine and Rituxan regimen April 2014- -September 2014.   In December 14 , he was started on maintenance Rituxan every 2 months for 2 years which he completed in October 2016.     In remission and on observation since then.    7/14/2020 - established care with me.    CT scan in July 2020 was stable apart from an indeterminate L2 vertebral body lesion.    MRI of the lumbar spine showed Nonspecific enhancing lesion in the right aspect of the L2 inferior endplate which remains indeterminate.    12/7/2020.  Repeat MRI of the lumbar spine is the same.    SUBJECTIVE   This is video visit through [facility].  He feels good.  He denies any B symptoms.  He denies any issues with back pain, abnormal weakness or abnormal sensation of the lower limbs or any urinary or bowel problems.  Denies any new lumps bumps or swellings.  Energy is good.  Denies any fevers infections shortness of breath or nausea or  vomiting.  No neuropathy.      ECOG 0    ROS:  A comprehensive ROS was otherwise neg      I reviewed other history in epic as below.      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     Lymphadenopathy     mediastinal & retroperitoneal     Non Hodgkin's lymphoma (H)      Non-Hodgkin's lymphoma of multiple sites (H) 6/26/2012     Polyps, colonic      Retinal detachment          CURRENT OUTPATIENT MEDICATIONS     Current Outpatient Medications   Medication Sig Dispense Refill     Acetaminophen (TYLENOL PO) Take 1,000 mg by mouth       cetirizine (ZYRTEC) 10 MG tablet Take 10 mg by mouth daily       metoprolol succinate ER (TOPROL-XL) 50 MG 24 hr tablet Take 1 tablet (50 mg) by mouth daily 90 tablet 4     order for DME Equipment ordered: RESMED Auto PAP Mask type: Full face  Settings: 5-15 cm h2o       simvastatin (ZOCOR) 20 MG tablet Take 1 tablet (20 mg) by mouth At Bedtime 90 tablet 4     triamcinolone (KENALOG) 0.1 % external ointment Apply sparingly to affected area twice daily as needed. 15 g 1        ALLERGIES     Allergies   Allergen Reactions     Allopurinol Rash     FAMILY HISTORY:  Family History   Problem Relation Age of Onset     Cancer Father         liver/kidney     C.A.D. Father      Pacemaker Brother      Arthritis Mother         RA     Dad had liver cancer at 74.  He has 2 children and they are healthy.    Social History     Socioeconomic History     Marital status:      Spouse name: Cristel     Number of children: 2     Years of education: Not on file     Highest education level: Not on file   Occupational History     Not on file   Social Needs     Financial resource strain: Not on file     Food insecurity     Worry: Not on file     Inability: Not on file     Transportation needs     Medical: Not on file     Non-medical: Not on file   Tobacco Use     Smoking status: Never Smoker      Smokeless tobacco: Never Used   Substance and Sexual Activity     Alcohol use: Yes     Alcohol/week: 3.3 standard drinks     Comment: occasionally weekends     Drug use: No     Sexual activity: Yes   Lifestyle     Physical activity     Days per week: Not on file     Minutes per session: Not on file     Stress: Not on file   Relationships     Social connections     Talks on phone: Not on file     Gets together: Not on file     Attends Mandaen service: Not on file     Active member of club or organization: Not on file     Attends meetings of clubs or organizations: Not on file     Relationship status: Not on file     Intimate partner violence     Fear of current or ex partner: Not on file     Emotionally abused: Not on file     Physically abused: Not on file     Forced sexual activity: Not on file   Other Topics Concern      Service Not Asked     Blood Transfusions Not Asked     Caffeine Concern No     Occupational Exposure Not Asked     Hobby Hazards Not Asked     Sleep Concern No     Stress Concern No     Weight Concern No     Special Diet Not Asked     Back Care Not Asked     Exercise Yes     Bike Helmet Not Asked     Seat Belt Yes     Self-Exams Not Asked     Parent/sibling w/ CABG, MI or angioplasty before 65F 55M? Not Asked   Social History Narrative     Not on file     Denies smoking. 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     PHYSICAL EXAM     There were no vitals taken for this visit.  Wt Readings from Last 4 Encounters:   09/10/20 127 kg (280 lb)   07/28/20 130.6 kg (288 lb)   07/07/20 129.7 kg (286 lb)   03/05/20 135.6 kg (299 lb)           Constitutional.  Does not seem to be in any acute distress.  Eyes.  No redness or discharge noted.  Respiratory.  Speaking in full sentences.  Breathing seems comfortable without any accessory use of muscles.    Skin.  Visualized his skin does not show any obvious rashes.  Musculoskeletal.  Range of motion for visualized areas is  intact.  Neurological.  Alert and oriented x3.  Psychiatric.  Mood, mentation and affect are normal.  Decision making capacity is intact.      The rest of a comprehensive physical examination is deferred due to Public Health Emergency video visit restrictions.         LABORATORY AND IMAGING STUDIES       Reviewed    Results for [place] AVELAR (MRN [id number]) as of 7/14/2020 11:21   Ref. Range 7/7/2020 10:44   Sodium Latest Ref Range: 133 - 144 mmol/L 139   Potassium Latest Ref Range: 3.4 - 5.3 mmol/L 4.2   Chloride Latest Ref Range: 94 - 109 mmol/L 106   Carbon Dioxide Latest Ref Range: 20 - 32 mmol/L 27   Urea Nitrogen Latest Ref Range: 7 - 30 mg/dL 16   Creatinine Latest Ref Range: 0.66 - 1.25 mg/dL 1.07   GFR Estimate Latest Ref Range: >60 mL/min/1.73_m2 70   GFR Estimate If Black Latest Ref Range: >60 mL/min/1.73_m2 81   Calcium Latest Ref Range: 8.5 - 10.1 mg/dL 8.7   Anion Gap Latest Ref Range: 3 - 14 mmol/L 6   Albumin Latest Ref Range: 3.4 - 5.0 g/dL 3.9   Protein Total Latest Ref Range: 6.8 - 8.8 g/dL 6.6 (L)   Bilirubin Total Latest Ref Range: 0.2 - 1.3 mg/dL 1.0   Alkaline Phosphatase Latest Ref Range: 40 - 150 U/L 107   ALT Latest Ref Range: 0 - 70 U/L 31   AST Latest Ref Range: 0 - 45 U/L 32   Cholesterol Latest Ref Range: <200 mg/dL 96   HDL Cholesterol Latest Ref Range: >39 mg/dL 28 (L)   Lactate Dehydrogenase Latest Ref Range: 85 - 227 U/L Canceled, Test credited   LDL Cholesterol Calculated Latest Ref Range: <100 mg/dL 29   Non HDL Cholesterol Latest Ref Range: <130 mg/dL 68   Triglycerides Latest Ref Range: <150 mg/dL 194 (H)   Glucose Latest Ref Range: 70 - 99 mg/dL 111 (H)   WBC Latest Ref Range: 4.0 - 11.0 10e9/L 5.9   Hemoglobin Latest Ref Range: 13.3 - 17.7 g/dL 14.1   Hematocrit Latest Ref Range: 40.0 - 53.0 % 41.2   Platelet Count Latest Ref Range: 150 - 450 10e9/L 176   RBC Count Latest Ref Range: 4.4 - 5.9 10e12/L 4.64   MCV Latest Ref Range: 78 - 100 fl 89   MCH Latest Ref Range: 26.5 -  33.0 pg 30.4   MCHC Latest Ref Range: 31.5 - 36.5 g/dL 34.2   RDW Latest Ref Range: 10.0 - 15.0 % 12.5   Diff Method Unknown Automated Method   % Neutrophils Latest Units: % 66.2   % Lymphocytes Latest Units: % 18.9   % Monocytes Latest Units: % 11.7   % Eosinophils Latest Units: % 1.9   % Basophils Latest Units: % 1.0   % Immature Granulocytes Latest Units: % 0.3   Nucleated RBCs Latest Ref Range: 0 /100 0   Absolute Neutrophil Latest Ref Range: 1.6 - 8.3 10e9/L 3.9   Absolute Lymphocytes Latest Ref Range: 0.8 - 5.3 10e9/L 1.1   Absolute Monocytes Latest Ref Range: 0.0 - 1.3 10e9/L 0.7   Absolute Basophils Latest Ref Range: 0.0 - 0.2 10e9/L 0.1   Abs Immature Granulocytes Latest Ref Range: 0 - 0.4 10e9/L 0.0   Absolute Nucleated RBC Unknown 0.0     MRI OF THE LUMBAR SPINE WITHOUT AND WITH CONTRAST December 7, 2020  11:34 AM      HISTORY: Follow up on L2 lesion. History of NHL. Lumbar spine tumor.  Non-Hodgkin's lymphoma of multiple sites (H).     TECHNIQUE: Multiplanar, multisequence MRI images of the lumbar spine  were acquired without and with 10 mL Gadavist IV contrast.     COMPARISON: Lumbar spine MRI 7/22/2020.     FINDINGS: There are five lumbar-type vertebrae for the purposes of  this dictation.      Alignment of the lumbar vertebrae remains normal; however,  straightening of normal lumbar lordosis is again noted. A focal T2  hyperintense, T1 hypointense, enhancing nodule in the L2 vertebral  body measuring 1.7 cm long axis is again noted without change. There  is Modic 1 degenerative marrow signal change and enhancement in the  inferior endplate of L5. Marrow signal throughout the lumbar vertebrae  is otherwise within normal limits. No new lesions. No fractures.     Mild posterior disc bulging at all levels of the lumbar spine again  noted.     The tip of the conus medullaris is at the mid L2 level which is within  normal limits. There is no evidence for intrathecal abnormality. No  abnormal intrathecal  contrast enhancement.     No significant spinal canal stenosis at any level of the lumbar spine.  Moderate-severe degenerative neural foraminal stenosis bilaterally at  L3-L4, L4-L5 and L5-S1 again noted.                                                                      IMPRESSION:  1. Stable T2 hyperintense enhancing nodule in the L2 vertebral body.  No new lesions.  2. Degenerative changes of the lumbar spine again noted without change  from the recent comparison study.    MRI LUMBAR SPINE WITHOUT AND WITH CONTRAST   7/22/2020 3:51 PM      HISTORY: L2 vertebral body lesion. History of NHL (non-Hodgkin's  lymphoma) of multiple sites (H). Lumbar spine tumor.     TECHNIQUE: Multiplanar multisequence MRI of the lumbar spine without  and with 10 mL Gadavist.     COMPARISON: CT chest, abdomen and pelvis dated 7/10/2020.     FINDINGS: Five lumbar vertebral bodies are presumed. Slight reversal  of the normal lumbar lordosis centered at the L2-L3 level. Mild  dextroconvex curvature centered at L1-L2 and levoconvex curvature  centered at L3-L4. There appears to be right lateral listhesis of L2  on L3 measuring up to approximately 6-7 mm. No fracture. There is a  mildly heterogeneous ovoid/lobulated T2 and STIR heterogeneously  hyperintense, T1 predominantly hypointense, enhancing lesion in the  right aspect of the L2 inferior endplate, corresponding to the  sclerotic lesion noted on most recent CT. This lesion measures  approximately 17 mm craniocaudal x 14 mm AP x 14 mm transverse and is  nonspecific. No extraosseous soft tissue mass. No other concerning  marrow lesions seen. There is mild Modic type I degenerative endplate  change at the L4-L5 level with scattered Modic type II degenerative  endplate changes elsewhere at L2-L3, L4-L5 and L5-S1. The conus  terminates at L2-L3. Paraspinous soft tissues are unremarkable.     Segmental analysis:  T12-L1: Imaged only in the sagittal plane. There appears to be a  left  foraminal disc herniation resulting in mild to moderate left neural  foraminal stenosis. No significant spinal stenosis or right neural  foraminal stenosis.     L1-L2: Disc desiccation without significant disc height loss. There is  a diffuse disc bulge slightly eccentric to the left with associated  appositional/marginal osteophytes on the left. Moderate facet  arthropathy. No spinal stenosis. Contact of the traversing right L2  nerve root by facet hypertrophy on the right, without significant  nerve root displacement. Mild left neural foraminal stenosis. The  right neural foramen is not significantly narrowed.     L2-L3: Moderate disc height loss, with moderate to severe disc height  loss along the left aspect of the disc space. Marginal endplate  osteophytes, particularly on the left. Diffuse disc bulge. Mild to  moderate facet arthropathy. Minimal contact of the traversing right  more than left L3 nerve roots by the disc bulge/posterior endplate  without significant displacement. No spinal stenosis. Mild to moderate  left and mild right neural foraminal stenosis.     L3-L4: Mild disc height loss. Diffuse disc bulge with marginal  endplate osteophytes. Moderate facet arthropathy. Mild encroachment on  the right lateral recess. Minimal contact of the traversing left L4  nerve root by the disc without significant displacement of the nerve  roots. No significant spinal stenosis. Severe right neural foraminal  stenosis. Mild to moderate left neural foraminal stenosis.     L4-L5: Moderate disc height loss. Diffuse disc bulge. Bilateral  marginal endplate osteophytes. Moderate facet arthropathy. Contact of  the bilateral traversing nerve roots, right more than left, with mild  right lateral recess encroachment. No traversing nerve root  displacement. No central spinal stenosis. Severe right and moderate  left neural foraminal stenosis.     L5-S1: Moderate disc height loss. Diffuse disc bulge with  marginal  endplate osteophytes. Moderate facet arthropathy. No spinal stenosis.  Moderate bilateral neural foraminal stenosis.                                                                      IMPRESSION:   1. Nonspecific enhancing lesion in the right aspect of the L2 inferior  endplate is indeterminate. If clinically warranted, consider further  characterization with either nuclear medicine bone scintigraphy, FDG  PET/CT, or follow-up MRI.  2. Multilevel degenerative changes of the lumbar spine, as described.  Please see the body of the report for details.  3. Severe right L3-L4 and L4-L5 neural foraminal stenosis, with lesser  degrees of neural foraminal narrowing elsewhere. No high-grade central  spinal canal stenosis.    CT CHEST/ABDOMEN/PELVIS WITH CONTRAST  7/10/2020 10:54 AM     HISTORY: Follicular lymphoma follow up. Follicular lymphoma,  unspecified follicular lymphoma type, unspecified body region (H).     TECHNIQUE: Scans obtained of the chest, abdomen, and pelvis with IV  contrast. 100 mL, Isovue 370 injected. Radiation dose for this scan  was reduced using automated exposure control, adjustment of the mA  and/or kV according to patient size, or iterative reconstruction  technique.     COMPARISON: CT chest, abdomen and pelvis dated 4/18/2019.     FINDINGS:   Chest: Mild groundglass densities in the posterior bilateral lungs are  most consistent with dependant atelectasis. Lungs are otherwise clear  without significant nodule, mass, infiltrate, effusion, or  pneumothorax.     Heart is normal in size. There are aortic and coronary artery  calcifications. Ectasia/mild aneurysmal dilatation of the ascending  thoracic aorta measures up to 4.8 cm in maximum dimension, similar to  the prior study. No central pulmonary artery filling defects to  suggest central pulmonary artery embolism. This study was not  optimized for pulmonary artery evaluation. There is no mediastinal,  hilar, or axillary  lymphadenopathy. Visualized portions of the thyroid  are unremarkable. Focal blastic area in the right inferior L2  vertebral body (image 78 series 2, image 65 series 8 and image 82  series 7) could be associated with patient's degenerative disc disease  at L2-3 but could also represent a blastic metastasis and is new or  increased in size since the most recent prior study. No other  aggressive osseous lesions are seen in the chest, abdomen or pelvis.  Degenerative changes are noted in the spine and SI joints.     There is mild diffuse fatty infiltration of the liver, similar to the  prior study. Very subtle layering density in the dependent aspect of  the gallbladder could represent tiny gallstones. These are unchanged.  Liver, gallbladder, pancreas, spleen, bilateral adrenal glands, and  bilateral kidneys otherwise enhance normally. No hydronephrosis,  nephrolithiasis, hydroureter, or ureteral calculus is identified.  Urinary bladder is grossly unremarkable.     Prostate gland is mildly enlarged and contains some dystrophic  calcifications but is otherwise unremarkable. There are mildly  prominent lymph nodes in the root of the mesentery most prominent in  the retroperitoneal region measuring up to approximately 1.0 cm in  short axis. These are similar to the prior study dated 4/18/2019 and  could be related to patient's history of non-Hodgkin's lymphoma.  Slight increased density in the adipose tissues of the mesenteric root  are also stable. No new lymphadenopathy is identified. No free fluid  or free air is seen in the peritoneal cavity.     The colon is grossly of normal caliber. No pericolonic inflammatory  change to suggest acute diverticulitis. Appendix is normal in  appearance and extends posteriorly, inferiorly and medially from the  cecum. Small bowel is of normal caliber. Stomach is mostly  decompressed but is otherwise unremarkable. Multiple ventral abdominal  wall fat-containing hernias are again  noted, not appreciably changed.                                                                      IMPRESSION:  1. Focal blastic area in the right inferior L2 vertebral body could  represent a blastic metastasis but could also be associated with  adjacent degenerative change. This is new or increased in size since  the prior study dated 4/18/2019. No other new evidence for metastasis  is seen.  2. Mildly prominent lymph nodes in the retroperitoneum and in the root  of the mesentery are stable in appearance since the prior study and  are upper normal limits. These could be related to patient's lymphoma.  3. Mild groundglass densities in the root of the mesentery adipose  tissues are stable.  4. Diffuse fatty infiltration of the liver is again noted.  5. Atherosclerosis worst in the coronary arteries.  6. Mild aneurysmal dilatation of the ascending thoracic aorta is  stable.  7. No new metastasis or evidence for recurrent malignancy.  8. Stable fat-containing ventral abdominal hernias.        ASSESSMENT AND PLAN     70-year-old male with stage IV follicular lymphoma who is status post treatment with bendamustine/Rituxan (04-09/2014) followed by maintenance rituximab for two years- completed in October 2016.    He was doing very well and a CT scan in July 2020 was stable apart from an indeterminate L2 vertebral body lesion.  At that time we decided on proceeding with an MRI of the lumbar spine which was done which showed Nonspecific enhancing lesion in the right aspect of the L2 inferior endplate which remains indeterminate.    We discussed the situation in detail.  Our options going forward to follow-up on this indeterminate L2 vertebral body lesion proceeding with a bone scan or PET scan or repeating an MRI in the next few months.  After a thorough discussion he told me that he did not want to do a bone scan or PET scan and he would be comfortable in repeating MRI in 3 months.    MRI done on 12/7/2020 as follow-up  for less indeterminate L2 vertebral body lesion is stable but technically remains indeterminate.    He is completely asymptomatic.  We again discussed that this lesion is indeterminate and options would include doing a bone scan or PET scan or close follow-up with repeat MRI in 3 months.  He is willing to get a PET scan now.  We will schedule that in the next few days.  We will also repeat blood work at that time.  I told him that if he develops any symptoms of back pain or abnormal weakness or abnormal sensations in the lower extremities, then he should seek immediate medical attention.    Recent PSA on 7/28/2020 was stable at 2.27.    I will see him back in a few weeks after repeat labs and PET scan.    All questions answered.  He is agreeable and comfortable with the plan.    Eduardo Crespo MD    Video start time. 9:35 AM  Video stop time. 9:46 AM

## 2021-01-02 DIAGNOSIS — D49.2 LUMBAR SPINE TUMOR: ICD-10-CM

## 2021-01-02 DIAGNOSIS — C85.98 NON-HODGKIN'S LYMPHOMA OF MULTIPLE SITES (H): ICD-10-CM

## 2021-01-02 LAB
ALBUMIN SERPL-MCNC: 3.8 G/DL (ref 3.4–5)
ALP SERPL-CCNC: 107 U/L (ref 40–150)
ALT SERPL W P-5'-P-CCNC: 34 U/L (ref 0–70)
ANION GAP SERPL CALCULATED.3IONS-SCNC: 5 MMOL/L (ref 3–14)
AST SERPL W P-5'-P-CCNC: 28 U/L (ref 0–45)
BASOPHILS # BLD AUTO: 0.1 10E9/L (ref 0–0.2)
BASOPHILS NFR BLD AUTO: 1 %
BILIRUB SERPL-MCNC: 0.8 MG/DL (ref 0.2–1.3)
BUN SERPL-MCNC: 20 MG/DL (ref 7–30)
CALCIUM SERPL-MCNC: 8.7 MG/DL (ref 8.5–10.1)
CHLORIDE SERPL-SCNC: 109 MMOL/L (ref 94–109)
CO2 SERPL-SCNC: 26 MMOL/L (ref 20–32)
CREAT SERPL-MCNC: 0.96 MG/DL (ref 0.66–1.25)
DIFFERENTIAL METHOD BLD: NORMAL
EOSINOPHIL NFR BLD AUTO: 2 %
ERYTHROCYTE [DISTWIDTH] IN BLOOD BY AUTOMATED COUNT: 12.7 % (ref 10–15)
GFR SERPL CREATININE-BSD FRML MDRD: 80 ML/MIN/{1.73_M2}
GLUCOSE SERPL-MCNC: 127 MG/DL (ref 70–99)
HCT VFR BLD AUTO: 41.9 % (ref 40–53)
HGB BLD-MCNC: 14.4 G/DL (ref 13.3–17.7)
IMM GRANULOCYTES # BLD: 0 10E9/L (ref 0–0.4)
IMM GRANULOCYTES NFR BLD: 0.2 %
LDH SERPL L TO P-CCNC: 177 U/L (ref 85–227)
LYMPHOCYTES # BLD AUTO: 1 10E9/L (ref 0.8–5.3)
LYMPHOCYTES NFR BLD AUTO: 16.3 %
MCH RBC QN AUTO: 31.2 PG (ref 26.5–33)
MCHC RBC AUTO-ENTMCNC: 34.4 G/DL (ref 31.5–36.5)
MCV RBC AUTO: 91 FL (ref 78–100)
MONOCYTES # BLD AUTO: 0.7 10E9/L (ref 0–1.3)
MONOCYTES NFR BLD AUTO: 11.5 %
NEUTROPHILS # BLD AUTO: 4.1 10E9/L (ref 1.6–8.3)
NEUTROPHILS NFR BLD AUTO: 69 %
NRBC # BLD AUTO: 0 10*3/UL
NRBC BLD AUTO-RTO: 0 /100
PLATELET # BLD AUTO: 161 10E9/L (ref 150–450)
POTASSIUM SERPL-SCNC: 4.3 MMOL/L (ref 3.4–5.3)
PROT SERPL-MCNC: 6.5 G/DL (ref 6.8–8.8)
RBC # BLD AUTO: 4.61 10E12/L (ref 4.4–5.9)
SODIUM SERPL-SCNC: 140 MMOL/L (ref 133–144)
WBC # BLD AUTO: 5.9 10E9/L (ref 4–11)

## 2021-01-02 PROCEDURE — 83615 LACTATE (LD) (LDH) ENZYME: CPT | Performed by: INTERNAL MEDICINE

## 2021-01-02 PROCEDURE — 85025 COMPLETE CBC W/AUTO DIFF WBC: CPT | Performed by: INTERNAL MEDICINE

## 2021-01-02 PROCEDURE — 36415 COLL VENOUS BLD VENIPUNCTURE: CPT | Performed by: INTERNAL MEDICINE

## 2021-01-02 PROCEDURE — 80053 COMPREHEN METABOLIC PANEL: CPT | Performed by: INTERNAL MEDICINE

## 2021-01-08 ENCOUNTER — HOSPITAL ENCOUNTER (OUTPATIENT)
Dept: PET IMAGING | Facility: CLINIC | Age: 71
Discharge: HOME | End: 2021-01-08
Attending: INTERNAL MEDICINE | Admitting: INTERNAL MEDICINE
Payer: MEDICARE

## 2021-01-08 PROCEDURE — 78816 PET IMAGE W/CT FULL BODY: CPT | Mod: 26

## 2021-01-08 PROCEDURE — G1004 CDSM NDSC: HCPCS | Mod: GC

## 2021-01-08 PROCEDURE — A9552 F18 FDG: HCPCS | Performed by: INTERNAL MEDICINE

## 2021-01-08 PROCEDURE — 78816 PET IMAGE W/CT FULL BODY: CPT | Mod: MG

## 2021-01-08 PROCEDURE — 343N000001 HC RX 343: Performed by: INTERNAL MEDICINE

## 2021-01-08 RX ADMIN — FLUDEOXYGLUCOSE F-18 14.3 MCI.: 500 INJECTION, SOLUTION INTRAVENOUS at 16:07

## 2021-01-19 ENCOUNTER — VIRTUAL VISIT (OUTPATIENT)
Dept: ONCOLOGY | Facility: CLINIC | Age: 71
End: 2021-01-19
Attending: INTERNAL MEDICINE
Payer: MEDICARE

## 2021-01-19 VITALS — WEIGHT: 290 LBS | BODY MASS INDEX: 39.28 KG/M2 | HEIGHT: 72 IN

## 2021-01-19 DIAGNOSIS — C85.98 NON-HODGKIN'S LYMPHOMA OF MULTIPLE SITES (H): ICD-10-CM

## 2021-01-19 DIAGNOSIS — D49.2 LUMBAR SPINE TUMOR: Primary | ICD-10-CM

## 2021-01-19 PROCEDURE — 99214 OFFICE O/P EST MOD 30 MIN: CPT | Mod: 95 | Performed by: INTERNAL MEDICINE

## 2021-01-19 ASSESSMENT — MIFFLIN-ST. JEOR: SCORE: 2118.43

## 2021-01-19 ASSESSMENT — PAIN SCALES - GENERAL: PAINLEVEL: NO PAIN (0)

## 2021-01-19 NOTE — PROGRESS NOTES
FOLLOW-UP VISIT NOTE    PATIENT NAME: Deandre Merchant MRN # [id number]  DATE OF VISIT: Jan 19, 2021 YOB: 1950        CANCER TYPE:Follicular Lymphoma  STAGE: IV( axillary, mediastinal, retroperitoneal, stomach, bone marrow)      ONCOLOGY HISTORY:    Patient was being followed by Dr. Krishnamurthy but now she has transferred his care to me.  I reviewed his previous records and have copied and updated from prior notes.  70-year-old male who in 2012 presented with mediastinal, retroperitoneal and mesenteric lymph nodes. Was clinically asymptomatic with normal blood counts. CT-guided lymph node biopsy on 6/21/2012 was consistent with follicular lymphoma, grade 1-2. He was observed at that point.    In 2013, presented to the hospital with ileus and abdominal distention. EGD showed gastric antrum and duodenal ulcers which were biopsied and showed follicular lymphoma. CT scans showed bulky  lymphadenopathy with increasing size. 04/13 Bone marrow biopsy was positive with evidence of follicular lymphoma.   He received bendamustine and Rituxan regimen April 2014- -September 2014.   In December 14 , he was started on maintenance Rituxan every 2 months for 2 years which he completed in October 2016.     In remission and on observation since then.    7/14/2020 - established care with me.    CT scan in July 2020 was stable apart from an indeterminate L2 vertebral body lesion.    MRI of the lumbar spine showed Nonspecific enhancing lesion in the right aspect of the L2 inferior endplate which remains indeterminate.    12/7/2020.  Repeat MRI of the lumbar spine is the same.    We proceeded with a PET scan on 1/8/2021 which shows mild FDG uptake in the L2 sclerotic lesion.  It was measuring about 1.5 x 1.2 x 1.4 cm and was stable.  Of note previously the lymphoma was FDG avid.    SUBJECTIVE   This is video visit.  He is doing well.  He denies any back pain or new neurological problems.  He denies any weight loss or  drenching night sweats or new swellings.  Energy is decent.  No infections or shortness of breath.     ECOG 0    ROS:  Rest of the comprehensive review of the system was essentially unremarkable.        I reviewed other history in epic as below.      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     Lymphadenopathy     mediastinal & retroperitoneal     Non Hodgkin's lymphoma (H)      Non-Hodgkin's lymphoma of multiple sites (H) 6/26/2012     Polyps, colonic      Retinal detachment          CURRENT OUTPATIENT MEDICATIONS     Current Outpatient Medications   Medication Sig Dispense Refill     Acetaminophen (TYLENOL PO) Take 1,000 mg by mouth       cetirizine (ZYRTEC) 10 MG tablet Take 10 mg by mouth daily       metoprolol succinate ER (TOPROL-XL) 50 MG 24 hr tablet Take 1 tablet (50 mg) by mouth daily 90 tablet 4     simvastatin (ZOCOR) 20 MG tablet Take 1 tablet (20 mg) by mouth At Bedtime 90 tablet 4     triamcinolone (KENALOG) 0.1 % external ointment Apply sparingly to affected area twice daily as needed. 15 g 1     order for DME Equipment ordered: RESMED Auto PAP Mask type: Full face  Settings: 5-15 cm h2o          ALLERGIES     Allergies   Allergen Reactions     Allopurinol Rash     FAMILY HISTORY:  Family History   Problem Relation Age of Onset     Cancer Father         liver/kidney     C.A.D. Father      Pacemaker Brother      Arthritis Mother         RA     Dad had liver cancer at 74.  He has 2 children and they are healthy.    Social History     Socioeconomic History     Marital status:      Spouse name: Cristel     Number of children: 2     Years of education: Not on file     Highest education level: Not on file   Occupational History     Not on file   Social Needs     Financial resource strain: Not on file     Food insecurity     Worry: Not on file     Inability: Not on file      Transportation needs     Medical: Not on file     Non-medical: Not on file   Tobacco Use     Smoking status: Never Smoker     Smokeless tobacco: Never Used   Substance and Sexual Activity     Alcohol use: Yes     Alcohol/week: 3.3 standard drinks     Comment: occasionally weekends     Drug use: No     Sexual activity: Yes   Lifestyle     Physical activity     Days per week: Not on file     Minutes per session: Not on file     Stress: Not on file   Relationships     Social connections     Talks on phone: Not on file     Gets together: Not on file     Attends Rastafarian service: Not on file     Active member of club or organization: Not on file     Attends meetings of clubs or organizations: Not on file     Relationship status: Not on file     Intimate partner violence     Fear of current or ex partner: Not on file     Emotionally abused: Not on file     Physically abused: Not on file     Forced sexual activity: Not on file   Other Topics Concern      Service Not Asked     Blood Transfusions Not Asked     Caffeine Concern No     Occupational Exposure Not Asked     Hobby Hazards Not Asked     Sleep Concern No     Stress Concern No     Weight Concern No     Special Diet Not Asked     Back Care Not Asked     Exercise Yes     Bike Helmet Not Asked     Seat Belt Yes     Self-Exams Not Asked     Parent/sibling w/ CABG, MI or angioplasty before 65F 55M? Not Asked   Social History Narrative     Not on file     Denies smoking. 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     PHYSICAL EXAM     Ht 1.829 m (6')   Wt 131.5 kg (290 lb)   BMI 39.33 kg/m    Wt Readings from Last 4 Encounters:   01/19/21 131.5 kg (290 lb)   09/10/20 127 kg (280 lb)   07/28/20 130.6 kg (288 lb)   07/07/20 129.7 kg (286 lb)         Constitutional.  Looks well and in no apparent distress.   Eyes.  Without eye redness or apparent jaundice.   Respiratory.  Non labored breathing. Speaking in full sentences.    Skin.  No concerning  skin rashes on the skin visualized.   Neurological.  Is alert and oriented.  Psychiatric.  Mood and affect seem appropriate.      The rest of a comprehensive physical examination is deferred due to Public Health Emergency video visit restrictions.         LABORATORY AND IMAGING STUDIES       Reviewed  CBC unremarkable  CMP unremarkable  LDH is normal    PET CT 1/8/2021 showed  that the sclerotic lesion in the L2 vertebral body demonstrates mild FDG uptake while previously the lymphoma was more FDG avid.  It shows interstitial lung disease pattern which is gradually progressed since 2014.      Combined Report of:    PET and CT on  1/8/2021 5:29 PM :     1. PET of the neck, chest, abdomen, and pelvis.  2. PET CT Fusion for Attenuation Correction and Anatomical  Localization:    3. 3D MIP and PET-CT fused images were processed on an independent  workstation and archived to PACS and reviewed by a radiologist.     Technique:     1. PET: The patient received 14.3 mCi of F-18-FDG; the serum glucose  was 90 prior to administration, body weight was 127 kg. Images were  evaluated in the axial, sagittal, and coronal planes as well as the  rotational whole body MIP. Images were acquired from the Vertex to the  mid calves.     UPTAKE WAS MEASURED AT 60 MINUTES.      BACKGROUND:  Liver SUV max= 4.9,   Aorta Blood SUV Max: 3.6.      2. CT: CT only obtained for attenuation correction and not diagnostic  purposes.     INDICATION: NHL and new L2 indeterminate lesion; Lumbar spine tumor;  Non-Hodgkin's lymphoma of multiple sites (H)     ADDITIONAL INFORMATION OBTAINED FROM EMR: 70-year-old male with a  history of follicular lymphoma status post chemotherapy followed by  maintenance Rituxan completed in October 2016.     COMPARISON: MRI L-spine 12/7/2020, MRI L-spine 7/22/2020, CT CAP  7/10/2020, CT CAP 14 2019, CT CAP 4/24/2017, PET-CT 4/4/2014        FINDINGS:      HEAD/NECK:  There is no suspicious FDG uptake in the neck. Mild  mucosal thickening  and fluid within the right ethmoid air cells with low levels of FDG  uptake, likely inflammatory.     CHEST:  There is no suspicious FDG uptake in the chest. Bilateral  basilar-predominant subpleural groundglass opacities and reticulation,  very slowly increasing over multiple past studies since at least 2014,  with associated FDG uptake. For example in the right lower lobe with  SUV max 4.4 (series 5, image 240). There appears to be a thin band of  subpleural cyst bearing views changes. Unchanged perivascular oval 6  mm nodule anterior right upper lobe (series 5, image 212). Heart size  is within normal limits. Dilatation of the ascending aorta to 4.7 cm.  Moderate coronary artery calcifications. No hypermetabolic lymph nodes  in the chest.     ABDOMEN AND PELVIS:  There is no suspicious FDG uptake in the abdomen or pelvis. Small  splenule. Unchanged mild hazy attenuation in the central mesentery. No  hypermetabolic lymph nodes in the abdomen or pelvis.     LOWER EXTREMITIES:   No abnormal masses or hypermetabolic lesions.     BONES:   Oval sclerotic lesion in the L2 vertebral body measuring 1.5 x 1.2 x  1.4 cm with SUV max 4.5 (series 5, image 343), corresponding with the  T2-hyperintense lesion seen on comparison MRI studies, and minimally  larger since CT 7/10/2020 where it measured 1.4 x 1.1 x 1.4 cm. This  is new since 4/24/2017. Multilevel degenerative changes in the spine.                                                                         IMPRESSION: In this patient with a history of follicular lymphoma  status post chemotherapy:     1. Sclerotic lesion in the L2 vertebral body demonstrates mild FDG  uptake, below liver background levels ([place]auville 3). In comparison,  patient's lymphoma was previously shown to be PET avid in 2014. This  lesion remains indeterminant, however given that is slowly growing and  not significantly PET avid, is suggestive of a process different  from  lymphoma, such as evolving degenerative changes. Neoplasm felt less  likely but not entirely excluded. Continued imaging surveillance or  tissue sampling can be considered.     2. No hypermetabolic lymph node or focal lesion elsewhere in the body  to suggest recurrent lymphoma.     3. Findings suggestive of active interstitial lung disease, including  basilar-predominant mildly FDG-avid subpleural groundglass opacities  bilaterally, slowly increased since 2014, with thin band of subpleural  sparing. This pattern is very suggestive of NSIP, with the FDG avidity  indicating active inflammation. Consider referral to pulmonology.     4. Unchanged mild hazy attenuation in the central mesentery, favor  scarring from treated lymphoma.     5. Unchanged ascending aorta aneurysm measuring 4.7 cm.      MRI OF THE LUMBAR SPINE WITHOUT AND WITH CONTRAST December 7, 2020  11:34 AM      HISTORY: Follow up on L2 lesion. History of NHL. Lumbar spine tumor.  Non-Hodgkin's lymphoma of multiple sites (H).     TECHNIQUE: Multiplanar, multisequence MRI images of the lumbar spine  were acquired without and with 10 mL Gadavist IV contrast.     COMPARISON: Lumbar spine MRI 7/22/2020.     FINDINGS: There are five lumbar-type vertebrae for the purposes of  this dictation.      Alignment of the lumbar vertebrae remains normal; however,  straightening of normal lumbar lordosis is again noted. A focal T2  hyperintense, T1 hypointense, enhancing nodule in the L2 vertebral  body measuring 1.7 cm long axis is again noted without change. There  is Modic 1 degenerative marrow signal change and enhancement in the  inferior endplate of L5. Marrow signal throughout the lumbar vertebrae  is otherwise within normal limits. No new lesions. No fractures.     Mild posterior disc bulging at all levels of the lumbar spine again  noted.     The tip of the conus medullaris is at the mid L2 level which is within  normal limits. There is no evidence for  intrathecal abnormality. No  abnormal intrathecal contrast enhancement.     No significant spinal canal stenosis at any level of the lumbar spine.  Moderate-severe degenerative neural foraminal stenosis bilaterally at  L3-L4, L4-L5 and L5-S1 again noted.                                                                      IMPRESSION:  1. Stable T2 hyperintense enhancing nodule in the L2 vertebral body.  No new lesions.  2. Degenerative changes of the lumbar spine again noted without change  from the recent comparison study.    MRI LUMBAR SPINE WITHOUT AND WITH CONTRAST   7/22/2020 3:51 PM      HISTORY: L2 vertebral body lesion. History of NHL (non-Hodgkin's  lymphoma) of multiple sites (H). Lumbar spine tumor.     TECHNIQUE: Multiplanar multisequence MRI of the lumbar spine without  and with 10 mL Gadavist.     COMPARISON: CT chest, abdomen and pelvis dated 7/10/2020.     FINDINGS: Five lumbar vertebral bodies are presumed. Slight reversal  of the normal lumbar lordosis centered at the L2-L3 level. Mild  dextroconvex curvature centered at L1-L2 and levoconvex curvature  centered at L3-L4. There appears to be right lateral listhesis of L2  on L3 measuring up to approximately 6-7 mm. No fracture. There is a  mildly heterogeneous ovoid/lobulated T2 and STIR heterogeneously  hyperintense, T1 predominantly hypointense, enhancing lesion in the  right aspect of the L2 inferior endplate, corresponding to the  sclerotic lesion noted on most recent CT. This lesion measures  approximately 17 mm craniocaudal x 14 mm AP x 14 mm transverse and is  nonspecific. No extraosseous soft tissue mass. No other concerning  marrow lesions seen. There is mild Modic type I degenerative endplate  change at the L4-L5 level with scattered Modic type II degenerative  endplate changes elsewhere at L2-L3, L4-L5 and L5-S1. The conus  terminates at L2-L3. Paraspinous soft tissues are unremarkable.     Segmental analysis:  T12-L1: Imaged only in the  sagittal plane. There appears to be a left  foraminal disc herniation resulting in mild to moderate left neural  foraminal stenosis. No significant spinal stenosis or right neural  foraminal stenosis.     L1-L2: Disc desiccation without significant disc height loss. There is  a diffuse disc bulge slightly eccentric to the left with associated  appositional/marginal osteophytes on the left. Moderate facet  arthropathy. No spinal stenosis. Contact of the traversing right L2  nerve root by facet hypertrophy on the right, without significant  nerve root displacement. Mild left neural foraminal stenosis. The  right neural foramen is not significantly narrowed.     L2-L3: Moderate disc height loss, with moderate to severe disc height  loss along the left aspect of the disc space. Marginal endplate  osteophytes, particularly on the left. Diffuse disc bulge. Mild to  moderate facet arthropathy. Minimal contact of the traversing right  more than left L3 nerve roots by the disc bulge/posterior endplate  without significant displacement. No spinal stenosis. Mild to moderate  left and mild right neural foraminal stenosis.     L3-L4: Mild disc height loss. Diffuse disc bulge with marginal  endplate osteophytes. Moderate facet arthropathy. Mild encroachment on  the right lateral recess. Minimal contact of the traversing left L4  nerve root by the disc without significant displacement of the nerve  roots. No significant spinal stenosis. Severe right neural foraminal  stenosis. Mild to moderate left neural foraminal stenosis.     L4-L5: Moderate disc height loss. Diffuse disc bulge. Bilateral  marginal endplate osteophytes. Moderate facet arthropathy. Contact of  the bilateral traversing nerve roots, right more than left, with mild  right lateral recess encroachment. No traversing nerve root  displacement. No central spinal stenosis. Severe right and moderate  left neural foraminal stenosis.     L5-S1: Moderate disc height loss.  Diffuse disc bulge with marginal  endplate osteophytes. Moderate facet arthropathy. No spinal stenosis.  Moderate bilateral neural foraminal stenosis.                                                                      IMPRESSION:   1. Nonspecific enhancing lesion in the right aspect of the L2 inferior  endplate is indeterminate. If clinically warranted, consider further  characterization with either nuclear medicine bone scintigraphy, FDG  PET/CT, or follow-up MRI.  2. Multilevel degenerative changes of the lumbar spine, as described.  Please see the body of the report for details.  3. Severe right L3-L4 and L4-L5 neural foraminal stenosis, with lesser  degrees of neural foraminal narrowing elsewhere. No high-grade central  spinal canal stenosis.    CT CHEST/ABDOMEN/PELVIS WITH CONTRAST  7/10/2020 10:54 AM     HISTORY: Follicular lymphoma follow up. Follicular lymphoma,  unspecified follicular lymphoma type, unspecified body region (H).     TECHNIQUE: Scans obtained of the chest, abdomen, and pelvis with IV  contrast. 100 mL, Isovue 370 injected. Radiation dose for this scan  was reduced using automated exposure control, adjustment of the mA  and/or kV according to patient size, or iterative reconstruction  technique.     COMPARISON: CT chest, abdomen and pelvis dated 4/18/2019.     FINDINGS:   Chest: Mild groundglass densities in the posterior bilateral lungs are  most consistent with dependant atelectasis. Lungs are otherwise clear  without significant nodule, mass, infiltrate, effusion, or  pneumothorax.     Heart is normal in size. There are aortic and coronary artery  calcifications. Ectasia/mild aneurysmal dilatation of the ascending  thoracic aorta measures up to 4.8 cm in maximum dimension, similar to  the prior study. No central pulmonary artery filling defects to  suggest central pulmonary artery embolism. This study was not  optimized for pulmonary artery evaluation. There is no mediastinal,  hilar, or  axillary lymphadenopathy. Visualized portions of the thyroid  are unremarkable. Focal blastic area in the right inferior L2  vertebral body (image 78 series 2, image 65 series 8 and image 82  series 7) could be associated with patient's degenerative disc disease  at L2-3 but could also represent a blastic metastasis and is new or  increased in size since the most recent prior study. No other  aggressive osseous lesions are seen in the chest, abdomen or pelvis.  Degenerative changes are noted in the spine and SI joints.     There is mild diffuse fatty infiltration of the liver, similar to the  prior study. Very subtle layering density in the dependent aspect of  the gallbladder could represent tiny gallstones. These are unchanged.  Liver, gallbladder, pancreas, spleen, bilateral adrenal glands, and  bilateral kidneys otherwise enhance normally. No hydronephrosis,  nephrolithiasis, hydroureter, or ureteral calculus is identified.  Urinary bladder is grossly unremarkable.     Prostate gland is mildly enlarged and contains some dystrophic  calcifications but is otherwise unremarkable. There are mildly  prominent lymph nodes in the root of the mesentery most prominent in  the retroperitoneal region measuring up to approximately 1.0 cm in  short axis. These are similar to the prior study dated 4/18/2019 and  could be related to patient's history of non-Hodgkin's lymphoma.  Slight increased density in the adipose tissues of the mesenteric root  are also stable. No new lymphadenopathy is identified. No free fluid  or free air is seen in the peritoneal cavity.     The colon is grossly of normal caliber. No pericolonic inflammatory  change to suggest acute diverticulitis. Appendix is normal in  appearance and extends posteriorly, inferiorly and medially from the  cecum. Small bowel is of normal caliber. Stomach is mostly  decompressed but is otherwise unremarkable. Multiple ventral abdominal  wall fat-containing hernias are  again noted, not appreciably changed.                                                                      IMPRESSION:  1. Focal blastic area in the right inferior L2 vertebral body could  represent a blastic metastasis but could also be associated with  adjacent degenerative change. This is new or increased in size since  the prior study dated 4/18/2019. No other new evidence for metastasis  is seen.  2. Mildly prominent lymph nodes in the retroperitoneum and in the root  of the mesentery are stable in appearance since the prior study and  are upper normal limits. These could be related to patient's lymphoma.  3. Mild groundglass densities in the root of the mesentery adipose  tissues are stable.  4. Diffuse fatty infiltration of the liver is again noted.  5. Atherosclerosis worst in the coronary arteries.  6. Mild aneurysmal dilatation of the ascending thoracic aorta is  stable.  7. No new metastasis or evidence for recurrent malignancy.  8. Stable fat-containing ventral abdominal hernias.        ASSESSMENT AND PLAN     70-year-old male with stage IV follicular lymphoma who is status post treatment with bendamustine/Rituxan (04-09/2014) followed by maintenance rituximab for two years- completed in October 2016.      CT scan in July 2020 was stable apart from an indeterminate L2 vertebral body lesion.  At that time we decided on proceeding with an MRI of the lumbar spine which was done which showed Nonspecific enhancing lesion in the right aspect of the L2 inferior endplate which remains indeterminate.    MRI done on 12/7/2020 as follow-up for less indeterminate L2 vertebral body lesion is stable but technically remains indeterminate.      Recent PSA on 7/28/2020 was stable at 2.27.      He remains asymptomatic.  PET/CT on 1/8/2021 does not show any clear evidence of recurrence of the lymphoma.  It showed that the L2 sclerotic lesion is the same size and is mildly FDG avid as opposed to previous lymphoma which was  more FDG avid in 2014.          It seems from the lymphoma aspect he is doing well and my plan will be to repeat labs in 6 months.     L2 sclerotic lesion.  We again discussed the situation in detail.   Our options going forward is to keep a close watch on this by repeat MRI in the next few months.  The other option would be to have him see a spine specialist as this seems to be something different from the lymphoma and get their expert opinion whether this needs to be biopsied or should this be followed with serial scans.  He is interested in talking to a spinal specialist and I gave him a referral for that.    Interstitial lung disease.  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  He is asymptomatic.  I discussed with him that I can give him a referral to see a lung specialist but it is likely that considering he is completely asymptomatic, that at this time, continued observation would be recommended.  Alternatively he can discuss this with his primary care physician.  He would like to wait and he tells me that he will discuss with his PCP about it further.      Assuming he remains well, I will see him back in 6 months with labs prior.      All questions answered.  He is agreeable and comfortable with the plan.    Eduardo Crespo MD    Video start time. 10:40  AM  Video stop time. 10:48 AM

## 2021-01-19 NOTE — NURSING NOTE
Minh is a 70 year old who is being evaluated via a billable video visit.      How would you like to obtain your AVS? MyChart  If the video visit is dropped, the invitation should be resent by: Text to cell phone: [phone]  Will anyone else be joining your video visit? No      Video-Visit Details    Type of service:  Video Visit    Originating Location (pt. Location): Home    Distant Location (provider location):  [facility]     Platform used for Video Visit: [facility]    SYMPTOM QUESTIONNAIRE    Pain: no    Nausea/Vomiting: no    Mouth Sores: no    Shortness of Breath: no    Smoking: no    Fever or Chills: no    Hard Stools: no    Soft Stools: no    Weight Loss: no    Weakness: no    Burning, numbness or tingling in hands or feet: no    Problems with skin or swelling: no    Memory Loss: no    Anxiety or Depression: no    Trouble Sleeping: no    Scarlet Chaudhry LPN on 1/19/2021 at 10:19 AM

## 2021-01-19 NOTE — LETTER
1/19/2021         RE: Deandre Merchant  [address]  [facility] [address]        Dear Colleague,    Thank you for referring your patient, Deandre Merchant, to the [facility]. Please see a copy of my visit note below.          FOLLOW-UP VISIT NOTE    PATIENT NAME: Deandre Merchant MRN # [id number]  DATE OF VISIT: Jan 19, 2021 YOB: 1950        CANCER TYPE:Follicular Lymphoma  STAGE: IV( axillary, mediastinal, retroperitoneal, stomach, bone marrow)      ONCOLOGY HISTORY:    Patient was being followed by Dr. Krishnamurthy but now she has transferred his care to me.  I reviewed his previous records and have copied and updated from prior notes.  70-year-old male who in 2012 presented with mediastinal, retroperitoneal and mesenteric lymph nodes. Was clinically asymptomatic with normal blood counts. CT-guided lymph node biopsy on 6/21/2012 was consistent with follicular lymphoma, grade 1-2. He was observed at that point.    In 2013, presented to the hospital with ileus and abdominal distention. EGD showed gastric antrum and duodenal ulcers which were biopsied and showed follicular lymphoma. CT scans showed bulky  lymphadenopathy with increasing size. 04/13 Bone marrow biopsy was positive with evidence of follicular lymphoma.   He received bendamustine and Rituxan regimen April 2014- -September 2014.   In December 14 , he was started on maintenance Rituxan every 2 months for 2 years which he completed in October 2016.     In remission and on observation since then.    7/14/2020 - established care with me.    CT scan in July 2020 was stable apart from an indeterminate L2 vertebral body lesion.    MRI of the lumbar spine showed Nonspecific enhancing lesion in the right aspect of the L2 inferior endplate which remains indeterminate.    12/7/2020.  Repeat MRI of the lumbar spine is the same.    We proceeded with a PET scan on 1/8/2021 which shows mild FDG uptake in the L2 sclerotic  lesion.  It was measuring about 1.5 x 1.2 x 1.4 cm and was stable.  Of note previously the lymphoma was FDG avid.    SUBJECTIVE   This is video visit.  He is doing well.  He denies any back pain or new neurological problems.  He denies any weight loss or drenching night sweats or new swellings.  Energy is decent.  No infections or shortness of breath.     ECOG 0    ROS:  Rest of the comprehensive review of the system was essentially unremarkable.        I reviewed other history in epic as below.      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     Lymphadenopathy     mediastinal & retroperitoneal     Non Hodgkin's lymphoma (H)      Non-Hodgkin's lymphoma of multiple sites (H) 6/26/2012     Polyps, colonic      Retinal detachment          CURRENT OUTPATIENT MEDICATIONS     Current Outpatient Medications   Medication Sig Dispense Refill     Acetaminophen (TYLENOL PO) Take 1,000 mg by mouth       cetirizine (ZYRTEC) 10 MG tablet Take 10 mg by mouth daily       metoprolol succinate ER (TOPROL-XL) 50 MG 24 hr tablet Take 1 tablet (50 mg) by mouth daily 90 tablet 4     simvastatin (ZOCOR) 20 MG tablet Take 1 tablet (20 mg) by mouth At Bedtime 90 tablet 4     triamcinolone (KENALOG) 0.1 % external ointment Apply sparingly to affected area twice daily as needed. 15 g 1     order for DME Equipment ordered: RESMED Auto PAP Mask type: Full face  Settings: 5-15 cm h2o          ALLERGIES     Allergies   Allergen Reactions     Allopurinol Rash     FAMILY HISTORY:  Family History   Problem Relation Age of Onset     Cancer Father         liver/kidney     C.A.D. Father      Pacemaker Brother      Arthritis Mother         RA     Dad had liver cancer at 74.  He has 2 children and they are healthy.    Social History     Socioeconomic History     Marital status:      Spouse name: Cristel     Number of children: 2      Years of education: Not on file     Highest education level: Not on file   Occupational History     Not on file   Social Needs     Financial resource strain: Not on file     Food insecurity     Worry: Not on file     Inability: Not on file     Transportation needs     Medical: Not on file     Non-medical: Not on file   Tobacco Use     Smoking status: Never Smoker     Smokeless tobacco: Never Used   Substance and Sexual Activity     Alcohol use: Yes     Alcohol/week: 3.3 standard drinks     Comment: occasionally weekends     Drug use: No     Sexual activity: Yes   Lifestyle     Physical activity     Days per week: Not on file     Minutes per session: Not on file     Stress: Not on file   Relationships     Social connections     Talks on phone: Not on file     Gets together: Not on file     Attends Islam service: Not on file     Active member of club or organization: Not on file     Attends meetings of clubs or organizations: Not on file     Relationship status: Not on file     Intimate partner violence     Fear of current or ex partner: Not on file     Emotionally abused: Not on file     Physically abused: Not on file     Forced sexual activity: Not on file   Other Topics Concern      Service Not Asked     Blood Transfusions Not Asked     Caffeine Concern No     Occupational Exposure Not Asked     Hobby Hazards Not Asked     Sleep Concern No     Stress Concern No     Weight Concern No     Special Diet Not Asked     Back Care Not Asked     Exercise Yes     Bike Helmet Not Asked     Seat Belt Yes     Self-Exams Not Asked     Parent/sibling w/ CABG, MI or angioplasty before 65F 55M? Not Asked   Social History Narrative     Not on file     Denies smoking. 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     PHYSICAL EXAM     Ht 1.829 m (6')   Wt 131.5 kg (290 lb)   BMI 39.33 kg/m    Wt Readings from Last 4 Encounters:   01/19/21 131.5 kg (290 lb)   09/10/20 127 kg (280 lb)   07/28/20 130.6 kg  (288 lb)   07/07/20 129.7 kg (286 lb)         Constitutional.  Looks well and in no apparent distress.   Eyes.  Without eye redness or apparent jaundice.   Respiratory.  Non labored breathing. Speaking in full sentences.    Skin.  No concerning skin rashes on the skin visualized.   Neurological.  Is alert and oriented.  Psychiatric.  Mood and affect seem appropriate.      The rest of a comprehensive physical examination is deferred due to Public Health Emergency video visit restrictions.         LABORATORY AND IMAGING STUDIES       Reviewed  CBC unremarkable  CMP unremarkable  LDH is normal    PET CT 1/8/2021 showed  that the sclerotic lesion in the L2 vertebral body demonstrates mild FDG uptake while previously the lymphoma was more FDG avid.  It shows interstitial lung disease pattern which is gradually progressed since 2014.      Combined Report of:    PET and CT on  1/8/2021 5:29 PM :     1. PET of the neck, chest, abdomen, and pelvis.  2. PET CT Fusion for Attenuation Correction and Anatomical  Localization:    3. 3D MIP and PET-CT fused images were processed on an independent  workstation and archived to PACS and reviewed by a radiologist.     Technique:     1. PET: The patient received 14.3 mCi of F-18-FDG; the serum glucose  was 90 prior to administration, body weight was 127 kg. Images were  evaluated in the axial, sagittal, and coronal planes as well as the  rotational whole body MIP. Images were acquired from the Vertex to the  mid calves.     UPTAKE WAS MEASURED AT 60 MINUTES.      BACKGROUND:  Liver SUV max= 4.9,   Aorta Blood SUV Max: 3.6.      2. CT: CT only obtained for attenuation correction and not diagnostic  purposes.     INDICATION: NHL and new L2 indeterminate lesion; Lumbar spine tumor;  Non-Hodgkin's lymphoma of multiple sites (H)     ADDITIONAL INFORMATION OBTAINED FROM EMR: 70-year-old male with a  history of follicular lymphoma status post chemotherapy followed by  maintenance Rituxan  completed in October 2016.     COMPARISON: MRI L-spine 12/7/2020, MRI L-spine 7/22/2020, CT CAP  7/10/2020, CT CAP 14 2019, CT CAP 4/24/2017, PET-CT 4/4/2014        FINDINGS:      HEAD/NECK:  There is no suspicious FDG uptake in the neck. Mild mucosal thickening  and fluid within the right ethmoid air cells with low levels of FDG  uptake, likely inflammatory.     CHEST:  There is no suspicious FDG uptake in the chest. Bilateral  basilar-predominant subpleural groundglass opacities and reticulation,  very slowly increasing over multiple past studies since at least 2014,  with associated FDG uptake. For example in the right lower lobe with  SUV max 4.4 (series 5, image 240). There appears to be a thin band of  subpleural cyst bearing views changes. Unchanged perivascular oval 6  mm nodule anterior right upper lobe (series 5, image 212). Heart size  is within normal limits. Dilatation of the ascending aorta to 4.7 cm.  Moderate coronary artery calcifications. No hypermetabolic lymph nodes  in the chest.     ABDOMEN AND PELVIS:  There is no suspicious FDG uptake in the abdomen or pelvis. Small  splenule. Unchanged mild hazy attenuation in the central mesentery. No  hypermetabolic lymph nodes in the abdomen or pelvis.     LOWER EXTREMITIES:   No abnormal masses or hypermetabolic lesions.     BONES:   Oval sclerotic lesion in the L2 vertebral body measuring 1.5 x 1.2 x  1.4 cm with SUV max 4.5 (series 5, image 343), corresponding with the  T2-hyperintense lesion seen on comparison MRI studies, and minimally  larger since CT 7/10/2020 where it measured 1.4 x 1.1 x 1.4 cm. This  is new since 4/24/2017. Multilevel degenerative changes in the spine.                                                                         IMPRESSION: In this patient with a history of follicular lymphoma  status post chemotherapy:     1. Sclerotic lesion in the L2 vertebral body demonstrates mild FDG  uptake, below liver background levels  (Deauville 3). In comparison,  patient's lymphoma was previously shown to be PET avid in 2014. This  lesion remains indeterminant, however given that is slowly growing and  not significantly PET avid, is suggestive of a process different from  lymphoma, such as evolving degenerative changes. Neoplasm felt less  likely but not entirely excluded. Continued imaging surveillance or  tissue sampling can be considered.     2. No hypermetabolic lymph node or focal lesion elsewhere in the body  to suggest recurrent lymphoma.     3. Findings suggestive of active interstitial lung disease, including  basilar-predominant mildly FDG-avid subpleural groundglass opacities  bilaterally, slowly increased since 2014, with thin band of subpleural  sparing. This pattern is very suggestive of NSIP, with the FDG avidity  indicating active inflammation. Consider referral to pulmonology.     4. Unchanged mild hazy attenuation in the central mesentery, favor  scarring from treated lymphoma.     5. Unchanged ascending aorta aneurysm measuring 4.7 cm.      MRI OF THE LUMBAR SPINE WITHOUT AND WITH CONTRAST December 7, 2020  11:34 AM      HISTORY: Follow up on L2 lesion. History of NHL. Lumbar spine tumor.  Non-Hodgkin's lymphoma of multiple sites (H).     TECHNIQUE: Multiplanar, multisequence MRI images of the lumbar spine  were acquired without and with 10 mL Gadavist IV contrast.     COMPARISON: Lumbar spine MRI 7/22/2020.     FINDINGS: There are five lumbar-type vertebrae for the purposes of  this dictation.      Alignment of the lumbar vertebrae remains normal; however,  straightening of normal lumbar lordosis is again noted. A focal T2  hyperintense, T1 hypointense, enhancing nodule in the L2 vertebral  body measuring 1.7 cm long axis is again noted without change. There  is Modic 1 degenerative marrow signal change and enhancement in the  inferior endplate of L5. Marrow signal throughout the lumbar vertebrae  is otherwise within normal  limits. No new lesions. No fractures.     Mild posterior disc bulging at all levels of the lumbar spine again  noted.     The tip of the conus medullaris is at the mid L2 level which is within  normal limits. There is no evidence for intrathecal abnormality. No  abnormal intrathecal contrast enhancement.     No significant spinal canal stenosis at any level of the lumbar spine.  Moderate-severe degenerative neural foraminal stenosis bilaterally at  L3-L4, L4-L5 and L5-S1 again noted.                                                                      IMPRESSION:  1. Stable T2 hyperintense enhancing nodule in the L2 vertebral body.  No new lesions.  2. Degenerative changes of the lumbar spine again noted without change  from the recent comparison study.    MRI LUMBAR SPINE WITHOUT AND WITH CONTRAST   7/22/2020 3:51 PM      HISTORY: L2 vertebral body lesion. History of NHL (non-Hodgkin's  lymphoma) of multiple sites (H). Lumbar spine tumor.     TECHNIQUE: Multiplanar multisequence MRI of the lumbar spine without  and with 10 mL Gadavist.     COMPARISON: CT chest, abdomen and pelvis dated 7/10/2020.     FINDINGS: Five lumbar vertebral bodies are presumed. Slight reversal  of the normal lumbar lordosis centered at the L2-L3 level. Mild  dextroconvex curvature centered at L1-L2 and levoconvex curvature  centered at L3-L4. There appears to be right lateral listhesis of L2  on L3 measuring up to approximately 6-7 mm. No fracture. There is a  mildly heterogeneous ovoid/lobulated T2 and STIR heterogeneously  hyperintense, T1 predominantly hypointense, enhancing lesion in the  right aspect of the L2 inferior endplate, corresponding to the  sclerotic lesion noted on most recent CT. This lesion measures  approximately 17 mm craniocaudal x 14 mm AP x 14 mm transverse and is  nonspecific. No extraosseous soft tissue mass. No other concerning  marrow lesions seen. There is mild Modic type I degenerative endplate  change at the  L4-L5 level with scattered Modic type II degenerative  endplate changes elsewhere at L2-L3, L4-L5 and L5-S1. The conus  terminates at L2-L3. Paraspinous soft tissues are unremarkable.     Segmental analysis:  T12-L1: Imaged only in the sagittal plane. There appears to be a left  foraminal disc herniation resulting in mild to moderate left neural  foraminal stenosis. No significant spinal stenosis or right neural  foraminal stenosis.     L1-L2: Disc desiccation without significant disc height loss. There is  a diffuse disc bulge slightly eccentric to the left with associated  appositional/marginal osteophytes on the left. Moderate facet  arthropathy. No spinal stenosis. Contact of the traversing right L2  nerve root by facet hypertrophy on the right, without significant  nerve root displacement. Mild left neural foraminal stenosis. The  right neural foramen is not significantly narrowed.     L2-L3: Moderate disc height loss, with moderate to severe disc height  loss along the left aspect of the disc space. Marginal endplate  osteophytes, particularly on the left. Diffuse disc bulge. Mild to  moderate facet arthropathy. Minimal contact of the traversing right  more than left L3 nerve roots by the disc bulge/posterior endplate  without significant displacement. No spinal stenosis. Mild to moderate  left and mild right neural foraminal stenosis.     L3-L4: Mild disc height loss. Diffuse disc bulge with marginal  endplate osteophytes. Moderate facet arthropathy. Mild encroachment on  the right lateral recess. Minimal contact of the traversing left L4  nerve root by the disc without significant displacement of the nerve  roots. No significant spinal stenosis. Severe right neural foraminal  stenosis. Mild to moderate left neural foraminal stenosis.     L4-L5: Moderate disc height loss. Diffuse disc bulge. Bilateral  marginal endplate osteophytes. Moderate facet arthropathy. Contact of  the bilateral traversing nerve roots,  right more than left, with mild  right lateral recess encroachment. No traversing nerve root  displacement. No central spinal stenosis. Severe right and moderate  left neural foraminal stenosis.     L5-S1: Moderate disc height loss. Diffuse disc bulge with marginal  endplate osteophytes. Moderate facet arthropathy. No spinal stenosis.  Moderate bilateral neural foraminal stenosis.                                                                      IMPRESSION:   1. Nonspecific enhancing lesion in the right aspect of the L2 inferior  endplate is indeterminate. If clinically warranted, consider further  characterization with either nuclear medicine bone scintigraphy, FDG  PET/CT, or follow-up MRI.  2. Multilevel degenerative changes of the lumbar spine, as described.  Please see the body of the report for details.  3. Severe right L3-L4 and L4-L5 neural foraminal stenosis, with lesser  degrees of neural foraminal narrowing elsewhere. No high-grade central  spinal canal stenosis.    CT CHEST/ABDOMEN/PELVIS WITH CONTRAST  7/10/2020 10:54 AM     HISTORY: Follicular lymphoma follow up. Follicular lymphoma,  unspecified follicular lymphoma type, unspecified body region (H).     TECHNIQUE: Scans obtained of the chest, abdomen, and pelvis with IV  contrast. 100 mL, Isovue 370 injected. Radiation dose for this scan  was reduced using automated exposure control, adjustment of the mA  and/or kV according to patient size, or iterative reconstruction  technique.     COMPARISON: CT chest, abdomen and pelvis dated 4/18/2019.     FINDINGS:   Chest: Mild groundglass densities in the posterior bilateral lungs are  most consistent with dependant atelectasis. Lungs are otherwise clear  without significant nodule, mass, infiltrate, effusion, or  pneumothorax.     Heart is normal in size. There are aortic and coronary artery  calcifications. Ectasia/mild aneurysmal dilatation of the ascending  thoracic aorta measures up to 4.8 cm in  maximum dimension, similar to  the prior study. No central pulmonary artery filling defects to  suggest central pulmonary artery embolism. This study was not  optimized for pulmonary artery evaluation. There is no mediastinal,  hilar, or axillary lymphadenopathy. Visualized portions of the thyroid  are unremarkable. Focal blastic area in the right inferior L2  vertebral body (image 78 series 2, image 65 series 8 and image 82  series 7) could be associated with patient's degenerative disc disease  at L2-3 but could also represent a blastic metastasis and is new or  increased in size since the most recent prior study. No other  aggressive osseous lesions are seen in the chest, abdomen or pelvis.  Degenerative changes are noted in the spine and SI joints.     There is mild diffuse fatty infiltration of the liver, similar to the  prior study. Very subtle layering density in the dependent aspect of  the gallbladder could represent tiny gallstones. These are unchanged.  Liver, gallbladder, pancreas, spleen, bilateral adrenal glands, and  bilateral kidneys otherwise enhance normally. No hydronephrosis,  nephrolithiasis, hydroureter, or ureteral calculus is identified.  Urinary bladder is grossly unremarkable.     Prostate gland is mildly enlarged and contains some dystrophic  calcifications but is otherwise unremarkable. There are mildly  prominent lymph nodes in the root of the mesentery most prominent in  the retroperitoneal region measuring up to approximately 1.0 cm in  short axis. These are similar to the prior study dated 4/18/2019 and  could be related to patient's history of non-Hodgkin's lymphoma.  Slight increased density in the adipose tissues of the mesenteric root  are also stable. No new lymphadenopathy is identified. No free fluid  or free air is seen in the peritoneal cavity.     The colon is grossly of normal caliber. No pericolonic inflammatory  change to suggest acute diverticulitis. Appendix is normal  in  appearance and extends posteriorly, inferiorly and medially from the  cecum. Small bowel is of normal caliber. Stomach is mostly  decompressed but is otherwise unremarkable. Multiple ventral abdominal  wall fat-containing hernias are again noted, not appreciably changed.                                                                      IMPRESSION:  1. Focal blastic area in the right inferior L2 vertebral body could  represent a blastic metastasis but could also be associated with  adjacent degenerative change. This is new or increased in size since  the prior study dated 4/18/2019. No other new evidence for metastasis  is seen.  2. Mildly prominent lymph nodes in the retroperitoneum and in the root  of the mesentery are stable in appearance since the prior study and  are upper normal limits. These could be related to patient's lymphoma.  3. Mild groundglass densities in the root of the mesentery adipose  tissues are stable.  4. Diffuse fatty infiltration of the liver is again noted.  5. Atherosclerosis worst in the coronary arteries.  6. Mild aneurysmal dilatation of the ascending thoracic aorta is  stable.  7. No new metastasis or evidence for recurrent malignancy.  8. Stable fat-containing ventral abdominal hernias.        ASSESSMENT AND PLAN     70-year-old male with stage IV follicular lymphoma who is status post treatment with bendamustine/Rituxan (04-09/2014) followed by maintenance rituximab for two years- completed in October 2016.      CT scan in July 2020 was stable apart from an indeterminate L2 vertebral body lesion.  At that time we decided on proceeding with an MRI of the lumbar spine which was done which showed Nonspecific enhancing lesion in the right aspect of the L2 inferior endplate which remains indeterminate.    MRI done on 12/7/2020 as follow-up for less indeterminate L2 vertebral body lesion is stable but technically remains indeterminate.      Recent PSA on 7/28/2020 was stable at  2.27.      He remains asymptomatic.  PET/CT on 1/8/2021 does not show any clear evidence of recurrence of the lymphoma.  It showed that the L2 sclerotic lesion is the same size and is mildly FDG avid as opposed to previous lymphoma which was more FDG avid in 2014.          It seems from the lymphoma aspect he is doing well and my plan will be to repeat labs in 6 months.     L2 sclerotic lesion.  We again discussed the situation in detail.   Our options going forward is to keep a close watch on this by repeat MRI in the next few months.  The other option would be to have him see a spine specialist as this seems to be something different from the lymphoma and get their expert opinion whether this needs to be biopsied or should this be followed with serial scans.  He is interested in talking to a spinal specialist and I gave him a referral for that.    Interstitial lung disease.  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  He is asymptomatic.  I discussed with him that I can give him a referral to see a lung specialist but it is likely that considering he is completely asymptomatic, that at this time, continued observation would be recommended.  Alternatively he can discuss this with his primary care physician.  He would like to wait and he tells me that he will discuss with his PCP about it further.      Assuming he remains well, I will see him back in 6 months with labs prior.      All questions answered.  He is agreeable and comfortable with the plan.    Eduardo Crespo MD    Video start time. 10:40  AM  Video stop time. 10:48 AM          Again, thank you for allowing me to participate in the care of your patient.        Sincerely,        Eduardo Crespo MD

## 2021-01-28 ENCOUNTER — OFFICE VISIT (OUTPATIENT)
Dept: NEUROSURGERY | Facility: CLINIC | Age: 71
End: 2021-01-28
Attending: INTERNAL MEDICINE
Payer: MEDICARE

## 2021-01-28 VITALS
DIASTOLIC BLOOD PRESSURE: 79 MMHG | BODY MASS INDEX: 39.28 KG/M2 | WEIGHT: 290 LBS | SYSTOLIC BLOOD PRESSURE: 141 MMHG | HEIGHT: 72 IN | HEART RATE: 71 BPM

## 2021-01-28 DIAGNOSIS — M54.16 LUMBAR RADICULOPATHY: Primary | ICD-10-CM

## 2021-01-28 DIAGNOSIS — D49.2 LUMBAR SPINE TUMOR: ICD-10-CM

## 2021-01-28 PROCEDURE — 99203 OFFICE O/P NEW LOW 30 MIN: CPT | Performed by: NEUROLOGICAL SURGERY

## 2021-01-28 ASSESSMENT — MIFFLIN-ST. JEOR: SCORE: 2118.43

## 2021-01-28 NOTE — NURSING NOTE
January 28, 2021 11:30 AM   Deandre Merchant is a 70 year old male who presents for:    Chief Complaint   Patient presents with     Consult     lumbar      Initial Vitals: BP (!) 141/79   Pulse 71   Ht 6' (1.829 m)   Wt 290 lb (131.5 kg)   BMI 39.33 kg/m   Estimated body mass index is 39.33 kg/m  as calculated from the following:    Height as of this encounter: 6' (1.829 m).    Weight as of this encounter: 290 lb (131.5 kg). Body surface area is 2.58 meters squared.  Data Unavailable Comment: Data Unavailable       Clinical concerns: Deandre Merchant is here today for lumbar. No pain  Mikaela Rolle MA

## 2021-01-28 NOTE — LETTER
1/28/2021         RE: Deandre Merchant  [address]  [facility] [address]        Dear Colleague,    Thank you for referring your patient, Deandre Merchant, to the [facility] [place]. Please see a copy of my visit note below.    I was asked by Dr. Crespo to see this patient in consultation    70M w/ lymphoma, possible L2 lesion.  Has been followed for lymphoma, and had CT CAP and subsequent lumbar MRI over the summer which showed a 1.6 cm focus of enhancement in the L2 vertebrae.  PET scan did not show avidity.  No pain or neurologic symptoms.  New MRI showed lesion to be stable.    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     Lymphadenopathy     mediastinal & retroperitoneal     Non Hodgkin's lymphoma (H)      Non-Hodgkin's lymphoma of multiple sites (H) 6/26/2012     Polyps, colonic      Retinal detachment      Past Surgical History:   Procedure Laterality Date     COLONOSCOPY N/A 3/1/2017    Procedure: COLONOSCOPY;  Surgeon: Dakota Wall MD;  Location:  GI     ESOPHAGOSCOPY, GASTROSCOPY, DUODENOSCOPY (EGD), COMBINED  4/21/2013    Procedure: COMBINED ESOPHAGOSCOPY, GASTROSCOPY, DUODENOSCOPY (EGD), BIOPSY SINGLE OR MULTIPLE;;  Surgeon: Torrey Cosme MD;  Location:  GI     ESOPHAGOSCOPY, GASTROSCOPY, DUODENOSCOPY (EGD), COMBINED  10/9/2013    Procedure: COMBINED ESOPHAGOSCOPY, GASTROSCOPY, DUODENOSCOPY (EGD), BIOPSY SINGLE OR MULTIPLE;  ESOPHAGOSCOPY, GASTROSCOPY, DUODENOSCOPY (EGD) with multiple biopsies;  Surgeon: Shay Sauer MD;  Location:  GI     HC COLONOSCOPY W/WO BRUSH/WASH  07/12/06     LAPAROTOMY EXPLORATORY  4/24/2013    Procedure: LAPAROTOMY EXPLORATORY;  Exploratory Laparotomy and lysis of adhesions.;  Surgeon: Genevieve Barros MD;  Location:  OR     Social History     Socioeconomic History     Marital status:      Spouse name: Cristel      Number of children: 2     Years of education: Not on file     Highest education level: Not on file   Occupational History     Not on file   Social Needs     Financial resource strain: Not on file     Food insecurity     Worry: Not on file     Inability: Not on file     Transportation needs     Medical: Not on file     Non-medical: Not on file   Tobacco Use     Smoking status: Never Smoker     Smokeless tobacco: Never Used   Substance and Sexual Activity     Alcohol use: Yes     Alcohol/week: 3.3 standard drinks     Comment: occasionally weekends     Drug use: No     Sexual activity: Yes   Lifestyle     Physical activity     Days per week: Not on file     Minutes per session: Not on file     Stress: Not on file   Relationships     Social connections     Talks on phone: Not on file     Gets together: Not on file     Attends Bahai service: Not on file     Active member of club or organization: Not on file     Attends meetings of clubs or organizations: Not on file     Relationship status: Not on file     Intimate partner violence     Fear of current or ex partner: Not on file     Emotionally abused: Not on file     Physically abused: Not on file     Forced sexual activity: Not on file   Other Topics Concern      Service Not Asked     Blood Transfusions Not Asked     Caffeine Concern No     Occupational Exposure Not Asked     Hobby Hazards Not Asked     Sleep Concern No     Stress Concern No     Weight Concern No     Special Diet Not Asked     Back Care Not Asked     Exercise Yes     Bike Helmet Not Asked     Seat Belt Yes     Self-Exams Not Asked     Parent/sibling w/ CABG, MI or angioplasty before 65F 55M? Not Asked   Social History Narrative     Not on file     Family History   Problem Relation Age of Onset     Cancer Father         liver/kidney     C.A.D. Father      Pacemaker Brother      Arthritis Mother         RA        ROS: 10 point ROS neg other than the symptoms noted above in the HPI.    Physical  Exam  BP (!) 141/79   Pulse 71   Ht 1.829 m (6')   Wt 131.5 kg (290 lb)   BMI 39.33 kg/m    HEENT:  Normocephalic, atraumatic.  PERRLA.  EOM s intact.  Visual fields full to gross exam  Neck:  Supple, non-tender, without lymphadenopathy.  Heart:  No peripheral edema  Lungs:  No SOB  Abdomen:  Non-distended.   Skin:  Warm and dry.  Extremities:  No edema, cyanosis or clubbing.  Psychiatric:  No apparent distress  Musculoskeletal:  Normal bulk and tone    NEUROLOGICAL EXAMINATION:     Mental status:  Alert and Oriented x 3, speech is fluent.  Cranial nerves:  II-XII intact.   Motor:    Shoulder Abduction:  Right:  5/5   Left:  5/5  Biceps:                      Right:  5/5   Left:  5/5  Triceps:                     Right:  5/5   Left:  5/5  Wrist Extensors:       Right:  5/5   Left:  5/5  Wrist Flexors:           Right:  5/5   Left:  5/5  interosseus :            Right:  5/5   Left:  5/5  Hip Flexion:                Right: 5/5  Left:  5/5  Quadriceps:             Right:  5/5  Left:  5/5  Hamstrings:             Right:  5/5  Left:  5/5  Gastroc Soleus:        Right:  5/5  Left:  5/5  Tib/Ant:                      Right:  5/5  Left:  5/5  EHL:                     Right:  5/5  Left:  5/5  Sensation:  Intact  Reflexes:  Negative Babinski.  Negative Clonus.  Negative Saul's.  Coordination:  Smooth finger to nose testing.   Negative pronator drift.  Smooth tandem walking.    A/P:  70M w/ lymphoma, possible L2 lesion    I had a discussion with the patient, reviewing the history, symptoms, and imaging  Given stability, lack of PET avidity, and appearance, I favor a benign process such as degenerative changes or atypical hemangioma  Plan for repeat MRI in 6 months         Again, thank you for allowing me to participate in the care of your patient.        Sincerely,        Raji Alston MD

## 2021-01-28 NOTE — PATIENT INSTRUCTIONS
Today's Visit    Ordered a MRI lumbar scan to be completed in 6 months. To schedule, please call [phone]. You should schedule this within one week. We will call you with the results. If you don't hear from us 7 days after the scan, please call us.      Please call our clinic with any questions or concerns    Amanda AGUIAR RN  [facility] - [address] [address]  Telephone:  [phone]       Fax:  [phone]

## 2021-01-28 NOTE — PROGRESS NOTES
I was asked by Dr. Crespo to see this patient in consultation    70M w/ lymphoma, possible L2 lesion.  Has been followed for lymphoma, and had CT CAP and subsequent lumbar MRI over the summer which showed a 1.6 cm focus of enhancement in the L2 vertebrae.  PET scan did not show avidity.  No pain or neurologic symptoms.  New MRI showed lesion to be stable.    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     Lymphadenopathy     mediastinal & retroperitoneal     Non Hodgkin's lymphoma (H)      Non-Hodgkin's lymphoma of multiple sites (H) 6/26/2012     Polyps, colonic      Retinal detachment      Past Surgical History:   Procedure Laterality Date     COLONOSCOPY N/A 3/1/2017    Procedure: COLONOSCOPY;  Surgeon: Dakota Wall MD;  Location:  GI     ESOPHAGOSCOPY, GASTROSCOPY, DUODENOSCOPY (EGD), COMBINED  4/21/2013    Procedure: COMBINED ESOPHAGOSCOPY, GASTROSCOPY, DUODENOSCOPY (EGD), BIOPSY SINGLE OR MULTIPLE;;  Surgeon: Torrey Cosme MD;  Location:  GI     ESOPHAGOSCOPY, GASTROSCOPY, DUODENOSCOPY (EGD), COMBINED  10/9/2013    Procedure: COMBINED ESOPHAGOSCOPY, GASTROSCOPY, DUODENOSCOPY (EGD), BIOPSY SINGLE OR MULTIPLE;  ESOPHAGOSCOPY, GASTROSCOPY, DUODENOSCOPY (EGD) with multiple biopsies;  Surgeon: Shay Sauer MD;  Location:  GI     HC COLONOSCOPY W/WO BRUSH/WASH  07/12/06     LAPAROTOMY EXPLORATORY  4/24/2013    Procedure: LAPAROTOMY EXPLORATORY;  Exploratory Laparotomy and lysis of adhesions.;  Surgeon: Genevieve Barros MD;  Location:  OR     Social History     Socioeconomic History     Marital status:      Spouse name: Cristel     Number of children: 2     Years of education: Not on file     Highest education level: Not on file   Occupational History     Not on file   Social Needs     Financial resource strain: Not on file     Food insecurity     Worry: Not on file     Inability: Not on file      Transportation needs     Medical: Not on file     Non-medical: Not on file   Tobacco Use     Smoking status: Never Smoker     Smokeless tobacco: Never Used   Substance and Sexual Activity     Alcohol use: Yes     Alcohol/week: 3.3 standard drinks     Comment: occasionally weekends     Drug use: No     Sexual activity: Yes   Lifestyle     Physical activity     Days per week: Not on file     Minutes per session: Not on file     Stress: Not on file   Relationships     Social connections     Talks on phone: Not on file     Gets together: Not on file     Attends Yarsani service: Not on file     Active member of club or organization: Not on file     Attends meetings of clubs or organizations: Not on file     Relationship status: Not on file     Intimate partner violence     Fear of current or ex partner: Not on file     Emotionally abused: Not on file     Physically abused: Not on file     Forced sexual activity: Not on file   Other Topics Concern      Service Not Asked     Blood Transfusions Not Asked     Caffeine Concern No     Occupational Exposure Not Asked     Hobby Hazards Not Asked     Sleep Concern No     Stress Concern No     Weight Concern No     Special Diet Not Asked     Back Care Not Asked     Exercise Yes     Bike Helmet Not Asked     Seat Belt Yes     Self-Exams Not Asked     Parent/sibling w/ CABG, MI or angioplasty before 65F 55M? Not Asked   Social History Narrative     Not on file     Family History   Problem Relation Age of Onset     Cancer Father         liver/kidney     C.A.D. Father      Pacemaker Brother      Arthritis Mother         RA        ROS: 10 point ROS neg other than the symptoms noted above in the HPI.    Physical Exam  BP (!) 141/79   Pulse 71   Ht 1.829 m (6')   Wt 131.5 kg (290 lb)   BMI 39.33 kg/m    HEENT:  Normocephalic, atraumatic.  PERRLA.  EOM s intact.  Visual fields full to gross exam  Neck:  Supple, non-tender, without lymphadenopathy.  Heart:  No peripheral  edema  Lungs:  No SOB  Abdomen:  Non-distended.   Skin:  Warm and dry.  Extremities:  No edema, cyanosis or clubbing.  Psychiatric:  No apparent distress  Musculoskeletal:  Normal bulk and tone    NEUROLOGICAL EXAMINATION:     Mental status:  Alert and Oriented x 3, speech is fluent.  Cranial nerves:  II-XII intact.   Motor:    Shoulder Abduction:  Right:  5/5   Left:  5/5  Biceps:                      Right:  5/5   Left:  5/5  Triceps:                     Right:  5/5   Left:  5/5  Wrist Extensors:       Right:  5/5   Left:  5/5  Wrist Flexors:           Right:  5/5   Left:  5/5  interosseus :            Right:  5/5   Left:  5/5  Hip Flexion:                Right: 5/5  Left:  5/5  Quadriceps:             Right:  5/5  Left:  5/5  Hamstrings:             Right:  5/5  Left:  5/5  Gastroc Soleus:        Right:  5/5  Left:  5/5  Tib/Ant:                      Right:  5/5  Left:  5/5  EHL:                     Right:  5/5  Left:  5/5  Sensation:  Intact  Reflexes:  Negative Babinski.  Negative Clonus.  Negative Saul's.  Coordination:  Smooth finger to nose testing.   Negative pronator drift.  Smooth tandem walking.    A/P:  70M w/ lymphoma, possible L2 lesion    I had a discussion with the patient, reviewing the history, symptoms, and imaging  Given stability, lack of PET avidity, and appearance, I favor a benign process such as degenerative changes or atypical hemangioma  Plan for repeat MRI in 6 months

## 2021-01-31 NOTE — TELEPHONE ENCOUNTER
Medication is being filled for 1 time refill only due to:  Patient needs to be seen because it has been more than one year since last visit.     Pending Prescriptions:                       Disp   Refills    simvastatin (ZOCOR) 20 MG tablet [Pharmac*30 tab*0            Sig: TAKE 1 TABLET BY MOUTH DAILY AT BEDTIME    Please call and help schedule.    Tiffany Goldman, RN, BSN       suspected deep tissue injury stage I

## 2021-07-15 ENCOUNTER — OFFICE VISIT (OUTPATIENT)
Dept: FAMILY MEDICINE | Facility: CLINIC | Age: 71
End: 2021-07-15
Payer: MEDICARE

## 2021-07-15 ENCOUNTER — LAB (OUTPATIENT)
Dept: LAB | Facility: CLINIC | Age: 71
End: 2021-07-15
Payer: MEDICARE

## 2021-07-15 VITALS
RESPIRATION RATE: 20 BRPM | SYSTOLIC BLOOD PRESSURE: 128 MMHG | DIASTOLIC BLOOD PRESSURE: 72 MMHG | OXYGEN SATURATION: 94 % | WEIGHT: 297 LBS | TEMPERATURE: 97.8 F | HEART RATE: 94 BPM | BODY MASS INDEX: 38.12 KG/M2 | HEIGHT: 74 IN

## 2021-07-15 DIAGNOSIS — I77.810 ASCENDING AORTA DILATATION (H): ICD-10-CM

## 2021-07-15 DIAGNOSIS — E78.5 HYPERLIPIDEMIA WITH TARGET LDL LESS THAN 130: ICD-10-CM

## 2021-07-15 DIAGNOSIS — G47.33 OSA (OBSTRUCTIVE SLEEP APNEA): ICD-10-CM

## 2021-07-15 DIAGNOSIS — I10 ESSENTIAL HYPERTENSION: ICD-10-CM

## 2021-07-15 DIAGNOSIS — L20.9 ATOPIC DERMATITIS, UNSPECIFIED TYPE: ICD-10-CM

## 2021-07-15 DIAGNOSIS — C85.98 NON-HODGKIN'S LYMPHOMA OF MULTIPLE SITES (H): ICD-10-CM

## 2021-07-15 DIAGNOSIS — Z00.00 ENCOUNTER FOR MEDICARE ANNUAL WELLNESS EXAM: Primary | ICD-10-CM

## 2021-07-15 DIAGNOSIS — E66.01 SEVERE OBESITY (BMI 35.0-39.9) WITH COMORBIDITY (H): ICD-10-CM

## 2021-07-15 DIAGNOSIS — R09.82 POST-NASAL DRIP: ICD-10-CM

## 2021-07-15 DIAGNOSIS — C82.80 LYMPHOMA, SMALL LYMPHOID, FOLLICULAR (H): ICD-10-CM

## 2021-07-15 LAB
ALBUMIN SERPL-MCNC: 3.9 G/DL (ref 3.4–5)
ALP SERPL-CCNC: 98 U/L (ref 40–150)
ALT SERPL W P-5'-P-CCNC: 33 U/L (ref 0–70)
ANION GAP SERPL CALCULATED.3IONS-SCNC: 5 MMOL/L (ref 3–14)
AST SERPL W P-5'-P-CCNC: 26 U/L (ref 0–45)
BASOPHILS # BLD AUTO: 0.1 10E3/UL (ref 0–0.2)
BASOPHILS NFR BLD AUTO: 1 %
BILIRUB SERPL-MCNC: 0.7 MG/DL (ref 0.2–1.3)
BUN SERPL-MCNC: 14 MG/DL (ref 7–30)
CALCIUM SERPL-MCNC: 8.4 MG/DL (ref 8.5–10.1)
CHLORIDE BLD-SCNC: 107 MMOL/L (ref 94–109)
CHOLEST SERPL-MCNC: 94 MG/DL
CO2 SERPL-SCNC: 27 MMOL/L (ref 20–32)
CREAT SERPL-MCNC: 0.96 MG/DL (ref 0.66–1.25)
EOSINOPHIL # BLD AUTO: 0.1 10E3/UL (ref 0–0.7)
EOSINOPHIL NFR BLD AUTO: 2 %
ERYTHROCYTE [DISTWIDTH] IN BLOOD BY AUTOMATED COUNT: 13 % (ref 10–15)
GFR SERPL CREATININE-BSD FRML MDRD: 80 ML/MIN/1.73M2
GLUCOSE BLD-MCNC: 135 MG/DL (ref 70–99)
HCT VFR BLD AUTO: 40.3 % (ref 40–53)
HDLC SERPL-MCNC: 28 MG/DL
HGB BLD-MCNC: 13.7 G/DL (ref 13.3–17.7)
IMM GRANULOCYTES # BLD: 0 10E3/UL
IMM GRANULOCYTES NFR BLD: 0 %
LDH SERPL L TO P-CCNC: 176 U/L (ref 85–227)
LDLC SERPL CALC-MCNC: 19 MG/DL
LYMPHOCYTES # BLD AUTO: 1 10E3/UL (ref 0.8–5.3)
LYMPHOCYTES NFR BLD AUTO: 16 %
MCH RBC QN AUTO: 30.5 PG (ref 26.5–33)
MCHC RBC AUTO-ENTMCNC: 34 G/DL (ref 31.5–36.5)
MCV RBC AUTO: 90 FL (ref 78–100)
MONOCYTES # BLD AUTO: 0.7 10E3/UL (ref 0–1.3)
MONOCYTES NFR BLD AUTO: 12 %
NEUTROPHILS # BLD AUTO: 4 10E3/UL (ref 1.6–8.3)
NEUTROPHILS NFR BLD AUTO: 69 %
NONHDLC SERPL-MCNC: 66 MG/DL
NRBC # BLD AUTO: 0 10E3/UL
NRBC BLD AUTO-RTO: 0 /100
PLATELET # BLD AUTO: 172 10E3/UL (ref 150–450)
POTASSIUM BLD-SCNC: 4.2 MMOL/L (ref 3.4–5.3)
PROT SERPL-MCNC: 6.4 G/DL (ref 6.8–8.8)
RBC # BLD AUTO: 4.49 10E6/UL (ref 4.4–5.9)
SODIUM SERPL-SCNC: 139 MMOL/L (ref 133–144)
TRIGL SERPL-MCNC: 234 MG/DL
WBC # BLD AUTO: 5.9 10E3/UL (ref 4–11)

## 2021-07-15 PROCEDURE — G0438 PPPS, INITIAL VISIT: HCPCS | Performed by: FAMILY MEDICINE

## 2021-07-15 PROCEDURE — 90746 HEPB VACCINE 3 DOSE ADULT IM: CPT | Performed by: FAMILY MEDICINE

## 2021-07-15 PROCEDURE — 36415 COLL VENOUS BLD VENIPUNCTURE: CPT

## 2021-07-15 PROCEDURE — 83615 LACTATE (LD) (LDH) ENZYME: CPT

## 2021-07-15 PROCEDURE — 85025 COMPLETE CBC W/AUTO DIFF WBC: CPT

## 2021-07-15 PROCEDURE — 99214 OFFICE O/P EST MOD 30 MIN: CPT | Mod: 25 | Performed by: FAMILY MEDICINE

## 2021-07-15 PROCEDURE — 80053 COMPREHEN METABOLIC PANEL: CPT

## 2021-07-15 PROCEDURE — 80061 LIPID PANEL: CPT

## 2021-07-15 PROCEDURE — G0010 ADMIN HEPATITIS B VACCINE: HCPCS | Performed by: FAMILY MEDICINE

## 2021-07-15 RX ORDER — METOPROLOL SUCCINATE 50 MG/1
50 TABLET, EXTENDED RELEASE ORAL DAILY
Qty: 90 TABLET | Refills: 4 | Status: SHIPPED | OUTPATIENT
Start: 2021-07-15 | End: 2022-08-23

## 2021-07-15 RX ORDER — TRIAMCINOLONE ACETONIDE 1 MG/G
OINTMENT TOPICAL
Qty: 15 G | Refills: 1 | Status: SHIPPED | OUTPATIENT
Start: 2021-07-15

## 2021-07-15 RX ORDER — HYDROXYZINE HYDROCHLORIDE 10 MG/5ML
4 SYRUP ORAL EVERY 6 HOURS PRN
COMMUNITY
End: 2024-10-01

## 2021-07-15 RX ORDER — SIMVASTATIN 20 MG
20 TABLET ORAL AT BEDTIME
Qty: 90 TABLET | Refills: 4 | Status: SHIPPED | OUTPATIENT
Start: 2021-07-15 | End: 2023-08-02

## 2021-07-15 RX ORDER — FLUTICASONE PROPIONATE 50 MCG
2 SPRAY, SUSPENSION (ML) NASAL DAILY
Qty: 16 G | Refills: 11 | Status: SHIPPED | OUTPATIENT
Start: 2021-07-15

## 2021-07-15 ASSESSMENT — MIFFLIN-ST. JEOR: SCORE: 2168.99

## 2021-07-15 ASSESSMENT — PAIN SCALES - GENERAL: PAINLEVEL: NO PAIN (0)

## 2021-07-15 NOTE — PROGRESS NOTES
"  SUBJECTIVE:   Deandre Merchant is a 71 year old male who presents for Preventive Visit.      Patient has been advised of split billing requirements and indicates understanding: Yes  Are you in the first 12 months of your Medicare Part B coverage?  No    Physical Health:    In general, how would you rate your overall physical health? good    Outside of work, how many days during the week do you exercise? none    Outside of work, approximately how many minutes a day do you exercise?not applicable    If you drink alcohol do you typically have >3 drinks per day or >7 drinks per week? No    Do you usually eat at least 4 servings of fruit and vegetables a day, include whole grains & fiber and avoid regularly eating high fat or \"junk\" foods? Yes    Do you have any problems taking medications regularly?  No    Do you have any side effects from medications? none    Needs assistance for the following daily activities: no assistance needed    Which of the following safety concerns are present in your home?  none identified     Hearing impairment: Yes, Need to ask people to speak up or repeat themselves.    In the past 6 months, have you been bothered by leaking of urine? yes    Mental Health:    In general, how would you rate your overall mental or emotional health? fair  PHQ-2 Score: 0    Do you feel safe in your environment? Yes    Have you ever done Advance Care Planning? (For example, a Health Directive, POLST, or a discussion with a medical provider or your loved ones about your wishes): Yes, patient states has an Advance Care Planning document and will bring a copy to the clinic.    Additional concerns to address?  YES-cough    Fall risk:  Fallen 2 or more times in the past year?: No  Any fall with injury in the past year?: No    Cognitive Screenin) Repeat 3 items (Leader, Season, Table)    2) Clock draw: NORMAL  3) 3 item recall: Recalls 3 objects  Results: 3 items recalled: COGNITIVE IMPAIRMENT LESS " LIKELY    Mini-CogTM Copyright AAKASH Gagnon. Licensed by the author for use in [facility]; reprinted with permission (emmanuelle@[facility]). All rights reserved.      Do you have sleep apnea, excessive snoring or daytime drowsiness?: yes        Reviewed and updated as needed this visit by clinical staff  Tobacco  Allergies  Meds   Med Hx  Surg Hx  Fam Hx  Soc Hx        Reviewed and updated as needed this visit by Provider                Social History     Tobacco Use     Smoking status: Never Smoker     Smokeless tobacco: Never Used   Substance Use Topics     Alcohol use: Yes     Alcohol/week: 3.3 standard drinks     Comment: occasionally weekends                           Current providers sharing in care for this patient include:   Patient Care Team:  Mekhi Abbott MD as PCP - General (Family Practice)  Néstor Ballard MD as MD (Hematology & Oncology)  Markell Mistry RN as Registered Nurse (Hematology & Oncology)  Mekhi Abbott MD as Assigned PCP  Hany Fierro MD as Assigned Pulmonology Provider  Eduardo Crespo MD as Assigned Cancer Care Provider  Raji Alston MD as Assigned Neuroscience Provider    The following health maintenance items are reviewed in Epic and correct as of today:  Health Maintenance   Topic Date Due     LIPID  07/07/2021     FALL RISK ASSESSMENT  07/07/2021     INFLUENZA VACCINE (1) 09/01/2021     COLORECTAL CANCER SCREENING  03/01/2022     MEDICARE ANNUAL WELLNESS VISIT  07/15/2022     ANNUAL REVIEW OF HM ORDERS  07/15/2022     DTAP/TDAP/TD IMMUNIZATION (4 - Td or Tdap) 09/17/2025     ADVANCE CARE PLANNING  07/15/2026     HEPATITIS C SCREENING  Completed     PHQ-2  Completed     Pneumococcal Vaccine: 65+ Years  Completed     ZOSTER IMMUNIZATION  Completed     AORTIC ANEURYSM SCREENING (SYSTEM ASSIGNED)  Completed     COVID-19 Vaccine  Completed     IPV IMMUNIZATION  Aged Out     MENINGITIS IMMUNIZATION  Aged Out     HEPATITIS B IMMUNIZATION  Aged Out  "          Also needs review of his ascending aortic aneurysm.  Had PET CT with oncology for his lymphoma management 6 months ago and it remained unchanged at 4.7 cm.      Hypertension is well controlled.  Is on metoprolol due to aneurysm as well as past concerns for intermittent, unproven atrial fibrillation per a cardiology report a few years ago.  He has no irregular heart beat issues..      Hyperlipidemia is well controlled.    Having bad postnasal drip.  Is taking oral decongestant as well as first and second generation antihistamine.  Cota a once daily saline nasal irrigation     ROS:  Constitutional, HEENT, cardiovascular, pulmonary, GI, , musculoskeletal, neuro, skin, endocrine and psych systems are negative, except as otherwise noted.    OBJECTIVE:   /72   Pulse 94   Temp 97.8  F (36.6  C) (Temporal)   Resp 20   Ht 1.867 m (6' 1.5\")   Wt 134.7 kg (297 lb)   SpO2 94%   BMI 38.65 kg/m   Estimated body mass index is 38.65 kg/m  as calculated from the following:    Height as of this encounter: 1.867 m (6' 1.5\").    Weight as of this encounter: 134.7 kg (297 lb).  EXAM:   Physical Exam  Constitutional:       General: He is not in acute distress.     Appearance: He is well-developed. He is obese.   HENT:      Head: Normocephalic and atraumatic.      Right Ear: Hearing, tympanic membrane, ear canal and external ear normal.      Left Ear: Hearing, tympanic membrane, ear canal and external ear normal.      Nose: Congestion and rhinorrhea present. Rhinorrhea is clear.      Right Turbinates: Enlarged and swollen.      Left Turbinates: Enlarged and swollen.      Mouth/Throat:      Mouth: No oral lesions.      Pharynx: Uvula midline. Posterior oropharyngeal erythema (stippling) present. No oropharyngeal exudate.   Eyes:      General: Lids are normal. No scleral icterus.        Right eye: No discharge.         Left eye: No discharge.      Conjunctiva/sclera: Conjunctivae normal.      Pupils: Pupils are " equal, round, and reactive to light.   Neck:      Thyroid: No thyroid mass or thyromegaly.      Trachea: No tracheal deviation.   Cardiovascular:      Rate and Rhythm: Normal rate and regular rhythm.      Pulses: Normal pulses.      Heart sounds: Normal heart sounds, S1 normal and S2 normal. No murmur heard.   No S3 or S4 sounds.    Pulmonary:      Effort: Pulmonary effort is normal. No respiratory distress.      Breath sounds: Normal breath sounds. No wheezing or rales.   Abdominal:      General: Abdomen is protuberant. Bowel sounds are normal. There is no distension.      Palpations: Abdomen is soft. There is no mass.      Tenderness: There is no abdominal tenderness. There is no guarding.   Musculoskeletal:         General: No deformity. Normal range of motion.      Cervical back: Normal range of motion and neck supple.   Lymphadenopathy:      Cervical: No cervical adenopathy.      Upper Body:      Right upper body: No supraclavicular adenopathy.      Left upper body: No supraclavicular adenopathy.   Skin:     General: Skin is warm and dry.      Findings: No lesion or rash.   Neurological:      Mental Status: He is alert and oriented to person, place, and time.      Motor: No abnormal muscle tone.      Deep Tendon Reflexes: Reflexes are normal and symmetric.   Psychiatric:         Speech: Speech normal.         Thought Content: Thought content normal.         Judgment: Judgment normal.              ASSESSMENT / PLAN:     ASSESSMENT/ORDERS:    ICD-10-CM    1. Encounter for Medicare annual wellness exam  Z00.00    2. Ascending aorta dilatation (H)  I77.810 metoprolol succinate ER (TOPROL-XL) 50 MG 24 hr tablet   3. 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 Severe obesity (BMI 35.0-39.9) with comorbidity (H)  E66.01    5. Essential hypertension  I10 metoprolol succinate ER (TOPROL-XL) 50 MG 24 hr tablet   6. Hyperlipidemia with target LDL less than 130  E78.5 simvastatin (ZOCOR) 20 MG tablet     Lipid  "panel reflex to direct LDL Fasting   7. WINSTON (obstructive sleep apnea)  G47.33    8. Post-nasal drip  R09.82 fluticasone (FLONASE) 50 MCG/ACT nasal spray   9. Atopic dermatitis, unspecified type  L20.9 triamcinolone (KENALOG) 0.1 % external ointment     PLAN:  1.  Next imaging for aneurysm will be sometime in 2022.  Only needs me to do it if oncology imaging does not have measurements for aneurysm.    2.  Hypertension:  At goal.  Continue metoprolol.  3.  See after visit summary for sinus symptoms management.  Started on Flonase.  4.   Medications refilled for all other above noted stable conditions.  Labs ordered as noted above.     Patient has been advised of split billing requirements and indicates understanding: Yes    COUNSELING:  Reviewed preventive health counseling, as reflected in patient instructions    Estimated body mass index is 38.65 kg/m  as calculated from the following:    Height as of this encounter: 1.867 m (6' 1.5\").    Weight as of this encounter: 134.7 kg (297 lb).    Weight management plan: Discussed healthy diet and exercise guidelines    He reports that he has never smoked. He has never used smokeless tobacco.    Appropriate preventive services were discussed with this patient, including applicable screening as appropriate for cardiovascular disease, diabetes, osteopenia/osteoporosis, and glaucoma.  As appropriate for age/gender, discussed screening for colorectal cancer, prostate cancer, breast cancer, and cervical cancer. Checklist reviewing preventive services available has been given to the patient.    Reviewed patients plan of care and provided an AVS. The Basic Care Plan (routine screening as documented in Health Maintenance) for Deandre meets the Care Plan requirement. This Care Plan has been established and reviewed with the Patient.    Counseling Resources:  ATP IV Guidelines  Pooled Cohorts Equation Calculator  Breast Cancer Risk Calculator  BRCA-Related Cancer Risk Assessment: " FHS-7 Tool  FRAX Risk Assessment  ICSI Preventive Guidelines  Dietary Guidelines for Americans, 2010  USDA's MyPlate  ASA Prophylaxis  Lung CA Screening    Mekhi Abbott MD  [facility]

## 2021-07-15 NOTE — PATIENT INSTRUCTIONS
Patient Education   Personalized Prevention Plan  You are due for the preventive services outlined below.  Your care team is available to assist you in scheduling these services.  If you have already completed any of these items, please share that information with your care team to update in your medical record.  Health Maintenance Due   Topic Date Due     ANNUAL REVIEW OF  ORDERS  Never done     Cholesterol Lab  07/07/2021     FALL RISK ASSESSMENT  07/07/2021     Annual Wellness Visit  07/07/2021         1.  Saline sinus rinse (one form comes in a squirt bottle form while another kind is known as a Neti Pots).  Follow directions on package.  May do this 1-2 times per day.  2.  Flonase:  A good idea is to use this medication with saline nasal irrigation.  If you choose to do this, use the Flonase about 30-45 minutes after the sinus rinse to maximize effect of medication.    3.  Antihistamines:  Daytime:  Zyrtec (cetirizine).  10 mg twice daily.

## 2021-07-15 NOTE — NURSING NOTE
Prior to immunization administration, verified patients identity using patient s name and date of birth. Please see Immunization Activity for additional information.     Screening Questionnaire for Adult Immunization    Are you sick today?   Yes   Do you have allergies to medications, food, a vaccine component or latex?   Yes   Have you ever had a serious reaction after receiving a vaccination?   No   Do you have a long-term health problem with heart, lung, kidney, or metabolic disease (e.g., diabetes), asthma, a blood disorder, no spleen, complement component deficiency, a cochlear implant, or a spinal fluid leak?  Are you on long-term aspirin therapy?   No   Do you have cancer, leukemia, HIV/AIDS, or any other immune system problem?   Yes   Do you have a parent, brother, or sister with an immune system problem?   No   In the past 3 months, have you taken medications that affect  your immune system, such as prednisone, other steroids, or anticancer drugs; drugs for the treatment of rheumatoid arthritis, Crohn s disease, or psoriasis; or have you had radiation treatments?   No   Have you had a seizure, or a brain or other nervous system problem?   No   During the past year, have you received a transfusion of blood or blood    products, or been given immune (gamma) globulin or antiviral drug?   No   For women: Are you pregnant or is there a chance you could become       pregnant during the next month?   No   Have you received any vaccinations in the past 4 weeks?   No     Immunization questionnaire was positive for at least one answer.  Notified Dr. Abbott.        Per orders of Dr. Abbott, injection of Hepatitis B given by Briseyda Styles CMA. Patient instructed to remain in clinic for 15 minutes afterwards, and to report any adverse reaction to me immediately.       Screening performed by Briseyda Styles CMA on 7/15/2021 at 2:48 PM.

## 2021-07-16 NOTE — RESULT ENCOUNTER NOTE
Minh,  Your results show mildly elevated triglycerides and low good cholesterol (HDL).  This can be improved with aerobic exercise.  Please let me know if you have any questions.    Sincerely,  Dr. Abbott

## 2021-07-26 ENCOUNTER — VIRTUAL VISIT (OUTPATIENT)
Dept: ONCOLOGY | Facility: CLINIC | Age: 71
End: 2021-07-26
Attending: INTERNAL MEDICINE
Payer: MEDICARE

## 2021-07-26 DIAGNOSIS — C85.98 NON-HODGKIN'S LYMPHOMA OF MULTIPLE SITES (H): Primary | ICD-10-CM

## 2021-07-26 DIAGNOSIS — D49.2 LUMBAR SPINE TUMOR: ICD-10-CM

## 2021-07-26 PROCEDURE — 99213 OFFICE O/P EST LOW 20 MIN: CPT | Mod: 95 | Performed by: INTERNAL MEDICINE

## 2021-07-26 NOTE — LETTER
7/26/2021         RE: Deandre Merchant  [address]  [facility] [id number]        Dear Colleague,    Thank you for referring your patient, Deandre Merchant, to the [facility]. Please see a copy of my visit note below.          FOLLOW-UP VISIT NOTE    PATIENT NAME: Deandre Merchant MRN # [id number]  DATE OF VISIT: Jul 26, 2021 YOB: 1950        CANCER TYPE:Follicular Lymphoma  STAGE: IV( axillary, mediastinal, retroperitoneal, stomach, bone marrow)      ONCOLOGY HISTORY:    Patient was being followed by Dr. Krishnamurthy but now she has transferred his care to me.  I reviewed his previous records and have copied and updated from prior notes.  71 year old  male who in 2012 presented with mediastinal, retroperitoneal and mesenteric lymph nodes. Was clinically asymptomatic with normal blood counts. CT-guided lymph node biopsy on 6/21/2012 was consistent with follicular lymphoma, grade 1-2. He was observed at that point.    In 2013, presented to the hospital with ileus and abdominal distention. EGD showed gastric antrum and duodenal ulcers which were biopsied and showed follicular lymphoma. CT scans showed bulky  lymphadenopathy with increasing size. 04/13 Bone marrow biopsy was positive with evidence of follicular lymphoma.   He received bendamustine and Rituxan regimen April 2014- -September 2014.   In December 14 , he was started on maintenance Rituxan every 2 months for 2 years which he completed in October 2016.     In remission and on observation since then.    7/14/2020 - established care with me.    CT scan in July 2020 was stable apart from an indeterminate L2 vertebral body lesion.    MRI of the lumbar spine showed Nonspecific enhancing lesion in the right aspect of the L2 inferior endplate which remains indeterminate.    12/7/2020.  Repeat MRI of the lumbar spine is the same.    We proceeded with a PET scan on 1/8/2021 which shows mild FDG uptake in the L2 sclerotic  lesion.  It was measuring about 1.5 x 1.2 x 1.4 cm and was stable.  Of note previously the lymphoma was FDG avid.    SUBJECTIVE   This is video visit.  He has been getting over a cold.  Did not have any fevers.  Otherwise feels well.  He denies any B symptoms or new swellings.  Energy is good.  Denies any abnormal bleeding.  No GI symptoms.  Denies any back pain.  He was seen by Dr. Alston from neurosurgery who recommended observation for the lumbar spine lesion.  He was supposed to get MRI in 6 months but it has not been scheduled.    ECOG 0    ROS:  Otherwise a comprehensive review of the system was unremarkable.      I reviewed other history in epic as below.      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     Lymphadenopathy     mediastinal & retroperitoneal     Non Hodgkin's lymphoma (H)      Non-Hodgkin's lymphoma of multiple sites (H) 6/26/2012     Polyps, colonic      Retinal detachment          CURRENT OUTPATIENT MEDICATIONS     Current Outpatient Medications   Medication Sig Dispense Refill     Acetaminophen (TYLENOL PO) Take 1,000 mg by mouth       cetirizine (ZYRTEC) 10 MG tablet Take 10 mg by mouth daily       chlorpheniramine (CHLOR-TRIMETON) 4 MG tablet Take 4 mg by mouth every 6 hours as needed for allergies or rhinitis       fluticasone (FLONASE) 50 MCG/ACT nasal spray Spray 2 sprays into both nostrils daily 16 g 11     metoprolol succinate ER (TOPROL-XL) 50 MG 24 hr tablet Take 1 tablet (50 mg) by mouth daily 90 tablet 4     order for DME Equipment ordered: RESMED Auto PAP Mask type: Full face  Settings: 5-15 cm h2o       simvastatin (ZOCOR) 20 MG tablet Take 1 tablet (20 mg) by mouth At Bedtime 90 tablet 4     triamcinolone (KENALOG) 0.1 % external ointment Apply sparingly to affected area twice daily as needed. 15 g 1        ALLERGIES     Allergies   Allergen Reactions      Allopurinol Rash     FAMILY HISTORY:  Family History   Problem Relation Age of Onset     Cancer Father         liver/kidney     C.A.D. Father      Pacemaker Brother      Arthritis Mother         RA     Dad had liver cancer at 74.  He has 2 children and they are healthy.    Social History     Socioeconomic History     Marital status:      Spouse name: Cristel     Number of children: 2     Years of education: Not on file     Highest education level: Not on file   Occupational History     Not on file   Tobacco Use     Smoking status: Never Smoker     Smokeless tobacco: Never Used   Vaping Use     Vaping Use: Never used   Substance and Sexual Activity     Alcohol use: Yes     Alcohol/week: 3.3 standard drinks     Comment: occasionally weekends     Drug use: No     Sexual activity: Yes   Other Topics Concern      Service Not Asked     Blood Transfusions Not Asked     Caffeine Concern No     Occupational Exposure Not Asked     Hobby Hazards Not Asked     Sleep Concern No     Stress Concern No     Weight Concern No     Special Diet Not Asked     Back Care Not Asked     Exercise Yes     Bike Helmet Not Asked     Seat Belt Yes     Self-Exams Not Asked     Parent/sibling w/ CABG, MI or angioplasty before 65F 55M? Not Asked   Social History Narrative     Not on file     Social Determinants of Health     Financial Resource Strain:      Difficulty of Paying Living Expenses:    Food Insecurity:      Worried About Running Out of Food in the Last Year:      Ran Out of Food in the Last Year:    Transportation Needs:      Lack of Transportation (Medical):      Lack of Transportation (Non-Medical):    Physical Activity:      Days of Exercise per Week:      Minutes of Exercise per Session:    Stress:      Feeling of Stress :    Social Connections:      Frequency of Communication with Friends and Family:      Frequency of Social Gatherings with Friends and Family:      Attends Holiness Services:      Active Member of  Clubs or Organizations:      Attends Club or Organization Meetings:      Marital Status:    Intimate Partner Violence:      Fear of Current or Ex-Partner:      Emotionally Abused:      Physically Abused:      Sexually Abused:      Denies smoking. 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     PHYSICAL EXAM     There were no vitals taken for this visit.  Wt Readings from Last 4 Encounters:   07/15/21 134.7 kg (297 lb)   01/28/21 131.5 kg (290 lb)   01/19/21 131.5 kg (290 lb)   09/10/20 127 kg (280 lb)         Constitutional.  Does not seem to be in any acute distress.  Eyes.  No redness or discharge noted.  Respiratory.  Speaking in full sentences.  Breathing seems comfortable without any accessory use of muscles.    Skin.  Visualized his skin does not show any obvious rashes.  Musculoskeletal.  Range of motion for visualized areas is intact.  Neurological.  Alert and oriented x3.  Psychiatric.  Mood, mentation and affect are normal.  Decision making capacity is intact.      The rest of a comprehensive physical examination is deferred due to Public Health Emergency video visit restrictions.         LABORATORY AND IMAGING STUDIES       Reviewed  7/15/2021.  CBC unremarkable  CMP unremarkable except total protein 6.4 and calcium 8.4.  .    PET CT 1/8/2021 showed  that the sclerotic lesion in the L2 vertebral body demonstrates mild FDG uptake while previously the lymphoma was more FDG avid.  It shows interstitial lung disease pattern which is gradually progressed since 2014.      Combined Report of:    PET and CT on  1/8/2021 5:29 PM :     1. PET of the neck, chest, abdomen, and pelvis.  2. PET CT Fusion for Attenuation Correction and Anatomical  Localization:    3. 3D MIP and PET-CT fused images were processed on an independent  workstation and archived to PACS and reviewed by a radiologist.     Technique:     1. PET: The patient received 14.3 mCi of F-18-FDG; the serum glucose  was 90 prior to  administration, body weight was 127 kg. Images were  evaluated in the axial, sagittal, and coronal planes as well as the  rotational whole body MIP. Images were acquired from the Vertex to the  mid calves.     UPTAKE WAS MEASURED AT 60 MINUTES.      BACKGROUND:  Liver SUV max= 4.9,   Aorta Blood SUV Max: 3.6.      2. CT: CT only obtained for attenuation correction and not diagnostic  purposes.     INDICATION: NHL and new L2 indeterminate lesion; Lumbar spine tumor;  Non-Hodgkin's lymphoma of multiple sites (H)     ADDITIONAL INFORMATION OBTAINED FROM EMR: 70-year-old male with a  history of follicular lymphoma status post chemotherapy followed by  maintenance Rituxan completed in October 2016.     COMPARISON: MRI L-spine 12/7/2020, MRI L-spine 7/22/2020, CT CAP  7/10/2020, CT CAP 14 2019, CT CAP 4/24/2017, PET-CT 4/4/2014        FINDINGS:      HEAD/NECK:  There is no suspicious FDG uptake in the neck. Mild mucosal thickening  and fluid within the right ethmoid air cells with low levels of FDG  uptake, likely inflammatory.     CHEST:  There is no suspicious FDG uptake in the chest. Bilateral  basilar-predominant subpleural groundglass opacities and reticulation,  very slowly increasing over multiple past studies since at least 2014,  with associated FDG uptake. For example in the right lower lobe with  SUV max 4.4 (series 5, image 240). There appears to be a thin band of  subpleural cyst bearing views changes. Unchanged perivascular oval 6  mm nodule anterior right upper lobe (series 5, image 212). Heart size  is within normal limits. Dilatation of the ascending aorta to 4.7 cm.  Moderate coronary artery calcifications. No hypermetabolic lymph nodes  in the chest.     ABDOMEN AND PELVIS:  There is no suspicious FDG uptake in the abdomen or pelvis. Small  splenule. Unchanged mild hazy attenuation in the central mesentery. No  hypermetabolic lymph nodes in the abdomen or pelvis.     LOWER EXTREMITIES:   No abnormal  masses or hypermetabolic lesions.     BONES:   Oval sclerotic lesion in the L2 vertebral body measuring 1.5 x 1.2 x  1.4 cm with SUV max 4.5 (series 5, image 343), corresponding with the  T2-hyperintense lesion seen on comparison MRI studies, and minimally  larger since CT 7/10/2020 where it measured 1.4 x 1.1 x 1.4 cm. This  is new since 4/24/2017. Multilevel degenerative changes in the spine.                                                                         IMPRESSION: In this patient with a history of follicular lymphoma  status post chemotherapy:     1. Sclerotic lesion in the L2 vertebral body demonstrates mild FDG  uptake, below liver background levels (Deauville 3). In comparison,  patient's lymphoma was previously shown to be PET avid in 2014. This  lesion remains indeterminant, however given that is slowly growing and  not significantly PET avid, is suggestive of a process different from  lymphoma, such as evolving degenerative changes. Neoplasm felt less  likely but not entirely excluded. Continued imaging surveillance or  tissue sampling can be considered.     2. No hypermetabolic lymph node or focal lesion elsewhere in the body  to suggest recurrent lymphoma.     3. Findings suggestive of active interstitial lung disease, including  basilar-predominant mildly FDG-avid subpleural groundglass opacities  bilaterally, slowly increased since 2014, with thin band of subpleural  sparing. This pattern is very suggestive of NSIP, with the FDG avidity  indicating active inflammation. Consider referral to pulmonology.     4. Unchanged mild hazy attenuation in the central mesentery, favor  scarring from treated lymphoma.     5. Unchanged ascending aorta aneurysm measuring 4.7 cm.      MRI OF THE LUMBAR SPINE WITHOUT AND WITH CONTRAST December 7, 2020  11:34 AM      HISTORY: Follow up on L2 lesion. History of NHL. Lumbar spine tumor.  Non-Hodgkin's lymphoma of multiple sites (H).     TECHNIQUE: Multiplanar,  multisequence MRI images of the lumbar spine  were acquired without and with 10 mL Gadavist IV contrast.     COMPARISON: Lumbar spine MRI 7/22/2020.     FINDINGS: There are five lumbar-type vertebrae for the purposes of  this dictation.      Alignment of the lumbar vertebrae remains normal; however,  straightening of normal lumbar lordosis is again noted. A focal T2  hyperintense, T1 hypointense, enhancing nodule in the L2 vertebral  body measuring 1.7 cm long axis is again noted without change. There  is Modic 1 degenerative marrow signal change and enhancement in the  inferior endplate of L5. Marrow signal throughout the lumbar vertebrae  is otherwise within normal limits. No new lesions. No fractures.     Mild posterior disc bulging at all levels of the lumbar spine again  noted.     The tip of the conus medullaris is at the mid L2 level which is within  normal limits. There is no evidence for intrathecal abnormality. No  abnormal intrathecal contrast enhancement.     No significant spinal canal stenosis at any level of the lumbar spine.  Moderate-severe degenerative neural foraminal stenosis bilaterally at  L3-L4, L4-L5 and L5-S1 again noted.                                                                      IMPRESSION:  1. Stable T2 hyperintense enhancing nodule in the L2 vertebral body.  No new lesions.  2. Degenerative changes of the lumbar spine again noted without change  from the recent comparison study.    MRI LUMBAR SPINE WITHOUT AND WITH CONTRAST   7/22/2020 3:51 PM      HISTORY: L2 vertebral body lesion. History of NHL (non-Hodgkin's  lymphoma) of multiple sites (H). Lumbar spine tumor.     TECHNIQUE: Multiplanar multisequence MRI of the lumbar spine without  and with 10 mL Gadavist.     COMPARISON: CT chest, abdomen and pelvis dated 7/10/2020.     FINDINGS: Five lumbar vertebral bodies are presumed. Slight reversal  of the normal lumbar lordosis centered at the L2-L3 level. Mild  dextroconvex  curvature centered at L1-L2 and levoconvex curvature  centered at L3-L4. There appears to be right lateral listhesis of L2  on L3 measuring up to approximately 6-7 mm. No fracture. There is a  mildly heterogeneous ovoid/lobulated T2 and STIR heterogeneously  hyperintense, T1 predominantly hypointense, enhancing lesion in the  right aspect of the L2 inferior endplate, corresponding to the  sclerotic lesion noted on most recent CT. This lesion measures  approximately 17 mm craniocaudal x 14 mm AP x 14 mm transverse and is  nonspecific. No extraosseous soft tissue mass. No other concerning  marrow lesions seen. There is mild Modic type I degenerative endplate  change at the L4-L5 level with scattered Modic type II degenerative  endplate changes elsewhere at L2-L3, L4-L5 and L5-S1. The conus  terminates at L2-L3. Paraspinous soft tissues are unremarkable.     Segmental analysis:  T12-L1: Imaged only in the sagittal plane. There appears to be a left  foraminal disc herniation resulting in mild to moderate left neural  foraminal stenosis. No significant spinal stenosis or right neural  foraminal stenosis.     L1-L2: Disc desiccation without significant disc height loss. There is  a diffuse disc bulge slightly eccentric to the left with associated  appositional/marginal osteophytes on the left. Moderate facet  arthropathy. No spinal stenosis. Contact of the traversing right L2  nerve root by facet hypertrophy on the right, without significant  nerve root displacement. Mild left neural foraminal stenosis. The  right neural foramen is not significantly narrowed.     L2-L3: Moderate disc height loss, with moderate to severe disc height  loss along the left aspect of the disc space. Marginal endplate  osteophytes, particularly on the left. Diffuse disc bulge. Mild to  moderate facet arthropathy. Minimal contact of the traversing right  more than left L3 nerve roots by the disc bulge/posterior endplate  without significant  displacement. No spinal stenosis. Mild to moderate  left and mild right neural foraminal stenosis.     L3-L4: Mild disc height loss. Diffuse disc bulge with marginal  endplate osteophytes. Moderate facet arthropathy. Mild encroachment on  the right lateral recess. Minimal contact of the traversing left L4  nerve root by the disc without significant displacement of the nerve  roots. No significant spinal stenosis. Severe right neural foraminal  stenosis. Mild to moderate left neural foraminal stenosis.     L4-L5: Moderate disc height loss. Diffuse disc bulge. Bilateral  marginal endplate osteophytes. Moderate facet arthropathy. Contact of  the bilateral traversing nerve roots, right more than left, with mild  right lateral recess encroachment. No traversing nerve root  displacement. No central spinal stenosis. Severe right and moderate  left neural foraminal stenosis.     L5-S1: Moderate disc height loss. Diffuse disc bulge with marginal  endplate osteophytes. Moderate facet arthropathy. No spinal stenosis.  Moderate bilateral neural foraminal stenosis.                                                                      IMPRESSION:   1. Nonspecific enhancing lesion in the right aspect of the L2 inferior  endplate is indeterminate. If clinically warranted, consider further  characterization with either nuclear medicine bone scintigraphy, FDG  PET/CT, or follow-up MRI.  2. Multilevel degenerative changes of the lumbar spine, as described.  Please see the body of the report for details.  3. Severe right L3-L4 and L4-L5 neural foraminal stenosis, with lesser  degrees of neural foraminal narrowing elsewhere. No high-grade central  spinal canal stenosis.            ASSESSMENT AND PLAN     Stage IV follicular lymphoma- status post treatment with bendamustine/Rituxan (04-09/2014) followed by maintenance rituximab for two years- completed in October 2016.      CT scan in July 2020 was stable apart from an indeterminate L2  vertebral body lesion.  At that time we decided on proceeding with an MRI of the lumbar spine which was done which showed Nonspecific enhancing lesion in the right aspect of the L2 inferior endplate which remains indeterminate.    MRI done on 12/7/2020 as follow-up for less indeterminate L2 vertebral body lesion is stable but technically remains indeterminate.      Recent PSA on 7/28/2020 was stable at 2.27.      He was asymptomatic.  PET/CT on 1/8/2021 does not show any clear evidence of recurrence of the lymphoma.  It showed that the L2 sclerotic lesion is the same size and is mildly FDG avid as opposed to previous lymphoma which was more FDG avid in 2014.      Overall he is doing well from the lymphoma standpoint.  We will plan to repeat CT scan and labs in 6 months.      L2 sclerotic lesion.  He was seen by Dr. Alston from neurosurgery and I reviewed the notes.  Most likely this is a benign lesion and recommendation was to follow it up serially with repeat MRI in 6 months.  It has been about 6 months since the last scan so I would have my schedulers try to schedule this in the next few days and he will follow with Dr. Toussaint after that. ll    We did not address the following today.  Interstitial lung disease.  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  He is asymptomatic.  I discussed with him that I can give him a referral to see a lung specialist but it is likely that considering he is completely asymptomatic, that at this time, continued observation would be recommended.  Alternatively he can discuss this with his primary care physician.  He would like to wait and he tells me that he will discuss with his PCP about it further.    Return to clinic in 6 months with repeat CT scan and labs prior.    All questions answered.  He is agreeable and comfortable with the plan.    Eduardo Crespo MD    Video start time. 8:07  AM  Video stop  time. 8:16 AM        Minh is a 71 year old who is being evaluated via a billable video visit.      How would you like to obtain your AVS? Mail a copy  If the video visit is dropped, the invitation should be resent by: Text to cell phone: [phone]   Will anyone else be joining your video visit? No            Again, thank you for allowing me to participate in the care of your patient.        Sincerely,        Eduardo Crespo MD

## 2021-07-26 NOTE — PROGRESS NOTES
Minh is a 71 year old who is being evaluated via a billable video visit.      How would you like to obtain your AVS? Mail a copy  If the video visit is dropped, the invitation should be resent by: Text to cell phone: [phone]   Will anyone else be joining your video visit? No

## 2021-07-26 NOTE — PROGRESS NOTES
FOLLOW-UP VISIT NOTE    PATIENT NAME: Deandre Merchant MRN # [id number]  DATE OF VISIT: Jul 26, 2021 YOB: 1950        CANCER TYPE:Follicular Lymphoma  STAGE: IV( axillary, mediastinal, retroperitoneal, stomach, bone marrow)      ONCOLOGY HISTORY:    Patient was being followed by Dr. Krishnamurthy but now she has transferred his care to me.  I reviewed his previous records and have copied and updated from prior notes.  71 year old  male who in 2012 presented with mediastinal, retroperitoneal and mesenteric lymph nodes. Was clinically asymptomatic with normal blood counts. CT-guided lymph node biopsy on 6/21/2012 was consistent with follicular lymphoma, grade 1-2. He was observed at that point.    In 2013, presented to the hospital with ileus and abdominal distention. EGD showed gastric antrum and duodenal ulcers which were biopsied and showed follicular lymphoma. CT scans showed bulky  lymphadenopathy with increasing size. 04/13 Bone marrow biopsy was positive with evidence of follicular lymphoma.   He received bendamustine and Rituxan regimen April 2014- -September 2014.   In December 14 , he was started on maintenance Rituxan every 2 months for 2 years which he completed in October 2016.     In remission and on observation since then.    7/14/2020 - established care with me.    CT scan in July 2020 was stable apart from an indeterminate L2 vertebral body lesion.    MRI of the lumbar spine showed Nonspecific enhancing lesion in the right aspect of the L2 inferior endplate which remains indeterminate.    12/7/2020.  Repeat MRI of the lumbar spine is the same.    We proceeded with a PET scan on 1/8/2021 which shows mild FDG uptake in the L2 sclerotic lesion.  It was measuring about 1.5 x 1.2 x 1.4 cm and was stable.  Of note previously the lymphoma was FDG avid.    SUBJECTIVE   This is video visit.  He has been getting over a cold.  Did not have any fevers.  Otherwise feels well.  He denies any B symptoms  or new swellings.  Energy is good.  Denies any abnormal bleeding.  No GI symptoms.  Denies any back pain.  He was seen by Dr. Alston from neurosurgery who recommended observation for the lumbar spine lesion.  He was supposed to get MRI in 6 months but it has not been scheduled.    ECOG 0    ROS:  Otherwise a comprehensive review of the system was unremarkable.      I reviewed other history in epic as below.      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     Lymphadenopathy     mediastinal & retroperitoneal     Non Hodgkin's lymphoma (H)      Non-Hodgkin's lymphoma of multiple sites (H) 6/26/2012     Polyps, colonic      Retinal detachment          CURRENT OUTPATIENT MEDICATIONS     Current Outpatient Medications   Medication Sig Dispense Refill     Acetaminophen (TYLENOL PO) Take 1,000 mg by mouth       cetirizine (ZYRTEC) 10 MG tablet Take 10 mg by mouth daily       chlorpheniramine (CHLOR-TRIMETON) 4 MG tablet Take 4 mg by mouth every 6 hours as needed for allergies or rhinitis       fluticasone (FLONASE) 50 MCG/ACT nasal spray Spray 2 sprays into both nostrils daily 16 g 11     metoprolol succinate ER (TOPROL-XL) 50 MG 24 hr tablet Take 1 tablet (50 mg) by mouth daily 90 tablet 4     order for DME Equipment ordered: RESMED Auto PAP Mask type: Full face  Settings: 5-15 cm h2o       simvastatin (ZOCOR) 20 MG tablet Take 1 tablet (20 mg) by mouth At Bedtime 90 tablet 4     triamcinolone (KENALOG) 0.1 % external ointment Apply sparingly to affected area twice daily as needed. 15 g 1        ALLERGIES     Allergies   Allergen Reactions     Allopurinol Rash     FAMILY HISTORY:  Family History   Problem Relation Age of Onset     Cancer Father         liver/kidney     C.A.D. Father      Pacemaker Brother      Arthritis Mother         RA     Dad had liver cancer at 74.  He has 2 children and they are  healthy.    Social History     Socioeconomic History     Marital status:      Spouse name: Cristel     Number of children: 2     Years of education: Not on file     Highest education level: Not on file   Occupational History     Not on file   Tobacco Use     Smoking status: Never Smoker     Smokeless tobacco: Never Used   Vaping Use     Vaping Use: Never used   Substance and Sexual Activity     Alcohol use: Yes     Alcohol/week: 3.3 standard drinks     Comment: occasionally weekends     Drug use: No     Sexual activity: Yes   Other Topics Concern      Service Not Asked     Blood Transfusions Not Asked     Caffeine Concern No     Occupational Exposure Not Asked     Hobby Hazards Not Asked     Sleep Concern No     Stress Concern No     Weight Concern No     Special Diet Not Asked     Back Care Not Asked     Exercise Yes     Bike Helmet Not Asked     Seat Belt Yes     Self-Exams Not Asked     Parent/sibling w/ CABG, MI or angioplasty before 65F 55M? Not Asked   Social History Narrative     Not on file     Social Determinants of Health     Financial Resource Strain:      Difficulty of Paying Living Expenses:    Food Insecurity:      Worried About Running Out of Food in the Last Year:      Ran Out of Food in the Last Year:    Transportation Needs:      Lack of Transportation (Medical):      Lack of Transportation (Non-Medical):    Physical Activity:      Days of Exercise per Week:      Minutes of Exercise per Session:    Stress:      Feeling of Stress :    Social Connections:      Frequency of Communication with Friends and Family:      Frequency of Social Gatherings with Friends and Family:      Attends Synagogue Services:      Active Member of Clubs or Organizations:      Attends Club or Organization Meetings:      Marital Status:    Intimate Partner Violence:      Fear of Current or Ex-Partner:      Emotionally Abused:      Physically Abused:      Sexually Abused:      Denies smoking. Drinks etoh  occasionally. Is now retired from construction.     PHYSICAL EXAM     There were no vitals taken for this visit.  Wt Readings from Last 4 Encounters:   07/15/21 134.7 kg (297 lb)   01/28/21 131.5 kg (290 lb)   01/19/21 131.5 kg (290 lb)   09/10/20 127 kg (280 lb)         Constitutional.  Does not seem to be in any acute distress.  Eyes.  No redness or discharge noted.  Respiratory.  Speaking in full sentences.  Breathing seems comfortable without any accessory use of muscles.    Skin.  Visualized his skin does not show any obvious rashes.  Musculoskeletal.  Range of motion for visualized areas is intact.  Neurological.  Alert and oriented x3.  Psychiatric.  Mood, mentation and affect are normal.  Decision making capacity is intact.      The rest of a comprehensive physical examination is deferred due to Public Health Emergency video visit restrictions.         LABORATORY AND IMAGING STUDIES       Reviewed  7/15/2021.  CBC unremarkable  CMP unremarkable except total protein 6.4 and calcium 8.4.  .    PET CT 1/8/2021 showed  that the sclerotic lesion in the L2 vertebral body demonstrates mild FDG uptake while previously the lymphoma was more FDG avid.  It shows interstitial lung disease pattern which is gradually progressed since 2014.      Combined Report of:    PET and CT on  1/8/2021 5:29 PM :     1. PET of the neck, chest, abdomen, and pelvis.  2. PET CT Fusion for Attenuation Correction and Anatomical  Localization:    3. 3D MIP and PET-CT fused images were processed on an independent  workstation and archived to PACS and reviewed by a radiologist.     Technique:     1. PET: The patient received 14.3 mCi of F-18-FDG; the serum glucose  was 90 prior to administration, body weight was 127 kg. Images were  evaluated in the axial, sagittal, and coronal planes as well as the  rotational whole body MIP. Images were acquired from the Vertex to the  mid calves.     UPTAKE WAS MEASURED AT 60 MINUTES.       BACKGROUND:  Liver SUV max= 4.9,   Aorta Blood SUV Max: 3.6.      2. CT: CT only obtained for attenuation correction and not diagnostic  purposes.     INDICATION: NHL and new L2 indeterminate lesion; Lumbar spine tumor;  Non-Hodgkin's lymphoma of multiple sites (H)     ADDITIONAL INFORMATION OBTAINED FROM EMR: 70-year-old male with a  history of follicular lymphoma status post chemotherapy followed by  maintenance Rituxan completed in October 2016.     COMPARISON: MRI L-spine 12/7/2020, MRI L-spine 7/22/2020, CT CAP  7/10/2020, CT CAP 14 2019, CT CAP 4/24/2017, PET-CT 4/4/2014        FINDINGS:      HEAD/NECK:  There is no suspicious FDG uptake in the neck. Mild mucosal thickening  and fluid within the right ethmoid air cells with low levels of FDG  uptake, likely inflammatory.     CHEST:  There is no suspicious FDG uptake in the chest. Bilateral  basilar-predominant subpleural groundglass opacities and reticulation,  very slowly increasing over multiple past studies since at least 2014,  with associated FDG uptake. For example in the right lower lobe with  SUV max 4.4 (series 5, image 240). There appears to be a thin band of  subpleural cyst bearing views changes. Unchanged perivascular oval 6  mm nodule anterior right upper lobe (series 5, image 212). Heart size  is within normal limits. Dilatation of the ascending aorta to 4.7 cm.  Moderate coronary artery calcifications. No hypermetabolic lymph nodes  in the chest.     ABDOMEN AND PELVIS:  There is no suspicious FDG uptake in the abdomen or pelvis. Small  splenule. Unchanged mild hazy attenuation in the central mesentery. No  hypermetabolic lymph nodes in the abdomen or pelvis.     LOWER EXTREMITIES:   No abnormal masses or hypermetabolic lesions.     BONES:   Oval sclerotic lesion in the L2 vertebral body measuring 1.5 x 1.2 x  1.4 cm with SUV max 4.5 (series 5, image 343), corresponding with the  T2-hyperintense lesion seen on comparison MRI studies, and  minimally  larger since CT 7/10/2020 where it measured 1.4 x 1.1 x 1.4 cm. This  is new since 4/24/2017. Multilevel degenerative changes in the spine.                                                                         IMPRESSION: In this patient with a history of follicular lymphoma  status post chemotherapy:     1. Sclerotic lesion in the L2 vertebral body demonstrates mild FDG  uptake, below liver background levels (Deauville 3). In comparison,  patient's lymphoma was previously shown to be PET avid in 2014. This  lesion remains indeterminant, however given that is slowly growing and  not significantly PET avid, is suggestive of a process different from  lymphoma, such as evolving degenerative changes. Neoplasm felt less  likely but not entirely excluded. Continued imaging surveillance or  tissue sampling can be considered.     2. No hypermetabolic lymph node or focal lesion elsewhere in the body  to suggest recurrent lymphoma.     3. Findings suggestive of active interstitial lung disease, including  basilar-predominant mildly FDG-avid subpleural groundglass opacities  bilaterally, slowly increased since 2014, with thin band of subpleural  sparing. This pattern is very suggestive of NSIP, with the FDG avidity  indicating active inflammation. Consider referral to pulmonology.     4. Unchanged mild hazy attenuation in the central mesentery, favor  scarring from treated lymphoma.     5. Unchanged ascending aorta aneurysm measuring 4.7 cm.      MRI OF THE LUMBAR SPINE WITHOUT AND WITH CONTRAST December 7, 2020  11:34 AM      HISTORY: Follow up on L2 lesion. History of NHL. Lumbar spine tumor.  Non-Hodgkin's lymphoma of multiple sites (H).     TECHNIQUE: Multiplanar, multisequence MRI images of the lumbar spine  were acquired without and with 10 mL Gadavist IV contrast.     COMPARISON: Lumbar spine MRI 7/22/2020.     FINDINGS: There are five lumbar-type vertebrae for the purposes of  this dictation.       Alignment of the lumbar vertebrae remains normal; however,  straightening of normal lumbar lordosis is again noted. A focal T2  hyperintense, T1 hypointense, enhancing nodule in the L2 vertebral  body measuring 1.7 cm long axis is again noted without change. There  is Modic 1 degenerative marrow signal change and enhancement in the  inferior endplate of L5. Marrow signal throughout the lumbar vertebrae  is otherwise within normal limits. No new lesions. No fractures.     Mild posterior disc bulging at all levels of the lumbar spine again  noted.     The tip of the conus medullaris is at the mid L2 level which is within  normal limits. There is no evidence for intrathecal abnormality. No  abnormal intrathecal contrast enhancement.     No significant spinal canal stenosis at any level of the lumbar spine.  Moderate-severe degenerative neural foraminal stenosis bilaterally at  L3-L4, L4-L5 and L5-S1 again noted.                                                                      IMPRESSION:  1. Stable T2 hyperintense enhancing nodule in the L2 vertebral body.  No new lesions.  2. Degenerative changes of the lumbar spine again noted without change  from the recent comparison study.    MRI LUMBAR SPINE WITHOUT AND WITH CONTRAST   7/22/2020 3:51 PM      HISTORY: L2 vertebral body lesion. History of NHL (non-Hodgkin's  lymphoma) of multiple sites (H). Lumbar spine tumor.     TECHNIQUE: Multiplanar multisequence MRI of the lumbar spine without  and with 10 mL Gadavist.     COMPARISON: CT chest, abdomen and pelvis dated 7/10/2020.     FINDINGS: Five lumbar vertebral bodies are presumed. Slight reversal  of the normal lumbar lordosis centered at the L2-L3 level. Mild  dextroconvex curvature centered at L1-L2 and levoconvex curvature  centered at L3-L4. There appears to be right lateral listhesis of L2  on L3 measuring up to approximately 6-7 mm. No fracture. There is a  mildly heterogeneous ovoid/lobulated T2 and STIR  heterogeneously  hyperintense, T1 predominantly hypointense, enhancing lesion in the  right aspect of the L2 inferior endplate, corresponding to the  sclerotic lesion noted on most recent CT. This lesion measures  approximately 17 mm craniocaudal x 14 mm AP x 14 mm transverse and is  nonspecific. No extraosseous soft tissue mass. No other concerning  marrow lesions seen. There is mild Modic type I degenerative endplate  change at the L4-L5 level with scattered Modic type II degenerative  endplate changes elsewhere at L2-L3, L4-L5 and L5-S1. The conus  terminates at L2-L3. Paraspinous soft tissues are unremarkable.     Segmental analysis:  T12-L1: Imaged only in the sagittal plane. There appears to be a left  foraminal disc herniation resulting in mild to moderate left neural  foraminal stenosis. No significant spinal stenosis or right neural  foraminal stenosis.     L1-L2: Disc desiccation without significant disc height loss. There is  a diffuse disc bulge slightly eccentric to the left with associated  appositional/marginal osteophytes on the left. Moderate facet  arthropathy. No spinal stenosis. Contact of the traversing right L2  nerve root by facet hypertrophy on the right, without significant  nerve root displacement. Mild left neural foraminal stenosis. The  right neural foramen is not significantly narrowed.     L2-L3: Moderate disc height loss, with moderate to severe disc height  loss along the left aspect of the disc space. Marginal endplate  osteophytes, particularly on the left. Diffuse disc bulge. Mild to  moderate facet arthropathy. Minimal contact of the traversing right  more than left L3 nerve roots by the disc bulge/posterior endplate  without significant displacement. No spinal stenosis. Mild to moderate  left and mild right neural foraminal stenosis.     L3-L4: Mild disc height loss. Diffuse disc bulge with marginal  endplate osteophytes. Moderate facet arthropathy. Mild encroachment on  the  right lateral recess. Minimal contact of the traversing left L4  nerve root by the disc without significant displacement of the nerve  roots. No significant spinal stenosis. Severe right neural foraminal  stenosis. Mild to moderate left neural foraminal stenosis.     L4-L5: Moderate disc height loss. Diffuse disc bulge. Bilateral  marginal endplate osteophytes. Moderate facet arthropathy. Contact of  the bilateral traversing nerve roots, right more than left, with mild  right lateral recess encroachment. No traversing nerve root  displacement. No central spinal stenosis. Severe right and moderate  left neural foraminal stenosis.     L5-S1: Moderate disc height loss. Diffuse disc bulge with marginal  endplate osteophytes. Moderate facet arthropathy. No spinal stenosis.  Moderate bilateral neural foraminal stenosis.                                                                      IMPRESSION:   1. Nonspecific enhancing lesion in the right aspect of the L2 inferior  endplate is indeterminate. If clinically warranted, consider further  characterization with either nuclear medicine bone scintigraphy, FDG  PET/CT, or follow-up MRI.  2. Multilevel degenerative changes of the lumbar spine, as described.  Please see the body of the report for details.  3. Severe right L3-L4 and L4-L5 neural foraminal stenosis, with lesser  degrees of neural foraminal narrowing elsewhere. No high-grade central  spinal canal stenosis.            ASSESSMENT AND PLAN     Stage IV follicular lymphoma- status post treatment with bendamustine/Rituxan (04-09/2014) followed by maintenance rituximab for two years- completed in October 2016.      CT scan in July 2020 was stable apart from an indeterminate L2 vertebral body lesion.  At that time we decided on proceeding with an MRI of the lumbar spine which was done which showed Nonspecific enhancing lesion in the right aspect of the L2 inferior endplate which remains indeterminate.    MRI done on  12/7/2020 as follow-up for less indeterminate L2 vertebral body lesion is stable but technically remains indeterminate.      Recent PSA on 7/28/2020 was stable at 2.27.      He was asymptomatic.  PET/CT on 1/8/2021 does not show any clear evidence of recurrence of the lymphoma.  It showed that the L2 sclerotic lesion is the same size and is mildly FDG avid as opposed to previous lymphoma which was more FDG avid in 2014.      Overall he is doing well from the lymphoma standpoint.  We will plan to repeat CT scan and labs in 6 months.      L2 sclerotic lesion.  He was seen by Dr. Alston from neurosurgery and I reviewed the notes.  Most likely this is a benign lesion and recommendation was to follow it up serially with repeat MRI in 6 months.  It has been about 6 months since the last scan so I would have my schedulers try to schedule this in the next few days and he will follow with Dr. Toussaint after that. ll    We did not address the following today.  Interstitial lung disease.  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  He is asymptomatic.  I discussed with him that I can give him a referral to see a lung specialist but it is likely that considering he is completely asymptomatic, that at this time, continued observation would be recommended.  Alternatively he can discuss this with his primary care physician.  He would like to wait and he tells me that he will discuss with his PCP about it further.    Return to clinic in 6 months with repeat CT scan and labs prior.    All questions answered.  He is agreeable and comfortable with the plan.    Eduardo Crespo MD    Video start time. 8:07  AM  Video stop time. 8:16 AM

## 2021-07-26 NOTE — PATIENT INSTRUCTIONS
Please schedule MRI Lumbar spine in the next few days      In 6 months, repeat labs and CT scan and see me after that

## 2021-07-26 NOTE — NURSING NOTE
Pt notes had a recent summer cold; he noted is taking Claritin 2/day at this time and occasional Tylenol

## 2021-08-03 ENCOUNTER — OFFICE VISIT (OUTPATIENT)
Dept: FAMILY MEDICINE | Facility: CLINIC | Age: 71
End: 2021-08-03
Payer: MEDICARE

## 2021-08-03 VITALS
WEIGHT: 294.8 LBS | SYSTOLIC BLOOD PRESSURE: 118 MMHG | HEART RATE: 88 BPM | RESPIRATION RATE: 20 BRPM | TEMPERATURE: 97.4 F | OXYGEN SATURATION: 94 % | BODY MASS INDEX: 38.37 KG/M2 | DIASTOLIC BLOOD PRESSURE: 76 MMHG

## 2021-08-03 DIAGNOSIS — M24.542 CONTRACTURE OF JOINT OF FINGER OF LEFT HAND: Primary | ICD-10-CM

## 2021-08-03 PROCEDURE — 99213 OFFICE O/P EST LOW 20 MIN: CPT | Performed by: FAMILY MEDICINE

## 2021-08-03 ASSESSMENT — PAIN SCALES - GENERAL: PAINLEVEL: NO PAIN (1)

## 2021-08-03 NOTE — PROGRESS NOTES
Assessment & Plan     ASSESSMENT/ORDERS:    ICD-10-CM    1. Contracture of joint of finger of left hand  M24.542 Orthopedic  Referral     PLAN:  1.  Patient does not appear to have trigger finger of this hand but a contracture.  I recommended he see ortho for further evaluation.                  No follow-ups on file.    Mekhi Abbott MD  [facility] SHEKHAR Wilkinson is a 71 year old who presents for the following health issues     HPI     Chief Complaint   Patient presents with     Injection     would like a cortisone injection in left hand         Patient feels that his finger is sticking and acting like it did last year when he got an injection for a trigger finger.  He notes his finger is stiff and difficult to extend.  Got good results with corticosteroid injection last year.  Has a distant history of dislocation of finger.    Review of Systems         Objective    /76   Pulse 88   Temp 97.4  F (36.3  C) (Temporal)   Resp 20   Wt 133.7 kg (294 lb 12.8 oz)   SpO2 94%   BMI 38.37 kg/m    Body mass index is 38.37 kg/m .  Physical Exam  Musculoskeletal:      Comments: Left long finger has difficulty with full extension with passive range of motion.  There is a thickened flexor tendon along the left hand with no thickness noted at volar side of MCP joint.

## 2021-08-04 ENCOUNTER — OFFICE VISIT (OUTPATIENT)
Dept: ORTHOPEDICS | Facility: CLINIC | Age: 71
End: 2021-08-04
Payer: MEDICARE

## 2021-08-04 VITALS
SYSTOLIC BLOOD PRESSURE: 116 MMHG | WEIGHT: 295 LBS | DIASTOLIC BLOOD PRESSURE: 78 MMHG | HEIGHT: 74 IN | BODY MASS INDEX: 37.86 KG/M2

## 2021-08-04 DIAGNOSIS — M24.542 CONTRACTURE OF JOINT OF FINGER OF LEFT HAND: ICD-10-CM

## 2021-08-04 DIAGNOSIS — M72.0 DUPUYTREN'S CONTRACTURE: Primary | ICD-10-CM

## 2021-08-04 PROCEDURE — 99203 OFFICE O/P NEW LOW 30 MIN: CPT | Performed by: ORTHOPAEDIC SURGERY

## 2021-08-04 ASSESSMENT — MIFFLIN-ST. JEOR: SCORE: 2159.92

## 2021-08-04 NOTE — LETTER
8/4/2021         RE: Deandre Merchant  [address]  [facility] [address]        Dear Colleague,    Thank you for referring your patient, Deandre Merchant, to the [facility]. Please see a copy of my visit note below.    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      Chief Complaints and History of Present Illnesses   Patient presents with     Left Hand - Pain             History of Present Illness:     The patient stated that at 1 point in time he was coming down from a ladder and caught his long finger on the lateral which caused a dislocation which he then relocated.  He did have some triggering of that finger a few months ago.  He is not had any triggering since that time.  He is having difficulty flexing the MCP joint fully.    Physical Exam:    Physical examination of the left hand reveals nodules deep to palpation along the flexor tendon of the long finger.  The A1 pulley is not significantly palpable.  The remainder of the flexor tendons do not have nodules to palpation.  The remainder of his fingers are able to flex fully.  Distal motor and sensory examinations grossly intact.    Impression: Left long finger depuytren's contracture    Plan:  All of the above pertinent physical exam and imaging modalities findings was reviewed.  I advised the patient that I recommend that he see Dr. Sampson regarding this condition.  He will see him tomorrow afternoon.  I did explain to him briefly the pathophysiology of Dupuytren's contracture.            BP Readings from Last 1 Encounters:   08/04/21 116/78                 Again, thank you for allowing me to participate in the care of your patient.        Sincerely,        Reji Ames DO

## 2021-08-04 NOTE — PROGRESS NOTES
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      Chief Complaints and History of Present Illnesses   Patient presents with     Left Hand - Pain             History of Present Illness:     The patient stated that at 1 point in time he was coming down from a ladder and caught his long finger on the lateral which caused a dislocation which he then relocated.  He did have some triggering of that finger a few months ago.  He is not had any triggering since that time.  He is having difficulty flexing the MCP joint fully.    Physical Exam:    Physical examination of the left hand reveals nodules deep to palpation along the flexor tendon of the long finger.  The A1 pulley is not significantly palpable.  The remainder of the flexor tendons do not have nodules to palpation.  The remainder of his fingers are able to flex fully.  Distal motor and sensory examinations grossly intact.    Impression: Left long finger depuytren's contracture    Plan:  All of the above pertinent physical exam and imaging modalities findings was reviewed.  I advised the patient that I recommend that he see Dr. Sampson regarding this condition.  He will see him tomorrow afternoon.  I did explain to him briefly the pathophysiology of Dupuytren's contracture.            BP Readings from Last 1 Encounters:   08/04/21 116/78

## 2021-08-05 ENCOUNTER — OFFICE VISIT (OUTPATIENT)
Dept: ORTHOPEDICS | Facility: CLINIC | Age: 71
End: 2021-08-05
Payer: MEDICARE

## 2021-08-05 VITALS
SYSTOLIC BLOOD PRESSURE: 121 MMHG | HEIGHT: 74 IN | BODY MASS INDEX: 37.86 KG/M2 | RESPIRATION RATE: 16 BRPM | WEIGHT: 295 LBS | DIASTOLIC BLOOD PRESSURE: 81 MMHG | HEART RATE: 96 BPM

## 2021-08-05 DIAGNOSIS — M65.332 TRIGGER FINGER, LEFT MIDDLE FINGER: ICD-10-CM

## 2021-08-05 PROCEDURE — 99213 OFFICE O/P EST LOW 20 MIN: CPT | Mod: 25 | Performed by: ORTHOPAEDIC SURGERY

## 2021-08-05 PROCEDURE — 20550 NJX 1 TENDON SHEATH/LIGAMENT: CPT | Performed by: ORTHOPAEDIC SURGERY

## 2021-08-05 RX ORDER — TRIAMCINOLONE ACETONIDE 40 MG/ML
20 INJECTION, SUSPENSION INTRA-ARTICULAR; INTRAMUSCULAR
Status: SHIPPED | OUTPATIENT
Start: 2021-08-05

## 2021-08-05 RX ORDER — LIDOCAINE HYDROCHLORIDE 10 MG/ML
0.5 INJECTION, SOLUTION INFILTRATION; PERINEURAL
Status: SHIPPED | OUTPATIENT
Start: 2021-08-05

## 2021-08-05 RX ADMIN — TRIAMCINOLONE ACETONIDE 20 MG: 40 INJECTION, SUSPENSION INTRA-ARTICULAR; INTRAMUSCULAR at 16:01

## 2021-08-05 RX ADMIN — LIDOCAINE HYDROCHLORIDE 0.5 ML: 10 INJECTION, SOLUTION INFILTRATION; PERINEURAL at 16:01

## 2021-08-05 ASSESSMENT — MIFFLIN-ST. JEOR: SCORE: 2159.92

## 2021-08-05 NOTE — LETTER
8/5/2021         RE: Deandre Merchant  [address]  [place] [address]        Dear Colleague,    Thank you for referring your patient, Deandre Merchant, to the [facility]. Please see a copy of my visit note below.    Deandre Merchant is a 71 year old male who is seen as self referral for pain and catching in his left middle finger.  This started 1 year ago.  He has aching pain with a sharp shooting pain at times.  The middle finger occasionally locks, especially in the morning.  He has had similar problems before with one prior steroid injection.  He saw  Dr. Phillip Ames yesterday, who was concerned about possible Dupuytren's contracture. .    Occupation - retired construction.  Diabetes mellitus negative.  Hypothyroidism negative.    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     Lymphadenopathy     mediastinal & retroperitoneal     Non Hodgkin's lymphoma (H)      Non-Hodgkin's lymphoma of multiple sites (H) 6/26/2012     Polyps, colonic      Retinal detachment        Past Surgical History:   Procedure Laterality Date     COLONOSCOPY N/A 3/1/2017    Procedure: COLONOSCOPY;  Surgeon: Dakota Wall MD;  Location:  GI     ESOPHAGOSCOPY, GASTROSCOPY, DUODENOSCOPY (EGD), COMBINED  4/21/2013    Procedure: COMBINED ESOPHAGOSCOPY, GASTROSCOPY, DUODENOSCOPY (EGD), BIOPSY SINGLE OR MULTIPLE;;  Surgeon: Torrey Cosme MD;  Location:  GI     ESOPHAGOSCOPY, GASTROSCOPY, DUODENOSCOPY (EGD), COMBINED  10/9/2013    Procedure: COMBINED ESOPHAGOSCOPY, GASTROSCOPY, DUODENOSCOPY (EGD), BIOPSY SINGLE OR MULTIPLE;  ESOPHAGOSCOPY, GASTROSCOPY, DUODENOSCOPY (EGD) with multiple biopsies;  Surgeon: Shay Sauer MD;  Location:  GI     LAPAROTOMY EXPLORATORY  4/24/2013    Procedure: LAPAROTOMY EXPLORATORY;  Exploratory Laparotomy and lysis of adhesions.;  Surgeon: Genevieve Barros MD;   Location:  OR     [facility] COLONOSCOPY W/WO BRUSH/WASH  07/12/06       Family History   Problem Relation Age of Onset     Cancer Father         liver/kidney     C.A.D. Father      Pacemaker Brother      Arthritis Mother         RA       Social History     Socioeconomic History     Marital status:      Spouse name: Cristel     Number of children: 2     Years of education: Not on file     Highest education level: Not on file   Occupational History     Not on file   Tobacco Use     Smoking status: Never Smoker     Smokeless tobacco: Never Used   Vaping Use     Vaping Use: Never used   Substance and Sexual Activity     Alcohol use: Yes     Alcohol/week: 3.3 standard drinks     Comment: occasionally weekends     Drug use: No     Sexual activity: Yes   Other Topics Concern      Service Not Asked     Blood Transfusions Not Asked     Caffeine Concern No     Occupational Exposure Not Asked     Hobby Hazards Not Asked     Sleep Concern No     Stress Concern No     Weight Concern No     Special Diet Not Asked     Back Care Not Asked     Exercise Yes     Bike Helmet Not Asked     Seat Belt Yes     Self-Exams Not Asked     Parent/sibling w/ CABG, MI or angioplasty before 65F 55M? Not Asked   Social History Narrative     Not on file     Social Determinants of Health     Financial Resource Strain:      Difficulty of Paying Living Expenses:    Food Insecurity:      Worried About Running Out of Food in the Last Year:      Ran Out of Food in the Last Year:    Transportation Needs:      Lack of Transportation (Medical):      Lack of Transportation (Non-Medical):    Physical Activity:      Days of Exercise per Week:      Minutes of Exercise per Session:    Stress:      Feeling of Stress :    Social Connections:      Frequency of Communication with Friends and Family:      Frequency of Social Gatherings with Friends and Family:      Attends Mandaeism Services:      Active Member of Clubs or Organizations:      Attends Club  or Organization Meetings:      Marital Status:    Intimate Partner Violence:      Fear of Current or Ex-Partner:      Emotionally Abused:      Physically Abused:      Sexually Abused:        Current Outpatient Medications   Medication Sig Dispense Refill     Acetaminophen (TYLENOL PO) Take 1,000 mg by mouth       cetirizine (ZYRTEC) 10 MG tablet Take 10 mg by mouth daily       chlorpheniramine (CHLOR-TRIMETON) 4 MG tablet Take 4 mg by mouth every 6 hours as needed for allergies or rhinitis       fluticasone (FLONASE) 50 MCG/ACT nasal spray Spray 2 sprays into both nostrils daily 16 g 11     metoprolol succinate ER (TOPROL-XL) 50 MG 24 hr tablet Take 1 tablet (50 mg) by mouth daily 90 tablet 4     order for DME Equipment ordered: RESMED Auto PAP Mask type: Full face  Settings: 5-15 cm h2o       simvastatin (ZOCOR) 20 MG tablet Take 1 tablet (20 mg) by mouth At Bedtime 90 tablet 4     triamcinolone (KENALOG) 0.1 % external ointment Apply sparingly to affected area twice daily as needed. 15 g 1       Allergies   Allergen Reactions     Allopurinol Rash       REVIEW OF SYSTEMS:  CONSTITUTIONAL:  NEGATIVE for fever, chills, change in weight  INTEGUMENTARY/SKIN:  NEGATIVE for worrisome rashes, moles or lesions  EYES:  NEGATIVE for vision changes or irritation  ENT/MOUTH:  NEGATIVE for ear, mouth and throat problems  RESP:  NEGATIVE for significant cough or SOB  BREAST:  NEGATIVE for masses, tenderness or discharge  CV:  NEGATIVE for chest pain, palpitations or peripheral edema  GI:  NEGATIVE for nausea, abdominal pain, heartburn, or change in bowel habits  :  Negative   MUSCULOSKELETAL:  See HPI above  NEURO:  NEGATIVE for weakness, dizziness or paresthesias  ENDOCRINE:  NEGATIVE for temperature intolerance, skin/hair changes  HEME/ALLERGY/IMMUNE:  NEGATIVE for bleeding problems  PSYCHIATRIC:  NEGATIVE for changes in mood or affect    Exam:  right hand dominant.  Sensation, motor, and circulation intact.  mild triggering  noted of left middle finger.  Palpable lump moves with the tendon.   Positive tenderness here  no triggering of other fingers.  There is some probable Dupuytren's nodules along the palm, but no contraction of the metacarpal phalangeal joint.  Tinel of median nerve: negative.    Assessment:  Trigger finger of left middle finger  Plan:  Discussed options of decreased use, NSAIDs, steroid injection, and trigger finger release.  He would like to proceed with injection.  With the patient's consent, I injected the left  middle finger tendon sheath with Kenalog 20 mg and lidocaine after sterile prep.    Return to clinic as needed.          Hand / Upper Extremity Injection/Arthrocentesis: L long A1    Date/Time: 8/5/2021 4:01 PM  Performed by: Alvarez Sampson MD  Authorized by: Alvarez Sampson MD     Indications:  Pain  Needle Size:  25 G  Guidance: landmark    Condition: trigger finger    Location:  Long finger    Site:  L long A1  Medications:  20 mg triamcinolone 40 MG/ML; 0.5 mL lidocaine 1 %  Medications comment:  0.5ml 0.5% bupivicaine  NDC:51962-387-69  Lot: [id number]  8/31/21        Outcome:  Tolerated well, no immediate complications  Procedure discussed: discussed risks, benefits, and alternatives    Consent Given by:  Patient  Timeout: timeout called immediately prior to procedure    Prep: patient was prepped and draped in usual sterile fashion              Again, thank you for allowing me to participate in the care of your patient.        Sincerely,        Alvarez Sampson MD

## 2021-08-05 NOTE — PROGRESS NOTES
Deandre Merchant is a 71 year old male who is seen as self referral for pain and catching in his left middle finger.  This started 1 year ago.  He has aching pain with a sharp shooting pain at times.  The middle finger occasionally locks, especially in the morning.  He has had similar problems before with one prior steroid injection.  He saw  Dr. Phillip Ames yesterday, who was concerned about possible Dupuytren's contracture. .    Occupation - retired construction.  Diabetes mellitus negative.  Hypothyroidism negative.    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     Lymphadenopathy     mediastinal & retroperitoneal     Non Hodgkin's lymphoma (H)      Non-Hodgkin's lymphoma of multiple sites (H) 6/26/2012     Polyps, colonic      Retinal detachment        Past Surgical History:   Procedure Laterality Date     COLONOSCOPY N/A 3/1/2017    Procedure: COLONOSCOPY;  Surgeon: Dakota Wall MD;  Location:  GI     ESOPHAGOSCOPY, GASTROSCOPY, DUODENOSCOPY (EGD), COMBINED  4/21/2013    Procedure: COMBINED ESOPHAGOSCOPY, GASTROSCOPY, DUODENOSCOPY (EGD), BIOPSY SINGLE OR MULTIPLE;;  Surgeon: Torrey Cosme MD;  Location:  GI     ESOPHAGOSCOPY, GASTROSCOPY, DUODENOSCOPY (EGD), COMBINED  10/9/2013    Procedure: COMBINED ESOPHAGOSCOPY, GASTROSCOPY, DUODENOSCOPY (EGD), BIOPSY SINGLE OR MULTIPLE;  ESOPHAGOSCOPY, GASTROSCOPY, DUODENOSCOPY (EGD) with multiple biopsies;  Surgeon: Shay Sauer MD;  Location:  GI     LAPAROTOMY EXPLORATORY  4/24/2013    Procedure: LAPAROTOMY EXPLORATORY;  Exploratory Laparotomy and lysis of adhesions.;  Surgeon: Genevieve Barros MD;  Location:  OR     [facility] COLONOSCOPY W/WO BRUSH/WASH  07/12/06       Family History   Problem Relation Age of Onset     Cancer Father         liver/kidney     C.A.D. Father      Pacemaker Brother      Arthritis Mother         RA       Social History      Socioeconomic History     Marital status:      Spouse name: Cristel     Number of children: 2     Years of education: Not on file     Highest education level: Not on file   Occupational History     Not on file   Tobacco Use     Smoking status: Never Smoker     Smokeless tobacco: Never Used   Vaping Use     Vaping Use: Never used   Substance and Sexual Activity     Alcohol use: Yes     Alcohol/week: 3.3 standard drinks     Comment: occasionally weekends     Drug use: No     Sexual activity: Yes   Other Topics Concern      Service Not Asked     Blood Transfusions Not Asked     Caffeine Concern No     Occupational Exposure Not Asked     Hobby Hazards Not Asked     Sleep Concern No     Stress Concern No     Weight Concern No     Special Diet Not Asked     Back Care Not Asked     Exercise Yes     Bike Helmet Not Asked     Seat Belt Yes     Self-Exams Not Asked     Parent/sibling w/ CABG, MI or angioplasty before 65F 55M? Not Asked   Social History Narrative     Not on file     Social Determinants of Health     Financial Resource Strain:      Difficulty of Paying Living Expenses:    Food Insecurity:      Worried About Running Out of Food in the Last Year:      Ran Out of Food in the Last Year:    Transportation Needs:      Lack of Transportation (Medical):      Lack of Transportation (Non-Medical):    Physical Activity:      Days of Exercise per Week:      Minutes of Exercise per Session:    Stress:      Feeling of Stress :    Social Connections:      Frequency of Communication with Friends and Family:      Frequency of Social Gatherings with Friends and Family:      Attends Rastafari Services:      Active Member of Clubs or Organizations:      Attends Club or Organization Meetings:      Marital Status:    Intimate Partner Violence:      Fear of Current or Ex-Partner:      Emotionally Abused:      Physically Abused:      Sexually Abused:        Current Outpatient Medications   Medication Sig Dispense  Refill     Acetaminophen (TYLENOL PO) Take 1,000 mg by mouth       cetirizine (ZYRTEC) 10 MG tablet Take 10 mg by mouth daily       chlorpheniramine (CHLOR-TRIMETON) 4 MG tablet Take 4 mg by mouth every 6 hours as needed for allergies or rhinitis       fluticasone (FLONASE) 50 MCG/ACT nasal spray Spray 2 sprays into both nostrils daily 16 g 11     metoprolol succinate ER (TOPROL-XL) 50 MG 24 hr tablet Take 1 tablet (50 mg) by mouth daily 90 tablet 4     order for DME Equipment ordered: RESMED Auto PAP Mask type: Full face  Settings: 5-15 cm h2o       simvastatin (ZOCOR) 20 MG tablet Take 1 tablet (20 mg) by mouth At Bedtime 90 tablet 4     triamcinolone (KENALOG) 0.1 % external ointment Apply sparingly to affected area twice daily as needed. 15 g 1       Allergies   Allergen Reactions     Allopurinol Rash       REVIEW OF SYSTEMS:  CONSTITUTIONAL:  NEGATIVE for fever, chills, change in weight  INTEGUMENTARY/SKIN:  NEGATIVE for worrisome rashes, moles or lesions  EYES:  NEGATIVE for vision changes or irritation  ENT/MOUTH:  NEGATIVE for ear, mouth and throat problems  RESP:  NEGATIVE for significant cough or SOB  BREAST:  NEGATIVE for masses, tenderness or discharge  CV:  NEGATIVE for chest pain, palpitations or peripheral edema  GI:  NEGATIVE for nausea, abdominal pain, heartburn, or change in bowel habits  :  Negative   MUSCULOSKELETAL:  See HPI above  NEURO:  NEGATIVE for weakness, dizziness or paresthesias  ENDOCRINE:  NEGATIVE for temperature intolerance, skin/hair changes  HEME/ALLERGY/IMMUNE:  NEGATIVE for bleeding problems  PSYCHIATRIC:  NEGATIVE for changes in mood or affect    Exam:  right hand dominant.  Sensation, motor, and circulation intact.  mild triggering noted of left middle finger.  Palpable lump moves with the tendon.   Positive tenderness here  no triggering of other fingers.  There is some probable Dupuytren's nodules along the palm, but no contraction of the metacarpal phalangeal  joint.  Tinel of median nerve: negative.    Assessment:  Trigger finger of left middle finger  Plan:  Discussed options of decreased use, NSAIDs, steroid injection, and trigger finger release.  He would like to proceed with injection.  With the patient's consent, I injected the left  middle finger tendon sheath with Kenalog 20 mg and lidocaine after sterile prep.    Return to clinic as needed.

## 2021-08-05 NOTE — PROGRESS NOTES
Hand / Upper Extremity Injection/Arthrocentesis: L long A1    Date/Time: 8/5/2021 4:01 PM  Performed by: Alvarez Sampson MD  Authorized by: Alvarez Sampson MD     Indications:  Pain  Needle Size:  25 G  Guidance: landmark    Condition: trigger finger    Location:  Long finger    Site:  L long A1  Medications:  20 mg triamcinolone 40 MG/ML; 0.5 mL lidocaine 1 %  Medications comment:  0.5ml 0.5% bupivicaine  NDC:09722-801-93  Lot: [id number]  8/31/21        Outcome:  Tolerated well, no immediate complications  Procedure discussed: discussed risks, benefits, and alternatives    Consent Given by:  Patient  Timeout: timeout called immediately prior to procedure    Prep: patient was prepped and draped in usual sterile fashion

## 2021-08-23 ENCOUNTER — HOSPITAL ENCOUNTER (OUTPATIENT)
Dept: CT IMAGING | Facility: CLINIC | Age: 71
Discharge: HOME | End: 2021-08-23
Attending: INTERNAL MEDICINE | Admitting: INTERNAL MEDICINE
Payer: MEDICARE

## 2021-08-23 DIAGNOSIS — C85.98 NON-HODGKIN'S LYMPHOMA OF MULTIPLE SITES (H): ICD-10-CM

## 2021-08-23 LAB
CREAT BLD-MCNC: 1 MG/DL (ref 0.7–1.3)
GFR SERPL CREATININE-BSD FRML MDRD: >60 ML/MIN/1.73M2

## 2021-08-23 PROCEDURE — 74177 CT ABD & PELVIS W/CONTRAST: CPT

## 2021-08-23 PROCEDURE — 250N000011 HC RX IP 250 OP 636: Performed by: INTERNAL MEDICINE

## 2021-08-23 PROCEDURE — 82565 ASSAY OF CREATININE: CPT

## 2021-08-23 PROCEDURE — 250N000009 HC RX 250: Performed by: INTERNAL MEDICINE

## 2021-08-23 RX ORDER — IOPAMIDOL 755 MG/ML
500 INJECTION, SOLUTION INTRAVASCULAR ONCE
Status: COMPLETED | OUTPATIENT
Start: 2021-08-23 | End: 2021-08-23

## 2021-08-23 RX ADMIN — IOPAMIDOL 100 ML: 755 INJECTION, SOLUTION INTRAVENOUS at 08:24

## 2021-08-23 RX ADMIN — SODIUM CHLORIDE 60 ML: 9 INJECTION, SOLUTION INTRAVENOUS at 08:24

## 2021-10-23 ENCOUNTER — HEALTH MAINTENANCE LETTER (OUTPATIENT)
Age: 71
End: 2021-10-23

## 2021-10-26 ENCOUNTER — HOSPITAL ENCOUNTER (OUTPATIENT)
Dept: MRI IMAGING | Facility: CLINIC | Age: 71
Discharge: HOME | End: 2021-10-26
Attending: NEUROLOGICAL SURGERY | Admitting: NEUROLOGICAL SURGERY
Payer: MEDICARE

## 2021-10-26 DIAGNOSIS — M54.16 LUMBAR RADICULOPATHY: ICD-10-CM

## 2021-10-26 PROCEDURE — 72158 MRI LUMBAR SPINE W/O & W/DYE: CPT | Mod: ME

## 2021-10-26 PROCEDURE — A9585 GADOBUTROL INJECTION: HCPCS | Performed by: NEUROLOGICAL SURGERY

## 2021-10-26 PROCEDURE — 255N000002 HC RX 255 OP 636: Performed by: NEUROLOGICAL SURGERY

## 2021-10-26 RX ORDER — GADOBUTROL 604.72 MG/ML
10 INJECTION INTRAVENOUS ONCE
Status: COMPLETED | OUTPATIENT
Start: 2021-10-26 | End: 2021-10-26

## 2021-10-26 RX ADMIN — GADOBUTROL 10 ML: 604.72 INJECTION INTRAVENOUS at 14:02

## 2021-10-27 ENCOUNTER — TELEPHONE (OUTPATIENT)
Dept: NEUROSURGERY | Facility: CLINIC | Age: 71
End: 2021-10-27

## 2021-10-27 NOTE — TELEPHONE ENCOUNTER
Dr. Alston received lumbar MRI results. Per Dr. Alston MRI is Stable, repeat in 1 year.     Attempted to reach out to patient, no answer. Left voice message asking patient to call clinic back to further discuss above results and recommendations.     Reminder set for nursing to place new lumbar MRI order w/ and w/o mid October 2022.

## 2021-11-03 ENCOUNTER — ONCOLOGY VISIT (OUTPATIENT)
Dept: ONCOLOGY | Facility: CLINIC | Age: 71
End: 2021-11-03
Attending: INTERNAL MEDICINE
Payer: MEDICARE

## 2021-11-03 VITALS
DIASTOLIC BLOOD PRESSURE: 81 MMHG | TEMPERATURE: 98.1 F | RESPIRATION RATE: 18 BRPM | HEIGHT: 74 IN | HEART RATE: 76 BPM | OXYGEN SATURATION: 97 % | BODY MASS INDEX: 38.04 KG/M2 | SYSTOLIC BLOOD PRESSURE: 128 MMHG | WEIGHT: 296.4 LBS

## 2021-11-03 DIAGNOSIS — C85.98 NON-HODGKIN'S LYMPHOMA OF MULTIPLE SITES (H): ICD-10-CM

## 2021-11-03 DIAGNOSIS — D49.2 LUMBAR SPINE TUMOR: Primary | ICD-10-CM

## 2021-11-03 LAB
ALBUMIN SERPL-MCNC: 3.8 G/DL (ref 3.4–5)
ALP SERPL-CCNC: 100 U/L (ref 40–150)
ALT SERPL W P-5'-P-CCNC: 55 U/L (ref 0–70)
ANION GAP SERPL CALCULATED.3IONS-SCNC: 5 MMOL/L (ref 3–14)
AST SERPL W P-5'-P-CCNC: 42 U/L (ref 0–45)
BASOPHILS # BLD AUTO: 0.1 10E3/UL (ref 0–0.2)
BASOPHILS NFR BLD AUTO: 1 %
BILIRUB SERPL-MCNC: 0.7 MG/DL (ref 0.2–1.3)
BUN SERPL-MCNC: 16 MG/DL (ref 7–30)
CALCIUM SERPL-MCNC: 8.6 MG/DL (ref 8.5–10.1)
CHLORIDE BLD-SCNC: 106 MMOL/L (ref 94–109)
CO2 SERPL-SCNC: 26 MMOL/L (ref 20–32)
CREAT SERPL-MCNC: 1.03 MG/DL (ref 0.66–1.25)
EOSINOPHIL # BLD AUTO: 0.2 10E3/UL (ref 0–0.7)
EOSINOPHIL NFR BLD AUTO: 2 %
ERYTHROCYTE [DISTWIDTH] IN BLOOD BY AUTOMATED COUNT: 12.5 % (ref 10–15)
GFR SERPL CREATININE-BSD FRML MDRD: 73 ML/MIN/1.73M2
GLUCOSE BLD-MCNC: 146 MG/DL (ref 70–99)
HCT VFR BLD AUTO: 40.3 % (ref 40–53)
HGB BLD-MCNC: 13.9 G/DL (ref 13.3–17.7)
HOLD SPECIMEN: NORMAL
IMM GRANULOCYTES # BLD: 0 10E3/UL
IMM GRANULOCYTES NFR BLD: 1 %
LDH SERPL L TO P-CCNC: 199 U/L (ref 85–227)
LYMPHOCYTES # BLD AUTO: 1.2 10E3/UL (ref 0.8–5.3)
LYMPHOCYTES NFR BLD AUTO: 18 %
MCH RBC QN AUTO: 31 PG (ref 26.5–33)
MCHC RBC AUTO-ENTMCNC: 34.5 G/DL (ref 31.5–36.5)
MCV RBC AUTO: 90 FL (ref 78–100)
MONOCYTES # BLD AUTO: 0.8 10E3/UL (ref 0–1.3)
MONOCYTES NFR BLD AUTO: 13 %
NEUTROPHILS # BLD AUTO: 4.3 10E3/UL (ref 1.6–8.3)
NEUTROPHILS NFR BLD AUTO: 65 %
NRBC # BLD AUTO: 0 10E3/UL
NRBC BLD AUTO-RTO: 0 /100
PLATELET # BLD AUTO: 183 10E3/UL (ref 150–450)
POTASSIUM BLD-SCNC: 4 MMOL/L (ref 3.4–5.3)
PROT SERPL-MCNC: 6.5 G/DL (ref 6.8–8.8)
RBC # BLD AUTO: 4.48 10E6/UL (ref 4.4–5.9)
SODIUM SERPL-SCNC: 137 MMOL/L (ref 133–144)
WBC # BLD AUTO: 6.5 10E3/UL (ref 4–11)

## 2021-11-03 PROCEDURE — 85025 COMPLETE CBC W/AUTO DIFF WBC: CPT | Performed by: INTERNAL MEDICINE

## 2021-11-03 PROCEDURE — 83615 LACTATE (LD) (LDH) ENZYME: CPT | Performed by: INTERNAL MEDICINE

## 2021-11-03 PROCEDURE — 36415 COLL VENOUS BLD VENIPUNCTURE: CPT | Performed by: INTERNAL MEDICINE

## 2021-11-03 PROCEDURE — 80053 COMPREHEN METABOLIC PANEL: CPT | Performed by: INTERNAL MEDICINE

## 2021-11-03 PROCEDURE — 99214 OFFICE O/P EST MOD 30 MIN: CPT | Performed by: INTERNAL MEDICINE

## 2021-11-03 ASSESSMENT — PAIN SCALES - GENERAL: PAINLEVEL: NO PAIN (0)

## 2021-11-03 ASSESSMENT — MIFFLIN-ST. JEOR: SCORE: 2166.27

## 2021-11-03 NOTE — PROGRESS NOTES
FOLLOW-UP VISIT NOTE    PATIENT NAME: Deandre Merchant MRN # [id number]  DATE OF VISIT: Nov 3, 2021 YOB: 1950        CANCER TYPE:Follicular Lymphoma  STAGE: IV( axillary, mediastinal, retroperitoneal, stomach, bone marrow)      ONCOLOGY HISTORY:    Patient was being followed by Dr. Krishnamurthy but now she has transferred his care to me.  I reviewed his previous records and have copied and updated from prior notes.  71 year old  male who in 2012 presented with mediastinal, retroperitoneal and mesenteric lymph nodes. Was clinically asymptomatic with normal blood counts. CT-guided lymph node biopsy on 6/21/2012 was consistent with follicular lymphoma, grade 1-2. He was observed at that point.    In 2013, presented to the hospital with ileus and abdominal distention. EGD showed gastric antrum and duodenal ulcers which were biopsied and showed follicular lymphoma. CT scans showed bulky  lymphadenopathy with increasing size. 04/13 Bone marrow biopsy was positive with evidence of follicular lymphoma.   He received bendamustine and Rituxan regimen April 2014- -September 2014.   In December 14 , he was started on maintenance Rituxan every 2 months for 2 years which he completed in October 2016.     In remission and on observation since then.    7/14/2020 - established care with me.    CT scan in July 2020 was stable apart from an indeterminate L2 vertebral body lesion.    MRI of the lumbar spine showed Nonspecific enhancing lesion in the right aspect of the L2 inferior endplate which remains indeterminate.    12/7/2020.  Repeat MRI of the lumbar spine is the same.    We proceeded with a PET scan on 1/8/2021 which shows mild FDG uptake in the L2 sclerotic lesion.  It was measuring about 1.5 x 1.2 x 1.4 cm and was stable.  Of note previously the lymphoma was FDG avid.    SUBJECTIVE   He comes into the clinic and tells me that he has been feeling well.  Denies any pain.  No new neurological problems.  Denies any  weight loss or night sweats.  No new swellings.  Energy is decent.  No shortness of breath.  No nausea vomiting diarrhea constipation.    ECOG 0    ROS:  Rest of the comprehensive review of the system was negative.      I reviewed other history in epic as below.      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     Lymphadenopathy     mediastinal & retroperitoneal     Non Hodgkin's lymphoma (H)      Non-Hodgkin's lymphoma of multiple sites (H) 6/26/2012     Polyps, colonic      Retinal detachment          CURRENT OUTPATIENT MEDICATIONS     Current Outpatient Medications   Medication Sig Dispense Refill     Acetaminophen (TYLENOL PO) Take 1,000 mg by mouth       cetirizine (ZYRTEC) 10 MG tablet Take 10 mg by mouth daily       chlorpheniramine (CHLOR-TRIMETON) 4 MG tablet Take 4 mg by mouth every 6 hours as needed for allergies or rhinitis       fluticasone (FLONASE) 50 MCG/ACT nasal spray Spray 2 sprays into both nostrils daily 16 g 11     metoprolol succinate ER (TOPROL-XL) 50 MG 24 hr tablet Take 1 tablet (50 mg) by mouth daily 90 tablet 4     order for DME Equipment ordered: RESMED Auto PAP Mask type: Full face  Settings: 5-15 cm h2o       simvastatin (ZOCOR) 20 MG tablet Take 1 tablet (20 mg) by mouth At Bedtime 90 tablet 4     triamcinolone (KENALOG) 0.1 % external ointment Apply sparingly to affected area twice daily as needed. 15 g 1        ALLERGIES     Allergies   Allergen Reactions     Allopurinol Rash     FAMILY HISTORY:  Family History   Problem Relation Age of Onset     Cancer Father         liver/kidney     C.A.D. Father      Pacemaker Brother      Arthritis Mother         RA     Dad had liver cancer at 74.  He has 2 children and they are healthy.    Social History     Socioeconomic History     Marital status:      Spouse name: Cristel     Number of children: 2     Years of  education: Not on file     Highest education level: Not on file   Occupational History     Not on file   Tobacco Use     Smoking status: Never Smoker     Smokeless tobacco: Never Used   Vaping Use     Vaping Use: Never used   Substance and Sexual Activity     Alcohol use: Yes     Alcohol/week: 3.3 standard drinks     Comment: occasionally weekends     Drug use: No     Sexual activity: Yes   Other Topics Concern      Service Not Asked     Blood Transfusions Not Asked     Caffeine Concern No     Occupational Exposure Not Asked     Hobby Hazards Not Asked     Sleep Concern No     Stress Concern No     Weight Concern No     Special Diet Not Asked     Back Care Not Asked     Exercise Yes     Bike Helmet Not Asked     Seat Belt Yes     Self-Exams Not Asked     Parent/sibling w/ CABG, MI or angioplasty before 65F 55M? Not Asked   Social History Narrative     Not on file     Social Determinants of Health     Financial Resource Strain:      Difficulty of Paying Living Expenses:    Food Insecurity:      Worried About Running Out of Food in the Last Year:      Ran Out of Food in the Last Year:    Transportation Needs:      Lack of Transportation (Medical):      Lack of Transportation (Non-Medical):    Physical Activity:      Days of Exercise per Week:      Minutes of Exercise per Session:    Stress:      Feeling of Stress :    Social Connections:      Frequency of Communication with Friends and Family:      Frequency of Social Gatherings with Friends and Family:      Attends Jainism Services:      Active Member of Clubs or Organizations:      Attends Club or Organization Meetings:      Marital Status:    Intimate Partner Violence:      Fear of Current or Ex-Partner:      Emotionally Abused:      Physically Abused:      Sexually Abused:      Denies smoking. 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     PHYSICAL EXAM     /81 (BP Location: Left arm, Patient Position: Sitting, Cuff Size: Adult  "Large)   Pulse 76   Temp 98.1  F (36.7  C) (Oral)   Resp 18   Ht 1.867 m (6' 1.5\")   Wt 134.4 kg (296 lb 6.4 oz)   SpO2 97%   BMI 38.58 kg/m    Wt Readings from Last 4 Encounters:   11/03/21 134.4 kg (296 lb 6.4 oz)   08/05/21 133.8 kg (295 lb)   08/04/21 133.8 kg (295 lb)   08/03/21 133.7 kg (294 lb 12.8 oz)       CONSTITUTIONAL: No apparent distress  EYES: PERRLA, without pallor or jaundice  ENT/MOUTH: Ears unremarkable. No oral lesions  CVS: s1s2 normal  RESPIRATORY: Chest is clear  GI: Abdomen is benign  NEURO: Alert and oriented ×3  INTEGUMENT: no concerning skin rashes   LYMPHATIC: no palpable lymphadenopathy  MUSCULOSKELETAL: Unremarkable. No bony tenderness.   EXTREMITIES: no pedal edema  PSYCH: Mentation, mood and affect are appropriate         LABORATORY AND IMAGING STUDIES       Previous labs reviewed.  Labs from today are pending.    7/15/2021.  CBC unremarkable  CMP unremarkable except total protein 6.4 and calcium 8.4.  .    Reviewed imaging as below.    CT chest abdomen and pelvis on 8/23/2021 showed stable lymph node in the retroperitoneum as compared to PET from January 2021.  Small sclerotic lesion in the L2 vertebral body may be minimally increased in size since July 2020.  No new evidence of metastatic disease.      MRI of the lumbar spine on 10/26/2021 showed a stable ovoid enhancing lesion in the right aspect of the L2 vertebral body as compared to December 2020 MRI.            ASSESSMENT AND PLAN     Stage IV follicular lymphoma- status post treatment with bendamustine/Rituxan (04-09/2014) followed by maintenance rituximab for two years- completed in October 2016.    Currently on observation and is doing well.  Assuming that the labs today are is stable, we will repeat CT chest abdomen and pelvis and labs in  February 2022.       L2 sclerotic lesion.  He was seen by Dr. Alston from neurosurgery and I reviewed the notes.  Most likely this is a benign lesion and recommendation was to " follow it up serially with repeat MRI in 6 months.  Repeat MRI on 10/26/2021 also shows this to be very stable.  Continue to monitor.    Healthy lifestyle.  He is overweight.  I advised him to exercise regularly and eat healthy to gradually lose weight in a healthy manner.    We did not address the following today.  Interstitial lung disease.  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  He is asymptomatic.  I discussed with him that I can give him a referral to see a lung specialist but it is likely that considering he is completely asymptomatic, that at this time, continued observation would be recommended.  Alternatively he can discuss this with his primary care physician.  He would like to wait and he tells me that he will discuss with his PCP about it further.    Return to clinic in about 4 months with repeat CT scan and labs prior.    All questions answered.  He is agreeable and comfortable with the plan.    Eduardo Crespo MD

## 2021-11-03 NOTE — NURSING NOTE
"Oncology Rooming Note    November 3, 2021 3:29 PM   Deandre Merchant is a 71 year old male who presents for:    Chief Complaint   Patient presents with     Oncology Clinic Visit     Follow up     Initial Vitals: /81 (BP Location: Left arm, Patient Position: Sitting, Cuff Size: Adult Large)   Pulse 76   Temp 98.1  F (36.7  C) (Oral)   Resp 18   Ht 1.867 m (6' 1.5\")   Wt 134.4 kg (296 lb 6.4 oz)   SpO2 97%   BMI 38.58 kg/m   Estimated body mass index is 38.58 kg/m  as calculated from the following:    Height as of this encounter: 1.867 m (6' 1.5\").    Weight as of this encounter: 134.4 kg (296 lb 6.4 oz). Body surface area is 2.64 meters squared.  No Pain (0) Comment: Data Unavailable   No LMP for male patient.  Allergies reviewed: Yes  Medications reviewed: Yes    Medications: Medication refills not needed today.  Pharmacy name entered into [facility]:    [facility] [place], [place] - [address] [place] [address] [address] [place], [place] - [address] [place] [address]    Clinical concerns: MRI results/lab results Dr. Crespo was notified.      Candida Zapien [facility]            "
19.09

## 2021-11-03 NOTE — PATIENT INSTRUCTIONS
Labs today    Assuming labs are fine, we will repeat labs and Ct scan in about 4 months and see me after that

## 2021-12-16 DIAGNOSIS — G47.33 OSA (OBSTRUCTIVE SLEEP APNEA): Primary | ICD-10-CM

## 2022-03-02 ENCOUNTER — HOSPITAL ENCOUNTER (OUTPATIENT)
Dept: CT IMAGING | Facility: CLINIC | Age: 72
Discharge: HOME | End: 2022-03-02
Attending: INTERNAL MEDICINE | Admitting: INTERNAL MEDICINE
Payer: MEDICARE

## 2022-03-02 DIAGNOSIS — D49.2 LUMBAR SPINE TUMOR: ICD-10-CM

## 2022-03-02 DIAGNOSIS — C85.98 NON-HODGKIN'S LYMPHOMA OF MULTIPLE SITES (H): ICD-10-CM

## 2022-03-02 LAB
CREAT BLD-MCNC: 0.9 MG/DL (ref 0.7–1.3)
GFR SERPL CREATININE-BSD FRML MDRD: >60 ML/MIN/1.73M2

## 2022-03-02 PROCEDURE — 82565 ASSAY OF CREATININE: CPT

## 2022-03-02 PROCEDURE — 250N000009 HC RX 250: Performed by: RADIOLOGY

## 2022-03-02 PROCEDURE — 250N000011 HC RX IP 250 OP 636: Performed by: RADIOLOGY

## 2022-03-02 PROCEDURE — 74177 CT ABD & PELVIS W/CONTRAST: CPT

## 2022-03-02 RX ORDER — IOPAMIDOL 755 MG/ML
500 INJECTION, SOLUTION INTRAVASCULAR ONCE
Status: COMPLETED | OUTPATIENT
Start: 2022-03-02 | End: 2022-03-02

## 2022-03-02 RX ADMIN — SODIUM CHLORIDE 60 ML: 9 INJECTION, SOLUTION INTRAVENOUS at 08:05

## 2022-03-02 RX ADMIN — IOPAMIDOL 100 ML: 755 INJECTION, SOLUTION INTRAVENOUS at 08:05

## 2022-03-08 DIAGNOSIS — C85.98 NON-HODGKIN'S LYMPHOMA OF MULTIPLE SITES (H): Primary | ICD-10-CM

## 2022-03-09 ENCOUNTER — ONCOLOGY VISIT (OUTPATIENT)
Dept: ONCOLOGY | Facility: CLINIC | Age: 72
End: 2022-03-09
Attending: INTERNAL MEDICINE
Payer: MEDICARE

## 2022-03-09 ENCOUNTER — LAB (OUTPATIENT)
Dept: LAB | Facility: CLINIC | Age: 72
End: 2022-03-09

## 2022-03-09 VITALS
WEIGHT: 294.9 LBS | HEART RATE: 71 BPM | TEMPERATURE: 97.8 F | BODY MASS INDEX: 37.85 KG/M2 | DIASTOLIC BLOOD PRESSURE: 80 MMHG | HEIGHT: 74 IN | SYSTOLIC BLOOD PRESSURE: 138 MMHG | OXYGEN SATURATION: 96 %

## 2022-03-09 DIAGNOSIS — N13.4 HYDROURETER: ICD-10-CM

## 2022-03-09 DIAGNOSIS — J84.9 ILD (INTERSTITIAL LUNG DISEASE) (H): Primary | ICD-10-CM

## 2022-03-09 DIAGNOSIS — C85.98 NON-HODGKIN'S LYMPHOMA OF MULTIPLE SITES (H): ICD-10-CM

## 2022-03-09 DIAGNOSIS — D49.2 LUMBAR SPINE TUMOR: ICD-10-CM

## 2022-03-09 LAB
ALBUMIN SERPL-MCNC: 3.9 G/DL (ref 3.4–5)
ALP SERPL-CCNC: 116 U/L (ref 40–150)
ALT SERPL W P-5'-P-CCNC: 75 U/L (ref 0–70)
ANION GAP SERPL CALCULATED.3IONS-SCNC: 7 MMOL/L (ref 3–14)
AST SERPL W P-5'-P-CCNC: 70 U/L (ref 0–45)
BASOPHILS # BLD AUTO: 0.1 10E3/UL (ref 0–0.2)
BASOPHILS NFR BLD AUTO: 1 %
BILIRUB SERPL-MCNC: 0.9 MG/DL (ref 0.2–1.3)
BUN SERPL-MCNC: 14 MG/DL (ref 7–30)
CALCIUM SERPL-MCNC: 8.7 MG/DL (ref 8.5–10.1)
CHLORIDE BLD-SCNC: 105 MMOL/L (ref 94–109)
CO2 SERPL-SCNC: 26 MMOL/L (ref 20–32)
CREAT SERPL-MCNC: 0.86 MG/DL (ref 0.66–1.25)
EOSINOPHIL # BLD AUTO: 0.1 10E3/UL (ref 0–0.7)
EOSINOPHIL NFR BLD AUTO: 2 %
ERYTHROCYTE [DISTWIDTH] IN BLOOD BY AUTOMATED COUNT: 12.7 % (ref 10–15)
GFR SERPL CREATININE-BSD FRML MDRD: >90 ML/MIN/1.73M2
GLUCOSE BLD-MCNC: 142 MG/DL (ref 70–99)
HCT VFR BLD AUTO: 42.3 % (ref 40–53)
HGB BLD-MCNC: 14.4 G/DL (ref 13.3–17.7)
IMM GRANULOCYTES # BLD: 0 10E3/UL
IMM GRANULOCYTES NFR BLD: 0 %
LDH SERPL L TO P-CCNC: 214 U/L (ref 85–227)
LYMPHOCYTES # BLD AUTO: 1.1 10E3/UL (ref 0.8–5.3)
LYMPHOCYTES NFR BLD AUTO: 19 %
MCH RBC QN AUTO: 30.3 PG (ref 26.5–33)
MCHC RBC AUTO-ENTMCNC: 34 G/DL (ref 31.5–36.5)
MCV RBC AUTO: 89 FL (ref 78–100)
MONOCYTES # BLD AUTO: 0.7 10E3/UL (ref 0–1.3)
MONOCYTES NFR BLD AUTO: 12 %
NEUTROPHILS # BLD AUTO: 3.9 10E3/UL (ref 1.6–8.3)
NEUTROPHILS NFR BLD AUTO: 66 %
NRBC # BLD AUTO: 0 10E3/UL
NRBC BLD AUTO-RTO: 0 /100
PLATELET # BLD AUTO: 188 10E3/UL (ref 150–450)
POTASSIUM BLD-SCNC: 4.3 MMOL/L (ref 3.4–5.3)
PROT SERPL-MCNC: 6.8 G/DL (ref 6.8–8.8)
RBC # BLD AUTO: 4.76 10E6/UL (ref 4.4–5.9)
SODIUM SERPL-SCNC: 138 MMOL/L (ref 133–144)
WBC # BLD AUTO: 5.9 10E3/UL (ref 4–11)

## 2022-03-09 PROCEDURE — 85025 COMPLETE CBC W/AUTO DIFF WBC: CPT

## 2022-03-09 PROCEDURE — 36415 COLL VENOUS BLD VENIPUNCTURE: CPT

## 2022-03-09 PROCEDURE — 80053 COMPREHEN METABOLIC PANEL: CPT

## 2022-03-09 PROCEDURE — 99214 OFFICE O/P EST MOD 30 MIN: CPT | Performed by: INTERNAL MEDICINE

## 2022-03-09 PROCEDURE — 83615 LACTATE (LD) (LDH) ENZYME: CPT

## 2022-03-09 ASSESSMENT — PAIN SCALES - GENERAL: PAINLEVEL: NO PAIN (0)

## 2022-03-09 NOTE — PROGRESS NOTES
FOLLOW-UP VISIT NOTE    PATIENT NAME: Deandre Merchant MRN # [id number]  DATE OF VISIT: Mar 9, 2022 YOB: 1950        CANCER TYPE:Follicular Lymphoma  STAGE: IV( axillary, mediastinal, retroperitoneal, stomach, bone marrow)      ONCOLOGY HISTORY:    Patient was being followed by Dr. Krishnamurthy but now she has transferred his care to me.  I reviewed his previous records and have copied and updated from prior notes.  72 year old  male who in 2012 presented with mediastinal, retroperitoneal and mesenteric lymph nodes. Was clinically asymptomatic with normal blood counts. CT-guided lymph node biopsy on 6/21/2012 was consistent with follicular lymphoma, grade 1-2. He was observed at that point.    In 2013, presented to the hospital with ileus and abdominal distention. EGD showed gastric antrum and duodenal ulcers which were biopsied and showed follicular lymphoma. CT scans showed bulky  lymphadenopathy with increasing size. 04/13 Bone marrow biopsy was positive with evidence of follicular lymphoma.   He received bendamustine and Rituxan regimen April 2014- -September 2014.   In December 14 , he was started on maintenance Rituxan every 2 months for 2 years which he completed in October 2016.     In remission and on observation since then.    7/14/2020 - established care with me.    CT scan in July 2020 was stable apart from an indeterminate L2 vertebral body lesion.    MRI of the lumbar spine showed Nonspecific enhancing lesion in the right aspect of the L2 inferior endplate which remains indeterminate.    12/7/2020.  Repeat MRI of the lumbar spine is the same.    We proceeded with a PET scan on 1/8/2021 which shows mild FDG uptake in the L2 sclerotic lesion.  It was measuring about 1.5 x 1.2 x 1.4 cm and was stable.  Of note previously the lymphoma was FDG avid.    SUBJECTIVE   He is doing well.  He does not have any B symptoms or new swellings.  Denies significant fatigue.  No dyspnea.  No GI complaints.   He does mention that it takes him a long to urinate which is frustrating.  No dysuria.    ECOG 0    ROS:  Rest of the comprehensive review of the system was negative.      I reviewed other history in epic as below.      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     Lymphadenopathy     mediastinal & retroperitoneal     Non Hodgkin's lymphoma (H)      Non-Hodgkin's lymphoma of multiple sites (H) 6/26/2012     Polyps, colonic      Retinal detachment          CURRENT OUTPATIENT MEDICATIONS     Current Outpatient Medications   Medication Sig Dispense Refill     Acetaminophen (TYLENOL PO) Take 1,000 mg by mouth       cetirizine (ZYRTEC) 10 MG tablet Take 10 mg by mouth daily       chlorpheniramine (CHLOR-TRIMETON) 4 MG tablet Take 4 mg by mouth every 6 hours as needed for allergies or rhinitis       fluticasone (FLONASE) 50 MCG/ACT nasal spray Spray 2 sprays into both nostrils daily 16 g 11     metoprolol succinate ER (TOPROL-XL) 50 MG 24 hr tablet Take 1 tablet (50 mg) by mouth daily 90 tablet 4     order for DME Equipment ordered: RESMED Auto PAP Mask type: Full face  Settings: 5-15 cm h2o       simvastatin (ZOCOR) 20 MG tablet Take 1 tablet (20 mg) by mouth At Bedtime 90 tablet 4     triamcinolone (KENALOG) 0.1 % external ointment Apply sparingly to affected area twice daily as needed. 15 g 1        ALLERGIES     Allergies   Allergen Reactions     Allopurinol Rash     FAMILY HISTORY:  Family History   Problem Relation Age of Onset     Cancer Father         liver/kidney     C.A.D. Father      Pacemaker Brother      Arthritis Mother         RA     Dad had liver cancer at 74.  He has 2 children and they are healthy.    Social History     Socioeconomic History     Marital status:      Spouse name: Cristel     Number of children: 2     Years of education: Not on file     Highest education level: Not  "on file   Occupational History     Not on file   Tobacco Use     Smoking status: Never Smoker     Smokeless tobacco: Never Used   Vaping Use     Vaping Use: Never used   Substance and Sexual Activity     Alcohol use: Yes     Alcohol/week: 3.3 standard drinks     Comment: occasionally weekends     Drug use: No     Sexual activity: Yes   Other Topics Concern      Service Not Asked     Blood Transfusions Not Asked     Caffeine Concern No     Occupational Exposure Not Asked     Hobby Hazards Not Asked     Sleep Concern No     Stress Concern No     Weight Concern No     Special Diet Not Asked     Back Care Not Asked     Exercise Yes     Bike Helmet Not Asked     Seat Belt Yes     Self-Exams Not Asked     Parent/sibling w/ CABG, MI or angioplasty before 65F 55M? Not Asked   Social History Narrative     Not on file     Social Determinants of Health     Financial Resource Strain: Not on file   Food Insecurity: Not on file   Transportation Needs: Not on file   Physical Activity: Not on file   Stress: Not on file   Social Connections: Not on file   Intimate Partner Violence: Not on file   Housing Stability: Not on file     Denies smoking. 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     PHYSICAL EXAM     /80   Pulse 71   Temp 97.8  F (36.6  C) (Oral)   Ht 1.867 m (6' 1.5\")   Wt 133.8 kg (294 lb 14.4 oz)   SpO2 96%   BMI 38.38 kg/m    Wt Readings from Last 4 Encounters:   03/09/22 133.8 kg (294 lb 14.4 oz)   11/03/21 134.4 kg (296 lb 6.4 oz)   08/05/21 133.8 kg (295 lb)   08/04/21 133.8 kg (295 lb)       CONSTITUTIONAL: no acute distress  EYES: Both pupils are surgical, no palor or icterus.   ENT/MOUTH: no mouth lesions. Ears normal  CVS: s1s2 no m r g .   RESPIRATORY: clear to auscultation b/l  GI: soft non tender no hepatosplenomegaly  NEURO: AAOX3  Grossly non focal neuro exam  INTEGUMENT: no obvious rashes  LYMPHATIC: no palpable cervical, supraclavicular, axillary or inguinal " LAD  MUSCULOSKELETAL: Unremarkable. No bony tenderness.   EXTREMITIES: no edema  PSYCH: Mentation, mood and affect are normal. Decision making capacity is intact         LABORATORY AND IMAGING STUDIES       I reviewed     3/9/2022  CBC unremarkable  CMP unremarkable except ALT 75 and AST 70.      CT chest abdomen and pelvis on 3/2/2022 does not show any evidence of lymphoma recurrence.  There is slight increase in bilateral distal hydroureter, left greater than the right.  There is increased thickening along the posterior urinary bladder wall near the level of the UVJs.  There is mild enlargement of the prostate.  L2 sclerotic lesion is stable.  Mid and lower lung subpleural reticulation has slightly increased.      MRI of the lumbar spine on 10/26/2021 showed a stable ovoid enhancing lesion in the right aspect of the L2 vertebral body as compared to December 2020 MRI.            ASSESSMENT AND PLAN     Stage IV follicular lymphoma- status post treatment with bendamustine/Rituxan (04-09/2014) followed by maintenance rituximab for two years- completed in October 2016.    He feels well and currently does not have evidence of recurrence/progression.  I recommend repeating labs and CT scan in 1 year.        L2 sclerotic lesion.  He was seen by Dr. Alston from neurosurgery and I reviewed the notes.  Most likely this is a benign lesion and recommendation was to follow it up serially with repeat MRI in 6 months.  Repeat MRI on 10/26/2021 also shows this to be very stable.  Repeat CT scan on 3/2/2022 also is a stable.  Continue to observe and repeat CT scan in 1 year.      Bilateral hydroureter and bladder wall thickening.  He does have some urinary hesitancy but no pain.  I believe he would benefit from urology evaluation and cystoscopy so I gave him a referral to see a urologist.      Interstitial lung disease.  Repeat CT scan continues to show slight worsening of the interstitial lung disease.  I advised him to  follow-up with pulmonology.  I gave him a referral for pulmonology.    Elevated liver enzymes.  Likely in the setting of statin use and being overweight and some degree of hepatic steatosis.  Advised him to gradually lose weight with regular exercise and healthy nutrition.  He will follow with PCP.    I will see him back in 1 year with labs and CT scan prior.    All questions answered.  He is agreeable and comfortable with the plan.    Eduardo Crespo MD

## 2022-03-09 NOTE — LETTER
3/9/2022         RE: Deandre Merchant  [address]  [facility] [address]        Dear Colleague,    Thank you for referring your patient, Deandre Merchant, to the [facility]. Please see a copy of my visit note below.          FOLLOW-UP VISIT NOTE    PATIENT NAME: Deandre Merchant MRN # [id number]  DATE OF VISIT: Mar 9, 2022 YOB: 1950        CANCER TYPE:Follicular Lymphoma  STAGE: IV( axillary, mediastinal, retroperitoneal, stomach, bone marrow)      ONCOLOGY HISTORY:    Patient was being followed by Dr. Krishnamurthy but now she has transferred his care to me.  I reviewed his previous records and have copied and updated from prior notes.  72 year old  male who in 2012 presented with mediastinal, retroperitoneal and mesenteric lymph nodes. Was clinically asymptomatic with normal blood counts. CT-guided lymph node biopsy on 6/21/2012 was consistent with follicular lymphoma, grade 1-2. He was observed at that point.    In 2013, presented to the hospital with ileus and abdominal distention. EGD showed gastric antrum and duodenal ulcers which were biopsied and showed follicular lymphoma. CT scans showed bulky  lymphadenopathy with increasing size. 04/13 Bone marrow biopsy was positive with evidence of follicular lymphoma.   He received bendamustine and Rituxan regimen April 2014- -September 2014.   In December 14 , he was started on maintenance Rituxan every 2 months for 2 years which he completed in October 2016.     In remission and on observation since then.    7/14/2020 - established care with me.    CT scan in July 2020 was stable apart from an indeterminate L2 vertebral body lesion.    MRI of the lumbar spine showed Nonspecific enhancing lesion in the right aspect of the L2 inferior endplate which remains indeterminate.    12/7/2020.  Repeat MRI of the lumbar spine is the same.    We proceeded with a PET scan on 1/8/2021 which shows mild FDG uptake in the L2 sclerotic  lesion.  It was measuring about 1.5 x 1.2 x 1.4 cm and was stable.  Of note previously the lymphoma was FDG avid.    SUBJECTIVE   He is doing well.  He does not have any B symptoms or new swellings.  Denies significant fatigue.  No dyspnea.  No GI complaints.  He does mention that it takes him a long to urinate which is frustrating.  No dysuria.    ECOG 0    ROS:  Rest of the comprehensive review of the system was negative.      I reviewed other history in epic as below.      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     Lymphadenopathy     mediastinal & retroperitoneal     Non Hodgkin's lymphoma (H)      Non-Hodgkin's lymphoma of multiple sites (H) 6/26/2012     Polyps, colonic      Retinal detachment          CURRENT OUTPATIENT MEDICATIONS     Current Outpatient Medications   Medication Sig Dispense Refill     Acetaminophen (TYLENOL PO) Take 1,000 mg by mouth       cetirizine (ZYRTEC) 10 MG tablet Take 10 mg by mouth daily       chlorpheniramine (CHLOR-TRIMETON) 4 MG tablet Take 4 mg by mouth every 6 hours as needed for allergies or rhinitis       fluticasone (FLONASE) 50 MCG/ACT nasal spray Spray 2 sprays into both nostrils daily 16 g 11     metoprolol succinate ER (TOPROL-XL) 50 MG 24 hr tablet Take 1 tablet (50 mg) by mouth daily 90 tablet 4     order for DME Equipment ordered: RESMED Auto PAP Mask type: Full face  Settings: 5-15 cm h2o       simvastatin (ZOCOR) 20 MG tablet Take 1 tablet (20 mg) by mouth At Bedtime 90 tablet 4     triamcinolone (KENALOG) 0.1 % external ointment Apply sparingly to affected area twice daily as needed. 15 g 1        ALLERGIES     Allergies   Allergen Reactions     Allopurinol Rash     FAMILY HISTORY:  Family History   Problem Relation Age of Onset     Cancer Father         liver/kidney     C.A.D. Father      Pacemaker Brother      Arthritis Mother         RA  "    Dad had liver cancer at 74.  He has 2 children and they are healthy.    Social History     Socioeconomic History     Marital status:      Spouse name: Cristel     Number of children: 2     Years of education: Not on file     Highest education level: Not on file   Occupational History     Not on file   Tobacco Use     Smoking status: Never Smoker     Smokeless tobacco: Never Used   Vaping Use     Vaping Use: Never used   Substance and Sexual Activity     Alcohol use: Yes     Alcohol/week: 3.3 standard drinks     Comment: occasionally weekends     Drug use: No     Sexual activity: Yes   Other Topics Concern      Service Not Asked     Blood Transfusions Not Asked     Caffeine Concern No     Occupational Exposure Not Asked     Hobby Hazards Not Asked     Sleep Concern No     Stress Concern No     Weight Concern No     Special Diet Not Asked     Back Care Not Asked     Exercise Yes     Bike Helmet Not Asked     Seat Belt Yes     Self-Exams Not Asked     Parent/sibling w/ CABG, MI or angioplasty before 65F 55M? Not Asked   Social History Narrative     Not on file     Social Determinants of Health     Financial Resource Strain: Not on file   Food Insecurity: Not on file   Transportation Needs: Not on file   Physical Activity: Not on file   Stress: Not on file   Social Connections: Not on file   Intimate Partner Violence: Not on file   Housing Stability: Not on file     Denies smoking. 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     PHYSICAL EXAM     /80   Pulse 71   Temp 97.8  F (36.6  C) (Oral)   Ht 1.867 m (6' 1.5\")   Wt 133.8 kg (294 lb 14.4 oz)   SpO2 96%   BMI 38.38 kg/m    Wt Readings from Last 4 Encounters:   03/09/22 133.8 kg (294 lb 14.4 oz)   11/03/21 134.4 kg (296 lb 6.4 oz)   08/05/21 133.8 kg (295 lb)   08/04/21 133.8 kg (295 lb)       CONSTITUTIONAL: no acute distress  EYES: PERRLA, no palor or icterus.   ENT/MOUTH: no mouth lesions. Ears normal  CVS: s1s2 no m r " g .   RESPIRATORY: clear to auscultation b/l  GI: soft non tender no hepatosplenomegaly  NEURO: AAOX3  Grossly non focal neuro exam  INTEGUMENT: no obvious rashes  LYMPHATIC: no palpable cervical, supraclavicular, axillary or inguinal LAD  MUSCULOSKELETAL: Unremarkable. No bony tenderness.   EXTREMITIES: no edema  PSYCH: Mentation, mood and affect are normal. Decision making capacity is intact         LABORATORY AND IMAGING STUDIES       I reviewed     3/9/2022  CBC unremarkable  CMP unremarkable.      CT chest abdomen and pelvis on 3/2/2022 does not show any evidence of lymphoma recurrence.  There is slight increase in bilateral distal hydroureter, left greater than the right.  There is increased thickening along the posterior urinary bladder wall near the level of the UVJs.  There is mild enlargement of the prostate.  L2 sclerotic lesion is stable.  Mid and lower lung subpleural reticulation has slightly increased.      MRI of the lumbar spine on 10/26/2021 showed a stable ovoid enhancing lesion in the right aspect of the L2 vertebral body as compared to December 2020 MRI.            ASSESSMENT AND PLAN     Stage IV follicular lymphoma- status post treatment with bendamustine/Rituxan (04-09/2014) followed by maintenance rituximab for two years- completed in October 2016.    He feels well and currently does not have evidence of recurrence/progression.  I recommend repeating labs and CT scan in 1 year.        L2 sclerotic lesion.  He was seen by Dr. Alston from neurosurgery and I reviewed the notes.  Most likely this is a benign lesion and recommendation was to follow it up serially with repeat MRI in 6 months.  Repeat MRI on 10/26/2021 also shows this to be very stable.  Repeat CT scan on 3/2/2022 also is a stable.  Continue to observe and repeat CT scan in 1 year.      Bilateral hydroureter and bladder wall thickening.  He does have some urinary hesitancy but no pain.  I believe he would benefit from urology  evaluation and cystoscopy so I gave him a referral to see a urologist.      Interstitial lung disease.  Repeat CT scan continues to show slight worsening of the interstitial lung disease.  I advised him to follow-up with pulmonology.  I gave him a referral for pulmonology.    I will see him back in 1 year with labs and CT scan prior.    All questions answered.  He is agreeable and comfortable with the plan.    Eduardo Crespo MD          Again, thank you for allowing me to participate in the care of your patient.        Sincerely,        Eduardo Crespo MD

## 2022-03-09 NOTE — NURSING NOTE
"Oncology Rooming Note    March 9, 2022 10:37 AM   Deandre Merchant is a 72 year old male who presents for:    Chief Complaint   Patient presents with     Oncology Clinic Visit     Initial Vitals: /80   Pulse 71   Temp 97.8  F (36.6  C) (Oral)   Ht 1.867 m (6' 1.5\")   Wt 133.8 kg (294 lb 14.4 oz)   SpO2 96%   BMI 38.38 kg/m   Estimated body mass index is 38.38 kg/m  as calculated from the following:    Height as of this encounter: 1.867 m (6' 1.5\").    Weight as of this encounter: 133.8 kg (294 lb 14.4 oz). Body surface area is 2.63 meters squared.  No Pain (0) Comment: Data Unavailable   No LMP for male patient.  Allergies reviewed: Yes  Medications reviewed: Yes    Medications: Medication refills not needed today.  Pharmacy name entered into [employer]:    [facility] [place] [facility] [address]   [id number] - [place] - [address] [place] [address]          Sulma Oconnor LPN              "

## 2022-03-09 NOTE — PATIENT INSTRUCTIONS
Refer to pulmonology     Refer to Urologist    In 1 year repeat labs and CT scan and see me a few days after that

## 2022-04-27 ENCOUNTER — TRANSFERRED RECORDS (OUTPATIENT)
Dept: MULTI SPECIALTY CLINIC | Facility: CLINIC | Age: 72
End: 2022-04-27

## 2022-04-27 LAB — RETINOPATHY: NORMAL

## 2022-05-04 ENCOUNTER — OFFICE VISIT (OUTPATIENT)
Dept: UROLOGY | Facility: CLINIC | Age: 72
End: 2022-05-04
Attending: INTERNAL MEDICINE
Payer: MEDICARE

## 2022-05-04 VITALS
HEIGHT: 74 IN | TEMPERATURE: 97.9 F | DIASTOLIC BLOOD PRESSURE: 78 MMHG | SYSTOLIC BLOOD PRESSURE: 118 MMHG | BODY MASS INDEX: 38.12 KG/M2 | WEIGHT: 297.06 LBS

## 2022-05-04 DIAGNOSIS — R93.89 ABNORMAL CT SCAN: Primary | ICD-10-CM

## 2022-05-04 DIAGNOSIS — N13.4 HYDROURETER: ICD-10-CM

## 2022-05-04 PROCEDURE — 52000 CYSTOURETHROSCOPY: CPT | Performed by: UROLOGY

## 2022-05-04 PROCEDURE — 51798 US URINE CAPACITY MEASURE: CPT | Performed by: UROLOGY

## 2022-05-04 PROCEDURE — 99203 OFFICE O/P NEW LOW 30 MIN: CPT | Mod: 25 | Performed by: UROLOGY

## 2022-05-04 ASSESSMENT — PAIN SCALES - GENERAL: PAINLEVEL: NO PAIN (0)

## 2022-05-04 NOTE — NURSING NOTE
Bladder Scan performed. 32 mL maximum residual urine detected after 3 scans. MD reid León, [facility]

## 2022-05-04 NOTE — PROGRESS NOTES
S: Patient is a pleasant 72-year-old male who was requested to be seen by Dr. Crespo for a consultation with regard to patient's abnormal CT scan finding.  Patient has history of non-Hodgkin's lymphoma.  Recent CT scan showed hydroureter bilaterally with the left more than right.  Patient denies any significant urinary problems.  He has no dysuria or hematuria.  He has no history of smoking in the past.  Current Outpatient Medications   Medication Sig Dispense Refill     cetirizine (ZYRTEC) 10 MG tablet Take 10 mg by mouth daily       chlorpheniramine (CHLOR-TRIMETON) 4 MG tablet Take 4 mg by mouth every 6 hours as needed for allergies or rhinitis       fluticasone (FLONASE) 50 MCG/ACT nasal spray Spray 2 sprays into both nostrils daily 16 g 11     metoprolol succinate ER (TOPROL-XL) 50 MG 24 hr tablet Take 1 tablet (50 mg) by mouth daily 90 tablet 4     order for DME Equipment ordered: RESMED Auto PAP Mask type: Full face  Settings: 5-15 cm h2o       simvastatin (ZOCOR) 20 MG tablet Take 1 tablet (20 mg) by mouth At Bedtime 90 tablet 4     Acetaminophen (TYLENOL PO) Take 1,000 mg by mouth (Patient not taking: Reported on 5/4/2022)       triamcinolone (KENALOG) 0.1 % external ointment Apply sparingly to affected area twice daily as needed. 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     Lymphadenopathy     mediastinal & retroperitoneal     Non Hodgkin's lymphoma (H)      Non-Hodgkin's lymphoma of multiple sites (H) 6/26/2012     Polyps, colonic      Retinal detachment      Past Surgical History:   Procedure Laterality Date     COLONOSCOPY N/A 3/1/2017    Procedure: COLONOSCOPY;  Surgeon: Dakota Wlal MD;  Location: [facility]     ESOPHAGOSCOPY, GASTROSCOPY, DUODENOSCOPY (EGD), COMBINED  4/21/2013    Procedure: COMBINED  ESOPHAGOSCOPY, GASTROSCOPY, DUODENOSCOPY (EGD), BIOPSY SINGLE OR MULTIPLE;;  Surgeon: Torrey Cosme MD;  Location: [facility]     ESOPHAGOSCOPY, GASTROSCOPY, DUODENOSCOPY (EGD), COMBINED  10/9/2013    Procedure: COMBINED ESOPHAGOSCOPY, GASTROSCOPY, DUODENOSCOPY (EGD), BIOPSY SINGLE OR MULTIPLE;  ESOPHAGOSCOPY, GASTROSCOPY, DUODENOSCOPY (EGD) with multiple biopsies;  Surgeon: Shay Sauer MD;  Location: [facility]     LAPAROTOMY EXPLORATORY  4/24/2013    Procedure: LAPAROTOMY EXPLORATORY;  Exploratory Laparotomy and lysis of adhesions.;  Surgeon: Genevieve Barros MD;  Location: [facility]     ZZ COLONOSCOPY W/WO BRUSH/WASH  07/12/06      Family History   Problem Relation Age of Onset     Cancer Father         liver/kidney     C.A.D. Father      Pacemaker Brother      Arthritis Mother         RA     Social History     Socioeconomic History     Marital status:      Spouse name: Cristel     Number of children: 2     Years of education: None     Highest education level: None   Tobacco Use     Smoking status: Never Smoker     Smokeless tobacco: Never Used   Vaping Use     Vaping Use: Never used   Substance and Sexual Activity     Alcohol use: Yes     Alcohol/week: 3.3 standard drinks     Comment: occasionally weekends     Drug use: No     Sexual activity: Yes   Other Topics Concern     Caffeine Concern No     Sleep Concern No     Stress Concern No     Weight Concern No     Exercise Yes     Seat Belt Yes       REVIEW OF SYSTEMS  =================  C: NEGATIVE for fever, chills, change in weight  I: NEGATIVE for worrisome rashes, moles or lesions  E/M: NEGATIVE for ear, mouth and throat problems  R: NEGATIVE for significant cough or SHORTNESS OF BREATH  CV:  NEGATIVE for chest pain, palpitations or peripheral edema  GI: NEGATIVE for nausea, abdominal pain, heartburn, or change in bowel habits  NEURO: NEGATIVE numbness/weakness  : see HPI  PSYCH: NEGATIVE depression/anxiety  LYmph: no new enlarged lymph  "nodes  Ortho: no new trauma/movements      Physical Exam:  /78 (BP Location: Right arm, Patient Position: Sitting, Cuff Size: Adult Large)   Temp 97.9  F (36.6  C) (Temporal)   Ht 1.867 m (6' 1.5\")   Wt 134.7 kg (297 lb 1 oz)   BMI 38.66 kg/m     Patient is pleasant, in no acute distress, good general condition.  Heart:  negative, PMI normal  Lung: no evidence of respiratory distress    Abdomen: Soft, nondistended, non tender. No masses. No rebound or guarding.   Exam: Penis no discharge.  Testes without any masses.  No scrotal skin lesion.  Bladder scan with residual urine less than 50 mL.  Skin: Warm and dry.  No redness.  Neuro: grossly normal  Musculaskeletal: moving all extremities  Psych normal mood and affect  Musculoskeletal  moving all extremities  Hematologic/Lymphatic/Immunologic: normal ant/post cervical, axillary, supraclavicular and inguinal nodes    Cysto done today    Patient is draped and prepped.  Flexible cystoscopy placed under direct vision.      The anterior urethra is normal   The prostatic urethra showed bilateral lobe enlargement.     The length is 2cm,  the coaptation is 2 cm.  Anterior lobe protrusion   In the bladder there is trabeculation grade 2.    Assessment/Plan:  (R93.89) Abnormal CT scan  (primary encounter diagnosis)  Comment:    Plan: CYSTOURETHROSCOPY (74658)          Neg cysto today    (N13.4) Hydroureter  Comment: no obvious findings  Plan: Patient has annual CT scan for his non-Hodgkin's lymphoma.  If there are any changes, patient to return to clinic see me again.       "

## 2022-06-01 ENCOUNTER — OFFICE VISIT (OUTPATIENT)
Dept: PULMONOLOGY | Facility: CLINIC | Age: 72
End: 2022-06-01
Attending: INTERNAL MEDICINE
Payer: MEDICARE

## 2022-06-01 VITALS
WEIGHT: 297.2 LBS | SYSTOLIC BLOOD PRESSURE: 112 MMHG | BODY MASS INDEX: 38.14 KG/M2 | HEIGHT: 74 IN | TEMPERATURE: 97.5 F | OXYGEN SATURATION: 96 % | HEART RATE: 80 BPM | DIASTOLIC BLOOD PRESSURE: 74 MMHG

## 2022-06-01 DIAGNOSIS — J84.9 ILD (INTERSTITIAL LUNG DISEASE) (H): ICD-10-CM

## 2022-06-01 PROCEDURE — 99214 OFFICE O/P EST MOD 30 MIN: CPT

## 2022-06-01 NOTE — PROGRESS NOTES
[facility]  Pulmonary Medicine  Visit Clinic Note  June 1, 2022         ASSESSMENT & PLAN       Interstitial lung disease  Atelectasis  Chronic cough  Postnasal drip    We briefly discussed his cough and postnasal drip, but we will not make any changes.  He responds very well to antihistamine related medications, and he would like to continue these.  Regarding the main reason for his visit today, the interstitial lung disease noted on chest CT scan is chronic.  Is been present for over a decade.  There are subtle changes between images over the years which have waxed and waned, and I think this depends on the degree of his inspiration as well as the CT protocol.  This would suggest some degree of basilar atelectasis in addition to fibrosis contributing to the abnormal CT images.    The tempo of the change over the last decade has been very minimal, and he has a lot of normal appearing lung.  I do not suspect that these changes in the lungs will become clinically significant for him.  However we discussed that interstitial lung diseases do not follow a linear course, and I do not have an exact diagnosis for him at the moment given the nonspecific look of his chest CT scan.    I will get a full set of pulmonary function testing performed, and I told him that we tend to follow lung function over time to see if there is any progression of his lung disease.  I will give him a phone call after I get his lung function test results and decide if we need to follow him every 6 to 12 months with PFTs.      Bolivar Mahmood MD     I spent 45 minutes on the date of the encounter doing chart review, history and exam, documentation and discussing a follow-up plan         Today's visit note:     Chief Complaint: Consult (Interstitial lung disease )      HISTORY OF PRESENT ILLNESS:    Deandre Merchant is a 72 year old year old male who is being seen for interstitial lung disease.    I previously saw him a few years  back for chronic cough.  He responded quite well to antihistamine related medications.  He assumed diagnosis was allergies at that time.  He continues to have the cough, but does respond well to chlorpheniramine as well as cetirizine.    He also has a history of lymphoma.  He gets CT scan imaging regularly.  As recent scan, the interstitial changes in the bases of his lungs looked slightly worse and so he has been referred to me.  Symptomatically, he does have some shortness of breath with exertion which she thinks it might be worse than in the past.  However, he acknowledges he has been walking less and just exerting himself less so he thinks he is just out of shape.  He does have some mild lower extremity swelling which has been present for decades.  No chest pain or wheezing.             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     Lymphadenopathy     mediastinal & retroperitoneal     Non Hodgkin's lymphoma (H)      Non-Hodgkin's lymphoma of multiple sites (H) 6/26/2012     Polyps, colonic      Retinal detachment      Past Surgical History:   Procedure Laterality Date     COLONOSCOPY N/A 3/1/2017    Procedure: COLONOSCOPY;  Surgeon: Dakota Wall MD;  Location:  GI     ESOPHAGOSCOPY, GASTROSCOPY, DUODENOSCOPY (EGD), COMBINED  4/21/2013    Procedure: COMBINED ESOPHAGOSCOPY, GASTROSCOPY, DUODENOSCOPY (EGD), BIOPSY SINGLE OR MULTIPLE;;  Surgeon: Torrey Cosme MD;  Location:  GI     ESOPHAGOSCOPY, GASTROSCOPY, DUODENOSCOPY (EGD), COMBINED  10/9/2013    Procedure: COMBINED ESOPHAGOSCOPY, GASTROSCOPY, DUODENOSCOPY (EGD), BIOPSY SINGLE OR MULTIPLE;  ESOPHAGOSCOPY, GASTROSCOPY, DUODENOSCOPY (EGD) with multiple biopsies;  Surgeon: Shay Sauer MD;  Location:  GI     LAPAROTOMY EXPLORATORY  4/24/2013    Procedure: LAPAROTOMY EXPLORATORY;  Exploratory Laparotomy and  lysis of adhesions.;  Surgeon: Genevieve Barros MD;  Location: [facility]     [facility] COLONOSCOPY W/WO BRUSH/WASH  07/12/06           Family History:     Family History   Problem Relation Age of Onset     Cancer Father         liver/kidney     C.A.D. Father      Pacemaker Brother      Arthritis Mother         RA              Social History:     Social History     Socioeconomic History     Marital status:      Spouse name: Cristel     Number of children: 2     Years of education: Not on file     Highest education level: Not on file   Occupational History     Not on file   Tobacco Use     Smoking status: Never Smoker     Smokeless tobacco: Never Used   Vaping Use     Vaping Use: Never used   Substance and Sexual Activity     Alcohol use: Yes     Alcohol/week: 3.3 standard drinks     Comment: occasionally weekends     Drug use: No     Sexual activity: Yes   Other Topics Concern      Service Not Asked     Blood Transfusions Not Asked     Caffeine Concern No     Occupational Exposure Not Asked     Hobby Hazards Not Asked     Sleep Concern No     Stress Concern No     Weight Concern No     Special Diet Not Asked     Back Care Not Asked     Exercise Yes     Bike Helmet Not Asked     Seat Belt Yes     Self-Exams Not Asked     Parent/sibling w/ CABG, MI or angioplasty before 65F 55M? Not Asked   Social History Narrative     Not on file     Social Determinants of Health     Financial Resource Strain: Not on file   Food Insecurity: Not on file   Transportation Needs: Not on file   Physical Activity: Not on file   Stress: Not on file   Social Connections: Not on file   Intimate Partner Violence: Not on file   Housing Stability: Not on file            Medications:     Current Outpatient Medications   Medication     Acetaminophen (TYLENOL PO)     cetirizine (ZYRTEC) 10 MG tablet     chlorpheniramine (CHLOR-TRIMETON) 4 MG tablet     fluticasone (FLONASE) 50 MCG/ACT nasal spray     metoprolol succinate ER (TOPROL-XL) 50  "MG 24 hr tablet     order for DME     simvastatin (ZOCOR) 20 MG tablet     triamcinolone (KENALOG) 0.1 % external ointment     Current Facility-Administered Medications   Medication     lidocaine 1 % injection 0.5 mL     triamcinolone (KENALOG-40) injection 20 mg            Review of Systems:       A complete review of systems was otherwise negative except as noted in the HPI.      PHYSICAL EXAM:  /74 (BP Location: Right arm, Patient Position: Sitting, Cuff Size: Adult Large)   Pulse 80   Temp 97.5  F (36.4  C) (Temporal)   Ht 1.867 m (6' 1.5\")   Wt 134.8 kg (297 lb 3.2 oz)   SpO2 96%   BMI 38.68 kg/m       General: Comfortable, No apparent distress  Eyes: Anicteric  Ears: Hearing grossly normal  Mouth: Oral mucosa is moist, without any lesions. No oropharyngeal exudate.  Neck: supple, no thyromegaly  Lymphatics: No cervical or supraclavicular nodes   Respiratory: Good air movement.  Subtle dry crackles in the right lung base.  No rhonchi no wheezing.  Cardiac: RRR, normal S1, S2. No murmurs. No JVD  Abdomen: Soft, NT/ND  Musculoskeletal: No clubbing. No cyanosis.  Trace ankle edema bilaterally  Skin: Chronic venous stasis changes of bilateral lower extremities  Neuro: Normal mentation. Normal speech.  Psych:Normal affect           Data:   All laboratory and imaging data reviewed.      PFT:   None available    PFT Interpretation:  Not applicable    Chest CT: I have reviewed the chest CT images from March 2022 all the way back to 2011.  On my review of all of these images, he has always had bilateral basilar interstitial changes.  He has never had honeycombing.  These changes have increased and decreased in intensity over the years with various scans.  The most recent scan does show progression compared to the 2018 scan.  The changes are nonspecific.                                        "

## 2022-07-08 ENCOUNTER — OFFICE VISIT (OUTPATIENT)
Dept: FAMILY MEDICINE | Facility: CLINIC | Age: 72
End: 2022-07-08
Payer: MEDICARE

## 2022-07-08 VITALS
HEART RATE: 88 BPM | OXYGEN SATURATION: 95 % | SYSTOLIC BLOOD PRESSURE: 122 MMHG | BODY MASS INDEX: 40.03 KG/M2 | DIASTOLIC BLOOD PRESSURE: 72 MMHG | HEIGHT: 72 IN | WEIGHT: 295.5 LBS | RESPIRATION RATE: 16 BRPM | TEMPERATURE: 97.6 F

## 2022-07-08 DIAGNOSIS — G47.33 OSA (OBSTRUCTIVE SLEEP APNEA): ICD-10-CM

## 2022-07-08 DIAGNOSIS — M65.332 TRIGGER FINGER, LEFT MIDDLE FINGER: ICD-10-CM

## 2022-07-08 DIAGNOSIS — H33.20 RETINAL DETACHMENT, UNSPECIFIED LATERALITY: ICD-10-CM

## 2022-07-08 DIAGNOSIS — I77.810 ASCENDING AORTA DILATATION (H): ICD-10-CM

## 2022-07-08 DIAGNOSIS — C82.80 LYMPHOMA, SMALL LYMPHOID, FOLLICULAR (H): ICD-10-CM

## 2022-07-08 DIAGNOSIS — R73.09 ELEVATED GLUCOSE: ICD-10-CM

## 2022-07-08 DIAGNOSIS — E11.9 TYPE 2 DIABETES MELLITUS WITHOUT COMPLICATION, WITHOUT LONG-TERM CURRENT USE OF INSULIN (H): ICD-10-CM

## 2022-07-08 DIAGNOSIS — D49.2 LUMBAR SPINE TUMOR: ICD-10-CM

## 2022-07-08 DIAGNOSIS — Z86.79 HISTORY OF ATRIAL FIBRILLATION: ICD-10-CM

## 2022-07-08 DIAGNOSIS — I10 ESSENTIAL HYPERTENSION: ICD-10-CM

## 2022-07-08 DIAGNOSIS — Z13.220 SCREENING FOR HYPERLIPIDEMIA: ICD-10-CM

## 2022-07-08 DIAGNOSIS — E66.01 SEVERE OBESITY (BMI 35.0-39.9) WITH COMORBIDITY (H): ICD-10-CM

## 2022-07-08 DIAGNOSIS — N40.0 BENIGN PROSTATIC HYPERPLASIA, UNSPECIFIED WHETHER LOWER URINARY TRACT SYMPTOMS PRESENT: ICD-10-CM

## 2022-07-08 DIAGNOSIS — Z86.69 HISTORY OF CATARACT: ICD-10-CM

## 2022-07-08 DIAGNOSIS — J84.9 ILD (INTERSTITIAL LUNG DISEASE) (H): ICD-10-CM

## 2022-07-08 DIAGNOSIS — C85.98 NON-HODGKIN'S LYMPHOMA OF MULTIPLE SITES (H): ICD-10-CM

## 2022-07-08 DIAGNOSIS — Z12.11 SCREEN FOR COLON CANCER: ICD-10-CM

## 2022-07-08 DIAGNOSIS — Z00.00 ROUTINE GENERAL MEDICAL EXAMINATION AT A HEALTH CARE FACILITY: Primary | ICD-10-CM

## 2022-07-08 DIAGNOSIS — E78.5 HYPERLIPIDEMIA WITH TARGET LDL LESS THAN 130: ICD-10-CM

## 2022-07-08 LAB
ALBUMIN SERPL-MCNC: 3.8 G/DL (ref 3.4–5)
ALP SERPL-CCNC: 118 U/L (ref 40–150)
ALT SERPL W P-5'-P-CCNC: 79 U/L (ref 0–70)
ANION GAP SERPL CALCULATED.3IONS-SCNC: 7 MMOL/L (ref 3–14)
AST SERPL W P-5'-P-CCNC: 58 U/L (ref 0–45)
BASOPHILS # BLD AUTO: 0.1 10E3/UL (ref 0–0.2)
BASOPHILS NFR BLD AUTO: 1 %
BILIRUB SERPL-MCNC: 0.9 MG/DL (ref 0.2–1.3)
BUN SERPL-MCNC: 19 MG/DL (ref 7–30)
CALCIUM SERPL-MCNC: 8.5 MG/DL (ref 8.5–10.1)
CHLORIDE BLD-SCNC: 102 MMOL/L (ref 94–109)
CHOLEST SERPL-MCNC: 113 MG/DL
CO2 SERPL-SCNC: 25 MMOL/L (ref 20–32)
CREAT SERPL-MCNC: 0.9 MG/DL (ref 0.66–1.25)
EOSINOPHIL # BLD AUTO: 0.1 10E3/UL (ref 0–0.7)
EOSINOPHIL NFR BLD AUTO: 2 %
ERYTHROCYTE [DISTWIDTH] IN BLOOD BY AUTOMATED COUNT: 12.6 % (ref 10–15)
FASTING STATUS PATIENT QL REPORTED: YES
GFR SERPL CREATININE-BSD FRML MDRD: >90 ML/MIN/1.73M2
GLUCOSE BLD-MCNC: 210 MG/DL (ref 70–99)
HCT VFR BLD AUTO: 42.7 % (ref 40–53)
HDLC SERPL-MCNC: 29 MG/DL
HGB BLD-MCNC: 14.8 G/DL (ref 13.3–17.7)
IMM GRANULOCYTES # BLD: 0 10E3/UL
IMM GRANULOCYTES NFR BLD: 0 %
LDH SERPL L TO P-CCNC: 228 U/L (ref 85–227)
LDLC SERPL CALC-MCNC: 15 MG/DL
LYMPHOCYTES # BLD AUTO: 1.1 10E3/UL (ref 0.8–5.3)
LYMPHOCYTES NFR BLD AUTO: 18 %
MCH RBC QN AUTO: 31.1 PG (ref 26.5–33)
MCHC RBC AUTO-ENTMCNC: 34.7 G/DL (ref 31.5–36.5)
MCV RBC AUTO: 90 FL (ref 78–100)
MONOCYTES # BLD AUTO: 0.6 10E3/UL (ref 0–1.3)
MONOCYTES NFR BLD AUTO: 10 %
NEUTROPHILS # BLD AUTO: 4.3 10E3/UL (ref 1.6–8.3)
NEUTROPHILS NFR BLD AUTO: 69 %
NONHDLC SERPL-MCNC: 84 MG/DL
NRBC # BLD AUTO: 0 10E3/UL
NRBC BLD AUTO-RTO: 0 /100
PLATELET # BLD AUTO: 158 10E3/UL (ref 150–450)
POTASSIUM BLD-SCNC: 4.3 MMOL/L (ref 3.4–5.3)
PROT SERPL-MCNC: 6.5 G/DL (ref 6.8–8.8)
RBC # BLD AUTO: 4.76 10E6/UL (ref 4.4–5.9)
SODIUM SERPL-SCNC: 134 MMOL/L (ref 133–144)
TRIGL SERPL-MCNC: 344 MG/DL
WBC # BLD AUTO: 6.3 10E3/UL (ref 4–11)

## 2022-07-08 PROCEDURE — 80053 COMPREHEN METABOLIC PANEL: CPT | Performed by: STUDENT IN AN ORGANIZED HEALTH CARE EDUCATION/TRAINING PROGRAM

## 2022-07-08 PROCEDURE — 85025 COMPLETE CBC W/AUTO DIFF WBC: CPT | Performed by: STUDENT IN AN ORGANIZED HEALTH CARE EDUCATION/TRAINING PROGRAM

## 2022-07-08 PROCEDURE — 36415 COLL VENOUS BLD VENIPUNCTURE: CPT | Performed by: STUDENT IN AN ORGANIZED HEALTH CARE EDUCATION/TRAINING PROGRAM

## 2022-07-08 PROCEDURE — G0439 PPPS, SUBSEQ VISIT: HCPCS | Performed by: STUDENT IN AN ORGANIZED HEALTH CARE EDUCATION/TRAINING PROGRAM

## 2022-07-08 PROCEDURE — 83615 LACTATE (LD) (LDH) ENZYME: CPT | Performed by: STUDENT IN AN ORGANIZED HEALTH CARE EDUCATION/TRAINING PROGRAM

## 2022-07-08 PROCEDURE — 80061 LIPID PANEL: CPT | Performed by: STUDENT IN AN ORGANIZED HEALTH CARE EDUCATION/TRAINING PROGRAM

## 2022-07-08 PROCEDURE — 83036 HEMOGLOBIN GLYCOSYLATED A1C: CPT | Performed by: STUDENT IN AN ORGANIZED HEALTH CARE EDUCATION/TRAINING PROGRAM

## 2022-07-08 ASSESSMENT — ENCOUNTER SYMPTOMS
NERVOUS/ANXIOUS: 0
FREQUENCY: 1
CONSTIPATION: 0
ARTHRALGIAS: 0
HEMATOCHEZIA: 0
CHILLS: 0
SORE THROAT: 0
DIARRHEA: 0
NAUSEA: 0
EYE PAIN: 0
DYSURIA: 0
WEAKNESS: 1
ABDOMINAL PAIN: 0
MYALGIAS: 0
FEVER: 0
HEADACHES: 0
COUGH: 0
HEARTBURN: 0
PALPITATIONS: 0
DIZZINESS: 0
PARESTHESIAS: 0
HEMATURIA: 0
JOINT SWELLING: 0

## 2022-07-08 ASSESSMENT — ACTIVITIES OF DAILY LIVING (ADL): CURRENT_FUNCTION: NO ASSISTANCE NEEDED

## 2022-07-08 NOTE — PATIENT INSTRUCTIONS
Preventive Health Recommendations:     See your health care provider every year to    Review health changes.     Discuss preventive care.      Review your medicines if your doctor has prescribed any.      Talk with your health care provider about whether you should have a test to screen for prostate cancer (PSA).    Every 3 years, have a diabetes test (fasting glucose). If you are at risk for diabetes, you should have this test more often.    Every 5 years, have a cholesterol test. Have this test more often if you are at risk for high cholesterol or heart disease.     Every 10 years, have a colonoscopy. Or, have a yearly FIT test (stool test). These exams will check for colon cancer.    Talk to with your health care provider about screening for Abdominal Aortic Aneurysm if you have a family history of AAA or have a history of smoking.    Shots:     Get a flu shot each year.     Get a tetanus shot every 10 years.     Talk to your doctor about your pneumonia vaccines. There are now two you should receive - Pneumovax (PPSV 23) and Prevnar (PCV 13).     Talk to your pharmacist about a shingles vaccine.     Talk to your doctor about the hepatitis B vaccine.  Nutrition:     Eat at least 5 servings of fruits and vegetables each day.     Eat whole-grain bread, whole-wheat pasta and brown rice instead of white grains and rice.     Get adequate Calcium and Vitamin D.   Lifestyle    Exercise for at least 150 minutes a week (30 minutes a day, 5 days a week). This will help you control your weight and prevent disease.     Limit alcohol to one drink per day.     No smoking.     Wear sunscreen to prevent skin cancer.    See your dentist every six months for an exam and cleaning.    See your eye doctor every 1 to 2 years to screen for conditions such as glaucoma, macular degeneration, cataracts, etc.    Personalized Prevention Plan  You are due for the preventive services outlined below.  Your care team is available to assist you  in scheduling these services.  If you have already completed any of these items, please share that information with your care team to update in your medical record.  Health Maintenance Due   Topic Date Due     COVID-19 Vaccine (4 - Booster for Pfizer series) 01/11/2022     Colorectal Cancer Screening  03/01/2022     Cholesterol Lab  07/15/2022

## 2022-07-08 NOTE — PROGRESS NOTES
"SUBJECTIVE:   Deandre Merchant is a 72 year old male who presents for Preventive Visit.      Patient has been advised of split billing requirements and indicates understanding: Yes  Are you in the first 12 months of your Medicare coverage?  No    Healthy Habits:     In general, how would you rate your overall health?  Fair    Frequency of exercise:  1 day/week    Duration of exercise:  Less than 15 minutes    Do you usually eat at least 4 servings of fruit and vegetables a day, include whole grains    & fiber and avoid regularly eating high fat or \"junk\" foods?  Yes    Taking medications regularly:  Yes    Medication side effects:  None    Ability to successfully perform activities of daily living:  No assistance needed    Home Safety:  Lack of grab bars in the bathroom    Hearing Impairment:  Difficulty following a conversation in a noisy restaurant or crowded room, need to ask people to speak up or repeat themselves and difficulty understanding soft or whispered speech    In the past 6 months, have you been bothered by leaking of urine? Yes    In general, how would you rate your overall mental or emotional health?  Excellent      PHQ-2 Total Score: 0    Additional concerns today:  Yes    Do you feel safe in your environment? Yes    Have you ever done Advance Care Planning? (For example, a Health Directive, POLST, or a discussion with a medical provider or your loved ones about your wishes): No, advance care planning information given to patient to review.  Patient declined advance care planning discussion at this time.       Fall risk  Fallen 2 or more times in the past year?: No  Any fall with injury in the past year?: No    Cognitive Screening   1) Repeat 3 items (Leader, Season, Table)    2) Clock draw: NORMAL  3) 3 item recall: Recalls 3 objects  Results: 3 items recalled: COGNITIVE IMPAIRMENT LESS LIKELY    Mini-CogTM Copyright S Kalin. Licensed by the author for use in [facility]; reprinted with " permission (megandannielle@[facility]). All rights reserved.      Do you have sleep apnea, excessive snoring or daytime drowsiness?: yes    Reviewed and updated as needed this visit by clinical staff   Tobacco  Allergies  Meds   Med Hx  Surg Hx  Fam Hx  Soc Hx          Reviewed and updated as needed this visit by Provider                   Social History     Tobacco Use     Smoking status: Never Smoker     Smokeless tobacco: Never Used   Substance Use Topics     Alcohol use: Yes     Alcohol/week: 3.3 standard drinks     Comment: occasionally weekends         Alcohol Use 7/8/2022   Prescreen: >3 drinks/day or >7 drinks/week? No   Prescreen: >3 drinks/day or >7 drinks/week? -       Current providers sharing in care for this patient include:   Patient Care Team:  Mekhi Abbott MD as PCP - General (Family Practice)  Néstor Ballard MD as MD (Hematology & Oncology)  Eduardo Crespo MD as Assigned Cancer Care Provider  Raji Alston MD as Assigned Neuroscience Provider  Mekhi Abbott MD as Assigned PCP  Alvarez Sampson MD as Assigned Musculoskeletal Provider  Laina Stephenson, RN as Specialty Care Coordinator (Hematology & Oncology)  Eddie Rick MD as Assigned Surgical Provider  Sarabjit Mahmood MD as Assigned Pulmonology Provider    The following health maintenance items are reviewed in Epic and correct as of today:  Health Maintenance Due   Topic Date Due     COVID-19 Vaccine (4 - Booster for Pfizer series) 01/11/2022     COLORECTAL CANCER SCREENING  03/01/2022     LIPID  07/15/2022       Review of Systems   Constitutional: Negative for chills and fever.   HENT: Positive for hearing loss. Negative for congestion, ear pain and sore throat.    Eyes: Negative for pain and visual disturbance.   Respiratory: Negative for cough.    Cardiovascular: Positive for peripheral edema. Negative for chest pain and palpitations.   Gastrointestinal: Negative for abdominal pain, constipation,  "diarrhea, heartburn, hematochezia and nausea.   Genitourinary: Positive for frequency, impotence and urgency. Negative for dysuria, genital sores and hematuria.   Musculoskeletal: Negative for arthralgias, joint swelling and myalgias.   Skin: Negative for rash.   Neurological: Positive for weakness. Negative for dizziness, headaches and paresthesias.   Psychiatric/Behavioral: Negative for mood changes. The patient is not nervous/anxious.        OBJECTIVE:   /72 (BP Location: Left arm)   Pulse 88   Temp 97.6  F (36.4  C) (Temporal)   Resp 16   Ht 1.835 m (6' 0.25\")   Wt 134 kg (295 lb 8 oz)   SpO2 95%   BMI 39.80 kg/m   Estimated body mass index is 39.8 kg/m  as calculated from the following:    Height as of this encounter: 1.835 m (6' 0.25\").    Weight as of this encounter: 134 kg (295 lb 8 oz).  Physical Exam  GENERAL: healthy, alert and no distress  EYES: conjunctivae and sclerae normal  HENT: ear canals partial obstruction and TM's normal, nose and mouth without ulcers or lesions pupil abnormality with iris loss inferiorly.  NECK: no adenopathy, no asymmetry, masses, or scars and thyroid normal to palpation  RESP: lungs clear to auscultation - no rales, rhonchi or wheezes  CV: regular rate and rhythm, normal S1 S2, no S3 or S4, no murmur, click or rub, trace peripheral edema and peripheral pulses strong  ABDOMEN: soft, nontender, no hepatosplenomegaly, no masses and bowel sounds normal  MS: no gross musculoskeletal defects noted, no edema  SKIN: no suspicious lesions or rashes  NEURO: Normal strength and tone, mentation intact and speech normal  PSYCH: mentation appears normal, affect normal/bright      ASSESSMENT / PLAN:   Deandre was seen today for physical.    Diagnoses and all orders for this visit:    Routine general medical examination at a health care facility  Patient should consider follow-up with audiology    Screen for colon cancer  -     Colonscopy Screening  Referral; " Future    Screening for hyperlipidemia  -     Lipid panel reflex to direct LDL Fasting; Future  -     Lipid panel reflex to direct LDL Fasting    WINSTON (obstructive sleep apnea)  Stable on CPAP    Hyperlipidemia with target LDL less than 130  -     Comprehensive metabolic panel (BMP + Alb, Alk Phos, ALT, AST, Total. Bili, TP); Future  -     Comprehensive metabolic panel (BMP + Alb, Alk Phos, ALT, AST, Total. Bili, TP)    Essential hypertension  -     Comprehensive metabolic panel (BMP + Alb, Alk Phos, ALT, AST, Total. Bili, TP); Future  -     Comprehensive metabolic panel (BMP + Alb, Alk Phos, ALT, AST, Total. Bili, TP)    Trigger finger, left middle finger  Stable symptoms since previous injection 1 year ago.  Rare triggering.  Mostly dealing with mild flexural contracture.  Could repeat injection in the future given that his symptoms are unchanged hold off for now.  Consider follow-up with orthopedics if he desires.  Otherwise continue to monitor symptoms    Lymphoma, small lymphoid, follicular (H)    Benign prostatic hyperplasia, unspecified whether lower urinary tract symptoms present  No nighttime symptoms but usually struggles due to fat pad and anatomy of his penis.  This has been stable for him.    Retinal detachment, unspecified laterality    Severe obesity (BMI 35.0-39.9) with comorbidity (H)  Encouraged increasing activity and improving diet as he is able.    History of cataract  Resolved.  Does have macular degeneration in the right eye.  History of coloboma    Ascending aorta dilatation (H)  Stable previously.  Does look like he needs to follow-up with cardiology and I did recommend he schedule this in the near future.    History of atrial fibrillation  Does have history of cardioversion was on neck regulation for short time but then discontinued.  No episodes since that he knows of.  He did have prolonged monitoring without episode of A. fib noted.  Does continue on metoprolol.    ILD (interstitial lung  "disease) (H)  Does have upcoming follow-up with pulmonology with PFTs.    Non-Hodgkin's lymphoma of multiple sites (H)  Following with oncology yearly.  Did see urology with cystoscopy for hydroureter.  No abnormalities found.  Did see Ortho for spinal lesion was thought to be benign.    Other orders  -     REVIEW OF HEALTH MAINTENANCE PROTOCOL ORDERS        Patient has been advised of split billing requirements and indicates understanding: Yes    COUNSELING:  Reviewed preventive health counseling, as reflected in patient instructions       Regular exercise       Healthy diet/nutrition       Vision screening       Hearing screening       Dental care       Bladder control       Colon cancer screening       Prostate cancer screening    Estimated body mass index is 39.8 kg/m  as calculated from the following:    Height as of this encounter: 1.835 m (6' 0.25\").    Weight as of this encounter: 134 kg (295 lb 8 oz).     Weight management plan: Discussed healthy diet and exercise guidelines    He reports that he has never smoked. He has never used smokeless tobacco.      Appropriate preventive services were discussed with this patient, including applicable screening as appropriate for cardiovascular disease, diabetes, osteopenia/osteoporosis, and glaucoma.  As appropriate for age/gender, discussed screening for colorectal cancer, prostate cancer, breast cancer, and cervical cancer. Checklist reviewing preventive services available has been given to the patient.    Reviewed patients plan of care and provided an AVS. The Basic Care Plan (routine screening as documented in Health Maintenance) for Deandre meets the Care Plan requirement. This Care Plan has been established and reviewed with the Patient.    Counseling Resources:  ATP IV Guidelines  Pooled Cohorts Equation Calculator  Breast Cancer Risk Calculator  Breast Cancer: Medication to Reduce Risk  FRAX Risk Assessment  ICSI Preventive Guidelines  Dietary Guidelines for " Americans, 2010  USDA's MyPlate  ASA Prophylaxis  Lung CA Screening    Oskar Conrad MD  [facility]    Identified Health Risks:

## 2022-07-12 LAB — HBA1C MFR BLD: 8.7 % (ref 0–5.6)

## 2022-07-18 ENCOUNTER — TELEPHONE (OUTPATIENT)
Dept: GASTROENTEROLOGY | Facility: CLINIC | Age: 72
End: 2022-07-18

## 2022-07-18 PROBLEM — E11.9 DIABETES MELLITUS, TYPE 2 (H): Status: ACTIVE | Noted: 2022-07-18

## 2022-07-18 RX ORDER — METFORMIN HCL 500 MG
500 TABLET, EXTENDED RELEASE 24 HR ORAL
Qty: 90 TABLET | Refills: 3 | Status: SHIPPED | OUTPATIENT
Start: 2022-07-18 | End: 2022-09-27

## 2022-07-18 NOTE — TELEPHONE ENCOUNTER
Screening Questions    BlueKIND OF PREP RedLOCATION [review exclusion criteria] GreenSEDATION TYPE      1. Are you active on mychart? Y    2. What insurance is in the chart? BCBS/MEDICARE     3.   Ordering/Referring Provider: Oskar Conrad MD in [facility]    4. BMI   (If greater than 40 review exclusion criteria [PAC APPT IF [MAC] @ [facility])  40.0  [If yes, BMI OVER 40-EXTENDED PREP]      **(Sedation review/consideration needed)**  Do you have a legal guardian or Medical Power of    and/or are you able to give consent for your medical care?     Y - SELF    5. Have you had a positive covid test in the last 90 days?   N -     6.  Are you currently on dialysis?   N [ If yes, G-PREP & HOSPITAL setting ONLY]     7.  Do you have chronic kidney disease?  N [ If yes, G-PREP ]    8.   Do you have a diagnosis of diabetes?   N   [ If yes, G-PREP ]    9.  On a regular basis do you go 3-5 days between bowel movements?   N   [ If yes, EXTENDED PREP]    10.  Are you taking any prescription pain medications on a routine schedule?    N -  [ If yes, EXTENDED PREP] [If yes, MAC]      11.   Do you have any chemical dependencies such as alcohol, street drugs, or methadone?    N [If yes, MAC]    12.   Do you have any history of post-traumatic stress syndrome, severe anxiety or history of psychosis?    N  [If yes, MAC]    13.  [FEMALES] Are you currently pregnant?     If yes, how many weeks?       Respiratory/Heart Screening:  [If yes to any of the following HOSPITAL setting only]     14. Do you have Pulmonary Hypertension [Lungs]?   N       15. Do you have UNCONTROLLED asthma?   N     16.  Do you use daily home oxygen?  N      17. Do you have mod to severe Obstructive Sleep Apnea?         (OKAY @ [facility]  [facility]  [facility]  [place]  [place] - if pt is not on OXYGEN)  N      18.   Have you had a heart or lung transplant?   N      19.   Have you had a stroke or Transient ischemic attack (TIA - aka  mini stroke ) within 6 months?   (If yes, please review exclusion criteria)  N     20.   In the past 6 months, have you had any heart related issues including cardiomyopathy or heart attack?   N           If yes, did it require cardiac stenting or other implantable device?         21.   Do you have any implantable devices in your body (pacemaker, defib, LVAD)? (If yes, please review exclusion criteria)  N     22. Do you take nitroglycerin?   N           If yes, how often?   (if yes, HOSPITAL setting ONLY)    23.  Are you currently taking any blood thinners?    [If yes, INFORM patient to follw up w/ ORDERING PROVIDER FOR BRIDGING INSTRUCTIONS]     N    24.   Do you transfer independently?                (If NO, please HOSPITAL setting ONLY)  Y    25.   Preferred LOCAL Pharmacy for Pre Prescription:         [id number] - [place], [place] - [address] [place] [address]    Scheduling Details  (Please ask for phone number if not scheduled by patient)      Caller : Deandre Merchant  Date of Procedure: 10/3/22  Surgeon: LIZZY  Location:         Sedation Type: CS l   Conscious Sedation- Needs  for 6 hours after the procedure  MAC/General-Needs  for 24 hours after procedure    N :[Pre-op Required] at [facility]  SH  MG and OR for MAC sedation   (advise patient they will need a pre-op WITH IN 30 DAYS of procedure date)     Type of Procedure Scheduled:   Lower Endoscopy [Colonoscopy]    Which Colonoscopy Prep was Sent?:   MIRALAX -     [facility] PATIENTS & [facility] PATIENTS NEEDS EXTENDED PREP       Informed patient they will need an adult  Y  Cannot take any type of public or medical transportation alone    Pre-Procedure Covid test to be completed at [facility] or Externally: Y  **INFORMED OF HOME TESTING & LAB OPTION**        Confirmed Nurse will call to complete assessment Y    Additional comments: N

## 2022-07-21 RX ORDER — ATORVASTATIN CALCIUM 20 MG/1
20 TABLET, FILM COATED ORAL DAILY
Qty: 90 TABLET | Refills: 3 | Status: SHIPPED | OUTPATIENT
Start: 2022-07-21 | End: 2022-07-21

## 2022-07-28 ENCOUNTER — TELEPHONE (OUTPATIENT)
Dept: FAMILY MEDICINE | Facility: CLINIC | Age: 72
End: 2022-07-28

## 2022-07-28 NOTE — TELEPHONE ENCOUNTER
Diabetes Education Scheduling Outreach #1:    Call to patient to schedule. Left message with phone number to call to schedule.    Also sent Weavly message for second attempt. Requested patient to call to schedule.    Kacie Esquivel OnCall  Diabetes and Nutrition Scheduling

## 2022-08-01 ENCOUNTER — HOSPITAL ENCOUNTER (OUTPATIENT)
Dept: RESPIRATORY THERAPY | Facility: CLINIC | Age: 72
Discharge: HOME | End: 2022-08-01
Payer: MEDICARE

## 2022-08-01 DIAGNOSIS — J84.9 ILD (INTERSTITIAL LUNG DISEASE) (H): ICD-10-CM

## 2022-08-01 PROCEDURE — 999N000157 HC STATISTIC RCP TIME EA 10 MIN

## 2022-08-01 PROCEDURE — 94729 DIFFUSING CAPACITY: CPT

## 2022-08-01 PROCEDURE — 999N000156 HC STATISTIC RCP CONSULT EA 30 MIN

## 2022-08-01 PROCEDURE — 250N000009 HC RX 250

## 2022-08-01 PROCEDURE — 94060 EVALUATION OF WHEEZING: CPT

## 2022-08-01 PROCEDURE — 94726 PLETHYSMOGRAPHY LUNG VOLUMES: CPT

## 2022-08-01 RX ORDER — ALBUTEROL SULFATE 0.83 MG/ML
2.5 SOLUTION RESPIRATORY (INHALATION)
Status: COMPLETED | OUTPATIENT
Start: 2022-08-01 | End: 2022-08-01

## 2022-08-01 RX ADMIN — ALBUTEROL SULFATE 2.5 MG: 2.5 SOLUTION RESPIRATORY (INHALATION) at 08:15

## 2022-08-01 NOTE — PROGRESS NOTES
Pre-Bronch    Post-Bronch         Actual Pred %Pred LLN Actual %Pred %Chng       ---- SPIROMETRY ----                          FVC (L) 3.05 4.55 67 3.36 2.96 65 -2            FEV1 (L) 2.19 3.41 64 2.45 2.14 62 -1            FEV1/FVC (%) 72 75   62 72   +1            FEF 25% (L/sec) 2.68       3.83   +42            FEF 75% (L/sec) 0.58       0.46   -20            FEF 25-75% (L/sec) 1.56 2.54 61 1.10 1.39 54 -10            FEF Max (L/sec) 5.69 8.77 64 6.30 6.86 78 +20            FIVC (L) 2.75       1.87   -31            FIF Max (L/sec) 1.99 7.36 27 4.69 1.09 14 -45            MVV (L/min)   134   86                  MEP (cmH2O)                          MIP (cmH2O)                                                     ---- LUNG VOLUMES ----                          SVC (L) 2.85 5.13 55 4.15                  IC (L) 2.67 4.52 59                    ERV (L) 0.18 0.62 28                    FRC (N2) (L)   3.84   2.64                  RV (N2) (L)   2.74   1.92                  TLC (N2) (L)   7.58   6.18                  RV/TLC (N2) (%)   42   31                  Washout Time (min)                          FRC(Pleth) (L) 2.99 3.84 77 2.64                  RV (Pleth) (L) 2.81 2.74 102 1.92                  TLC (Pleth) (L) 5.66 7.58 74 6.18                  RV/TLC (Pleth) (%) 50 42   31                  Trapped Gas (L)                                                     ---- DIFFUSION ----                          DLCOunc (ml/min/mmHg) 20.10 27.58 72 18.89                  DLCOcor (ml/min/mmHg) 19.99 27.58 72 18.89                  DL/VA (ml/min/mmHg/L) 4.22 3.99                      VA (L) 4.74 6.91 68 5.52                  Hgb (gm/dL) 14.8 12-18                                                 ---- AIRWAYS RESISTANCE ----                          Raw (cmH2O/L/s) 2.72 2.24 121                    Gaw (L/s/cmH2O) 0.38 1.03 36                    sRaw (cmH2O*s) 8.16 < 4.76                      sGaw (1/cmH2O*s) 0.13 0.08 151

## 2022-08-01 NOTE — DISCHARGE INSTRUCTIONS
Thank you for completing pulmonary function testing today.  All results will be scanned into your epic results for your doctor to review.  Please resume taking all your current prescribed medications and diet as directed by your provider.   If you have not heard from your provider about your testing within two weeks and do not have a follow-up appointment scheduled with them please contact your provider about any questions you have concerning your testing.   Thank you  [facility]

## 2022-08-02 LAB
DLCOCOR-%PRED-PRE: 54 %
DLCOCOR-PRE: 15.12 ML/MIN/MMHG
DLCOUNC-%PRED-PRE: 55 %
DLCOUNC-PRE: 15.2 ML/MIN/MMHG
DLCOUNC-PRED: 27.58 ML/MIN/MMHG
ERV-%PRED-PRE: 28 %
ERV-PRE: 0.18 L
ERV-PRED: 0.62 L
EXPTIME-PRE: 7.78 SEC
FEF2575-%PRED-POST: 54 %
FEF2575-%PRED-PRE: 61 %
FEF2575-POST: 1.39 L/SEC
FEF2575-PRE: 1.56 L/SEC
FEF2575-PRED: 2.54 L/SEC
FEFMAX-%PRED-PRE: 64 %
FEFMAX-PRE: 5.69 L/SEC
FEFMAX-PRED: 8.77 L/SEC
FEV1-%PRED-PRE: 64 %
FEV1-PRE: 2.19 L
FEV1FEV6-PRE: 72 %
FEV1FEV6-PRED: 78 %
FEV1FVC-PRE: 72 %
FEV1FVC-PRED: 75 %
FEV1SVC-PRE: 77 %
FEV1SVC-PRED: 66 %
FIFMAX-PRE: 1.99 L/SEC
FRCPLETH-%PRED-PRE: 77 %
FRCPLETH-PRE: 2.99 L
FRCPLETH-PRED: 3.84 L
FVC-%PRED-PRE: 67 %
FVC-PRE: 3.05 L
FVC-PRED: 4.55 L
GAW-%PRED-PRE: 36 %
GAW-PRE: 0.38 L/S/CMH2O
GAW-PRED: 1.03 L/S/CMH2O
IC-%PRED-PRE: 59 %
IC-PRE: 2.67 L
IC-PRED: 4.52 L
RVPLETH-%PRED-PRE: 102 %
RVPLETH-PRE: 2.81 L
RVPLETH-PRED: 2.74 L
SGAW-%PRED-PRE: 151 %
SGAW-PRE: 0.13 1/CMH2O*S
SGAW-PRED: 0.08 1/CMH2O*S
SRAW-%PRED-PRE: 171 %
SRAW-PRE: 8.16 CMH2O*S
SRAW-PRED: 4.76 CMH2O*S
TLCPLETH-%PRED-PRE: 74 %
TLCPLETH-PRE: 5.66 L
TLCPLETH-PRED: 7.58 L
VA-%PRED-PRE: 61 %
VA-PRE: 4.24 L
VC-%PRED-PRE: 55 %
VC-PRE: 2.85 L
VC-PRED: 5.13 L

## 2022-08-03 ENCOUNTER — TELEPHONE (OUTPATIENT)
Dept: PULMONOLOGY | Facility: CLINIC | Age: 72
End: 2022-08-03

## 2022-08-03 DIAGNOSIS — R05.9 COUGH: Primary | ICD-10-CM

## 2022-08-03 NOTE — TELEPHONE ENCOUNTER
Sarabjit Mahmood MD  [facility] Patient Care Specialty Pool 1 hour ago (3:41 PM)     NG    Follow up with me in 6 months.  Needs to be just after he gets his PFTs in the lab performed.

## 2022-08-03 NOTE — TELEPHONE ENCOUNTER
I called to discuss his pulmonary function tests. There is mixed obstruction and restriction.  I left a voicemail, since he did not answer.  He will need repeat PFTs in 6 months and a follow up with me shortly after that.     Bolivar Mahmood MD

## 2022-08-04 NOTE — TELEPHONE ENCOUNTER
Added Pt. To pt. List to be called when it  Is closer to desired appointment time.     Day Brice on 8/4/2022 at 11:58 AM

## 2022-08-21 DIAGNOSIS — I10 ESSENTIAL HYPERTENSION: ICD-10-CM

## 2022-08-21 DIAGNOSIS — I77.810 ASCENDING AORTA DILATATION (H): ICD-10-CM

## 2022-08-23 RX ORDER — METOPROLOL SUCCINATE 50 MG/1
TABLET, EXTENDED RELEASE ORAL
Qty: 90 TABLET | Refills: 1 | Status: SHIPPED | OUTPATIENT
Start: 2022-08-23 | End: 2023-03-21

## 2022-09-22 ENCOUNTER — DOCUMENTATION ONLY (OUTPATIENT)
Dept: FAMILY MEDICINE | Facility: CLINIC | Age: 72
End: 2022-09-22

## 2022-09-22 NOTE — PROGRESS NOTES
Please abstract the following data from this visit with this patient into the appropriate field in Epic:    Tests that can be patient reported without a hard copy:    Eye exam with ophthalmology on this date: 4/27/22    Other Tests found in the patient's chart through Chart Review/Care Everywhere:    Note to Abstraction: If this section is blank, no results were found via Chart Review/Care Everywhere.    Cali Lilly PharmD, [facility]  Medication Therapy Management Pharmacist  Pager: [phone]

## 2022-09-27 ENCOUNTER — ALLIED HEALTH/NURSE VISIT (OUTPATIENT)
Dept: EDUCATION SERVICES | Facility: CLINIC | Age: 72
End: 2022-09-27
Payer: MEDICARE

## 2022-09-27 ENCOUNTER — TELEPHONE (OUTPATIENT)
Dept: EDUCATION SERVICES | Facility: CLINIC | Age: 72
End: 2022-09-27

## 2022-09-27 DIAGNOSIS — E11.9 TYPE 2 DIABETES MELLITUS WITHOUT COMPLICATION, WITHOUT LONG-TERM CURRENT USE OF INSULIN (H): ICD-10-CM

## 2022-09-27 PROCEDURE — G0108 DIAB MANAGE TRN  PER INDIV: HCPCS

## 2022-09-27 NOTE — PATIENT INSTRUCTIONS
1) Increase Metformin to 2 tabs per day after dinner.     2) Check blood sugar once daily  Fasting/morning goal: less than 130 mg/dL  2 Hours after the start of a meal: Less than 180 mg/dL    3) 1 serving of carbohydrate = 15 grams  Aim for 4 servings or 60 grams per meal and 15-20 grams of carbs per snack + a serving of protein    4) Increase physical activity as able- walking is great!      Thank you,    Mary Jackson, GASPERN, LD  Outpatient Diabetes Education  Adult Diabetes Education Triage [phone]  Diabetes Scheduling [phone]

## 2022-09-27 NOTE — LETTER
9/27/2022         RE: Deandre Merchant  [address]  [facility] [address]        Dear Colleague,    Thank you for referring your patient, Deandre Merchant, to the [facility] [place]. Please see a copy of my visit note below.      Diabetes Self-Management Education & Support    Presents for: Initial Assessment for new diagnosis    Type of Visit: In Person    How would patient like to obtain AVS? Printed copy    ASSESSMENT:    Minh comes to clinic today for newly diagnosed DM type 2.  Diet is generally well balanced, discussed portion control and carbohydrate choices.  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  Plans to begin walking again.    Does not have a meter yet, provided one with demonstration in clinic today.  Order placed for strips and lancet refills.  BG was >200 mg/dL in clinic today about 1.5 hours after eating.  Recommend increasing Metformin to 1000 mg at dinnertime.  Will work with PCP to order.       Patient's most recent   Lab Results   Component Value Date    A1C 8.7 07/08/2022    A1C 5.4 09/17/2015    is not meeting goal of <8.0    Diabetes knowledge and skills assessment:   Patient is knowledgeable in diabetes management concepts related to: Being Active    Continue education with the following diabetes management concepts: Healthy Eating, Being Active, Monitoring, Taking Medication, Problem Solving, Reducing Risks and Healthy Coping    Based on learning assessment above, most appropriate setting for further diabetes education would be: Individual setting.    INTERVENTIONS:    Education provided today on:  AADE Self-Care Behaviors:  Patient was instructed on Accu-Chek Guide Me meter and was able to provide an accurate return demonstration. Patient's blood glucose reading today was 272 mg/dL.  Care Plan: Diabetes   Updates made by Ava Jackson since 9/27/2022 12:00 AM      Problem: HbA1C Not In Goal       Goal: Establish  Regular Follow-Ups with PCP       Task: Discuss with PCP the recommended timing for patient's next follow up visit(s)    Responsible User: Ava Jackson      Task: Discuss schedule for PCP visits with patient    Responsible User: Ava Jackson      Goal: Get HbA1C Level in Goal       Task: Educate patient on diabetes education self-management topics    Responsible User: Ava Jackson      Task: Educate patient on benefits of regular glucose monitoring    Responsible User: Ava Jackson      Task: Refer patient to appropriate extended care team member, as needed (Medication Therapy Management, Behavioral Health, Physical Therapy, etc.)    Responsible User: Ava Jackson      Task: Discuss diabetes treatment plan with patient    Responsible User: Ava Jackson      Problem: Diabetes Self-Management Education Needed to Optimize Self-Care Behaviors       Goal: Understand diabetes pathophysiology and disease progression       Task: Provide education on diabetes pathophysiology and disease progression specfic to patient's diabetes type Completed 9/27/2022   Responsible User: Ava Jackson      Goal: Healthy Eating - follow a healthy eating pattern for diabetes    Start Date: 9/27/2022   This Visit's Progress: 10%   Note:    My Goal: I will aim for 60 grams of carbohydrates with meals    What I need to meet my goal: carbohdyrate reference, portion control and my plate    I plan to meet my goal by this date: 3 months       Task: Provide education on portion control and consistency in amount, composition and timing of food intake Completed 9/27/2022   Responsible User: Ava Jackson      Task: Provide education on managing carbohydrate intake (carbohydrate counting, plate planning method, etc.) Completed 9/27/2022   Responsible User: Ava Jackson      Task: Provide education on weight management Completed 9/27/2022    Responsible User: Ava Jackson      Task: Provide education on heart healthy eating    Responsible User: Ava Jackson      Task: Provide education on eating out    Responsible User: Ava Jackson      Task: Develop individualized healthy eating plan with patient    Responsible User: Ava Jackson      Goal: Being Active - get regular physical activity, working up to at least 150 minutes per week       Task: Provide education on relationship of activity to glucose and precautions to take if at risk for low glucose Completed 9/27/2022   Responsible User: Ava Jackson      Task: Discuss barriers to physical activity with patient    Responsible User: Ava Jackson      Task: Develop physical activity plan with patient    Responsible User: Ava Jackson      Task: Explore community resources including walking groups, assistance programs, and home videos    Responsible User: Ava Jackson      Goal: Monitoring - monitor glucose and ketones as directed    This Visit's Progress: 0%   Note:    My Goal: I will test my BG once daily    What I need to meet my goal: meter and supplies    I plan to meet my goal by this date: 6 weeks      Task: Provide education on blood glucose monitoring (purpose, proper technique, frequency, glucose targets, interpreting results, when to use glucose control solution, sharps disposal)    Responsible User: Ava Jackson      Task: Provide education on continuous glucose monitoring (sensor placement, use of rene or /reader, understanding glucose trends, alerts and alarms, differences between sensor glucose and blood glucose)    Responsible User: Ava Jackson      Task: Provide education on ketone monitoring (when to monitor, frequency, etc.)    Responsible User: Ava Jackson      Goal: Taking Medication - patient is consistently taking medications as directed        Task: Provide education on action of prescribed medication, including when to take and possible side effects Completed 9/27/2022   Responsible User: Ava Jackson      Task: Provide education on insulin and injectable diabetes medications, including administration, storage, site selection and rotation for injection sites    Responsible User: Ava Jackson      Task: Discuss barriers to medication adherence with patient and provide management technique ideas as appropriate    Responsible User: Ava Jackson      Task: Provide education on frequency and refill details of medications    Responsible User: Ava Jackson      Goal: Problem Solving - know how to prevent and manage short-term diabetes complications       Task: Provide education on high blood glucose - causes, signs/symptoms, prevention and treatment    Responsible User: Ava Jackson      Task: Provide education on low blood glucose - causes, signs/symptoms, prevention, treatment, carrying a carbohydrate source at all times, and medical identification    Responsible User: Ava Jackson      Task: Provide education on safe travel with diabetes    Responsible User: Ava Jackson      Task: Provide education on how to care for diabetes on sick days    Responsible User: Ava Jackson      Task: Provide education on when to call a health care provider    Responsible User: Ava Jackson      Goal: Reducing Risks - know how to prevent and treat long-term diabetes complications       Task: Provide education on major complications of diabetes, prevention, early diagnostic measures and treatment of complications    Responsible User: Ava Jackson      Task: Provide education on recommended care for dental, eye and foot health    Responsible User: Ava Jackson      Task: Provide education on Hemoglobin A1c - goals and relationship to blood  glucose levels    Responsible User: Ava Jackson      Task: Provide education on recommendations for heart health - lipid levels and goals, blood pressure and goals, and aspirin therapy, if indicated    Responsible User: Ava Jackson      Task: Provide education on tobacco cessation    Responsible User: Ava Jackson      Goal: Healthy Coping - use available resources to cope with the challenges of managing diabetes       Task: Discuss recognizing feelings about having diabetes    Responsible User: Ava Jackson      Task: Provide education on the benefits of making appropriate lifestyle changes    Responsible User: Ava Jackson      Task: Provide education on benefits of utilizing support systems    Responsible User: Ava Jackson      Task: Discuss methods for coping with stress    Responsible User: Ava Jackson      Task: Provide education on when to seek professional counseling    Responsible User: Ava Jackson          Opportunities for ongoing education and support in diabetes-self management were discussed. Pt verbalized understanding of concepts discussed and recommendations provided today.       Education Materials Provided:  Living Healthy with Diabetes, BG Log Sheet, Carbohydrate Counting and My Plate Planner          PLAN    1) Increase Metformin to 2 tabs per day after dinner.     2) Check blood sugar once daily  Fasting/morning goal: less than 130 mg/dL  2 Hours after the start of a meal: Less than 180 mg/dL    3) 1 serving of carbohydrate = 15 grams  Aim for 4 servings or 60 grams per meal and 15-20 grams of carbs per snack + a serving of protein    4) Increase physical activity as able- walking is great!    Topics to cover at upcoming visits: Healthy Eating, Being Active, Taking Medication, Reducing Risks and Healthy Coping  Follow-up: November 9th, 9:30am- in person visit    See Goals Section for  "co-developed, patient-stated behavior change goals.  AVS provided to patient today.          SUBJECTIVE / OBJECTIVE:  Presents for: Initial Assessment for new diagnosis  Accompanied by: Self  Diabetes education in the past 24mo: No  Diabetes type: Type 2  Date of diagnosis: July 2022  Disease course: Stable  How confident are you filling out medical forms by yourself:: Somewhat  Transportation concerns: No  Difficulty affording diabetes medication?: No  Cultural Influences/Ethnic Background:  Not  or       Diabetes Symptoms & Complications:  Fatigue: Sometimes  Neuropathy: Sometimes  Polydipsia: No  Polyphagia: Sometimes  Polyuria: Yes  Visual change: Sometimes (related to long-term eye issues)  Slow healing wounds: No  Symptom course: Stable  Autonomic neuropathy: No  CVA: No  Heart disease: No  Nephropathy: No  Peripheral neuropathy: No  Peripheral Vascular Disease: No  Retinopathy: No  Sexual dysfunction: Yes    Patient Problem List and Family Medical History reviewed for relevant medical history, current medical status, and diabetes risk factors.    Vitals:  There were no vitals taken for this visit.  Estimated body mass index is 39.8 kg/m  as calculated from the following:    Height as of 7/8/22: 1.835 m (6' 0.25\").    Weight as of 7/8/22: 134 kg (295 lb 8 oz).   Last 3 BP:   BP Readings from Last 3 Encounters:   07/08/22 122/72   06/01/22 112/74   05/04/22 118/78       History   Smoking Status     Never Smoker   Smokeless Tobacco     Never Used       Labs:  Lab Results   Component Value Date    A1C 8.7 07/08/2022    A1C 5.4 09/17/2015     Lab Results   Component Value Date     07/08/2022     01/02/2021     Lab Results   Component Value Date    LDL 15 07/08/2022    LDL 29 07/07/2020     HDL Cholesterol   Date Value Ref Range Status   07/07/2020 28 (L) >39 mg/dL Final     Direct Measure HDL   Date Value Ref Range Status   07/08/2022 29 (L) >=40 mg/dL Final   ]  GFR Estimate   Date " Value Ref Range Status   07/08/2022 >90 >60 mL/min/1.73m2 Final     Comment:     Effective December 21, 2021 eGFRcr in adults is calculated using the 2021 CKD-EPI creatinine equation which includes age and gender (Catarina sky al., NEJM, DOI: 10.1056/BTZXhh2014949)   01/02/2021 80 >60 mL/min/[1.73_m2] Final     Comment:     Non  GFR Calc  Starting 12/18/2018, serum creatinine based estimated GFR (eGFR) will be   calculated using the Chronic Kidney Disease Epidemiology Collaboration   (CKD-EPI) equation.       GFR, ESTIMATED POCT   Date Value Ref Range Status   03/02/2022 >60 >60 mL/min/1.73m2 Final     GFR Estimate If Black   Date Value Ref Range Status   01/02/2021 >90 >60 mL/min/[1.73_m2] Final     Comment:      GFR Calc  Starting 12/18/2018, serum creatinine based estimated GFR (eGFR) will be   calculated using the Chronic Kidney Disease Epidemiology Collaboration   (CKD-EPI) equation.       Lab Results   Component Value Date    CR 0.90 07/08/2022    CR 0.96 01/02/2021     No results found for: MICROALBUMIN    Healthy Eating:  Healthy Eating Assessed Today: Yes  Cultural/Alevism diet restrictions?: No  Meal planning/habits: None  Meals include: Breakfast, Morning Snack, Lunch, Dinner, Evening Snack  Breakfast: Oatmeal (2 cups) with milk and brown sugar or maple syrup (home made)  Lunch: Hot dish or casserole leftovers  Dinner: Hot dish or casserole  Snacks: diet soda and a roll/donut evening- air popped pop corn with butter  Beverages: Milk, Diet soda  Has patient met with a dietitian in the past?: No    Being Active:  Being Active Assessed Today: Yes  Exercise:: Yes  How intense was your typical exercise? : Light (like stretching or slow walking)  Barrier to exercise: None    Monitoring:  Monitoring Assessed Today: No  Blood Glucose Meter: Unknown  Times checking blood sugar at home (number): Never  Blood glucose trend: Other      Taking Medications:  Diabetes Medication(s)      Biguanides       metFORMIN (GLUCOPHAGE XR) 500 MG 24 hr tablet    Take 1 tablet (500 mg) by mouth daily (with dinner)          Taking Medication Assessed Today: Yes  Current Treatments: Oral Medication (taken by mouth)  Problems taking diabetes medications regularly?: Yes  Diabetes medication side effects?: Yes    Problem Solving:  Problem Solving Assessed Today: No         Reducing Risks:  Diabetes Risks: Age over 45 years  CAD Risks: Diabetes Mellitus, Dyslipidemia, Family history, Hypertension, Obesity  Has dilated eye exam at least once a year?: Yes  Sees dentist every 6 months?: Yes  Feet checked by healthcare provider in the last year?: No    Healthy Coping:  Healthy Coping Assessed Today: No  Informal Support system:: Spouse  Stage of change: PREPARATION (Decided to change - considering how)  Patient Activation Measure Survey Score:  JENN Score (Last Two) 9/17/2015   JENN Raw Score 42   Activation Score 66   JENN Level 3             Mary Jackson RDN, LD  Outpatient Diabetes Education  Adult Diabetes Education Triage [phone]    Time Spent: 60 minutes  Encounter Type: Individual        Any diabetes medication dose changes were made via the Certified Diabetes Care & Education Protocol in collaboration with the patient's referring provider. A copy of this encounter was shared with the provider.

## 2022-09-27 NOTE — PROGRESS NOTES
Diabetes Self-Management Education & Support    Presents for: Initial Assessment for new diagnosis    Type of Visit: In Person    How would patient like to obtain AVS? Printed copy    ASSESSMENT:    Minh comes to clinic today for newly diagnosed DM type 2.  Diet is generally well balanced, discussed portion control and carbohydrate choices.  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  Plans to begin walking again.    Does not have a meter yet, provided one with demonstration in clinic today.  Order placed for strips and lancet refills.  BG was >200 mg/dL in clinic today about 1.5 hours after eating.  Recommend increasing Metformin to 1000 mg at dinnertime.  Will work with PCP to order.       Patient's most recent   Lab Results   Component Value Date    A1C 8.7 07/08/2022    A1C 5.4 09/17/2015    is not meeting goal of <8.0    Diabetes knowledge and skills assessment:   Patient is knowledgeable in diabetes management concepts related to: Being Active    Continue education with the following diabetes management concepts: Healthy Eating, Being Active, Monitoring, Taking Medication, Problem Solving, Reducing Risks and Healthy Coping    Based on learning assessment above, most appropriate setting for further diabetes education would be: Individual setting.    INTERVENTIONS:    Education provided today on:  AADE Self-Care Behaviors:  Patient was instructed on Accu-Chek Guide Me meter and was able to provide an accurate return demonstration. Patient's blood glucose reading today was 272 mg/dL.  Care Plan: Diabetes   Updates made by Ava Jackson since 9/27/2022 12:00 AM      Problem: HbA1C Not In Goal       Goal: Establish Regular Follow-Ups with PCP       Task: Discuss with PCP the recommended timing for patient's next follow up visit(s)    Responsible User: Ava Jackson      Task: Discuss schedule for PCP visits with patient    Responsible User: Maunel  Ava      Goal: Get HbA1C Level in Goal       Task: Educate patient on diabetes education self-management topics    Responsible User: Ava Jackson      Task: Educate patient on benefits of regular glucose monitoring    Responsible User: Ava Jackson      Task: Refer patient to appropriate extended care team member, as needed (Medication Therapy Management, Behavioral Health, Physical Therapy, etc.)    Responsible User: Ava Jackson      Task: Discuss diabetes treatment plan with patient    Responsible User: Ava Jackson      Problem: Diabetes Self-Management Education Needed to Optimize Self-Care Behaviors       Goal: Understand diabetes pathophysiology and disease progression       Task: Provide education on diabetes pathophysiology and disease progression specfic to patient's diabetes type Completed 9/27/2022   Responsible User: Ava Jackson      Goal: Healthy Eating - follow a healthy eating pattern for diabetes    Start Date: 9/27/2022   This Visit's Progress: 10%   Note:    My Goal: I will aim for 60 grams of carbohydrates with meals    What I need to meet my goal: carbohdyrate reference, portion control and my plate    I plan to meet my goal by this date: 3 months       Task: Provide education on portion control and consistency in amount, composition and timing of food intake Completed 9/27/2022   Responsible User: Ava Jackson      Task: Provide education on managing carbohydrate intake (carbohydrate counting, plate planning method, etc.) Completed 9/27/2022   Responsible User: Ava Jackson      Task: Provide education on weight management Completed 9/27/2022   Responsible User: Ava Jackson      Task: Provide education on heart healthy eating    Responsible User: Ava Jackson      Task: Provide education on eating out    Responsible User: Ava Jackson      Task: Develop individualized  healthy eating plan with patient    Responsible User: Ava Jackson      Goal: Being Active - get regular physical activity, working up to at least 150 minutes per week       Task: Provide education on relationship of activity to glucose and precautions to take if at risk for low glucose Completed 9/27/2022   Responsible User: Ava Jackson      Task: Discuss barriers to physical activity with patient    Responsible User: Ava Jackson      Task: Develop physical activity plan with patient    Responsible User: Ava Jackson      Task: Explore community resources including walking groups, assistance programs, and home videos    Responsible User: Ava Jackson      Goal: Monitoring - monitor glucose and ketones as directed    This Visit's Progress: 0%   Note:    My Goal: I will test my BG once daily    What I need to meet my goal: meter and supplies    I plan to meet my goal by this date: 6 weeks      Task: Provide education on blood glucose monitoring (purpose, proper technique, frequency, glucose targets, interpreting results, when to use glucose control solution, sharps disposal)    Responsible User: Ava Jackson      Task: Provide education on continuous glucose monitoring (sensor placement, use of rene or /reader, understanding glucose trends, alerts and alarms, differences between sensor glucose and blood glucose)    Responsible User: Ava Jackson      Task: Provide education on ketone monitoring (when to monitor, frequency, etc.)    Responsible User: Ava Jackson      Goal: Taking Medication - patient is consistently taking medications as directed       Task: Provide education on action of prescribed medication, including when to take and possible side effects Completed 9/27/2022   Responsible User: Ava Jackson      Task: Provide education on insulin and injectable diabetes medications, including  administration, storage, site selection and rotation for injection sites    Responsible User: Ava Jackson      Task: Discuss barriers to medication adherence with patient and provide management technique ideas as appropriate    Responsible User: Ava Jackson      Task: Provide education on frequency and refill details of medications    Responsible User: Ava Jackson      Goal: Problem Solving - know how to prevent and manage short-term diabetes complications       Task: Provide education on high blood glucose - causes, signs/symptoms, prevention and treatment    Responsible User: Ava Jackson      Task: Provide education on low blood glucose - causes, signs/symptoms, prevention, treatment, carrying a carbohydrate source at all times, and medical identification    Responsible User: Ava Jackson      Task: Provide education on safe travel with diabetes    Responsible User: Ava Jackson      Task: Provide education on how to care for diabetes on sick days    Responsible User: Ava Jackson      Task: Provide education on when to call a health care provider    Responsible User: Ava Jackson      Goal: Reducing Risks - know how to prevent and treat long-term diabetes complications       Task: Provide education on major complications of diabetes, prevention, early diagnostic measures and treatment of complications    Responsible User: Ava Jackson      Task: Provide education on recommended care for dental, eye and foot health    Responsible User: Ava Jackson      Task: Provide education on Hemoglobin A1c - goals and relationship to blood glucose levels    Responsible User: Ava Jackson      Task: Provide education on recommendations for heart health - lipid levels and goals, blood pressure and goals, and aspirin therapy, if indicated    Responsible User: Ava Jackson      Task:  Provide education on tobacco cessation    Responsible User: Ava Jackson      Goal: Healthy Coping - use available resources to cope with the challenges of managing diabetes       Task: Discuss recognizing feelings about having diabetes    Responsible User: Ava Jackson      Task: Provide education on the benefits of making appropriate lifestyle changes    Responsible User: Ava Jackson      Task: Provide education on benefits of utilizing support systems    Responsible User: Ava Jackson      Task: Discuss methods for coping with stress    Responsible User: Ava Jackson      Task: Provide education on when to seek professional counseling    Responsible User: Ava Jackson          Opportunities for ongoing education and support in diabetes-self management were discussed. Pt verbalized understanding of concepts discussed and recommendations provided today.       Education Materials Provided:  Living Healthy with Diabetes, BG Log Sheet, Carbohydrate Counting and My Plate Planner          PLAN    1) Increase Metformin to 2 tabs per day after dinner.     2) Check blood sugar once daily  Fasting/morning goal: less than 130 mg/dL  2 Hours after the start of a meal: Less than 180 mg/dL    3) 1 serving of carbohydrate = 15 grams  Aim for 4 servings or 60 grams per meal and 15-20 grams of carbs per snack + a serving of protein    4) Increase physical activity as able- walking is great!    Topics to cover at upcoming visits: Healthy Eating, Being Active, Taking Medication, Reducing Risks and Healthy Coping  Follow-up: November 9th, 9:30am- in person visit    See Goals Section for co-developed, patient-stated behavior change goals.  AVS provided to patient today.          SUBJECTIVE / OBJECTIVE:  Presents for: Initial Assessment for new diagnosis  Accompanied by: Self  Diabetes education in the past 24mo: No  Diabetes type: Type 2  Date of diagnosis: July  "2022  Disease course: Stable  How confident are you filling out medical forms by yourself:: Somewhat  Transportation concerns: No  Difficulty affording diabetes medication?: No  Cultural Influences/Ethnic Background:  Not  or       Diabetes Symptoms & Complications:  Fatigue: Sometimes  Neuropathy: Sometimes  Polydipsia: No  Polyphagia: Sometimes  Polyuria: Yes  Visual change: Sometimes (related to long-term eye issues)  Slow healing wounds: No  Symptom course: Stable  Autonomic neuropathy: No  CVA: No  Heart disease: No  Nephropathy: No  Peripheral neuropathy: No  Peripheral Vascular Disease: No  Retinopathy: No  Sexual dysfunction: Yes    Patient Problem List and Family Medical History reviewed for relevant medical history, current medical status, and diabetes risk factors.    Vitals:  There were no vitals taken for this visit.  Estimated body mass index is 39.8 kg/m  as calculated from the following:    Height as of 7/8/22: 1.835 m (6' 0.25\").    Weight as of 7/8/22: 134 kg (295 lb 8 oz).   Last 3 BP:   BP Readings from Last 3 Encounters:   07/08/22 122/72   06/01/22 112/74   05/04/22 118/78       History   Smoking Status     Never Smoker   Smokeless Tobacco     Never Used       Labs:  Lab Results   Component Value Date    A1C 8.7 07/08/2022    A1C 5.4 09/17/2015     Lab Results   Component Value Date     07/08/2022     01/02/2021     Lab Results   Component Value Date    LDL 15 07/08/2022    LDL 29 07/07/2020     HDL Cholesterol   Date Value Ref Range Status   07/07/2020 28 (L) >39 mg/dL Final     Direct Measure HDL   Date Value Ref Range Status   07/08/2022 29 (L) >=40 mg/dL Final   ]  GFR Estimate   Date Value Ref Range Status   07/08/2022 >90 >60 mL/min/1.73m2 Final     Comment:     Effective December 21, 2021 eGFRcr in adults is calculated using the 2021 CKD-EPI creatinine equation which includes age and gender (Catarina sky al., NEJM, DOI: 10.1056/VWSKqd3366748)   01/02/2021 80 >60 " mL/min/[1.73_m2] Final     Comment:     Non  GFR Calc  Starting 12/18/2018, serum creatinine based estimated GFR (eGFR) will be   calculated using the Chronic Kidney Disease Epidemiology Collaboration   (CKD-EPI) equation.       GFR, ESTIMATED POCT   Date Value Ref Range Status   03/02/2022 >60 >60 mL/min/1.73m2 Final     GFR Estimate If Black   Date Value Ref Range Status   01/02/2021 >90 >60 mL/min/[1.73_m2] Final     Comment:      GFR Calc  Starting 12/18/2018, serum creatinine based estimated GFR (eGFR) will be   calculated using the Chronic Kidney Disease Epidemiology Collaboration   (CKD-EPI) equation.       Lab Results   Component Value Date    CR 0.90 07/08/2022    CR 0.96 01/02/2021     No results found for: MICROALBUMIN    Healthy Eating:  Healthy Eating Assessed Today: Yes  Cultural/Worship diet restrictions?: No  Meal planning/habits: None  Meals include: Breakfast, Morning Snack, Lunch, Dinner, Evening Snack  Breakfast: Oatmeal (2 cups) with milk and brown sugar or maple syrup (home made)  Lunch: Hot dish or casserole leftovers  Dinner: Hot dish or casserole  Snacks: diet soda and a roll/donut evening- air popped pop corn with butter  Beverages: Milk, Diet soda  Has patient met with a dietitian in the past?: No    Being Active:  Being Active Assessed Today: Yes  Exercise:: Yes  How intense was your typical exercise? : Light (like stretching or slow walking)  Barrier to exercise: None    Monitoring:  Monitoring Assessed Today: No  Blood Glucose Meter: Unknown  Times checking blood sugar at home (number): Never  Blood glucose trend: Other      Taking Medications:  Diabetes Medication(s)     Biguanides       metFORMIN (GLUCOPHAGE XR) 500 MG 24 hr tablet    Take 1 tablet (500 mg) by mouth daily (with dinner)          Taking Medication Assessed Today: Yes  Current Treatments: Oral Medication (taken by mouth)  Problems taking diabetes medications regularly?: Yes  Diabetes  medication side effects?: Yes    Problem Solving:  Problem Solving Assessed Today: No         Reducing Risks:  Diabetes Risks: Age over 45 years  CAD Risks: Diabetes Mellitus, Dyslipidemia, Family history, Hypertension, Obesity  Has dilated eye exam at least once a year?: Yes  Sees dentist every 6 months?: Yes  Feet checked by healthcare provider in the last year?: No    Healthy Coping:  Healthy Coping Assessed Today: No  Informal Support system:: Spouse  Stage of change: PREPARATION (Decided to change - considering how)  Patient Activation Measure Survey Score:  JENN Score (Last Two) 9/17/2015   JENN Raw Score 42   Activation Score 66   JENN Level 3             Mary Jackson RDN, LD  Outpatient Diabetes Education  Adult Diabetes Education Triage [phone]    Time Spent: 60 minutes  Encounter Type: Individual        Any diabetes medication dose changes were made via the Certified Diabetes Care & Education Protocol in collaboration with the patient's referring provider. A copy of this encounter was shared with the provider.

## 2022-09-29 RX ORDER — METFORMIN HCL 500 MG
1000 TABLET, EXTENDED RELEASE 24 HR ORAL
Qty: 180 TABLET | Refills: 3 | Status: SHIPPED | OUTPATIENT
Start: 2022-09-29 | End: 2023-01-10

## 2022-10-02 ENCOUNTER — ANESTHESIA EVENT (OUTPATIENT)
Dept: GASTROENTEROLOGY | Facility: CLINIC | Age: 72
End: 2022-10-02
Payer: MEDICARE

## 2022-10-03 ENCOUNTER — ANESTHESIA (OUTPATIENT)
Dept: GASTROENTEROLOGY | Facility: CLINIC | Age: 72
End: 2022-10-03
Payer: MEDICARE

## 2022-10-03 ENCOUNTER — HOSPITAL ENCOUNTER (OUTPATIENT)
Facility: CLINIC | Age: 72
Discharge: HOME | End: 2022-10-03
Attending: FAMILY MEDICINE | Admitting: FAMILY MEDICINE
Payer: MEDICARE

## 2022-10-03 VITALS
RESPIRATION RATE: 16 BRPM | OXYGEN SATURATION: 94 % | WEIGHT: 295.42 LBS | SYSTOLIC BLOOD PRESSURE: 159 MMHG | DIASTOLIC BLOOD PRESSURE: 82 MMHG | TEMPERATURE: 98.2 F | HEIGHT: 72 IN | HEART RATE: 79 BPM | BODY MASS INDEX: 40.01 KG/M2

## 2022-10-03 LAB
COLONOSCOPY: NORMAL
GLUCOSE BLDC GLUCOMTR-MCNC: 169 MG/DL (ref 70–99)

## 2022-10-03 PROCEDURE — 250N000009 HC RX 250: Performed by: FAMILY MEDICINE

## 2022-10-03 PROCEDURE — 258N000003 HC RX IP 258 OP 636: Performed by: NURSE ANESTHETIST, CERTIFIED REGISTERED

## 2022-10-03 PROCEDURE — 250N000009 HC RX 250: Performed by: NURSE ANESTHETIST, CERTIFIED REGISTERED

## 2022-10-03 PROCEDURE — 45378 DIAGNOSTIC COLONOSCOPY: CPT | Performed by: FAMILY MEDICINE

## 2022-10-03 PROCEDURE — G0105 COLORECTAL SCRN; HI RISK IND: HCPCS | Performed by: FAMILY MEDICINE

## 2022-10-03 PROCEDURE — 82962 GLUCOSE BLOOD TEST: CPT

## 2022-10-03 PROCEDURE — 370N000017 HC ANESTHESIA TECHNICAL FEE, PER MIN: Performed by: FAMILY MEDICINE

## 2022-10-03 PROCEDURE — 250N000011 HC RX IP 250 OP 636: Performed by: NURSE ANESTHETIST, CERTIFIED REGISTERED

## 2022-10-03 RX ORDER — PROCHLORPERAZINE MALEATE 5 MG
5 TABLET ORAL EVERY 6 HOURS PRN
Status: DISCONTINUED | OUTPATIENT
Start: 2022-10-03 | End: 2022-10-03 | Stop reason: HOSPADM

## 2022-10-03 RX ORDER — ONDANSETRON 2 MG/ML
4 INJECTION INTRAMUSCULAR; INTRAVENOUS EVERY 6 HOURS PRN
Status: DISCONTINUED | OUTPATIENT
Start: 2022-10-03 | End: 2022-10-03 | Stop reason: HOSPADM

## 2022-10-03 RX ORDER — ONDANSETRON 4 MG/1
4 TABLET, ORALLY DISINTEGRATING ORAL EVERY 6 HOURS PRN
Status: DISCONTINUED | OUTPATIENT
Start: 2022-10-03 | End: 2022-10-03 | Stop reason: HOSPADM

## 2022-10-03 RX ORDER — NALOXONE HYDROCHLORIDE 0.4 MG/ML
0.4 INJECTION, SOLUTION INTRAMUSCULAR; INTRAVENOUS; SUBCUTANEOUS
Status: DISCONTINUED | OUTPATIENT
Start: 2022-10-03 | End: 2022-10-03 | Stop reason: HOSPADM

## 2022-10-03 RX ORDER — LIDOCAINE 40 MG/G
CREAM TOPICAL
Status: DISCONTINUED | OUTPATIENT
Start: 2022-10-03 | End: 2022-10-03 | Stop reason: HOSPADM

## 2022-10-03 RX ORDER — ONDANSETRON 2 MG/ML
4 INJECTION INTRAMUSCULAR; INTRAVENOUS EVERY 30 MIN PRN
Status: DISCONTINUED | OUTPATIENT
Start: 2022-10-03 | End: 2022-10-03 | Stop reason: HOSPADM

## 2022-10-03 RX ORDER — PROPOFOL 10 MG/ML
INJECTION, EMULSION INTRAVENOUS PRN
Status: DISCONTINUED | OUTPATIENT
Start: 2022-10-03 | End: 2022-10-03

## 2022-10-03 RX ORDER — SODIUM CHLORIDE, SODIUM LACTATE, POTASSIUM CHLORIDE, CALCIUM CHLORIDE 600; 310; 30; 20 MG/100ML; MG/100ML; MG/100ML; MG/100ML
INJECTION, SOLUTION INTRAVENOUS CONTINUOUS
Status: DISCONTINUED | OUTPATIENT
Start: 2022-10-03 | End: 2022-10-03 | Stop reason: HOSPADM

## 2022-10-03 RX ORDER — LIDOCAINE HYDROCHLORIDE 20 MG/ML
INJECTION, SOLUTION INFILTRATION; PERINEURAL PRN
Status: DISCONTINUED | OUTPATIENT
Start: 2022-10-03 | End: 2022-10-03

## 2022-10-03 RX ORDER — NALOXONE HYDROCHLORIDE 0.4 MG/ML
0.2 INJECTION, SOLUTION INTRAMUSCULAR; INTRAVENOUS; SUBCUTANEOUS
Status: DISCONTINUED | OUTPATIENT
Start: 2022-10-03 | End: 2022-10-03 | Stop reason: HOSPADM

## 2022-10-03 RX ORDER — ONDANSETRON 2 MG/ML
4 INJECTION INTRAMUSCULAR; INTRAVENOUS
Status: DISCONTINUED | OUTPATIENT
Start: 2022-10-03 | End: 2022-10-03 | Stop reason: HOSPADM

## 2022-10-03 RX ORDER — PROPOFOL 10 MG/ML
INJECTION, EMULSION INTRAVENOUS CONTINUOUS PRN
Status: DISCONTINUED | OUTPATIENT
Start: 2022-10-03 | End: 2022-10-03

## 2022-10-03 RX ORDER — ONDANSETRON 4 MG/1
4 TABLET, ORALLY DISINTEGRATING ORAL EVERY 30 MIN PRN
Status: DISCONTINUED | OUTPATIENT
Start: 2022-10-03 | End: 2022-10-03 | Stop reason: HOSPADM

## 2022-10-03 RX ORDER — FLUMAZENIL 0.1 MG/ML
0.2 INJECTION, SOLUTION INTRAVENOUS
Status: DISCONTINUED | OUTPATIENT
Start: 2022-10-03 | End: 2022-10-03 | Stop reason: HOSPADM

## 2022-10-03 RX ADMIN — PROPOFOL 50 MG: 10 INJECTION, EMULSION INTRAVENOUS at 11:41

## 2022-10-03 RX ADMIN — SODIUM CHLORIDE, POTASSIUM CHLORIDE, SODIUM LACTATE AND CALCIUM CHLORIDE: 600; 310; 30; 20 INJECTION, SOLUTION INTRAVENOUS at 10:53

## 2022-10-03 RX ADMIN — LIDOCAINE HYDROCHLORIDE 0.1 ML: 10 INJECTION, SOLUTION EPIDURAL; INFILTRATION; INTRACAUDAL; PERINEURAL at 10:53

## 2022-10-03 RX ADMIN — LIDOCAINE HYDROCHLORIDE 60 MG: 20 INJECTION, SOLUTION INFILTRATION; PERINEURAL at 11:41

## 2022-10-03 RX ADMIN — PROPOFOL 150 MCG/KG/MIN: 10 INJECTION, EMULSION INTRAVENOUS at 11:43

## 2022-10-03 ASSESSMENT — ACTIVITIES OF DAILY LIVING (ADL)
ADLS_ACUITY_SCORE: 35
ADLS_ACUITY_SCORE: 35

## 2022-10-03 NOTE — ANESTHESIA POSTPROCEDURE EVALUATION
Patient: Deandre Merchant    Procedure: Procedure(s):  COLONOSCOPY       Anesthesia Type:  MAC    Note:  Disposition: Outpatient   Postop Pain Control: Uneventful            Sign Out: Well controlled pain   PONV: No   Neuro/Psych: Uneventful            Sign Out: Acceptable/Baseline neuro status   Airway/Respiratory: Uneventful            Sign Out: Acceptable/Baseline resp. status   CV/Hemodynamics: Uneventful            Sign Out: Acceptable CV status   Other NRE: NONE   DID A NON-ROUTINE EVENT OCCUR? No    Event details/Postop Comments:  Pt was happy with anesthesia care.  No complications.  I will follow up with the pt if needed.           Last vitals:  Vitals:    10/03/22 1038   BP: 125/89   Resp: 16   Temp: 98.2  F (36.8  C)   SpO2: 96%       Electronically Signed By: CALEB Pool CRNA  October 3, 2022  12:22 PM

## 2022-10-03 NOTE — ANESTHESIA CARE TRANSFER NOTE
Patient: Deandre Merchant    Procedure: Procedure(s):  COLONOSCOPY       Diagnosis: Screen for colon cancer [Z12.11]  Diagnosis Additional Information: No value filed.    Anesthesia Type:   MAC     Note:    Oropharynx: oropharynx clear of all foreign objects and spontaneously breathing  Level of Consciousness: drowsy  Oxygen Supplementation: face mask    Independent Airway: airway patency satisfactory and stable  Dentition: dentition unchanged  Vital Signs Stable: post-procedure vital signs reviewed and stable  Report to RN Given: handoff report given  Patient transferred to: Phase II    Handoff Report: Identifed the Patient, Identified the Reponsible Provider, Reviewed the pertinent medical history, Discussed the surgical course, Reviewed Intra-OP anesthesia mangement and issues during anesthesia, Set expectations for post-procedure period and Allowed opportunity for questions and acknowledgement of understanding      Vitals:  Vitals Value Taken Time   BP     Temp     Pulse     Resp     SpO2 96 % 10/03/22 1208   Vitals shown include unvalidated device data.    Electronically Signed By: CALEB Pool CRNA  October 3, 2022  12:10 PM

## 2022-10-03 NOTE — DISCHARGE INSTRUCTIONS
[facility]    Home Care Following Endoscopy          Activity:  You have just undergone an endoscopic procedure usually performed with conscious sedation.  Do not work or operate machinery (including a car) for at least 12 hours.    I encourage you to walk and attempt to pass this air as soon as possible.    Diet:  Return to the diet you were on before your procedure but eat lightly for the first 12-24 hours.  Drink plenty of water.  Resume any regular medications unless otherwise advised by your physician.  Please begin any new medication prescribed as a result of your procedure as directed by your physician.   If you had any biopsy or polyp removed please refrain from aspirin or aspirin products for 2 days.  If on Coumadin please restart as instructed by your physician.   Pain:  You may take Tylenol as needed for pain.  Expected Recovery:  You can expect some mild abdominal fullness and/or discomfort due to the air used to inflate your intestinal tract.    Call Your Physician if You Have:    After Colonoscopy:  Worsening persisting abdominal pain which is worse with activity.  Fevers (>101 degrees F), chills or shakes.  Passage of continued blood with bowel movements.   Any questions or concerns about your recovery, please call [phone].    Follow-up Care:  As directed by physician.

## 2022-10-03 NOTE — ANESTHESIA PREPROCEDURE EVALUATION
Anesthesia Pre-Procedure Evaluation    Patient: Deandre Merchant   MRN: [id number] : 1950        Procedure : Procedure(s):  COLONOSCOPY          Past Medical History:   Diagnosis Date     BPH (benign prostatic hyperplasia)      Coloboma, optic disc, congenital, bilateral 2012     Lymphadenopathy     mediastinal & retroperitoneal     Non Hodgkin's lymphoma (H)      Non-Hodgkin's lymphoma of multiple sites (H) 2012     Polyps, colonic      Retinal detachment       Past Surgical History:   Procedure Laterality Date     COLONOSCOPY N/A 3/1/2017    Procedure: COLONOSCOPY;  Surgeon: Dakota Wall MD;  Location:  GI     ESOPHAGOSCOPY, GASTROSCOPY, DUODENOSCOPY (EGD), COMBINED  2013    Procedure: COMBINED ESOPHAGOSCOPY, GASTROSCOPY, DUODENOSCOPY (EGD), BIOPSY SINGLE OR MULTIPLE;;  Surgeon: Torrey Cosme MD;  Location:  GI     ESOPHAGOSCOPY, GASTROSCOPY, DUODENOSCOPY (EGD), COMBINED  10/9/2013    Procedure: COMBINED ESOPHAGOSCOPY, GASTROSCOPY, DUODENOSCOPY (EGD), BIOPSY SINGLE OR MULTIPLE;  ESOPHAGOSCOPY, GASTROSCOPY, DUODENOSCOPY (EGD) with multiple biopsies;  Surgeon: Shay Sauer MD;  Location:  GI     LAPAROTOMY EXPLORATORY  2013    Procedure: LAPAROTOMY EXPLORATORY;  Exploratory Laparotomy and lysis of adhesions.;  Surgeon: Gneevieve Barros MD;  Location:  OR     [facility] COLONOSCOPY W/WO BRUSH/WASH  06      Allergies   Allergen Reactions     Allopurinol Rash      Social History     Tobacco Use     Smoking status: Never Smoker     Smokeless tobacco: Never Used   Substance Use Topics     Alcohol use: Yes     Alcohol/week: 3.3 standard drinks     Comment: occasionally weekends      Wt Readings from Last 1 Encounters:   22 134 kg (295 lb 8 oz)        Anesthesia Evaluation   Pt has had prior anesthetic. Type: MAC and General.    No history of anesthetic complications       ROS/MED HX  ENT/Pulmonary:     (+) sleep apnea, moderate, uses CPAP,     Neurologic:  -  neg neurologic ROS     Cardiovascular:     (+) Dyslipidemia hypertension-----Previous cardiac testing   Echo: Date: Results:    Stress Test: Date: 6/13/19 Results:  Impression  1. Myocardial perfusion imaging using single isotope technique  demonstrated fixed apical defect likely apical thinning. There is no  significant reversibility on this study to suggest ongoing ischemia.   2. Gated images demonstrated mildly dilated LV cavity size with normal  LV systolic function. The left ventricular systolic function is 55%.  3. Compared to the prior study from no prior study .     ECG Reviewed: Date: 8/15/19 Results:  SR  Cath:  Date: Results:      METS/Exercise Tolerance:     Hematologic:       Musculoskeletal:  - neg musculoskeletal ROS     GI/Hepatic:  - neg GI/hepatic ROS     Renal/Genitourinary:  - neg Renal ROS     Endo:     (+) type II DM, Last HgA1c: 8.7, date: 7/8/22, Not using insulin, - not using insulin pump. Normal glucose range: >200, not previously admitted for DM/DKA. Obesity,     Psychiatric/Substance Use:  - neg psychiatric ROS     Infectious Disease:  - neg infectious disease ROS     Malignancy:  - neg malignancy ROS     Other:  - neg other ROS          Physical Exam    Airway  airway exam normal      Mallampati: III   TM distance: > 3 FB   Neck ROM: full   Mouth opening: > 3 cm    Respiratory Devices and Support         Dental  no notable dental history         Cardiovascular   cardiovascular exam normal       Rhythm and rate: regular and normal     Pulmonary   pulmonary exam normal        breath sounds clear to auscultation           OUTSIDE LABS:  CBC:   Lab Results   Component Value Date    WBC 6.3 07/08/2022    WBC 5.9 03/09/2022    HGB 14.8 07/08/2022    HGB 14.4 03/09/2022    HCT 42.7 07/08/2022    HCT 42.3 03/09/2022     07/08/2022     03/09/2022     BMP:   Lab Results   Component Value Date     07/08/2022     03/09/2022    POTASSIUM 4.3 07/08/2022    POTASSIUM 4.3  03/09/2022    CHLORIDE 102 07/08/2022    CHLORIDE 105 03/09/2022    CO2 25 07/08/2022    CO2 26 03/09/2022    BUN 19 07/08/2022    BUN 14 03/09/2022    CR 0.90 07/08/2022    CR 0.86 03/09/2022     (H) 07/08/2022     (H) 03/09/2022     COAGS:   Lab Results   Component Value Date    PTT 30 05/06/2014    INR 2.2 (H) 08/26/2014     POC:   Lab Results   Component Value Date     (H) 04/27/2013     HEPATIC:   Lab Results   Component Value Date    ALBUMIN 3.8 07/08/2022    PROTTOTAL 6.5 (L) 07/08/2022    ALT 79 (H) 07/08/2022    AST 58 (H) 07/08/2022    ALKPHOS 118 07/08/2022    BILITOTAL 0.9 07/08/2022     OTHER:   Lab Results   Component Value Date    PH 7.40 04/18/2013    LACT 1.1 04/20/2013    A1C 8.7 (H) 07/08/2022    ANDRÉS 8.5 07/08/2022    PHOS 3.7 05/06/2014    MAG 2.1 05/06/2014    LIPASE 32 04/17/2013    TSH 1.60 05/06/2014    CRP 69.3 (H) 05/10/2014       Anesthesia Plan    ASA Status:  3   NPO Status:  NPO Appropriate    Anesthesia Type: MAC.      Maintenance: TIVA.        Consents    Anesthesia Plan(s) and associated risks, benefits, and realistic alternatives discussed. Questions answered and patient/representative(s) expressed understanding.    - Discussed:     - Discussed with:  Patient    Use of blood products discussed: No .     Postoperative Care            Comments:    Other Comments: The risks and benefits of anesthesia, and the alternatives where applicable, have been discussed with the patient, and they wish to proceed.            Alvarez Burch, APRN CRNA

## 2022-10-03 NOTE — H&P
"Pre-Endoscopy History and Physical     Deandre Merchant MRN# [id number]   YOB: 1950 Age: 72 year old     Date of Procedure: (Not on file)  Primary care provider: Mekhi Abbott  Type of Endoscopy: colonoscopy  Type of Anesthesia Anticipated: MAC     HPI:    Deandre is a 72 year old male who was referred to me for colonoscopy.    Deandre is feeling well today.      Patient Active Problem List   Diagnosis     Impotence of organic origin     Retinal detachment     BPH (benign prostatic hyperplasia)     Hyperlipidemia with target LDL less than 130     Intra-abdominal lymphadenopathy     Lymphoma, small lymphoid, follicular (H)     Inguinal hernia, incarcerated     Chronic cough     Sinusitis chronic, ethmoidal     Post-nasal drip     WINSTON (obstructive sleep apnea)     Severe obesity (BMI 35.0-39.9) with comorbidity (H)     Essential hypertension     Ascending aorta dilatation (H)     Trigger finger, left middle finger     History of cataract     Diabetes mellitus, type 2 (H)            PHYSICAL EXAM:   There were no vitals taken for this visit.   Estimated body mass index is 39.8 kg/m  as calculated from the following:    Height as of 7/8/22: 1.835 m (6' 0.25\").    Weight as of 7/8/22: 134 kg (295 lb 8 oz).    GENERAL APPEARANCE: alert and no distress. Appears to comprehend the procedure and indication  RESP: lungs unlabored  CV: regular  ABD: soft, nt/nd    DIAGNOSTICS:      COVID-19 PCR Results    COVID-19 PCR Results   No data to display.         COVID-19 Antibody Results, Testing for Immunity    COVID-19 Antibody Results, Testing for Immunity   No data to display.              IMPRESSION   ASA Class 3 - Severe systemic disease, but not incapacitating        PLAN:     Plan for colonoscopy. No medical contraindications to proceed, or further work up needed. The risks and benefits of the procedure and the sedation options and risks were discussed with the patient. These include infection, bleeding, " and small risk of colon perforation (1/1000 to 1/73139 depending on patient characteristics and type of procedure). Deandre was also explained to alternatives for colo-rectal screening. All questions were answered and informed consent was obtained.      The above has been forwarded to the consulting provider.      Signed Electronically by: Jose J Bee MD  October 3, 2022

## 2022-10-09 ENCOUNTER — HEALTH MAINTENANCE LETTER (OUTPATIENT)
Age: 72
End: 2022-10-09

## 2022-10-11 ENCOUNTER — TRANSFERRED RECORDS (OUTPATIENT)
Dept: FAMILY MEDICINE | Facility: CLINIC | Age: 72
End: 2022-10-11

## 2022-10-11 LAB — RETINOPATHY: NEGATIVE

## 2022-10-17 ENCOUNTER — TELEPHONE (OUTPATIENT)
Dept: NEUROSURGERY | Facility: CLINIC | Age: 72
End: 2022-10-17

## 2022-10-17 DIAGNOSIS — M54.16 LUMBAR RADICULOPATHY: Primary | ICD-10-CM

## 2022-10-17 DIAGNOSIS — D49.2 LUMBAR SPINE TUMOR: ICD-10-CM

## 2022-10-17 NOTE — TELEPHONE ENCOUNTER
----- Message from Pati Ross RN sent at 10/17/2022  9:55 AM CDT -----  Regarding: patient due for 1 year MRI  Hi Team,     Patient is due for updated lumbar MRI W/ and W/O contrast per RN reminder. (1 year follow up MRI).   Order has been placed, please contact patient to schedule.     Thanks!

## 2022-10-17 NOTE — PROGRESS NOTES
Per Dr. Alston on 10/27/21 Lumbar MRI stale, repeat in 1 year.     Patient due for repeat lumbar MRI with and without. Order placed, message sent to scheduling team.

## 2022-11-09 ENCOUNTER — TELEPHONE (OUTPATIENT)
Dept: EDUCATION SERVICES | Facility: CLINIC | Age: 72
End: 2022-11-09

## 2022-11-09 ENCOUNTER — ALLIED HEALTH/NURSE VISIT (OUTPATIENT)
Dept: EDUCATION SERVICES | Facility: CLINIC | Age: 72
End: 2022-11-09
Payer: MEDICARE

## 2022-11-09 DIAGNOSIS — E11.9 TYPE 2 DIABETES MELLITUS WITHOUT COMPLICATION, WITHOUT LONG-TERM CURRENT USE OF INSULIN (H): Primary | ICD-10-CM

## 2022-11-09 PROCEDURE — G0108 DIAB MANAGE TRN  PER INDIV: HCPCS

## 2022-11-09 NOTE — LETTER
11/9/2022         RE: Deandre Merchant  [address]  [place] [facility] [address]        Dear Colleague,    Thank you for referring your patient, Deandre Merchant, to the [facility] DIABETES EDUCATION [place]. Please see a copy of my visit note below.    Diabetes Self-Management Education & Support    Presents for: Follow-up    Type of Visit: In Person    How would patient like to obtain AVS? Printed copy    ASSESSMENT:    Minh comes to clinic today to follow up for new Dx of DM type 2.  He has made some small positive changes with his diet in the past month.  He reports snacking less, is limiting diet soda intake to once daily and when eats fast food burgers, he only gets the burger with no sides.  Has been testing fasting BG daily and BG is trending down overall.  Says he didn't eat the best last week, was out of town and not a usual week for him.   Says he could work on walking more.   We reviewed the my plate and portion control with carbohydrates.  Provided additional resources to assist with meal planning.  Could have A1C rechecked at any time to assess progress.  Minh would prefer to hold off on increasing Metformin at this time.    Patient's most recent   Lab Results   Component Value Date    A1C 8.7 07/08/2022    A1C 5.4 09/17/2015    is not meeting goal of <8.0    Diabetes knowledge and skills assessment:   Patient is knowledgeable in diabetes management concepts related to: Monitoring and Taking Medication    Continue education with the following diabetes management concepts: Healthy Eating, Being Active, Monitoring, Reducing Risks and Healthy Coping    Based on learning assessment above, most appropriate setting for further diabetes education would be: Individual setting.    INTERVENTIONS:    Education provided today on:  AADE Self-Care Behaviors:  Care Plan: Diabetes   Updates made by Ava Jackson since 11/9/2022 12:00 AM      Problem: Diabetes Self-Management Education Needed to  Optimize Self-Care Behaviors       Goal: Healthy Eating - follow a healthy eating pattern for diabetes    Start Date: 9/27/2022   This Visit's Progress: 20%   Recent Progress: 10%   Note:    My Goal: I will aim for 60 grams of carbohydrates with meals    What I need to meet my goal: carbohdyrate reference, portion control and my plate    I plan to meet my goal by this date: 3 months       Task: Develop individualized healthy eating plan with patient Completed 11/9/2022   Responsible User: Ava Jackson      Goal: Being Active - get regular physical activity, working up to at least 150 minutes per week       Task: Discuss barriers to physical activity with patient Completed 11/9/2022   Responsible User: Ava Jackson      Task: Explore community resources including walking groups, assistance programs, and home videos Completed 11/9/2022   Responsible User: Ava Jackson      Goal: Monitoring - monitor glucose and ketones as directed    This Visit's Progress: On track   Recent Progress: 0%   Note:    My Goal: I will test my BG once daily    What I need to meet my goal: meter and supplies    I plan to meet my goal by this date: 6 weeks      Task: Provide education on blood glucose monitoring (purpose, proper technique, frequency, glucose targets, interpreting results, when to use glucose control solution, sharps disposal) Completed 11/9/2022   Responsible User: Ava Jackson          Opportunities for ongoing education and support in diabetes-self management were discussed. Pt verbalized understanding of concepts discussed and recommendations provided today.       Education Materials Provided:  [employer] Healthy Living with Diabetes Book and My Plate Planner          PLAN    1) Check blood sugar 1-2 times per day   Fasting/morning goal:  mg/dL  2 Hours after the start of a meal: Less than 180 mg/dL    2) 1 serving of carbohydrate = 15 grams  Aim for 4-5 servings  "or 60-75 grams per meal and 15-20 grams of carbs per snack + a serving of protein    3) Increase physical activity- walking is great exercise.  Find a place you can walk safely during the winter months.    4) Call to make lab appointment to have A1C rechecked    Topics to cover at upcoming visits: Healthy Eating, Reducing Risks and Healthy Coping  Follow-up: January 10th 9:30p-in person visit    See Goals Section for co-developed, patient-stated behavior change goals.  AVS provided to patient today.          SUBJECTIVE / OBJECTIVE:  Presents for: Follow-up  Accompanied by: Self  Diabetes education in the past 24mo: No  Diabetes type: Type 2  Date of diagnosis: July 2022  Disease course: Stable  How confident are you filling out medical forms by yourself:: Somewhat  Transportation concerns: No  Difficulty affording diabetes medication?: No  Cultural Influences/Ethnic Background:  Not  or       Diabetes Symptoms & Complications:  Fatigue: Sometimes  Neuropathy: Sometimes  Polydipsia: No  Polyphagia: Sometimes  Polyuria: Yes  Visual change: Sometimes (related to long-term eye issues)  Slow healing wounds: No  Symptom course: Stable  Weight trend: Decreasing  Autonomic neuropathy: No  CVA: No  Heart disease: No  Nephropathy: No  Peripheral neuropathy: No  Peripheral Vascular Disease: No  Retinopathy: No  Sexual dysfunction: Yes    Patient Problem List and Family Medical History reviewed for relevant medical history, current medical status, and diabetes risk factors.    Vitals:  There were no vitals taken for this visit.  Estimated body mass index is 39.8 kg/m  as calculated from the following:    Height as of 10/3/22: 1.835 m (6' 0.24\").    Weight as of 10/3/22: 134 kg (295 lb 6.7 oz).   Last 3 BP:   BP Readings from Last 3 Encounters:   10/03/22 (!) 159/82   07/08/22 122/72   06/01/22 112/74       History   Smoking Status     Never   Smokeless Tobacco     Never       Labs:  Lab Results   Component Value " Date    A1C 8.7 07/08/2022    A1C 5.4 09/17/2015     Lab Results   Component Value Date     10/03/2022     07/08/2022     01/02/2021     Lab Results   Component Value Date    LDL 15 07/08/2022    LDL 29 07/07/2020     HDL Cholesterol   Date Value Ref Range Status   07/07/2020 28 (L) >39 mg/dL Final     Direct Measure HDL   Date Value Ref Range Status   07/08/2022 29 (L) >=40 mg/dL Final   ]  GFR Estimate   Date Value Ref Range Status   07/08/2022 >90 >60 mL/min/1.73m2 Final     Comment:     Effective December 21, 2021 eGFRcr in adults is calculated using the 2021 CKD-EPI creatinine equation which includes age and gender (Catarina et al., NEJM, DOI: 10.1056/VXVCvz6335538)   01/02/2021 80 >60 mL/min/[1.73_m2] Final     Comment:     Non  GFR Calc  Starting 12/18/2018, serum creatinine based estimated GFR (eGFR) will be   calculated using the Chronic Kidney Disease Epidemiology Collaboration   (CKD-EPI) equation.       GFR, ESTIMATED POCT   Date Value Ref Range Status   03/02/2022 >60 >60 mL/min/1.73m2 Final     GFR Estimate If Black   Date Value Ref Range Status   01/02/2021 >90 >60 mL/min/[1.73_m2] Final     Comment:      GFR Calc  Starting 12/18/2018, serum creatinine based estimated GFR (eGFR) will be   calculated using the Chronic Kidney Disease Epidemiology Collaboration   (CKD-EPI) equation.       Lab Results   Component Value Date    CR 0.90 07/08/2022    CR 0.96 01/02/2021     No results found for: MICROALBUMIN    Healthy Eating:  Healthy Eating Assessed Today: Yes  Cultural/Scientologist diet restrictions?: No  Meal planning/habits: None  How many times a week on average do you eat food made away from home (restaurant/take-out)?: 2  Meals include: Breakfast, Morning Snack, Lunch, Dinner, Evening Snack  Breakfast: Oatmeal (2 cups) with milk and brown sugar or maple syrup (home made)  Lunch: Hot dish or casserole leftovers OR a burger from fast food place  Dinner:  Hot dish or casserole OR stew- meat potatoes carrots  Snacks: peanuts- lightly salted  Beverages: Milk, Diet soda, Water  Has patient met with a dietitian in the past?: No    Being Active:  Being Active Assessed Today: Yes  Exercise:: Yes  How intense was your typical exercise? : Light (like stretching or slow walking)  Barrier to exercise: None    Monitoring:  Monitoring Assessed Today: No  Did patient bring glucose meter to appointment? : No  Blood Glucose Meter: Unknown  Times checking blood sugar at home (number): 1  Times checking blood sugar at home (per): Day  Blood glucose trend: Other    Glucose data:  Date Breakfast    Before   11/9 158   11/7 177   11/6 150   11/5 140   11/4 153   11/3 147   11/2 152     Date Breakfast    Before   11/1 146   10/31 152   10/30 139   10/29 153   10/28 166   10/27 209   10/26 177         Taking Medications:  Diabetes Medication(s)     Biguanides       metFORMIN (GLUCOPHAGE XR) 500 MG 24 hr tablet    Take 2 tablets (1,000 mg) by mouth daily (with dinner)          Taking Medication Assessed Today: Yes  Current Treatments: Oral Medication (taken by mouth)  Problems taking diabetes medications regularly?: Yes  Diabetes medication side effects?: Yes    Problem Solving:  Problem Solving Assessed Today: No              Reducing Risks:  Diabetes Risks: Age over 45 years  CAD Risks: Diabetes Mellitus, Dyslipidemia, Family history, Hypertension, Obesity  Has dilated eye exam at least once a year?: Yes  Sees dentist every 6 months?: Yes  Feet checked by healthcare provider in the last year?: No    Healthy Coping:  Healthy Coping Assessed Today: No  Informal Support system:: Spouse  Stage of change: PREPARATION (Decided to change - considering how)  Patient Activation Measure Survey Score:  JENN Score (Last Two) 9/17/2015   JENN Raw Score 42   Activation Score 66   JENN Level 3             Mary Jackson RDN, LD  Outpatient Diabetes Education  Adult Diabetes Education Triage  [phone]    Time Spent: 45 minutes  Encounter Type: Individual        Any diabetes medication dose changes were made via the Certified Diabetes Care & Education Protocol in collaboration with the patient's referring provider. A copy of this encounter was shared with the provider.

## 2022-11-09 NOTE — PROGRESS NOTES
Diabetes Self-Management Education & Support    Presents for: Follow-up    Type of Visit: In Person    How would patient like to obtain AVS? Printed copy    ASSESSMENT:    Minh comes to clinic today to follow up for new Dx of DM type 2.  He has made some small positive changes with his diet in the past month.  He reports snacking less, is limiting diet soda intake to once daily and when eats fast food burgers, he only gets the burger with no sides.  Has been testing fasting BG daily and BG is trending down overall.  Says he didn't eat the best last week, was out of town and not a usual week for him.   Says he could work on walking more.   We reviewed the my plate and portion control with carbohydrates.  Provided additional resources to assist with meal planning.  Could have A1C rechecked at any time to assess progress.  Minh would prefer to hold off on increasing Metformin at this time.    Patient's most recent   Lab Results   Component Value Date    A1C 8.7 07/08/2022    A1C 5.4 09/17/2015    is not meeting goal of <8.0    Diabetes knowledge and skills assessment:   Patient is knowledgeable in diabetes management concepts related to: Monitoring and Taking Medication    Continue education with the following diabetes management concepts: Healthy Eating, Being Active, Monitoring, Reducing Risks and Healthy Coping    Based on learning assessment above, most appropriate setting for further diabetes education would be: Individual setting.    INTERVENTIONS:    Education provided today on:  AADE Self-Care Behaviors:  Care Plan: Diabetes   Updates made by Ava Jackson since 11/9/2022 12:00 AM      Problem: Diabetes Self-Management Education Needed to Optimize Self-Care Behaviors       Goal: Healthy Eating - follow a healthy eating pattern for diabetes    Start Date: 9/27/2022   This Visit's Progress: 20%   Recent Progress: 10%   Note:    My Goal: I will aim for 60 grams of carbohydrates with meals    What I  need to meet my goal: carbohdyrate reference, portion control and my plate    I plan to meet my goal by this date: 3 months       Task: Develop individualized healthy eating plan with patient Completed 11/9/2022   Responsible User: Ava Jackson      Goal: Being Active - get regular physical activity, working up to at least 150 minutes per week       Task: Discuss barriers to physical activity with patient Completed 11/9/2022   Responsible User: Ava Jackson      Task: Explore community resources including walking groups, assistance programs, and home videos Completed 11/9/2022   Responsible User: Ava Jackson      Goal: Monitoring - monitor glucose and ketones as directed    This Visit's Progress: On track   Recent Progress: 0%   Note:    My Goal: I will test my BG once daily    What I need to meet my goal: meter and supplies    I plan to meet my goal by this date: 6 weeks      Task: Provide education on blood glucose monitoring (purpose, proper technique, frequency, glucose targets, interpreting results, when to use glucose control solution, sharps disposal) Completed 11/9/2022   Responsible User: Ava Jackson          Opportunities for ongoing education and support in diabetes-self management were discussed. Pt verbalized understanding of concepts discussed and recommendations provided today.       Education Materials Provided:  [employer] Healthy Living with Diabetes Book and My Plate Planner          PLAN    1) Check blood sugar 1-2 times per day   Fasting/morning goal:  mg/dL  2 Hours after the start of a meal: Less than 180 mg/dL    2) 1 serving of carbohydrate = 15 grams  Aim for 4-5 servings or 60-75 grams per meal and 15-20 grams of carbs per snack + a serving of protein    3) Increase physical activity- walking is great exercise.  Find a place you can walk safely during the winter months.    4) Call to make lab appointment to have A1C  "rechecked    Topics to cover at upcoming visits: Healthy Eating, Reducing Risks and Healthy Coping  Follow-up: January 10th 9:30p-in person visit    See Goals Section for co-developed, patient-stated behavior change goals.  AVS provided to patient today.          SUBJECTIVE / OBJECTIVE:  Presents for: Follow-up  Accompanied by: Self  Diabetes education in the past 24mo: No  Diabetes type: Type 2  Date of diagnosis: July 2022  Disease course: Stable  How confident are you filling out medical forms by yourself:: Somewhat  Transportation concerns: No  Difficulty affording diabetes medication?: No  Cultural Influences/Ethnic Background:  Not  or       Diabetes Symptoms & Complications:  Fatigue: Sometimes  Neuropathy: Sometimes  Polydipsia: No  Polyphagia: Sometimes  Polyuria: Yes  Visual change: Sometimes (related to long-term eye issues)  Slow healing wounds: No  Symptom course: Stable  Weight trend: Decreasing  Autonomic neuropathy: No  CVA: No  Heart disease: No  Nephropathy: No  Peripheral neuropathy: No  Peripheral Vascular Disease: No  Retinopathy: No  Sexual dysfunction: Yes    Patient Problem List and Family Medical History reviewed for relevant medical history, current medical status, and diabetes risk factors.    Vitals:  There were no vitals taken for this visit.  Estimated body mass index is 39.8 kg/m  as calculated from the following:    Height as of 10/3/22: 1.835 m (6' 0.24\").    Weight as of 10/3/22: 134 kg (295 lb 6.7 oz).   Last 3 BP:   BP Readings from Last 3 Encounters:   10/03/22 (!) 159/82   07/08/22 122/72   06/01/22 112/74       History   Smoking Status     Never   Smokeless Tobacco     Never       Labs:  Lab Results   Component Value Date    A1C 8.7 07/08/2022    A1C 5.4 09/17/2015     Lab Results   Component Value Date     10/03/2022     07/08/2022     01/02/2021     Lab Results   Component Value Date    LDL 15 07/08/2022    LDL 29 07/07/2020     HDL " Cholesterol   Date Value Ref Range Status   07/07/2020 28 (L) >39 mg/dL Final     Direct Measure HDL   Date Value Ref Range Status   07/08/2022 29 (L) >=40 mg/dL Final   ]  GFR Estimate   Date Value Ref Range Status   07/08/2022 >90 >60 mL/min/1.73m2 Final     Comment:     Effective December 21, 2021 eGFRcr in adults is calculated using the 2021 CKD-EPI creatinine equation which includes age and gender (Catarina et al., NE, DOI: 10.1056/UFWBys1712913)   01/02/2021 80 >60 mL/min/[1.73_m2] Final     Comment:     Non  GFR Calc  Starting 12/18/2018, serum creatinine based estimated GFR (eGFR) will be   calculated using the Chronic Kidney Disease Epidemiology Collaboration   (CKD-EPI) equation.       GFR, ESTIMATED POCT   Date Value Ref Range Status   03/02/2022 >60 >60 mL/min/1.73m2 Final     GFR Estimate If Black   Date Value Ref Range Status   01/02/2021 >90 >60 mL/min/[1.73_m2] Final     Comment:      GFR Calc  Starting 12/18/2018, serum creatinine based estimated GFR (eGFR) will be   calculated using the Chronic Kidney Disease Epidemiology Collaboration   (CKD-EPI) equation.       Lab Results   Component Value Date    CR 0.90 07/08/2022    CR 0.96 01/02/2021     No results found for: MICROALBUMIN    Healthy Eating:  Healthy Eating Assessed Today: Yes  Cultural/Congregation diet restrictions?: No  Meal planning/habits: None  How many times a week on average do you eat food made away from home (restaurant/take-out)?: 2  Meals include: Breakfast, Morning Snack, Lunch, Dinner, Evening Snack  Breakfast: Oatmeal (2 cups) with milk and brown sugar or maple syrup (home made)  Lunch: Hot dish or casserole leftovers OR a burger from fast food place  Dinner: Hot dish or casserole OR stew- meat potatoes carrots  Snacks: peanuts- lightly salted  Beverages: Milk, Diet soda, Water  Has patient met with a dietitian in the past?: No    Being Active:  Being Active Assessed Today: Yes  Exercise:: Yes  How  intense was your typical exercise? : Light (like stretching or slow walking)  Barrier to exercise: None    Monitoring:  Monitoring Assessed Today: No  Did patient bring glucose meter to appointment? : No  Blood Glucose Meter: Unknown  Times checking blood sugar at home (number): 1  Times checking blood sugar at home (per): Day  Blood glucose trend: Other    Glucose data:  Date Breakfast    Before   11/9 158   11/7 177   11/6 150   11/5 140   11/4 153   11/3 147   11/2 152     Date Breakfast    Before   11/1 146   10/31 152   10/30 139   10/29 153   10/28 166   10/27 209   10/26 177         Taking Medications:  Diabetes Medication(s)     Biguanides       metFORMIN (GLUCOPHAGE XR) 500 MG 24 hr tablet    Take 2 tablets (1,000 mg) by mouth daily (with dinner)          Taking Medication Assessed Today: Yes  Current Treatments: Oral Medication (taken by mouth)  Problems taking diabetes medications regularly?: Yes  Diabetes medication side effects?: Yes    Problem Solving:  Problem Solving Assessed Today: No              Reducing Risks:  Diabetes Risks: Age over 45 years  CAD Risks: Diabetes Mellitus, Dyslipidemia, Family history, Hypertension, Obesity  Has dilated eye exam at least once a year?: Yes  Sees dentist every 6 months?: Yes  Feet checked by healthcare provider in the last year?: No    Healthy Coping:  Healthy Coping Assessed Today: No  Informal Support system:: Spouse  Stage of change: PREPARATION (Decided to change - considering how)  Patient Activation Measure Survey Score:  JENN Score (Last Two) 9/17/2015   JENN Raw Score 42   Activation Score 66   JENN Level 3             Mary Jackson RDN, LD  Outpatient Diabetes Education  Adult Diabetes Education Triage [phone]    Time Spent: 45 minutes  Encounter Type: Individual        Any diabetes medication dose changes were made via the Certified Diabetes Care & Education Protocol in collaboration with the patient's referring provider. A copy of this  encounter was shared with the provider.

## 2022-11-09 NOTE — PATIENT INSTRUCTIONS
1) Check blood sugar 1-2 times per day   Fasting/morning goal:  mg/dL  2 Hours after the start of a meal: Less than 180 mg/dL    2) 1 serving of carbohydrate = 15 grams  Aim for 4-5 servings or 60-75 grams per meal and 15-20 grams of carbs per snack + a serving of protein    3) Increase physical activity- walking is great exercise.  Find a place you can walk safely during the winter months.    4) Call to make lab appointment to have A1C rechecked    Mary Jackson RDN, LD  Outpatient Diabetes Education  Adult Diabetes Education Triage [phone]  Diabetes Scheduling [phone]

## 2022-11-28 NOTE — TELEPHONE ENCOUNTER
Patient scheduled for December 8th - he is wondering if you want him to have the lab work (A1c and other diabetic labs) prior to the appointment.  He has an MRI that morning and can get done at that time.      Please place orders and send back to team to contact patient once orders have been placed.    Shell Abel XRO/

## 2022-12-06 NOTE — TELEPHONE ENCOUNTER
Writer instructed scheduling to inform patient that Dr. Alston's team will call him with the results of his MRI within 1 week of the imaging and provide recommendations at that time.    Ludivina Westfall RN on 12/6/2022 at 2:52 PM

## 2022-12-08 ENCOUNTER — DOCUMENTATION ONLY (OUTPATIENT)
Dept: NEUROSURGERY | Facility: CLINIC | Age: 72
End: 2022-12-08

## 2022-12-08 ENCOUNTER — HOSPITAL ENCOUNTER (OUTPATIENT)
Dept: MRI IMAGING | Facility: CLINIC | Age: 72
Discharge: HOME | End: 2022-12-08
Attending: NEUROLOGICAL SURGERY | Admitting: NEUROLOGICAL SURGERY
Payer: MEDICARE

## 2022-12-08 ENCOUNTER — OFFICE VISIT (OUTPATIENT)
Dept: FAMILY MEDICINE | Facility: CLINIC | Age: 72
End: 2022-12-08
Payer: MEDICARE

## 2022-12-08 VITALS
HEART RATE: 80 BPM | RESPIRATION RATE: 20 BRPM | WEIGHT: 289.9 LBS | DIASTOLIC BLOOD PRESSURE: 84 MMHG | BODY MASS INDEX: 38.42 KG/M2 | TEMPERATURE: 98.1 F | SYSTOLIC BLOOD PRESSURE: 138 MMHG | OXYGEN SATURATION: 95 % | HEIGHT: 73 IN

## 2022-12-08 DIAGNOSIS — D49.2 LUMBAR SPINE TUMOR: ICD-10-CM

## 2022-12-08 DIAGNOSIS — J32.2 SINUSITIS CHRONIC, ETHMOIDAL: ICD-10-CM

## 2022-12-08 DIAGNOSIS — E66.01 SEVERE OBESITY (BMI 35.0-39.9) WITH COMORBIDITY (H): ICD-10-CM

## 2022-12-08 DIAGNOSIS — E11.9 TYPE 2 DIABETES MELLITUS WITHOUT COMPLICATION, WITHOUT LONG-TERM CURRENT USE OF INSULIN (H): Primary | ICD-10-CM

## 2022-12-08 DIAGNOSIS — T14.8XXA PUNCTURE WOUND: ICD-10-CM

## 2022-12-08 DIAGNOSIS — I10 ESSENTIAL HYPERTENSION: ICD-10-CM

## 2022-12-08 DIAGNOSIS — G47.33 OSA (OBSTRUCTIVE SLEEP APNEA): ICD-10-CM

## 2022-12-08 LAB
CREAT UR-MCNC: 164 MG/DL
HBA1C MFR BLD: 7.6 % (ref 0–5.6)
MICROALBUMIN UR-MCNC: 14 MG/L
MICROALBUMIN/CREAT UR: 8.54 MG/G CR (ref 0–17)

## 2022-12-08 PROCEDURE — 82043 UR ALBUMIN QUANTITATIVE: CPT | Performed by: STUDENT IN AN ORGANIZED HEALTH CARE EDUCATION/TRAINING PROGRAM

## 2022-12-08 PROCEDURE — 255N000002 HC RX 255 OP 636: Performed by: NEUROLOGICAL SURGERY

## 2022-12-08 PROCEDURE — 36415 COLL VENOUS BLD VENIPUNCTURE: CPT | Performed by: STUDENT IN AN ORGANIZED HEALTH CARE EDUCATION/TRAINING PROGRAM

## 2022-12-08 PROCEDURE — 99207 PR FOOT EXAM NO CHARGE: CPT | Performed by: STUDENT IN AN ORGANIZED HEALTH CARE EDUCATION/TRAINING PROGRAM

## 2022-12-08 PROCEDURE — 83036 HEMOGLOBIN GLYCOSYLATED A1C: CPT | Performed by: STUDENT IN AN ORGANIZED HEALTH CARE EDUCATION/TRAINING PROGRAM

## 2022-12-08 PROCEDURE — A9585 GADOBUTROL INJECTION: HCPCS | Performed by: NEUROLOGICAL SURGERY

## 2022-12-08 PROCEDURE — 99214 OFFICE O/P EST MOD 30 MIN: CPT | Performed by: STUDENT IN AN ORGANIZED HEALTH CARE EDUCATION/TRAINING PROGRAM

## 2022-12-08 PROCEDURE — 72158 MRI LUMBAR SPINE W/O & W/DYE: CPT | Mod: MG

## 2022-12-08 RX ORDER — GADOBUTROL 604.72 MG/ML
7.5 INJECTION INTRAVENOUS ONCE
Status: COMPLETED | OUTPATIENT
Start: 2022-12-08 | End: 2022-12-08

## 2022-12-08 RX ADMIN — GADOBUTROL 7.5 ML: 604.72 INJECTION INTRAVENOUS at 10:17

## 2022-12-08 RX ADMIN — GADOBUTROL 6 ML: 604.72 INJECTION INTRAVENOUS at 10:17

## 2022-12-08 ASSESSMENT — PAIN SCALES - GENERAL: PAINLEVEL: NO PAIN (0)

## 2022-12-08 NOTE — PROGRESS NOTES
Assessment & Plan     Type 2 diabetes mellitus without complication, without long-term current use of insulin (H)  A1c improved today. Tolerating metformin well. Continue with weight loss and increase metformin to 2000 mg daily. Repeat A1c in 3 months.   - FOOT EXAM  - Hemoglobin A1c  - Albumin Random Urine Quantitative with Creat Ratio    Severe obesity (BMI 35.0-39.9) with comorbidity (H)    WINSTON (obstructive sleep apnea)  Continue CPAP    Essential hypertension  stable    Sinusitis chronic, ethmoidal    Puncture wound  Now healing well without. Scabbed over without evidence of infection. Monitor for further signs of infection and let me know if changes occur.      Oskar Conrad MD  [facility] SHEKHAR Wilkinson is a 72 year old presenting for the following health issues:  Diabetes      History of Present Illness       Diabetes:   He presents for follow up of diabetes.  He is checking home blood glucose one time daily. He checks blood glucose before meals.  Blood glucose is never over 200 and never under 70. When his blood glucose is low, the patient is asymptomatic for confusion, blurred vision, lethargy and reports not feeling dizzy, shaky, or weak.  He has no concerns regarding his diabetes at this time.  He is having weight loss.         He eats 2-3 servings of fruits and vegetables daily.He consumes 0 sweetened beverage(s) daily.He exercises with enough effort to increase his heart rate 20 to 29 minutes per day.  He exercises with enough effort to increase his heart rate 3 or less days per week.   He is taking medications regularly.     Patient doing well with diet and exercise. Down ten pounds and has really been working on his diet. Blood pressure has been good. Using CPAP. Notes getting a cut with a screw but has since been healing and scabbed over. No discomfort, swelling or redness.       Review of Systems   Constitutional, HEENT, cardiovascular, pulmonary, gi and gu  "systems are negative, except as otherwise noted.      Objective    /84 (BP Location: Left arm, Patient Position: Chair, Cuff Size: Adult Large)   Pulse 80   Temp 98.1  F (36.7  C) (Temporal)   Resp 20   Ht 1.854 m (6' 1\")   Wt 131.5 kg (289 lb 14.4 oz)   SpO2 95%   BMI 38.25 kg/m    Body mass index is 38.25 kg/m .  Physical Exam   GENERAL: healthy, alert and no distress  EYES: Eyes grossly normal to inspection, PERRL and conjunctivae and sclerae normal  RESP: lungs clear to auscultation - no rales, rhonchi or wheezes  CV: regular rate and rhythm, normal S1 S2, no murmur, click or rub, no peripheral edema and peripheral pulses strong  MS: left index finger with scab in skin near PIP joint, ROM intact. monofilament normal on both feet.    SKIN: no suspicious lesions or rashes  NEURO: Normal strength and tone, mentation intact and speech normal  PSYCH: mentation appears normal, affect normal/bright            "

## 2022-12-08 NOTE — PROGRESS NOTES
Dr. Alston reviewed patient's lumbar MRI and recommends scheduling a phone or clinic visit to review results. Staff message sent to scheduling team to contact patient to arrange follow-up.

## 2022-12-12 ENCOUNTER — VIRTUAL VISIT (OUTPATIENT)
Dept: NEUROSURGERY | Facility: CLINIC | Age: 72
End: 2022-12-12
Payer: MEDICARE

## 2022-12-12 DIAGNOSIS — M89.9 LESION OF LUMBAR SPINE: Primary | ICD-10-CM

## 2022-12-12 PROCEDURE — 99442 PR PHYSICIAN TELEPHONE EVALUATION 11-20 MIN: CPT | Mod: 95 | Performed by: NEUROLOGICAL SURGERY

## 2022-12-12 NOTE — PROGRESS NOTES
70M w/ lymphoma, possible L2 lesion.  Has been followed for lymphoma, and had CT CAP and subsequent lumbar MRI over the summer which showed a 1.6 cm focus of enhancement in the L2 vertebrae.  PET scan did not show avidity.     Telephone encounter for follow up.  Doing well without symptoms.  New MR, personally reviewed, showed growth of lesion to 2.4 cm with note that malignancy not excluded.    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     Lymphadenopathy     mediastinal & retroperitoneal     Non Hodgkin's lymphoma (H)      Non-Hodgkin's lymphoma of multiple sites (H) 6/26/2012     Polyps, colonic      Retinal detachment      Past Surgical History:   Procedure Laterality Date     COLONOSCOPY N/A 3/1/2017    Procedure: COLONOSCOPY;  Surgeon: Dakota Wall MD;  Location:  GI     COLONOSCOPY N/A 10/3/2022    Procedure: COLONOSCOPY;  Surgeon: Jose J Bee MD;  Location:  GI     ESOPHAGOSCOPY, GASTROSCOPY, DUODENOSCOPY (EGD), COMBINED  4/21/2013    Procedure: COMBINED ESOPHAGOSCOPY, GASTROSCOPY, DUODENOSCOPY (EGD), BIOPSY SINGLE OR MULTIPLE;;  Surgeon: Torrey Cosme MD;  Location:  GI     ESOPHAGOSCOPY, GASTROSCOPY, DUODENOSCOPY (EGD), COMBINED  10/9/2013    Procedure: COMBINED ESOPHAGOSCOPY, GASTROSCOPY, DUODENOSCOPY (EGD), BIOPSY SINGLE OR MULTIPLE;  ESOPHAGOSCOPY, GASTROSCOPY, DUODENOSCOPY (EGD) with multiple biopsies;  Surgeon: Shay Sauer MD;  Location:  GI     LAPAROTOMY EXPLORATORY  4/24/2013    Procedure: LAPAROTOMY EXPLORATORY;  Exploratory Laparotomy and lysis of adhesions.;  Surgeon: Genevieve Barros MD;  Location:  OR     [facility] COLONOSCOPY W/WO BRUSH/WASH  07/12/06     Social History     Socioeconomic History     Marital status:      Spouse name: Cristel     Number of children: 2     Years of education: Not on file     Highest education level: Not on file    Occupational History     Not on file   Tobacco Use     Smoking status: Never     Smokeless tobacco: Never   Vaping Use     Vaping Use: Never used   Substance and Sexual Activity     Alcohol use: Yes     Alcohol/week: 3.3 standard drinks     Comment: occasionally weekends     Drug use: No     Sexual activity: Yes   Other Topics Concern      Service Not Asked     Blood Transfusions Not Asked     Caffeine Concern No     Occupational Exposure Not Asked     Hobby Hazards Not Asked     Sleep Concern No     Stress Concern No     Weight Concern No     Special Diet Not Asked     Back Care Not Asked     Exercise Yes     Bike Helmet Not Asked     Seat Belt Yes     Self-Exams Not Asked     Parent/sibling w/ CABG, MI or angioplasty before 65F 55M? Not Asked   Social History Narrative     Not on file     Social Determinants of Health     Financial Resource Strain: Not on file   Food Insecurity: Not on file   Transportation Needs: Not on file   Physical Activity: Not on file   Stress: Not on file   Social Connections: Not on file   Intimate Partner Violence: Not on file   Housing Stability: Not on file     Family History   Problem Relation Age of Onset     Cancer Father         liver/kidney     C.A.D. Father      Pacemaker Brother      Arthritis Mother         RA        ROS: 10 point ROS neg other than the symptoms noted above in the HPI.    Physical Exam  There were no vitals taken for this visit.  HEENT:  Normocephalic, atraumatic.  PERRLA.  EOM s intact.  Visual fields full to gross exam  Neck:  Supple, non-tender, without lymphadenopathy.  Heart:  No peripheral edema  Lungs:  No SOB  Abdomen:  Non-distended.   Skin:  Warm and dry.  Extremities:  No edema, cyanosis or clubbing.  Psychiatric:  No apparent distress  Musculoskeletal:  Normal bulk and tone    NEUROLOGICAL EXAMINATION:     Mental status:  Alert and Oriented x 3, speech is fluent.  Cranial nerves:  II-XII intact.   Motor:    Shoulder Abduction:  Right:  5/5    Left:  5/5  Biceps:                      Right:  5/5   Left:  5/5  Triceps:                     Right:  5/5   Left:  5/5  Wrist Extensors:       Right:  5/5   Left:  5/5  Wrist Flexors:           Right:  5/5   Left:  5/5  interosseus :            Right:  5/5   Left:  5/5  Hip Flexion:                Right: 5/5  Left:  5/5  Quadriceps:             Right:  5/5  Left:  5/5  Hamstrings:             Right:  5/5  Left:  5/5  Gastroc Soleus:        Right:  5/5  Left:  5/5  Tib/Ant:                      Right:  5/5  Left:  5/5  EHL:                     Right:  5/5  Left:  5/5  Sensation:  Intact  Reflexes:  Negative Babinski.  Negative Clonus.  Negative Saul's.  Coordination:  Smooth finger to nose testing.   Negative pronator drift.  Smooth tandem walking.    A/P:  70M w/ lymphoma, possible L2 lesion    Given growth of lesion, will plan for IR core biopsy    15 minutes telephone discussion

## 2022-12-12 NOTE — LETTER
12/12/2022         RE: Deandre Merchant  [id number]  [place] [facility] [id number]        Dear Colleague,    Thank you for referring your patient, Deandre Merchant, to the [facility]. Please see a copy of my visit note below.    70M w/ lymphoma, possible L2 lesion.  Has been followed for lymphoma, and had CT CAP and subsequent lumbar MRI over the summer which showed a 1.6 cm focus of enhancement in the L2 vertebrae.  PET scan did not show avidity.     Telephone encounter for follow up.  Doing well without symptoms.  New MR, personally reviewed, showed growth of lesion to 2.4 cm with note that malignancy not excluded.    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     Lymphadenopathy     mediastinal & retroperitoneal     Non Hodgkin's lymphoma (H)      Non-Hodgkin's lymphoma of multiple sites (H) 6/26/2012     Polyps, colonic      Retinal detachment      Past Surgical History:   Procedure Laterality Date     COLONOSCOPY N/A 3/1/2017    Procedure: COLONOSCOPY;  Surgeon: Dakota Wall MD;  Location:  GI     COLONOSCOPY N/A 10/3/2022    Procedure: COLONOSCOPY;  Surgeon: Jose J Bee MD;  Location:  GI     ESOPHAGOSCOPY, GASTROSCOPY, DUODENOSCOPY (EGD), COMBINED  4/21/2013    Procedure: COMBINED ESOPHAGOSCOPY, GASTROSCOPY, DUODENOSCOPY (EGD), BIOPSY SINGLE OR MULTIPLE;;  Surgeon: Torrey Cosme MD;  Location:  GI     ESOPHAGOSCOPY, GASTROSCOPY, DUODENOSCOPY (EGD), COMBINED  10/9/2013    Procedure: COMBINED ESOPHAGOSCOPY, GASTROSCOPY, DUODENOSCOPY (EGD), BIOPSY SINGLE OR MULTIPLE;  ESOPHAGOSCOPY, GASTROSCOPY, DUODENOSCOPY (EGD) with multiple biopsies;  Surgeon: Shay Sauer MD;  Location:  GI     LAPAROTOMY EXPLORATORY  4/24/2013    Procedure: LAPAROTOMY EXPLORATORY;  Exploratory Laparotomy and lysis of adhesions.;  Surgeon: Genevieve Barros MD;  Location:  OR      [facility] COLONOSCOPY W/WO BRUSH/WASH  07/12/06     Social History     Socioeconomic History     Marital status:      Spouse name: Cristel     Number of children: 2     Years of education: Not on file     Highest education level: Not on file   Occupational History     Not on file   Tobacco Use     Smoking status: Never     Smokeless tobacco: Never   Vaping Use     Vaping Use: Never used   Substance and Sexual Activity     Alcohol use: Yes     Alcohol/week: 3.3 standard drinks     Comment: occasionally weekends     Drug use: No     Sexual activity: Yes   Other Topics Concern      Service Not Asked     Blood Transfusions Not Asked     Caffeine Concern No     Occupational Exposure Not Asked     Hobby Hazards Not Asked     Sleep Concern No     Stress Concern No     Weight Concern No     Special Diet Not Asked     Back Care Not Asked     Exercise Yes     Bike Helmet Not Asked     Seat Belt Yes     Self-Exams Not Asked     Parent/sibling w/ CABG, MI or angioplasty before 65F 55M? Not Asked   Social History Narrative     Not on file     Social Determinants of Health     Financial Resource Strain: Not on file   Food Insecurity: Not on file   Transportation Needs: Not on file   Physical Activity: Not on file   Stress: Not on file   Social Connections: Not on file   Intimate Partner Violence: Not on file   Housing Stability: Not on file     Family History   Problem Relation Age of Onset     Cancer Father         liver/kidney     C.A.D. Father      Pacemaker Brother      Arthritis Mother         RA        ROS: 10 point ROS neg other than the symptoms noted above in the HPI.    Physical Exam  There were no vitals taken for this visit.  HEENT:  Normocephalic, atraumatic.  PERRLA.  EOM s intact.  Visual fields full to gross exam  Neck:  Supple, non-tender, without lymphadenopathy.  Heart:  No peripheral edema  Lungs:  No SOB  Abdomen:  Non-distended.   Skin:  Warm and dry.  Extremities:  No edema, cyanosis or  clubbing.  Psychiatric:  No apparent distress  Musculoskeletal:  Normal bulk and tone    NEUROLOGICAL EXAMINATION:     Mental status:  Alert and Oriented x 3, speech is fluent.  Cranial nerves:  II-XII intact.   Motor:    Shoulder Abduction:  Right:  5/5   Left:  5/5  Biceps:                      Right:  5/5   Left:  5/5  Triceps:                     Right:  5/5   Left:  5/5  Wrist Extensors:       Right:  5/5   Left:  5/5  Wrist Flexors:           Right:  5/5   Left:  5/5  interosseus :            Right:  5/5   Left:  5/5  Hip Flexion:                Right: 5/5  Left:  5/5  Quadriceps:             Right:  5/5  Left:  5/5  Hamstrings:             Right:  5/5  Left:  5/5  Gastroc Soleus:        Right:  5/5  Left:  5/5  Tib/Ant:                      Right:  5/5  Left:  5/5  EHL:                     Right:  5/5  Left:  5/5  Sensation:  Intact  Reflexes:  Negative Babinski.  Negative Clonus.  Negative Saul's.  Coordination:  Smooth finger to nose testing.   Negative pronator drift.  Smooth tandem walking.    A/P:  70M w/ lymphoma, possible L2 lesion    Given growth of lesion, will plan for IR core biopsy    15 minutes telephone discussion      Again, thank you for allowing me to participate in the care of your patient.        Sincerely,        Raji Alston MD

## 2022-12-12 NOTE — PATIENT INSTRUCTIONS
Patient Next Steps:    A referral has been placed for you with Interventional Radiology for a biopsy.   You should hear from them to schedule within 1-2 days.     Please call us if you have any further questions or concerns.    [facility]   Phone: [phone]  Fax: [phone]

## 2022-12-20 ENCOUNTER — TELEPHONE (OUTPATIENT)
Dept: INTERVENTIONAL RADIOLOGY/VASCULAR | Facility: CLINIC | Age: 72
End: 2022-12-20

## 2022-12-20 NOTE — TELEPHONE ENCOUNTER
12/12/22 IR referral request for a L2 vertebral lesion biopsy made.     12/13 Shelli went the referral to [facility] to have Neuro IR review. Thanks [facility]    12/13 @1117am, spoke w/Claudia- to have teams reviews. Shelli    12/20 @ 0905, d/w Claudia at [facility]. The biopsy was approved on 12/14/22 and Claudia will call the patient to schedule. IR referral completed. Missy NICHOLS    Thanks, Missy [facility] Interventional Radiology CNP ([phone]) (phone [phone])

## 2022-12-22 ENCOUNTER — TELEPHONE (OUTPATIENT)
Dept: INTERVENTIONAL RADIOLOGY/VASCULAR | Facility: CLINIC | Age: 72
End: 2022-12-22

## 2022-12-22 ENCOUNTER — HOSPITAL ENCOUNTER (OUTPATIENT)
Facility: CLINIC | Age: 72
End: 2022-12-22
Payer: MEDICARE

## 2022-12-22 DIAGNOSIS — M89.9 LESION OF BONE OF LUMBOSACRAL SPINE: Primary | ICD-10-CM

## 2022-12-22 NOTE — TELEPHONE ENCOUNTER
Deandre Merchant called today wondering about when the biopsy will be scheduled.     IR referral was placed on 12/12 and then sent to [facility] Neuro IR to review on 12/13. (see notes below)     I spoke with Claudia on 12/20, scheduled at [facility], as the procedure hadn't been scheduled yet and she stated the procedure had been approved 12/14 and she hadn't called the patient yet to schedule. She was going to.     [facility] Neuro   12/13 Shelli is sending this to [facility] to have Neuro IR to review. Thanks Missy [facility] JONATHAN     12/13 @1117am, spoke w/Claudia- to have teams reviews. Shelli     12/20 @ 0905, d/w Claudia at [facility]. The biopsy was approved on 12/14/22 and Claudia will call the patient to schedule. Telephone note entered. Missy NICHOLS    I called Minh back and the call went to  and left Claudia's number to call at .    Thanks, Missy [facility] Interventional Radiology CNP ([phone]) (phone [phone])

## 2022-12-29 ENCOUNTER — MYC MEDICAL ADVICE (OUTPATIENT)
Dept: INTERVENTIONAL RADIOLOGY/VASCULAR | Facility: CLINIC | Age: 72
End: 2022-12-29

## 2023-01-02 ENCOUNTER — OFFICE VISIT (OUTPATIENT)
Dept: FAMILY MEDICINE | Facility: CLINIC | Age: 73
End: 2023-01-02
Payer: MEDICARE

## 2023-01-02 VITALS
HEART RATE: 75 BPM | RESPIRATION RATE: 18 BRPM | TEMPERATURE: 97.6 F | HEIGHT: 73 IN | OXYGEN SATURATION: 97 % | WEIGHT: 286.9 LBS | SYSTOLIC BLOOD PRESSURE: 108 MMHG | BODY MASS INDEX: 38.02 KG/M2 | DIASTOLIC BLOOD PRESSURE: 72 MMHG

## 2023-01-02 DIAGNOSIS — M89.9 LESION OF LUMBAR SPINE: ICD-10-CM

## 2023-01-02 DIAGNOSIS — Z01.818 PREOP GENERAL PHYSICAL EXAM: Primary | ICD-10-CM

## 2023-01-02 PROCEDURE — 93000 ELECTROCARDIOGRAM COMPLETE: CPT | Performed by: FAMILY MEDICINE

## 2023-01-02 PROCEDURE — 99214 OFFICE O/P EST MOD 30 MIN: CPT | Performed by: FAMILY MEDICINE

## 2023-01-02 ASSESSMENT — PAIN SCALES - GENERAL: PAINLEVEL: NO PAIN (0)

## 2023-01-02 NOTE — PROGRESS NOTES
[address] [address]  Phone: [phone]  Fax: [phone]  Primary Provider: Oskar Conrad  Pre-op Performing Provider: RUTHIE GALINDO      PREOPERATIVE EVALUATION:  Today's date: 1/2/2023    Deandre Merchant is a 72 year old male who presents for a preoperative evaluation.     Surgical Information:  Surgery/Procedure: L-1,  L-2 Biopsy/Lesion lumbar spine   Surgery Location: [facility]  Surgeon: Dr. Raji Alston  Surgery Date: 01/04/2023  Time of Surgery: 11:30 am  Where patient plans to recover: At home with family  Fax number for surgical facility: Note does not need to be faxed, will be available electronically in Epic.    Type of Anesthesia Anticipated: General    Assessment & Plan     The proposed surgical procedure is considered INTERMEDIATE risk.    #1 preop exam    #2 lesion of the lumbar spine           Risks and Recommendations:  The patient has the following additional risks and recommendations for perioperative complications:   -Type 2 diabetes in good control  -Morbid obesity    Medication Instructions:   - metformin: HOLD day of surgery.    RECOMMENDATION:  APPROVAL GIVEN to proceed with proposed procedure, without further diagnostic evaluation.            Subjective     HPI related to upcoming procedure: Gentleman with lymphoma now they are concerned of a possible L2 lesion the L2 vertebrae they are going to go in and do a biopsy    Preop Questions 1/2/2023   1. Have you ever had a heart attack or stroke? No   2. Have you ever had surgery on your heart or blood vessels, such as a stent placement, a coronary artery bypass, or surgery on an artery in your head, neck, heart, or legs? No   3. Do you have chest pain with activity? No   4. Do you have a history of  heart failure? No   5. Do you currently have a cold, bronchitis or symptoms of other infection? No   6. Do you have a cough, shortness of breath,  or wheezing? No   7. Do you or anyone in your family have previous history of blood clots? No   8. Do you or does anyone in your family have a serious bleeding problem such as prolonged bleeding following surgeries or cuts? No   9. Have you ever had problems with anemia or been told to take iron pills? No   10. Have you had any abnormal blood loss such as black, tarry or bloody stools? No   11. Have you ever had a blood transfusion? UNKNOWN -    12. Are you willing to have a blood transfusion if it is medically needed before, during, or after your surgery? Yes   13. Have you or any of your relatives ever had problems with anesthesia? No   14. Do you have sleep apnea, excessive snoring or daytime drowsiness? UNKNOWN -    15. Do you have any artifical heart valves or other implanted medical devices like a pacemaker, defibrillator, or continuous glucose monitor? No   16. Do you have artificial joints? No   17. Are you allergic to latex? No       Health Care Directive:  Patient does not have a Health Care Directive or Living Will:     Preoperative Review of :   reviewed - no record of controlled substances prescribed.      Status of Chronic Conditions:  See problem list for active medical problems.  Problems all longstanding and stable, except as noted/documented.  See ROS for pertinent symptoms related to these conditions.    DIABETES - Patient has a longstanding history of DiabetesType Type II . Patient is being treated with oral agents and denies significant side effects. Control has been good. Complicating factors include but are not limited to: hypertension and hyperlipidemia.       Review of Systems  Constitutional, neuro, ENT, endocrine, pulmonary, cardiac, gastrointestinal, genitourinary, musculoskeletal, integument and psychiatric systems are negative, except as otherwise noted.    Patient Active Problem List    Diagnosis Date Noted     Diabetes mellitus, type 2 (H) 07/18/2022     Priority: Medium      History of cataract 07/08/2022     Priority: Medium     Trigger finger, left middle finger 08/05/2021     Priority: Medium     Severe obesity (BMI 35.0-39.9) with comorbidity (H) 07/07/2020     Priority: Medium     Essential hypertension 07/07/2020     Priority: Medium     Ascending aorta dilatation (H) 07/07/2020     Priority: Medium     WINSTON (obstructive sleep apnea) 08/31/2019     Priority: Medium     8/29/2019 [place] [facility] Study (288.0 lbs) - AHI 13.9, RDI 34.0, Supine AHI 14.3, REM AHI 25.8, Low O2 86.0%, Time Spent ?88% 1.5 minutes / Time Spent ?89% 3.3 minutes.       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 Provider to review and confirm.       BPH (benign prostatic hyperplasia) 02/24/2010     Priority: Medium     Impotence of organic origin 03/14/2006     Priority: Medium     Retinal detachment 03/14/2006     Priority: Medium     Problem list name updated by automated process. 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     Lymphadenopathy     mediastinal & retroperitoneal     Non Hodgkin's lymphoma (H)      Non-Hodgkin's lymphoma of multiple sites (H) 6/26/2012     Polyps, colonic      Retinal detachment      Past Surgical History:   Procedure Laterality Date     COLONOSCOPY N/A 3/1/2017    Procedure: COLONOSCOPY;  Surgeon: Dakota Wall MD;  Location: [facility]     COLONOSCOPY N/A 10/3/2022    Procedure:  COLONOSCOPY;  Surgeon: Jose J Bee MD;  Location: [facility]     ESOPHAGOSCOPY, GASTROSCOPY, DUODENOSCOPY (EGD), COMBINED  4/21/2013    Procedure: COMBINED ESOPHAGOSCOPY, GASTROSCOPY, DUODENOSCOPY (EGD), BIOPSY SINGLE OR MULTIPLE;;  Surgeon: Torrey Cosme MD;  Location:  GI     ESOPHAGOSCOPY, GASTROSCOPY, DUODENOSCOPY (EGD), COMBINED  10/9/2013    Procedure: COMBINED ESOPHAGOSCOPY, GASTROSCOPY, DUODENOSCOPY (EGD), BIOPSY SINGLE OR MULTIPLE;  ESOPHAGOSCOPY, GASTROSCOPY, DUODENOSCOPY (EGD) with multiple biopsies;  Surgeon: Shay Sauer MD;  Location: [facility]     LAPAROTOMY EXPLORATORY  4/24/2013    Procedure: LAPAROTOMY EXPLORATORY;  Exploratory Laparotomy and lysis of adhesions.;  Surgeon: Genevieve Barros MD;  Location:  OR     [facility] COLONOSCOPY W/WO BRUSH/WASH  07/12/06     Current Outpatient Medications   Medication Sig Dispense Refill     blood glucose (NO BRAND SPECIFIED) lancets standard Use to test blood sugar once daily. 1 each 3     blood glucose (NO BRAND SPECIFIED) test strip Use to test blood sugar once daily. 100 strip 3     chlorpheniramine (CHLOR-TRIMETON) 4 MG tablet Take 4 mg by mouth every 6 hours as needed for allergies or rhinitis       metFORMIN (GLUCOPHAGE XR) 500 MG 24 hr tablet Take 2 tablets (1,000 mg) by mouth daily (with dinner) 180 tablet 3     metoprolol succinate ER (TOPROL XL) 50 MG 24 hr tablet TAKE 1 TABLET BY MOUTH ONCE DAILY 90 tablet 1     order for DME Equipment ordered: RESMED Auto PAP Mask type: Full face  Settings: 5-15 cm h2o       simvastatin (ZOCOR) 20 MG tablet Take 1 tablet (20 mg) by mouth At Bedtime 90 tablet 4     Acetaminophen (TYLENOL PO) Take 1,000 mg by mouth (Patient not taking: Reported on 12/8/2022)       cetirizine (ZYRTEC) 10 MG tablet Take 10 mg by mouth daily (Patient not taking: Reported on 1/2/2023)       fluticasone (FLONASE) 50 MCG/ACT nasal spray Spray 2 sprays into both nostrils daily (Patient not taking: Reported on 7/8/2022)  "16 g 11     triamcinolone (KENALOG) 0.1 % external ointment Apply sparingly to affected area twice daily as needed. (Patient not taking: Reported on 7/8/2022) 15 g 1       Allergies   Allergen Reactions     Allopurinol Rash        Social History     Tobacco Use     Smoking status: Never     Smokeless tobacco: Never   Substance Use Topics     Alcohol use: Yes     Alcohol/week: 3.3 standard drinks     Comment: occasionally weekends     Family History   Problem Relation Age of Onset     Cancer Father         liver/kidney     C.A.D. Father      Pacemaker Brother      Arthritis Mother         RA     History   Drug Use No         Objective     /72 (BP Location: Right arm, Patient Position: Sitting, Cuff Size: Adult Large)   Pulse 75   Temp 97.6  F (36.4  C) (Temporal)   Resp 18   Ht 1.854 m (6' 1\")   Wt 130.1 kg (286 lb 14.4 oz)   SpO2 97%   BMI 37.85 kg/m      Physical Exam    GENERAL APPEARANCE: healthy, alert and no distress     EYES: EOMI,  PERRL     HENT: ear canals and TM's normal and nose and mouth without ulcers or lesions     NECK: no adenopathy, no asymmetry, masses, or scars and thyroid normal to palpation     RESP: lungs clear to auscultation - no rales, rhonchi or wheezes     CV: regular rates and rhythm, normal S1 S2, no S3 or S4 and no murmur, click or rub     ABDOMEN:  soft, nontender, no HSM or masses and bowel sounds normal     MS: extremities normal- no gross deformities noted, no evidence of inflammation in joints, FROM in all extremities.     SKIN: no suspicious lesions or rashes     NEURO: Normal strength and tone, sensory exam grossly normal, mentation intact and speech normal     PSYCH: mentation appears normal. and affect normal/bright     LYMPHATICS: No cervical adenopathy    Recent Labs   Lab Test 12/08/22  1113 07/08/22  0844 03/09/22  1000   HGB  --  14.8 14.4   PLT  --  158 188   NA  --  134 138   POTASSIUM  --  4.3 4.3   CR  --  0.90 0.86   A1C 7.6* 8.7*  --     "     Diagnostics:  No labs were ordered during this visit.   EKG: appears normal, NSR, normal axis, normal intervals, no acute ST/T changes c/w ischemia, no LVH by voltage criteria    Revised Cardiac Risk Index (RCRI):  The patient has the following serious cardiovascular risks for perioperative complications:   - No serious cardiac risks = 0 points     RCRI Interpretation: 0 points: Class I (very low risk - 0.4% complication rate)           Signed Electronically by: Zhen Davis MD  Copy of this evaluation report is provided to requesting physician.

## 2023-01-02 NOTE — PATIENT INSTRUCTIONS
For informational purposes only. Not to replace the advice of your health care provider. Copyright   2003,  [employer] [facility]. All rights reserved. Clinically reviewed by Marilynn Cormier MD. [email or web address] [id number] - REV .  Preparing for Your Surgery  Getting started  A nurse will call you to review your health history and instructions. They will give you an arrival time based on your scheduled surgery time. Please be ready to share:    Your doctor's clinic name and phone number    Your medical, surgical, and anesthesia history    A list of allergies and sensitivities    A list of medicines, including herbal treatments and over-the-counter drugs    Whether the patient has a legal guardian (ask how to send us the papers in advance)  Please tell us if you're pregnant--or if there's any chance you might be pregnant. Some surgeries may injure a fetus (unborn baby), so they require a pregnancy test. Surgeries that are safe for a fetus don't always need a test, and you can choose whether to have one.   If you have a child who's having surgery, please ask for a copy of Preparing for Your Child's Surgery.    Preparing for surgery    Within 10 to 30 days of surgery: Have a pre-op exam (sometimes called an H&P, or History and Physical). This can be done at a clinic or pre-operative center.  ? If you're having a , you may not need this exam. Talk to your care team.    At your pre-op exam, talk to your care team about all medicines you take. If you need to stop any medicines before surgery, ask when to start taking them again.  ? We do this for your safety. Many medicines can make you bleed too much during surgery. Some change how well surgery (anesthesia) drugs work.    Call your insurance company to let them know you're having surgery. (If you don't have insurance, call [phone].)    Call your clinic if there's any change in your health. This includes signs of a cold or flu (sore throat, runny nose,  cough, rash, fever). It also includes a scrape or scratch near the surgery site.    If you have questions on the day of surgery, call your hospital or surgery center.  Eating and drinking guidelines  For your safety: Unless your surgeon tells you otherwise, follow the guidelines below.    Eat and drink as usual until 8 hours before you arrive for surgery. After that, no food or milk.    Drink clear liquids until 2 hours before you arrive. These are liquids you can see through, like water, Gatorade, and Propel Water. They also include plain black coffee and tea (no cream or milk), candy, and breath mints. You can spit out gum when you arrive.    If you drink alcohol: Stop drinking it the night before surgery.    If your care team tells you to take medicine on the morning of surgery, it's okay to take it with a sip of water.  Preventing infection    Shower or bathe the night before and morning of your surgery. Follow the instructions your clinic gave you. (If no instructions, use regular soap.)    Don't shave or clip hair near your surgery site. We'll remove the hair if needed.    Don't smoke or vape the morning of surgery. You may chew nicotine gum up to 2 hours before surgery. A nicotine patch is okay.  ? Note: Some surgeries require you to completely quit smoking and nicotine. Check with your surgeon.    Your care team will make every effort to keep you safe from infection. We will:  ? Clean our hands often with soap and water (or an alcohol-based hand rub).  ? Clean the skin at your surgery site with a special soap that kills germs.  ? Give you a special gown to keep you warm. (Cold raises the risk of infection.)  ? Wear special hair covers, masks, gowns and gloves during surgery.  ? Give antibiotic medicine, if prescribed. Not all surgeries need antibiotics.  What to bring on the day of surgery    Photo ID and insurance card    Copy of your health care directive, if you have one    Glasses and hearing aids (bring  cases)  ? You can't wear contacts during surgery    Inhaler and eye drops, if you use them (tell us about these when you arrive)    CPAP machine or breathing device, if you use them    A few personal items, if spending the night    If you have . . .  ? A pacemaker, ICD (cardiac defibrillator) or other implant: Bring the ID card.  ? An implanted stimulator: Bring the remote control.  ? A legal guardian: Bring a copy of the certified (court-stamped) guardianship papers.  Please remove any jewelry, including body piercings. Leave jewelry and other valuables at home.  If you're going home the day of surgery    You must have a responsible adult drive you home. They should stay with you overnight as well.    If you don't have someone to stay with you, and you aren't safe to go home alone, we may keep you overnight. Insurance often won't pay for this.  After surgery  If it's hard to control your pain or you need more pain medicine, please call your surgeon's office.  Questions?   If you have any questions for your care team, list them here: _________________________________________________________________________________________________________________________________________________________________________ ____________________________________ ____________________________________ ____________________________________

## 2023-01-03 RX ORDER — SODIUM CHLORIDE 9 MG/ML
INJECTION, SOLUTION INTRAVENOUS CONTINUOUS
Status: CANCELLED | OUTPATIENT
Start: 2023-01-04

## 2023-01-03 RX ORDER — NALOXONE HYDROCHLORIDE 0.4 MG/ML
0.4 INJECTION, SOLUTION INTRAMUSCULAR; INTRAVENOUS; SUBCUTANEOUS
Status: CANCELLED | OUTPATIENT
Start: 2023-01-04

## 2023-01-03 RX ORDER — NALOXONE HYDROCHLORIDE 0.4 MG/ML
0.2 INJECTION, SOLUTION INTRAMUSCULAR; INTRAVENOUS; SUBCUTANEOUS
Status: CANCELLED | OUTPATIENT
Start: 2023-01-04

## 2023-01-03 RX ORDER — FENTANYL CITRATE 50 UG/ML
25-50 INJECTION, SOLUTION INTRAMUSCULAR; INTRAVENOUS EVERY 5 MIN PRN
Status: CANCELLED | OUTPATIENT
Start: 2023-01-04

## 2023-01-03 RX ORDER — FLUMAZENIL 0.1 MG/ML
0.2 INJECTION, SOLUTION INTRAVENOUS
Status: CANCELLED | OUTPATIENT
Start: 2023-01-04

## 2023-01-06 ENCOUNTER — MYC MEDICAL ADVICE (OUTPATIENT)
Dept: INTERVENTIONAL RADIOLOGY/VASCULAR | Facility: CLINIC | Age: 73
End: 2023-01-06

## 2023-01-10 ENCOUNTER — TELEPHONE (OUTPATIENT)
Dept: EDUCATION SERVICES | Facility: CLINIC | Age: 73
End: 2023-01-10

## 2023-01-10 ENCOUNTER — ALLIED HEALTH/NURSE VISIT (OUTPATIENT)
Dept: EDUCATION SERVICES | Facility: CLINIC | Age: 73
End: 2023-01-10
Payer: MEDICARE

## 2023-01-10 DIAGNOSIS — E11.9 TYPE 2 DIABETES MELLITUS WITHOUT COMPLICATION, WITHOUT LONG-TERM CURRENT USE OF INSULIN (H): ICD-10-CM

## 2023-01-10 DIAGNOSIS — E11.9 TYPE 2 DIABETES MELLITUS WITHOUT COMPLICATION, WITHOUT LONG-TERM CURRENT USE OF INSULIN (H): Primary | ICD-10-CM

## 2023-01-10 PROCEDURE — G0108 DIAB MANAGE TRN  PER INDIV: HCPCS

## 2023-01-10 NOTE — LETTER
1/10/2023         RE: Deandre Merchant  [address]  [place] [address]        Dear Colleague,    Thank you for referring your patient, Deandre Merchant, to the [facility] DIABETES [facility]UC[facility] [place]. Please see a copy of my visit note below.    Diabetes Self-Management Education & Support    Presents for: Follow-up    Type of Visit: In Person    How would patient like to obtain AVS? Printed copy    ASSESSMENT:    Minh comes to clinic today for follow-up for newer Dx and management.  A1C decreased from 8.7-->7.6%.  He has been testing BG daily, typically in the morning/fasting.  Also reports has been taking 3 tabs Metformin XR in the evening.  He reports some higher BG around the holidays due to diet.  Is working on getting back on track.  Says portion control is difficult for him.    Continues to use stationary bike at home for exercise.  Plans to join [employer] with his grandson.    Steady weight loss over the past 6 months.  Has questions about weight loss medications.  Discussed some potential options, he will think about it.    Wt Readings from Last 5 Encounters:   01/02/23 130.1 kg (286 lb 14.4 oz)   12/08/22 131.5 kg (289 lb 14.4 oz)   10/03/22 134 kg (295 lb 6.7 oz)   07/08/22 134 kg (295 lb 8 oz)   06/01/22 134.8 kg (297 lb 3.2 oz)         Patient's most recent   Lab Results   Component Value Date    A1C 7.6 12/08/2022    A1C 5.4 09/17/2015    is meeting goal of <8.0    Diabetes knowledge and skills assessment:   Patient is knowledgeable in diabetes management concepts related to: Healthy Eating, Being Active, Monitoring, Taking Medication and Reducing Risks    Continue education with the following diabetes management concepts: Healthy Eating, Taking Medication, Reducing Risks and Healthy Coping    Based on learning assessment above, most appropriate setting for further diabetes education would be: Individual setting.    INTERVENTIONS:    Education provided today on:  AADE Self-Care  Behaviors:  Care Plan: Diabetes   Updates made by Ava Jackson since 1/10/2023 12:00 AM      Problem: Diabetes Self-Management Education Needed to Optimize Self-Care Behaviors       Goal: Healthy Eating - follow a healthy eating pattern for diabetes    Start Date: 9/27/2022   This Visit's Progress: 40%   Recent Progress: 20%   Note:    My Goal: I will aim for 60 grams of carbohydrates with meals    What I need to meet my goal: carbohdyrate reference, portion control and my plate    I plan to meet my goal by this date: 3 months       Goal: Being Active - get regular physical activity, working up to at least 150 minutes per week    Start Date: 1/10/2023   This Visit's Progress: 40%   Note:    My Goal: I will increase physical activity to 150 minutes of moderate intense activity per week    What I need to meet my goal: a plan for where and when to exercise    I plan to meet my goal by this date: 6 months      Task: Develop physical activity plan with patient Completed 1/10/2023   Responsible User: Ava Jackson      Goal: Monitoring - monitor glucose and ketones as directed Completed 1/10/2023   This Visit's Progress: 100%   Recent Progress: On track   Note:    My Goal: I will test my BG once daily    What I need to meet my goal: meter and supplies    I plan to meet my goal by this date: 6 weeks      Task: Provide education on continuous glucose monitoring (sensor placement, use of rene or /reader, understanding glucose trends, alerts and alarms, differences between sensor glucose and blood glucose) Completed 1/10/2023   Responsible User: Ava Jackson      Task: Provide education on ketone monitoring (when to monitor, frequency, etc.) Completed 1/10/2023   Responsible User: Ava Jackson      Goal: Reducing Risks - know how to prevent and treat long-term diabetes complications       Task: Provide education on major complications of diabetes, prevention, early  diagnostic measures and treatment of complications Completed 1/10/2023   Responsible User: Ava Jackson      Task: Provide education on recommended care for dental, eye and foot health Completed 1/10/2023   Responsible User: Ava Jackson      Task: Provide education on Hemoglobin A1c - goals and relationship to blood glucose levels Completed 1/10/2023   Responsible User: Ava Jackson      Task: Provide education on recommendations for heart health - lipid levels and goals, blood pressure and goals, and aspirin therapy, if indicated Completed 1/10/2023   Responsible User: Ava Jackson      Goal: Healthy Coping - use available resources to cope with the challenges of managing diabetes       Task: Provide education on the benefits of making appropriate lifestyle changes Completed 1/10/2023   Responsible User: Ava Jackson      Task: Provide education on benefits of utilizing support systems Completed 1/10/2023   Responsible User: Ava Jackson          Opportunities for ongoing education and support in diabetes-self management were discussed. Pt verbalized understanding of concepts discussed and recommendations provided today.       Education Materials Provided:  No new materials provided today          PLAN    1) Continue checking blood sugar once daily    2) Continue with portion control- Aim for 60 grams of carbohydrates    3) Continue working on activity- great ideas with the gym, bicycle and walking    Continue taking 3 tabs Metformin daily in the evening    Weight loss medication list:  Phentermine  Orlistat  Phentermine/topiramate  Naltrexone/bupropion    Topics to cover at upcoming visits: Taking Medication, Reducing Risks and Healthy Coping  Follow-up: June 6th, 9:30am- in person visit    See Goals Section for co-developed, patient-stated behavior change goals.  AVS provided to patient today.          SUBJECTIVE / OBJECTIVE:  Presents for:  "Follow-up  Accompanied by: Self  Diabetes education in the past 24mo: No  Focus of Visit: Taking Medication, Healthy Eating, Being Active  Diabetes type: Type 2  Date of diagnosis: July 2022  Disease course: Stable  How confident are you filling out medical forms by yourself:: Somewhat  Transportation concerns: No  Difficulty affording diabetes medication?: No  Cultural Influences/Ethnic Background:  Not  or       Diabetes Symptoms & Complications:  Fatigue: Sometimes  Neuropathy: Sometimes  Polydipsia: No  Polyphagia: Sometimes  Polyuria: Yes  Visual change: Sometimes (related to long-term eye issues)  Slow healing wounds: No  Symptom course: Stable  Weight trend: Decreasing  Autonomic neuropathy: No  CVA: No  Heart disease: No  Nephropathy: No  Peripheral neuropathy: No  Peripheral Vascular Disease: No  Retinopathy: No  Sexual dysfunction: Yes    Patient Problem List and Family Medical History reviewed for relevant medical history, current medical status, and diabetes risk factors.    Vitals:  There were no vitals taken for this visit.  Estimated body mass index is 37.85 kg/m  as calculated from the following:    Height as of 1/2/23: 1.854 m (6' 1\").    Weight as of 1/2/23: 130.1 kg (286 lb 14.4 oz).   Last 3 BP:   BP Readings from Last 3 Encounters:   01/02/23 108/72   12/08/22 138/84   10/03/22 (!) 159/82       History   Smoking Status     Never   Smokeless Tobacco     Never       Labs:  Lab Results   Component Value Date    A1C 7.6 12/08/2022    A1C 5.4 09/17/2015     Lab Results   Component Value Date     10/03/2022     07/08/2022     01/02/2021     Lab Results   Component Value Date    LDL 15 07/08/2022    LDL 29 07/07/2020     HDL Cholesterol   Date Value Ref Range Status   07/07/2020 28 (L) >39 mg/dL Final     Direct Measure HDL   Date Value Ref Range Status   07/08/2022 29 (L) >=40 mg/dL Final   ]  GFR Estimate   Date Value Ref Range Status   07/08/2022 >90 >60 " mL/min/1.73m2 Final     Comment:     Effective December 21, 2021 eGFRcr in adults is calculated using the 2021 CKD-EPI creatinine equation which includes age and gender (Catarina et al., NE, DOI: 10.1056/OCTWxy1858718)   01/02/2021 80 >60 mL/min/[1.73_m2] Final     Comment:     Non  GFR Calc  Starting 12/18/2018, serum creatinine based estimated GFR (eGFR) will be   calculated using the Chronic Kidney Disease Epidemiology Collaboration   (CKD-EPI) equation.       GFR, ESTIMATED POCT   Date Value Ref Range Status   03/02/2022 >60 >60 mL/min/1.73m2 Final     GFR Estimate If Black   Date Value Ref Range Status   01/02/2021 >90 >60 mL/min/[1.73_m2] Final     Comment:      GFR Calc  Starting 12/18/2018, serum creatinine based estimated GFR (eGFR) will be   calculated using the Chronic Kidney Disease Epidemiology Collaboration   (CKD-EPI) equation.       Lab Results   Component Value Date    CR 0.90 07/08/2022    CR 0.96 01/02/2021     No results found for: MICROALBUMIN    Healthy Eating:  Healthy Eating Assessed Today: Yes  Cultural/Confucianist diet restrictions?: No  Meal planning/habits: None  How many times a week on average do you eat food made away from home (restaurant/take-out)?: 2  Meals include: Breakfast, Morning Snack, Lunch, Dinner, Evening Snack  Breakfast: Oatmeal (2 cups) with milk and brown sugar or maple syrup (home made)  Lunch: Hot dish or casserole leftovers OR a burger from fast food place  Dinner: Hot dish or casserole OR stew- meat potatoes carrots  Snacks: peanuts- lightly salted OR apple  Other: Veggies: broccoli, carrots, salads  Beverages: Milk, Diet soda, Water  Has patient met with a dietitian in the past?: No    Being Active:  Being Active Assessed Today: Yes  Exercise:: Yes  Days per week of moderate to strenuous exercise (like a brisk walk): 4  On average, minutes per day of exercise at this level: 20  How intense was your typical exercise? : Moderate (like  brisk walking)  Exercise Minutes per Week: 80  Barrier to exercise: None    Monitoring:  Monitoring Assessed Today: No  Did patient bring glucose meter to appointment? : No  Blood Glucose Meter: Unknown  Times checking blood sugar at home (number): 1  Times checking blood sugar at home (per): Day  Blood glucose trend: Other    Glucose data:  Date Breakfast    Before   12/24 160   12/25 165   12/26 193   12/27 165   12/29 190   12/30 173   12/31 161     Date Breakfast    Before   1/1 164   1/2 158   1/3 168   1/4 173   1/5 169   1/6 157   1/7 183     Has had recent readings of 210 mg/dL and 190 mg/dL per report    Taking Medications:  Diabetes Medication(s)     Biguanides       metFORMIN (GLUCOPHAGE XR) 500 MG 24 hr tablet    Take 2 tablets (1,000 mg) by mouth daily (with dinner)      Reports taking 3 tabs daily.      Taking Medication Assessed Today: Yes  Current Treatments: Oral Medication (taken by mouth)  Problems taking diabetes medications regularly?: Yes  Diabetes medication side effects?: Yes    Problem Solving:  Problem Solving Assessed Today: No              Reducing Risks:  Diabetes Risks: Age over 45 years  CAD Risks: Diabetes Mellitus, Dyslipidemia, Family history, Hypertension, Obesity  Has dilated eye exam at least once a year?: Yes  Sees dentist every 6 months?: Yes  Feet checked by healthcare provider in the last year?: No    Healthy Coping:  Healthy Coping Assessed Today: No  Informal Support system:: Spouse  Stage of change: ACTION (Actively working towards change)  Patient Activation Measure Survey Score:  JENN Score (Last Two) 9/17/2015   JENN Raw Score 42   Activation Score 66   JENN Level 3             Mary Jackson RDN, LD  Outpatient Diabetes Education  Adult Diabetes Education Triage [phone]    Time Spent: 40 minutes  Encounter Type: Individual        Any diabetes medication dose changes were made via the Certified Diabetes Care & Education Protocol in collaboration with the  patient's referring provider. A copy of this encounter was shared with the provider.

## 2023-01-10 NOTE — PATIENT INSTRUCTIONS
1) Continue checking blood sugar once daily    2) Continue with portion control- Aim for 60 grams of carbohydrates    3) Continue working on activity- great ideas with the gym, bicycle and walking    Continue taking 3 tabs Metformin daily in the evening    Weight loss medication list:  Phentermine  Orlistat  Phentermine/topiramate  Naltrexone/bupropion      Thank you,     Mary Jackson RDN, LD  Outpatient Diabetes Education  Adult Diabetes Education Triage [phone]

## 2023-01-10 NOTE — PROGRESS NOTES
Diabetes Self-Management Education & Support    Presents for: Follow-up    Type of Visit: In Person    How would patient like to obtain AVS? Printed copy    ASSESSMENT:    Minh comes to clinic today for follow-up for newer Dx and management.  A1C decreased from 8.7-->7.6%.  He has been testing BG daily, typically in the morning/fasting.  Also reports has been taking 3 tabs Metformin XR in the evening.  He reports some higher BG around the holidays due to diet.  Is working on getting back on track.  Says portion control is difficult for him.    Continues to use stationary bike at home for exercise.  Plans to join [employer] with his grandson.    Steady weight loss over the past 6 months.  Has questions about weight loss medications.  Discussed some potential options, he will think about it.    Wt Readings from Last 5 Encounters:   01/02/23 130.1 kg (286 lb 14.4 oz)   12/08/22 131.5 kg (289 lb 14.4 oz)   10/03/22 134 kg (295 lb 6.7 oz)   07/08/22 134 kg (295 lb 8 oz)   06/01/22 134.8 kg (297 lb 3.2 oz)         Patient's most recent   Lab Results   Component Value Date    A1C 7.6 12/08/2022    A1C 5.4 09/17/2015    is meeting goal of <8.0    Diabetes knowledge and skills assessment:   Patient is knowledgeable in diabetes management concepts related to: Healthy Eating, Being Active, Monitoring, Taking Medication and Reducing Risks    Continue education with the following diabetes management concepts: Healthy Eating, Taking Medication, Reducing Risks and Healthy Coping    Based on learning assessment above, most appropriate setting for further diabetes education would be: Individual setting.    INTERVENTIONS:    Education provided today on:  AADE Self-Care Behaviors:  Care Plan: Diabetes   Updates made by Ava Jackson since 1/10/2023 12:00 AM      Problem: Diabetes Self-Management Education Needed to Optimize Self-Care Behaviors       Goal: Healthy Eating - follow a healthy eating pattern for diabetes     Start Date: 9/27/2022   This Visit's Progress: 40%   Recent Progress: 20%   Note:    My Goal: I will aim for 60 grams of carbohydrates with meals    What I need to meet my goal: carbohdyrate reference, portion control and my plate    I plan to meet my goal by this date: 3 months       Goal: Being Active - get regular physical activity, working up to at least 150 minutes per week    Start Date: 1/10/2023   This Visit's Progress: 40%   Note:    My Goal: I will increase physical activity to 150 minutes of moderate intense activity per week    What I need to meet my goal: a plan for where and when to exercise    I plan to meet my goal by this date: 6 months      Task: Develop physical activity plan with patient Completed 1/10/2023   Responsible User: Ava Jackson      Goal: Monitoring - monitor glucose and ketones as directed Completed 1/10/2023   This Visit's Progress: 100%   Recent Progress: On track   Note:    My Goal: I will test my BG once daily    What I need to meet my goal: meter and supplies    I plan to meet my goal by this date: 6 weeks      Task: Provide education on continuous glucose monitoring (sensor placement, use of rene or /reader, understanding glucose trends, alerts and alarms, differences between sensor glucose and blood glucose) Completed 1/10/2023   Responsible User: Ava Jackson      Task: Provide education on ketone monitoring (when to monitor, frequency, etc.) Completed 1/10/2023   Responsible User: Ava Jackson      Goal: Reducing Risks - know how to prevent and treat long-term diabetes complications       Task: Provide education on major complications of diabetes, prevention, early diagnostic measures and treatment of complications Completed 1/10/2023   Responsible User: Ava Jackson      Task: Provide education on recommended care for dental, eye and foot health Completed 1/10/2023   Responsible User: Ava Jackson       Task: Provide education on Hemoglobin A1c - goals and relationship to blood glucose levels Completed 1/10/2023   Responsible User: Ava Jackson      Task: Provide education on recommendations for heart health - lipid levels and goals, blood pressure and goals, and aspirin therapy, if indicated Completed 1/10/2023   Responsible User: Ava Jackson      Goal: Healthy Coping - use available resources to cope with the challenges of managing diabetes       Task: Provide education on the benefits of making appropriate lifestyle changes Completed 1/10/2023   Responsible User: Ava Jackson      Task: Provide education on benefits of utilizing support systems Completed 1/10/2023   Responsible User: Ava Jackson          Opportunities for ongoing education and support in diabetes-self management were discussed. Pt verbalized understanding of concepts discussed and recommendations provided today.       Education Materials Provided:  No new materials provided today          PLAN    1) Continue checking blood sugar once daily    2) Continue with portion control- Aim for 60 grams of carbohydrates    3) Continue working on activity- great ideas with the gym, bicycle and walking    Continue taking 3 tabs Metformin daily in the evening    Weight loss medication list:  Phentermine  Orlistat  Phentermine/topiramate  Naltrexone/bupropion    Topics to cover at upcoming visits: Taking Medication, Reducing Risks and Healthy Coping  Follow-up: June 6th, 9:30am- in person visit    See Goals Section for co-developed, patient-stated behavior change goals.  AVS provided to patient today.          SUBJECTIVE / OBJECTIVE:  Presents for: Follow-up  Accompanied by: Self  Diabetes education in the past 24mo: No  Focus of Visit: Taking Medication, Healthy Eating, Being Active  Diabetes type: Type 2  Date of diagnosis: July 2022  Disease course: Stable  How confident are you filling out medical forms  "by yourself:: Somewhat  Transportation concerns: No  Difficulty affording diabetes medication?: No  Cultural Influences/Ethnic Background:  Not  or       Diabetes Symptoms & Complications:  Fatigue: Sometimes  Neuropathy: Sometimes  Polydipsia: No  Polyphagia: Sometimes  Polyuria: Yes  Visual change: Sometimes (related to long-term eye issues)  Slow healing wounds: No  Symptom course: Stable  Weight trend: Decreasing  Autonomic neuropathy: No  CVA: No  Heart disease: No  Nephropathy: No  Peripheral neuropathy: No  Peripheral Vascular Disease: No  Retinopathy: No  Sexual dysfunction: Yes    Patient Problem List and Family Medical History reviewed for relevant medical history, current medical status, and diabetes risk factors.    Vitals:  There were no vitals taken for this visit.  Estimated body mass index is 37.85 kg/m  as calculated from the following:    Height as of 1/2/23: 1.854 m (6' 1\").    Weight as of 1/2/23: 130.1 kg (286 lb 14.4 oz).   Last 3 BP:   BP Readings from Last 3 Encounters:   01/02/23 108/72   12/08/22 138/84   10/03/22 (!) 159/82       History   Smoking Status     Never   Smokeless Tobacco     Never       Labs:  Lab Results   Component Value Date    A1C 7.6 12/08/2022    A1C 5.4 09/17/2015     Lab Results   Component Value Date     10/03/2022     07/08/2022     01/02/2021     Lab Results   Component Value Date    LDL 15 07/08/2022    LDL 29 07/07/2020     HDL Cholesterol   Date Value Ref Range Status   07/07/2020 28 (L) >39 mg/dL Final     Direct Measure HDL   Date Value Ref Range Status   07/08/2022 29 (L) >=40 mg/dL Final   ]  GFR Estimate   Date Value Ref Range Status   07/08/2022 >90 >60 mL/min/1.73m2 Final     Comment:     Effective December 21, 2021 eGFRcr in adults is calculated using the 2021 CKD-EPI creatinine equation which includes age and gender (Catarina sky al., NEJM, DOI: 10.1056/MYGRha8666073)   01/02/2021 80 >60 mL/min/[1.73_m2] Final     Comment: "     Non  GFR Calc  Starting 12/18/2018, serum creatinine based estimated GFR (eGFR) will be   calculated using the Chronic Kidney Disease Epidemiology Collaboration   (CKD-EPI) equation.       GFR, ESTIMATED POCT   Date Value Ref Range Status   03/02/2022 >60 >60 mL/min/1.73m2 Final     GFR Estimate If Black   Date Value Ref Range Status   01/02/2021 >90 >60 mL/min/[1.73_m2] Final     Comment:      GFR Calc  Starting 12/18/2018, serum creatinine based estimated GFR (eGFR) will be   calculated using the Chronic Kidney Disease Epidemiology Collaboration   (CKD-EPI) equation.       Lab Results   Component Value Date    CR 0.90 07/08/2022    CR 0.96 01/02/2021     No results found for: MICROALBUMIN    Healthy Eating:  Healthy Eating Assessed Today: Yes  Cultural/Zoroastrian diet restrictions?: No  Meal planning/habits: None  How many times a week on average do you eat food made away from home (restaurant/take-out)?: 2  Meals include: Breakfast, Morning Snack, Lunch, Dinner, Evening Snack  Breakfast: Oatmeal (2 cups) with milk and brown sugar or maple syrup (home made)  Lunch: Hot dish or casserole leftovers OR a burger from fast food place  Dinner: Hot dish or casserole OR stew- meat potatoes carrots  Snacks: peanuts- lightly salted OR apple  Other: Veggies: broccoli, carrots, salads  Beverages: Milk, Diet soda, Water  Has patient met with a dietitian in the past?: No    Being Active:  Being Active Assessed Today: Yes  Exercise:: Yes  Days per week of moderate to strenuous exercise (like a brisk walk): 4  On average, minutes per day of exercise at this level: 20  How intense was your typical exercise? : Moderate (like brisk walking)  Exercise Minutes per Week: 80  Barrier to exercise: None    Monitoring:  Monitoring Assessed Today: No  Did patient bring glucose meter to appointment? : No  Blood Glucose Meter: Unknown  Times checking blood sugar at home (number): 1  Times checking blood  sugar at home (per): Day  Blood glucose trend: Other    Glucose data:  Date Breakfast    Before   12/24 160   12/25 165   12/26 193   12/27 165   12/29 190   12/30 173   12/31 161     Date Breakfast    Before   1/1 164   1/2 158   1/3 168   1/4 173   1/5 169   1/6 157   1/7 183     Has had recent readings of 210 mg/dL and 190 mg/dL per report    Taking Medications:  Diabetes Medication(s)     Biguanides       metFORMIN (GLUCOPHAGE XR) 500 MG 24 hr tablet    Take 2 tablets (1,000 mg) by mouth daily (with dinner)      Reports taking 3 tabs daily.      Taking Medication Assessed Today: Yes  Current Treatments: Oral Medication (taken by mouth)  Problems taking diabetes medications regularly?: Yes  Diabetes medication side effects?: Yes    Problem Solving:  Problem Solving Assessed Today: No              Reducing Risks:  Diabetes Risks: Age over 45 years  CAD Risks: Diabetes Mellitus, Dyslipidemia, Family history, Hypertension, Obesity  Has dilated eye exam at least once a year?: Yes  Sees dentist every 6 months?: Yes  Feet checked by healthcare provider in the last year?: No    Healthy Coping:  Healthy Coping Assessed Today: No  Informal Support system:: Spouse  Stage of change: ACTION (Actively working towards change)  Patient Activation Measure Survey Score:  JENN Score (Last Two) 9/17/2015   JENN Raw Score 42   Activation Score 66   JENN Level 3             Mary Jackson RDN, LD  Outpatient Diabetes Education  Adult Diabetes Education Triage [phone]    Time Spent: 40 minutes  Encounter Type: Individual        Any diabetes medication dose changes were made via the Certified Diabetes Care & Education Protocol in collaboration with the patient's referring provider. A copy of this encounter was shared with the provider.

## 2023-01-11 RX ORDER — METFORMIN HCL 500 MG
1500 TABLET, EXTENDED RELEASE 24 HR ORAL
Qty: 180 TABLET | Refills: 3 | Status: SHIPPED | OUTPATIENT
Start: 2023-01-11 | End: 2023-06-06

## 2023-01-12 ENCOUNTER — HOSPITAL ENCOUNTER (OUTPATIENT)
Facility: CLINIC | Age: 73
Discharge: HOME | End: 2023-01-12
Admitting: RADIOLOGY
Payer: MEDICARE

## 2023-01-12 ENCOUNTER — APPOINTMENT (OUTPATIENT)
Dept: INTERVENTIONAL RADIOLOGY/VASCULAR | Facility: CLINIC | Age: 73
End: 2023-01-12
Attending: NEUROLOGICAL SURGERY
Payer: MEDICARE

## 2023-01-12 VITALS
TEMPERATURE: 97.7 F | SYSTOLIC BLOOD PRESSURE: 125 MMHG | HEIGHT: 73 IN | RESPIRATION RATE: 16 BRPM | OXYGEN SATURATION: 94 % | HEART RATE: 69 BPM | WEIGHT: 284 LBS | BODY MASS INDEX: 37.64 KG/M2 | DIASTOLIC BLOOD PRESSURE: 67 MMHG

## 2023-01-12 DIAGNOSIS — M89.9 LESION OF BONE OF LUMBOSACRAL SPINE: ICD-10-CM

## 2023-01-12 LAB
HGB BLD-MCNC: 14.4 G/DL (ref 13.3–17.7)
INR PPP: 1.05 (ref 0.85–1.15)
PLATELET # BLD AUTO: 184 10E3/UL (ref 150–450)

## 2023-01-12 PROCEDURE — 250N000009 HC RX 250: Performed by: NURSE PRACTITIONER

## 2023-01-12 PROCEDURE — 85610 PROTHROMBIN TIME: CPT | Performed by: NURSE PRACTITIONER

## 2023-01-12 PROCEDURE — 36591 DRAW BLOOD OFF VENOUS DEVICE: CPT

## 2023-01-12 PROCEDURE — 999N000163 HC STATISTIC SIMPLE TUBE INSERTION/CHARGE, PORT, CATH, FISTULOGRAM

## 2023-01-12 PROCEDURE — 250N000011 HC RX IP 250 OP 636: Performed by: NURSE PRACTITIONER

## 2023-01-12 PROCEDURE — 258N000003 HC RX IP 258 OP 636: Performed by: NURSE PRACTITIONER

## 2023-01-12 PROCEDURE — 85049 AUTOMATED PLATELET COUNT: CPT | Performed by: NURSE PRACTITIONER

## 2023-01-12 PROCEDURE — 36415 COLL VENOUS BLD VENIPUNCTURE: CPT | Performed by: NURSE PRACTITIONER

## 2023-01-12 PROCEDURE — 88342 IMHCHEM/IMCYTCHM 1ST ANTB: CPT | Mod: TC | Performed by: RADIOLOGY

## 2023-01-12 PROCEDURE — 85018 HEMOGLOBIN: CPT | Performed by: NURSE PRACTITIONER

## 2023-01-12 PROCEDURE — 77002 NEEDLE LOCALIZATION BY XRAY: CPT

## 2023-01-12 RX ORDER — NALOXONE HYDROCHLORIDE 0.4 MG/ML
0.4 INJECTION, SOLUTION INTRAMUSCULAR; INTRAVENOUS; SUBCUTANEOUS
Status: DISCONTINUED | OUTPATIENT
Start: 2023-01-12 | End: 2023-01-12 | Stop reason: HOSPADM

## 2023-01-12 RX ORDER — NALOXONE HYDROCHLORIDE 0.4 MG/ML
0.2 INJECTION, SOLUTION INTRAMUSCULAR; INTRAVENOUS; SUBCUTANEOUS
Status: DISCONTINUED | OUTPATIENT
Start: 2023-01-12 | End: 2023-01-12 | Stop reason: HOSPADM

## 2023-01-12 RX ORDER — FLUMAZENIL 0.1 MG/ML
0.2 INJECTION, SOLUTION INTRAVENOUS
Status: DISCONTINUED | OUTPATIENT
Start: 2023-01-12 | End: 2023-01-12 | Stop reason: HOSPADM

## 2023-01-12 RX ORDER — ACETAMINOPHEN 500 MG
500-1000 TABLET ORAL EVERY 6 HOURS PRN
Status: DISCONTINUED | OUTPATIENT
Start: 2023-01-12 | End: 2023-01-12 | Stop reason: HOSPADM

## 2023-01-12 RX ORDER — SODIUM CHLORIDE 9 MG/ML
INJECTION, SOLUTION INTRAVENOUS CONTINUOUS
Status: DISCONTINUED | OUTPATIENT
Start: 2023-01-12 | End: 2023-01-12 | Stop reason: HOSPADM

## 2023-01-12 RX ORDER — FENTANYL CITRATE 50 UG/ML
25-50 INJECTION, SOLUTION INTRAMUSCULAR; INTRAVENOUS EVERY 5 MIN PRN
Status: DISCONTINUED | OUTPATIENT
Start: 2023-01-12 | End: 2023-01-12 | Stop reason: HOSPADM

## 2023-01-12 RX ADMIN — MIDAZOLAM 2 MG: 1 INJECTION INTRAMUSCULAR; INTRAVENOUS at 11:58

## 2023-01-12 RX ADMIN — SODIUM CHLORIDE: 9 INJECTION, SOLUTION INTRAVENOUS at 10:19

## 2023-01-12 RX ADMIN — LIDOCAINE HYDROCHLORIDE 10 ML: 10 INJECTION, SOLUTION INFILTRATION; PERINEURAL at 12:08

## 2023-01-12 RX ADMIN — FENTANYL CITRATE 50 MCG: 50 INJECTION INTRAMUSCULAR; INTRAVENOUS at 11:58

## 2023-01-12 ASSESSMENT — ACTIVITIES OF DAILY LIVING (ADL)
ADLS_ACUITY_SCORE: 35
ADLS_ACUITY_SCORE: 35

## 2023-01-12 NOTE — PROGRESS NOTES
[facility] Post Procedure Note    Patient Information  Name: Deandre Merchant  Age: 72 year old    Post Procedure  Time patient returned to [facility]: 1215  Concerns/abnormal assessment: none  If abnormal assessment, provider notified: N/A  Plan/Other: 2 hours of bedrest then planned discharge to home     Sulma Estrada RN

## 2023-01-12 NOTE — PRE-PROCEDURE
GENERAL PRE-PROCEDURE:   Procedure:  L2 bone biopsy    Verbal consent obtained?: Yes    Written consent obtained?: Yes    Risks and benefits: Risks, benefits and alternatives were discussed    Consent given by:  Patient  Patient states understanding of procedure being performed: Yes    Patient's understanding of procedure matches consent: Yes    Procedure consent matches procedure scheduled: Yes    Expected level of sedation:  Moderate  Appropriately NPO:  Yes  ASA Class:  2  Mallampati  :  Grade 2- soft palate, base of uvula, tonsillar pillars, and portion of posterior pharyngeal wall visible  Lungs:  Lungs clear with good breath sounds bilaterally  Heart:  Normal heart sounds and rate  History & Physical reviewed:  History and physical reviewed and no updates needed  Statement of review:  I have reviewed the lab findings, diagnostic data, medications, and the plan for sedation

## 2023-01-12 NOTE — DISCHARGE INSTRUCTIONS
Lumbar Bone Biopsy Discharge Instructions     After you go home:    You may resume your normal diet  Have an adult stay with you for 6 hours if you received sedation       For 24 hours - due to the sedation you received:  Relax and take it easy  Do NOT make any important or legal decisions  Do NOT drive or operate machines at home or at work  Do NOT drink alcohol    Care of Puncture Site:    For the first 48 hrs, check your puncture site every couple hours while you are awake   You may remove/change the bandaid tomorrow  You may shower tomorrow  No tub baths, whirlpools or swimming until your puncture site has fully healed    Activity     You may go back to normal activity in 24 hours   Wait 48 hours before lifting, straining, exercise or other strenuous activity    Medicines:    You may resume all medications  Resume your Warfarin/Coumadin at your regular dose today. Follow up with your provider to have your INR rechecked  Resume your Platelet Inhibitors and Aspirin tomorrow at your regular dose  For minor pain, you may take Acetaminophen (Tylenol) or Ibuprofen (Advil)            Call the provider who ordered this test if:    Increased pain or a large or growing hard lump around the site  Blood or fluid is draining from the site  The site is red, swollen, hot or tender  Chills or a fever greater than 101 F (38 C)  Pain that is getting worse  Any questions or concerns    Call  911 or go to the Emergency Room if:    Severe pain or trouble breathing  Bleeding that you cannot control        If you have questions call:          [facility] @ [phone]      The provider who performed your procedure was __Dr. Alston_______________.

## 2023-01-12 NOTE — PROGRESS NOTES
Care Suites Admission Nursing Note    Patient Information  Name: Deandre Merchant  Age: 72 year old  Reason for admission: Bone Biopsy  Care Suites arrival time: 0945    Visitor Information  Name: Cristel  Informed of visitor restrictions: Yes  1 visitor allowed per patient   Visitor must screen negative for COVID symptoms   Visitor must wear a mask  Waiting rooms closed to visitors    Patient Admission/Assessment   Pre-procedure assessment complete: Yes  If abnormal assessment/labs, provider notified: N/A  NPO: Yes  Medications held per instructions/orders: Yes  Consent: obtained  If applicable, pregnancy test status: deferred  Patient oriented to room: Yes  Education/questions answered: Yes  Plan/other: getting prepped for their procedure    Discharge Planning  Discharge name/phone number: Cristel  [phone]  Overnight post sedation caregiver: Cristel  Discharge location: [place]    Sulma Estrada RN

## 2023-01-12 NOTE — PROGRESS NOTES
Care Suites Discharge Nursing Note    Patient Information  Name: Deandre Merchant  Age: 72 year old    Discharge Education:  Discharge instructions reviewed: Yes  Additional education/resources provided: none  Patient/patient representative verbalizes understanding: Yes  Patient discharging on new medications: No  Medication education completed: N/A    Discharge Plans:   Discharge location: home  Discharge ride contacted: Yes  Approximate discharge time: 1430    Discharge Criteria:  Discharge criteria met and vital signs stable: Yes    Patient Belongs:  Patient belongings returned to patient: Yes    Sulma Estrada RN

## 2023-01-12 NOTE — PROCEDURES
Neurointerventional Surgery:  Post-procedure note ~    Patient Name: Deandre Merchant  MRN: [id number]  Date of Procedure: January 12, 2023    Procedure: L2 bone biopsy  Radiologist: Jac Pinon MD    Fluoro Time: 2.2 minutes  Air Kerma: 178.37 mGy    Medications:   Versed 2 mg IV  Fentanyl 50 mcg IV    Sedation Time: 15 minutes    EBL: minimal  Complications: none    Patient reevaluated immediately prior to sedation and prior to procedure.    Preliminary Report:   (See dictation for full detail)  Technically successful L2 bone biopsy.  - 1 core sample obtained and sent for routine pathology    Assess/Plan:  Routine post sedation monitoring.  Bedrest x 2 hours post procedure.  Discharge when meets criteria.    Jac Pinon MD   Emergency pager: [phone]  Office: [phone]

## 2023-01-12 NOTE — PROGRESS NOTES
"  Neurointerventional Surgery:  Pre Sedation Assessment ~    We are asked to perform a bone biopsy for pathology on this 72 year old male.    Imaging: MRI lumbar spine 12/8/2022 ~  1. Slight increase in size of indeterminate enhancing lesion within  the L2 vertebral body compared to 10/26/2021. Malignancy cannot be  excluded.  2. Severe degenerative change with multiple stenoses.    Allergies   Allergen Reactions     Allopurinol Rash       Labs: Hgb 14.4, plt 184, INR 1.05    H&P from 1/2/2023 is reviewed.  There are no pertinent changes in exam.  Sedation history: Previous problems with sedation. No   History of sleep apnea YES ( uses CPAP )    Exam:   BP (!) 146/79 (BP Location: Right arm)   Pulse 76   Temp 97.7  F (36.5  C) (Oral)   Resp 18   Ht 1.854 m (6' 1\")   Wt 128.8 kg (284 lb)   SpO2 97%   BMI 37.47 kg/m    Neuro: Alert and oriented x 3. Speech is clear  Cardiac: S1 S2, no murmur  Lungs: clear  Mallampati:  II - Faucial pillars and soft palate may be seen, but uvula is masked by the base of the tongue  ASA: 2 - Mild systemic disease  NPO since midnight    Impression/plan: This is a 72 year old male with a history of lymphoma and MRI evidence for enhancement in the L2 vertebrae. He was referred here today for a L2 bone biopsy for pathology. Minh is medically stable and appropriately NPO for procedure as planned with IV sedation.    The procedure its risks, benefits, and alternatives were discussed with the patient and his family.  They express an understanding of the procedure and provide permission for us to proceed..     CALEB Ventura [facility]  Office: [phone]  "

## 2023-01-12 NOTE — IR NOTE
Interventional Radiology Intra-procedural Nursing Note    Patient Name: Deandre RAE Atkins  Medical Record Number: [id number]  Today's Date: January 12, 2023    Procedure: L2 Bone Biopsy  Start time: 1157  End time: 1205  Report provided to: Eladio LUCAS      Note: Patient entered Interventional Radiology Suite number 3 via cart. Patient awake, alert and orientated. Assisted onto procedural table in prone position. Prepped and draped.  Dr. Pinon in room. Time out and procedure started. Vital signs stable. Telemetry reading NSR.    Procedure well tolerated by patient without complications. Procedure end with debrief by Dr. Pinon.      Administered medication totals:  Lidocaine 1% 10 mL Intradermal  Versed 2 mg IVP  Fentanyl 50 mcg IVP    Last dose of sedation administered at 1158.    Bone biopsy collected in formalin cup sent to pathology.    Per MD 2 hours bed rest.

## 2023-01-17 LAB
PATH REPORT.COMMENTS IMP SPEC: NORMAL
PATH REPORT.FINAL DX SPEC: NORMAL
PATH REPORT.GROSS SPEC: NORMAL
PATH REPORT.MICROSCOPIC SPEC OTHER STN: NORMAL
PATH REPORT.RELEVANT HX SPEC: NORMAL
PHOTO IMAGE: NORMAL

## 2023-01-17 PROCEDURE — 88305 TISSUE EXAM BY PATHOLOGIST: CPT | Mod: 26 | Performed by: PATHOLOGY

## 2023-01-17 PROCEDURE — 88341 IMHCHEM/IMCYTCHM EA ADD ANTB: CPT | Mod: 26 | Performed by: PATHOLOGY

## 2023-01-17 PROCEDURE — 88342 IMHCHEM/IMCYTCHM 1ST ANTB: CPT | Mod: 26 | Performed by: PATHOLOGY

## 2023-03-06 ENCOUNTER — HOSPITAL ENCOUNTER (OUTPATIENT)
Dept: CT IMAGING | Facility: CLINIC | Age: 73
Discharge: HOME | End: 2023-03-06
Attending: INTERNAL MEDICINE | Admitting: INTERNAL MEDICINE
Payer: MEDICARE

## 2023-03-06 ENCOUNTER — LAB (OUTPATIENT)
Dept: LAB | Facility: CLINIC | Age: 73
End: 2023-03-06
Payer: MEDICARE

## 2023-03-06 DIAGNOSIS — D49.2 LUMBAR SPINE TUMOR: ICD-10-CM

## 2023-03-06 DIAGNOSIS — C85.98 NON-HODGKIN'S LYMPHOMA OF MULTIPLE SITES (H): ICD-10-CM

## 2023-03-06 DIAGNOSIS — J84.9 ILD (INTERSTITIAL LUNG DISEASE) (H): ICD-10-CM

## 2023-03-06 LAB
ALBUMIN SERPL BCG-MCNC: 4 G/DL (ref 3.5–5.2)
ALP SERPL-CCNC: 128 U/L (ref 40–129)
ALT SERPL W P-5'-P-CCNC: 29 U/L (ref 10–50)
ANION GAP SERPL CALCULATED.3IONS-SCNC: 13 MMOL/L (ref 7–15)
AST SERPL W P-5'-P-CCNC: 30 U/L (ref 10–50)
BILIRUB SERPL-MCNC: 0.7 MG/DL
BUN SERPL-MCNC: 12.6 MG/DL (ref 8–23)
CALCIUM SERPL-MCNC: 8.8 MG/DL (ref 8.8–10.2)
CHLORIDE SERPL-SCNC: 99 MMOL/L (ref 98–107)
CREAT SERPL-MCNC: 0.88 MG/DL (ref 0.67–1.17)
DEPRECATED HCO3 PLAS-SCNC: 25 MMOL/L (ref 22–29)
GFR SERPL CREATININE-BSD FRML MDRD: >90 ML/MIN/1.73M2
GLUCOSE SERPL-MCNC: 140 MG/DL (ref 70–99)
POTASSIUM SERPL-SCNC: 4.4 MMOL/L (ref 3.4–5.3)
PROT SERPL-MCNC: 6.2 G/DL (ref 6.4–8.3)
SODIUM SERPL-SCNC: 137 MMOL/L (ref 136–145)

## 2023-03-06 PROCEDURE — 36415 COLL VENOUS BLD VENIPUNCTURE: CPT

## 2023-03-06 PROCEDURE — 74177 CT ABD & PELVIS W/CONTRAST: CPT | Mod: MG

## 2023-03-06 PROCEDURE — 250N000009 HC RX 250: Performed by: INTERNAL MEDICINE

## 2023-03-06 PROCEDURE — 80053 COMPREHEN METABOLIC PANEL: CPT

## 2023-03-06 PROCEDURE — 250N000011 HC RX IP 250 OP 636: Performed by: INTERNAL MEDICINE

## 2023-03-06 RX ORDER — IOPAMIDOL 755 MG/ML
500 INJECTION, SOLUTION INTRAVASCULAR ONCE
Status: COMPLETED | OUTPATIENT
Start: 2023-03-06 | End: 2023-03-06

## 2023-03-06 RX ADMIN — SODIUM CHLORIDE 60 ML: 9 INJECTION, SOLUTION INTRAVENOUS at 09:17

## 2023-03-06 RX ADMIN — IOPAMIDOL 90 ML: 755 INJECTION, SOLUTION INTRAVENOUS at 09:18

## 2023-03-06 RX ADMIN — IOPAMIDOL 60 ML: 755 INJECTION, SOLUTION INTRAVENOUS at 09:10

## 2023-03-08 ENCOUNTER — ONCOLOGY VISIT (OUTPATIENT)
Dept: ONCOLOGY | Facility: CLINIC | Age: 73
End: 2023-03-08
Attending: INTERNAL MEDICINE
Payer: MEDICARE

## 2023-03-08 VITALS
HEIGHT: 73 IN | HEART RATE: 90 BPM | RESPIRATION RATE: 18 BRPM | WEIGHT: 287.7 LBS | DIASTOLIC BLOOD PRESSURE: 70 MMHG | SYSTOLIC BLOOD PRESSURE: 109 MMHG | BODY MASS INDEX: 38.13 KG/M2 | OXYGEN SATURATION: 95 %

## 2023-03-08 DIAGNOSIS — C85.98 NON-HODGKIN'S LYMPHOMA OF MULTIPLE SITES (H): Primary | ICD-10-CM

## 2023-03-08 PROCEDURE — 99214 OFFICE O/P EST MOD 30 MIN: CPT | Performed by: INTERNAL MEDICINE

## 2023-03-08 ASSESSMENT — PAIN SCALES - GENERAL: PAINLEVEL: NO PAIN (0)

## 2023-03-08 NOTE — LETTER
3/8/2023         RE: Deandre Merchant  [address]  [facility] [address]        Dear Colleague,    Thank you for referring your patient, Deandre Merchant, to the [facility]. Please see a copy of my visit note below.          HEMATOLOGY ONCOLOGY FOLLOW-UP VISIT NOTE    PATIENT NAME: Deandre Merchant MRN # [id number]  DATE OF VISIT: Mar 8, 2023 YOB: 1950        CANCER TYPE:Follicular Lymphoma  STAGE: IV( axillary, mediastinal, retroperitoneal, stomach, bone marrow)      ONCOLOGY HISTORY:    Patient was being followed by Dr. Krishnamurthy but now she has transferred his care to me.  I reviewed his previous records and have copied and updated from prior notes.  73 year old  male who in 2012 presented with mediastinal, retroperitoneal and mesenteric lymph nodes. Was clinically asymptomatic with normal blood counts. CT-guided lymph node biopsy on 6/21/2012 was consistent with follicular lymphoma, grade 1-2. He was observed at that point.    In 2013, presented to the hospital with ileus and abdominal distention. EGD showed gastric antrum and duodenal ulcers which were biopsied and showed follicular lymphoma. CT scans showed bulky  lymphadenopathy with increasing size. 04/13 Bone marrow biopsy was positive with evidence of follicular lymphoma.   He received bendamustine and Rituxan regimen April 2014- -September 2014.   In December 14 , he was started on maintenance Rituxan every 2 months for 2 years which he completed in October 2016.     In remission and on observation since then.    7/14/2020 - established care with me.    CT scan in July 2020 was stable apart from an indeterminate L2 vertebral body lesion.    MRI of the lumbar spine showed Nonspecific enhancing lesion in the right aspect of the L2 inferior endplate which remains indeterminate.    12/7/2020.  Repeat MRI of the lumbar spine is the same.    We proceeded with a PET scan on 1/8/2021 which shows mild FDG uptake in  the L2 sclerotic lesion.  It was measuring about 1.5 x 1.2 x 1.4 cm and was stable.  Of note previously the lymphoma was FDG avid.    SUBJECTIVE   He is doing well.  He denies any pain.  No night sweats or weight loss.  In December 2022, he had MRI of the lumbar spine which had shown some growth of the L2 vertebral body lesion which now measures 23 x 24 x 21 mm so he was evaluated by Dr. Alston from neurosurgery and I reviewed the notes.  He had core biopsy done on 1/12/2022 which was benign.   He was also seen by urologist Dr. Rick on 5/4/2022 and cystoscopy was unremarkable.  His urination is better now.      ECOG 0    ROS:  Rest of the comprehensive review of the system was negative.      I reviewed other history in epic as below.      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 1 each 3     blood glucose (NO BRAND SPECIFIED) test strip Use to test blood sugar once daily. 50 strip 4     cetirizine (ZYRTEC) 10 MG tablet Take 10 mg by mouth daily       chlorpheniramine (CHLOR-TRIMETON) 4 MG tablet Take 4 mg by mouth every 6 hours as needed for allergies or rhinitis       fluticasone (FLONASE) 50 MCG/ACT nasal spray Spray 2 sprays into both nostrils daily 16 g 11     metFORMIN (GLUCOPHAGE XR) 500 MG 24 hr tablet Take 3 tablets (1,500 mg) by mouth daily (with dinner) 180 tablet 3     metoprolol succinate ER (TOPROL XL) 50 MG 24 hr tablet TAKE 1 TABLET BY MOUTH ONCE  DAILY 90 tablet 1     order for DME Equipment ordered: RESMED Auto PAP Mask type: Full face  Settings: 5-15 cm h2o       simvastatin (ZOCOR) 20 MG tablet Take 1 tablet (20 mg) by mouth At Bedtime 90 tablet 4     triamcinolone (KENALOG) 0.1 % external ointment Apply sparingly to affected area twice daily as needed. 15 g 1        ALLERGIES     Allergies   Allergen Reactions     Allopurinol Rash     FAMILY HISTORY:  Family History   Problem Relation Age of Onset     Cancer Father         liver/kidney     C.A.D. Father      Pacemaker Brother      Arthritis Mother         RA     Dad had liver cancer at 74.  He has 2 children and they are healthy.    Social History     Socioeconomic History     Marital status:      Spouse name: Cristel     Number of children: 2     Years of education: Not on file     Highest education level: Not on file   Occupational History     Not on file   Tobacco Use     Smoking status: Never     Smokeless tobacco: Never   Vaping Use     Vaping Use: Never used   Substance and Sexual Activity     Alcohol use: Yes     Alcohol/week: 3.3 standard drinks     Comment: occasionally weekends     Drug use: No     Sexual activity: Yes   Other Topics Concern      Service Not Asked     Blood Transfusions Not Asked     Caffeine Concern No     Occupational Exposure Not Asked     Hobby Hazards Not Asked     Sleep Concern No     Stress Concern No     Weight Concern No     Special Diet Not Asked     Back Care Not Asked     Exercise Yes     Bike Helmet Not Asked     Seat Belt Yes     Self-Exams Not Asked     Parent/sibling w/ CABG, MI or angioplasty before 65F 55M? Not Asked   Social History Narrative     Not on file     Social Determinants of Health     Financial Resource Strain: Not on file   Food Insecurity: Not on file   Transportation Needs: Not on file   Physical Activity: Not on file   Stress: Not on file   Social Connections: Not on file   Intimate Partner Violence: Not on file   Housing  "Stability: Not on file     Denies smoking. 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     PHYSICAL EXAM     /70 (BP Location: Left arm)   Pulse 90   Resp 18   Ht 1.854 m (6' 0.99\")   Wt 130.5 kg (287 lb 11.2 oz)   SpO2 95%   BMI 37.97 kg/m    Wt Readings from Last 4 Encounters:   03/08/23 130.5 kg (287 lb 11.2 oz)   01/12/23 128.8 kg (284 lb)   01/02/23 130.1 kg (286 lb 14.4 oz)   12/08/22 131.5 kg (289 lb 14.4 oz)     CONSTITUTIONAL: No apparent distress  EYES: Both pupils are surgical.  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  There is no pallor or icterus.  ENT/MOUTH: Ears unremarkable. No oral lesions  CVS: s1s2 normal  RESPIRATORY: Chest is clear  GI: Abdomen is benign  NEURO: Alert and oriented ×3  INTEGUMENT: no concerning skin rashes   LYMPHATIC: no palpable lymphadenopathy  MUSCULOSKELETAL: Unremarkable. No bony tenderness.   EXTREMITIES: no pedal edema  PSYCH: Mentation, mood and affect are appropriate           LABORATORY AND IMAGING STUDIES       I reviewed     3/6/2023  CMP unremarkable.      7/8/2022.  CBC unremarkable.  LDH was 228.      3/6/2023.  CT chest abdomen and pelvis was stable.  FINDINGS:   LUNGS AND PLEURA: No pleural effusions. Subpleural reticular and  groundglass opacities about the periphery of both lower lungs,  unchanged. No evidence for significant associated bronchiectasis or  honeycombing. A 0.7 cm nodule along the right minor fissure is  unchanged (series 4 image 154). A 0.3 cm right upper lobe pulmonary  nodule (series 4 image 101) is also unchanged.     MEDIASTINUM/AXILLAE: A mildly enlarged subcarinal lymph node in the  mediastinum measures 1.3 cm, unchanged. No other enlarged lymph nodes  in the chest. No pericardial effusion. Aneurysmal dilatation of the  ascending thoracic aorta is unchanged, measuring 4.7 cm.     CORONARY ARTERY CALCIFICATION: Moderate.     HEPATOBILIARY: Cholelithiasis and/or sludge within the gallbladder.  Mild hepatic steatosis. No " hepatic masses.     PANCREAS: Normal.     SPLEEN: Mild splenomegaly is unchanged. The spleen measures 14.5 cm in  length.     ADRENAL GLANDS: Normal.     KIDNEYS/BLADDER: Small exophytic cyst in the upper pole of the left  kidney is unchanged, and would require no specific follow-up. Mild  prominence of both distal ureters, greater on the left, is unchanged,  and is likely a chronic finding. No significant dilatation of the  intrarenal collecting systems.     BOWEL: No bowel obstruction. No convincing evidence for colitis or  diverticulitis. Unremarkable appendix.     PELVIC ORGANS: Mild prostatic enlargement and central calcification.     LYMPH NODES: No enlarged lymph nodes in the abdomen or pelvis.  Scattered mildly prominent retroperitoneal, mesenteric, and zarina  hepatis lymph nodes are unchanged. Mild hazy increased density in the  mesenteric fat is unchanged, and is likely related to treated  lymphoma.     VASCULATURE: Mild atherosclerotic aortoiliac calcification.     ADDITIONAL FINDINGS: Several fat-containing ventral hernias in the  periumbilical and supraumbilical region are unchanged.     MUSCULOSKELETAL: Degenerative changes in the thoracolumbar spine. A 2  cm sclerotic lesion in the L2 vertebral body is unchanged.                                                                      IMPRESSION:   1.  A mildly enlarged subcarinal lymph node and scattered mildly  prominent lymph nodes in the upper abdomen are unchanged.  2.  Mild splenomegaly is unchanged.  3.  Reticular and groundglass opacities about the periphery of both  lower lungs are unchanged, and are most likely fibrotic. Differential  considerations include an NSIP and early UIP. No associated  honeycombing.  4.  Aneurysmal dilatation of the ascending thoracic aorta is unchanged  at 4.7 cm.  5.  A 2 cm sclerotic lesion in the L2 vertebral body is unchanged.  6.  Cholelithiasis.  7.  Mild prostatic enlargement.       MRI lumbar spine  12/8/2022  IMPRESSION:    1. Slight increase in size of indeterminate enhancing lesion within  the L2 vertebral body compared to 10/26/2021. Malignancy cannot be  excluded.  2. Severe degenerative change with multiple stenoses.        Bone, L2 vertebral body, core needle biopsy on 1/12/2023:  --Fragments of lamellar hematopoietic containing bone trabeculae, no diagnostic morphologic evidence of lymphoma.        ASSESSMENT AND PLAN     Stage IV follicular lymphoma- status post treatment with bendamustine/Rituxan (04-09/2014) followed by maintenance rituximab for two years- completed in October 2016.    He is doing well.  Currently no evidence of lymphoma.  Plan to repeat labs and CT scan in 1 year.      L2 sclerotic lesion.  He was seen by Dr. Alston from neurosurgery and I reviewed the notes.  Most likely this is a benign lesion and recommendation was to follow it up serially with repeat MRI in 6 months.  MRI in December 2022 showed slight growth in the lesion so he had biopsy of this on 1/12/2022 which was benign.  He will continue to follow with Dr. Alston        Bilateral hydroureter and bladder wall thickening.  He had cystourethroscopy in May 2022 which was unremarkable.  I reviewed notes from urology.  CT scan is stable.  His urinary symptoms are better.  Continue to observe.      Healthy lifestyle.  He is morbidly obese.  Advised him to gradually lose weight with regular exercise and healthy eating.    We did not address the following today.  Interstitial lung disease.  Repeat CT scan continues to show slight worsening of the interstitial lung disease.  I advised him to follow-up with pulmonology.  I gave him a referral for pulmonology.    I will see him back in 1 year with labs and CT scan prior.    All questions answered.  He is agreeable and comfortable with the plan.    Eduardo Crespo MD          Again, thank you for allowing me to participate in the care of your patient.        Sincerely,        Eduardo CASON  MD [place]

## 2023-03-08 NOTE — PROGRESS NOTES
HEMATOLOGY ONCOLOGY FOLLOW-UP VISIT NOTE    PATIENT NAME: Deandre Merchant MRN # [id number]  DATE OF VISIT: Mar 8, 2023 YOB: 1950        CANCER TYPE:Follicular Lymphoma  STAGE: IV( axillary, mediastinal, retroperitoneal, stomach, bone marrow)      ONCOLOGY HISTORY:    Patient was being followed by Dr. Krishnamurthy but now she has transferred his care to me.  I reviewed his previous records and have copied and updated from prior notes.  73 year old  male who in 2012 presented with mediastinal, retroperitoneal and mesenteric lymph nodes. Was clinically asymptomatic with normal blood counts. CT-guided lymph node biopsy on 6/21/2012 was consistent with follicular lymphoma, grade 1-2. He was observed at that point.    In 2013, presented to the hospital with ileus and abdominal distention. EGD showed gastric antrum and duodenal ulcers which were biopsied and showed follicular lymphoma. CT scans showed bulky  lymphadenopathy with increasing size. 04/13 Bone marrow biopsy was positive with evidence of follicular lymphoma.   He received bendamustine and Rituxan regimen April 2014- -September 2014.   In December 14 , he was started on maintenance Rituxan every 2 months for 2 years which he completed in October 2016.     In remission and on observation since then.    7/14/2020 - established care with me.    CT scan in July 2020 was stable apart from an indeterminate L2 vertebral body lesion.    MRI of the lumbar spine showed Nonspecific enhancing lesion in the right aspect of the L2 inferior endplate which remains indeterminate.    12/7/2020.  Repeat MRI of the lumbar spine is the same.    We proceeded with a PET scan on 1/8/2021 which shows mild FDG uptake in the L2 sclerotic lesion.  It was measuring about 1.5 x 1.2 x 1.4 cm and was stable.  Of note previously the lymphoma was FDG avid.    SUBJECTIVE   He is doing well.  He denies any pain.  No night sweats or weight loss.  In December 2022, he had MRI of the  lumbar spine which had shown some growth of the L2 vertebral body lesion which now measures 23 x 24 x 21 mm so he was evaluated by Dr. Alston from neurosurgery and I reviewed the notes.  He had core biopsy done on 1/12/2022 which was benign.   He was also seen by urologist Dr. Rick on 5/4/2022 and cystoscopy was unremarkable.  His urination is better now.      ECOG 0    ROS:  Rest of the comprehensive review of the system was negative.      I reviewed other history in epic as below.      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 1 each 3     blood glucose (NO BRAND SPECIFIED) test strip Use to test blood sugar once daily. 50 strip 4     cetirizine (ZYRTEC) 10 MG tablet Take 10 mg by mouth daily       chlorpheniramine (CHLOR-TRIMETON) 4 MG tablet Take 4 mg by mouth every 6 hours as needed for allergies or rhinitis       fluticasone (FLONASE) 50 MCG/ACT nasal spray Spray 2 sprays into both nostrils daily 16 g 11     metFORMIN (GLUCOPHAGE XR) 500 MG 24 hr tablet Take 3 tablets (1,500 mg) by mouth daily (with dinner) 180 tablet 3     metoprolol succinate ER (TOPROL XL) 50 MG 24 hr tablet TAKE 1 TABLET BY MOUTH ONCE DAILY 90 tablet 1     order for DME Equipment ordered: RESMED Auto PAP Mask type: Full face  Settings: 5-15 cm h2o       simvastatin (ZOCOR) 20 MG tablet Take 1 tablet (20 mg) by mouth At Bedtime 90 tablet 4     triamcinolone (KENALOG) 0.1 % external  "ointment Apply sparingly to affected area twice daily as needed. 15 g 1        ALLERGIES     Allergies   Allergen Reactions     Allopurinol Rash     FAMILY HISTORY:  Family History   Problem Relation Age of Onset     Cancer Father         liver/kidney     C.A.D. Father      Pacemaker Brother      Arthritis Mother         RA     Dad had liver cancer at 74.  He has 2 children and they are healthy.    Social History     Socioeconomic History     Marital status:      Spouse name: Cristel     Number of children: 2     Years of education: Not on file     Highest education level: Not on file   Occupational History     Not on file   Tobacco Use     Smoking status: Never     Smokeless tobacco: Never   Vaping Use     Vaping Use: Never used   Substance and Sexual Activity     Alcohol use: Yes     Alcohol/week: 3.3 standard drinks     Comment: occasionally weekends     Drug use: No     Sexual activity: Yes   Other Topics Concern      Service Not Asked     Blood Transfusions Not Asked     Caffeine Concern No     Occupational Exposure Not Asked     Hobby Hazards Not Asked     Sleep Concern No     Stress Concern No     Weight Concern No     Special Diet Not Asked     Back Care Not Asked     Exercise Yes     Bike Helmet Not Asked     Seat Belt Yes     Self-Exams Not Asked     Parent/sibling w/ CABG, MI or angioplasty before 65F 55M? Not Asked   Social History Narrative     Not on file     Social Determinants of Health     Financial Resource Strain: Not on file   Food Insecurity: Not on file   Transportation Needs: Not on file   Physical Activity: Not on file   Stress: Not on file   Social Connections: Not on file   Intimate Partner Violence: Not on file   Housing Stability: Not on file     Denies smoking. 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     PHYSICAL EXAM     /70 (BP Location: Left arm)   Pulse 90   Resp 18   Ht 1.854 m (6' 0.99\")   Wt 130.5 kg (287 lb 11.2 oz)   SpO2 95%   BMI " 37.97 kg/m    Wt Readings from Last 4 Encounters:   03/08/23 130.5 kg (287 lb 11.2 oz)   01/12/23 128.8 kg (284 lb)   01/02/23 130.1 kg (286 lb 14.4 oz)   12/08/22 131.5 kg (289 lb 14.4 oz)     CONSTITUTIONAL: No apparent distress  EYES: Both pupils are surgical.  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  There is no pallor or icterus.  ENT/MOUTH: Ears unremarkable. No oral lesions  CVS: s1s2 normal  RESPIRATORY: Chest is clear  GI: Abdomen is benign  NEURO: Alert and oriented ×3  INTEGUMENT: no concerning skin rashes   LYMPHATIC: no palpable lymphadenopathy  MUSCULOSKELETAL: Unremarkable. No bony tenderness.   EXTREMITIES: no pedal edema  PSYCH: Mentation, mood and affect are appropriate           LABORATORY AND IMAGING STUDIES       I reviewed     3/6/2023  CMP unremarkable.      7/8/2022.  CBC unremarkable.  LDH was 228.      3/6/2023.  CT chest abdomen and pelvis was stable.  FINDINGS:   LUNGS AND PLEURA: No pleural effusions. Subpleural reticular and  groundglass opacities about the periphery of both lower lungs,  unchanged. No evidence for significant associated bronchiectasis or  honeycombing. A 0.7 cm nodule along the right minor fissure is  unchanged (series 4 image 154). A 0.3 cm right upper lobe pulmonary  nodule (series 4 image 101) is also unchanged.     MEDIASTINUM/AXILLAE: A mildly enlarged subcarinal lymph node in the  mediastinum measures 1.3 cm, unchanged. No other enlarged lymph nodes  in the chest. No pericardial effusion. Aneurysmal dilatation of the  ascending thoracic aorta is unchanged, measuring 4.7 cm.     CORONARY ARTERY CALCIFICATION: Moderate.     HEPATOBILIARY: Cholelithiasis and/or sludge within the gallbladder.  Mild hepatic steatosis. No hepatic masses.     PANCREAS: Normal.     SPLEEN: Mild splenomegaly is unchanged. The spleen measures 14.5 cm in  length.     ADRENAL GLANDS: Normal.     KIDNEYS/BLADDER: Small exophytic cyst in the upper pole of the left  kidney is unchanged, and would  require no specific follow-up. Mild  prominence of both distal ureters, greater on the left, is unchanged,  and is likely a chronic finding. No significant dilatation of the  intrarenal collecting systems.     BOWEL: No bowel obstruction. No convincing evidence for colitis or  diverticulitis. Unremarkable appendix.     PELVIC ORGANS: Mild prostatic enlargement and central calcification.     LYMPH NODES: No enlarged lymph nodes in the abdomen or pelvis.  Scattered mildly prominent retroperitoneal, mesenteric, and zarina  hepatis lymph nodes are unchanged. Mild hazy increased density in the  mesenteric fat is unchanged, and is likely related to treated  lymphoma.     VASCULATURE: Mild atherosclerotic aortoiliac calcification.     ADDITIONAL FINDINGS: Several fat-containing ventral hernias in the  periumbilical and supraumbilical region are unchanged.     MUSCULOSKELETAL: Degenerative changes in the thoracolumbar spine. A 2  cm sclerotic lesion in the L2 vertebral body is unchanged.                                                                      IMPRESSION:   1.  A mildly enlarged subcarinal lymph node and scattered mildly  prominent lymph nodes in the upper abdomen are unchanged.  2.  Mild splenomegaly is unchanged.  3.  Reticular and groundglass opacities about the periphery of both  lower lungs are unchanged, and are most likely fibrotic. Differential  considerations include an NSIP and early UIP. No associated  honeycombing.  4.  Aneurysmal dilatation of the ascending thoracic aorta is unchanged  at 4.7 cm.  5.  A 2 cm sclerotic lesion in the L2 vertebral body is unchanged.  6.  Cholelithiasis.  7.  Mild prostatic enlargement.       MRI lumbar spine 12/8/2022  IMPRESSION:    1. Slight increase in size of indeterminate enhancing lesion within  the L2 vertebral body compared to 10/26/2021. Malignancy cannot be  excluded.  2. Severe degenerative change with multiple stenoses.        Bone, L2 vertebral body, core  needle biopsy on 1/12/2023:  --Fragments of lamellar hematopoietic containing bone trabeculae, no diagnostic morphologic evidence of lymphoma.        ASSESSMENT AND PLAN     Stage IV follicular lymphoma- status post treatment with bendamustine/Rituxan (04-09/2014) followed by maintenance rituximab for two years- completed in October 2016.    He is doing well.  Currently no evidence of lymphoma.  Plan to repeat labs and CT scan in 1 year.      L2 sclerotic lesion.  He was seen by Dr. Alston from neurosurgery and I reviewed the notes.  Most likely this is a benign lesion and recommendation was to follow it up serially with repeat MRI in 6 months.  MRI in December 2022 showed slight growth in the lesion so he had biopsy of this on 1/12/2022 which was benign.  He will continue to follow with Dr. Alston        Bilateral hydroureter and bladder wall thickening.  He had cystourethroscopy in May 2022 which was unremarkable.  I reviewed notes from urology.  CT scan is stable.  His urinary symptoms are better.  Continue to observe.      Healthy lifestyle.  He is morbidly obese.  Advised him to gradually lose weight with regular exercise and healthy eating.    We did not address the following today.  Interstitial lung disease.  Repeat CT scan continues to show slight worsening of the interstitial lung disease.  I advised him to follow-up with pulmonology.  I gave him a referral for pulmonology.    I will see him back in 1 year with labs and CT scan prior.    All questions answered.  He is agreeable and comfortable with the plan.    Eduardo Crespo MD

## 2023-03-08 NOTE — NURSING NOTE
"Oncology Rooming Note    March 8, 2023 3:29 PM   Deandre Merchant is a 73 year old male who presents for:    Chief Complaint   Patient presents with     Oncology Clinic Visit     1 year follow up     Initial Vitals: /70 (BP Location: Left arm)   Pulse 90   Resp 18   Ht 1.854 m (6' 0.99\")   Wt 130.5 kg (287 lb 11.2 oz)   SpO2 95%   BMI 37.97 kg/m   Estimated body mass index is 37.97 kg/m  as calculated from the following:    Height as of this encounter: 1.854 m (6' 0.99\").    Weight as of this encounter: 130.5 kg (287 lb 11.2 oz). Body surface area is 2.59 meters squared.  No Pain (0) Comment: Data Unavailable   No LMP for male patient.  Allergies reviewed: Yes  Medications reviewed: Yes    Medications: Medication refills not needed today.  Pharmacy name entered into [facility]:    [facility] [place] - [address] [place] [address]  [id number] - [place] - [address] [place] [address]    Clinical concerns: results       Scarlet Chaudhry LPN            "

## 2023-03-20 DIAGNOSIS — I77.810 ASCENDING AORTA DILATATION (H): ICD-10-CM

## 2023-03-20 DIAGNOSIS — I10 ESSENTIAL HYPERTENSION: ICD-10-CM

## 2023-03-21 RX ORDER — METOPROLOL SUCCINATE 50 MG/1
TABLET, EXTENDED RELEASE ORAL
Qty: 90 TABLET | Refills: 1 | Status: SHIPPED | OUTPATIENT
Start: 2023-03-21 | End: 2023-08-02

## 2023-03-21 NOTE — TELEPHONE ENCOUNTER
Prescription approved per [facility] Refill Protocol.  Alfreda Sellers RN on 3/21/2023 at 3:25 PM

## 2023-03-25 ENCOUNTER — HEALTH MAINTENANCE LETTER (OUTPATIENT)
Age: 73
End: 2023-03-25

## 2023-05-18 DIAGNOSIS — E78.5 HYPERLIPIDEMIA WITH TARGET LDL LESS THAN 130: ICD-10-CM

## 2023-05-18 DIAGNOSIS — E11.9 TYPE 2 DIABETES MELLITUS WITHOUT COMPLICATION, WITHOUT LONG-TERM CURRENT USE OF INSULIN (H): ICD-10-CM

## 2023-05-18 NOTE — TELEPHONE ENCOUNTER
Pending Prescriptions:                       Disp   Refills    atorvastatin (LIPITOR) 20 MG tablet [Pharm*90 tab*3        Sig: Take 1 tablet (20 mg) by mouth daily      Routing refill request to provider for review/approval because:  Drug not active on patient's medication list    Alfreda Sellers RN on 5/18/2023 at 4:31 PM

## 2023-05-19 RX ORDER — ATORVASTATIN CALCIUM 20 MG/1
20 TABLET, FILM COATED ORAL DAILY
Qty: 90 TABLET | Refills: 3 | Status: SHIPPED | OUTPATIENT
Start: 2023-05-19 | End: 2023-08-02

## 2023-06-06 ENCOUNTER — TELEPHONE (OUTPATIENT)
Dept: EDUCATION SERVICES | Facility: CLINIC | Age: 73
End: 2023-06-06

## 2023-06-06 ENCOUNTER — ALLIED HEALTH/NURSE VISIT (OUTPATIENT)
Dept: EDUCATION SERVICES | Facility: CLINIC | Age: 73
End: 2023-06-06
Payer: MEDICARE

## 2023-06-06 VITALS — BODY MASS INDEX: 37.61 KG/M2 | WEIGHT: 285 LBS

## 2023-06-06 DIAGNOSIS — E11.9 TYPE 2 DIABETES MELLITUS WITHOUT COMPLICATION, WITHOUT LONG-TERM CURRENT USE OF INSULIN (H): ICD-10-CM

## 2023-06-06 DIAGNOSIS — E11.9 TYPE 2 DIABETES MELLITUS WITHOUT COMPLICATION, WITHOUT LONG-TERM CURRENT USE OF INSULIN (H): Primary | ICD-10-CM

## 2023-06-06 PROCEDURE — G0108 DIAB MANAGE TRN  PER INDIV: HCPCS

## 2023-06-06 RX ORDER — METFORMIN HCL 500 MG
1500 TABLET, EXTENDED RELEASE 24 HR ORAL
Qty: 180 TABLET | Refills: 3 | Status: SHIPPED | OUTPATIENT
Start: 2023-06-06 | End: 2023-08-02

## 2023-06-06 NOTE — PROGRESS NOTES
Diabetes Self-Management Education & Support    Presents for: Follow-up    Type of Visit: In Person    How would patient like to obtain AVS? Printed copy    ASSESSMENT:    Minh comes to clinic today for review of Diabetes.  He continues on Metformin.  Says he has not tested BG for about a month because his meter broke.    Was attending a 16 week DM course through [facility].     Says wt lately has been low 280 lbs, weight today     Wt Readings from Last 5 Encounters:   06/06/23 129.3 kg (285 lb)   03/08/23 130.5 kg (287 lb 11.2 oz)   01/12/23 128.8 kg (284 lb)   01/02/23 130.1 kg (286 lb 14.4 oz)   12/08/22 131.5 kg (289 lb 14.4 oz)     Provided new meter in clinic today and ordered refills.  Tested BG in clinic, result: 140 mg/dL.    Says before meter broke, usual range was: 130-170 mg/dL.  Reviewed BG goals.  Minh has questions about GLP-1 medications.  Reviewed the classes of DM meds and demonstrated GLP-1 use.  He will think about this more and wait to see what A1C lab is.    Has been active cutting trees lately.  Uses portion control with meals.  Will ask PCP to update Metformin and order A1C.  Minh asks about referral to podiatry, recommended he discuss with PCP at next visit.     Patient's most recent   Lab Results   Component Value Date    A1C 7.6 12/08/2022    A1C 5.4 09/17/2015    is meeting goal of <8.0- due for repeat    Diabetes knowledge and skills assessment:   Patient is knowledgeable in diabetes management concepts related to: Healthy Eating, Being Active, Monitoring, Taking Medication, Problem Solving, Reducing Risks and Healthy Coping    Continue education with the following diabetes management concepts: Healthy Eating and Taking Medication    Based on learning assessment above, most appropriate setting for further diabetes education would be: Individual setting.    INTERVENTIONS:    Education provided today on:  AADE Self-Care Behaviors:  GLP-1 administration technique taught today.  Patient verbalized understanding and was able to perform an accurate return demonstration of administration technique. Side effects were discussed, if patient has any abdominal pain, with or without nausea and/or vomiting, stop medication, call provider, clinic or go to the emergency room.  Care Plan: Diabetes   Updates made by Ava Jackson since 6/6/2023 12:00 AM      Problem: Diabetes Self-Management Education Needed to Optimize Self-Care Behaviors       Goal: Healthy Eating - follow a healthy eating pattern for diabetes    Start Date: 9/27/2022   This Visit's Progress: On track   Recent Progress: 40%   Note:    My Goal: I will aim for 60 grams of carbohydrates with meals    What I need to meet my goal: carbohdyrate reference, portion control and my plate    I plan to meet my goal by this date: 3 months       Goal: Taking Medication - patient is consistently taking medications as directed    Start Date: 6/6/2023   This Visit's Progress: On track   Note:    Answered questions about GLP-1 medications     Task: Provide education on insulin and injectable diabetes medications, including administration, storage, site selection and rotation for injection sites Completed 6/6/2023   Responsible User: Ava Jackson      Goal: Problem Solving - know how to prevent and manage short-term diabetes complications       Task: Provide education on high blood glucose - causes, signs/symptoms, prevention and treatment Completed 6/6/2023   Responsible User: Ava Jackson      Task: Provide education on when to call a health care provider Completed 6/6/2023   Responsible User: Ava Jackson          Opportunities for ongoing education and support in diabetes-self management were discussed. Pt verbalized understanding of concepts discussed and recommendations provided today.       Education Materials Provided:  No new materials provided today          PLAN    1) Check blood sugar 1-2 times  "per day   Fasting/morning goal:  mg/dL  2 Hours after the start of a meal: Less than 180 mg/dL    2) 1 serving of carbohydrate = 15 grams  Aim for 4-5 servings or 60-75 grams per meal and 15-20 grams of carbs per snack + a serving of protein    3) Continue to work on physical activity    Topics to cover at upcoming visits: Monitoring and Taking Medication  Follow-up: As needed    See Goals Section for co-developed, patient-stated behavior change goals.  AVS provided to patient today.          SUBJECTIVE / OBJECTIVE:  Presents for: Follow-up  Accompanied by: Self  Diabetes education in the past 24mo: Yes  Focus of Visit: Monitoring, Taking Medication, Healthy Eating, Assistance w/ making life changes  Diabetes type: Type 1, Type 2  Disease course: Stable  How confident are you filling out medical forms by yourself:: Quite a bit  Cultural Influences/Ethnic Background:  Not  or       Diabetes Symptoms & Complications:  Fatigue: Sometimes  Neuropathy: No  Polydipsia: No  Symptom course: Stable  Weight trend: Stable  Autonomic neuropathy: No  CVA: No  Heart disease: No    Patient Problem List and Family Medical History reviewed for relevant medical history, current medical status, and diabetes risk factors.    Vitals:  Wt 129.3 kg (285 lb)   BMI 37.61 kg/m    Estimated body mass index is 37.61 kg/m  as calculated from the following:    Height as of 3/8/23: 1.854 m (6' 0.99\").    Weight as of this encounter: 129.3 kg (285 lb).   Last 3 BP:   BP Readings from Last 3 Encounters:   03/08/23 109/70   01/12/23 125/67   01/02/23 108/72       History   Smoking Status     Never   Smokeless Tobacco     Never       Labs:  Lab Results   Component Value Date    A1C 7.6 12/08/2022    A1C 5.4 09/17/2015     Lab Results   Component Value Date     03/06/2023     10/03/2022     07/08/2022     01/02/2021     Lab Results   Component Value Date    LDL 15 07/08/2022    LDL 29 07/07/2020     HDL " Cholesterol   Date Value Ref Range Status   07/07/2020 28 (L) >39 mg/dL Final     Direct Measure HDL   Date Value Ref Range Status   07/08/2022 29 (L) >=40 mg/dL Final   ]  GFR Estimate   Date Value Ref Range Status   03/06/2023 >90 >60 mL/min/1.73m2 Final     Comment:     eGFR calculated using 2021 CKD-EPI equation.   01/02/2021 80 >60 mL/min/[1.73_m2] Final     Comment:     Non  GFR Calc  Starting 12/18/2018, serum creatinine based estimated GFR (eGFR) will be   calculated using the Chronic Kidney Disease Epidemiology Collaboration   (CKD-EPI) equation.       GFR, ESTIMATED POCT   Date Value Ref Range Status   03/02/2022 >60 >60 mL/min/1.73m2 Final     GFR Estimate If Black   Date Value Ref Range Status   01/02/2021 >90 >60 mL/min/[1.73_m2] Final     Comment:      GFR Calc  Starting 12/18/2018, serum creatinine based estimated GFR (eGFR) will be   calculated using the Chronic Kidney Disease Epidemiology Collaboration   (CKD-EPI) equation.       Lab Results   Component Value Date    CR 0.88 03/06/2023    CR 0.96 01/02/2021     No results found for: MICROALBUMIN    Healthy Eating:  Healthy Eating Assessed Today: Yes  Meal planning/habits: None, Smaller portions  Meals include: Dinner, Lunch, Breakfast  Breakfast: oatmeal, light on the brown sugar  Lunch: DQ meal- chicken strip basket with fries and snickers blizzard (treat)  Dinner: Chili soup  Has patient met with a dietitian in the past?: Yes    Being Active:  Being Active Assessed Today: Yes  Exercise:: Yes  How intense was your typical exercise? : Light (like stretching or slow walking)  Barrier to exercise: None    Monitoring:  Blood Glucose Meter: Accu-chek  Times checking blood sugar at home (number): 1  Times checking blood sugar at home (per): Day  Blood glucose trend: No change        Taking Medications:  Diabetes Medication(s)     Biguanides       metFORMIN (GLUCOPHAGE XR) 500 MG 24 hr tablet    Take 3 tablets (1,500 mg) by  mouth daily (with dinner)          Current Treatments: Oral Medication (taken by mouth)  Problems taking diabetes medications regularly?: No  Diabetes medication side effects?: No    Problem Solving:  Problem Solving Assessed Today: Yes  Is the patient at risk for hypoglycemia?: No      Reducing Risks:  Reducing Risks Assessed Today: Yes  CAD Risks: Diabetes Mellitus, Hypertension, Obesity  Has dilated eye exam at least once a year?: Yes  Sees dentist every 6 months?: Yes  Feet checked by healthcare provider in the last year?: No    Healthy Coping:  Emotional response to diabetes: Ready to learn  Informal Support system:: Family, Friends, Spouse  Stage of change: MAINTENANCE (Working to maintain change, with risk of relapse)  Patient Activation Measure Survey Score:      9/17/2015     1:37 PM   JENN Score (Last Two)   JENN Raw Score 42   Activation Score 66   JENN Level 3             Mary Jackson RDN, LD, [facility]  Outpatient Diabetes Education  Adult Diabetes Education Triage [phone]    Time Spent: 45 minutes  Encounter Type: Individual        Any diabetes medication dose changes were made via the Certified Diabetes Care & Education Protocol in collaboration with the patient's referring provider. A copy of this encounter was shared with the provider.

## 2023-06-06 NOTE — LETTER
6/6/2023         RE: Deandre Merchant  [address]  [facility] [address]        Dear Colleague,    Thank you for referring your patient, Deandre Merchant, to the [facility] DIABETES EDUCATION [place]. Please see a copy of my visit note below.    Diabetes Self-Management Education & Support    Presents for: Follow-up    Type of Visit: In Person    How would patient like to obtain AVS? Printed copy    ASSESSMENT:    Minh comes to clinic today for review of Diabetes.  He continues on Metformin.  Says he has not tested BG for about a month because his meter broke.    Was attending a 16 week DM course through [facility].     Says wt lately has been low 280 lbs, weight today     Wt Readings from Last 5 Encounters:   06/06/23 129.3 kg (285 lb)   03/08/23 130.5 kg (287 lb 11.2 oz)   01/12/23 128.8 kg (284 lb)   01/02/23 130.1 kg (286 lb 14.4 oz)   12/08/22 131.5 kg (289 lb 14.4 oz)     Provided new meter in clinic today and ordered refills.  Tested BG in clinic, result: 140 mg/dL.    Says before meter broke, usual range was: 130-170 mg/dL.  Reviewed BG goals.  Minh has questions about GLP-1 medications.  Reviewed the classes of DM meds and demonstrated GLP-1 use.  He will think about this more and wait to see what A1C lab is.    Has been active cutting trees lately.  Uses portion control with meals.  Will ask PCP to update Metformin and order A1C.  Minh asks about referral to podiatry, recommended he discuss with PCP at next visit.     Patient's most recent   Lab Results   Component Value Date    A1C 7.6 12/08/2022    A1C 5.4 09/17/2015    is meeting goal of <8.0- due for repeat    Diabetes knowledge and skills assessment:   Patient is knowledgeable in diabetes management concepts related to: Healthy Eating, Being Active, Monitoring, Taking Medication, Problem Solving, Reducing Risks and Healthy Coping    Continue education with the following diabetes management concepts: Healthy Eating and Taking  Medication    Based on learning assessment above, most appropriate setting for further diabetes education would be: Individual setting.    INTERVENTIONS:    Education provided today on:  AADE Self-Care Behaviors:  GLP-1 administration technique taught today. Patient verbalized understanding and was able to perform an accurate return demonstration of administration technique. Side effects were discussed, if patient has any abdominal pain, with or without nausea and/or vomiting, stop medication, call provider, clinic or go to the emergency room.  Care Plan: Diabetes   Updates made by Ava Jackson since 6/6/2023 12:00 AM      Problem: Diabetes Self-Management Education Needed to Optimize Self-Care Behaviors       Goal: Healthy Eating - follow a healthy eating pattern for diabetes    Start Date: 9/27/2022   This Visit's Progress: On track   Recent Progress: 40%   Note:    My Goal: I will aim for 60 grams of carbohydrates with meals    What I need to meet my goal: carbohdyrate reference, portion control and my plate    I plan to meet my goal by this date: 3 months       Goal: Taking Medication - patient is consistently taking medications as directed    Start Date: 6/6/2023   This Visit's Progress: On track   Note:    Answered questions about GLP-1 medications     Task: Provide education on insulin and injectable diabetes medications, including administration, storage, site selection and rotation for injection sites Completed 6/6/2023   Responsible User: Ava Jackson      Goal: Problem Solving - know how to prevent and manage short-term diabetes complications       Task: Provide education on high blood glucose - causes, signs/symptoms, prevention and treatment Completed 6/6/2023   Responsible User: Ava Jackson      Task: Provide education on when to call a health care provider Completed 6/6/2023   Responsible User: Ava Jackson          Opportunities for ongoing education  "and support in diabetes-self management were discussed. Pt verbalized understanding of concepts discussed and recommendations provided today.       Education Materials Provided:  No new materials provided today          PLAN    1) Check blood sugar 1-2 times per day   Fasting/morning goal:  mg/dL  2 Hours after the start of a meal: Less than 180 mg/dL    2) 1 serving of carbohydrate = 15 grams  Aim for 4-5 servings or 60-75 grams per meal and 15-20 grams of carbs per snack + a serving of protein    3) Continue to work on physical activity    Topics to cover at upcoming visits: Monitoring and Taking Medication  Follow-up: As needed    See Goals Section for co-developed, patient-stated behavior change goals.  AVS provided to patient today.          SUBJECTIVE / OBJECTIVE:  Presents for: Follow-up  Accompanied by: Self  Diabetes education in the past 24mo: Yes  Focus of Visit: Monitoring, Taking Medication, Healthy Eating, Assistance w/ making life changes  Diabetes type: Type 1, Type 2  Disease course: Stable  How confident are you filling out medical forms by yourself:: Quite a bit  Cultural Influences/Ethnic Background:  Not  or       Diabetes Symptoms & Complications:  Fatigue: Sometimes  Neuropathy: No  Polydipsia: No  Symptom course: Stable  Weight trend: Stable  Autonomic neuropathy: No  CVA: No  Heart disease: No    Patient Problem List and Family Medical History reviewed for relevant medical history, current medical status, and diabetes risk factors.    Vitals:  Wt 129.3 kg (285 lb)   BMI 37.61 kg/m    Estimated body mass index is 37.61 kg/m  as calculated from the following:    Height as of 3/8/23: 1.854 m (6' 0.99\").    Weight as of this encounter: 129.3 kg (285 lb).   Last 3 BP:   BP Readings from Last 3 Encounters:   03/08/23 109/70   01/12/23 125/67   01/02/23 108/72       History   Smoking Status     Never   Smokeless Tobacco     Never       Labs:  Lab Results   Component Value Date "    A1C 7.6 12/08/2022    A1C 5.4 09/17/2015     Lab Results   Component Value Date     03/06/2023     10/03/2022     07/08/2022     01/02/2021     Lab Results   Component Value Date    LDL 15 07/08/2022    LDL 29 07/07/2020     HDL Cholesterol   Date Value Ref Range Status   07/07/2020 28 (L) >39 mg/dL Final     Direct Measure HDL   Date Value Ref Range Status   07/08/2022 29 (L) >=40 mg/dL Final   ]  GFR Estimate   Date Value Ref Range Status   03/06/2023 >90 >60 mL/min/1.73m2 Final     Comment:     eGFR calculated using 2021 CKD-EPI equation.   01/02/2021 80 >60 mL/min/[1.73_m2] Final     Comment:     Non  GFR Calc  Starting 12/18/2018, serum creatinine based estimated GFR (eGFR) will be   calculated using the Chronic Kidney Disease Epidemiology Collaboration   (CKD-EPI) equation.       GFR, ESTIMATED POCT   Date Value Ref Range Status   03/02/2022 >60 >60 mL/min/1.73m2 Final     GFR Estimate If Black   Date Value Ref Range Status   01/02/2021 >90 >60 mL/min/[1.73_m2] Final     Comment:      GFR Calc  Starting 12/18/2018, serum creatinine based estimated GFR (eGFR) will be   calculated using the Chronic Kidney Disease Epidemiology Collaboration   (CKD-EPI) equation.       Lab Results   Component Value Date    CR 0.88 03/06/2023    CR 0.96 01/02/2021     No results found for: MICROALBUMIN    Healthy Eating:  Healthy Eating Assessed Today: Yes  Meal planning/habits: None, Smaller portions  Meals include: Dinner, Lunch, Breakfast  Breakfast: oatmeal, light on the brown sugar  Lunch: DQ meal- chicken strip basket with fries and snickers blizzard (treat)  Dinner: Chili soup  Has patient met with a dietitian in the past?: Yes    Being Active:  Being Active Assessed Today: Yes  Exercise:: Yes  How intense was your typical exercise? : Light (like stretching or slow walking)  Barrier to exercise: None    Monitoring:  Blood Glucose Meter: Accu-chek  Times checking  blood sugar at home (number): 1  Times checking blood sugar at home (per): Day  Blood glucose trend: No change        Taking Medications:  Diabetes Medication(s)     Biguanides       metFORMIN (GLUCOPHAGE XR) 500 MG 24 hr tablet    Take 3 tablets (1,500 mg) by mouth daily (with dinner)          Current Treatments: Oral Medication (taken by mouth)  Problems taking diabetes medications regularly?: No  Diabetes medication side effects?: No    Problem Solving:  Problem Solving Assessed Today: Yes  Is the patient at risk for hypoglycemia?: No      Reducing Risks:  Reducing Risks Assessed Today: Yes  CAD Risks: Diabetes Mellitus, Hypertension, Obesity  Has dilated eye exam at least once a year?: Yes  Sees dentist every 6 months?: Yes  Feet checked by healthcare provider in the last year?: No    Healthy Coping:  Emotional response to diabetes: Ready to learn  Informal Support system:: Family, Friends, Spouse  Stage of change: MAINTENANCE (Working to maintain change, with risk of relapse)  Patient Activation Measure Survey Score:      9/17/2015     1:37 PM   JENN Score (Last Two)   JENN Raw Score 42   Activation Score 66   JENN Level 3             Mary Jackson RDN, LD, [facility]  Outpatient Diabetes Education  Adult Diabetes Education Triage [phone]    Time Spent: 45 minutes  Encounter Type: Individual        Any diabetes medication dose changes were made via the Certified Diabetes Care & Education Protocol in collaboration with the patient's referring provider. A copy of this encounter was shared with the provider.

## 2023-06-06 NOTE — PATIENT INSTRUCTIONS
1) Check blood sugar 1-2 times per day   Fasting/morning goal:  mg/dL  2 Hours after the start of a meal: Less than 180 mg/dL    2) 1 serving of carbohydrate = 15 grams  Aim for 4-5 servings or 60-75 grams per meal and 15-20 grams of carbs per snack + a serving of protein    3) Continue to work on physical activity    Thank you,     Mary Jackson, URSULA, LD, [facility]  Outpatient Diabetes Education  Adult Diabetes Education Triage [phone]  Diabetes Scheduling [phone]

## 2023-06-08 ENCOUNTER — PATIENT OUTREACH (OUTPATIENT)
Dept: CARE COORDINATION | Facility: CLINIC | Age: 73
End: 2023-06-08
Payer: MEDICARE

## 2023-08-02 ENCOUNTER — LAB (OUTPATIENT)
Dept: LAB | Facility: CLINIC | Age: 73
End: 2023-08-02
Payer: MEDICARE

## 2023-08-02 ENCOUNTER — OFFICE VISIT (OUTPATIENT)
Dept: FAMILY MEDICINE | Facility: CLINIC | Age: 73
End: 2023-08-02
Payer: MEDICARE

## 2023-08-02 VITALS
OXYGEN SATURATION: 95 % | RESPIRATION RATE: 18 BRPM | WEIGHT: 288 LBS | BODY MASS INDEX: 38.17 KG/M2 | SYSTOLIC BLOOD PRESSURE: 118 MMHG | HEIGHT: 73 IN | TEMPERATURE: 97.7 F | DIASTOLIC BLOOD PRESSURE: 70 MMHG | HEART RATE: 68 BPM

## 2023-08-02 DIAGNOSIS — G47.33 OSA (OBSTRUCTIVE SLEEP APNEA): ICD-10-CM

## 2023-08-02 DIAGNOSIS — I10 ESSENTIAL HYPERTENSION: ICD-10-CM

## 2023-08-02 DIAGNOSIS — E11.9 TYPE 2 DIABETES MELLITUS WITHOUT COMPLICATION, WITHOUT LONG-TERM CURRENT USE OF INSULIN (H): ICD-10-CM

## 2023-08-02 DIAGNOSIS — J32.2 SINUSITIS CHRONIC, ETHMOIDAL: ICD-10-CM

## 2023-08-02 DIAGNOSIS — H90.3 SENSORINEURAL HEARING LOSS (SNHL) OF BOTH EARS: ICD-10-CM

## 2023-08-02 DIAGNOSIS — C82.80 LYMPHOMA, SMALL LYMPHOID, FOLLICULAR (H): ICD-10-CM

## 2023-08-02 DIAGNOSIS — Q13.0 CONGENITAL COLOBOMA OF IRIS: ICD-10-CM

## 2023-08-02 DIAGNOSIS — Z12.5 SCREENING FOR PROSTATE CANCER: ICD-10-CM

## 2023-08-02 DIAGNOSIS — Z00.00 ENCOUNTER FOR MEDICARE ANNUAL WELLNESS EXAM: Primary | ICD-10-CM

## 2023-08-02 DIAGNOSIS — I77.810 ASCENDING AORTA DILATATION (H): ICD-10-CM

## 2023-08-02 DIAGNOSIS — E66.01 SEVERE OBESITY (BMI 35.0-39.9) WITH COMORBIDITY (H): ICD-10-CM

## 2023-08-02 DIAGNOSIS — N40.0 BENIGN PROSTATIC HYPERPLASIA, UNSPECIFIED WHETHER LOWER URINARY TRACT SYMPTOMS PRESENT: ICD-10-CM

## 2023-08-02 DIAGNOSIS — E78.5 HYPERLIPIDEMIA WITH TARGET LDL LESS THAN 130: ICD-10-CM

## 2023-08-02 LAB — HBA1C MFR BLD: 7.2 %

## 2023-08-02 PROCEDURE — 99214 OFFICE O/P EST MOD 30 MIN: CPT | Mod: 25 | Performed by: STUDENT IN AN ORGANIZED HEALTH CARE EDUCATION/TRAINING PROGRAM

## 2023-08-02 PROCEDURE — 36415 COLL VENOUS BLD VENIPUNCTURE: CPT

## 2023-08-02 PROCEDURE — 99207 PR FOOT EXAM NO CHARGE: CPT | Performed by: STUDENT IN AN ORGANIZED HEALTH CARE EDUCATION/TRAINING PROGRAM

## 2023-08-02 PROCEDURE — 83036 HEMOGLOBIN GLYCOSYLATED A1C: CPT

## 2023-08-02 PROCEDURE — G0439 PPPS, SUBSEQ VISIT: HCPCS | Performed by: STUDENT IN AN ORGANIZED HEALTH CARE EDUCATION/TRAINING PROGRAM

## 2023-08-02 RX ORDER — ATORVASTATIN CALCIUM 20 MG/1
20 TABLET, FILM COATED ORAL DAILY
Qty: 90 TABLET | Refills: 3 | Status: SHIPPED | OUTPATIENT
Start: 2023-08-02 | End: 2024-07-08

## 2023-08-02 RX ORDER — METOPROLOL SUCCINATE 50 MG/1
50 TABLET, EXTENDED RELEASE ORAL DAILY
Qty: 90 TABLET | Refills: 3 | Status: SHIPPED | OUTPATIENT
Start: 2023-08-02 | End: 2024-08-07

## 2023-08-02 RX ORDER — METFORMIN HCL 500 MG
2000 TABLET, EXTENDED RELEASE 24 HR ORAL
Qty: 360 TABLET | Refills: 3 | Status: SHIPPED | OUTPATIENT
Start: 2023-08-02 | End: 2024-08-02

## 2023-08-02 ASSESSMENT — ENCOUNTER SYMPTOMS
COUGH: 1
WEAKNESS: 1
FREQUENCY: 1
CONSTIPATION: 1

## 2023-08-02 ASSESSMENT — PAIN SCALES - GENERAL: PAINLEVEL: NO PAIN (0)

## 2023-08-02 ASSESSMENT — ACTIVITIES OF DAILY LIVING (ADL): CURRENT_FUNCTION: NO ASSISTANCE NEEDED

## 2023-08-02 NOTE — PROGRESS NOTES
"SUBJECTIVE:   Minh is a 73 year old who presents for Preventive Visit.      8/2/2023    10:26 AM   Additional Questions   Roomed by Eugenie MICHAEL         8/2/2023    10:26 AM   Patient Reported Additional Medications   Patient reports taking the following new medications Ezcema lotion, congestion medication       Are you in the first 12 months of your Medicare coverage?  No    Healthy Habits:     In general, how would you rate your overall health?  Good    Frequency of exercise:  1 day/week    Duration of exercise:  15-30 minutes    Do you usually eat at least 4 servings of fruit and vegetables a day, include whole grains    & fiber and avoid regularly eating high fat or \"junk\" foods?  Yes    Taking medications regularly:  Yes    Medication side effects:  None    Ability to successfully perform activities of daily living:  No assistance needed    Home Safety:  No safety concerns identified    Hearing Impairment:  Difficulty following a conversation in a noisy restaurant or crowded room, need to ask people to speak up or repeat themselves and difficulty understanding soft or whispered speech    In the past 6 months, have you been bothered by leaking of urine? Yes    In general, how would you rate your overall mental or emotional health?  Good    Additional concerns today:  No        Have you ever done Advance Care Planning? (For example, a Health Directive, POLST, or a discussion with a medical provider or your loved ones about your wishes): Yes, advance care planning is on file.        Fall risk  Fallen 2 or more times in the past year?: No  Any fall with injury in the past year?: No    Cognitive Screening   1) Repeat 3 items (Leader, Season, Table)    2) Clock draw: NORMAL  3) 3 item recall: Recalls 2 objects   Results: NORMAL clock, 1-2 items recalled: COGNITIVE IMPAIRMENT LESS LIKELY    Mini-CogTM Copyright S Kalin. Licensed by the author for use in [facility]; reprinted with permission " (emmanuelle@[facility]). All rights reserved.      Do you have sleep apnea, excessive snoring or daytime drowsiness? : yes    Reviewed and updated as needed this visit by clinical staff   Tobacco  Allergies  Meds              Reviewed and updated as needed this visit by Provider                 Social History     Tobacco Use    Smoking status: Never    Smokeless tobacco: Never   Substance Use Topics    Alcohol use: Yes     Alcohol/week: 3.3 standard drinks of alcohol     Comment: occasionally weekends             8/2/2023    10:03 AM   Alcohol Use   Prescreen: >3 drinks/day or >7 drinks/week? No     Do you have a current opioid prescription? No  Do you use any other controlled substances or medications that are not prescribed by a provider? None            Current providers sharing in care for this patient include:   Patient Care Team:  Oskar Conrad MD as PCP - General (Family Medicine)  Néstor Ballard MD as MD (Hematology & Oncology)  Eduardo Crespo MD as Assigned Cancer Care Provider  Laina Stephenson RN as Specialty Care Coordinator (Hematology & Oncology)  Eddie Rick MD as Assigned Surgical Provider  Sarabjit Mahmood MD as Assigned Pulmonology Provider  Ava Jackson as Diabetes Educator  Raji Alston MD as Assigned Neuroscience Provider  Oskar Conrad MD as Assigned PCP    The following health maintenance items are reviewed in Epic and correct as of today:  Health Maintenance   Topic Date Due    LIPID  07/08/2023    INFLUENZA VACCINE (1) 09/01/2023    EYE EXAM  10/11/2023    A1C  11/02/2023    MICROALBUMIN  12/08/2023    BMP  03/06/2024    MEDICARE ANNUAL WELLNESS VISIT  08/02/2024    DIABETIC FOOT EXAM  08/02/2024    ANNUAL REVIEW OF HM ORDERS  08/02/2024    FALL RISK ASSESSMENT  08/02/2024    DTAP/TDAP/TD IMMUNIZATION (2 - Td or Tdap) 09/17/2025    COLORECTAL CANCER SCREENING  10/03/2027    ADVANCE CARE PLANNING  08/02/2028    HEPATITIS C SCREENING  Completed     "PHQ-2 (once per calendar year)  Completed    Pneumococcal Vaccine: 65+ Years  Completed    ZOSTER IMMUNIZATION  Completed    AORTIC ANEURYSM SCREENING (SYSTEM ASSIGNED)  Completed    COVID-19 Vaccine  Completed    IPV IMMUNIZATION  Aged Out    MENINGITIS IMMUNIZATION  Aged Out           Review of Systems   HENT:  Positive for congestion and hearing loss.    Respiratory:  Positive for cough.    Cardiovascular:  Positive for peripheral edema.   Gastrointestinal:  Positive for constipation.   Genitourinary:  Positive for frequency, impotence and urgency.   Neurological:  Positive for weakness.       OBJECTIVE:   /70 (BP Location: Right arm, Patient Position: Chair)   Pulse 68   Temp 97.7  F (36.5  C) (Temporal)   Resp 18   Ht 1.854 m (6' 1\")   Wt 130.6 kg (288 lb)   SpO2 95%   BMI 38.00 kg/m   Estimated body mass index is 38 kg/m  as calculated from the following:    Height as of this encounter: 1.854 m (6' 1\").    Weight as of this encounter: 130.6 kg (288 lb).  Physical Exam  GENERAL: healthy, alert and no distress  EYES: Eyes grossly normal to inspection, PERRL and conjunctivae and sclerae normal  HENT: ear canals and TM's normal, nose and mouth without ulcers or lesions  NECK: no adenopathy, no asymmetry, masses, or scars and thyroid normal to palpation  RESP: lungs clear to auscultation - no rales, rhonchi or wheezes  CV: regular rate and rhythm, normal S1 S2, no S3 or S4, no murmur, click or rub, no peripheral edema and peripheral pulses strong  ABDOMEN: soft, nontender, no hepatosplenomegaly, no masses and bowel sounds normal  MS: no gross musculoskeletal defects noted, no edema, diabetic foot exam with normal sensation, no lesions or sores noted  SKIN: no suspicious lesions or rashes  NEURO: Normal strength and tone, mentation intact and speech normal  PSYCH: mentation appears normal, affect normal/bright    Diagnostic Test Results:  Labs reviewed in Epic    ASSESSMENT / PLAN:   Deandre was seen " today for wellness visit.    Diagnoses and all orders for this visit:    Encounter for Medicare annual wellness exam  Age-appropriate cancer screening and immunizations discussed as noted below    Type 2 diabetes mellitus without complication, without long-term current use of insulin (H)  A1c improved to 7.2.  Will increase metformin to 2000 mg daily and continue to focus on diet lifestyle changes.  Patient is on atorvastatin.  Blood pressure stable without CKD.  Currently not on ACE.  -     metFORMIN (GLUCOPHAGE XR) 500 MG 24 hr tablet; Take 4 tablets (2,000 mg) by mouth daily (with dinner)  -     atorvastatin (LIPITOR) 20 MG tablet; Take 1 tablet (20 mg) by mouth daily  -     FOOT EXAM    Lymphoma, small lymphoid, follicular (H)  Following with oncology yearly    Benign prostatic hyperplasia, unspecified whether lower urinary tract symptoms present    Essential hypertension  Stable and tolerating without side effects  -     metoprolol succinate ER (TOPROL XL) 50 MG 24 hr tablet; Take 1 tablet (50 mg) by mouth daily    Ascending aorta dilatation (H)  Size unchanged on recent CT.  Consider yearly imaging follow-up.  -     metoprolol succinate ER (TOPROL XL) 50 MG 24 hr tablet; Take 1 tablet (50 mg) by mouth daily    Hyperlipidemia with target LDL less than 130  -     Lipid panel reflex to direct LDL Non-fasting; Future  -     atorvastatin (LIPITOR) 20 MG tablet; Take 1 tablet (20 mg) by mouth daily    Severe obesity (BMI 35.0-39.9) with comorbidity (H)    WINSTON (obstructive sleep apnea)    Sinusitis chronic, ethmoidal    Sensorineural hearing loss (SNHL) of both ears  -     Adult Audiology  Referral; Future    Screening for prostate cancer  -     PSA, screen; Future    Congenital coloboma of iris    Other orders  -     REVIEW OF HEALTH MAINTENANCE PROTOCOL ORDERS  -     PRIMARY CARE FOLLOW-UP SCHEDULING; Future        Patient has been advised of split billing requirements and indicates understanding:  "Yes      COUNSELING:  Reviewed preventive health counseling, as reflected in patient instructions       Regular exercise       Healthy diet/nutrition       Vision screening       Hearing screening       Dental care       Bladder control       Fall risk prevention       Immunizations       Aspirin prophylaxis        Alcohol Use        Colon cancer screening       Prostate cancer screening      BMI:   Estimated body mass index is 38 kg/m  as calculated from the following:    Height as of this encounter: 1.854 m (6' 1\").    Weight as of this encounter: 130.6 kg (288 lb).   Weight management plan: Discussed healthy diet and exercise guidelines      He reports that he has never smoked. He has never used smokeless tobacco.      Appropriate preventive services were discussed with this patient, including applicable screening as appropriate for cardiovascular disease, diabetes, osteopenia/osteoporosis, and glaucoma.  As appropriate for age/gender, discussed screening for colorectal cancer, prostate cancer, breast cancer, and cervical cancer. Checklist reviewing preventive services available has been given to the patient.    Reviewed patients plan of care and provided an AVS. The Basic Care Plan (routine screening as documented in Health Maintenance) for Deandre meets the Care Plan requirement. This Care Plan has been established and reviewed with the Patient.          Oskar Conrad MD  [facility]    Identified Health Risks:  I have reviewed Opioid Use Disorder and Substance Use Disorder risk factors and made any needed referrals. He is at risk for lack of exercise and has been provided with information to increase physical activity for the benefit of his well-being.  The patient was provided with written information regarding signs of hearing loss.  Information on urinary incontinence and treatment options given to patient.  "

## 2023-08-02 NOTE — PATIENT INSTRUCTIONS
"Patient Education   Personalized Prevention Plan  You are due for the preventive services outlined below.  Your care team is available to assist you in scheduling these services.  If you have already completed any of these items, please share that information with your care team to update in your medical record.  Health Maintenance Due   Topic Date Due     Cholesterol Lab  07/08/2023     Learning About Being Physically Active  What is physical activity?     Being physically active means doing any kind of activity that gets your body moving.  The types of physical activity that can help you get fit and stay healthy include:  Aerobic or \"cardio\" activities. These make your heart beat faster and make you breathe harder, such as brisk walking, riding a bike, or running. They strengthen your heart and lungs and build up your endurance.  Strength training activities. These make your muscles work against, or \"resist,\" something. Examples include lifting weights or doing push-ups. These activities help tone and strengthen your muscles and bones.  Stretches. These let you move your joints and muscles through their full range of motion. Stretching helps you be more flexible.  Reaching a balance between these three types of physical activity is important because each one contributes to your overall fitness.  What are the benefits of being active?  Being active is one of the best things you can do for your health. It helps you to:  Feel stronger and have more energy to do all the things you like to do.  Focus better at school or work.  Feel, think, and sleep better.  Reach and stay at a healthy weight.  Lose fat and build lean muscle.  Lower your risk for serious health problems, including diabetes, heart attack, high blood pressure, and some cancers.  Keep your heart, lungs, bones, muscles, and joints strong and healthy.  How can you make being active part of your life?  Start slowly. Make it your long-term goal to get at " "least 30 minutes of exercise on most days of the week. Walking is a good choice. You also may want to do other activities, such as running, swimming, cycling, or playing tennis or team sports.  Pick activities that you like--ones that make your heart beat faster, your muscles stronger, and your muscles and joints more flexible. If you find more than one thing you like doing, do them all. You don't have to do the same thing every day.  Get your heart pumping every day. Any activity that makes your heart beat faster and keeps it at that rate for a while counts.  Here are some great ways to get your heart beating faster:  Go for a brisk walk, run, or bike ride.  Go for a hike or swim.  Go in-line skating.  Play a game of touch football, basketball, or soccer.  Ride a bike.  Play tennis or racquetball.  Climb stairs.  Even some household chores can be aerobic--just do them at a faster pace. Vacuuming, raking or mowing the lawn, sweeping the garage, and washing and waxing the car all can help get your heart rate up.  Strengthen your muscles during the week. You don't have to lift heavy weights or grow big, bulky muscles to get stronger. Doing a few simple activities that make your muscles work against, or \"resist,\" something can help you get stronger.  For example, you can:  Do push-ups or sit-ups, which use your own body weight as resistance.  Lift weights or dumbbells or use stretch bands at home or in a gym or community center.  Stretch your muscles often. Stretching will help you as you become more active. It can help you stay flexible, loosen tight muscles, and avoid injury. It can also help improve your balance and posture and can be a great way to relax.  Be sure to stretch the muscles you'll be using when you work out. It's best to warm your muscles slightly before you stretch them. Walk or do some other light aerobic activity for a few minutes, and then start stretching.  When you stretch your muscles:  Do it " "slowly. Stretching is not about going fast or making sudden movements.  Don't push or bounce during a stretch.  Hold each stretch for at least 15 to 30 seconds, if you can. You should feel a stretch in the muscle, but not pain.  Breathe out as you do the stretch. Then breathe in as you hold the stretch. Don't hold your breath.  If you're worried about how more activity might affect your health, have a checkup before you start. Follow any special advice your doctor gives you for getting a smart start.  Where can you learn more?  Go to https://www.G[employer].MobiCart/patiented  Enter W332 in the search box to learn more about \"Learning About Being Physically Active.\"  Current as of: October 10, 2022               Content Version: 13.7    5251-3476 [employer].   Care instructions adapted under license by your healthcare professional. If you have questions about a medical condition or this instruction, always ask your healthcare professional. [employer] disclaims any warranty or liability for your use of this information.      Hearing Loss: Care Instructions  Overview     Hearing loss is a sudden or slow decrease in how well you hear. It can range from slight to profound. Permanent hearing loss can occur with aging. It also can happen when you are exposed long-term to loud noise. Examples include listening to loud music, riding motorcycles, or being around other loud machines.  Hearing loss can affect your work and home life. It can make you feel lonely or depressed. You may feel that you have lost your independence. But hearing aids and other devices can help you hear better and feel connected to others.  Follow-up care is a key part of your treatment and safety. Be sure to make and go to all appointments, and call your doctor if you are having problems. It's also a good idea to know your test results and keep a list of the medicines you take.  How can you care for yourself at home?  Avoid loud " noises whenever possible. This helps keep your hearing from getting worse.  Always wear hearing protection around loud noises.  Wear a hearing aid as directed.  A professional can help you pick a hearing aid that will work best for you.  You can also get hearing aids over the counter for mild to moderate hearing loss.  Have hearing tests as your doctor suggests. They can show whether your hearing has changed. Your hearing aid may need to be adjusted.  Use other devices as needed. These may include:  Telephone amplifiers and hearing aids that can connect to a television, stereo, radio, or microphone.  Devices that use lights or vibrations. These alert you to the doorbell, a ringing telephone, or a baby monitor.  Television closed-captioning. This shows the words at the bottom of the screen. Most new TVs can do this.  TTY (text telephone). This lets you type messages back and forth on the telephone instead of talking or listening. These devices are also called TDD. When messages are typed on the keyboard, they are sent over the phone line to a receiving TTY. The message is shown on a monitor.  Use text messaging, social media, and email if it is hard for you to communicate by telephone.  Try to learn a listening technique called speechreading. It is not lipreading. You pay attention to people's gestures, expressions, posture, and tone of voice. These clues can help you understand what a person is saying. Face the person you are talking to, and have them face you. Make sure the lighting is good. You need to see the other person's face clearly.  Think about counseling if you need help to adjust to your hearing loss.  When should you call for help?  Watch closely for changes in your health, and be sure to contact your doctor if:    You think your hearing is getting worse.     You have new symptoms, such as dizziness or nausea.   Where can you learn more?  Go to https://www.healthwise.net/patiented  Enter R728 in the  "search box to learn more about \"Hearing Loss: Care Instructions.\"  Current as of: March 1, 2023               Content Version: 13.7 2006-2023 [employer].   Care instructions adapted under license by your healthcare professional. If you have questions about a medical condition or this instruction, always ask your healthcare professional. [employer] disclaims any warranty or liability for your use of this information.      Bladder Training: Care Instructions  Your Care Instructions     Bladder training is used to treat urge incontinence and stress incontinence. Urge incontinence means that the need to urinate comes on so fast that you can't get to a toilet in time. Stress incontinence means that you leak urine because of pressure on your bladder. For example, it may happen when you laugh, cough, or lift something heavy.  Bladder training can increase how long you can wait before you have to urinate. It can also help your bladder hold more urine. And it can give you better control over the urge to urinate.  It is important to remember that bladder training takes a few weeks to a few months to make a difference. You may not see results right away, but don't give up.  Follow-up care is a key part of your treatment and safety. Be sure to make and go to all appointments, and call your doctor if you are having problems. It's also a good idea to know your test results and keep a list of the medicines you take.  How can you care for yourself at home?  Work with your doctor to come up with a bladder training program that is right for you. You may use one or more of the following methods.  Delayed urination  In the beginning, try to keep from urinating for 5 minutes after you first feel the need to go.  While you wait, take deep, slow breaths to relax. Kegel exercises can also help you delay the need to go to the bathroom.  After some practice, when you can easily wait 5 minutes to urinate, try to " "wait 10 minutes before you urinate.  Slowly increase the waiting period until you are able to control when you have to urinate.  Scheduled urination  Empty your bladder when you first wake up in the morning.  Schedule times throughout the day when you will urinate.  Start by going to the bathroom every hour, even if you don't need to go.  Slowly increase the time between trips to the bathroom.  When you have found a schedule that works well for you, keep doing it.  If you wake up during the night and have to urinate, do it. Apply your schedule to waking hours only.  Kegel exercises  These tighten and strengthen pelvic muscles, which can help you control the flow of urine. (If doing these exercises causes pain, stop doing them and talk with your doctor.) To do Kegel exercises:  Squeeze your muscles as if you were trying not to pass gas. Or squeeze your muscles as if you were stopping the flow of urine. Your belly, legs, and buttocks shouldn't move.  Hold the squeeze for 3 seconds, then relax for 5 to 10 seconds.  Start with 3 seconds, then add 1 second each week until you are able to squeeze for 10 seconds.  Repeat the exercise 10 times a session. Do 3 to 8 sessions a day.  When should you call for help?  Watch closely for changes in your health, and be sure to contact your doctor if:    Your incontinence is getting worse.     You do not get better as expected.   Where can you learn more?  Go to https://www.[employer].net/patiented  Enter V684 in the search box to learn more about \"Bladder Training: Care Instructions.\"  Current as of: March 1, 2023               Content Version: 13.7    9190-3289 [employer].   Care instructions adapted under license by your healthcare professional. If you have questions about a medical condition or this instruction, always ask your healthcare professional. [employer] disclaims any warranty or liability for your use of this information.         "

## 2023-08-28 ENCOUNTER — OFFICE VISIT (OUTPATIENT)
Dept: PODIATRY | Facility: CLINIC | Age: 73
End: 2023-08-28
Payer: MEDICARE

## 2023-08-28 VITALS
DIASTOLIC BLOOD PRESSURE: 68 MMHG | TEMPERATURE: 97.2 F | WEIGHT: 287 LBS | BODY MASS INDEX: 38.04 KG/M2 | HEIGHT: 73 IN | SYSTOLIC BLOOD PRESSURE: 122 MMHG

## 2023-08-28 DIAGNOSIS — L60.0 INGROWING NAIL: Primary | ICD-10-CM

## 2023-08-28 PROCEDURE — 99204 OFFICE O/P NEW MOD 45 MIN: CPT | Mod: 25 | Performed by: PODIATRIST

## 2023-08-28 PROCEDURE — 11750 EXCISION NAIL&NAIL MATRIX: CPT | Mod: TA | Performed by: PODIATRIST

## 2023-08-28 ASSESSMENT — PAIN SCALES - GENERAL: PAINLEVEL: NO PAIN (1)

## 2023-08-28 NOTE — PROGRESS NOTES
Chief Complaint   Patient presents with    Consult     Ingrown medial Left great toenail; LOV 10/9/2017    Diabetes     Type 2     HPI:  Deandre Merchant is a 67 year old male who is seen in consultation at the request of self.    Pt presents for eval of:   (Onset, Location, L/R, Character, Treatments, Injury if yes)     Ongoing for about a year, Left great toenail curving inward. Getting worse over past few months.  Intermittent redness, discolored, pain w/pressure    Retired.    Review of Systems:  Patient denies fever, chills, rash, wound, stiffness, limping, numbness, weakness, heart burn, blood in stool, chest pain with activity, calf pain when walking, shortness of breath with activity, chronic cough, easy bleeding/bruising, swelling of ankles, excessive thirst, fatigue, depression, anxiety.  Pt admits to the symptoms noted in history above.     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    Lymphadenopathy     mediastinal & retroperitoneal    Non Hodgkin's lymphoma (H)     Non-Hodgkin's lymphoma of multiple sites (H) 6/26/2012    Polyps, colonic     Retinal detachment         PAST SURGICAL HISTORY:   Past Surgical History:   Procedure Laterality Date    COLONOSCOPY N/A 3/1/2017    Procedure: COLONOSCOPY;  Surgeon: Dakota Wall MD;  Location:  GI    COLONOSCOPY N/A 10/3/2022    Procedure: COLONOSCOPY;  Surgeon: Jose J Bee MD;  Location:  GI    ESOPHAGOSCOPY, GASTROSCOPY, DUODENOSCOPY (EGD), COMBINED  4/21/2013    Procedure: COMBINED ESOPHAGOSCOPY, GASTROSCOPY, DUODENOSCOPY (EGD), BIOPSY SINGLE OR MULTIPLE;;  Surgeon: Torrey Cosme MD;  Location:  GI    ESOPHAGOSCOPY, GASTROSCOPY, DUODENOSCOPY (EGD), COMBINED  10/9/2013    Procedure: COMBINED ESOPHAGOSCOPY, GASTROSCOPY, DUODENOSCOPY (EGD), BIOPSY SINGLE OR MULTIPLE;  ESOPHAGOSCOPY, GASTROSCOPY, DUODENOSCOPY (EGD) with  multiple biopsies;  Surgeon: Shay Sauer MD;  Location: [facility]    IR BONE BIOPSY VERTEBRAL  1/12/2023    LAPAROTOMY EXPLORATORY  4/24/2013    Procedure: LAPAROTOMY EXPLORATORY;  Exploratory Laparotomy and lysis of adhesions.;  Surgeon: Genevieve Barros MD;  Location:  OR    [facility] COLONOSCOPY W/WO BRUSH/WASH  07/12/06        MEDICATIONS:   Current Outpatient Medications:     atorvastatin (LIPITOR) 20 MG tablet, Take 1 tablet (20 mg) by mouth daily, Disp: 90 tablet, Rfl: 3    blood glucose (NO BRAND SPECIFIED) lancets standard, Use to test blood sugar once daily., Disp: 1 each, Rfl: 3    blood glucose (NO BRAND SPECIFIED) test strip, Use to test blood sugar once daily., Disp: 50 strip, Rfl: 4    cetirizine (ZYRTEC) 10 MG tablet, Take 10 mg by mouth daily, Disp: , Rfl:     metFORMIN (GLUCOPHAGE XR) 500 MG 24 hr tablet, Take 4 tablets (2,000 mg) by mouth daily (with dinner), Disp: 360 tablet, Rfl: 3    metoprolol succinate ER (TOPROL XL) 50 MG 24 hr tablet, Take 1 tablet (50 mg) by mouth daily, Disp: 90 tablet, Rfl: 3    order for DME, Equipment ordered: RESMED Auto PAP Mask type: Full face  Settings: 5-15 cm h2o, Disp: , Rfl:     Acetaminophen (TYLENOL PO), Take 1,000 mg by mouth, Disp: , Rfl:     chlorpheniramine (CHLOR-TRIMETON) 4 MG tablet, Take 4 mg by mouth every 6 hours as needed for allergies or rhinitis (Patient not taking: Reported on 8/2/2023), Disp: , Rfl:     fluticasone (FLONASE) 50 MCG/ACT nasal spray, Spray 2 sprays into both nostrils daily (Patient not taking: Reported on 8/2/2023), Disp: 16 g, Rfl: 11    triamcinolone (KENALOG) 0.1 % external ointment, Apply sparingly to affected area twice daily as needed. (Patient not taking: Reported on 8/2/2023), Disp: 15 g, Rfl: 1    Current Facility-Administered Medications:     lidocaine 1 % injection 0.5 mL, 0.5 mL, , , Alvarez Sampson MD, 0.5 mL at 08/05/21 1601    triamcinolone (KENALOG-40) injection 20 mg, 20 mg, , , Alvarez Sampson,  "MD, 20 mg at 08/05/21 1601     ALLERGIES:    Allergies   Allergen Reactions    Allopurinol Rash        SOCIAL HISTORY:   Social History     Socioeconomic History    Marital status:      Spouse name: Cristel    Number of children: 2    Years of education: Not on file    Highest education level: Not on file   Occupational History    Not on file   Tobacco Use    Smoking status: Never    Smokeless tobacco: Never   Vaping Use    Vaping Use: Never used   Substance and Sexual Activity    Alcohol use: Yes     Alcohol/week: 3.3 standard drinks of alcohol     Comment: occasionally weekends    Drug use: No    Sexual activity: Yes   Other Topics Concern     Service Not Asked    Blood Transfusions Not Asked    Caffeine Concern No    Occupational Exposure Not Asked    Hobby Hazards Not Asked    Sleep Concern No    Stress Concern No    Weight Concern No    Special Diet Not Asked    Back Care Not Asked    Exercise Yes    Bike Helmet Not Asked    Seat Belt Yes    Self-Exams Not Asked    Parent/sibling w/ CABG, MI or angioplasty before 65F 55M? Not Asked   Social History Narrative    Not on file     Social Determinants of Health     Financial Resource Strain: Not on file   Food Insecurity: Not on file   Transportation Needs: Not on file   Physical Activity: Not on file   Stress: Not on file   Social Connections: Not on file   Intimate Partner Violence: Not on file   Housing Stability: Not on file        FAMILY HISTORY:   Family History   Problem Relation Age of Onset    Cancer Father         liver/kidney    C.A.D. Father     Pacemaker Brother     Arthritis Mother         RA        EXAM:Vitals: /68 (BP Location: Left arm, Patient Position: Sitting, Cuff Size: Adult Large)   Temp 97.2  F (36.2  C) (Temporal)   Ht 1.854 m (6' 1\")   Wt 130.2 kg (287 lb)   BMI 37.87 kg/m    BMI= Body mass index is 37.87 kg/m .    General appearance: Patient is alert and fully cooperative with history & exam.  No sign of distress is " noted during the visit.     Psychiatric: Affect is pleasant & appropriate.  Patient appears motivated to improve health.     Respiratory: Breathing is regular & unlabored while sitting.     HEENT: Hearing is intact to spoken word.  Speech is clear.  No gross evidence of visual impairment that would impact ambulation.     Vascular: DP & PT pulses are intact & regular, CFT immediate, positive digital hair growth bilaterally.  No significant edema or varicosities noted and skin temperature is normal to both lower extremities.     Neurologic: Lower extremity sensation is intact to light touch.  No evidence of weakness or contracture in the lower extremities.  No evidence of neuropathy.    Dermatologic: Adequate texture, turgor and tone about the integument.  No discoloration or thickening of the toenail however the left medial hallux nail border(s) are ingrown with localized erythema, discomfort and purulent drainage.     Musculoskeletal: Patient is ambulatory without assistive device or brace.  No gross ankle deformity noted.  No foot or ankle joint effusion is noted.     ASSESSMENT:       ICD-10-CM    1. Ingrowing nail  L60.0 REMOVAL NAIL/NAIL BED, PARTIAL OR COMPLETE          Plan:   8/28/2023  We reviewed medical history and EPIC chart.  After obtaining informed consent to permanently remove the left medial hallux nail(s), I utilized 3 cc of lidocaine plain to achieve local anesthesia per digit.  The toenails were then prepped with Betadine in usual fashion.  A quarter inch Penrose drain was then utilized for hemostasis.  100% of the toenail border was avulsed utilizing a nail elevator, english anvil, 6100 blade and hemostat.  The proximal root portion of the nail was confirmed to be removed atraumatically.  Three applications of 89% phenol were applied to the surgical site for 30 seconds each followed by a curette after each application.  Surgical site was then flushed with alcohol and dressed with bacitracin and a  nonadherent compression dressing.  Tourniquet was removed after approximately 3 minutes followed by immediate hyperemia to the distal aspect of the hallux.  Written postoperative instructions were dispensed and patient instructed to follow up in 1-2 weeks with any questions, pain,drainage, delayed healing or concerns.  I answered all questions to patients satisfaction.     If patient calls in the next 1-3 weeks with continued redness, pain or drainage I would recommend beginning oral Keflex 500 mg, 4 times a day ×10 days, after confirming there is no allergy.      Dedrick Saucedo DPM

## 2023-08-28 NOTE — LETTER
8/28/2023         RE: Deandre Merchant  [address]  [facility] [address]        Dear Colleague,    Thank you for referring your patient, Deandre Merchant, to the [facility]. Please see a copy of my visit note below.    Chief Complaint   Patient presents with     Consult     Ingrown medial Left great toenail; LOV 10/9/2017     Diabetes     Type 2     HPI:  Deandre Merchant is a 67 year old male who is seen in consultation at the request of self.    Pt presents for eval of:   (Onset, Location, L/R, Character, Treatments, Injury if yes)     Ongoing for about a year, Left great toenail curving inward. Getting worse over past few months.  Intermittent redness, discolored, pain w/pressure    Retired.    Review of Systems:  Patient denies fever, chills, rash, wound, stiffness, limping, numbness, weakness, heart burn, blood in stool, chest pain with activity, calf pain when walking, shortness of breath with activity, chronic cough, easy bleeding/bruising, swelling of ankles, excessive thirst, fatigue, depression, anxiety.  Pt admits to the symptoms noted in history above.     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     Lymphadenopathy     mediastinal & retroperitoneal     Non Hodgkin's lymphoma (H)      Non-Hodgkin's lymphoma of multiple sites (H) 6/26/2012     Polyps, colonic      Retinal detachment         PAST SURGICAL HISTORY:   Past Surgical History:   Procedure Laterality Date     COLONOSCOPY N/A 3/1/2017    Procedure: COLONOSCOPY;  Surgeon: Dakota Wall MD;  Location: [facility]     COLONOSCOPY N/A 10/3/2022    Procedure: COLONOSCOPY;  Surgeon: Jose J Bee MD;  Location:  GI     ESOPHAGOSCOPY, GASTROSCOPY, DUODENOSCOPY (EGD), COMBINED  4/21/2013    Procedure: COMBINED ESOPHAGOSCOPY, GASTROSCOPY, DUODENOSCOPY (EGD), BIOPSY SINGLE OR MULTIPLE;;  Surgeon: Ba  Torrey Perry MD;  Location:  GI     ESOPHAGOSCOPY, GASTROSCOPY, DUODENOSCOPY (EGD), COMBINED  10/9/2013    Procedure: COMBINED ESOPHAGOSCOPY, GASTROSCOPY, DUODENOSCOPY (EGD), BIOPSY SINGLE OR MULTIPLE;  ESOPHAGOSCOPY, GASTROSCOPY, DUODENOSCOPY (EGD) with multiple biopsies;  Surgeon: Shay Sauer MD;  Location:  GI     IR BONE BIOPSY VERTEBRAL  1/12/2023     LAPAROTOMY EXPLORATORY  4/24/2013    Procedure: LAPAROTOMY EXPLORATORY;  Exploratory Laparotomy and lysis of adhesions.;  Surgeon: Genevieve Barros MD;  Location:  OR     [facility] COLONOSCOPY W/WO BRUSH/WASH  07/12/06        MEDICATIONS:   Current Outpatient Medications:      atorvastatin (LIPITOR) 20 MG tablet, Take 1 tablet (20 mg) by mouth daily, Disp: 90 tablet, Rfl: 3     blood glucose (NO BRAND SPECIFIED) lancets standard, Use to test blood sugar once daily., Disp: 1 each, Rfl: 3     blood glucose (NO BRAND SPECIFIED) test strip, Use to test blood sugar once daily., Disp: 50 strip, Rfl: 4     cetirizine (ZYRTEC) 10 MG tablet, Take 10 mg by mouth daily, Disp: , Rfl:      metFORMIN (GLUCOPHAGE XR) 500 MG 24 hr tablet, Take 4 tablets (2,000 mg) by mouth daily (with dinner), Disp: 360 tablet, Rfl: 3     metoprolol succinate ER (TOPROL XL) 50 MG 24 hr tablet, Take 1 tablet (50 mg) by mouth daily, Disp: 90 tablet, Rfl: 3     order for DME, Equipment ordered: RESMED Auto PAP Mask type: Full face  Settings: 5-15 cm h2o, Disp: , Rfl:      Acetaminophen (TYLENOL PO), Take 1,000 mg by mouth, Disp: , Rfl:      chlorpheniramine (CHLOR-TRIMETON) 4 MG tablet, Take 4 mg by mouth every 6 hours as needed for allergies or rhinitis (Patient not taking: Reported on 8/2/2023), Disp: , Rfl:      fluticasone (FLONASE) 50 MCG/ACT nasal spray, Spray 2 sprays into both nostrils daily (Patient not taking: Reported on 8/2/2023), Disp: 16 g, Rfl: 11     triamcinolone (KENALOG) 0.1 % external ointment, Apply sparingly to affected area twice daily as needed. (Patient not  taking: Reported on 8/2/2023), Disp: 15 g, Rfl: 1    Current Facility-Administered Medications:      lidocaine 1 % injection 0.5 mL, 0.5 mL, , , Alvarez Sampson MD, 0.5 mL at 08/05/21 1601     triamcinolone (KENALOG-40) injection 20 mg, 20 mg, , , Alvarez Sampson MD, 20 mg at 08/05/21 1601     ALLERGIES:    Allergies   Allergen Reactions     Allopurinol Rash        SOCIAL HISTORY:   Social History     Socioeconomic History     Marital status:      Spouse name: Cristel     Number of children: 2     Years of education: Not on file     Highest education level: Not on file   Occupational History     Not on file   Tobacco Use     Smoking status: Never     Smokeless tobacco: Never   Vaping Use     Vaping Use: Never used   Substance and Sexual Activity     Alcohol use: Yes     Alcohol/week: 3.3 standard drinks of alcohol     Comment: occasionally weekends     Drug use: No     Sexual activity: Yes   Other Topics Concern      Service Not Asked     Blood Transfusions Not Asked     Caffeine Concern No     Occupational Exposure Not Asked     Hobby Hazards Not Asked     Sleep Concern No     Stress Concern No     Weight Concern No     Special Diet Not Asked     Back Care Not Asked     Exercise Yes     Bike Helmet Not Asked     Seat Belt Yes     Self-Exams Not Asked     Parent/sibling w/ CABG, MI or angioplasty before 65F 55M? Not Asked   Social History Narrative     Not on file     Social Determinants of Health     Financial Resource Strain: Not on file   Food Insecurity: Not on file   Transportation Needs: Not on file   Physical Activity: Not on file   Stress: Not on file   Social Connections: Not on file   Intimate Partner Violence: Not on file   Housing Stability: Not on file        FAMILY HISTORY:   Family History   Problem Relation Age of Onset     Cancer Father         liver/kidney     C.A.D. Father      Pacemaker Brother      Arthritis Mother         RA        EXAM:Vitals: /68 (BP Location:  "Left arm, Patient Position: Sitting, Cuff Size: Adult Large)   Temp 97.2  F (36.2  C) (Temporal)   Ht 1.854 m (6' 1\")   Wt 130.2 kg (287 lb)   BMI 37.87 kg/m    BMI= Body mass index is 37.87 kg/m .    General appearance: Patient is alert and fully cooperative with history & exam.  No sign of distress is noted during the visit.     Psychiatric: Affect is pleasant & appropriate.  Patient appears motivated to improve health.     Respiratory: Breathing is regular & unlabored while sitting.     HEENT: Hearing is intact to spoken word.  Speech is clear.  No gross evidence of visual impairment that would impact ambulation.     Vascular: DP & PT pulses are intact & regular, CFT immediate, positive digital hair growth bilaterally.  No significant edema or varicosities noted and skin temperature is normal to both lower extremities.     Neurologic: Lower extremity sensation is intact to light touch.  No evidence of weakness or contracture in the lower extremities.  No evidence of neuropathy.    Dermatologic: Adequate texture, turgor and tone about the integument.  No discoloration or thickening of the toenail however the left medial hallux nail border(s) are ingrown with localized erythema, discomfort and purulent drainage.     Musculoskeletal: Patient is ambulatory without assistive device or brace.  No gross ankle deformity noted.  No foot or ankle joint effusion is noted.     ASSESSMENT:       ICD-10-CM    1. Ingrowing nail  L60.0 REMOVAL NAIL/NAIL BED, PARTIAL OR COMPLETE          Plan:   8/28/2023  We reviewed medical history and EPIC chart.  After obtaining informed consent to permanently remove the left medial hallux nail(s), I utilized 3 cc of lidocaine plain to achieve local anesthesia per digit.  The toenails were then prepped with Betadine in usual fashion.  A quarter inch Penrose drain was then utilized for hemostasis.  100% of the toenail border was avulsed utilizing a nail elevator, english anvil, 6100 blade " and hemostat.  The proximal root portion of the nail was confirmed to be removed atraumatically.  Three applications of 89% phenol were applied to the surgical site for 30 seconds each followed by a curette after each application.  Surgical site was then flushed with alcohol and dressed with bacitracin and a nonadherent compression dressing.  Tourniquet was removed after approximately 3 minutes followed by immediate hyperemia to the distal aspect of the hallux.  Written postoperative instructions were dispensed and patient instructed to follow up in 1-2 weeks with any questions, pain,drainage, delayed healing or concerns.  I answered all questions to patients satisfaction.     If patient calls in the next 1-3 weeks with continued redness, pain or drainage I would recommend beginning oral Keflex 500 mg, 4 times a day ×10 days, after confirming there is no allergy.      Dedrick Saucedo DPM              Again, thank you for allowing me to participate in the care of your patient.        Sincerely,        Dedrick Saucedo DPM

## 2024-01-06 ENCOUNTER — HEALTH MAINTENANCE LETTER (OUTPATIENT)
Age: 74
End: 2024-01-06

## 2024-02-20 ENCOUNTER — OFFICE VISIT (OUTPATIENT)
Dept: FAMILY MEDICINE | Facility: CLINIC | Age: 74
End: 2024-02-20
Payer: MEDICARE

## 2024-02-20 VITALS
OXYGEN SATURATION: 95 % | RESPIRATION RATE: 18 BRPM | SYSTOLIC BLOOD PRESSURE: 130 MMHG | BODY MASS INDEX: 37.17 KG/M2 | HEART RATE: 78 BPM | HEIGHT: 73 IN | TEMPERATURE: 97.4 F | WEIGHT: 280.44 LBS | DIASTOLIC BLOOD PRESSURE: 66 MMHG

## 2024-02-20 DIAGNOSIS — E66.01 SEVERE OBESITY (BMI 35.0-39.9) WITH COMORBIDITY (H): ICD-10-CM

## 2024-02-20 DIAGNOSIS — R91.8 PULMONARY NODULES: ICD-10-CM

## 2024-02-20 DIAGNOSIS — I77.810 ASCENDING AORTA DILATATION (H): ICD-10-CM

## 2024-02-20 DIAGNOSIS — Z12.5 SCREENING FOR PROSTATE CANCER: ICD-10-CM

## 2024-02-20 DIAGNOSIS — G47.33 OSA (OBSTRUCTIVE SLEEP APNEA): ICD-10-CM

## 2024-02-20 DIAGNOSIS — E78.5 HYPERLIPIDEMIA WITH TARGET LDL LESS THAN 130: ICD-10-CM

## 2024-02-20 DIAGNOSIS — E11.9 TYPE 2 DIABETES MELLITUS WITHOUT COMPLICATION, WITHOUT LONG-TERM CURRENT USE OF INSULIN (H): Primary | ICD-10-CM

## 2024-02-20 DIAGNOSIS — J32.2 SINUSITIS CHRONIC, ETHMOIDAL: ICD-10-CM

## 2024-02-20 DIAGNOSIS — J84.9 ILD (INTERSTITIAL LUNG DISEASE) (H): ICD-10-CM

## 2024-02-20 DIAGNOSIS — C82.80 LYMPHOMA, SMALL LYMPHOID, FOLLICULAR (H): ICD-10-CM

## 2024-02-20 DIAGNOSIS — E11.69 TYPE 2 DIABETES MELLITUS WITH OTHER SPECIFIED COMPLICATION, WITHOUT LONG-TERM CURRENT USE OF INSULIN (H): ICD-10-CM

## 2024-02-20 LAB
ALBUMIN SERPL BCG-MCNC: 4.6 G/DL (ref 3.5–5.2)
ALP SERPL-CCNC: 115 U/L (ref 40–150)
ALT SERPL W P-5'-P-CCNC: 22 U/L (ref 0–70)
ANION GAP SERPL CALCULATED.3IONS-SCNC: 13 MMOL/L (ref 7–15)
AST SERPL W P-5'-P-CCNC: 30 U/L (ref 0–45)
BILIRUB SERPL-MCNC: 0.6 MG/DL
BUN SERPL-MCNC: 15.1 MG/DL (ref 8–23)
CALCIUM SERPL-MCNC: 9.3 MG/DL (ref 8.8–10.2)
CHLORIDE SERPL-SCNC: 102 MMOL/L (ref 98–107)
CHOLEST SERPL-MCNC: 111 MG/DL
CREAT SERPL-MCNC: 0.95 MG/DL (ref 0.67–1.17)
CREAT UR-MCNC: 171.6 MG/DL
DEPRECATED HCO3 PLAS-SCNC: 23 MMOL/L (ref 22–29)
EGFRCR SERPLBLD CKD-EPI 2021: 85 ML/MIN/1.73M2
FASTING STATUS PATIENT QL REPORTED: YES
GLUCOSE SERPL-MCNC: 128 MG/DL (ref 70–99)
HBA1C MFR BLD: 7.4 %
HDLC SERPL-MCNC: 26 MG/DL
LDLC SERPL CALC-MCNC: 28 MG/DL
MICROALBUMIN UR-MCNC: 17.2 MG/L
MICROALBUMIN/CREAT UR: 10.02 MG/G CR (ref 0–17)
NONHDLC SERPL-MCNC: 85 MG/DL
POTASSIUM SERPL-SCNC: 4.4 MMOL/L (ref 3.4–5.3)
PROT SERPL-MCNC: 7.1 G/DL (ref 6.4–8.3)
PSA SERPL DL<=0.01 NG/ML-MCNC: 3.28 NG/ML (ref 0–6.5)
SODIUM SERPL-SCNC: 138 MMOL/L (ref 135–145)
TRIGL SERPL-MCNC: 284 MG/DL

## 2024-02-20 PROCEDURE — 80053 COMPREHEN METABOLIC PANEL: CPT | Performed by: STUDENT IN AN ORGANIZED HEALTH CARE EDUCATION/TRAINING PROGRAM

## 2024-02-20 PROCEDURE — 83036 HEMOGLOBIN GLYCOSYLATED A1C: CPT | Performed by: STUDENT IN AN ORGANIZED HEALTH CARE EDUCATION/TRAINING PROGRAM

## 2024-02-20 PROCEDURE — 36415 COLL VENOUS BLD VENIPUNCTURE: CPT | Performed by: STUDENT IN AN ORGANIZED HEALTH CARE EDUCATION/TRAINING PROGRAM

## 2024-02-20 PROCEDURE — 99207 PR FOOT EXAM NO CHARGE: CPT | Performed by: STUDENT IN AN ORGANIZED HEALTH CARE EDUCATION/TRAINING PROGRAM

## 2024-02-20 PROCEDURE — 82043 UR ALBUMIN QUANTITATIVE: CPT | Performed by: STUDENT IN AN ORGANIZED HEALTH CARE EDUCATION/TRAINING PROGRAM

## 2024-02-20 PROCEDURE — G0103 PSA SCREENING: HCPCS | Performed by: STUDENT IN AN ORGANIZED HEALTH CARE EDUCATION/TRAINING PROGRAM

## 2024-02-20 PROCEDURE — 82570 ASSAY OF URINE CREATININE: CPT | Performed by: STUDENT IN AN ORGANIZED HEALTH CARE EDUCATION/TRAINING PROGRAM

## 2024-02-20 PROCEDURE — 80061 LIPID PANEL: CPT | Performed by: STUDENT IN AN ORGANIZED HEALTH CARE EDUCATION/TRAINING PROGRAM

## 2024-02-20 PROCEDURE — 99214 OFFICE O/P EST MOD 30 MIN: CPT | Performed by: STUDENT IN AN ORGANIZED HEALTH CARE EDUCATION/TRAINING PROGRAM

## 2024-02-20 RX ORDER — FLUTICASONE PROPIONATE 50 MCG
1 SPRAY, SUSPENSION (ML) NASAL DAILY
Qty: 18.2 ML | Refills: 1 | Status: SHIPPED | OUTPATIENT
Start: 2024-02-20

## 2024-02-20 RX ORDER — RESPIRATORY SYNCYTIAL VIRUS VACCINE 120MCG/0.5
0.5 KIT INTRAMUSCULAR ONCE
Qty: 1 EACH | Refills: 0 | Status: CANCELLED | OUTPATIENT
Start: 2024-02-20 | End: 2024-02-20

## 2024-02-20 NOTE — PROGRESS NOTES
"  Assessment & Plan   Problem List Items Addressed This Visit          Respiratory    Sinusitis chronic, ethmoidal    Relevant Medications    fluticasone (FLONASE) 50 MCG/ACT nasal spray    WINSTON (obstructive sleep apnea)    ILD (interstitial lung disease) (H)    Pulmonary nodules       Digestive    Severe obesity (BMI 35.0-39.9) with comorbidity (H)    Relevant Medications    semaglutide (OZEMPIC) 2 MG/3ML pen       Endocrine    Type 2 diabetes mellitus with other specified complication, without long-term current use of insulin (H) - Primary    Relevant Medications    semaglutide (OZEMPIC) 2 MG/3ML pen       Circulatory    Ascending aorta dilatation (H24)       Hematologic    Lymphoma, small lymphoid, follicular (H)     Other Visit Diagnoses       Screening for prostate cancer        Relevant Orders    PSA, screen (Completed)           Patient with recent cold and long history of chronic sinusitis.  No concern for bacterial infection today.  Plan to treat with Flonase again and nasal rinses if things are helpful in combination in the past.  Should do trial of azelastine if not improving.  History of follicular lymphoma and following with oncology.  Upcoming follow-up scheduled.  Diabetes stable but is interested in Ozempic and we did discuss risk of thyroid cancer as well as family history which is negative for him.  Also discussed possible side effects and he is wanting to try this now.  Plan for trial and confirm with his oncologist.  I think would be helpful for weight loss as well as further sugar control. Previously suspect interstitial lung disease with restrictive pattern on previous PFT. Follow up with pulmonology. Symptoms unchanged. Previous AA stable on last  CT and will be repeat with oncology.     BMI  Estimated body mass index is 37 kg/m  as calculated from the following:    Height as of this encounter: 1.854 m (6' 1\").    Weight as of this encounter: 127.2 kg (280 lb 7 oz).   Weight management plan: " Discussed healthy diet and exercise guidelines         Roberto Wilkinson is a 73 year old, presenting for the following health issues:  Diabetes, Lipids, and URI (For a week)        2/20/2024    11:00 AM   Additional Questions   Roomed by Jennifer AGUIAR MA     History of Present Illness       Reason for visit:  Cold symtoms  Symptom onset:  1-2 weeks ago  Symptoms include:  Uri  Symptom intensity:  Moderate  Symptom progression:  Staying the same  Had these symptoms before:  Yes  Has tried/received treatment for these symptoms:  No    He eats 0-1 servings of fruits and vegetables daily.He consumes 0 sweetened beverage(s) daily.He exercises with enough effort to increase his heart rate 10 to 19 minutes per day.  He exercises with enough effort to increase his heart rate 3 or less days per week.   He is taking medications regularly.         Diabetes Follow-up    How often are you checking your blood sugar? A few times a month  What time of day are you checking your blood sugars (select all that apply)?   First thing in the morning  Have you had any blood sugars above 200?  No  Have you had any blood sugars below 70?  No  What symptoms do you notice when your blood sugar is low?  None  What concerns do you have today about your diabetes? None   Do you have any of these symptoms? (Select all that apply)  No numbness or tingling in feet.  No redness, sores or blisters on feet.  No complaints of excessive thirst.  No reports of blurry vision.  No significant changes to weight.  Have you had a diabetic eye exam in the last 12 months? No        BP Readings from Last 2 Encounters:   02/20/24 130/66   08/28/23 122/68     Hemoglobin A1C (%)   Date Value   02/20/2024 7.4 (H)   08/02/2023 7.2 (H)   09/17/2015 5.4     LDL Cholesterol Calculated (mg/dL)   Date Value   02/20/2024 28   07/08/2022 15   07/07/2020 29   06/13/2019 31             Hyperlipidemia Follow-Up    Are you regularly taking any medication or supplement to lower  "your cholesterol?   Yes- atorvastatin  Are you having muscle aches or other side effects that you think could be caused by your cholesterol lowering medication?  No  How many servings of fruits and vegetables do you eat daily?  2-3  On average, how many sweetened beverages do you drink each day (Examples: soda, juice, sweet tea, etc.  Do NOT count diet or artificially sweetened beverages)?   0  How many days per week do you exercise enough to make your heart beat faster? 3 or less  How many minutes a day do you exercise enough to make your heart beat faster? 9 or less  How many days per week do you miss taking your medication? 0        Review of Systems  Constitutional, HEENT, cardiovascular, pulmonary, GI, , musculoskeletal, neuro, skin, endocrine and psych systems are negative, except as otherwise noted.      Objective    /66   Pulse 78   Temp 97.4  F (36.3  C) (Temporal)   Resp 18   Ht 1.854 m (6' 1\")   Wt 127.2 kg (280 lb 7 oz)   SpO2 95%   BMI 37.00 kg/m    Body mass index is 37 kg/m .    Physical Exam   GENERAL: alert and no distress  EYES: Eyes grossly normal to inspection, pupillary defect bilaterally, and conjunctivae and sclerae normal  HENT: ear canals and TM's normal, nose and mouth without ulcers or lesions  NECK: no adenopathy, no asymmetry, masses, or scars  RESP: Mild dry crackles on right lower lobe, rhonchi or wheezes  CV: regular rate and rhythm, normal S1 S2, no S3 or S4, no murmur, click or rub, no peripheral edema  ABDOMEN: soft, nontender, no hepatosplenomegaly, no masses and bowel sounds normal  MS: no gross musculoskeletal defects noted, no edema, sensation with monofilament slightly decreased on greater left toe otherwise intact  SKIN: no suspicious lesions or rashes  NEURO: Normal strength and tone, mentation intact and speech normal  PSYCH: mentation appears normal, affect normal/bright          Signed Electronically by: Oskar Conrad MD    "

## 2024-02-23 NOTE — PROGRESS NOTES
Does Deandre have a CPAP/Bipap?  Yes     Type of mask: full face     [facility]: [place] [phone]    [email or web address]    [place] Sleep Scale:  7  Stop Ban  BMI:36.94  Neck: 36.94         Outpatient Sleep Medicine Consultation:      Name: Deandre Merchant MRN# [id number]   Age: 74 year old YOB: 1950     Date of Consultation: 2024  Consultation is requested by: No referring provider defined for this encounter. No ref. provider found  Primary care provider: Oskar Conrad       Reason for Sleep Consult:     Deandre Merchant is sent by No ref. provider found for a sleep consultation regarding mild sleep apnea per 2019 study. He has been on CPAP since that time and derives significant benefit from use. He is here today to get updated orders for supplies and a new machine, otherwise everything is going well.     Patient s Reason for visit  Deandre Merchant main reason for visit: checkup   get new equipment  Patient states problem(s) started:    Deandre Merchant's goals for this visit: new equipment           Assessment and Plan:     Summary Sleep Diagnoses:  Mild mike per 2019 study, well managed on APAP 5-15    Comorbid Diagnoses:  T2DM  HTN  HLD  ILD      Summary Recommendations:  Mild sleep apnea well managed on CPAP, replacement device  and supplies ordered  No orders of the defined types were placed in this encounter.        Summary Counseling:    Sleep Testing Reviewed  Obstructive Sleep Apnea Reviewed  Complications of Untreated Sleep Apnea Reviewed      Medical Decision-making:   Educational materials provided in instructions    Total time spent reviewing medical records, history and physical examination, review of previous testing and interpretation as well as documentation on this date:35 min    CC: No ref. provider found          History of Present Illness:     Past Sleep Evaluations:    SLEEP-WAKE SCHEDULE:     Work/School Days:  Patient goes to school/work: No   Usually gets into bed at midnight  Takes patient about quickly to fall asleep  Has trouble falling asleep maybe two nights per week nights per week  Wakes up in the middle of the night oncw times.  Wakes up due to Use the bathroom;Uncertain  He has trouble falling back asleep twice a month times a week.   It usually takes half hour to get back to sleep  Patient is usually up at    Uses alarm: No    Weekends/Non-work Days/All Other Days:  Usually gets into bed at midnight   Takes patient about   to fall asleep  Patient is usually up at eight  Uses alarm: No    Sleep Need  Patient gets  eight hours sleep on average   Patient thinks he needs about eight sleep    Deandre A Mayur prefers to sleep in this position(s): Side   Patient states they do the following activities in bed: Other    Naps  Patient takes a purposeful nap three times a week and naps are usually hour in duration  He feels better after a nap: Yes  He dozes off unintentionally five days per week  Patient has had a driving accident or near-miss due to sleepiness/drowsiness: No      SLEEP DISRUPTIONS:    Breathing/Snoring  Patient snores:No  Other people complain about his snoring: No  Patient has been told he stops breathing in his sleep:Yes  He has issues with the following: Morning mouth dryness    Movement:  Patient gets pain, discomfort, with an urge to move:     It happens when he is resting:     It happens more at night:  No  Patient has been told he kicks his legs at night:  No     Behaviors in Sleep:  Deandre A Mayur has experienced the following behaviors while sleeping:    He has experienced sudden muscle weakness during the day: No      Is there anything else you would like your sleep provider to know: no        CAFFEINE AND OTHER SUBSTANCES:    Patient consumes caffeinated beverages per day:  two  Last caffeine use is usually: three pm  List of any prescribed or over the counter stimulants that patient takes:  nikunj  List of any prescribed or over the counter sleep medication patient takes:    List of previous sleep medications that patient has tried: tylenol pm  Patient drinks alcohol to help them sleep: No  Patient drinks alcohol near bedtime: No    Family History:  Patient has a family member been diagnosed with a sleep disorder: No            SCALES:    EPWORTH SLEEPINESS SCALE         2/29/2024     9:34 AM    [place] Sleepiness Scale ( ALEXANDRIA Bauer  [id number]<br>ESS - [place]/English - Final version - 21 Nov 07 - [facility] [place].)   Sitting and reading Slight chance of dozing   Watching TV Moderate chance of dozing   Sitting, inactive in a public place (e.g. a theatre or a meeting) Slight chance of dozing   As a passenger in a car for an hour without a break Slight chance of dozing   Lying down to rest in the afternoon when circumstances permit Slight chance of dozing   Sitting and talking to someone Would never doze   Sitting quietly after a lunch without alcohol Slight chance of dozing   In a car, while stopped for a few minutes in traffic Would never doze   [place] Score (MC) 7   [place] Score (Sleep) 7         INSOMNIA SEVERITY INDEX (LUCA)          2/29/2024     9:10 AM   Insomnia Severity Index (LUCA)   Difficulty falling asleep 0   Difficulty staying asleep 0   Problems waking up too early 0   How SATISFIED/DISSATISFIED are you with your CURRENT sleep pattern? 2   How NOTICEABLE to others do you think your sleep problem is in terms of impairing the quality of your life? 2   How WORRIED/DISTRESSED are you about your current sleep problem? 2   To what extent do you consider your sleep problem to INTERFERE with your daily functioning (e.g. daytime fatigue, mood, ability to function at work/daily chores, concentration, memory, mood, etc.) CURRENTLY? 1   LUCA Total Score 7       Guidelines for Scoring/Interpretation:  Total score categories:  0-7 = No clinically significant insomnia   8-14 = Subthreshold insomnia  "  15-21 = Clinical insomnia (moderate severity)  22-28 = Clinical insomnia (severe)  Used via courtesy of www.myhealth.va.gov with permission from Wilver Vann PhD., [facility]      STOP BANG         2/29/2024     9:35 AM   STOP BANG Questionnaire (  2008, the American Society of Anesthesiologists, Inc. [employer]   1. Snoring - Do you snore loudly (louder than talking or loud enough to be heard through closed doors)? No   2. Tired - Do you often feel tired, fatigued, or sleepy during daytime? Yes   3. Observed - Has anyone observed you stop breathing during your sleep? Yes   4. Blood pressure - Do you have or are you being treated for high blood pressure? Yes   5. BMI - BMI more than 35 kg/m2? Yes   6. Age - Age over 50 yr old? Yes   7. Neck circumference - Neck circumference greater than 40 cm? Yes   8. Gender - Gender male? Yes   STOP BANG Score (MC): 6 (High risk of WINSTON)   Neck Cir (cm) Clinic: 46 cm   B/P Clinic: 110/60   BMI Clinic: 36.94         GAD7         No data to display                    CAGE-AID         No data to display                  CAGE-AID reprinted with permission from the [place] Medical Journal, BERT Tim. and SAMY Hirsch, \"Conjoint screening questionnaires for alcohol and drug abuse\" [place] Medical Journal 94: 135-140, 1995.      PATIENT HEALTH QUESTIONNAIRE-9 (PHQ - 9)         No data to display                  Developed by Cielo Rogers, Hanane Weiner, Nicolás Franco and colleagues, with an educational gavin from [employer] No permission required to reproduce, translate, display or distribute.        Allergies:    Allergies   Allergen Reactions    Allopurinol Rash       Medications:    Current Outpatient Medications   Medication Sig Dispense Refill    Acetaminophen (TYLENOL PO) Take 1,000 mg by mouth      atorvastatin (LIPITOR) 20 MG tablet Take 1 tablet (20 mg) by mouth daily 90 tablet 3    cetirizine (ZYRTEC) 10 MG tablet Take 10 " mg by mouth daily      metFORMIN (GLUCOPHAGE XR) 500 MG 24 hr tablet Take 4 tablets (2,000 mg) by mouth daily (with dinner) 360 tablet 3    metoprolol succinate ER (TOPROL XL) 50 MG 24 hr tablet Take 1 tablet (50 mg) by mouth daily 90 tablet 3    blood glucose (NO BRAND SPECIFIED) lancets standard Use to test blood sugar once daily. 1 each 3    blood glucose (NO BRAND SPECIFIED) test strip Use to test blood sugar once daily. 50 strip 4    chlorpheniramine (CHLOR-TRIMETON) 4 MG tablet Take 4 mg by mouth every 6 hours as needed for allergies or rhinitis (Patient not taking: Reported on 8/2/2023)      fluticasone (FLONASE) 50 MCG/ACT nasal spray Spray 1 spray into both nostrils daily (Patient not taking: Reported on 2/29/2024) 18.2 mL 1    fluticasone (FLONASE) 50 MCG/ACT nasal spray Spray 2 sprays into both nostrils daily (Patient not taking: Reported on 8/2/2023) 16 g 11    order for DME Equipment ordered: Poup Auto PAP Mask type: Full face  Settings: 5-15 cm h2o      semaglutide (OZEMPIC) 2 MG/3ML pen Inject 0.25 mg Subcutaneous every 7 days Increase to 0.5 mg  every 7 days after 4 weeks. (Patient not taking: Reported on 2/29/2024) 3 mL 1    triamcinolone (KENALOG) 0.1 % external ointment Apply sparingly to affected area twice daily as needed. (Patient not taking: Reported on 8/2/2023) 15 g 1       Problem List:  Patient Active Problem List    Diagnosis Date Noted    ILD (interstitial lung disease) (H) 02/20/2024     Priority: Medium    Pulmonary nodules 02/20/2024     Priority: Medium    Lesion of lumbar spine 01/02/2023     Priority: Medium    Type 2 diabetes mellitus with other specified complication, without long-term current use of insulin (H) 07/18/2022     Priority: Medium    History of cataract 07/08/2022     Priority: Medium    Trigger finger, left middle finger 08/05/2021     Priority: Medium    Severe obesity (BMI 35.0-39.9) with comorbidity (H) 07/07/2020     Priority: Medium    Essential hypertension  07/07/2020     Priority: Medium    Ascending aorta dilatation (H24) 07/07/2020     Priority: Medium    WINSTON (obstructive sleep apnea) 08/31/2019     Priority: Medium     8/29/2019 [place] Diagnostic Sleep Study (288.0 lbs) - AHI 13.9, RDI 34.0, Supine AHI 14.3, REM AHI 25.8, Low O2 86.0%, Time Spent ?88% 1.5 minutes / Time Spent ?89% 3.3 minutes.      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 Provider to review and confirm.      BPH (benign prostatic hyperplasia) 02/24/2010     Priority: Medium    Impotence of organic origin 03/14/2006     Priority: Medium    Retinal detachment 03/14/2006     Priority: Medium     Problem list name updated by automated process. 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    Lymphadenopathy     mediastinal & retroperitoneal    Non Hodgkin's lymphoma (H)     Non-Hodgkin's lymphoma of multiple sites (H) 6/26/2012    Polyps, colonic     Retinal detachment      Past Surgical History:   Procedure Laterality Date    COLONOSCOPY N/A 3/1/2017    Procedure: COLONOSCOPY;  Surgeon: Dakota Wall MD;  Location:  GI    COLONOSCOPY N/A 10/3/2022    Procedure: COLONOSCOPY;  Surgeon: Jose J Bee MD;  Location:  GI    ESOPHAGOSCOPY, GASTROSCOPY, DUODENOSCOPY (EGD), COMBINED  4/21/2013    Procedure: COMBINED ESOPHAGOSCOPY, GASTROSCOPY, DUODENOSCOPY (EGD), BIOPSY SINGLE OR  MULTIPLE;;  Surgeon: Torrey Cosme MD;  Location: [facility] GI    ESOPHAGOSCOPY, GASTROSCOPY, DUODENOSCOPY (EGD), COMBINED  10/9/2013    Procedure: COMBINED ESOPHAGOSCOPY, GASTROSCOPY, DUODENOSCOPY (EGD), BIOPSY SINGLE OR MULTIPLE;  ESOPHAGOSCOPY, GASTROSCOPY, DUODENOSCOPY (EGD) with multiple biopsies;  Surgeon: Shay Sauer MD;  Location: [facility]    IR BONE BIOPSY VERTEBRAL  1/12/2023    LAPAROTOMY EXPLORATORY  4/24/2013    Procedure: LAPAROTOMY EXPLORATORY;  Exploratory Laparotomy and lysis of adhesions.;  Surgeon: Genevieve Barros MD;  Location: [facility] OR    ZZHC COLONOSCOPY W/WO BRUSH/WASH  07/12/06       Social History:  Social History     Socioeconomic History    Marital status:      Spouse name: Cristel    Number of children: 2    Years of education: Not on file    Highest education level: Not on file   Occupational History    Not on file   Tobacco Use    Smoking status: Never    Smokeless tobacco: Never   Vaping Use    Vaping Use: Never used   Substance and Sexual Activity    Alcohol use: Yes     Alcohol/week: 3.3 standard drinks of alcohol     Comment: occasionally weekends    Drug use: No    Sexual activity: Yes   Other Topics Concern     Service Not Asked    Blood Transfusions Not Asked    Caffeine Concern No    Occupational Exposure Not Asked    Hobby Hazards Not Asked    Sleep Concern No    Stress Concern No    Weight Concern No    Special Diet Not Asked    Back Care Not Asked    Exercise Yes    Bike Helmet Not Asked    Seat Belt Yes    Self-Exams Not Asked    Parent/sibling w/ CABG, MI or angioplasty before 65F 55M? Not Asked   Social History Narrative    Not on file     Social Determinants of Health     Financial Resource Strain: Not on file   Food Insecurity: Not on file   Transportation Needs: Not on file   Physical Activity: Not on file   Stress: Not on file   Social Connections: Not on file   Interpersonal Safety: Not on file   Housing Stability: Not on file       Family  "History:  Family History   Problem Relation Age of Onset    Cancer Father         liver/kidney    C.A.D. Father     Pacemaker Brother     Arthritis Mother         RA       Review of Systems:  A complete review of systems reviewed by me is negative with the exeption of what has been mentioned in the history of present illness.  In the last TWO WEEKS have you experienced any of the following symptoms?  Night Sweats: No  Weight Gain: No  Pain at Night: No  Double Vision: No  Changes in Vision: No  Difficulty Breathing through Nose: No  Sore Throat in Morning: No  Dry Mouth in the Morning: Yes  Shortness of Breath Lying Flat: No  Shortness of Breath With Activity: Yes  Awakening with Shortness of Breath: No  Increased Cough: Yes  Heart Racing at Night: No  Swelling in Feet or Legs: No  Diarrhea at Night: No  Heartburn at Night: No  Urinating More than Once at Night: No  Losing Control of Urine at Night: No  Joint Pains at Night: Yes  Headaches in Morning: No  Weakness in Arms or Legs: No  Depressed Mood: No     Physical Examination:  Vitals: /60   Pulse 90   Ht 1.854 m (6' 1\")   Wt 127 kg (280 lb)   SpO2 95%   BMI 36.94 kg/m    BMI= Body mass index is 36.94 kg/m .    Neck Cir (cm): 46 cm      GENERAL APPEARANCE: healthy, alert, and no distress  EYES: conjunctivae and sclerae normal, abnormal pupils  HENT: nose and mouth without ulcers or lesions and oropharynx crowded  NECK: no adenopathy, no asymmetry, masses, or scars, and trachea midline and normal to palpation  RESP: lungs clear to auscultation - no rales, rhonchi or wheezes  CV: regular rates and rhythm, normal S1 S2, no S3 or S4, and no murmur, click or rub  MS: extremities normal- no gross deformities noted  PSYCH: mentation appears normal and affect normal/bright  Mallampati Class: I.  Tonsillar Stage: 1  hidden by pillars.         Data: All pertinent previous laboratory data reviewed     Recent Labs   Lab Test 02/20/24  1211 03/06/23  0845    " "137   POTASSIUM 4.4 4.4   CHLORIDE 102 99   CO2 23 25   ANIONGAP 13 13   * 140*   BUN 15.1 12.6   CR 0.95 0.88   ANDRÉS 9.3 8.8       Recent Labs   Lab Test 01/12/23  1017 07/08/22  0844   WBC  --  6.3   RBC  --  4.76   HGB 14.4 14.8   HCT  --  42.7   MCV  --  90   MCH  --  31.1   MCHC  --  34.7   RDW  --  12.6    158       Recent Labs   Lab Test 02/20/24  1211   PROTTOTAL 7.1   ALBUMIN 4.6   BILITOTAL 0.6   ALKPHOS 115   AST 30   ALT 22       TSH (mU/L)   Date Value   05/06/2014 1.60   04/17/2013 3.57       No results found for: \"UAMP\", \"UBARB\", \"BENZODIAZEUR\", \"UCANN\", \"UCOC\", \"OPIT\", \"UPCP\"    No results found for: \"IRONSAT\", \"[id number]\", \"DIANNE\"    pH Arterial   Date Value   04/18/2013 7.40 pH   05/30/2002 5.5     pO2 Arterial (mm Hg)   Date Value   04/18/2013 62 (L)     pCO2 Arterial (mm Hg)   Date Value   04/18/2013 36     Bicarbonate Arterial (mmol/L)   Date Value   04/18/2013 22       @LABRCNTIPR(phv:4,pco2v:4,po2v:4,hco3v:4,jaelyn:4,o2per:4)@    Echocardiology: No results found for this or any previous visit (from the past 4320 hour(s)).    Chest x-ray: No results found for this or any previous visit from the past 365 days.      Chest CT:   CT Chest/Abdomen/Pelvis w Contrast 03/06/2023    Narrative  CT CHEST/ABDOMEN/PELVIS WITH CONTRAST 3/6/2023 9:25 AM    CLINICAL HISTORY: ILD (interstitial lung disease) (H). Non-Hodgkin's  lymphoma of multiple sites (H). Lumbar spine tumor.    TECHNIQUE: CT scan of the chest, abdomen, and pelvis was performed  following injection of IV contrast. Multiplanar reformats were  obtained. Dose reduction techniques were used.  CONTRAST: Isovue 370, 60    COMPARISON: CT of the chest, abdomen, and pelvis performed 3/2/2022.    FINDINGS:  LUNGS AND PLEURA: No pleural effusions. Subpleural reticular and  groundglass opacities about the periphery of both lower lungs,  unchanged. No evidence for significant associated bronchiectasis or  honeycombing. A 0.7 cm nodule along the " right minor fissure is  unchanged (series 4 image 154). A 0.3 cm right upper lobe pulmonary  nodule (series 4 image 101) is also unchanged.    MEDIASTINUM/AXILLAE: A mildly enlarged subcarinal lymph node in the  mediastinum measures 1.3 cm, unchanged. No other enlarged lymph nodes  in the chest. No pericardial effusion. Aneurysmal dilatation of the  ascending thoracic aorta is unchanged, measuring 4.7 cm.    CORONARY ARTERY CALCIFICATION: Moderate.    HEPATOBILIARY: Cholelithiasis and/or sludge within the gallbladder.  Mild hepatic steatosis. No hepatic masses.    PANCREAS: Normal.    SPLEEN: Mild splenomegaly is unchanged. The spleen measures 14.5 cm in  length.    ADRENAL GLANDS: Normal.    KIDNEYS/BLADDER: Small exophytic cyst in the upper pole of the left  kidney is unchanged, and would require no specific follow-up. Mild  prominence of both distal ureters, greater on the left, is unchanged,  and is likely a chronic finding. No significant dilatation of the  intrarenal collecting systems.    BOWEL: No bowel obstruction. No convincing evidence for colitis or  diverticulitis. Unremarkable appendix.    PELVIC ORGANS: Mild prostatic enlargement and central calcification.    LYMPH NODES: No enlarged lymph nodes in the abdomen or pelvis.  Scattered mildly prominent retroperitoneal, mesenteric, and zarina  hepatis lymph nodes are unchanged. Mild hazy increased density in the  mesenteric fat is unchanged, and is likely related to treated  lymphoma.    VASCULATURE: Mild atherosclerotic aortoiliac calcification.    ADDITIONAL FINDINGS: Several fat-containing ventral hernias in the  periumbilical and supraumbilical region are unchanged.    MUSCULOSKELETAL: Degenerative changes in the thoracolumbar spine. A 2  cm sclerotic lesion in the L2 vertebral body is unchanged.    Impression  IMPRESSION:  1.  A mildly enlarged subcarinal lymph node and scattered mildly  prominent lymph nodes in the upper abdomen are unchanged.  2.   "Mild splenomegaly is unchanged.  3.  Reticular and groundglass opacities about the periphery of both  lower lungs are unchanged, and are most likely fibrotic. Differential  considerations include an NSIP and early UIP. No associated  honeycombing.  4.  Aneurysmal dilatation of the ascending thoracic aorta is unchanged  at 4.7 cm.  5.  A 2 cm sclerotic lesion in the L2 vertebral body is unchanged.  6.  Cholelithiasis.  7.  Mild prostatic enlargement.    HANNA MIDDLETON MD      SYSTEM ID:  [id number]      PFT: Most Recent [place] Pulmonary Function Testing    FVC-Pred   Date Value Ref Range Status   08/01/2022 4.55 L      FVC-Pre   Date Value Ref Range Status   08/01/2022 3.05 L      FVC-%Pred-Pre   Date Value Ref Range Status   08/01/2022 67 %      FEV1-Pre   Date Value Ref Range Status   08/01/2022 2.19 L      FEV1-%Pred-Pre   Date Value Ref Range Status   08/01/2022 64 %      FEV1FVC-Pred   Date Value Ref Range Status   08/01/2022 75 %      FEV1FVC-Pre   Date Value Ref Range Status   08/01/2022 72 %      No results found for: \"20029\"  FEFMax-Pred   Date Value Ref Range Status   08/01/2022 8.77 L/sec      FEFMax-Pre   Date Value Ref Range Status   08/01/2022 5.69 L/sec      FEFMax-%Pred-Pre   Date Value Ref Range Status   08/01/2022 64 %      ExpTime-Pre   Date Value Ref Range Status   08/01/2022 7.78 sec      FIFMax-Pre   Date Value Ref Range Status   08/01/2022 1.99 L/sec      FEV1FEV6-Pred   Date Value Ref Range Status   08/01/2022 78 %      FEV1FEV6-Pre   Date Value Ref Range Status   08/01/2022 72 %      No results found for: \"20055\"      Laura Summers CMA 2/29/2024           Sleep Apnea - Follow-up Visit:    Impression/Plan:     Mild Sleep apnea. He is Tolerating PAP well, apnea is welll controlled. He benefits from treatment. . Daytime symptoms are stable.   Replacement device and supplies ordered.     Deandre Merchant will follow up in about 3 month(s).     45 min minutes spent with patient, all of which were " spent face-to-face counseling, consulting, coordinating plan of care.      CC:  Oskar Conrad,         History of Present Illness:  Chief Complaint   Patient presents with    Sleep Problem     Re establish care for CPAP        Deandre Merchant presents for follow-up of their mild sleep apnea, managed with CPAP.     No specialty comments available.    Do you use a CPAP Machine at home: Yes  Overall, on a scale of 0-10 how would you rate your CPAP (0 poor, 10 great): nine    What type of mask do you use: Full Face Mask  Is your mask comfortable: Yes  If not, why:      Is your mask leaking: No  If yes, where do you feel it:    How many night per week does the mask leak (0-7):      Do you notice snoring with mask on: No  Do you notice gasping arousals with mask on: No  Are you having significant oral or nasal dryness: No  Is the pressure setting comfortable: Yes  If not, why:      What is your typical bedtime: tweve  How long does it take you to go to sleep on PAP therapy: quickly  What time do you typically get out of bed for the day: eight  How many hours on average per night are you using PAP therapy: eight  How many hours are you sleeping per night:    Do you feel well rested in the morning: Yes      [employer]Med   CPAP  cmH2O 30 day usage data:  96% of days with > 4 hours of use. 0/30 days with no use.   Average use 394 minutes per day.   95%ile Leak 28.26 L/min.   AHI 2.23 events per hour.     Auto-PAP 5.0 - 15.0 cmH2O 30 day usage data:    96% of days with > 4 hours of use. 0/30 days with no use.   Average use 394 minutes per day.   95%ile Leak 28.26 L/min.   CPAP 95% pressure 13.1 cm.   AHI 2.23 events per hour.        EPWORTH SLEEPINESS SCALE         2/29/2024     9:34 AM    [place] Sleepiness Scale ( [place] Bauer  [id number]<br>ESS - [place]/English - Final version - 21 Nov 07 - [facility] [place].)   Sitting and reading Slight chance of dozing   Watching TV Moderate chance of dozing   Sitting, inactive in a  public place (e.g. a theatre or a meeting) Slight chance of dozing   As a passenger in a car for an hour without a break Slight chance of dozing   Lying down to rest in the afternoon when circumstances permit Slight chance of dozing   Sitting and talking to someone Would never doze   Sitting quietly after a lunch without alcohol Slight chance of dozing   In a car, while stopped for a few minutes in traffic Would never doze   [place] Score (MC) 7   [place] Score (Sleep) 7       INSOMNIA SEVERITY INDEX (LUCA)          2/29/2024     9:10 AM   Insomnia Severity Index (LUCA)   Difficulty falling asleep 0   Difficulty staying asleep 0   Problems waking up too early 0   How SATISFIED/DISSATISFIED are you with your CURRENT sleep pattern? 2   How NOTICEABLE to others do you think your sleep problem is in terms of impairing the quality of your life? 2   How WORRIED/DISTRESSED are you about your current sleep problem? 2   To what extent do you consider your sleep problem to INTERFERE with your daily functioning (e.g. daytime fatigue, mood, ability to function at work/daily chores, concentration, memory, mood, etc.) CURRENTLY? 1   LUCA Total Score 7       Guidelines for Scoring/Interpretation:  Total score categories:  0-7 = No clinically significant insomnia   8-14 = Subthreshold insomnia   15-21 = Clinical insomnia (moderate severity)  22-28 = Clinical insomnia (severe)  Used via courtesy of www.[employer].va.gov with permission from Wilver Vann PhD., [facility]      Past medical/surgical history, family history, social history, medications and allergies were reviewed.        Problem List:  Patient Active Problem List    Diagnosis Date Noted    ILD (interstitial lung disease) (H) 02/20/2024     Priority: Medium    Pulmonary nodules 02/20/2024     Priority: Medium    Lesion of lumbar spine 01/02/2023     Priority: Medium    Type 2 diabetes mellitus with other specified complication, without long-term current use of  "insulin (H) 07/18/2022     Priority: Medium    History of cataract 07/08/2022     Priority: Medium    Trigger finger, left middle finger 08/05/2021     Priority: Medium    Severe obesity (BMI 35.0-39.9) with comorbidity (H) 07/07/2020     Priority: Medium    Essential hypertension 07/07/2020     Priority: Medium    Ascending aorta dilatation (H24) 07/07/2020     Priority: Medium    WINSTON (obstructive sleep apnea) 08/31/2019     Priority: Medium     8/29/2019 [facility] (288.0 lbs) - AHI 13.9, RDI 34.0, Supine AHI 14.3, REM AHI 25.8, Low O2 86.0%, Time Spent ?88% 1.5 minutes / Time Spent ?89% 3.3 minutes.      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 Provider to review and confirm.      BPH (benign prostatic hyperplasia) 02/24/2010     Priority: Medium    Impotence of organic origin 03/14/2006     Priority: Medium    Retinal detachment 03/14/2006     Priority: Medium     Problem list name updated by automated process. Provider to review          /60   Pulse 90   Ht 1.854 m (6' 1\")   Wt 127 kg (280 lb)   SpO2 95%   BMI 36.94 kg/m        "

## 2024-02-29 ENCOUNTER — OFFICE VISIT (OUTPATIENT)
Dept: SLEEP MEDICINE | Facility: CLINIC | Age: 74
End: 2024-02-29
Payer: MEDICARE

## 2024-02-29 VITALS
BODY MASS INDEX: 37.11 KG/M2 | HEART RATE: 90 BPM | DIASTOLIC BLOOD PRESSURE: 60 MMHG | WEIGHT: 280 LBS | OXYGEN SATURATION: 95 % | SYSTOLIC BLOOD PRESSURE: 110 MMHG | HEIGHT: 73 IN

## 2024-02-29 DIAGNOSIS — G47.33 OSA (OBSTRUCTIVE SLEEP APNEA): Primary | ICD-10-CM

## 2024-02-29 PROCEDURE — 99203 OFFICE O/P NEW LOW 30 MIN: CPT | Performed by: PHYSICIAN ASSISTANT

## 2024-03-04 ENCOUNTER — ANCILLARY PROCEDURE (OUTPATIENT)
Dept: CT IMAGING | Facility: CLINIC | Age: 74
End: 2024-03-04
Attending: INTERNAL MEDICINE
Payer: MEDICARE

## 2024-03-04 ENCOUNTER — LAB (OUTPATIENT)
Dept: INFUSION THERAPY | Facility: CLINIC | Age: 74
End: 2024-03-04
Attending: NURSE PRACTITIONER
Payer: MEDICARE

## 2024-03-04 DIAGNOSIS — C85.98 NON-HODGKIN'S LYMPHOMA OF MULTIPLE SITES (H): ICD-10-CM

## 2024-03-04 LAB
ALBUMIN SERPL BCG-MCNC: 4.6 G/DL (ref 3.5–5.2)
ALP SERPL-CCNC: 102 U/L (ref 40–150)
ALT SERPL W P-5'-P-CCNC: 28 U/L (ref 0–70)
ANION GAP SERPL CALCULATED.3IONS-SCNC: 12 MMOL/L (ref 7–15)
AST SERPL W P-5'-P-CCNC: 36 U/L (ref 0–45)
BASOPHILS # BLD MANUAL: 0.9 10E3/UL (ref 0–0.2)
BASOPHILS NFR BLD MANUAL: 5 %
BILIRUB SERPL-MCNC: 0.5 MG/DL
BUN SERPL-MCNC: 13.6 MG/DL (ref 8–23)
CALCIUM SERPL-MCNC: 8.6 MG/DL (ref 8.8–10.2)
CHLORIDE SERPL-SCNC: 103 MMOL/L (ref 98–107)
CREAT SERPL-MCNC: 0.91 MG/DL (ref 0.67–1.17)
DEPRECATED HCO3 PLAS-SCNC: 24 MMOL/L (ref 22–29)
EGFRCR SERPLBLD CKD-EPI 2021: 89 ML/MIN/1.73M2
EOSINOPHIL # BLD MANUAL: 0.5 10E3/UL (ref 0–0.7)
EOSINOPHIL NFR BLD MANUAL: 3 %
ERYTHROCYTE [DISTWIDTH] IN BLOOD BY AUTOMATED COUNT: 14.2 % (ref 10–15)
GLUCOSE SERPL-MCNC: 154 MG/DL (ref 70–99)
HCT VFR BLD AUTO: 42.6 % (ref 40–53)
HGB BLD-MCNC: 13.8 G/DL (ref 13.3–17.7)
HOLD SPECIMEN: NORMAL
LDH SERPL L TO P-CCNC: 345 U/L (ref 0–250)
LYMPHOCYTES # BLD MANUAL: 1.4 10E3/UL (ref 0.8–5.3)
LYMPHOCYTES NFR BLD MANUAL: 8 %
MCH RBC QN AUTO: 29.8 PG (ref 26.5–33)
MCHC RBC AUTO-ENTMCNC: 32.4 G/DL (ref 31.5–36.5)
MCV RBC AUTO: 92 FL (ref 78–100)
METAMYELOCYTES # BLD MANUAL: 0.5 10E3/UL
METAMYELOCYTES NFR BLD MANUAL: 3 %
MONOCYTES # BLD MANUAL: 0.9 10E3/UL (ref 0–1.3)
MONOCYTES NFR BLD MANUAL: 5 %
NEUTROPHILS # BLD MANUAL: 13.8 10E3/UL (ref 1.6–8.3)
NEUTROPHILS NFR BLD MANUAL: 76 %
NRBC # BLD AUTO: 0 10E3/UL
NRBC BLD AUTO-RTO: 0 /100
PLAT MORPH BLD: ABNORMAL
PLATELET # BLD AUTO: 223 10E3/UL (ref 150–450)
POTASSIUM SERPL-SCNC: 4.7 MMOL/L (ref 3.4–5.3)
PROT SERPL-MCNC: 6.6 G/DL (ref 6.4–8.3)
RBC # BLD AUTO: 4.63 10E6/UL (ref 4.4–5.9)
RBC MORPH BLD: ABNORMAL
SODIUM SERPL-SCNC: 139 MMOL/L (ref 135–145)
WBC # BLD AUTO: 18.1 10E3/UL (ref 4–11)

## 2024-03-04 PROCEDURE — 85007 BL SMEAR W/DIFF WBC COUNT: CPT

## 2024-03-04 PROCEDURE — 74177 CT ABD & PELVIS W/CONTRAST: CPT | Mod: MG | Performed by: RADIOLOGY

## 2024-03-04 PROCEDURE — 71260 CT THORAX DX C+: CPT | Mod: MG | Performed by: RADIOLOGY

## 2024-03-04 PROCEDURE — 83615 LACTATE (LD) (LDH) ENZYME: CPT

## 2024-03-04 PROCEDURE — 82040 ASSAY OF SERUM ALBUMIN: CPT

## 2024-03-04 PROCEDURE — G1010 CDSM STANSON: HCPCS | Performed by: RADIOLOGY

## 2024-03-04 PROCEDURE — 85025 COMPLETE CBC W/AUTO DIFF WBC: CPT

## 2024-03-04 PROCEDURE — 36415 COLL VENOUS BLD VENIPUNCTURE: CPT

## 2024-03-04 RX ORDER — IOPAMIDOL 755 MG/ML
122 INJECTION, SOLUTION INTRAVASCULAR ONCE
Status: COMPLETED | OUTPATIENT
Start: 2024-03-04 | End: 2024-03-04

## 2024-03-04 RX ADMIN — IOPAMIDOL 122 ML: 755 INJECTION, SOLUTION INTRAVASCULAR at 10:20

## 2024-03-06 ENCOUNTER — ONCOLOGY VISIT (OUTPATIENT)
Dept: ONCOLOGY | Facility: CLINIC | Age: 74
End: 2024-03-06
Attending: INTERNAL MEDICINE
Payer: MEDICARE

## 2024-03-06 VITALS
HEIGHT: 73 IN | SYSTOLIC BLOOD PRESSURE: 129 MMHG | DIASTOLIC BLOOD PRESSURE: 78 MMHG | HEART RATE: 82 BPM | WEIGHT: 276.9 LBS | OXYGEN SATURATION: 94 % | BODY MASS INDEX: 36.7 KG/M2

## 2024-03-06 DIAGNOSIS — D49.2 LUMBAR SPINE TUMOR: ICD-10-CM

## 2024-03-06 DIAGNOSIS — C85.98 NON-HODGKIN'S LYMPHOMA OF MULTIPLE SITES (H): ICD-10-CM

## 2024-03-06 DIAGNOSIS — N13.4 HYDROURETER: ICD-10-CM

## 2024-03-06 DIAGNOSIS — D72.828 NEUTROPHILIA: Primary | ICD-10-CM

## 2024-03-06 PROCEDURE — G0463 HOSPITAL OUTPT CLINIC VISIT: HCPCS | Performed by: INTERNAL MEDICINE

## 2024-03-06 PROCEDURE — 99214 OFFICE O/P EST MOD 30 MIN: CPT | Performed by: INTERNAL MEDICINE

## 2024-03-06 ASSESSMENT — PAIN SCALES - GENERAL: PAINLEVEL: NO PAIN (0)

## 2024-03-06 NOTE — PROGRESS NOTES
HEMATOLOGY ONCOLOGY FOLLOW-UP VISIT NOTE    PATIENT NAME: Deandre Merchant MRN # [id number]  DATE OF VISIT: Mar 6, 2024 YOB: 1950        CANCER TYPE:Follicular Lymphoma  STAGE: IV( axillary, mediastinal, retroperitoneal, stomach, bone marrow)      ONCOLOGY HISTORY:    Patient was being followed by Dr. Krishnamurthy but now she has transferred his care to me.  I reviewed his previous records and have copied and updated from prior notes.  74 year old  male who in 2012 presented with mediastinal, retroperitoneal and mesenteric lymph nodes. Was clinically asymptomatic with normal blood counts. CT-guided lymph node biopsy on 6/21/2012 was consistent with follicular lymphoma, grade 1-2. He was observed at that point.    In 2013, presented to the hospital with ileus and abdominal distention. EGD showed gastric antrum and duodenal ulcers which were biopsied and showed follicular lymphoma. CT scans showed bulky  lymphadenopathy with increasing size. 04/13 Bone marrow biopsy was positive with evidence of follicular lymphoma.   He received bendamustine and Rituxan regimen April 2014- -September 2014.   In December 14 , he was started on maintenance Rituxan every 2 months for 2 years which he completed in October 2016.     In remission and on observation since then.    7/14/2020 - established care with me.    CT scan in July 2020 was stable apart from an indeterminate L2 vertebral body lesion.    MRI of the lumbar spine showed Nonspecific enhancing lesion in the right aspect of the L2 inferior endplate which remains indeterminate.    12/7/2020.  Repeat MRI of the lumbar spine is the same.    We proceeded with a PET scan on 1/8/2021 which shows mild FDG uptake in the L2 sclerotic lesion.  It was measuring about 1.5 x 1.2 x 1.4 cm and was stable.  Of note previously the lymphoma was FDG avid.      In December 2022, he had MRI of the lumbar spine which had shown some growth of the L2 vertebral body lesion which now measures  23 x 24 x 21 mm so he was evaluated by Dr. Alston from neurosurgery and I reviewed the notes.  He had core biopsy done on 1/12/2022 which was benign.       He was also seen by urologist Dr. Rick on 5/4/2022 and cystoscopy was unremarkable.       SUBJECTIVE   Overall he feels well.  He is getting over a cold.  Currently denies any fevers.  No shortness of breath.  No B symptoms.  No new pain and no new swellings.  No nausea vomiting diarrhea constipation.    ECOG 0    ROS:  Rest of the comprehensive review of the system was negative.      I reviewed other history in epic as below.      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 1 each 3    blood glucose (NO BRAND SPECIFIED) test strip Use to test blood sugar once daily. 50 strip 4    cetirizine (ZYRTEC) 10 MG tablet Take 10 mg by mouth daily      fluticasone (FLONASE) 50 MCG/ACT nasal spray Spray 2 sprays into both nostrils daily 16 g 11    metFORMIN (GLUCOPHAGE XR) 500 MG 24 hr tablet Take 4 tablets (2,000 mg) by mouth daily (with dinner) 360 tablet 3    metoprolol succinate ER (TOPROL XL) 50 MG 24 hr tablet Take 1 tablet (50 mg) by mouth daily 90 tablet 3    order for DME Equipment ordered: RESMED Auto PAP Mask type: Full face  Settings: 5-15 cm h2o      chlorpheniramine (CHLOR-TRIMETON) 4 MG tablet Take 4 mg by mouth every 6 hours as  needed for allergies or rhinitis (Patient not taking: Reported on 8/2/2023)      fluticasone (FLONASE) 50 MCG/ACT nasal spray Spray 1 spray into both nostrils daily (Patient not taking: Reported on 2/29/2024) 18.2 mL 1    semaglutide (OZEMPIC) 2 MG/3ML pen Inject 0.25 mg Subcutaneous every 7 days Increase to 0.5 mg  every 7 days after 4 weeks. (Patient not taking: Reported on 2/29/2024) 3 mL 1    triamcinolone (KENALOG) 0.1 % external ointment Apply sparingly to affected area twice daily as needed. (Patient not taking: Reported on 3/6/2024) 15 g 1        ALLERGIES     Allergies   Allergen Reactions    Allopurinol Rash     FAMILY HISTORY:  Family History   Problem Relation Age of Onset    Cancer Father         liver/kidney    C.A.D. Father     Pacemaker Brother     Arthritis Mother         RA     Dad had liver cancer at 74.  He has 2 children and they are healthy.    Social History     Socioeconomic History    Marital status:      Spouse name: Cristel    Number of children: 2    Years of education: Not on file    Highest education level: Not on file   Occupational History    Not on file   Tobacco Use    Smoking status: Never    Smokeless tobacco: Never   Vaping Use    Vaping Use: Never used   Substance and Sexual Activity    Alcohol use: Yes     Alcohol/week: 3.3 standard drinks of alcohol     Comment: occasionally weekends    Drug use: No    Sexual activity: Yes   Other Topics Concern     Service Not Asked    Blood Transfusions Not Asked    Caffeine Concern No    Occupational Exposure Not Asked    Hobby Hazards Not Asked    Sleep Concern No    Stress Concern No    Weight Concern No    Special Diet Not Asked    Back Care Not Asked    Exercise Yes    Bike Helmet Not Asked    Seat Belt Yes    Self-Exams Not Asked    Parent/sibling w/ CABG, MI or angioplasty before 65F 55M? Not Asked   Social History Narrative    Not on file     Social Determinants of Health     Financial Resource Strain: Not on file  "  Food Insecurity: Not on file   Transportation Needs: Not on file   Physical Activity: Not on file   Stress: Not on file   Social Connections: Not on file   Interpersonal Safety: Not on file   Housing Stability: Not on file     Denies smoking. 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     PHYSICAL EXAM     /78   Pulse 82   Ht 1.854 m (6' 1\")   Wt 125.6 kg (276 lb 14.4 oz)   SpO2 94%   BMI 36.53 kg/m    Wt Readings from Last 4 Encounters:   03/06/24 125.6 kg (276 lb 14.4 oz)   02/29/24 127 kg (280 lb)   02/20/24 127.2 kg (280 lb 7 oz)   08/28/23 130.2 kg (287 lb)     CONSTITUTIONAL: no acute distress  EYES: Both pupils are surgical.  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  There is no pallor or icterus.  ENT/MOUTH: no mouth lesions. Ears normal  CVS: s1s2 no m r g .   RESPIRATORY: clear to auscultation b/l  GI: soft non tender no hepatosplenomegaly  NEURO: AAOX3  Grossly non focal neuro exam  INTEGUMENT: no obvious rashes  LYMPHATIC: no palpable cervical, supraclavicular, axillary or inguinal LAD  MUSCULOSKELETAL: Unremarkable. No bony tenderness.   EXTREMITIES: no edema  PSYCH: Mentation, mood and affect are normal. Decision making capacity is intact             LABORATORY AND IMAGING STUDIES       I reviewed     3/4/2024  CBC shows WBC 18.1.  Hemoglobin 13.8.  Platelets 223.  ANC 13.8.  Basophils 0.9.  Lymphocytes 1.4.  Metamyelocytes 0.5.  CMP unremarkable except calcium 8.6.        3/4/2024.  CT chest abdomen and pelvis  IMPRESSION:  1.  Stable exam compared to 3/6/2023 CT scan.  2.  Stable prominent mediastinal lymph nodes and mild splenomegaly.  3.  Unchanged bibasilar subpleural reticular and groundglass pulmonary  opacities suggestive of interstitial lung disease.  4.  Ascending aortic aneurysm measuring 4.7 cm.   5.  Stable 2.3 cm sclerotic lesion in L2 vertebral body.        Bone, L2 vertebral body, core needle biopsy on 1/12/2023:  --Fragments of lamellar hematopoietic " containing bone trabeculae, no diagnostic morphologic evidence of lymphoma.        ASSESSMENT AND PLAN     Stage IV follicular lymphoma- status post treatment with bendamustine/Rituxan (04-09/2014) followed by maintenance rituximab for two years- completed in October 2016.    Currently no evidence of lymphoma.  Plan to repeat labs and CT scan in 1 year.      Leukocytosis/neutrophilia/basophilia and elevated LDH.  Metamyelocyte was also elevated.  Not sure exactly what is the reason but could be due to recent cold-like illness that he is now getting better.  I would recommend repeating labs in a few days including peripheral blood smear and repeat LDH.      L2 sclerotic lesion.  He was seen by Dr. Alston from neurosurgery and I reviewed the notes.  Most likely this is a benign lesion and recommendation was to follow it up serially with repeat MRI in 6 months.  MRI in December 2022 showed slight growth in the lesion so he had biopsy of this on 1/12/2022 which was benign.  Repeat CT chest abdomen and pelvis on 3/4/2024 showed overall stable findings.          Bilateral hydroureter and bladder wall thickening.  He had cystourethroscopy in May 2022 which was unremarkable.  I reviewed notes from urology.  CT scan from 3/4/2024 shows mild dilation of left distal ureter which is stable.  Continue to observe.      We did not address the following today.  Interstitial lung disease.  Repeat CT scan continues to show slight worsening of the interstitial lung disease.  I advised him to follow-up with pulmonology.  I gave him a referral for pulmonology.    Assuming that repeat blood work is fine, I will see him back in 1 year with labs and CT scan prior.    All questions answered.  He is agreeable and comfortable with the plan.    Eduardo Crespo MD

## 2024-03-06 NOTE — NURSING NOTE
"Oncology Rooming Note    March 6, 2024 3:09 PM   Deandre Merchant is a 74 year old male who presents for:    Chief Complaint   Patient presents with    Oncology Clinic Visit     Follow up     Initial Vitals: /78   Pulse 82   Ht 1.854 m (6' 1\")   Wt 125.6 kg (276 lb 14.4 oz)   SpO2 94%   BMI 36.53 kg/m   Estimated body mass index is 36.53 kg/m  as calculated from the following:    Height as of this encounter: 1.854 m (6' 1\").    Weight as of this encounter: 125.6 kg (276 lb 14.4 oz). Body surface area is 2.54 meters squared.  No Pain (0) Comment: Data Unavailable   No LMP for male patient.  Allergies reviewed: Yes  Medications reviewed: Yes    Medications: Medication refills not needed today.  Pharmacy name entered into [facility]:    [facility] [place], [place] - [address]   [facility] [id number] - [place], [place] - [address] [place] [address]    Frailty Screening:   Is the patient here for a new oncology consult visit in cancer care? 2. No      Clinical concerns: No Concerns        Yvrose Spann MA            "

## 2024-03-06 NOTE — LETTER
3/6/2024         RE: Deandre Merchant  [address]  [facility] [address]        Dear Colleague,    Thank you for referring your patient, Deandre Merchant, to the [facility]. Please see a copy of my visit note below.    HEMATOLOGY ONCOLOGY FOLLOW-UP VISIT NOTE    PATIENT NAME: Deandre Merchant MRN # [id number]  DATE OF VISIT: Mar 6, 2024 YOB: 1950        CANCER TYPE:Follicular Lymphoma  STAGE: IV( axillary, mediastinal, retroperitoneal, stomach, bone marrow)      ONCOLOGY HISTORY:    Patient was being followed by Dr. Krishnamurthy but now she has transferred his care to me.  I reviewed his previous records and have copied and updated from prior notes.  74 year old  male who in 2012 presented with mediastinal, retroperitoneal and mesenteric lymph nodes. Was clinically asymptomatic with normal blood counts. CT-guided lymph node biopsy on 6/21/2012 was consistent with follicular lymphoma, grade 1-2. He was observed at that point.    In 2013, presented to the hospital with ileus and abdominal distention. EGD showed gastric antrum and duodenal ulcers which were biopsied and showed follicular lymphoma. CT scans showed bulky  lymphadenopathy with increasing size. 04/13 Bone marrow biopsy was positive with evidence of follicular lymphoma.   He received bendamustine and Rituxan regimen April 2014- -September 2014.   In December 14 , he was started on maintenance Rituxan every 2 months for 2 years which he completed in October 2016.     In remission and on observation since then.    7/14/2020 - established care with me.    CT scan in July 2020 was stable apart from an indeterminate L2 vertebral body lesion.    MRI of the lumbar spine showed Nonspecific enhancing lesion in the right aspect of the L2 inferior endplate which remains indeterminate.    12/7/2020.  Repeat MRI of the lumbar spine is the same.    We proceeded with a PET scan on 1/8/2021 which shows mild FDG uptake in the L2  sclerotic lesion.  It was measuring about 1.5 x 1.2 x 1.4 cm and was stable.  Of note previously the lymphoma was FDG avid.      In December 2022, he had MRI of the lumbar spine which had shown some growth of the L2 vertebral body lesion which now measures 23 x 24 x 21 mm so he was evaluated by Dr. Alston from neurosurgery and I reviewed the notes.  He had core biopsy done on 1/12/2022 which was benign.       He was also seen by urologist Dr. Rick on 5/4/2022 and cystoscopy was unremarkable.       SUBJECTIVE   Overall he feels well.  He is getting over a cold.  Currently denies any fevers.  No shortness of breath.  No B symptoms.  No new pain and no new swellings.  No nausea vomiting diarrhea constipation.    ECOG 0    ROS:  Rest of the comprehensive review of the system was negative.      I reviewed other history in epic as below.      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 1 each 3     blood glucose (NO BRAND SPECIFIED) test strip Use to test blood sugar once daily. 50 strip 4     cetirizine (ZYRTEC) 10 MG tablet Take 10 mg by mouth daily       fluticasone (FLONASE) 50 MCG/ACT nasal spray Spray 2 sprays into both nostrils daily 16 g 11     metFORMIN (GLUCOPHAGE XR) 500 MG 24 hr tablet Take 4 tablets (2,000 mg) by mouth daily (with dinner) 360 tablet 3     metoprolol  succinate ER (TOPROL XL) 50 MG 24 hr tablet Take 1 tablet (50 mg) by mouth daily 90 tablet 3     order for DME Equipment ordered: RESMED Auto PAP Mask type: Full face  Settings: 5-15 cm h2o       chlorpheniramine (CHLOR-TRIMETON) 4 MG tablet Take 4 mg by mouth every 6 hours as needed for allergies or rhinitis (Patient not taking: Reported on 8/2/2023)       fluticasone (FLONASE) 50 MCG/ACT nasal spray Spray 1 spray into both nostrils daily (Patient not taking: Reported on 2/29/2024) 18.2 mL 1     semaglutide (OZEMPIC) 2 MG/3ML pen Inject 0.25 mg Subcutaneous every 7 days Increase to 0.5 mg  every 7 days after 4 weeks. (Patient not taking: Reported on 2/29/2024) 3 mL 1     triamcinolone (KENALOG) 0.1 % external ointment Apply sparingly to affected area twice daily as needed. (Patient not taking: Reported on 3/6/2024) 15 g 1        ALLERGIES     Allergies   Allergen Reactions     Allopurinol Rash     FAMILY HISTORY:  Family History   Problem Relation Age of Onset     Cancer Father         liver/kidney     C.A.D. Father      Pacemaker Brother      Arthritis Mother         RA     Dad had liver cancer at 74.  He has 2 children and they are healthy.    Social History     Socioeconomic History     Marital status:      Spouse name: Cristel     Number of children: 2     Years of education: Not on file     Highest education level: Not on file   Occupational History     Not on file   Tobacco Use     Smoking status: Never     Smokeless tobacco: Never   Vaping Use     Vaping Use: Never used   Substance and Sexual Activity     Alcohol use: Yes     Alcohol/week: 3.3 standard drinks of alcohol     Comment: occasionally weekends     Drug use: No     Sexual activity: Yes   Other Topics Concern      Service Not Asked     Blood Transfusions Not Asked     Caffeine Concern No     Occupational Exposure Not Asked     Hobby Hazards Not Asked     Sleep Concern No     Stress Concern No     Weight Concern No     Special Diet Not  "Asked     Back Care Not Asked     Exercise Yes     Bike Helmet Not Asked     Seat Belt Yes     Self-Exams Not Asked     Parent/sibling w/ CABG, MI or angioplasty before 65F 55M? Not Asked   Social History Narrative     Not on file     Social Determinants of Health     Financial Resource Strain: Not on file   Food Insecurity: Not on file   Transportation Needs: Not on file   Physical Activity: Not on file   Stress: Not on file   Social Connections: Not on file   Interpersonal Safety: Not on file   Housing Stability: Not on file     Denies smoking. 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     PHYSICAL EXAM     /78   Pulse 82   Ht 1.854 m (6' 1\")   Wt 125.6 kg (276 lb 14.4 oz)   SpO2 94%   BMI 36.53 kg/m    Wt Readings from Last 4 Encounters:   03/06/24 125.6 kg (276 lb 14.4 oz)   02/29/24 127 kg (280 lb)   02/20/24 127.2 kg (280 lb 7 oz)   08/28/23 130.2 kg (287 lb)     CONSTITUTIONAL: no acute distress  EYES: Both pupils are surgical.  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  There is no pallor or icterus.  ENT/MOUTH: no mouth lesions. Ears normal  CVS: s1s2 no m r g .   RESPIRATORY: clear to auscultation b/l  GI: soft non tender no hepatosplenomegaly  NEURO: AAOX3  Grossly non focal neuro exam  INTEGUMENT: no obvious rashes  LYMPHATIC: no palpable cervical, supraclavicular, axillary or inguinal LAD  MUSCULOSKELETAL: Unremarkable. No bony tenderness.   EXTREMITIES: no edema  PSYCH: Mentation, mood and affect are normal. Decision making capacity is intact             LABORATORY AND IMAGING STUDIES       I reviewed     3/4/2024  CBC shows WBC 18.1.  Hemoglobin 13.8.  Platelets 223.  ANC 13.8.  Basophils 0.9.  Lymphocytes 1.4.  Metamyelocytes 0.5.  CMP unremarkable except calcium 8.6.        3/4/2024.  CT chest abdomen and pelvis  IMPRESSION:  1.  Stable exam compared to 3/6/2023 CT scan.  2.  Stable prominent mediastinal lymph nodes and mild splenomegaly.  3.  Unchanged bibasilar subpleural " reticular and groundglass pulmonary  opacities suggestive of interstitial lung disease.  4.  Ascending aortic aneurysm measuring 4.7 cm.   5.  Stable 2.3 cm sclerotic lesion in L2 vertebral body.        Bone, L2 vertebral body, core needle biopsy on 1/12/2023:  --Fragments of lamellar hematopoietic containing bone trabeculae, no diagnostic morphologic evidence of lymphoma.        ASSESSMENT AND PLAN     Stage IV follicular lymphoma- status post treatment with bendamustine/Rituxan (04-09/2014) followed by maintenance rituximab for two years- completed in October 2016.    Currently no evidence of lymphoma.  Plan to repeat labs and CT scan in 1 year.      Leukocytosis/neutrophilia/basophilia and elevated LDH.  Metamyelocyte was also elevated.  Not sure exactly what is the reason but could be due to recent cold-like illness that he is now getting better.  I would recommend repeating labs in a few days including peripheral blood smear and repeat LDH.      L2 sclerotic lesion.  He was seen by Dr. Alston from neurosurgery and I reviewed the notes.  Most likely this is a benign lesion and recommendation was to follow it up serially with repeat MRI in 6 months.  MRI in December 2022 showed slight growth in the lesion so he had biopsy of this on 1/12/2022 which was benign.  Repeat CT chest abdomen and pelvis on 3/4/2024 showed overall stable findings.          Bilateral hydroureter and bladder wall thickening.  He had cystourethroscopy in May 2022 which was unremarkable.  I reviewed notes from urology.  CT scan from 3/4/2024 shows mild dilation of left distal ureter which is stable.  Continue to observe.      We did not address the following today.  Interstitial lung disease.  Repeat CT scan continues to show slight worsening of the interstitial lung disease.  I advised him to follow-up with pulmonology.  I gave him a referral for pulmonology.    Assuming that repeat blood work is fine, I will see him back in 1 year with labs  and CT scan prior.    All questions answered.  He is agreeable and comfortable with the plan.    Eduardo Crespo MD        Again, thank you for allowing me to participate in the care of your patient.        Sincerely,        Eduardo Crespo MD

## 2024-03-20 ENCOUNTER — LAB (OUTPATIENT)
Dept: LAB | Facility: CLINIC | Age: 74
End: 2024-03-20
Payer: MEDICARE

## 2024-03-20 DIAGNOSIS — C85.98 NON-HODGKIN'S LYMPHOMA OF MULTIPLE SITES (H): ICD-10-CM

## 2024-03-20 DIAGNOSIS — E78.5 HYPERLIPIDEMIA WITH TARGET LDL LESS THAN 130: ICD-10-CM

## 2024-03-20 DIAGNOSIS — Z12.5 SCREENING FOR PROSTATE CANCER: ICD-10-CM

## 2024-03-20 DIAGNOSIS — D72.828 NEUTROPHILIA: ICD-10-CM

## 2024-03-20 DIAGNOSIS — D49.2 LUMBAR SPINE TUMOR: ICD-10-CM

## 2024-03-20 DIAGNOSIS — N13.4 HYDROURETER: ICD-10-CM

## 2024-03-20 LAB
BASOPHILS # BLD MANUAL: 0.8 10E3/UL (ref 0–0.2)
BASOPHILS NFR BLD MANUAL: 3 %
CHOLEST SERPL-MCNC: 121 MG/DL
EOSINOPHIL # BLD MANUAL: 0.3 10E3/UL (ref 0–0.7)
EOSINOPHIL NFR BLD MANUAL: 1 %
ERYTHROCYTE [DISTWIDTH] IN BLOOD BY AUTOMATED COUNT: 14.4 % (ref 10–15)
FASTING STATUS PATIENT QL REPORTED: YES
HCT VFR BLD AUTO: 41.3 % (ref 40–53)
HDLC SERPL-MCNC: 29 MG/DL
HGB BLD-MCNC: 13.7 G/DL (ref 13.3–17.7)
LDH SERPL L TO P-CCNC: 395 U/L (ref 0–250)
LDLC SERPL CALC-MCNC: 29 MG/DL
LYMPHOCYTES # BLD MANUAL: 3 10E3/UL (ref 0.8–5.3)
LYMPHOCYTES NFR BLD MANUAL: 11 %
MCH RBC QN AUTO: 30.4 PG (ref 26.5–33)
MCHC RBC AUTO-ENTMCNC: 33.2 G/DL (ref 31.5–36.5)
MCV RBC AUTO: 92 FL (ref 78–100)
METAMYELOCYTES # BLD MANUAL: 0.6 10E3/UL
METAMYELOCYTES NFR BLD MANUAL: 2 %
MONOCYTES # BLD MANUAL: 3.3 10E3/UL (ref 0–1.3)
MONOCYTES NFR BLD MANUAL: 12 %
MYELOCYTES # BLD MANUAL: 0.3 10E3/UL
MYELOCYTES NFR BLD MANUAL: 1 %
NEUTROPHILS # BLD MANUAL: 18.7 10E3/UL (ref 1.6–8.3)
NEUTROPHILS NFR BLD MANUAL: 68 %
NONHDLC SERPL-MCNC: 92 MG/DL
NRBC # BLD AUTO: 0 10E3/UL
NRBC BLD AUTO-RTO: 0 /100
PLAT MORPH BLD: ABNORMAL
PLATELET # BLD AUTO: 247 10E3/UL (ref 150–450)
PROMYELOCYTES # BLD MANUAL: 0.6 10E3/UL
PROMYELOCYTES NFR BLD MANUAL: 2 %
PSA SERPL DL<=0.01 NG/ML-MCNC: 2.85 NG/ML (ref 0–6.5)
RBC # BLD AUTO: 4.51 10E6/UL (ref 4.4–5.9)
RBC MORPH BLD: ABNORMAL
RETICS # AUTO: 0.14 10E6/UL (ref 0.03–0.1)
RETICS/RBC NFR AUTO: 3 % (ref 0.5–2)
TRIGL SERPL-MCNC: 315 MG/DL
WBC # BLD AUTO: 27.5 10E3/UL (ref 4–11)

## 2024-03-20 PROCEDURE — 36415 COLL VENOUS BLD VENIPUNCTURE: CPT

## 2024-03-20 PROCEDURE — 85025 COMPLETE CBC W/AUTO DIFF WBC: CPT

## 2024-03-20 PROCEDURE — 85045 AUTOMATED RETICULOCYTE COUNT: CPT

## 2024-03-20 PROCEDURE — 83615 LACTATE (LD) (LDH) ENZYME: CPT

## 2024-03-20 PROCEDURE — 80061 LIPID PANEL: CPT

## 2024-03-20 PROCEDURE — 99207 BLOOD MORPHOLOGY PATHOLOGIST REVIEW: CPT | Performed by: PATHOLOGY

## 2024-03-20 PROCEDURE — G0103 PSA SCREENING: HCPCS

## 2024-03-21 LAB
PATH REPORT.COMMENTS IMP SPEC: NORMAL
PATH REPORT.COMMENTS IMP SPEC: NORMAL
PATH REPORT.FINAL DX SPEC: NORMAL
PATH REPORT.MICROSCOPIC SPEC OTHER STN: NORMAL
PATH REPORT.MICROSCOPIC SPEC OTHER STN: NORMAL
PATH REPORT.RELEVANT HX SPEC: NORMAL

## 2024-03-28 ENCOUNTER — LAB (OUTPATIENT)
Dept: LAB | Facility: CLINIC | Age: 74
End: 2024-03-28
Payer: MEDICARE

## 2024-03-28 DIAGNOSIS — D72.828 OTHER ELEVATED WHITE BLOOD CELL (WBC) COUNT: ICD-10-CM

## 2024-03-28 DIAGNOSIS — D72.828 OTHER ELEVATED WHITE BLOOD CELL (WBC) COUNT: Primary | ICD-10-CM

## 2024-03-28 LAB
ALBUMIN SERPL BCG-MCNC: 4.5 G/DL (ref 3.5–5.2)
ALP SERPL-CCNC: 113 U/L (ref 40–150)
ALT SERPL W P-5'-P-CCNC: 35 U/L (ref 0–70)
ANION GAP SERPL CALCULATED.3IONS-SCNC: 12 MMOL/L (ref 7–15)
AST SERPL W P-5'-P-CCNC: 37 U/L (ref 0–45)
BASOPHILS # BLD MANUAL: 1.5 10E3/UL (ref 0–0.2)
BASOPHILS NFR BLD MANUAL: 5 %
BILIRUB SERPL-MCNC: 0.6 MG/DL
BUN SERPL-MCNC: 16.5 MG/DL (ref 8–23)
CALCIUM SERPL-MCNC: 9.5 MG/DL (ref 8.8–10.2)
CHLORIDE SERPL-SCNC: 101 MMOL/L (ref 98–107)
CREAT SERPL-MCNC: 0.97 MG/DL (ref 0.67–1.17)
DEPRECATED HCO3 PLAS-SCNC: 24 MMOL/L (ref 22–29)
EGFRCR SERPLBLD CKD-EPI 2021: 82 ML/MIN/1.73M2
EOSINOPHIL # BLD MANUAL: 0.3 10E3/UL (ref 0–0.7)
EOSINOPHIL NFR BLD MANUAL: 1 %
ERYTHROCYTE [DISTWIDTH] IN BLOOD BY AUTOMATED COUNT: 14.6 % (ref 10–15)
GLUCOSE SERPL-MCNC: 156 MG/DL (ref 70–99)
HCT VFR BLD AUTO: 41.7 % (ref 40–53)
HGB BLD-MCNC: 13.9 G/DL (ref 13.3–17.7)
LAB DIRECTOR COMMENTS: NORMAL
LAB DIRECTOR DISCLAIMER: NORMAL
LAB DIRECTOR INTERPRETATION: NORMAL
LAB DIRECTOR METHODOLOGY: NORMAL
LAB DIRECTOR RESULTS: NORMAL
LDH SERPL L TO P-CCNC: 409 U/L (ref 0–250)
LYMPHOCYTES # BLD MANUAL: 3.3 10E3/UL (ref 0.8–5.3)
LYMPHOCYTES NFR BLD MANUAL: 11 %
MCH RBC QN AUTO: 30 PG (ref 26.5–33)
MCHC RBC AUTO-ENTMCNC: 33.3 G/DL (ref 31.5–36.5)
MCV RBC AUTO: 90 FL (ref 78–100)
MONOCYTES # BLD MANUAL: 1.2 10E3/UL (ref 0–1.3)
MONOCYTES NFR BLD MANUAL: 4 %
MYELOCYTES # BLD MANUAL: 0.3 10E3/UL
MYELOCYTES NFR BLD MANUAL: 1 %
NEUTROPHILS # BLD MANUAL: 21.8 10E3/UL (ref 1.6–8.3)
NEUTROPHILS NFR BLD MANUAL: 73 %
NRBC # BLD AUTO: 0 10E3/UL
NRBC BLD AUTO-RTO: 0 /100
PLAT MORPH BLD: ABNORMAL
PLATELET # BLD AUTO: 260 10E3/UL (ref 150–450)
POTASSIUM SERPL-SCNC: 4.4 MMOL/L (ref 3.4–5.3)
PROMYELOCYTES # BLD MANUAL: 1.5 10E3/UL
PROMYELOCYTES NFR BLD MANUAL: 5 %
PROT SERPL-MCNC: 6.9 G/DL (ref 6.4–8.3)
RBC # BLD AUTO: 4.64 10E6/UL (ref 4.4–5.9)
RBC MORPH BLD: ABNORMAL
SODIUM SERPL-SCNC: 137 MMOL/L (ref 135–145)
SPECIMEN DESCRIPTION: NORMAL
WBC # BLD AUTO: 29.8 10E3/UL (ref 4–11)

## 2024-03-28 PROCEDURE — 85025 COMPLETE CBC W/AUTO DIFF WBC: CPT

## 2024-03-28 PROCEDURE — 80053 COMPREHEN METABOLIC PANEL: CPT

## 2024-03-28 PROCEDURE — 36415 COLL VENOUS BLD VENIPUNCTURE: CPT

## 2024-03-28 PROCEDURE — 81206 BCR/ABL1 GENE MAJOR BP: CPT

## 2024-03-28 PROCEDURE — 83615 LACTATE (LD) (LDH) ENZYME: CPT

## 2024-03-28 PROCEDURE — G0452 MOLECULAR PATHOLOGY INTERPR: HCPCS | Performed by: PATHOLOGY

## 2024-04-17 ENCOUNTER — ONCOLOGY VISIT (OUTPATIENT)
Dept: ONCOLOGY | Facility: CLINIC | Age: 74
End: 2024-04-17
Attending: INTERNAL MEDICINE
Payer: MEDICARE

## 2024-04-17 VITALS
DIASTOLIC BLOOD PRESSURE: 75 MMHG | SYSTOLIC BLOOD PRESSURE: 128 MMHG | HEIGHT: 73 IN | WEIGHT: 288 LBS | BODY MASS INDEX: 38.17 KG/M2 | OXYGEN SATURATION: 98 % | HEART RATE: 79 BPM

## 2024-04-17 DIAGNOSIS — C85.98 NON-HODGKIN'S LYMPHOMA OF MULTIPLE SITES (H): ICD-10-CM

## 2024-04-17 DIAGNOSIS — C92.10 CML (CHRONIC MYELOCYTIC LEUKEMIA) (H): Primary | ICD-10-CM

## 2024-04-17 DIAGNOSIS — D72.828 OTHER ELEVATED WHITE BLOOD CELL (WBC) COUNT: ICD-10-CM

## 2024-04-17 PROCEDURE — G0463 HOSPITAL OUTPT CLINIC VISIT: HCPCS | Performed by: INTERNAL MEDICINE

## 2024-04-17 PROCEDURE — 99214 OFFICE O/P EST MOD 30 MIN: CPT | Performed by: INTERNAL MEDICINE

## 2024-04-17 ASSESSMENT — PAIN SCALES - GENERAL: PAINLEVEL: NO PAIN (0)

## 2024-04-17 NOTE — NURSING NOTE
"Oncology Rooming Note    April 17, 2024 1:01 PM   Deandre Merchant is a 74 year old male who presents for:    Chief Complaint   Patient presents with    Oncology Clinic Visit     Discuss lab results     Initial Vitals: /75 (BP Location: Left arm, Patient Position: Chair, Cuff Size: Adult Large)   Pulse 79   Ht 1.854 m (6' 1\")   Wt 130.6 kg (288 lb)   SpO2 98%   BMI 38.00 kg/m   Estimated body mass index is 38 kg/m  as calculated from the following:    Height as of this encounter: 1.854 m (6' 1\").    Weight as of this encounter: 130.6 kg (288 lb). Body surface area is 2.59 meters squared.  No Pain (0) Comment: Data Unavailable   No LMP for male patient.  Allergies reviewed: Yes  Medications reviewed: Yes    Medications: Medication refills not needed today.  Pharmacy name entered into [facility]:    [facility], [place] - [address]   [id number] - [place], [place] - [address] [place] [address]    Frailty Screening:   Is the patient here for a new oncology consult visit in cancer care? 2. No      Clinical concerns: No       Pati Ramsay CMA              "

## 2024-04-17 NOTE — LETTER
4/17/2024         RE: Deandre Merchant  [address]  [facility] [address]        Dear Colleague,    Thank you for referring your patient, Deandre Merchant, to the [facility]. Please see a copy of my visit note below.    HEMATOLOGY ONCOLOGY FOLLOW-UP VISIT NOTE    PATIENT NAME: Deandre Merchant MRN # [id number]  DATE OF VISIT: Apr 17, 2024 YOB: 1950        CANCER TYPE:Follicular Lymphoma  STAGE: IV( axillary, mediastinal, retroperitoneal, stomach, bone marrow)      ONCOLOGY HISTORY:    Patient was being followed by Dr. Krishnamurthy but now she has transferred his care to me.  I reviewed his previous records and have copied and updated from prior notes.  74 year old  male who in 2012 presented with mediastinal, retroperitoneal and mesenteric lymph nodes. Was clinically asymptomatic with normal blood counts. CT-guided lymph node biopsy on 6/21/2012 was consistent with follicular lymphoma, grade 1-2. He was observed at that point.    In 2013, presented to the hospital with ileus and abdominal distention. EGD showed gastric antrum and duodenal ulcers which were biopsied and showed follicular lymphoma. CT scans showed bulky  lymphadenopathy with increasing size. 04/13 Bone marrow biopsy was positive with evidence of follicular lymphoma.   He received bendamustine and Rituxan regimen April 2014- -September 2014.   In December 14 , he was started on maintenance Rituxan every 2 months for 2 years which he completed in October 2016.     In remission and on observation since then.    7/14/2020 - established care with me.    CT scan in July 2020 was stable apart from an indeterminate L2 vertebral body lesion.    MRI of the lumbar spine showed Nonspecific enhancing lesion in the right aspect of the L2 inferior endplate which remains indeterminate.    12/7/2020.  Repeat MRI of the lumbar spine is the same.    We proceeded with a PET scan on 1/8/2021 which shows mild FDG uptake in the L2  sclerotic lesion.  It was measuring about 1.5 x 1.2 x 1.4 cm and was stable.  Of note previously the lymphoma was FDG avid.      In December 2022, he had MRI of the lumbar spine which had shown some growth of the L2 vertebral body lesion which now measures 23 x 24 x 21 mm so he was evaluated by Dr. Alston from neurosurgery and I reviewed the notes.  He had core biopsy done on 1/12/2022 which was benign.       He was also seen by urologist Dr. Rick on 5/4/2022 and cystoscopy was unremarkable.       SUBJECTIVE   When he saw me in March 2024, we noticed that he had leukocytosis with left shift as well as monocytosis so we repeated blood work which showed further increase in leukocytosis as well as left shift including presence of myelocytes and promyelocytes.  Other testing showed that BCR/ABL is positive.  Overall he continues to feel well and denies any new pain or new swellings or B symptoms.  No fevers infection shortness of breath.  No GI problems.     ECOG 0    ROS:  Rest of the comprehensive review of the system was negative.      I reviewed other history in epic as below.      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 1 each 3     blood glucose (NO BRAND SPECIFIED) test strip Use to test blood sugar once daily. 50 strip 4     cetirizine (ZYRTEC) 10 MG tablet Take 10 mg by  mouth daily       fluticasone (FLONASE) 50 MCG/ACT nasal spray Spray 2 sprays into both nostrils daily 16 g 11     metFORMIN (GLUCOPHAGE XR) 500 MG 24 hr tablet Take 4 tablets (2,000 mg) by mouth daily (with dinner) 360 tablet 3     metoprolol succinate ER (TOPROL XL) 50 MG 24 hr tablet Take 1 tablet (50 mg) by mouth daily 90 tablet 3     order for DME Equipment ordered: RESMED Auto PAP Mask type: Full face  Settings: 5-15 cm h2o       chlorpheniramine (CHLOR-TRIMETON) 4 MG tablet Take 4 mg by mouth every 6 hours as needed for allergies or rhinitis (Patient not taking: Reported on 8/2/2023)       fluticasone (FLONASE) 50 MCG/ACT nasal spray Spray 1 spray into both nostrils daily (Patient not taking: Reported on 2/29/2024) 18.2 mL 1     semaglutide (OZEMPIC) 2 MG/3ML pen Inject 0.25 mg Subcutaneous every 7 days Increase to 0.5 mg  every 7 days after 4 weeks. (Patient not taking: Reported on 2/29/2024) 3 mL 1     triamcinolone (KENALOG) 0.1 % external ointment Apply sparingly to affected area twice daily as needed. (Patient not taking: Reported on 3/6/2024) 15 g 1        ALLERGIES     Allergies   Allergen Reactions     Allopurinol Rash     FAMILY HISTORY:  Family History   Problem Relation Age of Onset     Cancer Father         liver/kidney     C.A.D. Father      Pacemaker Brother      Arthritis Mother         RA     Dad had liver cancer at 74.  He has 2 children and they are healthy.    Social History     Socioeconomic History     Marital status:      Spouse name: Cristel     Number of children: 2     Years of education: Not on file     Highest education level: Not on file   Occupational History     Not on file   Tobacco Use     Smoking status: Never     Smokeless tobacco: Never   Vaping Use     Vaping status: Never Used   Substance and Sexual Activity     Alcohol use: Yes     Alcohol/week: 3.3 standard drinks of alcohol     Comment: occasionally weekends     Drug use: No     Sexual activity: Yes   Other Topics  "Concern      Service Not Asked     Blood Transfusions Not Asked     Caffeine Concern No     Occupational Exposure Not Asked     Hobby Hazards Not Asked     Sleep Concern No     Stress Concern No     Weight Concern No     Special Diet Not Asked     Back Care Not Asked     Exercise Yes     Bike Helmet Not Asked     Seat Belt Yes     Self-Exams Not Asked     Parent/sibling w/ CABG, MI or angioplasty before 65F 55M? Not Asked   Social History Narrative     Not on file     Social Determinants of Health     Financial Resource Strain: Not on file   Food Insecurity: Not on file   Transportation Needs: Not on file   Physical Activity: Not on file   Stress: Not on file   Social Connections: Not on file   Interpersonal Safety: Not on file   Housing Stability: Not on file     Denies smoking. 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     PHYSICAL EXAM     /75 (BP Location: Left arm, Patient Position: Chair, Cuff Size: Adult Large)   Pulse 79   Ht 1.854 m (6' 1\")   Wt 130.6 kg (288 lb)   SpO2 98%   BMI 38.00 kg/m    Wt Readings from Last 4 Encounters:   04/17/24 130.6 kg (288 lb)   03/06/24 125.6 kg (276 lb 14.4 oz)   02/29/24 127 kg (280 lb)   02/20/24 127.2 kg (280 lb 7 oz)     CONSTITUTIONAL: No apparent distress  EYES: Both pupils are surgical.  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  Eyes are without pallor or jaundice  ENT/MOUTH: Ears unremarkable. No oral lesions  CVS: s1s2 normal  RESPIRATORY: Chest is clear  GI: Abdomen is benign  NEURO: Alert and oriented ×3  INTEGUMENT: no concerning skin rashes   LYMPHATIC: no palpable lymphadenopathy  MUSCULOSKELETAL: Unremarkable. No bony tenderness.   EXTREMITIES: no pedal edema  PSYCH: Mentation, mood and affect are appropriate               LABORATORY AND IMAGING STUDIES       I reviewed     3/28/2024  CBC shows WBC 29.8.  Hemoglobin 13.9.  Platelets 260.  Basophils 1.5.  Neutrophils 21.8.  Myelocytes 0.3.  Promyelocytes 1.5.    CMP was " unremarkable.    .    BCR::ABL1/ABL1 Major ( p210): 57%        3/4/2024.  CT chest abdomen and pelvis  IMPRESSION:  1.  Stable exam compared to 3/6/2023 CT scan.  2.  Stable prominent mediastinal lymph nodes and mild splenomegaly.  3.  Unchanged bibasilar subpleural reticular and groundglass pulmonary  opacities suggestive of interstitial lung disease.  4.  Ascending aortic aneurysm measuring 4.7 cm.   5.  Stable 2.3 cm sclerotic lesion in L2 vertebral body.        Bone, L2 vertebral body, core needle biopsy on 1/12/2023:  --Fragments of lamellar hematopoietic containing bone trabeculae, no diagnostic morphologic evidence of lymphoma.        ASSESSMENT AND PLAN     Stage IV follicular lymphoma- status post treatment with bendamustine/Rituxan (04-09/2014) followed by maintenance rituximab for two years- completed in October 2016.    Currently no evidence of lymphoma.  Plan to repeat labs and CT scan in 1 year.          Leukocytosis/neutrophilia/basophilia and elevated LDH.  Metamyelocyte and promyelocytes also elevated.  We did BCR/ABL and it was positive at 57%.  I am concerned that most likely he has CML.  I would recommend checking a bone marrow biopsy.  We discussed the rational and the procedure of bone marrow biopsy including its potential complications in detail.  We will arrange in the next few days.   We will also check peripheral blood FISH for t9;22.      We did not address the following today.    L2 sclerotic lesion.  He was seen by Dr. Alston from neurosurgery and I reviewed the notes.  Most likely this is a benign lesion and recommendation was to follow it up serially with repeat MRI in 6 months.  MRI in December 2022 showed slight growth in the lesion so he had biopsy of this on 1/12/2022 which was benign.  Repeat CT chest abdomen and pelvis on 3/4/2024 showed overall stable findings.          Bilateral hydroureter and bladder wall thickening.  He had cystourethroscopy in May 2022 which was  unremarkable.  I reviewed notes from urology.  CT scan from 3/4/2024 shows mild dilation of left distal ureter which is stable.  Continue to observe.      Interstitial lung disease.  Repeat CT scan continues to show slight worsening of the interstitial lung disease.  I advised him to follow-up with pulmonology.  I gave him a referral for pulmonology.    I will see him back after the bone marrow biopsy.    All questions answered.  He is agreeable and comfortable with the plan.    Eduardo Crespo MD        Again, thank you for allowing me to participate in the care of your patient.        Sincerely,        Eduardo Crespo MD

## 2024-04-17 NOTE — PROGRESS NOTES
HEMATOLOGY ONCOLOGY FOLLOW-UP VISIT NOTE    PATIENT NAME: Deandre Merchant MRN # [id number]  DATE OF VISIT: Apr 17, 2024 YOB: 1950        CANCER TYPE:Follicular Lymphoma  STAGE: IV( axillary, mediastinal, retroperitoneal, stomach, bone marrow)      ONCOLOGY HISTORY:    Patient was being followed by Dr. Krishnamurthy but now she has transferred his care to me.  I reviewed his previous records and have copied and updated from prior notes.  74 year old  male who in 2012 presented with mediastinal, retroperitoneal and mesenteric lymph nodes. Was clinically asymptomatic with normal blood counts. CT-guided lymph node biopsy on 6/21/2012 was consistent with follicular lymphoma, grade 1-2. He was observed at that point.    In 2013, presented to the hospital with ileus and abdominal distention. EGD showed gastric antrum and duodenal ulcers which were biopsied and showed follicular lymphoma. CT scans showed bulky  lymphadenopathy with increasing size. 04/13 Bone marrow biopsy was positive with evidence of follicular lymphoma.   He received bendamustine and Rituxan regimen April 2014- -September 2014.   In December 14 , he was started on maintenance Rituxan every 2 months for 2 years which he completed in October 2016.     In remission and on observation since then.    7/14/2020 - established care with me.    CT scan in July 2020 was stable apart from an indeterminate L2 vertebral body lesion.    MRI of the lumbar spine showed Nonspecific enhancing lesion in the right aspect of the L2 inferior endplate which remains indeterminate.    12/7/2020.  Repeat MRI of the lumbar spine is the same.    We proceeded with a PET scan on 1/8/2021 which shows mild FDG uptake in the L2 sclerotic lesion.  It was measuring about 1.5 x 1.2 x 1.4 cm and was stable.  Of note previously the lymphoma was FDG avid.      In December 2022, he had MRI of the lumbar spine which had shown some growth of the L2 vertebral body lesion which now measures  23 x 24 x 21 mm so he was evaluated by Dr. Alston from neurosurgery and I reviewed the notes.  He had core biopsy done on 1/12/2022 which was benign.       He was also seen by urologist Dr. Rick on 5/4/2022 and cystoscopy was unremarkable.       SUBJECTIVE   When he saw me in March 2024, we noticed that he had leukocytosis with left shift as well as monocytosis so we repeated blood work which showed further increase in leukocytosis as well as left shift including presence of myelocytes and promyelocytes.  Other testing showed that BCR/ABL is positive.  Overall he continues to feel well and denies any new pain or new swellings or B symptoms.  No fevers infection shortness of breath.  No GI problems.     ECOG 0    ROS:  Rest of the comprehensive review of the system was negative.      I reviewed other history in epic as below.      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 1 each 3    blood glucose (NO BRAND SPECIFIED) test strip Use to test blood sugar once daily. 50 strip 4    cetirizine (ZYRTEC) 10 MG tablet Take 10 mg by mouth daily      fluticasone (FLONASE) 50 MCG/ACT nasal spray Spray 2 sprays into both nostrils daily 16 g 11    metFORMIN (GLUCOPHAGE XR) 500 MG 24 hr tablet Take 4 tablets (2,000 mg) by mouth daily (with dinner) 360 tablet 3    metoprolol succinate ER (TOPROL XL) 50  MG 24 hr tablet Take 1 tablet (50 mg) by mouth daily 90 tablet 3    order for DME Equipment ordered: RESMED Auto PAP Mask type: Full face  Settings: 5-15 cm h2o      chlorpheniramine (CHLOR-TRIMETON) 4 MG tablet Take 4 mg by mouth every 6 hours as needed for allergies or rhinitis (Patient not taking: Reported on 8/2/2023)      fluticasone (FLONASE) 50 MCG/ACT nasal spray Spray 1 spray into both nostrils daily (Patient not taking: Reported on 2/29/2024) 18.2 mL 1    semaglutide (OZEMPIC) 2 MG/3ML pen Inject 0.25 mg Subcutaneous every 7 days Increase to 0.5 mg  every 7 days after 4 weeks. (Patient not taking: Reported on 2/29/2024) 3 mL 1    triamcinolone (KENALOG) 0.1 % external ointment Apply sparingly to affected area twice daily as needed. (Patient not taking: Reported on 3/6/2024) 15 g 1        ALLERGIES     Allergies   Allergen Reactions    Allopurinol Rash     FAMILY HISTORY:  Family History   Problem Relation Age of Onset    Cancer Father         liver/kidney    C.A.D. Father     Pacemaker Brother     Arthritis Mother         RA     Dad had liver cancer at 74.  He has 2 children and they are healthy.    Social History     Socioeconomic History    Marital status:      Spouse name: Cristel    Number of children: 2    Years of education: Not on file    Highest education level: Not on file   Occupational History    Not on file   Tobacco Use    Smoking status: Never    Smokeless tobacco: Never   Vaping Use    Vaping status: Never Used   Substance and Sexual Activity    Alcohol use: Yes     Alcohol/week: 3.3 standard drinks of alcohol     Comment: occasionally weekends    Drug use: No    Sexual activity: Yes   Other Topics Concern     Service Not Asked    Blood Transfusions Not Asked    Caffeine Concern No    Occupational Exposure Not Asked    Hobby Hazards Not Asked    Sleep Concern No    Stress Concern No    Weight Concern No    Special Diet Not Asked    Back Care Not Asked    Exercise Yes    Bike  "Helmet Not Asked    Seat Belt Yes    Self-Exams Not Asked    Parent/sibling w/ CABG, MI or angioplasty before 65F 55M? Not Asked   Social History Narrative    Not on file     Social Determinants of Health     Financial Resource Strain: Not on file   Food Insecurity: Not on file   Transportation Needs: Not on file   Physical Activity: Not on file   Stress: Not on file   Social Connections: Not on file   Interpersonal Safety: Not on file   Housing Stability: Not on file     Denies smoking. 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     PHYSICAL EXAM     /75 (BP Location: Left arm, Patient Position: Chair, Cuff Size: Adult Large)   Pulse 79   Ht 1.854 m (6' 1\")   Wt 130.6 kg (288 lb)   SpO2 98%   BMI 38.00 kg/m    Wt Readings from Last 4 Encounters:   04/17/24 130.6 kg (288 lb)   03/06/24 125.6 kg (276 lb 14.4 oz)   02/29/24 127 kg (280 lb)   02/20/24 127.2 kg (280 lb 7 oz)     CONSTITUTIONAL: No apparent distress  EYES: Both pupils are surgical.  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  Eyes are without pallor or jaundice  ENT/MOUTH: Ears unremarkable. No oral lesions  CVS: s1s2 normal  RESPIRATORY: Chest is clear  GI: Abdomen is benign  NEURO: Alert and oriented ×3  INTEGUMENT: no concerning skin rashes   LYMPHATIC: no palpable lymphadenopathy  MUSCULOSKELETAL: Unremarkable. No bony tenderness.   EXTREMITIES: no pedal edema  PSYCH: Mentation, mood and affect are appropriate               LABORATORY AND IMAGING STUDIES       I reviewed     3/28/2024  CBC shows WBC 29.8.  Hemoglobin 13.9.  Platelets 260.  Basophils 1.5.  Neutrophils 21.8.  Myelocytes 0.3.  Promyelocytes 1.5.    CMP was unremarkable.    .    BCR::ABL1/ABL1 Major ( p210): 57%        3/4/2024.  CT chest abdomen and pelvis  IMPRESSION:  1.  Stable exam compared to 3/6/2023 CT scan.  2.  Stable prominent mediastinal lymph nodes and mild splenomegaly.  3.  Unchanged bibasilar subpleural reticular and groundglass " pulmonary  opacities suggestive of interstitial lung disease.  4.  Ascending aortic aneurysm measuring 4.7 cm.   5.  Stable 2.3 cm sclerotic lesion in L2 vertebral body.        Bone, L2 vertebral body, core needle biopsy on 1/12/2023:  --Fragments of lamellar hematopoietic containing bone trabeculae, no diagnostic morphologic evidence of lymphoma.        ASSESSMENT AND PLAN     Stage IV follicular lymphoma- status post treatment with bendamustine/Rituxan (04-09/2014) followed by maintenance rituximab for two years- completed in October 2016.    Currently no evidence of lymphoma.  Plan to repeat labs and CT scan in 1 year.          Leukocytosis/neutrophilia/basophilia and elevated LDH.  Metamyelocyte and promyelocytes also elevated.  We did BCR/ABL and it was positive at 57%.  I am concerned that most likely he has CML.  I would recommend checking a bone marrow biopsy.  We discussed the rational and the procedure of bone marrow biopsy including its potential complications in detail.  We will arrange in the next few days.   We will also check peripheral blood FISH for t9;22.      We did not address the following today.    L2 sclerotic lesion.  He was seen by Dr. Alston from neurosurgery and I reviewed the notes.  Most likely this is a benign lesion and recommendation was to follow it up serially with repeat MRI in 6 months.  MRI in December 2022 showed slight growth in the lesion so he had biopsy of this on 1/12/2022 which was benign.  Repeat CT chest abdomen and pelvis on 3/4/2024 showed overall stable findings.          Bilateral hydroureter and bladder wall thickening.  He had cystourethroscopy in May 2022 which was unremarkable.  I reviewed notes from urology.  CT scan from 3/4/2024 shows mild dilation of left distal ureter which is stable.  Continue to observe.      Interstitial lung disease.  Repeat CT scan continues to show slight worsening of the interstitial lung disease.  I advised him to follow-up with  pulmonology.  I gave him a referral for pulmonology.    I will see him back after the bone marrow biopsy.    All questions answered.  He is agreeable and comfortable with the plan.    Eduardo Crespo MD

## 2024-04-23 ENCOUNTER — TELEPHONE (OUTPATIENT)
Dept: ONCOLOGY | Facility: CLINIC | Age: 74
End: 2024-04-23
Payer: MEDICARE

## 2024-04-23 NOTE — TELEPHONE ENCOUNTER
[facility]   Bone Marrow Biopsy Pre-Procedure Education    Called Patient  to review instructions prior to their scheduled bone marrow biopsy.      Reviewed the following instructions: wear loose fitting clothing, eat a light breakfast, the process will take approximately 90 minutes to 2 hours, and a  is required.    Patient is on blood thinner(s): No    Answered questions to Patient's satisfaction.  Provided them with clinic number ([phone]) if they have any additional questions or issues prior to the procedure.     Rosi Barnett RN, OCN

## 2024-04-28 NOTE — PROGRESS NOTES
[facility] Adult Bone Marrow Biopsy Procedure Note  April 29, 2024  There were no vitals taken for this visit.   Results for orders placed or performed in visit on 04/29/24   Comprehensive metabolic panel     Status: Abnormal   Result Value Ref Range    Sodium 139 135 - 145 mmol/L    Potassium 4.3 3.4 - 5.3 mmol/L    Carbon Dioxide (CO2) 23 22 - 29 mmol/L    Anion Gap 12 7 - 15 mmol/L    Urea Nitrogen 15.4 8.0 - 23.0 mg/dL    Creatinine 0.96 0.67 - 1.17 mg/dL    GFR Estimate 83 >60 mL/min/1.73m2    Calcium 8.9 8.8 - 10.2 mg/dL    Chloride 104 98 - 107 mmol/L    Glucose 172 (H) 70 - 99 mg/dL    Alkaline Phosphatase 99 40 - 150 U/L    AST 33 0 - 45 U/L    ALT 21 0 - 70 U/L    Protein Total 6.3 (L) 6.4 - 8.3 g/dL    Albumin 4.4 3.5 - 5.2 g/dL    Bilirubin Total 0.5 <=1.2 mg/dL   Lactate Dehydrogenase     Status: Abnormal   Result Value Ref Range    Lactate Dehydrogenase 429 (H) 0 - 250 U/L   CBC with platelets and differential     Status: Abnormal   Result Value Ref Range    WBC Count 31.7 (HH) 4.0 - 11.0 10e3/uL    RBC Count 4.35 (L) 4.40 - 5.90 10e6/uL    Hemoglobin 13.2 (L) 13.3 - 17.7 g/dL    Hematocrit 40.1 40.0 - 53.0 %    MCV 92 78 - 100 fL    MCH 30.3 26.5 - 33.0 pg    MCHC 32.9 31.5 - 36.5 g/dL    RDW 14.7 10.0 - 15.0 %    Platelet Count 233 150 - 450 10e3/uL    NRBCs per 100 WBC 0 <1 /100    Absolute NRBCs 0.0 10e3/uL   Extra Tube     Status: None (In process)    Narrative    The following orders were created for panel order Extra Tube.  Procedure                               Abnormality         Status                     ---------                               -----------         ------                     Extra Red Top Tube[[id number]]                               In process                   Please view results for these tests on the individual orders.   Manual Differential     Status: Abnormal   Result Value Ref Range    % Neutrophils 71 %    % Lymphocytes 6 %    % Monocytes 8 %    % Eosinophils 4 %    %  Basophils 3 %    % Metamyelocytes 5 %    % Myelocytes 3 %    Absolute Neutrophils 22.5 (H) 1.6 - 8.3 10e3/uL    Absolute Lymphocytes 1.9 0.8 - 5.3 10e3/uL    Absolute Monocytes 2.5 (H) 0.0 - 1.3 10e3/uL    Absolute Eosinophils 1.3 (H) 0.0 - 0.7 10e3/uL    Absolute Basophils 1.0 (H) 0.0 - 0.2 10e3/uL    Absolute Metamyelocytes 1.6 (H) <=0.0 10e3/uL    Absolute Myelocytes 1.0 (H) <=0.0 10e3/uL    RBC Morphology Confirmed RBC Indices     Platelet Assessment  Automated Count Confirmed. Platelet morphology is normal.     Automated Count Confirmed. Platelet morphology is normal.   CBC with platelets differential     Status: Abnormal    Narrative    The following orders were created for panel order CBC with platelets differential.  Procedure                               Abnormality         Status                     ---------                               -----------         ------                     CBC with platelets and d...[[id number]]  Abnormal            Final result               Manual Differential[[id number]]          Abnormal            Final result                 Please view results for these tests on the individual orders.         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     Patient s identification was positively verified by verbal identification and invasive procedure safety checklist was completed. Informed consent was obtained. Following the administration of Midazolam 1 mg IV as pre-medication, patient was placed in the left lateral decubitus position and prepped and draped in a sterile manner. Approximately 10 cc of 1% Lidocaine was used over the right posterior iliac spine. Following this a 3 mm incision was made. Trephine bone marrow core(s) was (were) obtained from the [facility]. Bone marrow aspirates were obtained from the [facility]. Aspirates were sent for morphology,  immunophenotyping, cytogenetics, and molecular diagnostics  A total of approximately 20 ml of marrow was aspirated. Following this procedure a sterile dressing was applied to the bone marrow biopsy site(s). The patient was placed in the supine position to maintain pressure on the biopsy site. Post-procedure wound care instructions were given.     Complications: NO    Pre-procedural pain: 0 out of 10 on the numeric pain rating scale.     Post-procedural pain assessment: 1 out of 10 on the numeric pain rating scale.     Interventions: NO    Length of procedure:20 minutes or less      Procedure performed by: Netta Fierro CNp

## 2024-04-29 ENCOUNTER — INFUSION THERAPY VISIT (OUTPATIENT)
Dept: INFUSION THERAPY | Facility: CLINIC | Age: 74
End: 2024-04-29
Attending: INTERNAL MEDICINE
Payer: MEDICARE

## 2024-04-29 ENCOUNTER — PROCEDURE ONLY VISIT (OUTPATIENT)
Dept: ONCOLOGY | Facility: CLINIC | Age: 74
End: 2024-04-29
Attending: NURSE PRACTITIONER
Payer: MEDICARE

## 2024-04-29 VITALS — DIASTOLIC BLOOD PRESSURE: 67 MMHG | HEART RATE: 73 BPM | TEMPERATURE: 97.6 F | SYSTOLIC BLOOD PRESSURE: 128 MMHG

## 2024-04-29 VITALS
HEIGHT: 73 IN | TEMPERATURE: 98.3 F | OXYGEN SATURATION: 94 % | SYSTOLIC BLOOD PRESSURE: 130 MMHG | HEART RATE: 81 BPM | WEIGHT: 286 LBS | BODY MASS INDEX: 37.91 KG/M2 | DIASTOLIC BLOOD PRESSURE: 74 MMHG

## 2024-04-29 DIAGNOSIS — C85.98 NON-HODGKIN'S LYMPHOMA OF MULTIPLE SITES (H): ICD-10-CM

## 2024-04-29 DIAGNOSIS — C92.10 CML (CHRONIC MYELOCYTIC LEUKEMIA) (H): ICD-10-CM

## 2024-04-29 DIAGNOSIS — D72.828 OTHER ELEVATED WHITE BLOOD CELL (WBC) COUNT: ICD-10-CM

## 2024-04-29 DIAGNOSIS — C92.10 CML (CHRONIC MYELOCYTIC LEUKEMIA) (H): Primary | ICD-10-CM

## 2024-04-29 LAB
ALBUMIN SERPL BCG-MCNC: 4.4 G/DL (ref 3.5–5.2)
ALP SERPL-CCNC: 99 U/L (ref 40–150)
ALT SERPL W P-5'-P-CCNC: 21 U/L (ref 0–70)
ANION GAP SERPL CALCULATED.3IONS-SCNC: 12 MMOL/L (ref 7–15)
AST SERPL W P-5'-P-CCNC: 33 U/L (ref 0–45)
BASOPHILS # BLD MANUAL: 1 10E3/UL (ref 0–0.2)
BASOPHILS NFR BLD MANUAL: 3 %
BILIRUB SERPL-MCNC: 0.5 MG/DL
BUN SERPL-MCNC: 15.4 MG/DL (ref 8–23)
CALCIUM SERPL-MCNC: 8.9 MG/DL (ref 8.8–10.2)
CHLORIDE SERPL-SCNC: 104 MMOL/L (ref 98–107)
CREAT SERPL-MCNC: 0.96 MG/DL (ref 0.67–1.17)
DEPRECATED HCO3 PLAS-SCNC: 23 MMOL/L (ref 22–29)
EGFRCR SERPLBLD CKD-EPI 2021: 83 ML/MIN/1.73M2
EOSINOPHIL # BLD MANUAL: 1.3 10E3/UL (ref 0–0.7)
EOSINOPHIL NFR BLD MANUAL: 4 %
ERYTHROCYTE [DISTWIDTH] IN BLOOD BY AUTOMATED COUNT: 14.7 % (ref 10–15)
GLUCOSE SERPL-MCNC: 172 MG/DL (ref 70–99)
HCT VFR BLD AUTO: 40.1 % (ref 40–53)
HGB BLD-MCNC: 13.2 G/DL (ref 13.3–17.7)
HOLD SPECIMEN: NORMAL
LDH SERPL L TO P-CCNC: 429 U/L (ref 0–250)
LYMPHOCYTES # BLD MANUAL: 1.9 10E3/UL (ref 0.8–5.3)
LYMPHOCYTES NFR BLD MANUAL: 6 %
MCH RBC QN AUTO: 30.3 PG (ref 26.5–33)
MCHC RBC AUTO-ENTMCNC: 32.9 G/DL (ref 31.5–36.5)
MCV RBC AUTO: 92 FL (ref 78–100)
METAMYELOCYTES # BLD MANUAL: 1.6 10E3/UL
METAMYELOCYTES NFR BLD MANUAL: 5 %
MONOCYTES # BLD MANUAL: 2.5 10E3/UL (ref 0–1.3)
MONOCYTES NFR BLD MANUAL: 8 %
MYELOCYTES # BLD MANUAL: 1 10E3/UL
MYELOCYTES NFR BLD MANUAL: 3 %
NEUTROPHILS # BLD MANUAL: 22.5 10E3/UL (ref 1.6–8.3)
NEUTROPHILS NFR BLD MANUAL: 71 %
NRBC # BLD AUTO: 0 10E3/UL
NRBC BLD AUTO-RTO: 0 /100
PLAT MORPH BLD: ABNORMAL
PLATELET # BLD AUTO: 233 10E3/UL (ref 150–450)
POTASSIUM SERPL-SCNC: 4.3 MMOL/L (ref 3.4–5.3)
PROT SERPL-MCNC: 6.3 G/DL (ref 6.4–8.3)
RBC # BLD AUTO: 4.35 10E6/UL (ref 4.4–5.9)
RBC MORPH BLD: ABNORMAL
SODIUM SERPL-SCNC: 139 MMOL/L (ref 135–145)
WBC # BLD AUTO: 31.7 10E3/UL (ref 4–11)

## 2024-04-29 PROCEDURE — 88341 IMHCHEM/IMCYTCHM EA ADD ANTB: CPT | Mod: 26 | Performed by: STUDENT IN AN ORGANIZED HEALTH CARE EDUCATION/TRAINING PROGRAM

## 2024-04-29 PROCEDURE — 250N000011 HC RX IP 250 OP 636: Performed by: NURSE PRACTITIONER

## 2024-04-29 PROCEDURE — 88368 INSITU HYBRIDIZATION MANUAL: CPT | Mod: 26 | Performed by: MEDICAL GENETICS

## 2024-04-29 PROCEDURE — 88311 DECALCIFY TISSUE: CPT | Mod: TC

## 2024-04-29 PROCEDURE — 88184 FLOWCYTOMETRY/ TC 1 MARKER: CPT | Performed by: NURSE PRACTITIONER

## 2024-04-29 PROCEDURE — 85025 COMPLETE CBC W/AUTO DIFF WBC: CPT

## 2024-04-29 PROCEDURE — 88185 FLOWCYTOMETRY/TC ADD-ON: CPT | Performed by: NURSE PRACTITIONER

## 2024-04-29 PROCEDURE — 88313 SPECIAL STAINS GROUP 2: CPT | Mod: 26 | Performed by: STUDENT IN AN ORGANIZED HEALTH CARE EDUCATION/TRAINING PROGRAM

## 2024-04-29 PROCEDURE — 88237 TISSUE CULTURE BONE MARROW: CPT | Performed by: NURSE PRACTITIONER

## 2024-04-29 PROCEDURE — 38222 DX BONE MARROW BX & ASPIR: CPT | Performed by: NURSE PRACTITIONER

## 2024-04-29 PROCEDURE — 85007 BL SMEAR W/DIFF WBC COUNT: CPT

## 2024-04-29 PROCEDURE — 88342 IMHCHEM/IMCYTCHM 1ST ANTB: CPT | Mod: 26 | Performed by: STUDENT IN AN ORGANIZED HEALTH CARE EDUCATION/TRAINING PROGRAM

## 2024-04-29 PROCEDURE — 88275 CYTOGENETICS 100-300: CPT | Mod: 59 | Performed by: NURSE PRACTITIONER

## 2024-04-29 PROCEDURE — 88280 CHROMOSOME KARYOTYPE STUDY: CPT | Performed by: NURSE PRACTITIONER

## 2024-04-29 PROCEDURE — 99207 PR NO CHARGE LOS: CPT

## 2024-04-29 PROCEDURE — 83615 LACTATE (LD) (LDH) ENZYME: CPT

## 2024-04-29 PROCEDURE — 88275 CYTOGENETICS 100-300: CPT

## 2024-04-29 PROCEDURE — 84075 ASSAY ALKALINE PHOSPHATASE: CPT

## 2024-04-29 PROCEDURE — 88311 DECALCIFY TISSUE: CPT | Mod: 26 | Performed by: STUDENT IN AN ORGANIZED HEALTH CARE EDUCATION/TRAINING PROGRAM

## 2024-04-29 PROCEDURE — 88184 FLOWCYTOMETRY/ TC 1 MARKER: CPT | Performed by: STUDENT IN AN ORGANIZED HEALTH CARE EDUCATION/TRAINING PROGRAM

## 2024-04-29 PROCEDURE — 36415 COLL VENOUS BLD VENIPUNCTURE: CPT

## 2024-04-29 PROCEDURE — 88189 FLOWCYTOMETRY/READ 16 & >: CPT | Mod: GC | Performed by: STUDENT IN AN ORGANIZED HEALTH CARE EDUCATION/TRAINING PROGRAM

## 2024-04-29 PROCEDURE — 85097 BONE MARROW INTERPRETATION: CPT | Mod: GC | Performed by: STUDENT IN AN ORGANIZED HEALTH CARE EDUCATION/TRAINING PROGRAM

## 2024-04-29 PROCEDURE — 88291 CYTO/MOLECULAR REPORT: CPT | Performed by: MEDICAL GENETICS

## 2024-04-29 PROCEDURE — 88305 TISSUE EXAM BY PATHOLOGIST: CPT | Mod: 26 | Performed by: STUDENT IN AN ORGANIZED HEALTH CARE EDUCATION/TRAINING PROGRAM

## 2024-04-29 RX ADMIN — MIDAZOLAM 1 MG: 1 INJECTION INTRAMUSCULAR; INTRAVENOUS at 09:50

## 2024-04-29 ASSESSMENT — PAIN SCALES - GENERAL: PAINLEVEL: NO PAIN (1)

## 2024-04-29 NOTE — PROGRESS NOTES
Patient presents for bone marrow biopsy.  Discussed procedure with patient.  Vitals obtained and stable.  Lab staff present during PIV start and lab drawn.  Netta Fierro CNP met with patient and consent was signed.  Pre-medications administered, patient positioned in prone position.  Patient tolerated procedure well.  Patient remained on bed rest for 30 minutes post procedure,  water and snack provided. PIV D/C'd.  Dressing assessed; no bleeding. Discharge instructions reviewed with patient, voiced understanding.  Wife, Cristel will be driving patient home.   Patient discharged at 11:00 in stable condition.    DATE/TIME OF CALL RECEIVED FROM LAB:  04/29/24 at 0900 AM   LAB TEST:  WBC  LAB VALUE:  31.7  PROVIDER NOTIFIED?: Yes  PROVIDER NAME: Netta Fierro NP  DATE/TIME LAB VALUE REPORTED TO PROVIDER: 0905  MECHANISM OF PROVIDER NOTIFICATION: Face-To-Face  PROVIDER RESPONSE: This is a known and expected value and patient is undergoing bone marrow biopsy today for this issue.

## 2024-04-30 LAB
PATH REPORT.COMMENTS IMP SPEC: NORMAL
PATH REPORT.FINAL DX SPEC: NORMAL
PATH REPORT.MICROSCOPIC SPEC OTHER STN: NORMAL
PATH REPORT.RELEVANT HX SPEC: NORMAL

## 2024-05-02 LAB
PATH REPORT.ADDENDUM SPEC: ABNORMAL
PATH REPORT.COMMENTS IMP SPEC: ABNORMAL
PATH REPORT.COMMENTS IMP SPEC: ABNORMAL
PATH REPORT.COMMENTS IMP SPEC: YES
PATH REPORT.FINAL DX SPEC: ABNORMAL
PATH REPORT.GROSS SPEC: ABNORMAL
PATH REPORT.MICROSCOPIC SPEC OTHER STN: ABNORMAL
PATH REPORT.MICROSCOPIC SPEC OTHER STN: ABNORMAL
PATH REPORT.RELEVANT HX SPEC: ABNORMAL

## 2024-05-06 LAB
CULTURE HARVEST COMPLETE DATE: NORMAL
INTERPRETATION: NORMAL
ISCN: NORMAL
METHODS: NORMAL

## 2024-05-08 ENCOUNTER — TELEPHONE (OUTPATIENT)
Dept: ONCOLOGY | Facility: CLINIC | Age: 74
End: 2024-05-08

## 2024-05-08 ENCOUNTER — ONCOLOGY VISIT (OUTPATIENT)
Dept: ONCOLOGY | Facility: CLINIC | Age: 74
End: 2024-05-08
Attending: INTERNAL MEDICINE
Payer: MEDICARE

## 2024-05-08 VITALS
BODY MASS INDEX: 38.33 KG/M2 | WEIGHT: 289.2 LBS | HEIGHT: 73 IN | RESPIRATION RATE: 16 BRPM | SYSTOLIC BLOOD PRESSURE: 128 MMHG | OXYGEN SATURATION: 95 % | DIASTOLIC BLOOD PRESSURE: 78 MMHG | HEART RATE: 78 BPM

## 2024-05-08 DIAGNOSIS — C92.10 CML (CHRONIC MYELOCYTIC LEUKEMIA) (H): Primary | ICD-10-CM

## 2024-05-08 PROCEDURE — G2211 COMPLEX E/M VISIT ADD ON: HCPCS | Performed by: INTERNAL MEDICINE

## 2024-05-08 PROCEDURE — G0463 HOSPITAL OUTPT CLINIC VISIT: HCPCS | Performed by: INTERNAL MEDICINE

## 2024-05-08 PROCEDURE — 99214 OFFICE O/P EST MOD 30 MIN: CPT | Performed by: INTERNAL MEDICINE

## 2024-05-08 ASSESSMENT — PAIN SCALES - GENERAL: PAINLEVEL: NO PAIN (0)

## 2024-05-08 NOTE — PROGRESS NOTES
HEMATOLOGY ONCOLOGY FOLLOW-UP VISIT NOTE    PATIENT NAME: Deandre RAE Mayur MRN # [id number]  DATE OF VISIT: May 8, 2024 YOB: 1950        CANCER TYPE:Follicular Lymphoma  STAGE: IV( axillary, mediastinal, retroperitoneal, stomach, bone marrow)    CML- Diagnosed March/April 2024    ONCOLOGY HISTORY:    Patient was being followed by Dr. Krishnamurthy but now she has transferred his care to me.  I reviewed his previous records and have copied and updated from prior notes.  74 year old  male who in 2012 presented with mediastinal, retroperitoneal and mesenteric lymph nodes. Was clinically asymptomatic with normal blood counts. CT-guided lymph node biopsy on 6/21/2012 was consistent with follicular lymphoma, grade 1-2. He was observed at that point.    In 2013, presented to the hospital with ileus and abdominal distention. EGD showed gastric antrum and duodenal ulcers which were biopsied and showed follicular lymphoma. CT scans showed bulky  lymphadenopathy with increasing size. 04/13 Bone marrow biopsy was positive with evidence of follicular lymphoma.   He received bendamustine and Rituxan regimen April 2014- -September 2014.   In December 14 , he was started on maintenance Rituxan every 2 months for 2 years which he completed in October 2016.     In remission and on observation since then.    7/14/2020 - established care with me.    CT scan in July 2020 was stable apart from an indeterminate L2 vertebral body lesion.    MRI of the lumbar spine showed Nonspecific enhancing lesion in the right aspect of the L2 inferior endplate which remains indeterminate.    12/7/2020.  Repeat MRI of the lumbar spine is the same.    We proceeded with a PET scan on 1/8/2021 which shows mild FDG uptake in the L2 sclerotic lesion.  It was measuring about 1.5 x 1.2 x 1.4 cm and was stable.  Of note previously the lymphoma was FDG avid.      In December 2022, he had MRI of the lumbar spine which had shown some growth of the L2 vertebral  body lesion which now measures 23 x 24 x 21 mm so he was evaluated by Dr. Alston from neurosurgery and I reviewed the notes.  He had core biopsy done on 1/12/2022 which was benign.       He was also seen by urologist Dr. Rick on 5/4/2022 and cystoscopy was unremarkable.     When he saw me in March 2024, we noticed that he had leukocytosis with left shift as well as monocytosis so we repeated blood work which showed further increase in leukocytosis as well as left shift including presence of myelocytes and promyelocytes.  Other testing showed that BCR/ABL is positive.         SUBJECTIVE   He comes in today accompanied by his wife.  Overall he is feeling okay.  Denies any B symptoms.  No new pain and no new swellings.      ECOG 0    ROS:  Rest of the comprehensive review of the system was negative.      I reviewed other history in epic as below.      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 1 each 3    blood glucose (NO BRAND SPECIFIED) test strip Use to test blood sugar once daily. 50 strip 4    cetirizine (ZYRTEC) 10 MG tablet Take 10 mg by mouth daily      chlorpheniramine (CHLOR-TRIMETON) 4 MG tablet Take 4 mg by mouth every 6 hours as needed for allergies or rhinitis      fluticasone (FLONASE) 50 MCG/ACT nasal spray Spray 1 spray into both nostrils daily 18.2 mL 1    fluticasone  (FLONASE) 50 MCG/ACT nasal spray Spray 2 sprays into both nostrils daily 16 g 11    metFORMIN (GLUCOPHAGE XR) 500 MG 24 hr tablet Take 4 tablets (2,000 mg) by mouth daily (with dinner) 360 tablet 3    metoprolol succinate ER (TOPROL XL) 50 MG 24 hr tablet Take 1 tablet (50 mg) by mouth daily 90 tablet 3    order for DME Equipment ordered: RESMED Auto PAP Mask type: Full face  Settings: 5-15 cm h2o      semaglutide (OZEMPIC) 2 MG/3ML pen Inject 0.25 mg Subcutaneous every 7 days Increase to 0.5 mg  every 7 days after 4 weeks. 3 mL 1    triamcinolone (KENALOG) 0.1 % external ointment Apply sparingly to affected area twice daily as needed. 15 g 1        ALLERGIES     Allergies   Allergen Reactions    Allopurinol Rash     FAMILY HISTORY:  Family History   Problem Relation Age of Onset    Cancer Father         liver/kidney    C.A.D. Father     Pacemaker Brother     Arthritis Mother         RA     Dad had liver cancer at 74.  He has 2 children and they are healthy.    Social History     Socioeconomic History    Marital status:      Spouse name: Cristel    Number of children: 2    Years of education: Not on file    Highest education level: Not on file   Occupational History    Not on file   Tobacco Use    Smoking status: Never    Smokeless tobacco: Never   Vaping Use    Vaping status: Never Used   Substance and Sexual Activity    Alcohol use: Yes     Alcohol/week: 3.3 standard drinks of alcohol     Comment: occasionally weekends    Drug use: No    Sexual activity: Yes   Other Topics Concern     Service Not Asked    Blood Transfusions Not Asked    Caffeine Concern No    Occupational Exposure Not Asked    Hobby Hazards Not Asked    Sleep Concern No    Stress Concern No    Weight Concern No    Special Diet Not Asked    Back Care Not Asked    Exercise Yes    Bike Helmet Not Asked    Seat Belt Yes    Self-Exams Not Asked    Parent/sibling w/ CABG, MI or angioplasty before 65F 55M? Not Asked   Social History  "Narrative    Not on file     Social Determinants of Health     Financial Resource Strain: Not on file   Food Insecurity: Not on file   Transportation Needs: Not on file   Physical Activity: Not on file   Stress: Not on file   Social Connections: Not on file   Interpersonal Safety: Not on file   Housing Stability: Not on file     Denies smoking. 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     PHYSICAL EXAM     /78 (BP Location: Left arm)   Pulse 78   Resp 16   Ht 1.854 m (6' 1\")   Wt 131.2 kg (289 lb 3.2 oz)   SpO2 95%   BMI 38.16 kg/m    Wt Readings from Last 4 Encounters:   05/08/24 131.2 kg (289 lb 3.2 oz)   04/29/24 129.7 kg (286 lb)   04/17/24 130.6 kg (288 lb)   03/06/24 125.6 kg (276 lb 14.4 oz)     CONSTITUTIONAL: no acute distress  EYES: Both pupils are surgical.  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  Eyes are without pallor or jaundice  ENT/MOUTH: no mouth lesions. Ears normal  CVS: s1s2 no m r g .   RESPIRATORY: clear to auscultation b/l  GI: soft non tender no hepatosplenomegaly  NEURO: AAOX3  Grossly non focal neuro exam  INTEGUMENT: no obvious rashes  LYMPHATIC: no palpable cervical, supraclavicular, axillary or inguinal LAD  MUSCULOSKELETAL: Unremarkable. No bony tenderness.   EXTREMITIES: no edema  PSYCH: Mentation, mood and affect are normal. Decision making capacity is intact         LABORATORY AND IMAGING STUDIES       I reviewed   4/29/2024  CBC showed WBC 31.7.  Hemoglobin 13.2.  Platelets 233.  Chemistry unremarkable except glucose 172  .    Bone marrow biopsy 4/29/2024   Chronic myeloid leukemia (CML) with the following features:  - Hypercellular marrow (cellularity estimated at 90-95%) with left-shifted and hyperplastic granulopoiesis, relatively diminished erythropoiesis, subset of atypical (small) megakaryocytes, and ~1% blasts  - No morphologic or immunophenotypic evidence of B-cell lymphoma  - Peripheral blood showing slight normochromic normocytic anemia; " moderate leukocytosis with neutrophilia (left-shifted), monocytosis, and basophilia; rare circulating blast (<1%)      Flow cytometry analysis on concurrent specimen ([id number]) showed no increase in myeloid blasts, no definitive abnormal myeloid blast population, and rare-to-absent B cells.     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        Peripheral blood FISH 4/29/2024  ABNORMAL  - BCR::ABL1 fusion (86%)    3/28/2024  BCR::ABL1/ABL1 Major ( p210): 57%            ASSESSMENT AND PLAN     CML.  Chronic phase CML. Positive for t(9;22)  Peripheral Blood FISH- BCR::ABL1 fusion (86%)  Bone Marrow FISH - BCR::ABL1 fusion (91%)  Peripheral Blood BCR::ABL1/ABL1 Major ( p210): 57%    Intermediate ELTS Score    I would like to start him on imatinib.  We discussed the rational schedule and potential side effects of it in detail.    We will check frequent labs.  We will repeat BCR/ABL molecular test in 3 months.      Stage IV follicular lymphoma- status post treatment with bendamustine/Rituxan (04-09/2014) followed by maintenance rituximab for two years- completed in October 2016.    Currently no evidence of lymphoma.  Plan to repeat labs and CT scan in 1 year.        We did not address the following today.    L2 sclerotic lesion.  He was seen by Dr. Alston from neurosurgery and I reviewed the notes.  Most likely this is a benign lesion and recommendation was to follow it up serially with repeat MRI in 6 months.  MRI in December 2022 showed slight growth in the lesion so he had biopsy of this on 1/12/2022 which was benign.  Repeat CT chest abdomen and pelvis on 3/4/2024 showed overall stable findings.          Bilateral hydroureter and bladder wall thickening.  He  had cystourethroscopy in May 2022 which was unremarkable.  I reviewed notes from urology.  CT scan from 3/4/2024 shows mild dilation of left distal ureter which is stable.  Continue to observe.      Interstitial lung disease.  Repeat CT scan continues to show slight worsening of the interstitial lung disease.  I advised him to follow-up with pulmonology.  I gave him a referral for pulmonology.      I would like to see him back about 1 month after start of imatinib.    All questions answered.  He is agreeable and comfortable with the plan.    Eduardo Crespo MD    The longitudinal plan of care for the CML as documented were addressed during this visit. Due to the added complexity in care, I will continue to support Minh in the subsequent management and with ongoing continuity of care.

## 2024-05-08 NOTE — TELEPHONE ENCOUNTER
PA Initiation    Medication: IMATINIB MESYLATE 400 MG PO TABS  Insurance Company: [employer] - Phone [phone] Fax [phone]  Pharmacy Filling the Rx:    Filling Pharmacy Phone:    Filling Pharmacy Fax:    Start Date: 5/8/2024

## 2024-05-08 NOTE — NURSING NOTE
"Oncology Rooming Note    May 8, 2024 2:26 PM   Deandre Merchant is a 74 year old male who presents for:    Chief Complaint   Patient presents with    Oncology Clinic Visit     Follow up BMBX     Initial Vitals: /78 (BP Location: Left arm)   Pulse 78   Resp 16   Ht 1.854 m (6' 1\")   Wt 131.2 kg (289 lb 3.2 oz)   SpO2 95%   BMI 38.16 kg/m   Estimated body mass index is 38.16 kg/m  as calculated from the following:    Height as of this encounter: 1.854 m (6' 1\").    Weight as of this encounter: 131.2 kg (289 lb 3.2 oz). Body surface area is 2.6 meters squared.  No Pain (0) Comment: Data Unavailable   No LMP for male patient.  Allergies reviewed: Yes  Medications reviewed: Yes    Medications: Medication refills not needed today.  Pharmacy name entered into Dat[employer]:    [facility] - [place], [place] - [address] [place] [address]  [id number] [place] [facility] [address] [place] [address]    Frailty Screening:   Is the patient here for a new oncology consult visit in cancer care? 2. No      Clinical concerns: No Concerns       Yvrose Spann MA            "

## 2024-05-08 NOTE — LETTER
5/8/2024         RE: Deandre Merchant  [address]  [facility] [address]        Dear Colleague,    Thank you for referring your patient, Deandre Merchant, to the [facility]. Please see a copy of my visit note below.    HEMATOLOGY ONCOLOGY FOLLOW-UP VISIT NOTE    PATIENT NAME: Deandre Merchant MRN # [id number]  DATE OF VISIT: May 8, 2024 YOB: 1950        CANCER TYPE:Follicular Lymphoma  STAGE: IV( axillary, mediastinal, retroperitoneal, stomach, bone marrow)    CML- Diagnosed March/April 2024    ONCOLOGY HISTORY:    Patient was being followed by Dr. Krishnamurthy but now she has transferred his care to me.  I reviewed his previous records and have copied and updated from prior notes.  74 year old  male who in 2012 presented with mediastinal, retroperitoneal and mesenteric lymph nodes. Was clinically asymptomatic with normal blood counts. CT-guided lymph node biopsy on 6/21/2012 was consistent with follicular lymphoma, grade 1-2. He was observed at that point.    In 2013, presented to the hospital with ileus and abdominal distention. EGD showed gastric antrum and duodenal ulcers which were biopsied and showed follicular lymphoma. CT scans showed bulky  lymphadenopathy with increasing size. 04/13 Bone marrow biopsy was positive with evidence of follicular lymphoma.   He received bendamustine and Rituxan regimen April 2014- -September 2014.   In December 14 , he was started on maintenance Rituxan every 2 months for 2 years which he completed in October 2016.     In remission and on observation since then.    7/14/2020 - established care with me.    CT scan in July 2020 was stable apart from an indeterminate L2 vertebral body lesion.    MRI of the lumbar spine showed Nonspecific enhancing lesion in the right aspect of the L2 inferior endplate which remains indeterminate.    12/7/2020.  Repeat MRI of the lumbar spine is the same.    We proceeded with a PET scan on 1/8/2021 which  shows mild FDG uptake in the L2 sclerotic lesion.  It was measuring about 1.5 x 1.2 x 1.4 cm and was stable.  Of note previously the lymphoma was FDG avid.      In December 2022, he had MRI of the lumbar spine which had shown some growth of the L2 vertebral body lesion which now measures 23 x 24 x 21 mm so he was evaluated by Dr. Alston from neurosurgery and I reviewed the notes.  He had core biopsy done on 1/12/2022 which was benign.       He was also seen by urologist Dr. Rick on 5/4/2022 and cystoscopy was unremarkable.     When he saw me in March 2024, we noticed that he had leukocytosis with left shift as well as monocytosis so we repeated blood work which showed further increase in leukocytosis as well as left shift including presence of myelocytes and promyelocytes.  Other testing showed that BCR/ABL is positive.         SUBJECTIVE   He comes in today accompanied by his wife.  Overall he is feeling okay.  Denies any B symptoms.  No new pain and no new swellings.      ECOG 0    ROS:  Rest of the comprehensive review of the system was negative.      I reviewed other history in epic as below.      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 1 each 3     blood glucose (NO BRAND SPECIFIED) test strip Use to test blood sugar once daily. 50 strip 4     cetirizine (ZYRTEC) 10 MG  tablet Take 10 mg by mouth daily       chlorpheniramine (CHLOR-TRIMETON) 4 MG tablet Take 4 mg by mouth every 6 hours as needed for allergies or rhinitis       fluticasone (FLONASE) 50 MCG/ACT nasal spray Spray 1 spray into both nostrils daily 18.2 mL 1     fluticasone (FLONASE) 50 MCG/ACT nasal spray Spray 2 sprays into both nostrils daily 16 g 11     metFORMIN (GLUCOPHAGE XR) 500 MG 24 hr tablet Take 4 tablets (2,000 mg) by mouth daily (with dinner) 360 tablet 3     metoprolol succinate ER (TOPROL XL) 50 MG 24 hr tablet Take 1 tablet (50 mg) by mouth daily 90 tablet 3     order for DME Equipment ordered: RESMED Auto PAP Mask type: Full face  Settings: 5-15 cm h2o       semaglutide (OZEMPIC) 2 MG/3ML pen Inject 0.25 mg Subcutaneous every 7 days Increase to 0.5 mg  every 7 days after 4 weeks. 3 mL 1     triamcinolone (KENALOG) 0.1 % external ointment Apply sparingly to affected area twice daily as needed. 15 g 1        ALLERGIES     Allergies   Allergen Reactions     Allopurinol Rash     FAMILY HISTORY:  Family History   Problem Relation Age of Onset     Cancer Father         liver/kidney     C.A.D. Father      Pacemaker Brother      Arthritis Mother         RA     Dad had liver cancer at 74.  He has 2 children and they are healthy.    Social History     Socioeconomic History     Marital status:      Spouse name: Cristel     Number of children: 2     Years of education: Not on file     Highest education level: Not on file   Occupational History     Not on file   Tobacco Use     Smoking status: Never     Smokeless tobacco: Never   Vaping Use     Vaping status: Never Used   Substance and Sexual Activity     Alcohol use: Yes     Alcohol/week: 3.3 standard drinks of alcohol     Comment: occasionally weekends     Drug use: No     Sexual activity: Yes   Other Topics Concern      Service Not Asked     Blood Transfusions Not Asked     Caffeine Concern No     Occupational Exposure Not Asked     Hobby Hazards  "Not Asked     Sleep Concern No     Stress Concern No     Weight Concern No     Special Diet Not Asked     Back Care Not Asked     Exercise Yes     Bike Helmet Not Asked     Seat Belt Yes     Self-Exams Not Asked     Parent/sibling w/ CABG, MI or angioplasty before 65F 55M? Not Asked   Social History Narrative     Not on file     Social Determinants of Health     Financial Resource Strain: Not on file   Food Insecurity: Not on file   Transportation Needs: Not on file   Physical Activity: Not on file   Stress: Not on file   Social Connections: Not on file   Interpersonal Safety: Not on file   Housing Stability: Not on file     Denies smoking. 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     PHYSICAL EXAM     /78 (BP Location: Left arm)   Pulse 78   Resp 16   Ht 1.854 m (6' 1\")   Wt 131.2 kg (289 lb 3.2 oz)   SpO2 95%   BMI 38.16 kg/m    Wt Readings from Last 4 Encounters:   05/08/24 131.2 kg (289 lb 3.2 oz)   04/29/24 129.7 kg (286 lb)   04/17/24 130.6 kg (288 lb)   03/06/24 125.6 kg (276 lb 14.4 oz)     CONSTITUTIONAL: no acute distress  EYES: Both pupils are surgical.  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  Eyes are without pallor or jaundice  ENT/MOUTH: no mouth lesions. Ears normal  CVS: s1s2 no m r g .   RESPIRATORY: clear to auscultation b/l  GI: soft non tender no hepatosplenomegaly  NEURO: AAOX3  Grossly non focal neuro exam  INTEGUMENT: no obvious rashes  LYMPHATIC: no palpable cervical, supraclavicular, axillary or inguinal LAD  MUSCULOSKELETAL: Unremarkable. No bony tenderness.   EXTREMITIES: no edema  PSYCH: Mentation, mood and affect are normal. Decision making capacity is intact         LABORATORY AND IMAGING STUDIES       I reviewed   4/29/2024  CBC showed WBC 31.7.  Hemoglobin 13.2.  Platelets 233.  Chemistry unremarkable except glucose 172  .    Bone marrow biopsy 4/29/2024   Chronic myeloid leukemia (CML) with the following features:  - Hypercellular marrow (cellularity " estimated at 90-95%) with left-shifted and hyperplastic granulopoiesis, relatively diminished erythropoiesis, subset of atypical (small) megakaryocytes, and ~1% blasts  - No morphologic or immunophenotypic evidence of B-cell lymphoma  - Peripheral blood showing slight normochromic normocytic anemia; moderate leukocytosis with neutrophilia (left-shifted), monocytosis, and basophilia; rare circulating blast (<1%)      Flow cytometry analysis on concurrent specimen ([id number]) showed no increase in myeloid blasts, no definitive abnormal myeloid blast population, and rare-to-absent B cells.     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        Peripheral blood FISH 4/29/2024  ABNORMAL  - BCR::ABL1 fusion (86%)    3/28/2024  BCR::ABL1/ABL1 Major ( p210): 57%            ASSESSMENT AND PLAN     CML.  Chronic phase CML. Positive for t(9;22)  Peripheral Blood FISH- BCR::ABL1 fusion (86%)  Bone Marrow FISH - BCR::ABL1 fusion (91%)  Peripheral Blood BCR::ABL1/ABL1 Major ( p210): 57%    Intermediate ELTS Score    I would like to start him on imatinib.  We discussed the rational schedule and potential side effects of it in detail.    We will check frequent labs.  We will repeat BCR/ABL molecular test in 3 months.      Stage IV follicular lymphoma- status post treatment with bendamustine/Rituxan (04-09/2014) followed by maintenance rituximab for two years- completed in October 2016.    Currently no evidence of lymphoma.  Plan to repeat labs and CT scan in 1 year.        We did not address the following today.    L2 sclerotic lesion.  He was seen by Dr. Alston from neurosurgery and I reviewed the notes.  Most likely this is a benign lesion and recommendation was to follow  it up serially with repeat MRI in 6 months.  MRI in December 2022 showed slight growth in the lesion so he had biopsy of this on 1/12/2022 which was benign.  Repeat CT chest abdomen and pelvis on 3/4/2024 showed overall stable findings.          Bilateral hydroureter and bladder wall thickening.  He had cystourethroscopy in May 2022 which was unremarkable.  I reviewed notes from urology.  CT scan from 3/4/2024 shows mild dilation of left distal ureter which is stable.  Continue to observe.      Interstitial lung disease.  Repeat CT scan continues to show slight worsening of the interstitial lung disease.  I advised him to follow-up with pulmonology.  I gave him a referral for pulmonology.      I would like to see him back about 1 month after start of imatinib.    All questions answered.  He is agreeable and comfortable with the plan.    Eduardo Crespo MD    The longitudinal plan of care for the CML as documented were addressed during this visit. Due to the added complexity in care, I will continue to support Minh in the subsequent management and with ongoing continuity of care.        Again, thank you for allowing me to participate in the care of your patient.        Sincerely,        Eduardo Crespo MD

## 2024-05-09 NOTE — TELEPHONE ENCOUNTER
Prior Authorization Approval    Medication: IMATINIB MESYLATE 400 MG PO TABS  Authorization Effective Date: 2/9/2024  Authorization Expiration Date: 5/9/2025  Approved Dose/Quantity: 30/30  Reference #: [id number]   Insurance Company: [employer] - Phone [phone] Fax [phone]  Expected CoPay: $ 60   CoPay Card Available:      Financial Assistance Needed:   Which Pharmacy is filling the prescription:    Pharmacy Notified: Yes  Patient Notified: Yes    Approval letter has not been obtained at this time

## 2024-05-10 ENCOUNTER — TELEPHONE (OUTPATIENT)
Dept: ONCOLOGY | Facility: CLINIC | Age: 74
End: 2024-05-10
Payer: MEDICARE

## 2024-05-10 DIAGNOSIS — C92.10 CML (CHRONIC MYELOCYTIC LEUKEMIA) (H): Primary | ICD-10-CM

## 2024-05-10 LAB — INTERPRETATION: NORMAL

## 2024-05-10 RX ORDER — PROCHLORPERAZINE MALEATE 10 MG
10 TABLET ORAL EVERY 6 HOURS PRN
Qty: 30 TABLET | Refills: 2 | Status: SHIPPED | OUTPATIENT
Start: 2024-05-10

## 2024-05-10 RX ORDER — IMATINIB MESYLATE 400 MG/1
400 TABLET, FILM COATED ORAL DAILY
Qty: 30 TABLET | Refills: 0 | Status: SHIPPED | OUTPATIENT
Start: 2024-05-13 | End: 2024-06-12

## 2024-05-10 NOTE — ORAL ONC MGMT
"Oral Chemotherapy Monitoring Program    Primary Oncologist: Dr. Crespo  Primary Oncology Clinic: [facility]  Cancer Diagnosis: CML    Drug: imatinib 400 mg daily  Start Date: ASAP  Expected duration of therapy: Until disease progression or unacceptable toxicity    Drug Interaction Assessment:   Upon review of medication list, the following potential drug interactions were identified: (include medications and actions to be taken)      -  Imatinib/atorvastatin (category C): May enhance adverse effects of atorvastatin. Counseled patient to monitor for muscle aches.    Lab Monitoring Plan  Per treatment plan    Subjective/Objective:  Deandre Merchant is a 74 year old male contacted by phone for an initial visit for oral chemotherapy education. We also reviewed prochlorperazine and it was sent to his local pharmacy.        5/9/2024     9:00 AM 5/10/2024     2:00 PM   ORAL CHEMOTHERAPY   Assessment Type Initial Work up New Teach   Diagnosis Code Chronic Myeloid Leukemia (CML) Chronic Myeloid Leukemia (CML)   Providers Zak Crespo   Clinic Name/Location [facility]   Drug Name Gleevec (imatinib) Gleevec (imatinib)   Dose 400 mg 400 mg   Current Schedule Daily Daily   Cycle Details Continuous Continuous   Any new drug interactions? No    Is the dose as ordered appropriate for the patient? Yes        Vitals:  BP:   BP Readings from Last 1 Encounters:   05/08/24 128/78     Wt Readings from Last 1 Encounters:   05/08/24 131.2 kg (289 lb 3.2 oz)     Estimated body surface area is 2.6 meters squared as calculated from the following:    Height as of 5/8/24: 1.854 m (6' 1\").    Weight as of 5/8/24: 131.2 kg (289 lb 3.2 oz).    Labs:  _  Result Component Current Result Ref Range   Sodium 139 (4/29/2024) 135 - 145 mmol/L     _  Result Component Current Result Ref Range   Potassium 4.3 (4/29/2024) 3.4 - 5.3 mmol/L     _  Result Component Current Result Ref Range   Calcium 8.9 (4/29/2024) 8.8 - 10.2 mg/dL     No results found " for Mag within last 30 days.     No results found for Phos within last 30 days.     _  Result Component Current Result Ref Range   Albumin 4.4 (4/29/2024) 3.5 - 5.2 g/dL     _  Result Component Current Result Ref Range   Urea Nitrogen 15.4 (4/29/2024) 8.0 - 23.0 mg/dL     _  Result Component Current Result Ref Range   Creatinine 0.96 (4/29/2024) 0.67 - 1.17 mg/dL       _  Result Component Current Result Ref Range   AST 33 (4/29/2024) 0 - 45 U/L     _  Result Component Current Result Ref Range   ALT 21 (4/29/2024) 0 - 70 U/L     _  Result Component Current Result Ref Range   Bilirubin Total 0.5 (4/29/2024) <=1.2 mg/dL       _  Result Component Current Result Ref Range   WBC Count 31.7 (HH) (4/29/2024) 4.0 - 11.0 10e3/uL     _  Result Component Current Result Ref Range   Hemoglobin 13.2 (L) (4/29/2024) 13.3 - 17.7 g/dL     _  Result Component Current Result Ref Range   Platelet Count 233 (4/29/2024) 150 - 450 10e3/uL     _  Result Component Current Result Ref Range   Absolute Neutrophils 22.5 (H) (4/29/2024) 1.6 - 8.3 10e3/uL         Assessment:  Patient is appropriate to start therapy.    Plan:  Basic chemotherapy teaching was reviewed with the patient including indication, start date of therapy, dose, administration, adverse effects, missed doses, food and drug interactions, monitoring, side effect management, office contact information, and safe handling. Written materials were provided and all questions answered.    Follow-Up:  We will call Minh about one week after he starts treatment for an initial follow-up.     Kristofer Merida, PharmD  Oral Chemotherapy Pharmacist  [phone]

## 2024-05-16 DIAGNOSIS — E78.5 HYPERLIPIDEMIA WITH TARGET LDL LESS THAN 130: ICD-10-CM

## 2024-05-16 DIAGNOSIS — E11.9 TYPE 2 DIABETES MELLITUS WITHOUT COMPLICATION, WITHOUT LONG-TERM CURRENT USE OF INSULIN (H): ICD-10-CM

## 2024-05-16 RX ORDER — ATORVASTATIN CALCIUM 20 MG/1
20 TABLET, FILM COATED ORAL DAILY
Qty: 90 TABLET | Refills: 3 | OUTPATIENT
Start: 2024-05-16

## 2024-05-20 DIAGNOSIS — E11.9 TYPE 2 DIABETES MELLITUS WITHOUT COMPLICATION, WITHOUT LONG-TERM CURRENT USE OF INSULIN (H): ICD-10-CM

## 2024-05-20 DIAGNOSIS — E78.5 HYPERLIPIDEMIA WITH TARGET LDL LESS THAN 130: ICD-10-CM

## 2024-05-20 RX ORDER — ATORVASTATIN CALCIUM 20 MG/1
20 TABLET, FILM COATED ORAL DAILY
Qty: 90 TABLET | Refills: 3 | OUTPATIENT
Start: 2024-05-20

## 2024-05-21 ENCOUNTER — LAB (OUTPATIENT)
Dept: LAB | Facility: CLINIC | Age: 74
End: 2024-05-21
Payer: MEDICARE

## 2024-05-21 DIAGNOSIS — E11.69 TYPE 2 DIABETES MELLITUS WITH OTHER SPECIFIED COMPLICATION, WITHOUT LONG-TERM CURRENT USE OF INSULIN (H): Primary | ICD-10-CM

## 2024-05-21 DIAGNOSIS — C92.10 CML (CHRONIC MYELOCYTIC LEUKEMIA) (H): ICD-10-CM

## 2024-05-21 LAB
BASOPHILS # BLD MANUAL: 0.6 10E3/UL (ref 0–0.2)
BASOPHILS NFR BLD MANUAL: 2 %
EOSINOPHIL # BLD MANUAL: 0.3 10E3/UL (ref 0–0.7)
EOSINOPHIL NFR BLD MANUAL: 1 %
ERYTHROCYTE [DISTWIDTH] IN BLOOD BY AUTOMATED COUNT: 15.3 % (ref 10–15)
HBA1C MFR BLD: 7.8 %
HCT VFR BLD AUTO: 38.7 % (ref 40–53)
HGB BLD-MCNC: 12.8 G/DL (ref 13.3–17.7)
HOLD SPECIMEN: NORMAL
LYMPHOCYTES # BLD MANUAL: 4.4 10E3/UL (ref 0.8–5.3)
LYMPHOCYTES NFR BLD MANUAL: 14 %
MCH RBC QN AUTO: 30 PG (ref 26.5–33)
MCHC RBC AUTO-ENTMCNC: 33.1 G/DL (ref 31.5–36.5)
MCV RBC AUTO: 91 FL (ref 78–100)
METAMYELOCYTES # BLD MANUAL: 0.6 10E3/UL
METAMYELOCYTES NFR BLD MANUAL: 2 %
MONOCYTES # BLD MANUAL: 2.5 10E3/UL (ref 0–1.3)
MONOCYTES NFR BLD MANUAL: 8 %
MYELOCYTES # BLD MANUAL: 0.3 10E3/UL
MYELOCYTES NFR BLD MANUAL: 1 %
NEUTROPHILS # BLD MANUAL: 22.4 10E3/UL (ref 1.6–8.3)
NEUTROPHILS NFR BLD MANUAL: 72 %
NRBC # BLD AUTO: 0 10E3/UL
NRBC BLD AUTO-RTO: 0 /100
PLAT MORPH BLD: ABNORMAL
PLATELET # BLD AUTO: 248 10E3/UL (ref 150–450)
RBC # BLD AUTO: 4.27 10E6/UL (ref 4.4–5.9)
RBC MORPH BLD: ABNORMAL
VARIANT LYMPHS BLD QL SMEAR: PRESENT
WBC # BLD AUTO: 31.1 10E3/UL (ref 4–11)

## 2024-05-21 PROCEDURE — 83036 HEMOGLOBIN GLYCOSYLATED A1C: CPT

## 2024-05-21 PROCEDURE — 85025 COMPLETE CBC W/AUTO DIFF WBC: CPT

## 2024-05-21 PROCEDURE — 36415 COLL VENOUS BLD VENIPUNCTURE: CPT

## 2024-05-22 ENCOUNTER — TELEPHONE (OUTPATIENT)
Dept: ONCOLOGY | Facility: CLINIC | Age: 74
End: 2024-05-22
Payer: MEDICARE

## 2024-05-22 NOTE — TELEPHONE ENCOUNTER
Oral Chemotherapy Monitoring Program    Primary Oncologist: Dr. Crespo  Drug: Gleevec (imatinib)  Start Date: 5/17/2024    Subjective/Objective:  Deandre Merchant is a 74 year old male contacted by phone for a follow-up visit for oral chemotherapy.  Minh reports some mild diarrhea that happens only occasionally. He also stated he struggles with IBS. I recommended he could try using Imodium as needed to help with these symptoms.         5/9/2024     9:00 AM 5/10/2024     2:00 PM 5/22/2024    11:00 AM   ORAL CHEMOTHERAPY   Assessment Type Initial Work up New Teach Initial Follow up   Diagnosis Code Chronic Myeloid Leukemia (CML) Chronic Myeloid Leukemia (CML) Chronic Myeloid Leukemia (CML)   Providers Zak Crespo   Clinic Name/Location [facility]   Drug Name Gleevec (imatinib) Gleevec (imatinib) Gleevec (imatinib)   Dose 400 mg 400 mg 400 mg   Current Schedule Daily Daily Daily   Cycle Details Continuous Continuous Continuous   Start Date of Last Cycle   5/17/2024   Planned next cycle start date   6/16/2024   Doses missed in last 2 weeks   0   Adherence Assessment   Adherent   Adverse Effects   Diarrhea   Diarrhea   Grade 1   Pharmacist Intervention(diarrhea)   Yes   Intervention(s)   OTC recommendation   Home BPs   Not applicable   Any new drug interactions? No  No   Is the dose as ordered appropriate for the patient? Yes  Yes   Is the patient currently in pain?   No   Has the patient been assessed within the past 6 months for depression?   Yes   Has the patient missed any days of school, work, or other routine activity?   No   Since the last time we talked, have you been hospitalized or used the emergency room?   No       Vitals:  BP:   BP Readings from Last 1 Encounters:   05/08/24 128/78     Wt Readings from Last 1 Encounters:   05/08/24 131.2 kg (289 lb 3.2 oz)     Estimated body surface area is 2.6 meters squared as calculated from the following:    Height as of 5/8/24: 1.854 m (6'  "1\").    Weight as of 5/8/24: 131.2 kg (289 lb 3.2 oz).    Labs:  _  Result Component Current Result Ref Range   Sodium 139 (4/29/2024) 135 - 145 mmol/L     _  Result Component Current Result Ref Range   Potassium 4.3 (4/29/2024) 3.4 - 5.3 mmol/L     _  Result Component Current Result Ref Range   Calcium 8.9 (4/29/2024) 8.8 - 10.2 mg/dL       Result Component Current Result Ref Range   Albumin 4.4 (4/29/2024) 3.5 - 5.2 g/dL     _  Result Component Current Result Ref Range   Urea Nitrogen 15.4 (4/29/2024) 8.0 - 23.0 mg/dL     _  Result Component Current Result Ref Range   Creatinine 0.96 (4/29/2024) 0.67 - 1.17 mg/dL       _  Result Component Current Result Ref Range   AST 33 (4/29/2024) 0 - 45 U/L     _  Result Component Current Result Ref Range   ALT 21 (4/29/2024) 0 - 70 U/L     _  Result Component Current Result Ref Range   Bilirubin Total 0.5 (4/29/2024) <=1.2 mg/dL       _  Result Component Current Result Ref Range   WBC Count 31.1 (H) (5/21/2024) 4.0 - 11.0 10e3/uL     _  Result Component Current Result Ref Range   Hemoglobin 12.8 (L) (5/21/2024) 13.3 - 17.7 g/dL     _  Result Component Current Result Ref Range   Platelet Count 248 (5/21/2024) 150 - 450 10e3/uL     _  Result Component Current Result Ref Range   Absolute Neutrophils 22.4 (H) (5/21/2024) 1.6 - 8.3 10e3/uL       Assessment/Plan:  I spoke with patient today. Patient reports doing well on therapy. No major issues or concerns reported. Patient is compliant with therapy and no missed doses. Future appointments have been confirmed with patient. We will continue to follow.    Follow-Up:  5/28 weekly lab appt    Zaheer Shearer PharmD, BCOP  Oncology Pharmacy Manager  [facility]  [phone]      "

## 2024-05-28 ENCOUNTER — LAB (OUTPATIENT)
Dept: LAB | Facility: CLINIC | Age: 74
End: 2024-05-28
Payer: MEDICARE

## 2024-05-28 ENCOUNTER — MYC MEDICAL ADVICE (OUTPATIENT)
Dept: PHARMACY | Facility: CLINIC | Age: 74
End: 2024-05-28

## 2024-05-28 DIAGNOSIS — N40.0 BENIGN PROSTATIC HYPERPLASIA, UNSPECIFIED WHETHER LOWER URINARY TRACT SYMPTOMS PRESENT: Primary | ICD-10-CM

## 2024-05-28 DIAGNOSIS — C92.10 CML (CHRONIC MYELOCYTIC LEUKEMIA) (H): ICD-10-CM

## 2024-05-28 LAB
ALBUMIN SERPL BCG-MCNC: 4.1 G/DL (ref 3.5–5.2)
ALP SERPL-CCNC: 98 U/L (ref 40–150)
ALT SERPL W P-5'-P-CCNC: 24 U/L (ref 0–70)
ANION GAP SERPL CALCULATED.3IONS-SCNC: 14 MMOL/L (ref 7–15)
AST SERPL W P-5'-P-CCNC: 31 U/L (ref 0–45)
BASOPHILS # BLD AUTO: 0.4 10E3/UL (ref 0–0.2)
BASOPHILS NFR BLD AUTO: 2 %
BILIRUB SERPL-MCNC: 0.6 MG/DL
BUN SERPL-MCNC: 15.2 MG/DL (ref 8–23)
CALCIUM SERPL-MCNC: 8.7 MG/DL (ref 8.8–10.2)
CHLORIDE SERPL-SCNC: 100 MMOL/L (ref 98–107)
CREAT SERPL-MCNC: 1.09 MG/DL (ref 0.67–1.17)
DEPRECATED HCO3 PLAS-SCNC: 21 MMOL/L (ref 22–29)
EGFRCR SERPLBLD CKD-EPI 2021: 72 ML/MIN/1.73M2
EOSINOPHIL # BLD AUTO: 0.2 10E3/UL (ref 0–0.7)
EOSINOPHIL NFR BLD AUTO: 2 %
ERYTHROCYTE [DISTWIDTH] IN BLOOD BY AUTOMATED COUNT: 15.2 % (ref 10–15)
GLUCOSE SERPL-MCNC: 137 MG/DL (ref 70–99)
HCT VFR BLD AUTO: 37.2 % (ref 40–53)
HGB BLD-MCNC: 12.5 G/DL (ref 13.3–17.7)
IMM GRANULOCYTES # BLD: 0.3 10E3/UL
IMM GRANULOCYTES NFR BLD: 2 %
LYMPHOCYTES # BLD AUTO: 1.1 10E3/UL (ref 0.8–5.3)
LYMPHOCYTES NFR BLD AUTO: 7 %
MAGNESIUM SERPL-MCNC: 2.2 MG/DL (ref 1.7–2.3)
MCH RBC QN AUTO: 30.4 PG (ref 26.5–33)
MCHC RBC AUTO-ENTMCNC: 33.6 G/DL (ref 31.5–36.5)
MCV RBC AUTO: 91 FL (ref 78–100)
MONOCYTES # BLD AUTO: 0.8 10E3/UL (ref 0–1.3)
MONOCYTES NFR BLD AUTO: 5 %
NEUTROPHILS # BLD AUTO: 12 10E3/UL (ref 1.6–8.3)
NEUTROPHILS NFR BLD AUTO: 81 %
NRBC # BLD AUTO: 0 10E3/UL
NRBC BLD AUTO-RTO: 0 /100
PHOSPHATE SERPL-MCNC: 3.1 MG/DL (ref 2.5–4.5)
PLATELET # BLD AUTO: 202 10E3/UL (ref 150–450)
POTASSIUM SERPL-SCNC: 4.3 MMOL/L (ref 3.4–5.3)
PROT SERPL-MCNC: 6.1 G/DL (ref 6.4–8.3)
RBC # BLD AUTO: 4.11 10E6/UL (ref 4.4–5.9)
SODIUM SERPL-SCNC: 135 MMOL/L (ref 135–145)
WBC # BLD AUTO: 14.8 10E3/UL (ref 4–11)

## 2024-05-28 PROCEDURE — 84100 ASSAY OF PHOSPHORUS: CPT

## 2024-05-28 PROCEDURE — 80053 COMPREHEN METABOLIC PANEL: CPT

## 2024-05-28 PROCEDURE — 83735 ASSAY OF MAGNESIUM: CPT

## 2024-05-28 PROCEDURE — 85025 COMPLETE CBC W/AUTO DIFF WBC: CPT

## 2024-05-28 PROCEDURE — 36415 COLL VENOUS BLD VENIPUNCTURE: CPT

## 2024-05-31 DIAGNOSIS — C92.10 CML (CHRONIC MYELOCYTIC LEUKEMIA) (H): Primary | ICD-10-CM

## 2024-06-04 ENCOUNTER — LAB (OUTPATIENT)
Dept: LAB | Facility: CLINIC | Age: 74
End: 2024-06-04
Payer: MEDICARE

## 2024-06-04 DIAGNOSIS — C92.10 CML (CHRONIC MYELOCYTIC LEUKEMIA) (H): Primary | ICD-10-CM

## 2024-06-04 DIAGNOSIS — C92.10 CML (CHRONIC MYELOCYTIC LEUKEMIA) (H): ICD-10-CM

## 2024-06-04 LAB
ALBUMIN SERPL BCG-MCNC: 4.2 G/DL (ref 3.5–5.2)
ALP SERPL-CCNC: 106 U/L (ref 40–150)
ALT SERPL W P-5'-P-CCNC: 23 U/L (ref 0–70)
ANION GAP SERPL CALCULATED.3IONS-SCNC: 13 MMOL/L (ref 7–15)
AST SERPL W P-5'-P-CCNC: 27 U/L (ref 0–45)
BASOPHILS # BLD AUTO: 0.1 10E3/UL (ref 0–0.2)
BASOPHILS NFR BLD AUTO: 1 %
BILIRUB SERPL-MCNC: 0.8 MG/DL
BUN SERPL-MCNC: 15.7 MG/DL (ref 8–23)
CALCIUM SERPL-MCNC: 8.8 MG/DL (ref 8.8–10.2)
CHLORIDE SERPL-SCNC: 103 MMOL/L (ref 98–107)
CREAT SERPL-MCNC: 1.04 MG/DL (ref 0.67–1.17)
DEPRECATED HCO3 PLAS-SCNC: 23 MMOL/L (ref 22–29)
EGFRCR SERPLBLD CKD-EPI 2021: 76 ML/MIN/1.73M2
EOSINOPHIL # BLD AUTO: 0.1 10E3/UL (ref 0–0.7)
EOSINOPHIL NFR BLD AUTO: 2 %
ERYTHROCYTE [DISTWIDTH] IN BLOOD BY AUTOMATED COUNT: 15.6 % (ref 10–15)
GLUCOSE SERPL-MCNC: 136 MG/DL (ref 70–99)
HCT VFR BLD AUTO: 36.9 % (ref 40–53)
HGB BLD-MCNC: 12.1 G/DL (ref 13.3–17.7)
IMM GRANULOCYTES # BLD: 0 10E3/UL
IMM GRANULOCYTES NFR BLD: 1 %
LYMPHOCYTES # BLD AUTO: 0.8 10E3/UL (ref 0.8–5.3)
LYMPHOCYTES NFR BLD AUTO: 11 %
MAGNESIUM SERPL-MCNC: 2.1 MG/DL (ref 1.7–2.3)
MCH RBC QN AUTO: 30 PG (ref 26.5–33)
MCHC RBC AUTO-ENTMCNC: 32.8 G/DL (ref 31.5–36.5)
MCV RBC AUTO: 92 FL (ref 78–100)
MONOCYTES # BLD AUTO: 0.5 10E3/UL (ref 0–1.3)
MONOCYTES NFR BLD AUTO: 7 %
NEUTROPHILS # BLD AUTO: 5.9 10E3/UL (ref 1.6–8.3)
NEUTROPHILS NFR BLD AUTO: 79 %
NRBC # BLD AUTO: 0 10E3/UL
NRBC BLD AUTO-RTO: 0 /100
PHOSPHATE SERPL-MCNC: 3.1 MG/DL (ref 2.5–4.5)
PLATELET # BLD AUTO: 157 10E3/UL (ref 150–450)
POTASSIUM SERPL-SCNC: 4.3 MMOL/L (ref 3.4–5.3)
PROT SERPL-MCNC: 6.2 G/DL (ref 6.4–8.3)
RBC # BLD AUTO: 4.03 10E6/UL (ref 4.4–5.9)
SODIUM SERPL-SCNC: 139 MMOL/L (ref 135–145)
WBC # BLD AUTO: 7.5 10E3/UL (ref 4–11)

## 2024-06-04 PROCEDURE — 83735 ASSAY OF MAGNESIUM: CPT

## 2024-06-04 PROCEDURE — 80053 COMPREHEN METABOLIC PANEL: CPT

## 2024-06-04 PROCEDURE — 36415 COLL VENOUS BLD VENIPUNCTURE: CPT

## 2024-06-04 PROCEDURE — 84100 ASSAY OF PHOSPHORUS: CPT

## 2024-06-04 PROCEDURE — 85025 COMPLETE CBC W/AUTO DIFF WBC: CPT

## 2024-06-04 RX ORDER — IMATINIB MESYLATE 400 MG/1
400 TABLET, FILM COATED ORAL DAILY
Qty: 30 TABLET | Refills: 0 | Status: SHIPPED | OUTPATIENT
Start: 2024-06-16 | End: 2024-07-16

## 2024-06-05 ENCOUNTER — ONCOLOGY VISIT (OUTPATIENT)
Dept: ONCOLOGY | Facility: CLINIC | Age: 74
End: 2024-06-05
Attending: INTERNAL MEDICINE
Payer: MEDICARE

## 2024-06-05 VITALS
HEIGHT: 73 IN | BODY MASS INDEX: 38.46 KG/M2 | SYSTOLIC BLOOD PRESSURE: 125 MMHG | DIASTOLIC BLOOD PRESSURE: 75 MMHG | RESPIRATION RATE: 18 BRPM | HEART RATE: 82 BPM | WEIGHT: 290.2 LBS | OXYGEN SATURATION: 96 %

## 2024-06-05 DIAGNOSIS — C92.10 CML (CHRONIC MYELOCYTIC LEUKEMIA) (H): Primary | ICD-10-CM

## 2024-06-05 PROCEDURE — 99215 OFFICE O/P EST HI 40 MIN: CPT | Performed by: INTERNAL MEDICINE

## 2024-06-05 PROCEDURE — G2211 COMPLEX E/M VISIT ADD ON: HCPCS | Performed by: INTERNAL MEDICINE

## 2024-06-05 PROCEDURE — G0463 HOSPITAL OUTPT CLINIC VISIT: HCPCS | Performed by: INTERNAL MEDICINE

## 2024-06-05 ASSESSMENT — PAIN SCALES - GENERAL: PAINLEVEL: NO PAIN (0)

## 2024-06-05 NOTE — PATIENT INSTRUCTIONS
Cont [facility]    Labs every 2 weeks    See me as a video visit 7/16/2024

## 2024-06-05 NOTE — NURSING NOTE
"Oncology Rooming Note    June 5, 2024 3:20 PM   Deandre Merchant is a 74 year old male who presents for:    Chief Complaint   Patient presents with    Oncology Clinic Visit       CML (chronic myelocytic leukemia)          Initial Vitals: /75 (BP Location: Left arm)   Pulse 82   Resp 18   Ht 1.854 m (6' 1\")   Wt 131.6 kg (290 lb 3.2 oz)   SpO2 96%   BMI 38.29 kg/m   Estimated body mass index is 38.29 kg/m  as calculated from the following:    Height as of this encounter: 1.854 m (6' 1\").    Weight as of this encounter: 131.6 kg (290 lb 3.2 oz). Body surface area is 2.6 meters squared.  No Pain (0) Comment: Data Unavailable   No LMP for male patient.  Allergies reviewed: Yes  Medications reviewed: Yes    Medications: Medication refills not needed today.  Pharmacy name entered into [employer]:    [facility] [place] [address] [place] [address]  [id number] - [place], [place] - [address] [place] [address]    Frailty Screening:   Is the patient here for a new oncology consult visit in cancer care? 2. No      Clinical concerns: No Concerns       Yvrose Spann MA            "

## 2024-06-05 NOTE — PROGRESS NOTES
HEMATOLOGY ONCOLOGY FOLLOW-UP VISIT NOTE    PATIENT NAME: Deandre RAE Mayur MRN # [id number]  DATE OF VISIT: Jun 5, 2024 YOB: 1950        CANCER TYPE:Follicular Lymphoma  STAGE: IV( axillary, mediastinal, retroperitoneal, stomach, bone marrow)    CML- Diagnosed March/April 2024    ONCOLOGY HISTORY:    Patient was being followed by Dr. Krishnamurthy but now she has transferred his care to me.  I reviewed his previous records and have copied and updated from prior notes.  74 year old  male who in 2012 presented with mediastinal, retroperitoneal and mesenteric lymph nodes. Was clinically asymptomatic with normal blood counts. CT-guided lymph node biopsy on 6/21/2012 was consistent with follicular lymphoma, grade 1-2. He was observed at that point.    In 2013, presented to the hospital with ileus and abdominal distention. EGD showed gastric antrum and duodenal ulcers which were biopsied and showed follicular lymphoma. CT scans showed bulky  lymphadenopathy with increasing size. 04/13 Bone marrow biopsy was positive with evidence of follicular lymphoma.   He received bendamustine and Rituxan regimen April 2014- -September 2014.   In December 14 , he was started on maintenance Rituxan every 2 months for 2 years which he completed in October 2016.     In remission and on observation since then.    7/14/2020 - established care with me.    CT scan in July 2020 was stable apart from an indeterminate L2 vertebral body lesion.    MRI of the lumbar spine showed Nonspecific enhancing lesion in the right aspect of the L2 inferior endplate which remains indeterminate.    12/7/2020.  Repeat MRI of the lumbar spine is the same.    We proceeded with a PET scan on 1/8/2021 which shows mild FDG uptake in the L2 sclerotic lesion.  It was measuring about 1.5 x 1.2 x 1.4 cm and was stable.  Of note previously the lymphoma was FDG avid.      In December 2022, he had MRI of the lumbar spine which had shown some growth of the L2 vertebral  body lesion which now measures 23 x 24 x 21 mm so he was evaluated by Dr. Alston from neurosurgery and I reviewed the notes.  He had core biopsy done on 1/12/2022 which was benign.       He was also seen by urologist Dr. Rick on 5/4/2022 and cystoscopy was unremarkable.     When he saw me in March 2024, we noticed that he had leukocytosis with left shift as well as monocytosis so we repeated blood work which showed further increase in leukocytosis as well as left shift including presence of myelocytes and promyelocytes.  Other testing showed that BCR/ABL is positive.     Bone marrow biopsy 4/29/2024   Chronic myeloid leukemia (CML) with the following features:  - Hypercellular marrow (cellularity estimated at 90-95%) with left-shifted and hyperplastic granulopoiesis, relatively diminished erythropoiesis, subset of atypical (small) megakaryocytes, and ~1% blasts  - No morphologic or immunophenotypic evidence of B-cell lymphoma  - Peripheral blood showing slight normochromic normocytic anemia; moderate leukocytosis with neutrophilia (left-shifted), monocytosis, and basophilia; rare circulating blast (<1%)      FISH testing on bone marrow 4/29/2024  ABNORMAL  - BCR::ABL1 fusion (91%)    Bone Marrow Chromosome Analysis 4/29/2024  46,XY,t(9;22)(q34;q11.2)[20]    All 20 (100%) of the metaphase cells analyzed comprised a clone characterized by a t(9;22) resulting in a [place] chromosome as the sole abnormality.        Peripheral blood FISH 4/29/2024  ABNORMAL  - BCR::ABL1 fusion (86%)    3/28/2024  BCR::ABL1/ABL1 Major ( p210): 57%      He was diagnosed with Chronic phase CML. Positive for t(9;22)      Intermediate ELTS Score      5/17/2024.  Started imatinib.      SUBJECTIVE   He started imatinib on 5/17/2024.  He has noticed diarrhea with it.  He does started taking Imodium as needed and he thinks it has helped but not sure.  Denies any bleeding.  No infections.  No shortness of breath.  No new swellings.     ECOG  0    ROS:  Rest of the comprehensive review of the system was negative.      I reviewed other history in epic as below.      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 1 each 3    blood glucose (NO BRAND SPECIFIED) test strip Use to test blood sugar once daily. 50 strip 4    cetirizine (ZYRTEC) 10 MG tablet Take 10 mg by mouth daily      chlorpheniramine (CHLOR-TRIMETON) 4 MG tablet Take 4 mg by mouth every 6 hours as needed for allergies or rhinitis      fluticasone (FLONASE) 50 MCG/ACT nasal spray Spray 1 spray into both nostrils daily 18.2 mL 1    fluticasone (FLONASE) 50 MCG/ACT nasal spray Spray 2 sprays into both nostrils daily 16 g 11    [START ON 6/16/2024] imatinib (GLEEVEC) 400 MG tablet Take 1 tablet (400 mg) by mouth daily Take with a meal and a large glass of water. 30 tablet 0    imatinib (GLEEVEC) 400 MG tablet Take 1 tablet (400 mg) by mouth daily Take with a meal and a large glass of water. 30 tablet 0    metFORMIN (GLUCOPHAGE XR) 500 MG 24 hr tablet Take 4 tablets (2,000 mg) by mouth daily (with dinner) 360 tablet 3    metoprolol succinate ER (TOPROL XL) 50 MG 24 hr tablet Take 1 tablet (50 mg) by mouth daily 90 tablet 3    order for DME Equipment ordered: RESMED Auto PAP Mask type: Full face  Settings: 5-15 cm h2o      prochlorperazine (COMPAZINE) 10 MG tablet Take 1  tablet (10 mg) by mouth every 6 hours as needed for nausea or vomiting 30 tablet 2    semaglutide (OZEMPIC) 2 MG/3ML pen Inject 0.25 mg Subcutaneous every 7 days Increase to 0.5 mg  every 7 days after 4 weeks. 3 mL 1    triamcinolone (KENALOG) 0.1 % external ointment Apply sparingly to affected area twice daily as needed. 15 g 1        ALLERGIES     Allergies   Allergen Reactions    Allopurinol Rash     FAMILY HISTORY:  Family History   Problem Relation Age of Onset    Cancer Father         liver/kidney    C.A.D. Father     Pacemaker Brother     Arthritis Mother         RA     Dad had liver cancer at 74.  He has 2 children and they are healthy.    Social History     Socioeconomic History    Marital status:      Spouse name: Cristel    Number of children: 2    Years of education: Not on file    Highest education level: Not on file   Occupational History    Not on file   Tobacco Use    Smoking status: Never    Smokeless tobacco: Never   Vaping Use    Vaping status: Never Used   Substance and Sexual Activity    Alcohol use: Yes     Alcohol/week: 3.3 standard drinks of alcohol     Comment: occasionally weekends    Drug use: No    Sexual activity: Yes   Other Topics Concern     Service Not Asked    Blood Transfusions Not Asked    Caffeine Concern No    Occupational Exposure Not Asked    Hobby Hazards Not Asked    Sleep Concern No    Stress Concern No    Weight Concern No    Special Diet Not Asked    Back Care Not Asked    Exercise Yes    Bike Helmet Not Asked    Seat Belt Yes    Self-Exams Not Asked    Parent/sibling w/ CABG, MI or angioplasty before 65F 55M? Not Asked   Social History Narrative    Not on file     Social Determinants of Health     Financial Resource Strain: Not on file   Food Insecurity: Not on file   Transportation Needs: Not on file   Physical Activity: Not on file   Stress: Not on file   Social Connections: Not on file   Interpersonal Safety: Not on file   Housing Stability: Not on file  "    Denies smoking. 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     PHYSICAL EXAM     /75 (BP Location: Left arm)   Pulse 82   Resp 18   Ht 1.854 m (6' 1\")   Wt 131.6 kg (290 lb 3.2 oz)   SpO2 96%   BMI 38.29 kg/m    Wt Readings from Last 4 Encounters:   06/05/24 131.6 kg (290 lb 3.2 oz)   05/08/24 131.2 kg (289 lb 3.2 oz)   04/29/24 129.7 kg (286 lb)   04/17/24 130.6 kg (288 lb)     CONSTITUTIONAL: No apparent distress  EYES: Both pupils are surgical.  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  Eyes are without pallor or jaundice  ENT/MOUTH: Ears unremarkable. No oral lesions  CVS: s1s2 normal  RESPIRATORY: Chest is clear  GI: Abdomen is benign  NEURO: Alert and oriented ×3  INTEGUMENT: no concerning skin rashes   LYMPHATIC: no palpable lymphadenopathy  MUSCULOSKELETAL: Unremarkable. No bony tenderness.   EXTREMITIES: no pedal edema  PSYCH: Mentation, mood and affect are appropriate           LABORATORY AND IMAGING STUDIES       I reviewed   6/4/2024  CBC showed WBC 7.5 with a normal differential.  Hemoglobin 12.1.  Platelets 157.      Chemistry unremarkable.      LDH was 429 on 4/29/2024.        3/28/2024  BCR::ABL1/ABL1 Major ( p210): 57%      ASSESSMENT AND PLAN     CML.  Chronic phase CML. Positive for t(9;22)  Peripheral Blood FISH- BCR::ABL1 fusion (86%)  Bone Marrow FISH - BCR::ABL1 fusion (91%)  Peripheral Blood BCR::ABL1/ABL1 Major ( p210): 57%    Intermediate ELTS Score    Patient started imatinib on 5/17/2024.      This is going well.  White blood cell count now is normal.     Continue imatinib.  Check labs every 2 weeks for now.  We will repeat BCR/ABL molecular test in 3 months.      Diarrhea.  Imatinib related.  Use Imodium as needed.  We discussed that if he sees a pattern of diarrhea, then he can use Imodium accordingly to prevent diarrhea.      Stage IV follicular lymphoma- status post treatment with bendamustine/Rituxan (04-09/2014) followed by maintenance rituximab for two " years- completed in October 2016.    Currently no evidence of lymphoma.  Plan to repeat labs and CT scan in 1 year.        We did not address the following today.    L2 sclerotic lesion.  He was seen by Dr. Alston from neurosurgery and I reviewed the notes.  Most likely this is a benign lesion and recommendation was to follow it up serially with repeat MRI in 6 months.  MRI in December 2022 showed slight growth in the lesion so he had biopsy of this on 1/12/2022 which was benign.  Repeat CT chest abdomen and pelvis on 3/4/2024 showed overall stable findings.          Bilateral hydroureter and bladder wall thickening.  He had cystourethroscopy in May 2022 which was unremarkable.  I reviewed notes from urology.  CT scan from 3/4/2024 shows mild dilation of left distal ureter which is stable.  Continue to observe.      Interstitial lung disease.  Repeat CT scan continues to show slight worsening of the interstitial lung disease.  I advised him to follow-up with pulmonology.  I gave him a referral for pulmonology.    I will see him back in about 6 weeks.    All questions answered.  He is agreeable and comfortable with the plan.    Eduardo Crespo MD    The longitudinal plan of care for the CML as documented were addressed during this visit. Due to the added complexity in care, I will continue to support Minh in the subsequent management and with ongoing continuity of care.

## 2024-06-05 NOTE — LETTER
6/5/2024      Deandre Merchant  [id number]  [place] [id number]      Dear Colleague,    Thank you for referring your patient, Deandre Merchant, to the [facility]. Please see a copy of my visit note below.    HEMATOLOGY ONCOLOGY FOLLOW-UP VISIT NOTE    PATIENT NAME: Deandre Merchant MRN # [id number]  DATE OF VISIT: Jun 5, 2024 YOB: 1950        CANCER TYPE:Follicular Lymphoma  STAGE: IV( axillary, mediastinal, retroperitoneal, stomach, bone marrow)    CML- Diagnosed March/April 2024    ONCOLOGY HISTORY:    Patient was being followed by Dr. Krishnamurthy but now she has transferred his care to me.  I reviewed his previous records and have copied and updated from prior notes.  74 year old  male who in 2012 presented with mediastinal, retroperitoneal and mesenteric lymph nodes. Was clinically asymptomatic with normal blood counts. CT-guided lymph node biopsy on 6/21/2012 was consistent with follicular lymphoma, grade 1-2. He was observed at that point.    In 2013, presented to the hospital with ileus and abdominal distention. EGD showed gastric antrum and duodenal ulcers which were biopsied and showed follicular lymphoma. CT scans showed bulky  lymphadenopathy with increasing size. 04/13 Bone marrow biopsy was positive with evidence of follicular lymphoma.   He received bendamustine and Rituxan regimen April 2014- -September 2014.   In December 14 , he was started on maintenance Rituxan every 2 months for 2 years which he completed in October 2016.     In remission and on observation since then.    7/14/2020 - established care with me.    CT scan in July 2020 was stable apart from an indeterminate L2 vertebral body lesion.    MRI of the lumbar spine showed Nonspecific enhancing lesion in the right aspect of the L2 inferior endplate which remains indeterminate.    12/7/2020.  Repeat MRI of the lumbar spine is the same.    We proceeded with a PET scan on 1/8/2021 which shows mild  FDG uptake in the L2 sclerotic lesion.  It was measuring about 1.5 x 1.2 x 1.4 cm and was stable.  Of note previously the lymphoma was FDG avid.      In December 2022, he had MRI of the lumbar spine which had shown some growth of the L2 vertebral body lesion which now measures 23 x 24 x 21 mm so he was evaluated by Dr. Alston from neurosurgery and I reviewed the notes.  He had core biopsy done on 1/12/2022 which was benign.       He was also seen by urologist Dr. Rick on 5/4/2022 and cystoscopy was unremarkable.     When he saw me in March 2024, we noticed that he had leukocytosis with left shift as well as monocytosis so we repeated blood work which showed further increase in leukocytosis as well as left shift including presence of myelocytes and promyelocytes.  Other testing showed that BCR/ABL is positive.     Bone marrow biopsy 4/29/2024   Chronic myeloid leukemia (CML) with the following features:  - Hypercellular marrow (cellularity estimated at 90-95%) with left-shifted and hyperplastic granulopoiesis, relatively diminished erythropoiesis, subset of atypical (small) megakaryocytes, and ~1% blasts  - No morphologic or immunophenotypic evidence of B-cell lymphoma  - Peripheral blood showing slight normochromic normocytic anemia; moderate leukocytosis with neutrophilia (left-shifted), monocytosis, and basophilia; rare circulating blast (<1%)      FISH testing on bone marrow 4/29/2024  ABNORMAL  - BCR::ABL1 fusion (91%)    Bone Marrow Chromosome Analysis 4/29/2024  46,XY,t(9;22)(q34;q11.2)[20]    All 20 (100%) of the metaphase cells analyzed comprised a clone characterized by a t(9;22) resulting in a [place] chromosome as the sole abnormality.        Peripheral blood FISH 4/29/2024  ABNORMAL  - BCR::ABL1 fusion (86%)    3/28/2024  BCR::ABL1/ABL1 Major ( p210): 57%      He was diagnosed with Chronic phase CML. Positive for t(9;22)      Intermediate ELTS Score      5/17/2024.  Started imatinib.      SUBJECTIVE    He started imatinib on 5/17/2024.  He has noticed diarrhea with it.  He does started taking Imodium as needed and he thinks it has helped but not sure.  Denies any bleeding.  No infections.  No shortness of breath.  No new swellings.     ECOG 0    ROS:  Rest of the comprehensive review of the system was negative.      I reviewed other history in epic as below.      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 1 each 3     blood glucose (NO BRAND SPECIFIED) test strip Use to test blood sugar once daily. 50 strip 4     cetirizine (ZYRTEC) 10 MG tablet Take 10 mg by mouth daily       chlorpheniramine (CHLOR-TRIMETON) 4 MG tablet Take 4 mg by mouth every 6 hours as needed for allergies or rhinitis       fluticasone (FLONASE) 50 MCG/ACT nasal spray Spray 1 spray into both nostrils daily 18.2 mL 1     fluticasone (FLONASE) 50 MCG/ACT nasal spray Spray 2 sprays into both nostrils daily 16 g 11     [START ON 6/16/2024] imatinib (GLEEVEC) 400 MG tablet Take 1 tablet (400 mg) by mouth daily Take with a meal and a large glass of water. 30 tablet 0     imatinib (GLEEVEC) 400 MG tablet Take 1 tablet (400 mg) by mouth daily Take with a meal and a large glass of water. 30 tablet 0     metFORMIN (GLUCOPHAGE XR) 500 MG 24 hr tablet Take 4 tablets (2,000 mg) by mouth daily (with dinner) 360  tablet 3     metoprolol succinate ER (TOPROL XL) 50 MG 24 hr tablet Take 1 tablet (50 mg) by mouth daily 90 tablet 3     order for DME Equipment ordered: RESMED Auto PAP Mask type: Full face  Settings: 5-15 cm h2o       prochlorperazine (COMPAZINE) 10 MG tablet Take 1 tablet (10 mg) by mouth every 6 hours as needed for nausea or vomiting 30 tablet 2     semaglutide (OZEMPIC) 2 MG/3ML pen Inject 0.25 mg Subcutaneous every 7 days Increase to 0.5 mg  every 7 days after 4 weeks. 3 mL 1     triamcinolone (KENALOG) 0.1 % external ointment Apply sparingly to affected area twice daily as needed. 15 g 1        ALLERGIES     Allergies   Allergen Reactions     Allopurinol Rash     FAMILY HISTORY:  Family History   Problem Relation Age of Onset     Cancer Father         liver/kidney     C.A.D. Father      Pacemaker Brother      Arthritis Mother         RA     Dad had liver cancer at 74.  He has 2 children and they are healthy.    Social History     Socioeconomic History     Marital status:      Spouse name: Cristel     Number of children: 2     Years of education: Not on file     Highest education level: Not on file   Occupational History     Not on file   Tobacco Use     Smoking status: Never     Smokeless tobacco: Never   Vaping Use     Vaping status: Never Used   Substance and Sexual Activity     Alcohol use: Yes     Alcohol/week: 3.3 standard drinks of alcohol     Comment: occasionally weekends     Drug use: No     Sexual activity: Yes   Other Topics Concern      Service Not Asked     Blood Transfusions Not Asked     Caffeine Concern No     Occupational Exposure Not Asked     Hobby Hazards Not Asked     Sleep Concern No     Stress Concern No     Weight Concern No     Special Diet Not Asked     Back Care Not Asked     Exercise Yes     Bike Helmet Not Asked     Seat Belt Yes     Self-Exams Not Asked     Parent/sibling w/ CABG, MI or angioplasty before 65F 55M? Not Asked   Social History Narrative     Not on file  "    Social Determinants of Health     Financial Resource Strain: Not on file   Food Insecurity: Not on file   Transportation Needs: Not on file   Physical Activity: Not on file   Stress: Not on file   Social Connections: Not on file   Interpersonal Safety: Not on file   Housing Stability: Not on file     Denies smoking. 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     PHYSICAL EXAM     /75 (BP Location: Left arm)   Pulse 82   Resp 18   Ht 1.854 m (6' 1\")   Wt 131.6 kg (290 lb 3.2 oz)   SpO2 96%   BMI 38.29 kg/m    Wt Readings from Last 4 Encounters:   06/05/24 131.6 kg (290 lb 3.2 oz)   05/08/24 131.2 kg (289 lb 3.2 oz)   04/29/24 129.7 kg (286 lb)   04/17/24 130.6 kg (288 lb)     CONSTITUTIONAL: No apparent distress  EYES: Both pupils are surgical.  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  Eyes are without pallor or jaundice  ENT/MOUTH: Ears unremarkable. No oral lesions  CVS: s1s2 normal  RESPIRATORY: Chest is clear  GI: Abdomen is benign  NEURO: Alert and oriented ×3  INTEGUMENT: no concerning skin rashes   LYMPHATIC: no palpable lymphadenopathy  MUSCULOSKELETAL: Unremarkable. No bony tenderness.   EXTREMITIES: no pedal edema  PSYCH: Mentation, mood and affect are appropriate           LABORATORY AND IMAGING STUDIES       I reviewed   6/4/2024  CBC showed WBC 7.5 with a normal differential.  Hemoglobin 12.1.  Platelets 157.      Chemistry unremarkable.      LDH was 429 on 4/29/2024.        3/28/2024  BCR::ABL1/ABL1 Major ( p210): 57%      ASSESSMENT AND PLAN     CML.  Chronic phase CML. Positive for t(9;22)  Peripheral Blood FISH- BCR::ABL1 fusion (86%)  Bone Marrow FISH - BCR::ABL1 fusion (91%)  Peripheral Blood BCR::ABL1/ABL1 Major ( p210): 57%    Intermediate ELTS Score    Patient started imatinib on 5/17/2024.      This is going well.  White blood cell count now is normal.     Continue imatinib.  Check labs every 2 weeks for now.  We will repeat BCR/ABL molecular test in 3 months.  "     Diarrhea.  Imatinib related.  Use Imodium as needed.  We discussed that if he sees a pattern of diarrhea, then he can use Imodium accordingly to prevent diarrhea.      Stage IV follicular lymphoma- status post treatment with bendamustine/Rituxan (04-09/2014) followed by maintenance rituximab for two years- completed in October 2016.    Currently no evidence of lymphoma.  Plan to repeat labs and CT scan in 1 year.        We did not address the following today.    L2 sclerotic lesion.  He was seen by Dr. Alston from neurosurgery and I reviewed the notes.  Most likely this is a benign lesion and recommendation was to follow it up serially with repeat MRI in 6 months.  MRI in December 2022 showed slight growth in the lesion so he had biopsy of this on 1/12/2022 which was benign.  Repeat CT chest abdomen and pelvis on 3/4/2024 showed overall stable findings.          Bilateral hydroureter and bladder wall thickening.  He had cystourethroscopy in May 2022 which was unremarkable.  I reviewed notes from urology.  CT scan from 3/4/2024 shows mild dilation of left distal ureter which is stable.  Continue to observe.      Interstitial lung disease.  Repeat CT scan continues to show slight worsening of the interstitial lung disease.  I advised him to follow-up with pulmonology.  I gave him a referral for pulmonology.    I will see him back in about 6 weeks.    All questions answered.  He is agreeable and comfortable with the plan.    Eduardo Crespo MD    The longitudinal plan of care for the CML as documented were addressed during this visit. Due to the added complexity in care, I will continue to support Minh in the subsequent management and with ongoing continuity of care.        Again, thank you for allowing me to participate in the care of your patient.        Sincerely,        Eduardo Crespo MD

## 2024-06-18 ENCOUNTER — LAB (OUTPATIENT)
Dept: LAB | Facility: CLINIC | Age: 74
End: 2024-06-18
Payer: MEDICARE

## 2024-06-18 DIAGNOSIS — N40.0 BENIGN PROSTATIC HYPERPLASIA, UNSPECIFIED WHETHER LOWER URINARY TRACT SYMPTOMS PRESENT: ICD-10-CM

## 2024-06-18 DIAGNOSIS — C92.10 CML (CHRONIC MYELOCYTIC LEUKEMIA) (H): ICD-10-CM

## 2024-06-18 LAB
BASOPHILS # BLD AUTO: 0 10E3/UL (ref 0–0.2)
BASOPHILS NFR BLD AUTO: 1 %
EOSINOPHIL # BLD AUTO: 0.1 10E3/UL (ref 0–0.7)
EOSINOPHIL NFR BLD AUTO: 4 %
ERYTHROCYTE [DISTWIDTH] IN BLOOD BY AUTOMATED COUNT: 16.3 % (ref 10–15)
HCT VFR BLD AUTO: 33.9 % (ref 40–53)
HGB BLD-MCNC: 11.6 G/DL (ref 13.3–17.7)
IMM GRANULOCYTES # BLD: 0 10E3/UL
IMM GRANULOCYTES NFR BLD: 1 %
LDH SERPL L TO P-CCNC: 172 U/L (ref 0–250)
LYMPHOCYTES # BLD AUTO: 0.5 10E3/UL (ref 0.8–5.3)
LYMPHOCYTES NFR BLD AUTO: 14 %
MCH RBC QN AUTO: 30.7 PG (ref 26.5–33)
MCHC RBC AUTO-ENTMCNC: 34.2 G/DL (ref 31.5–36.5)
MCV RBC AUTO: 90 FL (ref 78–100)
MONOCYTES # BLD AUTO: 0.3 10E3/UL (ref 0–1.3)
MONOCYTES NFR BLD AUTO: 9 %
NEUTROPHILS # BLD AUTO: 2.7 10E3/UL (ref 1.6–8.3)
NEUTROPHILS NFR BLD AUTO: 73 %
NRBC # BLD AUTO: 0 10E3/UL
NRBC BLD AUTO-RTO: 0 /100
PLATELET # BLD AUTO: 123 10E3/UL (ref 150–450)
PSA SERPL DL<=0.01 NG/ML-MCNC: 6.2 NG/ML (ref 0–6.5)
RBC # BLD AUTO: 3.78 10E6/UL (ref 4.4–5.9)
WBC # BLD AUTO: 3.7 10E3/UL (ref 4–11)

## 2024-06-18 PROCEDURE — 36415 COLL VENOUS BLD VENIPUNCTURE: CPT

## 2024-06-18 PROCEDURE — 84153 ASSAY OF PSA TOTAL: CPT

## 2024-06-18 PROCEDURE — 83615 LACTATE (LD) (LDH) ENZYME: CPT

## 2024-06-18 PROCEDURE — 85025 COMPLETE CBC W/AUTO DIFF WBC: CPT

## 2024-06-26 ENCOUNTER — TRANSFERRED RECORDS (OUTPATIENT)
Dept: HEALTH INFORMATION MANAGEMENT | Facility: CLINIC | Age: 74
End: 2024-06-26
Payer: MEDICARE

## 2024-06-26 DIAGNOSIS — C92.10 CML (CHRONIC MYELOCYTIC LEUKEMIA) (H): Primary | ICD-10-CM

## 2024-06-26 LAB — RETINOPATHY: NEGATIVE

## 2024-07-02 ENCOUNTER — DOCUMENTATION ONLY (OUTPATIENT)
Dept: ONCOLOGY | Facility: CLINIC | Age: 74
End: 2024-07-02

## 2024-07-02 ENCOUNTER — LAB (OUTPATIENT)
Dept: LAB | Facility: CLINIC | Age: 74
End: 2024-07-02
Payer: MEDICARE

## 2024-07-02 DIAGNOSIS — C92.10 CML (CHRONIC MYELOCYTIC LEUKEMIA) (H): Primary | ICD-10-CM

## 2024-07-02 DIAGNOSIS — C92.10 CML (CHRONIC MYELOCYTIC LEUKEMIA) (H): ICD-10-CM

## 2024-07-02 LAB
ALBUMIN SERPL BCG-MCNC: 4.1 G/DL (ref 3.5–5.2)
ALP SERPL-CCNC: 155 U/L (ref 40–150)
ALT SERPL W P-5'-P-CCNC: 17 U/L (ref 0–70)
ANION GAP SERPL CALCULATED.3IONS-SCNC: 12 MMOL/L (ref 7–15)
AST SERPL W P-5'-P-CCNC: 25 U/L (ref 0–45)
BASOPHILS # BLD AUTO: 0 10E3/UL (ref 0–0.2)
BASOPHILS NFR BLD AUTO: 1 %
BILIRUB SERPL-MCNC: 0.8 MG/DL
BUN SERPL-MCNC: 17 MG/DL (ref 8–23)
CALCIUM SERPL-MCNC: 8.8 MG/DL (ref 8.8–10.2)
CHLORIDE SERPL-SCNC: 104 MMOL/L (ref 98–107)
CREAT SERPL-MCNC: 1.2 MG/DL (ref 0.67–1.17)
DEPRECATED HCO3 PLAS-SCNC: 23 MMOL/L (ref 22–29)
EGFRCR SERPLBLD CKD-EPI 2021: 64 ML/MIN/1.73M2
EOSINOPHIL # BLD AUTO: 0.1 10E3/UL (ref 0–0.7)
EOSINOPHIL NFR BLD AUTO: 3 %
ERYTHROCYTE [DISTWIDTH] IN BLOOD BY AUTOMATED COUNT: 16.3 % (ref 10–15)
GLUCOSE SERPL-MCNC: 135 MG/DL (ref 70–99)
HCT VFR BLD AUTO: 34.7 % (ref 40–53)
HGB BLD-MCNC: 11.6 G/DL (ref 13.3–17.7)
IMM GRANULOCYTES # BLD: 0 10E3/UL
IMM GRANULOCYTES NFR BLD: 0 %
LDH SERPL L TO P-CCNC: 174 U/L (ref 0–250)
LYMPHOCYTES # BLD AUTO: 0.7 10E3/UL (ref 0.8–5.3)
LYMPHOCYTES NFR BLD AUTO: 15 %
MAGNESIUM SERPL-MCNC: 2.2 MG/DL (ref 1.7–2.3)
MCH RBC QN AUTO: 30.6 PG (ref 26.5–33)
MCHC RBC AUTO-ENTMCNC: 33.4 G/DL (ref 31.5–36.5)
MCV RBC AUTO: 92 FL (ref 78–100)
MONOCYTES # BLD AUTO: 0.3 10E3/UL (ref 0–1.3)
MONOCYTES NFR BLD AUTO: 8 %
NEUTROPHILS # BLD AUTO: 3.1 10E3/UL (ref 1.6–8.3)
NEUTROPHILS NFR BLD AUTO: 73 %
NRBC # BLD AUTO: 0 10E3/UL
NRBC BLD AUTO-RTO: 0 /100
PHOSPHATE SERPL-MCNC: 3 MG/DL (ref 2.5–4.5)
PLATELET # BLD AUTO: 130 10E3/UL (ref 150–450)
POTASSIUM SERPL-SCNC: 4.6 MMOL/L (ref 3.4–5.3)
PROT SERPL-MCNC: 6.2 G/DL (ref 6.4–8.3)
RBC # BLD AUTO: 3.79 10E6/UL (ref 4.4–5.9)
SODIUM SERPL-SCNC: 139 MMOL/L (ref 135–145)
WBC # BLD AUTO: 4.2 10E3/UL (ref 4–11)

## 2024-07-02 PROCEDURE — 83615 LACTATE (LD) (LDH) ENZYME: CPT

## 2024-07-02 PROCEDURE — 84100 ASSAY OF PHOSPHORUS: CPT

## 2024-07-02 PROCEDURE — 80053 COMPREHEN METABOLIC PANEL: CPT

## 2024-07-02 PROCEDURE — 85025 COMPLETE CBC W/AUTO DIFF WBC: CPT

## 2024-07-02 PROCEDURE — 83735 ASSAY OF MAGNESIUM: CPT

## 2024-07-02 PROCEDURE — 36415 COLL VENOUS BLD VENIPUNCTURE: CPT

## 2024-07-02 RX ORDER — IMATINIB MESYLATE 400 MG/1
400 TABLET, FILM COATED ORAL DAILY
Qty: 30 TABLET | Refills: 0 | Status: SHIPPED | OUTPATIENT
Start: 2024-07-16 | End: 2024-08-15

## 2024-07-03 ENCOUNTER — PATIENT OUTREACH (OUTPATIENT)
Dept: CARE COORDINATION | Facility: CLINIC | Age: 74
End: 2024-07-03
Payer: MEDICARE

## 2024-07-08 DIAGNOSIS — E11.9 TYPE 2 DIABETES MELLITUS WITHOUT COMPLICATION, WITHOUT LONG-TERM CURRENT USE OF INSULIN (H): ICD-10-CM

## 2024-07-08 DIAGNOSIS — E78.5 HYPERLIPIDEMIA WITH TARGET LDL LESS THAN 130: ICD-10-CM

## 2024-07-08 RX ORDER — ATORVASTATIN CALCIUM 20 MG/1
20 TABLET, FILM COATED ORAL DAILY
Qty: 90 TABLET | Refills: 1 | Status: SHIPPED | OUTPATIENT
Start: 2024-07-08

## 2024-07-10 ENCOUNTER — DOCUMENTATION ONLY (OUTPATIENT)
Dept: LAB | Facility: CLINIC | Age: 74
End: 2024-07-10
Payer: MEDICARE

## 2024-07-10 NOTE — PROGRESS NOTES
Deandre Merchant has an upcoming lab appointment:    Future Appointments   Date Time Provider Department Center   7/15/2024  9:30 AM [facility]   7/16/2024  9:00 AM [facility]   3/3/2025  7:30 AM PH LAB [facility]   3/3/2025  8:00 AM [facility]   3/5/2025 10:30 AM Eduardo Crespo MD [facility]     Patient is scheduled for the following lab(s): Patient has a lab appointment on 7/14 but has no orders placed    There is no order available. Please review and place either future orders or HMPO (Review of Health Maintenance Protocol Orders), as appropriate.    There are no preventive care reminders to display for this patient.  Mariana Cleaning

## 2024-07-15 ENCOUNTER — LAB (OUTPATIENT)
Dept: LAB | Facility: CLINIC | Age: 74
End: 2024-07-15
Payer: MEDICARE

## 2024-07-15 DIAGNOSIS — C92.10 CML (CHRONIC MYELOCYTIC LEUKEMIA) (H): Primary | ICD-10-CM

## 2024-07-15 DIAGNOSIS — C92.10 CML (CHRONIC MYELOCYTIC LEUKEMIA) (H): ICD-10-CM

## 2024-07-15 DIAGNOSIS — C82.80 LYMPHOMA, SMALL LYMPHOID, FOLLICULAR (H): Primary | ICD-10-CM

## 2024-07-15 LAB
ALBUMIN SERPL BCG-MCNC: 4.1 G/DL (ref 3.5–5.2)
ALP SERPL-CCNC: 130 U/L (ref 40–150)
ALT SERPL W P-5'-P-CCNC: 17 U/L (ref 0–70)
ANION GAP SERPL CALCULATED.3IONS-SCNC: 13 MMOL/L (ref 7–15)
AST SERPL W P-5'-P-CCNC: 24 U/L (ref 0–45)
BASOPHILS # BLD AUTO: 0 10E3/UL (ref 0–0.2)
BASOPHILS NFR BLD AUTO: 1 %
BILIRUB SERPL-MCNC: 0.8 MG/DL
BUN SERPL-MCNC: 13.8 MG/DL (ref 8–23)
CALCIUM SERPL-MCNC: 8.5 MG/DL (ref 8.8–10.2)
CHLORIDE SERPL-SCNC: 103 MMOL/L (ref 98–107)
CREAT SERPL-MCNC: 1.12 MG/DL (ref 0.67–1.17)
DEPRECATED HCO3 PLAS-SCNC: 22 MMOL/L (ref 22–29)
EGFRCR SERPLBLD CKD-EPI 2021: 69 ML/MIN/1.73M2
EOSINOPHIL # BLD AUTO: 0.1 10E3/UL (ref 0–0.7)
EOSINOPHIL NFR BLD AUTO: 3 %
ERYTHROCYTE [DISTWIDTH] IN BLOOD BY AUTOMATED COUNT: 16 % (ref 10–15)
GLUCOSE SERPL-MCNC: 121 MG/DL (ref 70–99)
HCT VFR BLD AUTO: 34.8 % (ref 40–53)
HGB BLD-MCNC: 11.7 G/DL (ref 13.3–17.7)
HOLD SPECIMEN: NORMAL
HOLD SPECIMEN: NORMAL
IMM GRANULOCYTES # BLD: 0 10E3/UL
IMM GRANULOCYTES NFR BLD: 1 %
LDH SERPL L TO P-CCNC: 179 U/L (ref 0–250)
LYMPHOCYTES # BLD AUTO: 0.7 10E3/UL (ref 0.8–5.3)
LYMPHOCYTES NFR BLD AUTO: 19 %
MCH RBC QN AUTO: 31.5 PG (ref 26.5–33)
MCHC RBC AUTO-ENTMCNC: 33.6 G/DL (ref 31.5–36.5)
MCV RBC AUTO: 94 FL (ref 78–100)
MONOCYTES # BLD AUTO: 0.3 10E3/UL (ref 0–1.3)
MONOCYTES NFR BLD AUTO: 8 %
NEUTROPHILS # BLD AUTO: 2.5 10E3/UL (ref 1.6–8.3)
NEUTROPHILS NFR BLD AUTO: 69 %
NRBC # BLD AUTO: 0 10E3/UL
NRBC BLD AUTO-RTO: 0 /100
PLATELET # BLD AUTO: 137 10E3/UL (ref 150–450)
POTASSIUM SERPL-SCNC: 4.6 MMOL/L (ref 3.4–5.3)
PROT SERPL-MCNC: 6 G/DL (ref 6.4–8.3)
RBC # BLD AUTO: 3.72 10E6/UL (ref 4.4–5.9)
SODIUM SERPL-SCNC: 138 MMOL/L (ref 135–145)
WBC # BLD AUTO: 3.7 10E3/UL (ref 4–11)

## 2024-07-15 PROCEDURE — 80053 COMPREHEN METABOLIC PANEL: CPT

## 2024-07-15 PROCEDURE — 83615 LACTATE (LD) (LDH) ENZYME: CPT

## 2024-07-15 PROCEDURE — 36415 COLL VENOUS BLD VENIPUNCTURE: CPT

## 2024-07-15 PROCEDURE — 85025 COMPLETE CBC W/AUTO DIFF WBC: CPT

## 2024-07-16 ENCOUNTER — VIRTUAL VISIT (OUTPATIENT)
Dept: ONCOLOGY | Facility: CLINIC | Age: 74
End: 2024-07-16
Attending: INTERNAL MEDICINE
Payer: MEDICARE

## 2024-07-16 VITALS — BODY MASS INDEX: 37.11 KG/M2 | HEIGHT: 73 IN | WEIGHT: 280 LBS

## 2024-07-16 DIAGNOSIS — C92.10 CML (CHRONIC MYELOCYTIC LEUKEMIA) (H): Primary | ICD-10-CM

## 2024-07-16 PROCEDURE — 99215 OFFICE O/P EST HI 40 MIN: CPT | Mod: 95 | Performed by: INTERNAL MEDICINE

## 2024-07-16 PROCEDURE — G2211 COMPLEX E/M VISIT ADD ON: HCPCS | Mod: 95 | Performed by: INTERNAL MEDICINE

## 2024-07-16 ASSESSMENT — PAIN SCALES - GENERAL: PAINLEVEL: NO PAIN (0)

## 2024-07-16 NOTE — PROGRESS NOTES
HEMATOLOGY ONCOLOGY FOLLOW-UP VISIT NOTE    PATIENT NAME: Deandre RAE Mayur MRN # [id number]  DATE OF VISIT: Jul 16, 2024 YOB: 1950        CANCER TYPE:Follicular Lymphoma  STAGE: IV( axillary, mediastinal, retroperitoneal, stomach, bone marrow)    CML- Diagnosed March/April 2024    ONCOLOGY HISTORY:    Patient was being followed by Dr. Krishnamurthy but now she has transferred his care to me.  I reviewed his previous records and have copied and updated from prior notes.  74 year old  male who in 2012 presented with mediastinal, retroperitoneal and mesenteric lymph nodes. Was clinically asymptomatic with normal blood counts. CT-guided lymph node biopsy on 6/21/2012 was consistent with follicular lymphoma, grade 1-2. He was observed at that point.    In 2013, presented to the hospital with ileus and abdominal distention. EGD showed gastric antrum and duodenal ulcers which were biopsied and showed follicular lymphoma. CT scans showed bulky  lymphadenopathy with increasing size. 04/13 Bone marrow biopsy was positive with evidence of follicular lymphoma.   He received bendamustine and Rituxan regimen April 2014- -September 2014.   In December 14 , he was started on maintenance Rituxan every 2 months for 2 years which he completed in October 2016.     In remission and on observation since then.    7/14/2020 - established care with me.    CT scan in July 2020 was stable apart from an indeterminate L2 vertebral body lesion.    MRI of the lumbar spine showed Nonspecific enhancing lesion in the right aspect of the L2 inferior endplate which remains indeterminate.    12/7/2020.  Repeat MRI of the lumbar spine is the same.    We proceeded with a PET scan on 1/8/2021 which shows mild FDG uptake in the L2 sclerotic lesion.  It was measuring about 1.5 x 1.2 x 1.4 cm and was stable.  Of note previously the lymphoma was FDG avid.      In December 2022, he had MRI of the lumbar spine which had shown some growth of the L2  vertebral body lesion which now measures 23 x 24 x 21 mm so he was evaluated by Dr. Alston from neurosurgery and I reviewed the notes.  He had core biopsy done on 1/12/2022 which was benign.       He was also seen by urologist Dr. Rick on 5/4/2022 and cystoscopy was unremarkable.     When he saw me in March 2024, we noticed that he had leukocytosis with left shift as well as monocytosis so we repeated blood work which showed further increase in leukocytosis as well as left shift including presence of myelocytes and promyelocytes.  Other testing showed that BCR/ABL is positive.     Bone marrow biopsy 4/29/2024   Chronic myeloid leukemia (CML) with the following features:  - Hypercellular marrow (cellularity estimated at 90-95%) with left-shifted and hyperplastic granulopoiesis, relatively diminished erythropoiesis, subset of atypical (small) megakaryocytes, and ~1% blasts  - No morphologic or immunophenotypic evidence of B-cell lymphoma  - Peripheral blood showing slight normochromic normocytic anemia; moderate leukocytosis with neutrophilia (left-shifted), monocytosis, and basophilia; rare circulating blast (<1%)      FISH testing on bone marrow 4/29/2024  ABNORMAL  - BCR::ABL1 fusion (91%)    Bone Marrow Chromosome Analysis 4/29/2024  46,XY,t(9;22)(q34;q11.2)[20]    All 20 (100%) of the metaphase cells analyzed comprised a clone characterized by a t(9;22) resulting in a [place] chromosome as the sole abnormality.        Peripheral blood FISH 4/29/2024  ABNORMAL  - BCR::ABL1 fusion (86%)    3/28/2024  BCR::ABL1/ABL1 Major ( p210): 57%      He was diagnosed with Chronic phase CML. Positive for t(9;22)      Intermediate ELTS Score      5/17/2024.  Started imatinib.      SUBJECTIVE   This is a video visit.  Overall he is tolerating imatinib well.  He does have off-and-on diarrhea for which she takes Imodium.  Otherwise he denies nausea or vomiting.  No B symptoms.  No new swellings.  No bleeding.  No infections.       ECOG 0    ROS:  Rest of the comprehensive review of the system was negative.      I reviewed other history in epic as below.      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 1 each 3    blood glucose (NO BRAND SPECIFIED) test strip Use to test blood sugar once daily. 50 strip 4    cetirizine (ZYRTEC) 10 MG tablet Take 10 mg by mouth daily      chlorpheniramine (CHLOR-TRIMETON) 4 MG tablet Take 4 mg by mouth every 6 hours as needed for allergies or rhinitis      fluticasone (FLONASE) 50 MCG/ACT nasal spray Spray 1 spray into both nostrils daily 18.2 mL 1    fluticasone (FLONASE) 50 MCG/ACT nasal spray Spray 2 sprays into both nostrils daily 16 g 11    imatinib (GLEEVEC) 400 MG tablet Take 1 tablet (400 mg) by mouth daily Take with a meal and a large glass of water. 30 tablet 0    imatinib (GLEEVEC) 400 MG tablet Take 1 tablet (400 mg) by mouth daily Take with a meal and a large glass of water. 30 tablet 0    metFORMIN (GLUCOPHAGE XR) 500 MG 24 hr tablet Take 4 tablets (2,000 mg) by mouth daily (with dinner) 360 tablet 3    metoprolol succinate ER (TOPROL XL) 50 MG 24 hr tablet Take 1 tablet (50 mg) by mouth daily 90 tablet 3    order for DME Equipment ordered: RESMED Auto PAP Mask type: Full face  Settings: 5-15 cm h2o      prochlorperazine (COMPAZINE) 10 MG tablet Take 1 tablet (10  mg) by mouth every 6 hours as needed for nausea or vomiting 30 tablet 2    semaglutide (OZEMPIC) 2 MG/3ML pen Inject 0.25 mg Subcutaneous every 7 days Increase to 0.5 mg  every 7 days after 4 weeks. 3 mL 1    triamcinolone (KENALOG) 0.1 % external ointment Apply sparingly to affected area twice daily as needed. 15 g 1        ALLERGIES     Allergies   Allergen Reactions    Allopurinol Rash     FAMILY HISTORY:  Family History   Problem Relation Age of Onset    Cancer Father         liver/kidney    C.A.D. Father     Pacemaker Brother     Arthritis Mother         RA     Dad had liver cancer at 74.  He has 2 children and they are healthy.    Social History     Socioeconomic History    Marital status:      Spouse name: Cristel    Number of children: 2    Years of education: Not on file    Highest education level: Not on file   Occupational History    Not on file   Tobacco Use    Smoking status: Never    Smokeless tobacco: Never   Vaping Use    Vaping status: Never Used   Substance and Sexual Activity    Alcohol use: Yes     Alcohol/week: 3.3 standard drinks of alcohol     Comment: occasionally weekends    Drug use: No    Sexual activity: Yes   Other Topics Concern     Service Not Asked    Blood Transfusions Not Asked    Caffeine Concern No    Occupational Exposure Not Asked    Hobby Hazards Not Asked    Sleep Concern No    Stress Concern No    Weight Concern No    Special Diet Not Asked    Back Care Not Asked    Exercise Yes    Bike Helmet Not Asked    Seat Belt Yes    Self-Exams Not Asked    Parent/sibling w/ CABG, MI or angioplasty before 65F 55M? Not Asked   Social History Narrative    Not on file     Social Determinants of Health     Financial Resource Strain: Not on file   Food Insecurity: Not on file   Transportation Needs: Not on file   Physical Activity: Not on file   Stress: Not on file   Social Connections: Not on file   Interpersonal Safety: Not on file   Housing Stability: Not on file     Denies  "smoking. 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     PHYSICAL EXAM     Ht 1.854 m (6' 1\")   Wt 127 kg (280 lb)   BMI 36.94 kg/m    Wt Readings from Last 4 Encounters:   07/16/24 127 kg (280 lb)   06/05/24 131.6 kg (290 lb 3.2 oz)   05/08/24 131.2 kg (289 lb 3.2 oz)   04/29/24 129.7 kg (286 lb)         Constitutional.  Looks well and in no apparent distress.   Eyes.  Without eye redness or apparent jaundice.   Respiratory.  Non labored breathing. Speaking in full sentences.    Skin.  No concerning skin rashes on the skin visualized.   Neurological.  Is alert and oriented.  Psychiatric.  Mood and affect seem appropriate.       LABORATORY AND IMAGING STUDIES       I reviewed   7/15/2024  CBC showed WBC 3.7 - Normal differential except ALC 0.7.  Hemoglobin 11.7  Platelets 137.      Chemistry unremarkable except Ca 8.5.      LDH was 179     3/28/2024  BCR::ABL1/ABL1 Major ( p210): 57%      ASSESSMENT AND PLAN     CML.  Chronic phase CML. Positive for t(9;22)  Peripheral Blood FISH- BCR::ABL1 fusion (86%)  Bone Marrow FISH - BCR::ABL1 fusion (91%)  Peripheral Blood BCR::ABL1/ABL1 Major ( p210): 57%    Intermediate ELTS Score    Patient started imatinib on 5/17/2024.    He is tolerating it fairly well.    Blood counts have improved.  WBC is 3.7 with normal differential except lymphocyte 0.7.    He has mild anemia and mild thrombocytopenia.    At this time I would recommend continuing imatinib.  We will monitor labs once a month.    Plan to repeat FISH testing/BCR/ABL molecular test next month.         Diarrhea.  Imatinib related.  Imodium helps and he will take Imodium as needed.      Stage IV follicular lymphoma- status post treatment with bendamustine/Rituxan (04-09/2014) followed by maintenance rituximab for two years- completed in October 2016.    Currently no evidence of lymphoma.  Plan to repeat labs and CT scan in March 2025     We did not address the following today.    L2 sclerotic lesion.  " He was seen by Dr. Alston from neurosurgery and I reviewed the notes.  Most likely this is a benign lesion and recommendation was to follow it up serially with repeat MRI in 6 months.  MRI in December 2022 showed slight growth in the lesion so he had biopsy of this on 1/12/2022 which was benign.  Repeat CT chest abdomen and pelvis on 3/4/2024 showed overall stable findings.          Bilateral hydroureter and bladder wall thickening.  He had cystourethroscopy in May 2022 which was unremarkable.  I reviewed notes from urology.  CT scan from 3/4/2024 shows mild dilation of left distal ureter which is stable.  Continue to observe.      Interstitial lung disease.  Repeat CT scan continues to show slight worsening of the interstitial lung disease.  I advised him to follow-up with pulmonology.  I gave him a referral for pulmonology.    I will see him back in about 6 weeks.    All questions answered.  He is agreeable and comfortable with the plan.    Eduardo Crespo MD    The longitudinal plan of care for the CML as documented were addressed during this visit. Due to the added complexity in care, I will continue to support Minh in the subsequent management and with ongoing continuity of care.

## 2024-07-16 NOTE — NURSING NOTE
Current patient location: [id number]  [facility] [address]    Is the patient currently in the state of [place]? YES    Visit mode:VIDEO    If the visit is dropped, the patient can be reconnected by: VIDEO VISIT: Text to cell phone:   Telephone Information:   Mobile [phone]       Will anyone else be joining the visit? NO  (If patient encounters technical issues they should call [phone]    How would you like to obtain your AVS? MyChart    Are changes needed to the allergy or medication list? Pt stated no changes to allergies and Pt stated no med changes    Are refills needed on medications prescribed by this physician? NO    Reason for visit: JAYLA Masters LPN

## 2024-07-16 NOTE — LETTER
7/16/2024      Deandre Merchant  [address]  [facility] [address]      Dear Colleague,    Thank you for referring your patient, Deandre Merchant, to the [facility]. Please see a copy of my visit note below.    Virtual Visit Details    Type of service:  Video Visit   Video Start Time: 9:23 AM  Video End Time:9:29 AM    Originating Location (pt. Location): Home    Distant Location (provider location):  Off-site  Platform used for Video Visit: [facility]    HEMATOLOGY ONCOLOGY FOLLOW-UP VISIT NOTE    PATIENT NAME: Deandre Merchant MRN # [id number]  DATE OF VISIT: Jul 16, 2024 YOB: 1950        CANCER TYPE:Follicular Lymphoma  STAGE: IV( axillary, mediastinal, retroperitoneal, stomach, bone marrow)    CML- Diagnosed March/April 2024    ONCOLOGY HISTORY:    Patient was being followed by Dr. Krishnamurthy but now she has transferred his care to me.  I reviewed his previous records and have copied and updated from prior notes.  74 year old  male who in 2012 presented with mediastinal, retroperitoneal and mesenteric lymph nodes. Was clinically asymptomatic with normal blood counts. CT-guided lymph node biopsy on 6/21/2012 was consistent with follicular lymphoma, grade 1-2. He was observed at that point.    In 2013, presented to the hospital with ileus and abdominal distention. EGD showed gastric antrum and duodenal ulcers which were biopsied and showed follicular lymphoma. CT scans showed bulky  lymphadenopathy with increasing size. 04/13 Bone marrow biopsy was positive with evidence of follicular lymphoma.   He received bendamustine and Rituxan regimen April 2014- -September 2014.   In December 14 , he was started on maintenance Rituxan every 2 months for 2 years which he completed in October 2016.     In remission and on observation since then.    7/14/2020 - established care with me.    CT scan in July 2020 was stable apart from an indeterminate L2 vertebral body lesion.    MRI of the  lumbar spine showed Nonspecific enhancing lesion in the right aspect of the L2 inferior endplate which remains indeterminate.    12/7/2020.  Repeat MRI of the lumbar spine is the same.    We proceeded with a PET scan on 1/8/2021 which shows mild FDG uptake in the L2 sclerotic lesion.  It was measuring about 1.5 x 1.2 x 1.4 cm and was stable.  Of note previously the lymphoma was FDG avid.      In December 2022, he had MRI of the lumbar spine which had shown some growth of the L2 vertebral body lesion which now measures 23 x 24 x 21 mm so he was evaluated by Dr. Alston from neurosurgery and I reviewed the notes.  He had core biopsy done on 1/12/2022 which was benign.       He was also seen by urologist Dr. Rick on 5/4/2022 and cystoscopy was unremarkable.     When he saw me in March 2024, we noticed that he had leukocytosis with left shift as well as monocytosis so we repeated blood work which showed further increase in leukocytosis as well as left shift including presence of myelocytes and promyelocytes.  Other testing showed that BCR/ABL is positive.     Bone marrow biopsy 4/29/2024   Chronic myeloid leukemia (CML) with the following features:  - Hypercellular marrow (cellularity estimated at 90-95%) with left-shifted and hyperplastic granulopoiesis, relatively diminished erythropoiesis, subset of atypical (small) megakaryocytes, and ~1% blasts  - No morphologic or immunophenotypic evidence of B-cell lymphoma  - Peripheral blood showing slight normochromic normocytic anemia; moderate leukocytosis with neutrophilia (left-shifted), monocytosis, and basophilia; rare circulating blast (<1%)      FISH testing on bone marrow 4/29/2024  ABNORMAL  - BCR::ABL1 fusion (91%)    Bone Marrow Chromosome Analysis 4/29/2024  46,XY,t(9;22)(q34;q11.2)[20]    All 20 (100%) of the metaphase cells analyzed comprised a clone characterized by a t(9;22) resulting in a [place] chromosome as the sole abnormality.        Peripheral blood  FISH 4/29/2024  ABNORMAL  - BCR::ABL1 fusion (86%)    3/28/2024  BCR::ABL1/ABL1 Major ( p210): 57%      He was diagnosed with Chronic phase CML. Positive for t(9;22)      Intermediate ELTS Score      5/17/2024.  Started imatinib.      SUBJECTIVE   This is a video visit.  Overall he is tolerating imatinib well.  He does have off-and-on diarrhea for which she takes Imodium.  Otherwise he denies nausea or vomiting.  No B symptoms.  No new swellings.  No bleeding.  No infections.      ECOG 0    ROS:  Rest of the comprehensive review of the system was negative.      I reviewed other history in epic as below.      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 1 each 3     blood glucose (NO BRAND SPECIFIED) test strip Use to test blood sugar once daily. 50 strip 4     cetirizine (ZYRTEC) 10 MG tablet Take 10 mg by mouth daily       chlorpheniramine (CHLOR-TRIMETON) 4 MG tablet Take 4 mg by mouth every 6 hours as needed for allergies or rhinitis       fluticasone (FLONASE) 50 MCG/ACT nasal spray Spray 1 spray into both nostrils daily 18.2 mL 1     fluticasone (FLONASE) 50 MCG/ACT nasal spray Spray 2 sprays into both nostrils daily 16 g 11     imatinib (GLEEVEC) 400 MG tablet Take 1 tablet (400 mg) by mouth daily Take with a meal and a large glass of water. 30 tablet 0     imatinib  (GLEEVEC) 400 MG tablet Take 1 tablet (400 mg) by mouth daily Take with a meal and a large glass of water. 30 tablet 0     metFORMIN (GLUCOPHAGE XR) 500 MG 24 hr tablet Take 4 tablets (2,000 mg) by mouth daily (with dinner) 360 tablet 3     metoprolol succinate ER (TOPROL XL) 50 MG 24 hr tablet Take 1 tablet (50 mg) by mouth daily 90 tablet 3     order for DME Equipment ordered: RESMED Auto PAP Mask type: Full face  Settings: 5-15 cm h2o       prochlorperazine (COMPAZINE) 10 MG tablet Take 1 tablet (10 mg) by mouth every 6 hours as needed for nausea or vomiting 30 tablet 2     semaglutide (OZEMPIC) 2 MG/3ML pen Inject 0.25 mg Subcutaneous every 7 days Increase to 0.5 mg  every 7 days after 4 weeks. 3 mL 1     triamcinolone (KENALOG) 0.1 % external ointment Apply sparingly to affected area twice daily as needed. 15 g 1        ALLERGIES     Allergies   Allergen Reactions     Allopurinol Rash     FAMILY HISTORY:  Family History   Problem Relation Age of Onset     Cancer Father         liver/kidney     C.A.D. Father      Pacemaker Brother      Arthritis Mother         RA     Dad had liver cancer at 74.  He has 2 children and they are healthy.    Social History     Socioeconomic History     Marital status:      Spouse name: Cristel     Number of children: 2     Years of education: Not on file     Highest education level: Not on file   Occupational History     Not on file   Tobacco Use     Smoking status: Never     Smokeless tobacco: Never   Vaping Use     Vaping status: Never Used   Substance and Sexual Activity     Alcohol use: Yes     Alcohol/week: 3.3 standard drinks of alcohol     Comment: occasionally weekends     Drug use: No     Sexual activity: Yes   Other Topics Concern      Service Not Asked     Blood Transfusions Not Asked     Caffeine Concern No     Occupational Exposure Not Asked     Hobby Hazards Not Asked     Sleep Concern No     Stress Concern No     Weight Concern No     Special Diet Not  "Asked     Back Care Not Asked     Exercise Yes     Bike Helmet Not Asked     Seat Belt Yes     Self-Exams Not Asked     Parent/sibling w/ CABG, MI or angioplasty before 65F 55M? Not Asked   Social History Narrative     Not on file     Social Determinants of Health     Financial Resource Strain: Not on file   Food Insecurity: Not on file   Transportation Needs: Not on file   Physical Activity: Not on file   Stress: Not on file   Social Connections: Not on file   Interpersonal Safety: Not on file   Housing Stability: Not on file     Denies smoking. 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     PHYSICAL EXAM     Ht 1.854 m (6' 1\")   Wt 127 kg (280 lb)   BMI 36.94 kg/m    Wt Readings from Last 4 Encounters:   07/16/24 127 kg (280 lb)   06/05/24 131.6 kg (290 lb 3.2 oz)   05/08/24 131.2 kg (289 lb 3.2 oz)   04/29/24 129.7 kg (286 lb)         Constitutional.  Looks well and in no apparent distress.   Eyes.  Without eye redness or apparent jaundice.   Respiratory.  Non labored breathing. Speaking in full sentences.    Skin.  No concerning skin rashes on the skin visualized.   Neurological.  Is alert and oriented.  Psychiatric.  Mood and affect seem appropriate.       LABORATORY AND IMAGING STUDIES       I reviewed   7/15/2024  CBC showed WBC 3.7 - Normal differential except ALC 0.7.  Hemoglobin 11.7  Platelets 137.      Chemistry unremarkable except Ca 8.5.      LDH was 179     3/28/2024  BCR::ABL1/ABL1 Major ( p210): 57%      ASSESSMENT AND PLAN     CML.  Chronic phase CML. Positive for t(9;22)  Peripheral Blood FISH- BCR::ABL1 fusion (86%)  Bone Marrow FISH - BCR::ABL1 fusion (91%)  Peripheral Blood BCR::ABL1/ABL1 Major ( p210): 57%    Intermediate ELTS Score    Patient started imatinib on 5/17/2024.    He is tolerating it fairly well.    Blood counts have improved.  WBC is 3.7 with normal differential except lymphocyte 0.7.    He has mild anemia and mild thrombocytopenia.    At this time I would " recommend continuing imatinib.  We will monitor labs once a month.    Plan to repeat FISH testing/BCR/ABL molecular test next month.         Diarrhea.  Imatinib related.  Imodium helps and he will take Imodium as needed.      Stage IV follicular lymphoma- status post treatment with bendamustine/Rituxan (04-09/2014) followed by maintenance rituximab for two years- completed in October 2016.    Currently no evidence of lymphoma.  Plan to repeat labs and CT scan in March 2025     We did not address the following today.    L2 sclerotic lesion.  He was seen by Dr. Alston from neurosurgery and I reviewed the notes.  Most likely this is a benign lesion and recommendation was to follow it up serially with repeat MRI in 6 months.  MRI in December 2022 showed slight growth in the lesion so he had biopsy of this on 1/12/2022 which was benign.  Repeat CT chest abdomen and pelvis on 3/4/2024 showed overall stable findings.          Bilateral hydroureter and bladder wall thickening.  He had cystourethroscopy in May 2022 which was unremarkable.  I reviewed notes from urology.  CT scan from 3/4/2024 shows mild dilation of left distal ureter which is stable.  Continue to observe.      Interstitial lung disease.  Repeat CT scan continues to show slight worsening of the interstitial lung disease.  I advised him to follow-up with pulmonology.  I gave him a referral for pulmonology.    I will see him back in about 6 weeks.    All questions answered.  He is agreeable and comfortable with the plan.    Eduardo Crespo MD    The longitudinal plan of care for the CML as documented were addressed during this visit. Due to the added complexity in care, I will continue to support Minh in the subsequent management and with ongoing continuity of care.        Again, thank you for allowing me to participate in the care of your patient.        Sincerely,        Eduardo Crespo MD

## 2024-07-16 NOTE — PROGRESS NOTES
Virtual Visit Details    Type of service:  Video Visit   Video Start Time: 9:23 AM  Video End Time:9:29 AM    Originating Location (pt. Location): Home    Distant Location (provider location):  Off-site  Platform used for Video Visit: Well

## 2024-07-17 ENCOUNTER — PATIENT OUTREACH (OUTPATIENT)
Dept: CARE COORDINATION | Facility: CLINIC | Age: 74
End: 2024-07-17
Payer: MEDICARE

## 2024-08-02 DIAGNOSIS — E11.9 TYPE 2 DIABETES MELLITUS WITHOUT COMPLICATION, WITHOUT LONG-TERM CURRENT USE OF INSULIN (H): ICD-10-CM

## 2024-08-02 RX ORDER — METFORMIN HCL 500 MG
2000 TABLET, EXTENDED RELEASE 24 HR ORAL
Qty: 360 TABLET | Refills: 0 | Status: SHIPPED | OUTPATIENT
Start: 2024-08-02

## 2024-08-07 DIAGNOSIS — I10 ESSENTIAL HYPERTENSION: ICD-10-CM

## 2024-08-07 DIAGNOSIS — I77.810 ASCENDING AORTA DILATATION (H): ICD-10-CM

## 2024-08-07 RX ORDER — METOPROLOL SUCCINATE 50 MG/1
50 TABLET, EXTENDED RELEASE ORAL DAILY
Qty: 90 TABLET | Refills: 1 | Status: SHIPPED | OUTPATIENT
Start: 2024-08-07

## 2024-08-08 DIAGNOSIS — C92.10 CML (CHRONIC MYELOCYTIC LEUKEMIA) (H): Primary | ICD-10-CM

## 2024-08-08 RX ORDER — IMATINIB MESYLATE 400 MG/1
400 TABLET, FILM COATED ORAL DAILY
Qty: 30 TABLET | Refills: 0 | Status: SHIPPED | OUTPATIENT
Start: 2024-08-15 | End: 2024-09-14

## 2024-08-20 ENCOUNTER — DOCUMENTATION ONLY (OUTPATIENT)
Dept: ONCOLOGY | Facility: CLINIC | Age: 74
End: 2024-08-20

## 2024-08-20 ENCOUNTER — LAB (OUTPATIENT)
Dept: LAB | Facility: CLINIC | Age: 74
End: 2024-08-20
Payer: MEDICARE

## 2024-08-20 DIAGNOSIS — C92.10 CML (CHRONIC MYELOCYTIC LEUKEMIA) (H): ICD-10-CM

## 2024-08-20 LAB
ALBUMIN SERPL BCG-MCNC: 4.3 G/DL (ref 3.5–5.2)
ALP SERPL-CCNC: 113 U/L (ref 40–150)
ALT SERPL W P-5'-P-CCNC: 18 U/L (ref 0–70)
ANION GAP SERPL CALCULATED.3IONS-SCNC: 12 MMOL/L (ref 7–15)
AST SERPL W P-5'-P-CCNC: 24 U/L (ref 0–45)
BASOPHILS # BLD AUTO: 0 10E3/UL (ref 0–0.2)
BASOPHILS NFR BLD AUTO: 1 %
BILIRUB SERPL-MCNC: 0.9 MG/DL
BUN SERPL-MCNC: 14.1 MG/DL (ref 8–23)
CALCIUM SERPL-MCNC: 8.8 MG/DL (ref 8.8–10.4)
CHLORIDE SERPL-SCNC: 101 MMOL/L (ref 98–107)
CREAT SERPL-MCNC: 1.2 MG/DL (ref 0.67–1.17)
EGFRCR SERPLBLD CKD-EPI 2021: 64 ML/MIN/1.73M2
EOSINOPHIL # BLD AUTO: 0.2 10E3/UL (ref 0–0.7)
EOSINOPHIL NFR BLD AUTO: 3 %
ERYTHROCYTE [DISTWIDTH] IN BLOOD BY AUTOMATED COUNT: 14.5 % (ref 10–15)
GLUCOSE SERPL-MCNC: 131 MG/DL (ref 70–99)
HCO3 SERPL-SCNC: 24 MMOL/L (ref 22–29)
HCT VFR BLD AUTO: 36 % (ref 40–53)
HGB BLD-MCNC: 12.5 G/DL (ref 13.3–17.7)
IMM GRANULOCYTES # BLD: 0 10E3/UL
IMM GRANULOCYTES NFR BLD: 0 %
LAB DIRECTOR COMMENTS: NORMAL
LAB DIRECTOR DISCLAIMER: NORMAL
LAB DIRECTOR INTERPRETATION: NORMAL
LAB DIRECTOR METHODOLOGY: NORMAL
LAB DIRECTOR RESULTS: NORMAL
LDH SERPL L TO P-CCNC: 178 U/L (ref 0–250)
LOCATION OF TASK: NORMAL
LYMPHOCYTES # BLD AUTO: 0.9 10E3/UL (ref 0.8–5.3)
LYMPHOCYTES NFR BLD AUTO: 17 %
MAGNESIUM SERPL-MCNC: 2.3 MG/DL (ref 1.7–2.3)
MCH RBC QN AUTO: 32.3 PG (ref 26.5–33)
MCHC RBC AUTO-ENTMCNC: 34.7 G/DL (ref 31.5–36.5)
MCV RBC AUTO: 93 FL (ref 78–100)
MONOCYTES # BLD AUTO: 0.5 10E3/UL (ref 0–1.3)
MONOCYTES NFR BLD AUTO: 10 %
NEUTROPHILS # BLD AUTO: 3.8 10E3/UL (ref 1.6–8.3)
NEUTROPHILS NFR BLD AUTO: 70 %
NRBC # BLD AUTO: 0 10E3/UL
NRBC BLD AUTO-RTO: 0 /100
PHOSPHATE SERPL-MCNC: 3.1 MG/DL (ref 2.5–4.5)
PLATELET # BLD AUTO: 136 10E3/UL (ref 150–450)
POTASSIUM SERPL-SCNC: 4.6 MMOL/L (ref 3.4–5.3)
PROT SERPL-MCNC: 6 G/DL (ref 6.4–8.3)
RBC # BLD AUTO: 3.87 10E6/UL (ref 4.4–5.9)
SODIUM SERPL-SCNC: 137 MMOL/L (ref 135–145)
SPECIMEN DESCRIPTION: NORMAL
WBC # BLD AUTO: 5.4 10E3/UL (ref 4–11)

## 2024-08-20 PROCEDURE — 83735 ASSAY OF MAGNESIUM: CPT

## 2024-08-20 PROCEDURE — 85025 COMPLETE CBC W/AUTO DIFF WBC: CPT

## 2024-08-20 PROCEDURE — G0452 MOLECULAR PATHOLOGY INTERPR: HCPCS | Mod: 59 | Performed by: STUDENT IN AN ORGANIZED HEALTH CARE EDUCATION/TRAINING PROGRAM

## 2024-08-20 PROCEDURE — 80053 COMPREHEN METABOLIC PANEL: CPT

## 2024-08-20 PROCEDURE — 81206 BCR/ABL1 GENE MAJOR BP: CPT

## 2024-08-20 PROCEDURE — 88275 CYTOGENETICS 100-300: CPT

## 2024-08-20 PROCEDURE — 84100 ASSAY OF PHOSPHORUS: CPT

## 2024-08-20 PROCEDURE — 88291 CYTO/MOLECULAR REPORT: CPT | Mod: 59 | Performed by: MEDICAL GENETICS

## 2024-08-20 PROCEDURE — 88271 CYTOGENETICS DNA PROBE: CPT

## 2024-08-20 PROCEDURE — 83615 LACTATE (LD) (LDH) ENZYME: CPT

## 2024-08-20 PROCEDURE — 36415 COLL VENOUS BLD VENIPUNCTURE: CPT

## 2024-08-23 LAB
CULTURE HARVEST COMPLETE DATE: NORMAL
INTERPRETATION: NORMAL

## 2024-09-02 DIAGNOSIS — C92.10 CML (CHRONIC MYELOCYTIC LEUKEMIA) (H): Primary | ICD-10-CM

## 2024-09-02 RX ORDER — IMATINIB MESYLATE 400 MG/1
400 TABLET, FILM COATED ORAL DAILY
Qty: 30 TABLET | Refills: 0 | Status: SHIPPED | OUTPATIENT
Start: 2024-09-14 | End: 2024-10-14

## 2024-09-19 ENCOUNTER — OFFICE VISIT (OUTPATIENT)
Dept: FAMILY MEDICINE | Facility: CLINIC | Age: 74
End: 2024-09-19
Payer: MEDICARE

## 2024-09-19 VITALS
TEMPERATURE: 98.8 F | HEART RATE: 71 BPM | BODY MASS INDEX: 38.59 KG/M2 | DIASTOLIC BLOOD PRESSURE: 74 MMHG | OXYGEN SATURATION: 96 % | SYSTOLIC BLOOD PRESSURE: 117 MMHG | RESPIRATION RATE: 22 BRPM | HEIGHT: 72 IN | WEIGHT: 284.9 LBS

## 2024-09-19 DIAGNOSIS — I10 ESSENTIAL HYPERTENSION: ICD-10-CM

## 2024-09-19 DIAGNOSIS — Z29.11 NEED FOR VACCINATION AGAINST RESPIRATORY SYNCYTIAL VIRUS: ICD-10-CM

## 2024-09-19 DIAGNOSIS — G47.33 OSA (OBSTRUCTIVE SLEEP APNEA): ICD-10-CM

## 2024-09-19 DIAGNOSIS — E11.69 TYPE 2 DIABETES MELLITUS WITH OTHER SPECIFIED COMPLICATION, WITHOUT LONG-TERM CURRENT USE OF INSULIN (H): Primary | ICD-10-CM

## 2024-09-19 DIAGNOSIS — Z00.00 ENCOUNTER FOR MEDICARE ANNUAL WELLNESS EXAM: ICD-10-CM

## 2024-09-19 DIAGNOSIS — M89.9 LESION OF LUMBAR SPINE: ICD-10-CM

## 2024-09-19 DIAGNOSIS — N40.0 BENIGN PROSTATIC HYPERPLASIA, UNSPECIFIED WHETHER LOWER URINARY TRACT SYMPTOMS PRESENT: ICD-10-CM

## 2024-09-19 DIAGNOSIS — L82.1 SK (SEBORRHEIC KERATOSIS): ICD-10-CM

## 2024-09-19 DIAGNOSIS — J84.9 ILD (INTERSTITIAL LUNG DISEASE) (H): ICD-10-CM

## 2024-09-19 DIAGNOSIS — E78.5 HYPERLIPIDEMIA WITH TARGET LDL LESS THAN 130: ICD-10-CM

## 2024-09-19 DIAGNOSIS — C82.80 LYMPHOMA, SMALL LYMPHOID, FOLLICULAR (H): ICD-10-CM

## 2024-09-19 DIAGNOSIS — E66.01 SEVERE OBESITY (BMI 35.0-39.9) WITH COMORBIDITY (H): ICD-10-CM

## 2024-09-19 DIAGNOSIS — I77.810 ASCENDING AORTA DILATATION (H): ICD-10-CM

## 2024-09-19 DIAGNOSIS — C92.10 CML (CHRONIC MYELOCYTIC LEUKEMIA) (H): ICD-10-CM

## 2024-09-19 LAB
ALBUMIN SERPL BCG-MCNC: 4.1 G/DL (ref 3.5–5.2)
ALP SERPL-CCNC: 101 U/L (ref 40–150)
ALT SERPL W P-5'-P-CCNC: 16 U/L (ref 0–70)
ANION GAP SERPL CALCULATED.3IONS-SCNC: 11 MMOL/L (ref 7–15)
AST SERPL W P-5'-P-CCNC: 22 U/L (ref 0–45)
BASOPHILS # BLD AUTO: 0.1 10E3/UL (ref 0–0.2)
BASOPHILS NFR BLD AUTO: 1 %
BILIRUB SERPL-MCNC: 0.9 MG/DL
BUN SERPL-MCNC: 13.3 MG/DL (ref 8–23)
CALCIUM SERPL-MCNC: 8.4 MG/DL (ref 8.8–10.4)
CHLORIDE SERPL-SCNC: 104 MMOL/L (ref 98–107)
CREAT SERPL-MCNC: 1.16 MG/DL (ref 0.67–1.17)
EGFRCR SERPLBLD CKD-EPI 2021: 67 ML/MIN/1.73M2
EOSINOPHIL # BLD AUTO: 0.1 10E3/UL (ref 0–0.7)
EOSINOPHIL NFR BLD AUTO: 2 %
ERYTHROCYTE [DISTWIDTH] IN BLOOD BY AUTOMATED COUNT: 13.8 % (ref 10–15)
EST. AVERAGE GLUCOSE BLD GHB EST-MCNC: 123 MG/DL
GLUCOSE SERPL-MCNC: 119 MG/DL (ref 70–99)
HBA1C MFR BLD: 5.9 %
HCO3 SERPL-SCNC: 23 MMOL/L (ref 22–29)
HCT VFR BLD AUTO: 34.5 % (ref 40–53)
HGB BLD-MCNC: 12 G/DL (ref 13.3–17.7)
IMM GRANULOCYTES # BLD: 0 10E3/UL
IMM GRANULOCYTES NFR BLD: 0 %
LDH SERPL L TO P-CCNC: 171 U/L (ref 0–250)
LYMPHOCYTES # BLD AUTO: 1 10E3/UL (ref 0.8–5.3)
LYMPHOCYTES NFR BLD AUTO: 18 %
MAGNESIUM SERPL-MCNC: 2.2 MG/DL (ref 1.7–2.3)
MCH RBC QN AUTO: 32.4 PG (ref 26.5–33)
MCHC RBC AUTO-ENTMCNC: 34.8 G/DL (ref 31.5–36.5)
MCV RBC AUTO: 93 FL (ref 78–100)
MONOCYTES # BLD AUTO: 0.6 10E3/UL (ref 0–1.3)
MONOCYTES NFR BLD AUTO: 11 %
NEUTROPHILS # BLD AUTO: 3.7 10E3/UL (ref 1.6–8.3)
NEUTROPHILS NFR BLD AUTO: 68 %
NRBC # BLD AUTO: 0 10E3/UL
NRBC BLD AUTO-RTO: 0 /100
PHOSPHATE SERPL-MCNC: 2.8 MG/DL (ref 2.5–4.5)
PLATELET # BLD AUTO: 133 10E3/UL (ref 150–450)
POTASSIUM SERPL-SCNC: 4.3 MMOL/L (ref 3.4–5.3)
PROT SERPL-MCNC: 6 G/DL (ref 6.4–8.3)
RBC # BLD AUTO: 3.7 10E6/UL (ref 4.4–5.9)
SODIUM SERPL-SCNC: 138 MMOL/L (ref 135–145)
WBC # BLD AUTO: 5.4 10E3/UL (ref 4–11)

## 2024-09-19 PROCEDURE — 99214 OFFICE O/P EST MOD 30 MIN: CPT | Mod: 25 | Performed by: STUDENT IN AN ORGANIZED HEALTH CARE EDUCATION/TRAINING PROGRAM

## 2024-09-19 PROCEDURE — G0439 PPPS, SUBSEQ VISIT: HCPCS | Performed by: STUDENT IN AN ORGANIZED HEALTH CARE EDUCATION/TRAINING PROGRAM

## 2024-09-19 PROCEDURE — 84100 ASSAY OF PHOSPHORUS: CPT | Performed by: STUDENT IN AN ORGANIZED HEALTH CARE EDUCATION/TRAINING PROGRAM

## 2024-09-19 PROCEDURE — 83735 ASSAY OF MAGNESIUM: CPT | Performed by: STUDENT IN AN ORGANIZED HEALTH CARE EDUCATION/TRAINING PROGRAM

## 2024-09-19 PROCEDURE — 85025 COMPLETE CBC W/AUTO DIFF WBC: CPT | Performed by: STUDENT IN AN ORGANIZED HEALTH CARE EDUCATION/TRAINING PROGRAM

## 2024-09-19 PROCEDURE — 17110 DESTRUCTION B9 LES UP TO 14: CPT | Performed by: STUDENT IN AN ORGANIZED HEALTH CARE EDUCATION/TRAINING PROGRAM

## 2024-09-19 PROCEDURE — 83615 LACTATE (LD) (LDH) ENZYME: CPT | Performed by: STUDENT IN AN ORGANIZED HEALTH CARE EDUCATION/TRAINING PROGRAM

## 2024-09-19 PROCEDURE — 99207 PR FOOT EXAM NO CHARGE: CPT | Performed by: STUDENT IN AN ORGANIZED HEALTH CARE EDUCATION/TRAINING PROGRAM

## 2024-09-19 PROCEDURE — 80053 COMPREHEN METABOLIC PANEL: CPT | Performed by: STUDENT IN AN ORGANIZED HEALTH CARE EDUCATION/TRAINING PROGRAM

## 2024-09-19 PROCEDURE — 36415 COLL VENOUS BLD VENIPUNCTURE: CPT | Performed by: STUDENT IN AN ORGANIZED HEALTH CARE EDUCATION/TRAINING PROGRAM

## 2024-09-19 PROCEDURE — 83036 HEMOGLOBIN GLYCOSYLATED A1C: CPT | Performed by: STUDENT IN AN ORGANIZED HEALTH CARE EDUCATION/TRAINING PROGRAM

## 2024-09-19 RX ORDER — TAMSULOSIN HYDROCHLORIDE 0.4 MG/1
0.4 CAPSULE ORAL DAILY
Qty: 90 CAPSULE | Refills: 3 | Status: SHIPPED | OUTPATIENT
Start: 2024-09-19

## 2024-09-19 ASSESSMENT — PAIN SCALES - GENERAL: PAINLEVEL: NO PAIN (0)

## 2024-09-19 NOTE — PATIENT INSTRUCTIONS
Patient Education   Preventive Care Advice   This is general advice given by our system to help you stay healthy. However, your care team may have specific advice just for you. Please talk to your care team about your preventive care needs.  Nutrition  Eat 5 or more servings of fruits and vegetables each day.  Try wheat bread, brown rice and whole grain pasta (instead of white bread, rice, and pasta).  Get enough calcium and vitamin D. Check the label on foods and aim for 100% of the RDA (recommended daily allowance).  Lifestyle  Exercise at least 150 minutes each week  (30 minutes a day, 5 days a week).  Do muscle strengthening activities 2 days a week. These help control your weight and prevent disease.  No smoking.  Wear sunscreen to prevent skin cancer.  Have a dental exam and cleaning every 6 months.  Yearly exams  See your health care team every year to talk about:  Any changes in your health.  Any medicines your care team has prescribed.  Preventive care, family planning, and ways to prevent chronic diseases.  Shots (vaccines)   HPV shots (up to age 26), if you've never had them before.  Hepatitis B shots (up to age 59), if you've never had them before.  COVID-19 shot: Get this shot when it's due.  Flu shot: Get a flu shot every year.  Tetanus shot: Get a tetanus shot every 10 years.  Pneumococcal, hepatitis A, and RSV shots: Ask your care team if you need these based on your risk.  Shingles shot (for age 50 and up)  General health tests  Diabetes screening:  Starting at age 35, Get screened for diabetes at least every 3 years.  If you are younger than age 35, ask your care team if you should be screened for diabetes.  Cholesterol test: At age 39, start having a cholesterol test every 5 years, or more often if advised.  Bone density scan (DEXA): At age 50, ask your care team if you should have this scan for osteoporosis (brittle bones).  Hepatitis C: Get tested at least once in your life.  STIs (sexually  transmitted infections)  Before age 24: Ask your care team if you should be screened for STIs.  After age 24: Get screened for STIs if you're at risk. You are at risk for STIs (including HIV) if:  You are sexually active with more than one person.  You don't use condoms every time.  You or a partner was diagnosed with a sexually transmitted infection.  If you are at risk for HIV, ask about PrEP medicine to prevent HIV.  Get tested for HIV at least once in your life, whether you are at risk for HIV or not.  Cancer screening tests  Cervical cancer screening: If you have a cervix, begin getting regular cervical cancer screening tests starting at age 21.  Breast cancer scan (mammogram): If you've ever had breasts, begin having regular mammograms starting at age 40. This is a scan to check for breast cancer.  Colon cancer screening: It is important to start screening for colon cancer at age 45.  Have a colonoscopy test every 10 years (or more often if you're at risk) Or, ask your provider about stool tests like a FIT test every year or Cologuard test every 3 years.  To learn more about your testing options, visit:   .  For help making a decision, visit:   https://bit.ly/mm92469.  Prostate cancer screening test: If you have a prostate, ask your care team if a prostate cancer screening test (PSA) at age 55 is right for you.  Lung cancer screening: If you are a current or former smoker ages 50 to 80, ask your care team if ongoing lung cancer screenings are right for you.  For informational purposes only. Not to replace the advice of your health care provider. Copyright   2023 [facility] [employer]. All rights reserved. Clinically reviewed by the [facility]. [employer] [id number] - REV 01/24.  Hearing Loss: Care Instructions  Overview     Hearing loss is a sudden or slow decrease in how well you hear. It can range from slight to profound. Permanent hearing loss can occur with aging. It also can  happen when you are exposed long-term to loud noise. Examples include listening to loud music, riding motorcycles, or being around other loud machines.  Hearing loss can affect your work and home life. It can make you feel lonely or depressed. You may feel that you have lost your independence. But hearing aids and other devices can help you hear better and feel connected to others.  Follow-up care is a key part of your treatment and safety. Be sure to make and go to all appointments, and call your doctor if you are having problems. It's also a good idea to know your test results and keep a list of the medicines you take.  How can you care for yourself at home?  Avoid loud noises whenever possible. This helps keep your hearing from getting worse.  Always wear hearing protection around loud noises.  Wear a hearing aid as directed.  A professional can help you pick a hearing aid that will work best for you.  You can also get hearing aids over the counter for mild to moderate hearing loss.  Have hearing tests as your doctor suggests. They can show whether your hearing has changed. Your hearing aid may need to be adjusted.  Use other devices as needed. These may include:  Telephone amplifiers and hearing aids that can connect to a television, stereo, radio, or microphone.  Devices that use lights or vibrations. These alert you to the doorbell, a ringing telephone, or a baby monitor.  Television closed-captioning. This shows the words at the bottom of the screen. Most new TVs can do this.  TTY (text telephone). This lets you type messages back and forth on the telephone instead of talking or listening. These devices are also called TDD. When messages are typed on the keyboard, they are sent over the phone line to a receiving TTY. The message is shown on a monitor.  Use text messaging, social media, and email if it is hard for you to communicate by telephone.  Try to learn a listening technique called speechreading. It is  "not lipreading. You pay attention to people's gestures, expressions, posture, and tone of voice. These clues can help you understand what a person is saying. Face the person you are talking to, and have them face you. Make sure the lighting is good. You need to see the other person's face clearly.  Think about counseling if you need help to adjust to your hearing loss.  When should you call for help?  Watch closely for changes in your health, and be sure to contact your doctor if:    You think your hearing is getting worse.     You have new symptoms, such as dizziness or nausea.   Where can you learn more?  Go to https://www.[employer].net/patiented  Enter R798 in the search box to learn more about \"Hearing Loss: Care Instructions.\"  Current as of: September 27, 2023               Content Version: 14.0    3247-7891 [employer].   Care instructions adapted under license by your healthcare professional. If you have questions about a medical condition or this instruction, always ask your healthcare professional. [employer] disclaims any warranty or liability for your use of this information.      Learning About Sleeping Well  What does sleeping well mean?     Sleeping well means getting enough sleep to feel good and stay healthy. How much sleep is enough varies among people.  The number of hours you sleep and how you feel when you wake up are both important. If you do not feel refreshed, you probably need more sleep. Another sign of not getting enough sleep is feeling tired during the day.  Experts recommend that adults get at least 7 or more hours of sleep per day. Children and older adults need more sleep.  Why is getting enough sleep important?  Getting enough quality sleep is a basic part of good health. When your sleep suffers, your physical health, mood, and your thoughts can suffer too. You may find yourself feeling more grumpy or stressed. Not getting enough sleep also can lead to " "serious problems, including injury, accidents, anxiety, and depression.  What might cause poor sleeping?  Many things can cause sleep problems, including:  Changes to your sleep schedule.  Stress. Stress can be caused by fear about a single event, such as giving a speech. Or you may have ongoing stress, such as worry about work or school.  Depression, anxiety, and other mental or emotional conditions.  Changes in your sleep habits or surroundings. This includes changes that happen where you sleep, such as noise, light, or sleeping in a different bed. It also includes changes in your sleep pattern, such as having jet lag or working a late shift.  Health problems, such as pain, breathing problems, and restless legs syndrome.  Lack of regular exercise.  Using alcohol, nicotine, or caffeine before bed.  How can you help yourself?  Here are some tips that may help you sleep more soundly and wake up feeling more refreshed.  Your sleeping area   Use your bedroom only for sleeping and sex. A bit of light reading may help you fall asleep. But if it doesn't, do your reading elsewhere in the house. Try not to use your TV, computer, smartphone, or tablet while you are in bed.  Be sure your bed is big enough to stretch out comfortably, especially if you have a sleep partner.  Keep your bedroom quiet, dark, and cool. Use curtains, blinds, or a sleep mask to block out light. To block out noise, use earplugs, soothing music, or a \"white noise\" machine.  Your evening and bedtime routine   Create a relaxing bedtime routine. You might want to take a warm shower or bath, or listen to soothing music.  Go to bed at the same time every night. And get up at the same time every morning, even if you feel tired.  What to avoid   Limit caffeine (coffee, tea, caffeinated sodas) during the day, and don't have any for at least 6 hours before bedtime.  Avoid drinking alcohol before bedtime. Alcohol can cause you to wake up more often during the " "night.  Try not to smoke or use tobacco, especially in the evening. Nicotine can keep you awake.  Limit naps during the day, especially close to bedtime.  Avoid lying in bed awake for too long. If you can't fall asleep or if you wake up in the middle of the night and can't get back to sleep within about 20 minutes, get out of bed and go to another room until you feel sleepy.  Avoid taking medicine right before bed that may keep you awake or make you feel hyper or energized. Your doctor can tell you if your medicine may do this and if you can take it earlier in the day.  If you can't sleep   Imagine yourself in a peaceful, pleasant scene. Focus on the details and feelings of being in a place that is relaxing.  Get up and do a quiet or boring activity until you feel sleepy.  Avoid drinking any liquids before going to bed to help prevent waking up often to use the bathroom.  Where can you learn more?  Go to https://www.[employer].net/patiented  Enter J942 in the search box to learn more about \"Learning About Sleeping Well.\"  Current as of: July 10, 2023  Content Version: 14.1 2006-2024 [employer].   Care instructions adapted under license by your healthcare professional. If you have questions about a medical condition or this instruction, always ask your healthcare professional. [employer] disclaims any warranty or liability for your use of this information.    Bladder Training: Care Instructions  Your Care Instructions     Bladder training is used to treat urge incontinence and stress incontinence. Urge incontinence means that the need to urinate comes on so fast that you can't get to a toilet in time. Stress incontinence means that you leak urine because of pressure on your bladder. For example, it may happen when you laugh, cough, or lift something heavy.  Bladder training can increase how long you can wait before you have to urinate. It can also help your bladder hold more urine. And " it can give you better control over the urge to urinate.  It is important to remember that bladder training takes a few weeks to a few months to make a difference. You may not see results right away, but don't give up.  Follow-up care is a key part of your treatment and safety. Be sure to make and go to all appointments, and call your doctor if you are having problems. It's also a good idea to know your test results and keep a list of the medicines you take.  How can you care for yourself at home?  Work with your doctor to come up with a bladder training program that is right for you. You may use one or more of the following methods.  Delayed urination  In the beginning, try to keep from urinating for 5 minutes after you first feel the need to go.  While you wait, take deep, slow breaths to relax. Kegel exercises can also help you delay the need to go to the bathroom.  After some practice, when you can easily wait 5 minutes to urinate, try to wait 10 minutes before you urinate.  Slowly increase the waiting period until you are able to control when you have to urinate.  Scheduled urination  Empty your bladder when you first wake up in the morning.  Schedule times throughout the day when you will urinate.  Start by going to the bathroom every hour, even if you don't need to go.  Slowly increase the time between trips to the bathroom.  When you have found a schedule that works well for you, keep doing it.  If you wake up during the night and have to urinate, do it. Apply your schedule to waking hours only.  Kegel exercises  These tighten and strengthen pelvic muscles, which can help you control the flow of urine. (If doing these exercises causes pain, stop doing them and talk with your doctor.) To do Kegel exercises:  Squeeze your muscles as if you were trying not to pass gas. Or squeeze your muscles as if you were stopping the flow of urine. Your belly, legs, and buttocks shouldn't move.  Hold the squeeze for 3  "seconds, then relax for 5 to 10 seconds.  Start with 3 seconds, then add 1 second each week until you are able to squeeze for 10 seconds.  Repeat the exercise 10 times a session. Do 3 to 8 sessions a day.  When should you call for help?  Watch closely for changes in your health, and be sure to contact your doctor if:    Your incontinence is getting worse.     You do not get better as expected.   Where can you learn more?  Go to https://www.[employer].net/patiented  Enter V684 in the search box to learn more about \"Bladder Training: Care Instructions.\"  Current as of: November 15, 2023               Content Version: 14.0    9825-1600 [employer].   Care instructions adapted under license by your healthcare professional. If you have questions about a medical condition or this instruction, always ask your healthcare professional. [employer] disclaims any warranty or liability for your use of this information.         "

## 2024-09-19 NOTE — PROGRESS NOTES
Preventive Care Visit  [facility]  Oskar Conrad MD, Family Medicine  Sep 19, 2024      Assessment & Plan   Problem List Items Addressed This Visit          Respiratory    WINSTON (obstructive sleep apnea)    ILD (interstitial lung disease) (H)       Digestive    Severe obesity (BMI 35.0-39.9) with comorbidity (H)       Endocrine    Hyperlipidemia with target LDL less than 130    Type 2 diabetes mellitus with other specified complication, without long-term current use of insulin (H) - Primary    Relevant Orders    HEMOGLOBIN A1C    FOOT EXAM (Completed)       Circulatory    Essential hypertension    Ascending aorta dilatation (H24)       Urinary    BPH (benign prostatic hyperplasia)       Hematologic    Lymphoma, small lymphoid, follicular (H)    CML (chronic myelocytic leukemia) (H)       Other    Lesion of lumbar spine     Other Visit Diagnoses       Need for vaccination against respiratory syncytial virus        SK (seborrheic keratosis)        Relevant Orders    DESTRUCT BENIGN LESION, UP TO 14 (Completed)    Encounter for Medicare annual wellness exam              Age-appropriate screening and immunization discussed.  Patient with SK on the top of his back that seems to be bothersome.  Concerning features and treatment options discussed including observation.  He did want cryotherapy today.  See procedure note below.  Repeat A1c today and would recommend restarting Ozempic if A1c not at goal.  Lipids have been stable and he is following with oncology currently on imatinib.  Does look like slight decrease in GFR over the last year would have him discuss this further with oncology if it persist with prolonged use.  Some BPH symptoms and will do trial of Flomax today.  If still having issues would consider alternative medication options and postvoid residual.  Continues to use CPAP and benefiting.  He will get immunizations at pharmacy.  Aneurysm and lesions unchanged. Follow up  "with pulmonology.     Patient has been advised of split billing requirements and indicates understanding: Yes       BMI  Estimated body mass index is 38.17 kg/m  as calculated from the following:    Height as of this encounter: 1.84 m (6' 0.44\").    Weight as of this encounter: 129.2 kg (284 lb 14.4 oz).   Weight management plan: Discussed healthy diet and exercise guidelines    Counseling  Appropriate preventive services were addressed with this patient via screening, questionnaire, or discussion as appropriate for fall prevention, nutrition, physical activity, Tobacco-use cessation, social engagement, weight loss and cognition.  Checklist reviewing preventive services available has been given to the patient.  Reviewed patient's diet, addressing concerns and/or questions.   He is at risk for lack of exercise and has been provided with information to increase physical activity for the benefit of his well-being.   Discussed possible causes of fatigue. The patient was provided with written information regarding signs of hearing loss.   Information on urinary incontinence and treatment options given to patient.         Roberto Wilkinson is a 74 year old, presenting for the following:  Physical        9/19/2024     9:54 AM   Additional Questions   Roomed by [facility] Care Directive  Patient does not have a Health Care Directive or Living Will: Patient states has Advance Directive and will bring in a copy to clinic.    HPI  Patient with spot on his back that seems to irritate him and he often picks at.  No bleeding itching or pain.  Does note ongoing urinary issues with getting up frequently and having difficulty emptying his bladder at times.          9/19/2024   General Health   How would you rate your overall physical health? Good   Feel stress (tense, anxious, or unable to sleep) Not at all            9/19/2024   Nutrition   Diet: Diabetic    I don't know       Multiple values from one day are sorted in " reverse-chronological order         9/19/2024   Exercise   Days per week of moderate/strenous exercise 2 days      (!) EXERCISE CONCERN      9/19/2024   Social Factors   Frequency of gathering with friends or relatives Once a week   Worry food won't last until get money to buy more No   Food not last or not have enough money for food? No   Do you have housing? (Housing is defined as stable permanent housing and does not include staying ouside in a car, in a tent, in an abandoned building, in an overnight shelter, or couch-surfing.) Yes   Are you worried about losing your housing? No   Lack of transportation? No   Unable to get utilities (heat,electricity)? No            9/19/2024   Fall Risk   Fallen 2 or more times in the past year? No    No   Trouble with walking or balance? No    No       Multiple values from one day are sorted in reverse-chronological order          9/19/2024   Activities of Daily Living- Home Safety   Needs help with the following daily activites None of the above   Safety concerns in the home None of the above            9/19/2024   Dental   Dentist two times every year? Yes            9/19/2024   Hearing Screening   Hearing concerns? (!) I FEEL THAT PEOPLE ARE MUMBLING OR NOT SPEAKING CLEARLY.    (!) I NEED TO ASK PEOPLE TO SPEAK UP OR REPEAT THEMSELVES.    (!) IT'S HARD TO FOLLOW A CONVERSATION IN A NOISY RESTAURANT OR CROWDED ROOM.    (!) TROUBLE UNDESTANDING A SPEAKER IN A PUBLIC MEETING OR [employer] SERVICE.    (!) TROUBLE UNDERSTANDING SOFT OR WHISPERED SPEECH.       Multiple values from one day are sorted in reverse-chronological order         9/19/2024   Driving Risk Screening   Patient/family members have concerns about driving No            9/19/2024   General Alertness/Fatigue Screening   Have you been more tired than usual lately? (!) YES            9/19/2024   Urinary Incontinence Screening   Bothered by leaking urine in past 6 months Yes            9/19/2024   TB Screening   Were  you born outside of the [place]? No            Today's PHQ-2 Score:       2024     9:54 AM   PHQ-2 (  [employer])   Q1: Little interest or pleasure in doing things 0   Q2: Feeling down, depressed or hopeless 0   PHQ-2 Score 0   Q1: Little interest or pleasure in doing things Not at all   Q2: Feeling down, depressed or hopeless Not at all   PHQ-2 Score 0           2024   Substance Use   Alcohol more than 3/day or more than 7/wk No   Do you have a current opioid prescription? No   How severe/bad is pain from 1 to 10? 5/10   Do you use any other substances recreationally? No        Social History     Tobacco Use    Smoking status: Never    Smokeless tobacco: Never   Vaping Use    Vaping status: Never Used   Substance Use Topics    Alcohol use: Yes     Alcohol/week: 3.3 standard drinks of alcohol     Comment: occasionally weekends    Drug use: No           2024   AAA Screening   Family history of Abdominal Aortic Aneurysm (AAA)? No      ASCVD Risk   The ASCVD Risk score (Wes MARROQUIN, et al., 2019) failed to calculate for the following reasons:    The valid total cholesterol range is 130 to 320 mg/dL    Fracture Risk Assessment Tool  Link to Frax Calculator  Use the information below to complete the Frax calculator  : 1950  Sex: male  Weight (kg): 129.2 kg (actual weight)  Height (cm): 184 cm  Previous Fragility Fracture:  No  History of parent with fractured hip:  No  Current Smoking:  No  Patient has been on glucocorticoids for more than 3 months (5mg/day or more): No  Rheumatoid Arthritis on Problem List:  No  Secondary Osteoporosis on Problem List:  No  Consumes 3 or more units of alcohol per day: No  Femoral Neck BMD (g/cm2)            Reviewed and updated as needed this visit by Provider                    Past Medical History:   Diagnosis Date    BPH (benign prostatic hyperplasia)     Coloboma, optic disc, congenital, bilateral 2012    Lymphadenopathy     mediastinal & retroperitoneal     Non Hodgkin's lymphoma (H)     Non-Hodgkin's lymphoma of multiple sites (H) 6/26/2012    Polyps, colonic     Retinal detachment      Past Surgical History:   Procedure Laterality Date    COLONOSCOPY N/A 3/1/2017    Procedure: COLONOSCOPY;  Surgeon: Dakota Wall MD;  Location: [facility]    COLONOSCOPY N/A 10/3/2022    Procedure: COLONOSCOPY;  Surgeon: Jose J Bee MD;  Location: [facility]    ESOPHAGOSCOPY, GASTROSCOPY, DUODENOSCOPY (EGD), COMBINED  4/21/2013    Procedure: COMBINED ESOPHAGOSCOPY, GASTROSCOPY, DUODENOSCOPY (EGD), BIOPSY SINGLE OR MULTIPLE;;  Surgeon: Torrey Cosme MD;  Location: [facility]    ESOPHAGOSCOPY, GASTROSCOPY, DUODENOSCOPY (EGD), COMBINED  10/9/2013    Procedure: COMBINED ESOPHAGOSCOPY, GASTROSCOPY, DUODENOSCOPY (EGD), BIOPSY SINGLE OR MULTIPLE;  ESOPHAGOSCOPY, GASTROSCOPY, DUODENOSCOPY (EGD) with multiple biopsies;  Surgeon: Shay Sauer MD;  Location: [facility]    IR BONE BIOPSY VERTEBRAL  1/12/2023    LAPAROTOMY EXPLORATORY  4/24/2013    Procedure: LAPAROTOMY EXPLORATORY;  Exploratory Laparotomy and lysis of adhesions.;  Surgeon: Genevieve Barros MD;  Location: [facility]    [facility] COLONOSCOPY W/WO BRUSH/WASH  07/12/06     Current providers sharing in care for this patient include:  Patient Care Team:  Oskar Conrad MD as PCP - General (Family Medicine)  Eduardo Crespo MD as Assigned Cancer Care Provider  Ava Jackson as Diabetes Educator  Oskar Conrad MD as Assigned PCP  Dedrick Saucedo DPM as Assigned Surgical Provider  Yung Reyes PA-C as Assigned Sleep Provider    The following health maintenance items are reviewed in Epic and correct as of today:  Health Maintenance   Topic Date Due    RSV VACCINE (1 - Risk 60-74 years 1-dose series) Never done    A1C  08/21/2024    INFLUENZA VACCINE (1) 09/01/2024    COVID-19 Vaccine (6 - 2024-25 season) 09/01/2024    MICROALBUMIN  02/20/2025    LIPID  03/20/2025    EYE EXAM  06/26/2025    BMP   "08/20/2025    DTAP/TDAP/TD IMMUNIZATION (2 - Td or Tdap) 09/17/2025    MEDICARE ANNUAL WELLNESS VISIT  09/19/2025    DIABETIC FOOT EXAM  09/19/2025    ANNUAL REVIEW OF HM ORDERS  09/19/2025    FALL RISK ASSESSMENT  09/19/2025    COLORECTAL CANCER SCREENING  10/03/2027    ADVANCE CARE PLANNING  09/19/2029    HEPATITIS C SCREENING  Completed    PHQ-2 (once per calendar year)  Completed    Pneumococcal Vaccine: 65+ Years  Completed    ZOSTER IMMUNIZATION  Completed    HPV IMMUNIZATION  Aged Out    MENINGITIS IMMUNIZATION  Aged Out    RSV MONOCLONAL ANTIBODY  Aged Out         Review of Systems  Constitutional, HEENT, cardiovascular, pulmonary, GI, , musculoskeletal, neuro, skin, endocrine and psych systems are negative, except as otherwise noted.     Objective    Exam  /74 (BP Location: Left arm, Patient Position: Sitting, Cuff Size: Adult Large)   Pulse 71   Temp 98.8  F (37.1  C) (Temporal)   Resp 22   Ht 1.84 m (6' 0.44\")   Wt 129.2 kg (284 lb 14.4 oz)   SpO2 96%   BMI 38.17 kg/m     Estimated body mass index is 38.17 kg/m  as calculated from the following:    Height as of this encounter: 1.84 m (6' 0.44\").    Weight as of this encounter: 129.2 kg (284 lb 14.4 oz).    Physical Exam  GENERAL: alert and no distress  EYES: Eyes grossly normal to inspection, PERRL and conjunctivae and sclerae normal  HENT: ear canals and TM's normal, nose and mouth without ulcers or lesions  NECK: no adenopathy, no asymmetry, masses, or scars  RESP: lungs clear to auscultation - no rales, rhonchi or wheezes  CV: regular rate and rhythm, normal S1 S2, no murmur, click or rub, no peripheral edema  ABDOMEN: soft, nontender, no hepatosplenomegaly, no masses and bowel sounds normal  MS: no gross musculoskeletal defects noted, no edema, sensation of feet intact and no lesions noted.   SKIN: no suspicious lesions or rashes, left forearm and right back with stuck on brown papule, no surrounding ulcerations or erythema.  NEURO: " Normal strength and tone, mentation intact and speech normal  PSYCH: mentation appears normal, affect normal/bright        9/19/2024   Mini Cog   Clock Draw Score 2 Normal   3 Item Recall 3 objects recalled   Mini Cog Total Score 5               Prior to immunization administration, verified patients identity using patient s name and date of birth. Please see Immunization Activity for additional information.     Screening Questionnaire for Adult Immunization    Are you sick today?   No   Do you have allergies to medications, food, a vaccine component or latex?   No   Have you ever had a serious reaction after receiving a vaccination?   No   Do you have a long-term health problem with heart, lung, kidney, or metabolic disease (e.g., diabetes), asthma, a blood disorder, no spleen, complement component deficiency, a cochlear implant, or a spinal fluid leak?  Are you on long-term aspirin therapy?  Yes,Diabetes   Do you have cancer, leukemia, HIV/AIDS, or any other immune system problem?  Yes Lymphoma   Do you have a parent, brother, or sister with an immune system problem?   No   In the past 3 months, have you taken medications that affect  your immune system, such as prednisone, other steroids, or anticancer drugs; drugs for the treatment of rheumatoid arthritis, Crohn s disease, or psoriasis; or have you had radiation treatments?   No   Have you had a seizure, or a brain or other nervous system problem?   No   During the past year, have you received a transfusion of blood or blood    products, or been given immune (gamma) globulin or antiviral drug?   No   For women: Are you pregnant or is there a chance you could become       pregnant during the next month?   No   Have you received any vaccinations in the past 4 weeks?   No     Immunization questionnaire was positive for at least one answer.  Notified Provider.    Cryotherapy procedure note: After verbal consent and discussion of risks and benefits including but no  limited to dyspigmentation/scar, blister, and pain, 2was(were) treated with 1-2mm freeze border for 3 cycles with liquid nitrogen. Post cryotherapy instructions were provided. Plan for patient to return to clinic in 2 weeks if still present.   ,    Patient instructed to remain in clinic for 15 minutes afterwards, and to report any adverse reactions.     Screening performed by Vicki Weber on 9/19/2024 at 10:04 AM.       Signed Electronically by: Oskar Conrad MD

## 2024-09-23 NOTE — MR AVS SNAPSHOT
After Visit Summary   2/22/2018    Deandre Merchant    MRN: [id number]           Patient Information     Date Of Birth          1950        Visit Information        Provider Department      2/22/2018 1:00 PM Felix Sevilla MD [facility]        Today's Diagnoses     Pneumonia of right lower lobe due to infectious organism (H)    -  1    Cough        Lung crackles        Non-Hodgkin's lymphoma of multiple sites (H)        Hyperlipidemia with target LDL less than 130          Care Instructions    Thank you for visiting [facility]    Take the antibiotics until they're gone.    OK to continue your other medications.    Let me know if problems or not improving.    Please see me in 1-2 weeks for follow up if needed.       If you had imaging scheduled please refer to your radiology prep sheet.    Appointment    Date_______________     Time_____________    Day:   M TU W TH F    With____________________________    Location_________________________    If you need medication refills, please contact your pharmacy 3 days before your prescriptions runs out. If you are out of refills, your pharmacy will contact contact the clinic.    Contact us or return if questions or concerns.     -Your Care Team:  MD Debi Olivera PA-C Joel De Haan, PA-C Elizabeth McLean, CALEB CASTILLO    General information about your clinic      Clinic hours:     Lab hours:  Phone [phone]  Monday 7:30 am-7 pm    Monday 8:30 am-6:30 pm  Tuesday-Friday 7:30 am-5 pm   Tuesday-Friday 8:30 am-4:30 pm    Pharmacy hours:  Phone [phone]  Monday 8:30 am-7pm  Tuesday-Friday 8:30am-6 pm                                       Mychart assistance [phone]        We would like to hear from you, how was your visit today?    Heather Weiner  Patient Information Supervisor   Patient Care Supervisor  [facility], and [facility],  and [facility]  [phone]             Follow-ups after your visit        Your next 10 appointments already scheduled     Apr 18, 2018  8:00 AM CDT   LAB with NL LAB [facility] ([facility])    [address] [place] [id number] [phone]           Please do not eat 10-12 hours before your appointment if you are coming in fasting for labs on lipids, cholesterol, or glucose (sugar). This does not apply to pregnant women. Water, hot tea and black coffee (with nothing added) are okay. Do not drink other fluids, diet soda or chew gum.            Apr 18, 2018  8:30 AM CDT   CT ABDOMEN PELVIS W CONTRAST with PHCT1   [facility] CT Scan ([facility])    [address] [place] [address] [id number] [phone]           Please bring any scans or X-rays taken at other hospitals, if similar tests were done. Also bring a list of your medicines, including vitamins, minerals and over-the-counter drugs. It is safest to leave personal items at home.  Be sure to tell your doctor:   If you have any allergies.   If there s any chance you are pregnant.   If you are breastfeeding.  You may have contrast for this exam. To prepare:   Do not eat or drink for 2 hours before your exam. If you need to take medicine, you may take it with small sips of water. (We may ask you to take liquid medicine as well.)   The day before your exam, drink extra fluids at least six 8-ounce glasses (unless your doctor tells you to restrict your fluids).   You will be given instructions on how to drink the contrast.  Patients over 70 or patients with diabetes or kidney problems:   If you haven t had a blood test (creatinine test) within the last 30 days, the Cardiologist/Radiologist may require you to get this test prior to your exam.  If you have diabetes:   Continue to take your metformin medication on the day of your exam  Please wear loose clothing,  [No Acute Distress] : no acute distress such as a sweat suit or jogging clothes. Avoid snaps, zippers and other metal. We may ask you to undress and put on a hospital gown.  If you have any questions, please call the Imaging Department where you will have your exam.            Apr 18, 2018  9:00 AM CDT   CT CHEST W CONTRAST with PHCT1   [facility] CT Scan ([facility])    [address] [place] [id number] [phone]           Please bring any scans or X-rays taken at other hospitals, if similar tests were done. Also bring a list of your medicines, including vitamins, minerals and over-the-counter drugs. It is safest to leave personal items at home.  Be sure to tell your doctor:   If you have any allergies.   If there s any chance you are pregnant.   If you are breastfeeding.    If you have diabetes as your medication may need to be adjusted for this exam.  You will have contrast for this exam. To prepare:   Do not eat or drink for 2 hours before your exam. If you need to take medicine, you may take it with small sips of water. (We may ask you to take liquid medicine as well.)   The day before your exam, drink extra fluids at least six 8-ounce glasses (unless your doctor tells you to restrict your fluids).  Patients over 70 or patients with diabetes or kidney problems:   If you haven t had a blood test (creatinine test) within the last 30 days, the Cardiologist/Radiologist may require you to get this test prior to your exam.  Please wear loose clothing, such as a sweat suit or jogging clothes. Avoid snaps, zippers and other metal. We may ask you to undress and put on a hospital gown.  If you have any questions, please call the Imaging Department where you will have your exam.            Apr 25, 2018  8:00 AM CDT   Return Visit with Ge Krishnamurthy MD   [facility]    [address] [id number] [phone]              Future tests that were ordered for you  "today     Open Future Orders        Priority Expected Expires Ordered    Lipid panel reflex to direct LDL Fasting Routine  2018            Who to contact     If you have questions or need follow up information about today's clinic visit or your schedule please contact [facility] directly at [phone].  Normal or non-critical lab and imaging results will be communicated to you by MyChart, letter or phone within 4 business days after the clinic has received the results. If you do not hear from us within 7 days, please contact the clinic through [facility] or phone. If you have a critical or abnormal lab result, we will notify you by phone as soon as possible.  Submit refill requests through [facility] or call your pharmacy and they will forward the refill request to us. Please allow 3 business days for your refill to be completed.          Additional Information About Your Visit        [facility] Information     [facility] lets you send messages to your doctor, view your test results, renew your prescriptions, schedule appointments and more. To sign up, go to [email or web address] . Click on \"Log in\" on the left side of the screen, which will take you to the Welcome page. Then click on \"Sign up Now\" on the right side of the page.     You will be asked to enter the access code listed below, as well as some personal information. Please follow the directions to create your username and password.     Your access code is: [email or web address]  Expires: 2018 10:26 AM     Your access code will  in 90 days. If you need help or a new code, please call your [facility] clinic or [phone].        Care EveryWhere ID     This is your Care EveryWhere ID. This could be used by other organizations to access your [facility] medical records  [id number]        Your Vitals Were     Pulse Temperature Respirations Height Pulse Oximetry BMI (Body Mass Index)    80 97.8  F (36.6  C) (Temporal) 24 6' 0.5\" (1.842 m) " [Normal Oropharynx] : normal oropharynx 96% 37.92 kg/m2       Blood Pressure from Last 3 Encounters:   02/22/18 110/68   01/29/18 140/76   10/18/17 129/76    Weight from Last 3 Encounters:   02/22/18 283 lb 8 oz (128.6 kg)   01/29/18 284 lb (128.8 kg)   10/18/17 281 lb 9.6 oz (127.7 kg)                 Today's Medication Changes          These changes are accurate as of 2/22/18  1:44 PM.  If you have any questions, ask your nurse or doctor.               Start taking these medicines.        Dose/Directions    azithromycin 250 MG tablet   Commonly known as:  ZITHROMAX   Used for:  Cough, Lung crackles, Non-Hodgkin's lymphoma of multiple sites (H), Pneumonia of right lower lobe due to infectious organism (H)   Started by:  Felix Sevilla MD        Two tablets first day, then one tablet daily for four days.   Quantity:  6 tablet   Refills:  0            Where to get your medicines      These medications were sent to [id number] - [place], [place] - [address] [place] [address]  [address] [place] [id number]    Hours:  M-F 8:30-6:30; Sat 9-4; closed Sunday Phone:  [phone]     azithromycin 250 MG tablet                Primary Care Provider Office Phone # Fax #    Felix Sevilla [phone] [address] [place] [address]        Equal Access to Services     [facility]: Hadii jo ku hadasho Sojeanieali, waaxda luqadaha, qaybta kaalmada adeegyada, rajat mares . So [facility] [phone].    ATENCIÓN: Si habla español, tiene a kerr disposición servicios gratuitos de asistencia lingüística. Michael al [phone].    We comply with applicable federal civil rights laws and [place] laws. We do not discriminate on the basis of race, color, national origin, age, disability, sex, sexual orientation, or gender identity.            Thank you!     Thank you for choosing [facility]  for your care. Our goal is always to provide you with excellent care. Hearing back from our patients is one way  [Normal Appearance] : normal appearance [No Neck Mass] : no neck mass we can continue to improve our services. Please take a few minutes to complete the written survey that you may receive in the mail after your visit with us. Thank you!             Your Updated Medication List - Protect others around you: Learn how to safely use, store and throw away your medicines at www.disposemymeds.org.          This list is accurate as of 2/22/18  1:44 PM.  Always use your most recent med list.                   Brand Name Dispense Instructions for use Diagnosis    albuterol 108 (90 BASE) MCG/ACT Inhaler    PROAIR HFA/PROVENTIL HFA/VENTOLIN HFA    1 Inhaler    Inhale 2 puffs into the lungs every 6 hours as needed for shortness of breath / dyspnea or wheezing    Upper respiratory tract infection, unspecified type, Upper airway cough syndrome       aspirin 81 MG tablet      Take 81 mg by mouth daily        azithromycin 250 MG tablet    ZITHROMAX    6 tablet    Two tablets first day, then one tablet daily for four days.    Cough, Lung crackles, Non-Hodgkin's lymphoma of multiple sites (H), Pneumonia of right lower lobe due to infectious organism (H)       cetirizine 10 MG tablet    zyrTEC     Take 10 mg by mouth daily        CLARITIN PO           fluticasone 50 MCG/ACT spray    FLONASE    16 g    Spray 1-2 sprays into both nostrils daily    Upper respiratory tract infection, unspecified type, Upper airway cough syndrome       order for DME     1 Device    Equipment being ordered: knee sleeve with horseshoe, XL    Chronic pain of left knee       simvastatin 20 MG tablet    ZOCOR    90 tablet    TAKE 1 TABLET BY MOUTH ISSA Y AT BEDTIME    Hyperlipidemia with target LDL less than 130       TYLENOL PO      Take 1,000 mg by mouth           [Normal Rate/Rhythm] : normal rate/rhythm [Normal S1, S2] : normal s1, s2 [No Murmurs] : no murmurs [No Abnormalities] : no abnormalities [Benign] : benign [Normal Gait] : normal gait [No Clubbing] : no clubbing [No Cyanosis] : no cyanosis [No Edema] : no edema [FROM] : FROM [Normal Color/ Pigmentation] : normal color/ pigmentation [No Focal Deficits] : no focal deficits [Oriented x3] : oriented x3 [Normal Affect] : normal affect [TextBox_68] : MARIO GIANG

## 2024-09-26 DIAGNOSIS — C92.10 CML (CHRONIC MYELOCYTIC LEUKEMIA) (H): Primary | ICD-10-CM

## 2024-09-30 NOTE — PROGRESS NOTES
Oncology Follow Up Visit: October 1, 2024     Oncologist: Dr. Crespo  PCP: Oskar Conrad    Reason for Visit: Follow-up CML    Diagnosis: CML  74 year old  male who in 2012 presented with mediastinal, retroperitoneal and mesenteric lymph nodes. Was clinically asymptomatic with normal blood counts. CT-guided lymph node biopsy on 6/21/2012 was consistent with follicular lymphoma, grade 1-2. He was observed at that point.     In 2013, presented to the hospital with ileus and abdominal distention. EGD showed gastric antrum and duodenal ulcers which were biopsied and showed follicular lymphoma. CT scans showed bulky  lymphadenopathy with increasing size. 04/13 Bone marrow biopsy was positive with evidence of follicular lymphoma. He received bendamustine and Rituxan regimen April 2014- -September 2014.   In December 14 , he was started on maintenance Rituxan every 2 months for 2 years which he completed in October 2016.      In remission and on observation since then.     7/14/2020 - established care with Dr. Crespo. CT scan in July 2020 was stable apart from an indeterminate L2 vertebral body lesion. MRI of the lumbar spine showed nonspecific enhancing lesion in the right aspect of the L2 inferior endplate which remains indeterminate.     12/7/2020.  Repeat MRI of the lumbar spine is the same.     PET scan on 1/8/2021 which shows mild FDG uptake in the L2 sclerotic lesion.  It was measuring about 1.5 x 1.2 x 1.4 cm and was stable.  Of note previously the lymphoma was FDG avid.      In March 2024, we noticed that he had leukocytosis with left shift as well as monocytosis so we repeated blood work which showed further increase in leukocytosis as well as left shift including presence of myelocytes and promyelocytes.  Other testing showed that BCR/ABL is positive.      Bone marrow biopsy 4/29/2024: Chronic myeloid leukemia (CML) with the following features:  Hypercellular marrow (cellularity estimated at 90-95%) with  left-shifted and hyperplastic granulopoiesis, relatively diminished erythropoiesis, subset of atypical (small) megakaryocytes, and ~1% blasts  No morphologic or immunophenotypic evidence of B-cell lymphoma  Peripheral blood showing slight normochromic normocytic anemia; moderate leukocytosis with neutrophilia (left-shifted), monocytosis, and basophilia; rare circulating blast (<1%)     FISH testing on bone marrow 4/29/2024: ABNORMAL  - BCR::ABL1 fusion (91%)     Bone Marrow Chromosome Analysis 4/29/2024  - 46,XY,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     Peripheral blood FISH 4/29/2024: ABNORMAL  - BCR::ABL1 fusion (86%)     3/28/2024  BCR::ABL1/ABL1 Major ( p210): 57%     He was diagnosed with Chronic phase CML. Positive for t(9;22)  Intermediate ELTS Score     5/17/2024.  Started imatinib.    Interval History:  Pt is seen in-person to review CML. No new health concerns since last visit. Mentions he forgets to take a dose once a week, he is trying hard to find ways to remember because he understands the importance of treatment. Had significant diarrhea during onset of treatment, this has resolved and BM are more normal and consistent, rarely using imodium. Appetite is good and eating well. Ongoing fatigue but also admits he is not very active during the day. No additional questions or concerns.     Patient denies any of the following except if noted above: fevers, chills, difficulty with energy, vision or hearing changes, chest pain, dyspnea, abdominal pain, nausea, vomiting, diarrhea, constipation, urinary concerns, headaches, numbness, tingling, issues with sleep or mood. He also denies lumps, bumps, rashes or skin lesions, bleeding or bruising issues.    Physical Exam:  /78 (BP Location: Right arm, Patient Position: Sitting, Cuff Size: Adult Large)   Pulse 79    "Temp 97.5  F (36.4  C) (Temporal)   Ht 1.854 m (6' 1\")   Wt 130.8 kg (288 lb 5 oz)   SpO2 96%   BMI 38.04 kg/m     BP Readings from Last 6 Encounters:   10/01/24 130/78   09/19/24 117/74   06/05/24 125/75   05/08/24 128/78   04/29/24 128/67   04/29/24 130/74     Wt Readings from Last 6 Encounters:   10/01/24 130.8 kg (288 lb 5 oz)   09/19/24 129.2 kg (284 lb 14.4 oz)   07/16/24 127 kg (280 lb)   06/05/24 131.6 kg (290 lb 3.2 oz)   05/08/24 131.2 kg (289 lb 3.2 oz)   04/29/24 129.7 kg (286 lb)      Constitutional: No acute distress, pleasant, appropriately groomed.   ENT: PERRLA, sclera without erythema. Appropriate dentition.  Neck: Trachea midline, no adenopathy.   Resp: CTA, adequate depth and rate of respirations.   Cardiac: S1/S2, RRR, no murmurs.   Abdomen: BS active, abdomen soft and non-tender. No masses or organomegaly.   MS:  5/5 muscle strength, adequate ROM.   Skin: No rashes, lesions, or wounds on exposed skin.  Neuro: A/O x 4, sensation intact.   Lymph: No palpable anterior/posterior cervical, axillary, or supraclavicular nodes.   Psych: Appropriate mentation and affect.    Laboratory Results:    Latest Reference Range & Units 09/19/24 11:03   Sodium 135 - 145 mmol/L 138   Potassium 3.4 - 5.3 mmol/L 4.3   Chloride 98 - 107 mmol/L 104   Carbon Dioxide (CO2) 22 - 29 mmol/L 23   Urea Nitrogen 8.0 - 23.0 mg/dL 13.3   Creatinine 0.67 - 1.17 mg/dL 1.16   GFR Estimate >60 mL/min/1.73m2 67   Calcium 8.8 - 10.4 mg/dL 8.4 (L)   Anion Gap 7 - 15 mmol/L 11   Magnesium 1.7 - 2.3 mg/dL 2.2   Phosphorus 2.5 - 4.5 mg/dL 2.8   Albumin 3.5 - 5.2 g/dL 4.1   Protein Total 6.4 - 8.3 g/dL 6.0 (L)   Alkaline Phosphatase 40 - 150 U/L 101   ALT 0 - 70 U/L 16   AST 0 - 45 U/L 22   Bilirubin Total <=1.2 mg/dL 0.9   Glucose 70 - 99 mg/dL 119 (H)   Estimated Average Glucose <117 mg/dL 123 (H)   Hemoglobin A1C <5.7 % 5.9 (H)   Lactate Dehydrogenase 0 - 250 U/L 171   WBC 4.0 - 11.0 10e3/uL 5.4   Hemoglobin 13.3 - 17.7 g/dL 12.0 " (L)   Hematocrit 40.0 - 53.0 % 34.5 (L)   Platelet Count 150 - 450 10e3/uL 133 (L)   RBC Count 4.40 - 5.90 10e6/uL 3.70 (L)   MCV 78 - 100 fL 93   MCH 26.5 - 33.0 pg 32.4   MCHC 31.5 - 36.5 g/dL 34.8   RDW 10.0 - 15.0 % 13.8   % Neutrophils % 68   % Lymphocytes % 18   % Monocytes % 11   % Eosinophils % 2   % Basophils % 1   Absolute Basophils 0.0 - 0.2 10e3/uL 0.1   Absolute Eosinophils 0.0 - 0.7 10e3/uL 0.1   Absolute Immature Granulocytes <=0.4 10e3/uL 0.0   Absolute Lymphocytes 0.8 - 5.3 10e3/uL 1.0   Absolute Monocytes 0.0 - 1.3 10e3/uL 0.6   % Immature Granulocytes % 0   Absolute Neutrophils 1.6 - 8.3 10e3/uL 3.7   Absolute NRBCs 10e3/uL 0.0   NRBCs per 100 WBC <1 /100 0   (L): Data is abnormally low  (H): Data is abnormally high  I reviewed the above labs today.      Assessment and Plan:   CML   - On current treatment with imatinib and tolerating well without significant side effects  - Most recent labs reviewed and discussed   - No change to treatment plan warranted at this time, continue imatinib at current dosing  - Continue monthly labs and follow-up with Dr. Crespo in 3 months    Hx of stage IV follicular lymphoma   - s/p bendamustine/Rituxan (04-09/2014) followed by maintenance rituximab - completed 10/2016  - No B-symptoms   - Repeat labs and CT in 3/2025     DM II  - Most recent Hgb A1c of 5.9%  - Currently on metformin with previous use of ozempic  - PCP managing       Dalila Davis - APRN, CNP

## 2024-10-01 ENCOUNTER — ONCOLOGY VISIT (OUTPATIENT)
Dept: ONCOLOGY | Facility: CLINIC | Age: 74
End: 2024-10-01
Attending: INTERNAL MEDICINE
Payer: MEDICARE

## 2024-10-01 ENCOUNTER — TELEPHONE (OUTPATIENT)
Dept: ONCOLOGY | Facility: CLINIC | Age: 74
End: 2024-10-01

## 2024-10-01 VITALS
OXYGEN SATURATION: 96 % | WEIGHT: 288.31 LBS | TEMPERATURE: 97.5 F | SYSTOLIC BLOOD PRESSURE: 130 MMHG | HEIGHT: 73 IN | BODY MASS INDEX: 38.21 KG/M2 | DIASTOLIC BLOOD PRESSURE: 78 MMHG | HEART RATE: 79 BPM

## 2024-10-01 DIAGNOSIS — E11.69 TYPE 2 DIABETES MELLITUS WITH OTHER SPECIFIED COMPLICATION, WITHOUT LONG-TERM CURRENT USE OF INSULIN (H): ICD-10-CM

## 2024-10-01 DIAGNOSIS — C92.10 CML (CHRONIC MYELOCYTIC LEUKEMIA) (H): Primary | ICD-10-CM

## 2024-10-01 DIAGNOSIS — C85.98 NON-HODGKIN'S LYMPHOMA OF MULTIPLE SITES (H): ICD-10-CM

## 2024-10-01 PROCEDURE — 99214 OFFICE O/P EST MOD 30 MIN: CPT

## 2024-10-01 RX ORDER — LORATADINE 10 MG/1
10 TABLET, ORALLY DISINTEGRATING ORAL DAILY
COMMUNITY

## 2024-10-01 ASSESSMENT — PAIN SCALES - GENERAL: PAINLEVEL: NO PAIN (0)

## 2024-10-01 NOTE — LETTER
10/1/2024      Deandre Merchant  [address] [place] [address]      Dear Colleague,    Thank you for referring your patient, Deandre Merchant, to the [facility]. Please see a copy of my visit note below.    Oncology Follow Up Visit: October 1, 2024     Oncologist: Dr. Crespo  PCP: Oskar Conrad    Reason for Visit: Follow-up CML    Diagnosis: CML  74 year old  male who in 2012 presented with mediastinal, retroperitoneal and mesenteric lymph nodes. Was clinically asymptomatic with normal blood counts. CT-guided lymph node biopsy on 6/21/2012 was consistent with follicular lymphoma, grade 1-2. He was observed at that point.     In 2013, presented to the hospital with ileus and abdominal distention. EGD showed gastric antrum and duodenal ulcers which were biopsied and showed follicular lymphoma. CT scans showed bulky  lymphadenopathy with increasing size. 04/13 Bone marrow biopsy was positive with evidence of follicular lymphoma. He received bendamustine and Rituxan regimen April 2014- -September 2014.   In December 14 , he was started on maintenance Rituxan every 2 months for 2 years which he completed in October 2016.      In remission and on observation since then.     7/14/2020 - established care with Dr. Crespo. CT scan in July 2020 was stable apart from an indeterminate L2 vertebral body lesion. MRI of the lumbar spine showed nonspecific enhancing lesion in the right aspect of the L2 inferior endplate which remains indeterminate.     12/7/2020.  Repeat MRI of the lumbar spine is the same.     PET scan on 1/8/2021 which shows mild FDG uptake in the L2 sclerotic lesion.  It was measuring about 1.5 x 1.2 x 1.4 cm and was stable.  Of note previously the lymphoma was FDG avid.      In March 2024, we noticed that he had leukocytosis with left shift as well as monocytosis so we repeated blood work which showed further increase in leukocytosis as well as left shift including  presence of myelocytes and promyelocytes.  Other testing showed that BCR/ABL is positive.      Bone marrow biopsy 4/29/2024: Chronic myeloid leukemia (CML) with the following features:  Hypercellular marrow (cellularity estimated at 90-95%) with left-shifted and hyperplastic granulopoiesis, relatively diminished erythropoiesis, subset of atypical (small) megakaryocytes, and ~1% blasts  No morphologic or immunophenotypic evidence of B-cell lymphoma  Peripheral blood showing slight normochromic normocytic anemia; moderate leukocytosis with neutrophilia (left-shifted), monocytosis, and basophilia; rare circulating blast (<1%)     FISH testing on bone marrow 4/29/2024: ABNORMAL  - BCR::ABL1 fusion (91%)     Bone Marrow Chromosome Analysis 4/29/2024  - 46,XY,t(9;22)(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     Peripheral blood FISH 4/29/2024: ABNORMAL  - BCR::ABL1 fusion (86%)     3/28/2024  BCR::ABL1/ABL1 Major ( p210): 57%     He was diagnosed with Chronic phase CML. Positive for t(9;22)  Intermediate ELTS Score     5/17/2024.  Started imatinib.    Interval History:  Pt is seen in-person to review CML. No new health concerns since last visit. Mentions he forgets to take a dose once a week, he is trying hard to find ways to remember because he understands the importance of treatment. Had significant diarrhea during onset of treatment, this has resolved and BM are more normal and consistent, rarely using imodium. Appetite is good and eating well. Ongoing fatigue but also admits he is not very active during the day. No additional questions or concerns.     Patient denies any of the following except if noted above: fevers, chills, difficulty with energy, vision or hearing changes, chest pain, dyspnea, abdominal pain, nausea, vomiting, diarrhea, constipation, urinary concerns, headaches,  "numbness, tingling, issues with sleep or mood. He also denies lumps, bumps, rashes or skin lesions, bleeding or bruising issues.    Physical Exam:  /78 (BP Location: Right arm, Patient Position: Sitting, Cuff Size: Adult Large)   Pulse 79   Temp 97.5  F (36.4  C) (Temporal)   Ht 1.854 m (6' 1\")   Wt 130.8 kg (288 lb 5 oz)   SpO2 96%   BMI 38.04 kg/m     BP Readings from Last 6 Encounters:   10/01/24 130/78   09/19/24 117/74   06/05/24 125/75   05/08/24 128/78   04/29/24 128/67   04/29/24 130/74     Wt Readings from Last 6 Encounters:   10/01/24 130.8 kg (288 lb 5 oz)   09/19/24 129.2 kg (284 lb 14.4 oz)   07/16/24 127 kg (280 lb)   06/05/24 131.6 kg (290 lb 3.2 oz)   05/08/24 131.2 kg (289 lb 3.2 oz)   04/29/24 129.7 kg (286 lb)      Constitutional: No acute distress, pleasant, appropriately groomed.   ENT: PERRLA, sclera without erythema. Appropriate dentition.  Neck: Trachea midline, no adenopathy.   Resp: CTA, adequate depth and rate of respirations.   Cardiac: S1/S2, RRR, no murmurs.   Abdomen: BS active, abdomen soft and non-tender. No masses or organomegaly.   MS:  5/5 muscle strength, adequate ROM.   Skin: No rashes, lesions, or wounds on exposed skin.  Neuro: A/O x 4, sensation intact.   Lymph: No palpable anterior/posterior cervical, axillary, or supraclavicular nodes.   Psych: Appropriate mentation and affect.    Laboratory Results:    Latest Reference Range & Units 09/19/24 11:03   Sodium 135 - 145 mmol/L 138   Potassium 3.4 - 5.3 mmol/L 4.3   Chloride 98 - 107 mmol/L 104   Carbon Dioxide (CO2) 22 - 29 mmol/L 23   Urea Nitrogen 8.0 - 23.0 mg/dL 13.3   Creatinine 0.67 - 1.17 mg/dL 1.16   GFR Estimate >60 mL/min/1.73m2 67   Calcium 8.8 - 10.4 mg/dL 8.4 (L)   Anion Gap 7 - 15 mmol/L 11   Magnesium 1.7 - 2.3 mg/dL 2.2   Phosphorus 2.5 - 4.5 mg/dL 2.8   Albumin 3.5 - 5.2 g/dL 4.1   Protein Total 6.4 - 8.3 g/dL 6.0 (L)   Alkaline Phosphatase 40 - 150 U/L 101   ALT 0 - 70 U/L 16   AST 0 - 45 U/L 22 "   Bilirubin Total <=1.2 mg/dL 0.9   Glucose 70 - 99 mg/dL 119 (H)   Estimated Average Glucose <117 mg/dL 123 (H)   Hemoglobin A1C <5.7 % 5.9 (H)   Lactate Dehydrogenase 0 - 250 U/L 171   WBC 4.0 - 11.0 10e3/uL 5.4   Hemoglobin 13.3 - 17.7 g/dL 12.0 (L)   Hematocrit 40.0 - 53.0 % 34.5 (L)   Platelet Count 150 - 450 10e3/uL 133 (L)   RBC Count 4.40 - 5.90 10e6/uL 3.70 (L)   MCV 78 - 100 fL 93   MCH 26.5 - 33.0 pg 32.4   MCHC 31.5 - 36.5 g/dL 34.8   RDW 10.0 - 15.0 % 13.8   % Neutrophils % 68   % Lymphocytes % 18   % Monocytes % 11   % Eosinophils % 2   % Basophils % 1   Absolute Basophils 0.0 - 0.2 10e3/uL 0.1   Absolute Eosinophils 0.0 - 0.7 10e3/uL 0.1   Absolute Immature Granulocytes <=0.4 10e3/uL 0.0   Absolute Lymphocytes 0.8 - 5.3 10e3/uL 1.0   Absolute Monocytes 0.0 - 1.3 10e3/uL 0.6   % Immature Granulocytes % 0   Absolute Neutrophils 1.6 - 8.3 10e3/uL 3.7   Absolute NRBCs 10e3/uL 0.0   NRBCs per 100 WBC <1 /100 0   (L): Data is abnormally low  (H): Data is abnormally high  I reviewed the above labs today.      Assessment and Plan:   CML   - On current treatment with imatinib and tolerating well without significant side effects  - Most recent labs reviewed and discussed   - No change to treatment plan warranted at this time, continue imatinib at current dosing  - Continue monthly labs and follow-up with Dr. Crespo in 3 months    Hx of stage IV follicular lymphoma   - s/p bendamustine/Rituxan (04-09/2014) followed by maintenance rituximab - completed 10/2016  - No B-symptoms   - Repeat labs and CT in 3/2025     DM II  - Most recent Hgb A1c of 5.9%  - Currently on metformin with previous use of ozempic  - PCP managing       Dalila Davis - CALEB, CNP      Again, thank you for allowing me to participate in the care of your patient.        Sincerely,        CALEB Patel CNP

## 2024-10-01 NOTE — TELEPHONE ENCOUNTER
Per order this patient needs monthly labs scheduled. This patient will be scheduling these labs on his own. -cap-10/01/2024

## 2024-10-02 DIAGNOSIS — C92.10 CML (CHRONIC MYELOCYTIC LEUKEMIA) (H): Primary | ICD-10-CM

## 2024-10-02 RX ORDER — IMATINIB MESYLATE 400 MG/1
400 TABLET, FILM COATED ORAL DAILY
Qty: 30 TABLET | Refills: 0 | Status: SHIPPED | OUTPATIENT
Start: 2024-10-14 | End: 2024-11-13

## 2024-10-03 ENCOUNTER — TELEPHONE (OUTPATIENT)
Dept: ONCOLOGY | Facility: CLINIC | Age: 74
End: 2024-10-03
Payer: MEDICARE

## 2024-10-03 NOTE — PROGRESS NOTES
CLINICAL NUTRITION SERVICES     Reason for Contact: Questions from Oncology Distress Screening  1. How concerned are you about your ability to eat? :  0  2. How concerned are you about unintended weight loss or your current weight? : 6  9. If you want to be contacted by one of our professionals, I can send a message to them right now.: Oncology Dietitian     Action: RD called patient indicating reason for phone call. Left a VM with a return call back number.     Follow up: Wait for a return phone call.    Yvonne Arias RD, LD  [place] office [phone]

## 2024-10-16 ENCOUNTER — DOCUMENTATION ONLY (OUTPATIENT)
Dept: PHARMACY | Facility: CLINIC | Age: 74
End: 2024-10-16

## 2024-10-16 ENCOUNTER — LAB (OUTPATIENT)
Dept: LAB | Facility: CLINIC | Age: 74
End: 2024-10-16
Payer: MEDICARE

## 2024-10-16 DIAGNOSIS — C92.10 CML (CHRONIC MYELOCYTIC LEUKEMIA) (H): ICD-10-CM

## 2024-10-16 LAB
ALBUMIN SERPL BCG-MCNC: 4.1 G/DL (ref 3.5–5.2)
ALP SERPL-CCNC: 101 U/L (ref 40–150)
ALT SERPL W P-5'-P-CCNC: 18 U/L (ref 0–70)
ANION GAP SERPL CALCULATED.3IONS-SCNC: 12 MMOL/L (ref 7–15)
AST SERPL W P-5'-P-CCNC: 22 U/L (ref 0–45)
BASOPHILS # BLD AUTO: 0.1 10E3/UL (ref 0–0.2)
BASOPHILS NFR BLD AUTO: 1 %
BILIRUB SERPL-MCNC: 0.7 MG/DL
BUN SERPL-MCNC: 18 MG/DL (ref 8–23)
CALCIUM SERPL-MCNC: 8.2 MG/DL (ref 8.8–10.4)
CHLORIDE SERPL-SCNC: 103 MMOL/L (ref 98–107)
CREAT SERPL-MCNC: 1.15 MG/DL (ref 0.67–1.17)
EGFRCR SERPLBLD CKD-EPI 2021: 67 ML/MIN/1.73M2
EOSINOPHIL # BLD AUTO: 0.1 10E3/UL (ref 0–0.7)
EOSINOPHIL NFR BLD AUTO: 2 %
ERYTHROCYTE [DISTWIDTH] IN BLOOD BY AUTOMATED COUNT: 13.8 % (ref 10–15)
GLUCOSE SERPL-MCNC: 144 MG/DL (ref 70–99)
HCO3 SERPL-SCNC: 22 MMOL/L (ref 22–29)
HCT VFR BLD AUTO: 34 % (ref 40–53)
HGB BLD-MCNC: 12 G/DL (ref 13.3–17.7)
IMM GRANULOCYTES # BLD: 0 10E3/UL
IMM GRANULOCYTES NFR BLD: 1 %
LYMPHOCYTES # BLD AUTO: 0.9 10E3/UL (ref 0.8–5.3)
LYMPHOCYTES NFR BLD AUTO: 19 %
MCH RBC QN AUTO: 33.1 PG (ref 26.5–33)
MCHC RBC AUTO-ENTMCNC: 35.3 G/DL (ref 31.5–36.5)
MCV RBC AUTO: 94 FL (ref 78–100)
MONOCYTES # BLD AUTO: 0.5 10E3/UL (ref 0–1.3)
MONOCYTES NFR BLD AUTO: 10 %
NEUTROPHILS # BLD AUTO: 3.3 10E3/UL (ref 1.6–8.3)
NEUTROPHILS NFR BLD AUTO: 67 %
NRBC # BLD AUTO: 0 10E3/UL
NRBC BLD AUTO-RTO: 0 /100
PLATELET # BLD AUTO: 129 10E3/UL (ref 150–450)
POTASSIUM SERPL-SCNC: 4.5 MMOL/L (ref 3.4–5.3)
PROT SERPL-MCNC: 6 G/DL (ref 6.4–8.3)
RBC # BLD AUTO: 3.62 10E6/UL (ref 4.4–5.9)
SODIUM SERPL-SCNC: 137 MMOL/L (ref 135–145)
WBC # BLD AUTO: 4.9 10E3/UL (ref 4–11)

## 2024-10-16 PROCEDURE — 85025 COMPLETE CBC W/AUTO DIFF WBC: CPT

## 2024-10-16 PROCEDURE — 36415 COLL VENOUS BLD VENIPUNCTURE: CPT

## 2024-10-16 PROCEDURE — 80053 COMPREHEN METABOLIC PANEL: CPT

## 2024-10-16 NOTE — PROGRESS NOTES
Oral Chemotherapy Monitoring Program  Lab Follow Up     Patient currently on imatinib (Gleevec).  Reviewed lab results from today.     Lab Results   Component Value Date    WBC 4.9 10/16/2024    WBC 5.9 01/02/2021     Lab Results   Component Value Date    RBC 3.62 10/16/2024    RBC 4.61 01/02/2021     Lab Results   Component Value Date    HGB 12.0 10/16/2024    HGB 14.4 01/02/2021     Lab Results   Component Value Date    HCT 34.0 10/16/2024    HCT 41.9 01/02/2021     Lab Results   Component Value Date    MCV 94 10/16/2024    MCV 91 01/02/2021     Lab Results   Component Value Date    MCH 33.1 10/16/2024    MCH 31.2 01/02/2021     Lab Results   Component Value Date    MCHC 35.3 10/16/2024    MCHC 34.4 01/02/2021     Lab Results   Component Value Date    RDW 13.8 10/16/2024    RDW 12.7 01/02/2021     Lab Results   Component Value Date     10/16/2024     01/02/2021       Last Comprehensive Metabolic Panel:  Sodium   Date Value Ref Range Status   10/16/2024 137 135 - 145 mmol/L Final   01/02/2021 140 133 - 144 mmol/L Final     Potassium   Date Value Ref Range Status   10/16/2024 4.5 3.4 - 5.3 mmol/L Final   07/08/2022 4.3 3.4 - 5.3 mmol/L Final   01/02/2021 4.3 3.4 - 5.3 mmol/L Final     Chloride   Date Value Ref Range Status   10/16/2024 103 98 - 107 mmol/L Final   07/08/2022 102 94 - 109 mmol/L Final   01/02/2021 109 94 - 109 mmol/L Final     Carbon Dioxide   Date Value Ref Range Status   01/02/2021 26 20 - 32 mmol/L Final     Carbon Dioxide (CO2)   Date Value Ref Range Status   10/16/2024 22 22 - 29 mmol/L Final   07/08/2022 25 20 - 32 mmol/L Final     Anion Gap   Date Value Ref Range Status   10/16/2024 12 7 - 15 mmol/L Final   07/08/2022 7 3 - 14 mmol/L Final   01/02/2021 5 3 - 14 mmol/L Final     Glucose   Date Value Ref Range Status   10/16/2024 144 (H) 70 - 99 mg/dL Final   07/08/2022 210 (H) 70 - 99 mg/dL Final   01/02/2021 127 (H) 70 - 99 mg/dL Final     GLUCOSE BY METER POCT   Date Value Ref  Range Status   10/03/2022 169 (H) 70 - 99 mg/dL Final     Urea Nitrogen   Date Value Ref Range Status   10/16/2024 18.0 8.0 - 23.0 mg/dL Final   07/08/2022 19 7 - 30 mg/dL Final   01/02/2021 20 7 - 30 mg/dL Final     Creatinine   Date Value Ref Range Status   10/16/2024 1.15 0.67 - 1.17 mg/dL Final   01/02/2021 0.96 0.66 - 1.25 mg/dL Final     GFR Estimate   Date Value Ref Range Status   10/16/2024 67 >60 mL/min/1.73m2 Final     Comment:     eGFR calculated using 2021 CKD-EPI equation.   01/02/2021 80 >60 mL/min/[1.73_m2] Final     Comment:     Non  GFR Calc  Starting 12/18/2018, serum creatinine based estimated GFR (eGFR) will be   calculated using the Chronic Kidney Disease Epidemiology Collaboration   (CKD-EPI) equation.       GFR, ESTIMATED POCT   Date Value Ref Range Status   03/02/2022 >60 >60 mL/min/1.73m2 Final     Calcium   Date Value Ref Range Status   10/16/2024 8.2 (L) 8.8 - 10.4 mg/dL Final     Comment:     Reference intervals for this test were updated on 7/16/2024 to reflect our healthy population more accurately. There may be differences in the flagging of prior results with similar values performed with this method. Those prior results can be interpreted in the context of the updated reference intervals.   01/02/2021 8.7 8.5 - 10.1 mg/dL Final     Bilirubin Total   Date Value Ref Range Status   10/16/2024 0.7 <=1.2 mg/dL Final   01/02/2021 0.8 0.2 - 1.3 mg/dL Final     Alkaline Phosphatase   Date Value Ref Range Status   10/16/2024 101 40 - 150 U/L Final   01/02/2021 107 40 - 150 U/L Final     ALT   Date Value Ref Range Status   10/16/2024 18 0 - 70 U/L Final   01/02/2021 34 0 - 70 U/L Final     AST   Date Value Ref Range Status   10/16/2024 22 0 - 45 U/L Final   01/02/2021 28 0 - 45 U/L Final         Assessment & Plan:  No significant abnormalities noted, okay to continue imatinib as prescribed.     Follow-Up:  11/13 monthly labs    Janes Reveles, [place], [facility] and Oral Chemotherapy  [phone]

## 2024-10-31 DIAGNOSIS — C92.10 CML (CHRONIC MYELOCYTIC LEUKEMIA) (H): Primary | ICD-10-CM

## 2024-11-04 DIAGNOSIS — C92.10 CML (CHRONIC MYELOCYTIC LEUKEMIA) (H): Primary | ICD-10-CM

## 2024-11-04 DIAGNOSIS — E11.9 TYPE 2 DIABETES MELLITUS WITHOUT COMPLICATION, WITHOUT LONG-TERM CURRENT USE OF INSULIN (H): ICD-10-CM

## 2024-11-04 RX ORDER — METFORMIN HYDROCHLORIDE 500 MG/1
2000 TABLET, EXTENDED RELEASE ORAL
Qty: 360 TABLET | Refills: 0 | Status: SHIPPED | OUTPATIENT
Start: 2024-11-04

## 2024-11-04 RX ORDER — IMATINIB MESYLATE 400 MG/1
400 TABLET, FILM COATED ORAL DAILY
Qty: 30 TABLET | Refills: 0 | Status: SHIPPED | OUTPATIENT
Start: 2024-11-13 | End: 2024-12-13

## 2024-11-05 DIAGNOSIS — C92.10 CML (CHRONIC MYELOCYTIC LEUKEMIA) (H): ICD-10-CM

## 2024-11-06 RX ORDER — IMATINIB MESYLATE 400 MG/1
400 TABLET, FILM COATED ORAL DAILY
Refills: 0 | OUTPATIENT
Start: 2024-11-06 | End: 2024-12-06

## 2024-11-13 ENCOUNTER — LAB (OUTPATIENT)
Dept: LAB | Facility: CLINIC | Age: 74
End: 2024-11-13
Payer: MEDICARE

## 2024-11-13 DIAGNOSIS — C92.10 CML (CHRONIC MYELOCYTIC LEUKEMIA) (H): ICD-10-CM

## 2024-11-13 LAB
ALBUMIN SERPL BCG-MCNC: 4 G/DL (ref 3.5–5.2)
ALP SERPL-CCNC: 91 U/L (ref 40–150)
ALT SERPL W P-5'-P-CCNC: 16 U/L (ref 0–70)
ANION GAP SERPL CALCULATED.3IONS-SCNC: 16 MMOL/L (ref 7–15)
AST SERPL W P-5'-P-CCNC: 25 U/L (ref 0–45)
BASOPHILS # BLD AUTO: 0 10E3/UL (ref 0–0.2)
BASOPHILS NFR BLD AUTO: 1 %
BILIRUB SERPL-MCNC: 0.7 MG/DL
BUN SERPL-MCNC: 15.2 MG/DL (ref 8–23)
CALCIUM SERPL-MCNC: 8.5 MG/DL (ref 8.8–10.4)
CHLORIDE SERPL-SCNC: 101 MMOL/L (ref 98–107)
CREAT SERPL-MCNC: 1.2 MG/DL (ref 0.67–1.17)
EGFRCR SERPLBLD CKD-EPI 2021: 64 ML/MIN/1.73M2
EOSINOPHIL # BLD AUTO: 0.1 10E3/UL (ref 0–0.7)
EOSINOPHIL NFR BLD AUTO: 2 %
ERYTHROCYTE [DISTWIDTH] IN BLOOD BY AUTOMATED COUNT: 13.8 % (ref 10–15)
GLUCOSE SERPL-MCNC: 145 MG/DL (ref 70–99)
HCO3 SERPL-SCNC: 21 MMOL/L (ref 22–29)
HCT VFR BLD AUTO: 33.4 % (ref 40–53)
HGB BLD-MCNC: 11.7 G/DL (ref 13.3–17.7)
IMM GRANULOCYTES # BLD: 0 10E3/UL
IMM GRANULOCYTES NFR BLD: 1 %
LDH SERPL L TO P-CCNC: 203 U/L (ref 0–250)
LYMPHOCYTES # BLD AUTO: 1 10E3/UL (ref 0.8–5.3)
LYMPHOCYTES NFR BLD AUTO: 16 %
MAGNESIUM SERPL-MCNC: 2.1 MG/DL (ref 1.7–2.3)
MCH RBC QN AUTO: 33.1 PG (ref 26.5–33)
MCHC RBC AUTO-ENTMCNC: 35 G/DL (ref 31.5–36.5)
MCV RBC AUTO: 94 FL (ref 78–100)
MONOCYTES # BLD AUTO: 0.5 10E3/UL (ref 0–1.3)
MONOCYTES NFR BLD AUTO: 7 %
NEUTROPHILS # BLD AUTO: 4.5 10E3/UL (ref 1.6–8.3)
NEUTROPHILS NFR BLD AUTO: 73 %
NRBC # BLD AUTO: 0 10E3/UL
NRBC BLD AUTO-RTO: 0 /100
PHOSPHATE SERPL-MCNC: 3.2 MG/DL (ref 2.5–4.5)
PLATELET # BLD AUTO: 153 10E3/UL (ref 150–450)
POTASSIUM SERPL-SCNC: 4 MMOL/L (ref 3.4–5.3)
PROT SERPL-MCNC: 5.9 G/DL (ref 6.4–8.3)
RBC # BLD AUTO: 3.54 10E6/UL (ref 4.4–5.9)
SODIUM SERPL-SCNC: 138 MMOL/L (ref 135–145)
WBC # BLD AUTO: 6.1 10E3/UL (ref 4–11)

## 2024-11-13 PROCEDURE — 85025 COMPLETE CBC W/AUTO DIFF WBC: CPT

## 2024-11-13 PROCEDURE — 36415 COLL VENOUS BLD VENIPUNCTURE: CPT

## 2024-12-11 ENCOUNTER — LAB (OUTPATIENT)
Dept: LAB | Facility: CLINIC | Age: 74
End: 2024-12-11
Payer: MEDICARE

## 2024-12-11 DIAGNOSIS — C92.10 CML (CHRONIC MYELOCYTIC LEUKEMIA) (H): ICD-10-CM

## 2024-12-11 DIAGNOSIS — E11.69 TYPE 2 DIABETES MELLITUS WITH OTHER SPECIFIED COMPLICATION, WITHOUT LONG-TERM CURRENT USE OF INSULIN (H): Primary | ICD-10-CM

## 2024-12-11 LAB
ALBUMIN SERPL BCG-MCNC: 4.2 G/DL (ref 3.5–5.2)
ALP SERPL-CCNC: 102 U/L (ref 40–150)
ALT SERPL W P-5'-P-CCNC: 18 U/L (ref 0–70)
ANION GAP SERPL CALCULATED.3IONS-SCNC: 14 MMOL/L (ref 7–15)
AST SERPL W P-5'-P-CCNC: 25 U/L (ref 0–45)
BASOPHILS # BLD AUTO: 0 10E3/UL (ref 0–0.2)
BASOPHILS NFR BLD AUTO: 1 %
BILIRUB SERPL-MCNC: 0.9 MG/DL
BUN SERPL-MCNC: 12.9 MG/DL (ref 8–23)
CALCIUM SERPL-MCNC: 8.7 MG/DL (ref 8.8–10.4)
CHLORIDE SERPL-SCNC: 103 MMOL/L (ref 98–107)
CREAT SERPL-MCNC: 1.14 MG/DL (ref 0.67–1.17)
EGFRCR SERPLBLD CKD-EPI 2021: 68 ML/MIN/1.73M2
EOSINOPHIL # BLD AUTO: 0.1 10E3/UL (ref 0–0.7)
EOSINOPHIL NFR BLD AUTO: 2 %
ERYTHROCYTE [DISTWIDTH] IN BLOOD BY AUTOMATED COUNT: 13.4 % (ref 10–15)
EST. AVERAGE GLUCOSE BLD GHB EST-MCNC: 128 MG/DL
GLUCOSE SERPL-MCNC: 143 MG/DL (ref 70–99)
HBA1C MFR BLD: 6.1 %
HCO3 SERPL-SCNC: 21 MMOL/L (ref 22–29)
HCT VFR BLD AUTO: 35.2 % (ref 40–53)
HGB BLD-MCNC: 12.2 G/DL (ref 13.3–17.7)
IMM GRANULOCYTES # BLD: 0 10E3/UL
IMM GRANULOCYTES NFR BLD: 0 %
LDH SERPL L TO P-CCNC: 194 U/L (ref 0–250)
LYMPHOCYTES # BLD AUTO: 1.1 10E3/UL (ref 0.8–5.3)
LYMPHOCYTES NFR BLD AUTO: 20 %
MAGNESIUM SERPL-MCNC: 2.4 MG/DL (ref 1.7–2.3)
MCH RBC QN AUTO: 33.1 PG (ref 26.5–33)
MCHC RBC AUTO-ENTMCNC: 34.7 G/DL (ref 31.5–36.5)
MCV RBC AUTO: 95 FL (ref 78–100)
MONOCYTES # BLD AUTO: 0.5 10E3/UL (ref 0–1.3)
MONOCYTES NFR BLD AUTO: 10 %
NEUTROPHILS # BLD AUTO: 3.5 10E3/UL (ref 1.6–8.3)
NEUTROPHILS NFR BLD AUTO: 67 %
NRBC # BLD AUTO: 0 10E3/UL
NRBC BLD AUTO-RTO: 0 /100
PHOSPHATE SERPL-MCNC: 2.8 MG/DL (ref 2.5–4.5)
PLATELET # BLD AUTO: 149 10E3/UL (ref 150–450)
POTASSIUM SERPL-SCNC: 4.5 MMOL/L (ref 3.4–5.3)
PROT SERPL-MCNC: 6 G/DL (ref 6.4–8.3)
RBC # BLD AUTO: 3.69 10E6/UL (ref 4.4–5.9)
SODIUM SERPL-SCNC: 138 MMOL/L (ref 135–145)
WBC # BLD AUTO: 5.2 10E3/UL (ref 4–11)

## 2024-12-11 PROCEDURE — 36415 COLL VENOUS BLD VENIPUNCTURE: CPT

## 2024-12-11 PROCEDURE — 83036 HEMOGLOBIN GLYCOSYLATED A1C: CPT

## 2024-12-12 DIAGNOSIS — C92.10 CML (CHRONIC MYELOCYTIC LEUKEMIA) (H): Primary | ICD-10-CM

## 2024-12-12 RX ORDER — IMATINIB MESYLATE 400 MG/1
400 TABLET, FILM COATED ORAL DAILY
Qty: 30 TABLET | Refills: 0 | OUTPATIENT
Start: 2024-12-13 | End: 2025-01-12

## 2025-01-05 DIAGNOSIS — C92.10 CML (CHRONIC MYELOCYTIC LEUKEMIA) (H): Primary | ICD-10-CM

## 2025-01-06 DIAGNOSIS — C92.10 CML (CHRONIC MYELOCYTIC LEUKEMIA) (H): Primary | ICD-10-CM

## 2025-01-15 ENCOUNTER — ONCOLOGY VISIT (OUTPATIENT)
Dept: ONCOLOGY | Facility: CLINIC | Age: 75
End: 2025-01-15
Payer: MEDICARE

## 2025-01-15 ENCOUNTER — LAB (OUTPATIENT)
Dept: INFUSION THERAPY | Facility: CLINIC | Age: 75
End: 2025-01-15
Attending: INTERNAL MEDICINE
Payer: MEDICARE

## 2025-01-15 VITALS
SYSTOLIC BLOOD PRESSURE: 128 MMHG | RESPIRATION RATE: 18 BRPM | WEIGHT: 294 LBS | OXYGEN SATURATION: 96 % | BODY MASS INDEX: 38.97 KG/M2 | HEART RATE: 78 BPM | HEIGHT: 73 IN | DIASTOLIC BLOOD PRESSURE: 79 MMHG

## 2025-01-15 DIAGNOSIS — C85.98 NON-HODGKIN'S LYMPHOMA OF MULTIPLE SITES (H): ICD-10-CM

## 2025-01-15 DIAGNOSIS — C92.10 CML (CHRONIC MYELOCYTIC LEUKEMIA) (H): ICD-10-CM

## 2025-01-15 LAB
ALBUMIN SERPL BCG-MCNC: 4.3 G/DL (ref 3.5–5.2)
ALP SERPL-CCNC: 94 U/L (ref 40–150)
ALT SERPL W P-5'-P-CCNC: 18 U/L (ref 0–70)
ANION GAP SERPL CALCULATED.3IONS-SCNC: 14 MMOL/L (ref 7–15)
AST SERPL W P-5'-P-CCNC: 24 U/L (ref 0–45)
BASOPHILS # BLD AUTO: 0 10E3/UL (ref 0–0.2)
BASOPHILS NFR BLD AUTO: 1 %
BILIRUB SERPL-MCNC: 1 MG/DL
BUN SERPL-MCNC: 12.7 MG/DL (ref 8–23)
CALCIUM SERPL-MCNC: 8.8 MG/DL (ref 8.8–10.4)
CHLORIDE SERPL-SCNC: 102 MMOL/L (ref 98–107)
CREAT SERPL-MCNC: 1.12 MG/DL (ref 0.67–1.17)
EGFRCR SERPLBLD CKD-EPI 2021: 69 ML/MIN/1.73M2
EOSINOPHIL # BLD AUTO: 0.1 10E3/UL (ref 0–0.7)
EOSINOPHIL NFR BLD AUTO: 2 %
ERYTHROCYTE [DISTWIDTH] IN BLOOD BY AUTOMATED COUNT: 13.3 % (ref 10–15)
GLUCOSE SERPL-MCNC: 136 MG/DL (ref 70–99)
HCO3 SERPL-SCNC: 24 MMOL/L (ref 22–29)
HCT VFR BLD AUTO: 35.3 % (ref 40–53)
HGB BLD-MCNC: 12.3 G/DL (ref 13.3–17.7)
HOLD SPECIMEN: NORMAL
IMM GRANULOCYTES # BLD: 0 10E3/UL
IMM GRANULOCYTES NFR BLD: 0 %
LDH SERPL L TO P-CCNC: 205 U/L (ref 0–250)
LYMPHOCYTES # BLD AUTO: 1.2 10E3/UL (ref 0.8–5.3)
LYMPHOCYTES NFR BLD AUTO: 23 %
MAGNESIUM SERPL-MCNC: 2.3 MG/DL (ref 1.7–2.3)
MCH RBC QN AUTO: 33 PG (ref 26.5–33)
MCHC RBC AUTO-ENTMCNC: 34.8 G/DL (ref 31.5–36.5)
MCV RBC AUTO: 95 FL (ref 78–100)
MONOCYTES # BLD AUTO: 0.5 10E3/UL (ref 0–1.3)
MONOCYTES NFR BLD AUTO: 10 %
NEUTROPHILS # BLD AUTO: 3.2 10E3/UL (ref 1.6–8.3)
NEUTROPHILS NFR BLD AUTO: 64 %
NRBC # BLD AUTO: 0 10E3/UL
NRBC BLD AUTO-RTO: 0 /100
PHOSPHATE SERPL-MCNC: 3 MG/DL (ref 2.5–4.5)
PLATELET # BLD AUTO: 160 10E3/UL (ref 150–450)
POTASSIUM SERPL-SCNC: 4.7 MMOL/L (ref 3.4–5.3)
PROT SERPL-MCNC: 6.2 G/DL (ref 6.4–8.3)
RBC # BLD AUTO: 3.73 10E6/UL (ref 4.4–5.9)
SODIUM SERPL-SCNC: 140 MMOL/L (ref 135–145)
WBC # BLD AUTO: 5 10E3/UL (ref 4–11)

## 2025-01-15 PROCEDURE — 36415 COLL VENOUS BLD VENIPUNCTURE: CPT

## 2025-01-15 PROCEDURE — 81206 BCR/ABL1 GENE MAJOR BP: CPT

## 2025-01-15 PROCEDURE — 80053 COMPREHEN METABOLIC PANEL: CPT

## 2025-01-15 PROCEDURE — G0463 HOSPITAL OUTPT CLINIC VISIT: HCPCS | Performed by: INTERNAL MEDICINE

## 2025-01-15 PROCEDURE — 82040 ASSAY OF SERUM ALBUMIN: CPT

## 2025-01-15 PROCEDURE — 84100 ASSAY OF PHOSPHORUS: CPT

## 2025-01-15 PROCEDURE — 99214 OFFICE O/P EST MOD 30 MIN: CPT | Performed by: INTERNAL MEDICINE

## 2025-01-15 PROCEDURE — 85004 AUTOMATED DIFF WBC COUNT: CPT

## 2025-01-15 PROCEDURE — 83735 ASSAY OF MAGNESIUM: CPT

## 2025-01-15 PROCEDURE — 83615 LACTATE (LD) (LDH) ENZYME: CPT

## 2025-01-15 ASSESSMENT — PAIN SCALES - GENERAL: PAINLEVEL_OUTOF10: NO PAIN (0)

## 2025-01-15 NOTE — PROGRESS NOTES
Hematology/Medical Oncology Follow-up Note      January 15, 2025    Reason for visit:  Deandre Merchant is a 74 year old retired  from [employer] who presents for oncologic reevaluation of chronic myelogenous leukemia on imatinib and prior history of follicular B-cell lymphoma.    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 Crespo in March, 2025 as planned with labs and CT before appointment    Time with patient including review, documentation, history, examination, coordination of care and counseling was 20 minutes.    History of present illness:  In March, 2024, neutrophilic left shifted leukocytosis was identified with positive BCR/ABL 1 rearrangement and 4/29/2024 bone marrow biopsy consistent with chronic myelogenous leukemia.  BCR/ABL1 fusion was 91%.  Peripheral blood BCR/ABL 1 fusion was 86%.  On 5/17/2024, he was started on imatinib 400 mg once daily.  His last 8/20/2024 BCR-ABL1 revealed 1.2% transcripts.  Most recent 12/11/2024 CBC, CMP and LDH were unremarkable except for mild, nonprogressive thrombocytopenia.  No BCR-ABL1 testing was performed.    His past oncologic history is also remarkable for a 2012 diagnosis of a grade 1-2 follicular B-cell lymphoma initially observed and then treated in April, 2014 with Bendamustine/Rituxan x 6 because of increased bulky lymphadenopathy followed by every other month rituximab for 2 years completed in October, 2016.  He has been observed since that without intervention.  Scheduled for repeat CT CAP imaging and follow-up with Dr. Crespo in March, 2025.    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  Family History   Problem Relation Age of Onset    Cancer Father         liver/kidney    C.A.D. Father     Pacemaker Brother     Arthritis Mother         RA         Medications:  Current Outpatient Medications   Medication Sig Dispense Refill    atorvastatin (LIPITOR) 20 MG tablet TAKE 1 TABLET (20 MG) BY MOUTH DAILY 90 tablet 1    imatinib (GLEEVEC) 400 MG tablet Take 1 tablet (400 mg) by mouth daily Take with a meal and a large glass of water. 30 tablet 0    loratadine (CLARITIN REDITABS) 10 MG ODT Take 10 mg by mouth daily.      metFORMIN (GLUCOPHAGE XR) 500 MG 24 hr tablet TAKE 4 TABLETS (2,000 MG) BY MOUTH DAILY (WITH DINNER) 360 tablet 0    metoprolol succinate ER (TOPROL XL) 50 MG 24 hr tablet TAKE 1 TABLET (50 MG) BY MOUTH DAILY 90 tablet 1    Acetaminophen (TYLENOL PO) Take 1,000 mg by mouth (Patient not taking: Reported on 1/15/2025)      blood glucose (NO BRAND SPECIFIED) lancets standard Use to test blood sugar once daily. (Patient not taking: Reported on 1/15/2025) 1 each 3    blood glucose (NO BRAND SPECIFIED) test strip Use to test blood sugar once daily. (Patient not taking: Reported on 1/15/2025) 50 strip 4    fluticasone (FLONASE) 50 MCG/ACT nasal spray Spray 1 spray into both nostrils daily (Patient not taking: Reported on 1/15/2025) 18.2 mL 1    fluticasone (FLONASE) 50 MCG/ACT nasal spray Spray 2 sprays into both nostrils daily (Patient not taking: Reported on 1/15/2025) 16 g 11    order for DME Equipment ordered: RESMED  "Auto PAP Mask type: Full face  Settings: 5-15 cm h2o (Patient not taking: Reported on 1/15/2025)      prochlorperazine (COMPAZINE) 10 MG tablet Take 1 tablet (10 mg) by mouth every 6 hours as needed for nausea or vomiting (Patient not taking: Reported on 1/15/2025) 30 tablet 2    semaglutide (OZEMPIC) 2 MG/3ML pen Inject 0.25 mg Subcutaneous every 7 days Increase to 0.5 mg  every 7 days after 4 weeks. (Patient not taking: Reported on 1/15/2025) 3 mL 1    tamsulosin (FLOMAX) 0.4 MG capsule Take 1 capsule (0.4 mg) by mouth daily. (Patient not taking: Reported on 1/15/2025) 90 capsule 3    triamcinolone (KENALOG) 0.1 % external ointment Apply sparingly to affected area twice daily as needed. (Patient not taking: Reported on 1/15/2025) 15 g 1     Current Facility-Administered Medications   Medication Dose Route Frequency Provider Last Rate Last Admin    lidocaine 1 % injection 0.5 mL  0.5 mL   Alvarez Sampson MD   0.5 mL at 08/05/21 1601    triamcinolone (KENALOG-40) injection 20 mg  20 mg   Alvarez Sampson MD   20 mg at 08/05/21 1601       Allergies:  Allergies   Allergen Reactions    Allopurinol Rash       Review of systems:  Except as note above, the patient denies:  Headache, double vision, change in vision, hearing loss, tinnitus;  Dyspnea, chest pain, palpitations, pleurisy or hemoptysis;  Anorexia, nausea, vomiting, diarrhea, constipation, rectal bleeding bleeding, change in bowel habits;  Urinary frequency, burning or hematuria;  Fever, chills, sweats, change in appetite;  Bone or back pain; skin rash, joint swelling, new lumps;  Unexplained, bleeding or bruising, tingling numbness, change in gait;    Physical examination:  /79   Pulse 78   Resp 18   Ht 1.854 m (6' 0.99\")   Wt 133.4 kg (294 lb)   SpO2 96%   BMI 38.80 kg/m      The patient is alert and oriented.   Throat and oral mucosa are normal-appearing.   Adenopathy is absent in the neck, axilla, groin and supraclavicular fossa; "   Lungs are clear to auscultation and percussion without rales, wheezes or rubs; Heart rhythm is regular, heart sounds are without murmurs or gallops;   Abdomen is soft and flat without hepatosplenomegaly, masses, ascites or tenderness;   Extremities and skin reveal no unusual skin lesions, joint swelling or edema;    Marc Palmer MD

## 2025-01-15 NOTE — PATIENT INSTRUCTIONS
1. Continue imatinib 400 mg once daily as before  2. Dr. Palmer will call you with BCR-ABL1 transcript report  3. Follow-up Dr. Crespo as planned.

## 2025-01-15 NOTE — NURSING NOTE
"Oncology Rooming Note    January 15, 2025 10:09 AM   Deandre Merchant is a 74 year old male who presents for:    Chief Complaint   Patient presents with    Oncology Clinic Visit     3 month follow up     Initial Vitals: /79   Pulse 78   Resp 18   Ht 1.854 m (6' 0.99\")   Wt 133.4 kg (294 lb)   SpO2 96%   BMI 38.80 kg/m   Estimated body mass index is 38.8 kg/m  as calculated from the following:    Height as of this encounter: 1.854 m (6' 0.99\").    Weight as of this encounter: 133.4 kg (294 lb). Body surface area is 2.62 meters squared.  No Pain (0) Comment: Data Unavailable   No LMP for male patient.  Allergies reviewed: Yes  Medications reviewed: Yes    Medications: Medication refills not needed today.  Pharmacy name entered into [employer]:    [facility] [place], [place] - [address] [address] [facility]   [id number] [place] [place] [facility] [address] [place] [address]    Frailty Screening:   Is the patient here for a new oncology consult visit in cancer care? 2. No      Clinical concerns: gleevec causes diarrhea. He will use imodium.        Scarlet Chaudhry LPN              "

## 2025-01-15 NOTE — LETTER
1/15/2025      Deandre Merchant  [address]  [facility] [id number]      Dear Colleague,    Thank you for referring your patient, Deandre Merchant, to the [facility]. Please see a copy of my visit note below.    Hematology/Medical Oncology Follow-up Note      January 15, 2025    Reason for visit:  Deandre Merchant is a 74 year old retired  from [employer] who presents for oncologic reevaluation of chronic myelogenous leukemia on imatinib and prior history of follicular B-cell lymphoma.    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 Crespo in March, 2025 as planned with labs and CT before appointment    Time with patient including review, documentation, history, examination, coordination of care and counseling was 20 minutes.    History of present illness:  In March, 2024, neutrophilic left shifted leukocytosis was identified with positive BCR/ABL 1 rearrangement and 4/29/2024 bone marrow biopsy consistent with chronic myelogenous leukemia.  BCR/ABL1 fusion was 91%.  Peripheral blood BCR/ABL 1 fusion was 86%.  On 5/17/2024, he was started on imatinib 400 mg once daily.  His last 8/20/2024 BCR-ABL1 revealed 1.2% transcripts.  Most recent 12/11/2024 CBC, CMP and LDH were unremarkable except for mild, nonprogressive thrombocytopenia.  No BCR-ABL1 testing was performed.    His past oncologic history is also remarkable for a 2012 diagnosis of a grade 1-2 follicular B-cell lymphoma initially observed and then treated in April, 2014 with Bendamustine/Rituxan x 6 because of increased bulky lymphadenopathy followed by every  other month rituximab for 2 years completed in October, 2016.  He has been observed since that without intervention.  Scheduled for repeat CT CAP imaging and follow-up with Dr. Crespo in March, 2025.    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  Family History   Problem Relation Age of Onset     Cancer Father         liver/kidney     C.A.D. Father      Pacemaker Brother      Arthritis Mother         RA         Medications:  Current Outpatient Medications   Medication Sig Dispense Refill     atorvastatin (LIPITOR) 20 MG tablet TAKE 1 TABLET (20 MG) BY MOUTH DAILY 90 tablet 1     imatinib (GLEEVEC) 400 MG tablet Take 1 tablet (400 mg) by mouth daily Take with a meal and a large glass of water. 30 tablet 0     loratadine (CLARITIN REDITABS) 10 MG ODT Take 10 mg by mouth daily.       metFORMIN (GLUCOPHAGE XR) 500 MG 24 hr tablet TAKE 4 TABLETS (2,000 MG) BY MOUTH DAILY (WITH DINNER) 360 tablet 0     metoprolol succinate ER (TOPROL XL) 50 MG 24 hr tablet TAKE 1 TABLET (50 MG) BY MOUTH DAILY 90 tablet 1     Acetaminophen (TYLENOL PO) Take 1,000 mg by mouth (Patient not taking: Reported on 1/15/2025)       blood glucose (NO BRAND SPECIFIED) lancets standard Use to test blood sugar once daily. (Patient not taking: Reported on 1/15/2025) 1 each 3     blood glucose (NO BRAND SPECIFIED) test strip Use to test blood sugar once daily. (Patient not taking: Reported on 1/15/2025) 50 strip 4     fluticasone (FLONASE) 50 MCG/ACT nasal spray Spray 1 spray into both  nostrils daily (Patient not taking: Reported on 1/15/2025) 18.2 mL 1     fluticasone (FLONASE) 50 MCG/ACT nasal spray Spray 2 sprays into both nostrils daily (Patient not taking: Reported on 1/15/2025) 16 g 11     order for DME Equipment ordered: RESMED Auto PAP Mask type: Full face  Settings: 5-15 cm h2o (Patient not taking: Reported on 1/15/2025)       prochlorperazine (COMPAZINE) 10 MG tablet Take 1 tablet (10 mg) by mouth every 6 hours as needed for nausea or vomiting (Patient not taking: Reported on 1/15/2025) 30 tablet 2     semaglutide (OZEMPIC) 2 MG/3ML pen Inject 0.25 mg Subcutaneous every 7 days Increase to 0.5 mg  every 7 days after 4 weeks. (Patient not taking: Reported on 1/15/2025) 3 mL 1     tamsulosin (FLOMAX) 0.4 MG capsule Take 1 capsule (0.4 mg) by mouth daily. (Patient not taking: Reported on 1/15/2025) 90 capsule 3     triamcinolone (KENALOG) 0.1 % external ointment Apply sparingly to affected area twice daily as needed. (Patient not taking: Reported on 1/15/2025) 15 g 1     Current Facility-Administered Medications   Medication Dose Route Frequency Provider Last Rate Last Admin     lidocaine 1 % injection 0.5 mL  0.5 mL   Alvarez Sampson MD   0.5 mL at 08/05/21 1601     triamcinolone (KENALOG-40) injection 20 mg  20 mg   Alvarez Sampson MD   20 mg at 08/05/21 1601       Allergies:  Allergies   Allergen Reactions     Allopurinol Rash       Review of systems:  Except as note above, the patient denies:  Headache, double vision, change in vision, hearing loss, tinnitus;  Dyspnea, chest pain, palpitations, pleurisy or hemoptysis;  Anorexia, nausea, vomiting, diarrhea, constipation, rectal bleeding bleeding, change in bowel habits;  Urinary frequency, burning or hematuria;  Fever, chills, sweats, change in appetite;  Bone or back pain; skin rash, joint swelling, new lumps;  Unexplained, bleeding or bruising, tingling numbness, change in gait;    Physical examination:  /79   Pulse  "78   Resp 18   Ht 1.854 m (6' 0.99\")   Wt 133.4 kg (294 lb)   SpO2 96%   BMI 38.80 kg/m      The patient is alert and oriented.   Throat and oral mucosa are normal-appearing.   Adenopathy is absent in the neck, axilla, groin and supraclavicular fossa;   Lungs are clear to auscultation and percussion without rales, wheezes or rubs; Heart rhythm is regular, heart sounds are without murmurs or gallops;   Abdomen is soft and flat without hepatosplenomegaly, masses, ascites or tenderness;   Extremities and skin reveal no unusual skin lesions, joint swelling or edema;    Marc Palmer MD      Again, thank you for allowing me to participate in the care of your patient.        Sincerely,        Marc Palmer MD    Electronically signed"

## 2025-02-02 DIAGNOSIS — E11.9 TYPE 2 DIABETES MELLITUS WITHOUT COMPLICATION, WITHOUT LONG-TERM CURRENT USE OF INSULIN (H): ICD-10-CM

## 2025-02-03 DIAGNOSIS — I77.810 ASCENDING AORTA DILATATION: ICD-10-CM

## 2025-02-03 DIAGNOSIS — I10 ESSENTIAL HYPERTENSION: ICD-10-CM

## 2025-02-03 RX ORDER — METFORMIN HYDROCHLORIDE 500 MG/1
2000 TABLET, EXTENDED RELEASE ORAL
Qty: 360 TABLET | Refills: 0 | Status: SHIPPED | OUTPATIENT
Start: 2025-02-03

## 2025-02-03 RX ORDER — METOPROLOL SUCCINATE 50 MG/1
50 TABLET, EXTENDED RELEASE ORAL DAILY
Qty: 90 TABLET | Refills: 0 | Status: SHIPPED | OUTPATIENT
Start: 2025-02-03

## 2025-02-03 NOTE — TELEPHONE ENCOUNTER
Prescription approved per [facility] Refill Protocol.  Aleksandra Briggs RN  [facility]

## 2025-02-11 DIAGNOSIS — C92.10 CML (CHRONIC MYELOCYTIC LEUKEMIA) (H): Primary | ICD-10-CM

## 2025-02-11 RX ORDER — IMATINIB MESYLATE 400 MG/1
400 TABLET, FILM COATED ORAL DAILY
Qty: 30 TABLET | Refills: 0 | OUTPATIENT
Start: 2025-02-11 | End: 2025-03-13

## 2025-02-19 ENCOUNTER — LAB (OUTPATIENT)
Dept: LAB | Facility: CLINIC | Age: 75
End: 2025-02-19
Payer: MEDICARE

## 2025-02-19 DIAGNOSIS — C92.10 CML (CHRONIC MYELOCYTIC LEUKEMIA) (H): ICD-10-CM

## 2025-02-19 LAB
ALBUMIN SERPL BCG-MCNC: 4.1 G/DL (ref 3.5–5.2)
ALP SERPL-CCNC: 96 U/L (ref 40–150)
ALT SERPL W P-5'-P-CCNC: 17 U/L (ref 0–70)
ANION GAP SERPL CALCULATED.3IONS-SCNC: 15 MMOL/L (ref 7–15)
AST SERPL W P-5'-P-CCNC: 23 U/L (ref 0–45)
BASOPHILS # BLD AUTO: 0.1 10E3/UL (ref 0–0.2)
BASOPHILS NFR BLD AUTO: 1 %
BILIRUB SERPL-MCNC: 0.7 MG/DL
BUN SERPL-MCNC: 14.4 MG/DL (ref 8–23)
CALCIUM SERPL-MCNC: 8.7 MG/DL (ref 8.8–10.4)
CHLORIDE SERPL-SCNC: 102 MMOL/L (ref 98–107)
CREAT SERPL-MCNC: 1.05 MG/DL (ref 0.67–1.17)
EGFRCR SERPLBLD CKD-EPI 2021: 75 ML/MIN/1.73M2
EOSINOPHIL # BLD AUTO: 0.2 10E3/UL (ref 0–0.7)
EOSINOPHIL NFR BLD AUTO: 3 %
ERYTHROCYTE [DISTWIDTH] IN BLOOD BY AUTOMATED COUNT: 13.2 % (ref 10–15)
GLUCOSE SERPL-MCNC: 151 MG/DL (ref 70–99)
HCO3 SERPL-SCNC: 21 MMOL/L (ref 22–29)
HCT VFR BLD AUTO: 35.3 % (ref 40–53)
HGB BLD-MCNC: 12.3 G/DL (ref 13.3–17.7)
IMM GRANULOCYTES # BLD: 0 10E3/UL
IMM GRANULOCYTES NFR BLD: 0 %
LDH SERPL L TO P-CCNC: 172 U/L (ref 0–250)
LYMPHOCYTES # BLD AUTO: 1 10E3/UL (ref 0.8–5.3)
LYMPHOCYTES NFR BLD AUTO: 19 %
MAGNESIUM SERPL-MCNC: 2.1 MG/DL (ref 1.7–2.3)
MCH RBC QN AUTO: 33.2 PG (ref 26.5–33)
MCHC RBC AUTO-ENTMCNC: 34.8 G/DL (ref 31.5–36.5)
MCV RBC AUTO: 95 FL (ref 78–100)
MONOCYTES # BLD AUTO: 0.6 10E3/UL (ref 0–1.3)
MONOCYTES NFR BLD AUTO: 11 %
NEUTROPHILS # BLD AUTO: 3.3 10E3/UL (ref 1.6–8.3)
NEUTROPHILS NFR BLD AUTO: 66 %
NRBC # BLD AUTO: 0 10E3/UL
NRBC BLD AUTO-RTO: 0 /100
PHOSPHATE SERPL-MCNC: 3 MG/DL (ref 2.5–4.5)
PLATELET # BLD AUTO: 135 10E3/UL (ref 150–450)
POTASSIUM SERPL-SCNC: 4.4 MMOL/L (ref 3.4–5.3)
PROT SERPL-MCNC: 6 G/DL (ref 6.4–8.3)
RBC # BLD AUTO: 3.71 10E6/UL (ref 4.4–5.9)
SODIUM SERPL-SCNC: 138 MMOL/L (ref 135–145)
WBC # BLD AUTO: 5.1 10E3/UL (ref 4–11)

## 2025-02-19 PROCEDURE — 36415 COLL VENOUS BLD VENIPUNCTURE: CPT

## 2025-02-25 DIAGNOSIS — C92.10 CML (CHRONIC MYELOCYTIC LEUKEMIA) (H): Primary | ICD-10-CM

## 2025-03-03 ENCOUNTER — LAB (OUTPATIENT)
Dept: LAB | Facility: CLINIC | Age: 75
End: 2025-03-03
Payer: MEDICARE

## 2025-03-03 ENCOUNTER — HOSPITAL ENCOUNTER (OUTPATIENT)
Dept: CT IMAGING | Facility: CLINIC | Age: 75
Discharge: HOME | End: 2025-03-03
Attending: INTERNAL MEDICINE | Admitting: INTERNAL MEDICINE
Payer: MEDICARE

## 2025-03-03 DIAGNOSIS — E11.69 TYPE 2 DIABETES MELLITUS WITH OTHER SPECIFIED COMPLICATION, WITHOUT LONG-TERM CURRENT USE OF INSULIN (H): Primary | ICD-10-CM

## 2025-03-03 DIAGNOSIS — D49.2 LUMBAR SPINE TUMOR: ICD-10-CM

## 2025-03-03 DIAGNOSIS — C92.10 CML (CHRONIC MYELOCYTIC LEUKEMIA) (H): ICD-10-CM

## 2025-03-03 DIAGNOSIS — N13.4 HYDROURETER: ICD-10-CM

## 2025-03-03 DIAGNOSIS — C85.98 NON-HODGKIN'S LYMPHOMA OF MULTIPLE SITES (H): ICD-10-CM

## 2025-03-03 LAB
ALBUMIN SERPL BCG-MCNC: 4.1 G/DL (ref 3.5–5.2)
ALP SERPL-CCNC: 92 U/L (ref 40–150)
ALT SERPL W P-5'-P-CCNC: 17 U/L (ref 0–70)
ANION GAP SERPL CALCULATED.3IONS-SCNC: 13 MMOL/L (ref 7–15)
AST SERPL W P-5'-P-CCNC: 23 U/L (ref 0–45)
BASOPHILS # BLD AUTO: 0 10E3/UL (ref 0–0.2)
BASOPHILS NFR BLD AUTO: 1 %
BILIRUB SERPL-MCNC: 0.8 MG/DL
BUN SERPL-MCNC: 15.1 MG/DL (ref 8–23)
CALCIUM SERPL-MCNC: 9.1 MG/DL (ref 8.8–10.4)
CHLORIDE SERPL-SCNC: 105 MMOL/L (ref 98–107)
CREAT SERPL-MCNC: 1.12 MG/DL (ref 0.67–1.17)
EGFRCR SERPLBLD CKD-EPI 2021: 69 ML/MIN/1.73M2
EOSINOPHIL # BLD AUTO: 0.1 10E3/UL (ref 0–0.7)
EOSINOPHIL NFR BLD AUTO: 3 %
ERYTHROCYTE [DISTWIDTH] IN BLOOD BY AUTOMATED COUNT: 13.7 % (ref 10–15)
EST. AVERAGE GLUCOSE BLD GHB EST-MCNC: 134 MG/DL
GLUCOSE SERPL-MCNC: 149 MG/DL (ref 70–99)
HBA1C MFR BLD: 6.3 %
HCO3 SERPL-SCNC: 22 MMOL/L (ref 22–29)
HCT VFR BLD AUTO: 34.4 % (ref 40–53)
HGB BLD-MCNC: 11.7 G/DL (ref 13.3–17.7)
IMM GRANULOCYTES # BLD: 0 10E3/UL
IMM GRANULOCYTES NFR BLD: 1 %
LDH SERPL L TO P-CCNC: 182 U/L (ref 0–250)
LYMPHOCYTES # BLD AUTO: 0.9 10E3/UL (ref 0.8–5.3)
LYMPHOCYTES NFR BLD AUTO: 19 %
MAGNESIUM SERPL-MCNC: 2.1 MG/DL (ref 1.7–2.3)
MCH RBC QN AUTO: 33.4 PG (ref 26.5–33)
MCHC RBC AUTO-ENTMCNC: 34 G/DL (ref 31.5–36.5)
MCV RBC AUTO: 98 FL (ref 78–100)
MONOCYTES # BLD AUTO: 0.5 10E3/UL (ref 0–1.3)
MONOCYTES NFR BLD AUTO: 10 %
NEUTROPHILS # BLD AUTO: 3.2 10E3/UL (ref 1.6–8.3)
NEUTROPHILS NFR BLD AUTO: 68 %
NRBC # BLD AUTO: 0 10E3/UL
NRBC BLD AUTO-RTO: 0 /100
PHOSPHATE SERPL-MCNC: 3 MG/DL (ref 2.5–4.5)
PLATELET # BLD AUTO: 134 10E3/UL (ref 150–450)
POTASSIUM SERPL-SCNC: 4.4 MMOL/L (ref 3.4–5.3)
PROT SERPL-MCNC: 6 G/DL (ref 6.4–8.3)
RBC # BLD AUTO: 3.5 10E6/UL (ref 4.4–5.9)
SODIUM SERPL-SCNC: 140 MMOL/L (ref 135–145)
WBC # BLD AUTO: 4.8 10E3/UL (ref 4–11)

## 2025-03-03 PROCEDURE — 36415 COLL VENOUS BLD VENIPUNCTURE: CPT

## 2025-03-03 PROCEDURE — 250N000011 HC RX IP 250 OP 636: Performed by: INTERNAL MEDICINE

## 2025-03-03 PROCEDURE — 80053 COMPREHEN METABOLIC PANEL: CPT

## 2025-03-03 PROCEDURE — 84100 ASSAY OF PHOSPHORUS: CPT

## 2025-03-03 PROCEDURE — 74177 CT ABD & PELVIS W/CONTRAST: CPT

## 2025-03-03 PROCEDURE — 83735 ASSAY OF MAGNESIUM: CPT

## 2025-03-03 PROCEDURE — 83615 LACTATE (LD) (LDH) ENZYME: CPT

## 2025-03-03 PROCEDURE — 85025 COMPLETE CBC W/AUTO DIFF WBC: CPT

## 2025-03-03 PROCEDURE — 83036 HEMOGLOBIN GLYCOSYLATED A1C: CPT

## 2025-03-03 RX ORDER — IOPAMIDOL 755 MG/ML
500 INJECTION, SOLUTION INTRAVASCULAR ONCE
Status: COMPLETED | OUTPATIENT
Start: 2025-03-03 | End: 2025-03-03

## 2025-03-03 RX ADMIN — IOPAMIDOL 100 ML: 755 INJECTION, SOLUTION INTRAVENOUS at 07:55

## 2025-03-05 ENCOUNTER — VIRTUAL VISIT (OUTPATIENT)
Dept: ONCOLOGY | Facility: CLINIC | Age: 75
End: 2025-03-05
Attending: INTERNAL MEDICINE
Payer: MEDICARE

## 2025-03-05 VITALS — WEIGHT: 295 LBS | BODY MASS INDEX: 39.1 KG/M2 | HEIGHT: 73 IN

## 2025-03-05 DIAGNOSIS — C92.10 CML (CHRONIC MYELOCYTIC LEUKEMIA) (H): ICD-10-CM

## 2025-03-05 DIAGNOSIS — D49.2 LUMBAR SPINE TUMOR: Primary | ICD-10-CM

## 2025-03-05 PROCEDURE — 98006 SYNCH AUDIO-VIDEO EST MOD 30: CPT | Performed by: INTERNAL MEDICINE

## 2025-03-05 PROCEDURE — G2211 COMPLEX E/M VISIT ADD ON: HCPCS | Performed by: INTERNAL MEDICINE

## 2025-03-05 PROCEDURE — 1125F AMNT PAIN NOTED PAIN PRSNT: CPT | Performed by: INTERNAL MEDICINE

## 2025-03-05 RX ORDER — LOPERAMIDE HYDROCHLORIDE 1 MG/5ML
SOLUTION ORAL 4 TIMES DAILY PRN
COMMUNITY

## 2025-03-05 ASSESSMENT — PAIN SCALES - GENERAL: PAINLEVEL_OUTOF10: MILD PAIN (1)

## 2025-03-05 NOTE — PROGRESS NOTES
Virtual Visit Details    Type of service:  Video Visit   Video Start Time: 10:29 AM  Video End Time:10:38 AM    Originating Location (pt. Location): Home    Distant Location (provider location):  On-site  Platform used for Video Visit: [facility]    HEMATOLOGY ONCOLOGY FOLLOW-UP VISIT NOTE    PATIENT NAME: Deandre Merchant MRN # [id number]  DATE OF VISIT: Mar 5, 2025 YOB: 1950        CANCER TYPE:Follicular Lymphoma  STAGE: IV( axillary, mediastinal, retroperitoneal, stomach, bone marrow)    CML- Diagnosed March/April 2024    ONCOLOGY HISTORY:    Patient was being followed by Dr. Krishnamurthy but now she has transferred his care to me.  I reviewed his previous records and have copied and updated from prior notes.  75 year old  male who in 2012 presented with mediastinal, retroperitoneal and mesenteric lymph nodes. Was clinically asymptomatic with normal blood counts. CT-guided lymph node biopsy on 6/21/2012 was consistent with follicular lymphoma, grade 1-2. He was observed at that point.    In 2013, presented to the hospital with ileus and abdominal distention. EGD showed gastric antrum and duodenal ulcers which were biopsied and showed follicular lymphoma. CT scans showed bulky  lymphadenopathy with increasing size. 04/13 Bone marrow biopsy was positive with evidence of follicular lymphoma.   He received bendamustine and Rituxan regimen April 2014- -September 2014.   In December 14 , he was started on maintenance Rituxan every 2 months for 2 years which he completed in October 2016.     In remission and on observation since then.    7/14/2020 - established care with me.    CT scan in July 2020 was stable apart from an indeterminate L2 vertebral body lesion.    MRI of the lumbar spine showed Nonspecific enhancing lesion in the right aspect of the L2 inferior endplate which remains indeterminate.    12/7/2020.  Repeat MRI of the lumbar spine is the same.    We proceeded with a PET scan on 1/8/2021 which shows mild  FDG uptake in the L2 sclerotic lesion.  It was measuring about 1.5 x 1.2 x 1.4 cm and was stable.  Of note previously the lymphoma was FDG avid.      In December 2022, he had MRI of the lumbar spine which had shown some growth of the L2 vertebral body lesion which now measures 23 x 24 x 21 mm so he was evaluated by Dr. Alston from neurosurgery and I reviewed the notes.  He had core biopsy done on 1/12/2022 which was benign.       He was also seen by urologist Dr. Rick on 5/4/2022 and cystoscopy was unremarkable.     When he saw me in March 2024, we noticed that he had leukocytosis with left shift as well as monocytosis so we repeated blood work which showed further increase in leukocytosis as well as left shift including presence of myelocytes and promyelocytes.  Other testing showed that BCR/ABL is positive.     Bone marrow biopsy 4/29/2024   Chronic myeloid leukemia (CML) with the following features:  - Hypercellular marrow (cellularity estimated at 90-95%) with left-shifted and hyperplastic granulopoiesis, relatively diminished erythropoiesis, subset of atypical (small) megakaryocytes, and ~1% blasts  - No morphologic or immunophenotypic evidence of B-cell lymphoma  - Peripheral blood showing slight normochromic normocytic anemia; moderate leukocytosis with neutrophilia (left-shifted), monocytosis, and basophilia; rare circulating blast (<1%)      FISH testing on bone marrow 4/29/2024  ABNORMAL  - BCR::ABL1 fusion (91%)    Bone Marrow Chromosome Analysis 4/29/2024  46,XY,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        Peripheral blood FISH 4/29/2024  ABNORMAL  - BCR::ABL1 fusion (86%)    3/28/2024  BCR::ABL1/ABL1 Major ( p210): 57%      He was diagnosed with Chronic phase CML. Positive for t(9;22)      Intermediate ELTS Score      5/17/2024.  Started imatinib.      SUBJECTIVE    This is a video visit.  He mentions that he has issues with bowel movements with imatinib.  There are days when he has several bowel movements and at times he has diarrhea for which she has to take Imodium.  He feels an urgency to have a bowel movement.  He denies any nausea or vomiting.  Denies any B symptoms.  No new swellings.  No bleeding or infections.  Denies any significant pain.  He has mild low back pain with exertion which gets better with rest, but this has not changed for several years.        ECOG 0    ROS:  Rest of the comprehensive review of the system was negative.      I reviewed other history in epic as below.      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 (Patient not taking: Reported on 1/15/2025) 1 each 3    blood glucose (NO BRAND SPECIFIED) test strip Use to test blood sugar once daily. (Patient not taking: Reported on 1/15/2025) 50 strip 4    fluticasone (FLONASE) 50 MCG/ACT nasal spray Spray 1 spray into both nostrils daily (Patient not taking: Reported on 1/15/2025) 18.2 mL 1    fluticasone (FLONASE) 50 MCG/ACT nasal spray Spray 2 sprays into both nostrils daily (Patient not taking: Reported on 1/15/2025) 16 g 11    imatinib (GLEEVEC) 400 MG tablet Take 1 tablet (400 mg) by mouth daily Take with a meal and a large  glass of water. 30 tablet 0    loratadine (CLARITIN REDITABS) 10 MG ODT Take 10 mg by mouth daily.      metFORMIN (GLUCOPHAGE XR) 500 MG 24 hr tablet TAKE 4 TABLETS (2,000 MG) BY MOUTH DAILY (WITH DINNER) 360 tablet 0    metoprolol succinate ER (TOPROL XL) 50 MG 24 hr tablet TAKE 1 TABLET (50 MG) BY MOUTH DAILY 90 tablet 0    order for DME Equipment ordered: RESMED Auto PAP Mask type: Full face  Settings: 5-15 cm h2o (Patient not taking: Reported on 1/15/2025)      prochlorperazine (COMPAZINE) 10 MG tablet Take 1 tablet (10 mg) by mouth every 6 hours as needed for nausea or vomiting (Patient not taking: Reported on 1/15/2025) 30 tablet 2    semaglutide (OZEMPIC) 2 MG/3ML pen Inject 0.25 mg Subcutaneous every 7 days Increase to 0.5 mg  every 7 days after 4 weeks. (Patient not taking: Reported on 1/15/2025) 3 mL 1    tamsulosin (FLOMAX) 0.4 MG capsule Take 1 capsule (0.4 mg) by mouth daily. (Patient not taking: Reported on 1/15/2025) 90 capsule 3    triamcinolone (KENALOG) 0.1 % external ointment Apply sparingly to affected area twice daily as needed. (Patient not taking: Reported on 1/15/2025) 15 g 1        ALLERGIES     Allergies   Allergen Reactions    Allopurinol Rash     FAMILY HISTORY:  Family History   Problem Relation Age of Onset    Cancer Father         liver/kidney    C.A.D. Father     Pacemaker Brother     Arthritis Mother         RA     Dad had liver cancer at 74.  He has 2 children and they are healthy.    Social History     Socioeconomic History    Marital status:      Spouse name: Cristel    Number of children: 2    Years of education: Not on file    Highest education level: Not on file   Occupational History    Not on file   Tobacco Use    Smoking status: Never    Smokeless tobacco: Never   Vaping Use    Vaping status: Never Used   Substance and Sexual Activity    Alcohol use: Yes     Alcohol/week: 3.3 standard drinks of alcohol     Comment: occasionally weekends    Drug use: No    Sexual  activity: Yes   Other Topics Concern     Service Not Asked    Blood Transfusions Not Asked    Caffeine Concern No    Occupational Exposure Not Asked    Hobby Hazards Not Asked    Sleep Concern No    Stress Concern No    Weight Concern No    Special Diet Not Asked    Back Care Not Asked    Exercise Yes    Bike Helmet Not Asked    Seat Belt Yes    Self-Exams Not Asked    Parent/sibling w/ CABG, MI or angioplasty before 65F 55M? Not Asked   Social History Narrative    Not on file     Social Drivers of Health     Financial Resource Strain: Low Risk  (9/19/2024)    Financial Resource Strain     Within the past 12 months, have you or your family members you live with been unable to get utilities (heat, electricity) when it was really needed?: No   Food Insecurity: Low Risk  (9/19/2024)    Food Insecurity     Within the past 12 months, did you worry that your food would run out before you got money to buy more?: No     Within the past 12 months, did the food you bought just not last and you didn t have money to get more?: No   Transportation Needs: Low Risk  (9/19/2024)    Transportation Needs     Within the past 12 months, has lack of transportation kept you from medical appointments, getting your medicines, non-medical meetings or appointments, work, or from getting things that you need?: No   Physical Activity: Unknown (9/19/2024)    Exercise Vital Sign     Days of Exercise per Week: 2 days     Minutes of Exercise per Session: Not on file   Stress: No Stress Concern Present (9/19/2024)    [employer] - Occupational Stress Questionnaire     Feeling of Stress : Not at all   Social Connections: Unknown (9/19/2024)    Social Connection and Isolation Panel [NHANES]     Frequency of Communication with Friends and Family: Not on file     Frequency of Social Gatherings with Friends and Family: Once a week     Attends [employer] Services: Not on file     Active Member of Clubs or  Organizations: Not on file     Attends Club or Organization Meetings: Not on file     Marital Status: Not on file   Interpersonal Safety: Not on file   Housing Stability: Low Risk  (9/19/2024)    Housing Stability     Do you have housing? : Yes     Are you worried about losing your housing?: No     Denies smoking. 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     PHYSICAL EXAM     There were no vitals taken for this visit.  Wt Readings from Last 4 Encounters:   01/15/25 133.4 kg (294 lb)   10/01/24 130.8 kg (288 lb 5 oz)   09/19/24 129.2 kg (284 lb 14.4 oz)   07/16/24 127 kg (280 lb)         Constitutional.  Looks well and in no apparent distress.   Eyes.  Without eye redness or apparent jaundice.   Respiratory.  Non labored breathing. Speaking in full sentences.    Skin.  No concerning skin rashes on the skin visualized.   Neurological.  Is alert and oriented.  Psychiatric.  Mood and affect seem appropriate.       LABORATORY AND IMAGING STUDIES       I reviewed   3/3/2025  CBC showed WBC 4.8.  Hemoglobin 11.7.  Platelets 134.  Normal WBC differential.    Chemistry unremarkable     LDH was 182    8/20/2024.  FISH testing-   ABNORMAL  - BCR::ABL1 fusion (0.8%)    1/25/2025-BCR::ABL1 MAJOR(p210) QUANTITATION RESULTS:    This sample is positive for BCR::ABL1 transcripts of the major (p210) breakpoint cluster regions with a BCR::ABL1/ABL1 ratio of <0.453%. The limit of sensitivity of the quantitative BCR::ABL1 major (p210) assay is 10 copies of the BCR::ABL1 transcripts. This sample contains less than 10 copies of BCR::ABL1 transcripts, therefore; accurate quantitation is not possible.     8/20/2024.    BCR::ABL1/ABL1 ratio of 1.20%     3/28/2024  BCR::ABL1/ABL1 Major ( p210): 57%      ASSESSMENT AND PLAN     CML.  Chronic phase CML. Positive for t(9;22)  Peripheral Blood FISH- BCR::ABL1 fusion (86%)  Bone Marrow FISH - BCR::ABL1 fusion (91%)  Peripheral Blood BCR::ABL1/ABL1 Major ( p210):  57%    Intermediate ELTS Score    Patient started imatinib on 5/17/2024.      We have noticed improvement and WBC and differential has normalized.  He still bicytopenia and has mild anemia and thrombocytopenia.    Now BCR::ABL1/ABL1 ratio from 1/15/2025 was positive but nonquantifiable.    I would recommend continuing imatinib at the same dose.    Monitoring labs monthly.    We will check FISH testing and molecular test for BCR/ABL in a couple of months.      Diarrhea.  He has days when he has several bowel movements and at times diarrhea.  I believe this is from imatinib.  He takes Imodium which helps so he will take it as needed.      Stage IV follicular lymphoma- status post treatment with bendamustine/Rituxan (04-09/2014) followed by maintenance rituximab for two years- completed in October 2016.    He had repeat CT chest abdomen and pelvis on 3/3/2025 which I reviewed myself.  Overall it shows stable findings.  There is minimal prominence of a 10 mm lymph node above the aortic bifurcation.  I would recommend repeat CT scan in 6 months.         L2 sclerotic lesion.  He was seen by Dr. Alston from neurosurgery and I reviewed the notes.  Most likely this is a benign lesion and recommendation was to follow it up serially with repeat MRI in 6 months.  MRI in December 2022 showed slight growth in the lesion so he had biopsy of this on 1/12/2023 which was benign.  Repeat CT chest abdomen and pelvis on 3/4/2024 showed overall stable findings.  Most recent CT scan from 3/3/2025 showed slight increase in the lesion.  I will check MRI lumbar spine again because of some increase in the size of this lesion.      We did not address the following today.    Bilateral hydroureter and bladder wall thickening.  He had cystourethroscopy in May 2022 which was unremarkable.  I reviewed notes from urology.  CT scan from 3/4/2024 shows mild dilation of left distal ureter which is stable.  Continue to observe.      Interstitial lung  disease.  Repeat CT scan continues to show slight worsening of the interstitial lung disease.  I advised him to follow-up with pulmonology.  I gave him a referral for pulmonology.    I will see him back in about 2 months.    All questions answered.  He is agreeable and comfortable with the plan.    Eduardo Crespo MD    The longitudinal plan of care for the CML and NHL as documented were addressed during this visit. Due to the added complexity in care, I will continue to support Minh in the subsequent management and with ongoing continuity of care.

## 2025-03-05 NOTE — LETTER
3/5/2025      Deandre Merchant  [id number]  [facility] [address]      Dear Colleague,    Thank you for referring your patient, Deandre Merchant, to the [facility]. Please see a copy of my visit note below.    Virtual Visit Details    Type of service:  Video Visit   Video Start Time: 10:29 AM  Video End Time:10:38 AM    Originating Location (pt. Location): Home    Distant Location (provider location):  On-site  Platform used for Video Visit: [facility]    HEMATOLOGY ONCOLOGY FOLLOW-UP VISIT NOTE    PATIENT NAME: Deandre Merchant MRN # [id number]  DATE OF VISIT: Mar 5, 2025 YOB: 1950        CANCER TYPE:Follicular Lymphoma  STAGE: IV( axillary, mediastinal, retroperitoneal, stomach, bone marrow)    CML- Diagnosed March/April 2024    ONCOLOGY HISTORY:    Patient was being followed by Dr. Krishnamurthy but now she has transferred his care to me.  I reviewed his previous records and have copied and updated from prior notes.  75 year old  male who in 2012 presented with mediastinal, retroperitoneal and mesenteric lymph nodes. Was clinically asymptomatic with normal blood counts. CT-guided lymph node biopsy on 6/21/2012 was consistent with follicular lymphoma, grade 1-2. He was observed at that point.    In 2013, presented to the hospital with ileus and abdominal distention. EGD showed gastric antrum and duodenal ulcers which were biopsied and showed follicular lymphoma. CT scans showed bulky  lymphadenopathy with increasing size. 04/13 Bone marrow biopsy was positive with evidence of follicular lymphoma.   He received bendamustine and Rituxan regimen April 2014- -September 2014.   In December 14 , he was started on maintenance Rituxan every 2 months for 2 years which he completed in October 2016.     In remission and on observation since then.    7/14/2020 - established care with me.    CT scan in July 2020 was stable apart from an indeterminate L2 vertebral body lesion.    MRI of the  lumbar spine showed Nonspecific enhancing lesion in the right aspect of the L2 inferior endplate which remains indeterminate.    12/7/2020.  Repeat MRI of the lumbar spine is the same.    We proceeded with a PET scan on 1/8/2021 which shows mild FDG uptake in the L2 sclerotic lesion.  It was measuring about 1.5 x 1.2 x 1.4 cm and was stable.  Of note previously the lymphoma was FDG avid.      In December 2022, he had MRI of the lumbar spine which had shown some growth of the L2 vertebral body lesion which now measures 23 x 24 x 21 mm so he was evaluated by Dr. Alston from neurosurgery and I reviewed the notes.  He had core biopsy done on 1/12/2022 which was benign.       He was also seen by urologist Dr. Rick on 5/4/2022 and cystoscopy was unremarkable.     When he saw me in March 2024, we noticed that he had leukocytosis with left shift as well as monocytosis so we repeated blood work which showed further increase in leukocytosis as well as left shift including presence of myelocytes and promyelocytes.  Other testing showed that BCR/ABL is positive.     Bone marrow biopsy 4/29/2024   Chronic myeloid leukemia (CML) with the following features:  - Hypercellular marrow (cellularity estimated at 90-95%) with left-shifted and hyperplastic granulopoiesis, relatively diminished erythropoiesis, subset of atypical (small) megakaryocytes, and ~1% blasts  - No morphologic or immunophenotypic evidence of B-cell lymphoma  - Peripheral blood showing slight normochromic normocytic anemia; moderate leukocytosis with neutrophilia (left-shifted), monocytosis, and basophilia; rare circulating blast (<1%)      FISH testing on bone marrow 4/29/2024  ABNORMAL  - BCR::ABL1 fusion (91%)    Bone Marrow Chromosome Analysis 4/29/2024  46,XY,t(9;22)(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        Peripheral blood  FISH 4/29/2024  ABNORMAL  - BCR::ABL1 fusion (86%)    3/28/2024  BCR::ABL1/ABL1 Major ( p210): 57%      He was diagnosed with Chronic phase CML. Positive for t(9;22)      Intermediate ELTS Score      5/17/2024.  Started imatinib.      SUBJECTIVE   This is a video visit.  He mentions that he has issues with bowel movements with imatinib.  There are days when he has several bowel movements and at times he has diarrhea for which she has to take Imodium.  He feels an urgency to have a bowel movement.  He denies any nausea or vomiting.  Denies any B symptoms.  No new swellings.  No bleeding or infections.  Denies any significant pain.  He has mild low back pain with exertion which gets better with rest, but this has not changed for several years.        ECOG 0    ROS:  Rest of the comprehensive review of the system was negative.      I reviewed other history in epic as below.      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 (Patient not taking: Reported on 1/15/2025) 1 each 3     blood glucose (NO BRAND SPECIFIED) test strip Use to test blood sugar once daily. (Patient not taking: Reported on 1/15/2025) 50 strip 4     fluticasone (FLONASE) 50 MCG/ACT nasal spray Spray 1 spray into both nostrils daily (Patient not taking: Reported on  1/15/2025) 18.2 mL 1     fluticasone (FLONASE) 50 MCG/ACT nasal spray Spray 2 sprays into both nostrils daily (Patient not taking: Reported on 1/15/2025) 16 g 11     imatinib (GLEEVEC) 400 MG tablet Take 1 tablet (400 mg) by mouth daily Take with a meal and a large glass of water. 30 tablet 0     loratadine (CLARITIN REDITABS) 10 MG ODT Take 10 mg by mouth daily.       metFORMIN (GLUCOPHAGE XR) 500 MG 24 hr tablet TAKE 4 TABLETS (2,000 MG) BY MOUTH DAILY (WITH DINNER) 360 tablet 0     metoprolol succinate ER (TOPROL XL) 50 MG 24 hr tablet TAKE 1 TABLET (50 MG) BY MOUTH DAILY 90 tablet 0     order for DME Equipment ordered: [employer] PAP Mask type: Full face  Settings: 5-15 cm h2o (Patient not taking: Reported on 1/15/2025)       prochlorperazine (COMPAZINE) 10 MG tablet Take 1 tablet (10 mg) by mouth every 6 hours as needed for nausea or vomiting (Patient not taking: Reported on 1/15/2025) 30 tablet 2     semaglutide (OZEMPIC) 2 MG/3ML pen Inject 0.25 mg Subcutaneous every 7 days Increase to 0.5 mg  every 7 days after 4 weeks. (Patient not taking: Reported on 1/15/2025) 3 mL 1     tamsulosin (FLOMAX) 0.4 MG capsule Take 1 capsule (0.4 mg) by mouth daily. (Patient not taking: Reported on 1/15/2025) 90 capsule 3     triamcinolone (KENALOG) 0.1 % external ointment Apply sparingly to affected area twice daily as needed. (Patient not taking: Reported on 1/15/2025) 15 g 1        ALLERGIES     Allergies   Allergen Reactions     Allopurinol Rash     FAMILY HISTORY:  Family History   Problem Relation Age of Onset     Cancer Father         liver/kidney     C.A.D. Father      Pacemaker Brother      Arthritis Mother         RA     Dad had liver cancer at 74.  He has 2 children and they are healthy.    Social History     Socioeconomic History     Marital status:      Spouse name: Cristel     Number of children: 2     Years of education: Not on file     Highest education level: Not on file   Occupational History     Not  on file   Tobacco Use     Smoking status: Never     Smokeless tobacco: Never   Vaping Use     Vaping status: Never Used   Substance and Sexual Activity     Alcohol use: Yes     Alcohol/week: 3.3 standard drinks of alcohol     Comment: occasionally weekends     Drug use: No     Sexual activity: Yes   Other Topics Concern      Service Not Asked     Blood Transfusions Not Asked     Caffeine Concern No     Occupational Exposure Not Asked     Hobby Hazards Not Asked     Sleep Concern No     Stress Concern No     Weight Concern No     Special Diet Not Asked     Back Care Not Asked     Exercise Yes     Bike Helmet Not Asked     Seat Belt Yes     Self-Exams Not Asked     Parent/sibling w/ CABG, MI or angioplasty before 65F 55M? Not Asked   Social History Narrative     Not on file     Social Drivers of Health     Financial Resource Strain: Low Risk  (9/19/2024)    Financial Resource Strain      Within the past 12 months, have you or your family members you live with been unable to get utilities (heat, electricity) when it was really needed?: No   Food Insecurity: Low Risk  (9/19/2024)    Food Insecurity      Within the past 12 months, did you worry that your food would run out before you got money to buy more?: No      Within the past 12 months, did the food you bought just not last and you didn t have money to get more?: No   Transportation Needs: Low Risk  (9/19/2024)    Transportation Needs      Within the past 12 months, has lack of transportation kept you from medical appointments, getting your medicines, non-medical meetings or appointments, work, or from getting things that you need?: No   Physical Activity: Unknown (9/19/2024)    Exercise Vital Sign      Days of Exercise per Week: 2 days      Minutes of Exercise per Session: Not on file   Stress: No Stress Concern Present (9/19/2024)    [employer] - Occupational Stress Questionnaire      Feeling of Stress : Not at all   Social  Connections: Unknown (9/19/2024)    Social Connection and Isolation Panel [NHANES]      Frequency of Communication with Friends and Family: Not on file      Frequency of Social Gatherings with Friends and Family: Once a week      Attends Temple Services: Not on file      Active Member of Clubs or Organizations: Not on file      Attends Club or Organization Meetings: Not on file      Marital Status: Not on file   Interpersonal Safety: Not on file   Housing Stability: Low Risk  (9/19/2024)    Housing Stability      Do you have housing? : Yes      Are you worried about losing your housing?: No     Denies smoking. 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     PHYSICAL EXAM     There were no vitals taken for this visit.  Wt Readings from Last 4 Encounters:   01/15/25 133.4 kg (294 lb)   10/01/24 130.8 kg (288 lb 5 oz)   09/19/24 129.2 kg (284 lb 14.4 oz)   07/16/24 127 kg (280 lb)         Constitutional.  Looks well and in no apparent distress.   Eyes.  Without eye redness or apparent jaundice.   Respiratory.  Non labored breathing. Speaking in full sentences.    Skin.  No concerning skin rashes on the skin visualized.   Neurological.  Is alert and oriented.  Psychiatric.  Mood and affect seem appropriate.       LABORATORY AND IMAGING STUDIES       I reviewed   3/3/2025  CBC showed WBC 4.8.  Hemoglobin 11.7.  Platelets 134.  Normal WBC differential.    Chemistry unremarkable     LDH was 182    8/20/2024.  FISH testing-   ABNORMAL  - BCR::ABL1 fusion (0.8%)    1/25/2025-BCR::ABL1 MAJOR(p210) QUANTITATION RESULTS:    This sample is positive for BCR::ABL1 transcripts of the major (p210) breakpoint cluster regions with a BCR::ABL1/ABL1 ratio of <0.453%. The limit of sensitivity of the quantitative BCR::ABL1 major (p210) assay is 10 copies of the BCR::ABL1 transcripts. This sample contains less than 10 copies of BCR::ABL1 transcripts, therefore; accurate quantitation is not possible.      8/20/2024.    BCR::ABL1/ABL1 ratio of 1.20%     3/28/2024  BCR::ABL1/ABL1 Major ( p210): 57%      ASSESSMENT AND PLAN     CML.  Chronic phase CML. Positive for t(9;22)  Peripheral Blood FISH- BCR::ABL1 fusion (86%)  Bone Marrow FISH - BCR::ABL1 fusion (91%)  Peripheral Blood BCR::ABL1/ABL1 Major ( p210): 57%    Intermediate ELTS Score    Patient started imatinib on 5/17/2024.      We have noticed improvement and WBC and differential has normalized.  He still bicytopenia and has mild anemia and thrombocytopenia.    Now BCR::ABL1/ABL1 ratio from 1/15/2025 was positive but nonquantifiable.    I would recommend continuing imatinib at the same dose.    Monitoring labs monthly.    We will check FISH testing and molecular test for BCR/ABL in a couple of months.      Diarrhea.  He has days when he has several bowel movements and at times diarrhea.  I believe this is from imatinib.  He takes Imodium which helps so he will take it as needed.      Stage IV follicular lymphoma- status post treatment with bendamustine/Rituxan (04-09/2014) followed by maintenance rituximab for two years- completed in October 2016.    He had repeat CT chest abdomen and pelvis on 3/3/2025 which I reviewed myself.  Overall it shows stable findings.  There is minimal prominence of a 10 mm lymph node above the aortic bifurcation.  I would recommend repeat CT scan in 6 months.         L2 sclerotic lesion.  He was seen by Dr. Alston from neurosurgery and I reviewed the notes.  Most likely this is a benign lesion and recommendation was to follow it up serially with repeat MRI in 6 months.  MRI in December 2022 showed slight growth in the lesion so he had biopsy of this on 1/12/2023 which was benign.  Repeat CT chest abdomen and pelvis on 3/4/2024 showed overall stable findings.  Most recent CT scan from 3/3/2025 showed slight increase in the lesion.  I will check MRI lumbar spine again because of some increase in the size of this lesion.      We did  not address the following today.    Bilateral hydroureter and bladder wall thickening.  He had cystourethroscopy in May 2022 which was unremarkable.  I reviewed notes from urology.  CT scan from 3/4/2024 shows mild dilation of left distal ureter which is stable.  Continue to observe.      Interstitial lung disease.  Repeat CT scan continues to show slight worsening of the interstitial lung disease.  I advised him to follow-up with pulmonology.  I gave him a referral for pulmonology.    I will see him back in about 2 months.    All questions answered.  He is agreeable and comfortable with the plan.    Eduardo Crespo MD    The longitudinal plan of care for the CML and NHL as documented were addressed during this visit. Due to the added complexity in care, I will continue to support Minh in the subsequent management and with ongoing continuity of care.        Again, thank you for allowing me to participate in the care of your patient.        Sincerely,        Eduardo Crespo MD    Electronically signed

## 2025-03-05 NOTE — PATIENT INSTRUCTIONS
Schedule MRI asap    Cont Imatinib    Monthly labs    In 2 months, check FISH/BCR:ABL test and see me 2 weeks after that

## 2025-03-13 ENCOUNTER — HOSPITAL ENCOUNTER (OUTPATIENT)
Dept: MRI IMAGING | Facility: CLINIC | Age: 75
Discharge: HOME | End: 2025-03-13
Attending: INTERNAL MEDICINE
Payer: MEDICARE

## 2025-03-13 DIAGNOSIS — D49.2 LUMBAR SPINE TUMOR: ICD-10-CM

## 2025-03-13 DIAGNOSIS — C92.10 CML (CHRONIC MYELOCYTIC LEUKEMIA) (H): Primary | ICD-10-CM

## 2025-03-13 PROCEDURE — A9585 GADOBUTROL INJECTION: HCPCS | Performed by: INTERNAL MEDICINE

## 2025-03-13 PROCEDURE — 72158 MRI LUMBAR SPINE W/O & W/DYE: CPT

## 2025-03-13 PROCEDURE — 255N000002 HC RX 255 OP 636: Performed by: INTERNAL MEDICINE

## 2025-03-13 RX ORDER — IMATINIB MESYLATE 400 MG/1
400 TABLET, FILM COATED ORAL DAILY
Qty: 30 TABLET | Refills: 0 | OUTPATIENT
Start: 2025-03-13 | End: 2025-04-12

## 2025-03-13 RX ORDER — GADOBUTROL 604.72 MG/ML
10 INJECTION INTRAVENOUS ONCE
Status: COMPLETED | OUTPATIENT
Start: 2025-03-13 | End: 2025-03-13

## 2025-03-13 RX ADMIN — GADOBUTROL 10 ML: 604.72 INJECTION INTRAVENOUS at 15:06

## 2025-03-19 ENCOUNTER — MYC MEDICAL ADVICE (OUTPATIENT)
Dept: ONCOLOGY | Facility: CLINIC | Age: 75
End: 2025-03-19
Payer: MEDICARE

## 2025-03-20 NOTE — CONFIDENTIAL NOTE
Attempted to reach patient x3 regarding recent results/recommendations, but was unable to connect. Voicemail left directing him to [employer] message for details, direct number given and return call requested.    Analisa Tate, RN, BSN  RN Care Coordinator - Oncology/Hematology  [facility]

## 2025-03-22 ENCOUNTER — HEALTH MAINTENANCE LETTER (OUTPATIENT)
Age: 75
End: 2025-03-22

## 2025-03-27 NOTE — CONFIDENTIAL NOTE
Attempted to reach patient again to discuss results but patient answered and hung up immediately. Unable to leave a voicemail.    Analisa Tate, RN, BSN  RN Care Coordinator - Oncology/Hematology  [facility]

## 2025-03-31 ENCOUNTER — LAB (OUTPATIENT)
Dept: LAB | Facility: CLINIC | Age: 75
End: 2025-03-31
Payer: MEDICARE

## 2025-03-31 DIAGNOSIS — E11.69 TYPE 2 DIABETES MELLITUS WITH OTHER SPECIFIED COMPLICATION, WITHOUT LONG-TERM CURRENT USE OF INSULIN (H): ICD-10-CM

## 2025-03-31 DIAGNOSIS — C92.10 CML (CHRONIC MYELOCYTIC LEUKEMIA) (H): ICD-10-CM

## 2025-03-31 LAB
EST. AVERAGE GLUCOSE BLD GHB EST-MCNC: 137 MG/DL
HBA1C MFR BLD: 6.4 %
LAB DIRECTOR COMMENTS: NORMAL
LAB DIRECTOR DISCLAIMER: NORMAL
LAB DIRECTOR INTERPRETATION: NORMAL
LAB DIRECTOR METHODOLOGY: NORMAL
LAB DIRECTOR RESULTS: NORMAL
SPECIMEN TYPE: NORMAL

## 2025-03-31 PROCEDURE — 81206 BCR/ABL1 GENE MAJOR BP: CPT | Mod: 59

## 2025-03-31 PROCEDURE — 88271 CYTOGENETICS DNA PROBE: CPT

## 2025-03-31 PROCEDURE — 36415 COLL VENOUS BLD VENIPUNCTURE: CPT

## 2025-03-31 PROCEDURE — 88275 CYTOGENETICS 100-300: CPT

## 2025-03-31 PROCEDURE — 88291 CYTO/MOLECULAR REPORT: CPT | Performed by: MEDICAL GENETICS

## 2025-03-31 PROCEDURE — G0452 MOLECULAR PATHOLOGY INTERPR: HCPCS | Mod: 59 | Performed by: PATHOLOGY

## 2025-03-31 PROCEDURE — 83036 HEMOGLOBIN GLYCOSYLATED A1C: CPT

## 2025-04-02 LAB
CULTURE HARVEST COMPLETE DATE: NORMAL
INTERPRETATION: NORMAL

## 2025-04-09 ENCOUNTER — TELEPHONE (OUTPATIENT)
Dept: ONCOLOGY | Facility: CLINIC | Age: 75
End: 2025-04-09
Payer: MEDICARE

## 2025-04-09 NOTE — TELEPHONE ENCOUNTER
PA Initiation    Medication: IMATINIB MESYLATE PO  Insurance Company: [employer] - Phone [phone] Fax [phone]  Pharmacy Filling the Rx:    Filling Pharmacy Phone:    Filling Pharmacy Fax:    Start Date: 4/9/2025

## 2025-04-10 NOTE — TELEPHONE ENCOUNTER
Prior Authorization Approval    Medication: IMATINIB MESYLATE PO  Authorization Effective Date: 1/10/2025  Authorization Expiration Date: 4/10/2026  Approved Dose/Quantity: 30/30  Reference #: [id number]   Insurance Company: [employer] - Phone [phone] Fax [phone]  Expected CoPay: $    CoPay Card Available:      Financial Assistance Needed:   Which Pharmacy is filling the prescription: [id number] - [place], [place] - [address] [place] [address]  Pharmacy Notified: YES  Patient Notified: Yes

## 2025-04-22 DIAGNOSIS — I10 ESSENTIAL HYPERTENSION: ICD-10-CM

## 2025-04-22 DIAGNOSIS — I77.810 ASCENDING AORTA DILATATION: ICD-10-CM

## 2025-04-22 RX ORDER — METOPROLOL SUCCINATE 50 MG/1
50 TABLET, EXTENDED RELEASE ORAL DAILY
Qty: 90 TABLET | Refills: 1 | Status: SHIPPED | OUTPATIENT
Start: 2025-04-22

## 2025-04-23 DIAGNOSIS — C92.10 CML (CHRONIC MYELOCYTIC LEUKEMIA) (H): Primary | ICD-10-CM

## 2025-04-24 DIAGNOSIS — C92.10 CML (CHRONIC MYELOCYTIC LEUKEMIA) (H): Primary | ICD-10-CM

## 2025-04-24 RX ORDER — IMATINIB MESYLATE 400 MG/1
400 TABLET, FILM COATED ORAL DAILY
Qty: 30 TABLET | Refills: 0 | OUTPATIENT
Start: 2025-04-24 | End: 2025-05-24

## 2025-05-03 DIAGNOSIS — E11.9 TYPE 2 DIABETES MELLITUS WITHOUT COMPLICATION, WITHOUT LONG-TERM CURRENT USE OF INSULIN (H): ICD-10-CM

## 2025-05-06 ENCOUNTER — OFFICE VISIT (OUTPATIENT)
Dept: FAMILY MEDICINE | Facility: CLINIC | Age: 75
End: 2025-05-06
Payer: MEDICARE

## 2025-05-06 VITALS
RESPIRATION RATE: 21 BRPM | HEIGHT: 73 IN | HEART RATE: 80 BPM | OXYGEN SATURATION: 97 % | WEIGHT: 297.2 LBS | SYSTOLIC BLOOD PRESSURE: 130 MMHG | BODY MASS INDEX: 39.39 KG/M2 | TEMPERATURE: 98.4 F | DIASTOLIC BLOOD PRESSURE: 74 MMHG

## 2025-05-06 DIAGNOSIS — R21 RASH: Primary | ICD-10-CM

## 2025-05-06 LAB
ALBUMIN SERPL BCG-MCNC: 4.2 G/DL (ref 3.5–5.2)
ALP SERPL-CCNC: 96 U/L (ref 40–150)
ALT SERPL W P-5'-P-CCNC: 19 U/L (ref 0–70)
ANION GAP SERPL CALCULATED.3IONS-SCNC: 11 MMOL/L (ref 7–15)
AST SERPL W P-5'-P-CCNC: 29 U/L (ref 0–45)
BASOPHILS # BLD AUTO: 0 10E3/UL (ref 0–0.2)
BASOPHILS NFR BLD AUTO: 1 %
BILIRUB SERPL-MCNC: 1.1 MG/DL
BUN SERPL-MCNC: 14.1 MG/DL (ref 8–23)
CALCIUM SERPL-MCNC: 8.9 MG/DL (ref 8.8–10.4)
CHLORIDE SERPL-SCNC: 104 MMOL/L (ref 98–107)
CREAT SERPL-MCNC: 1.25 MG/DL (ref 0.67–1.17)
EGFRCR SERPLBLD CKD-EPI 2021: 60 ML/MIN/1.73M2
EOSINOPHIL # BLD AUTO: 0.1 10E3/UL (ref 0–0.7)
EOSINOPHIL NFR BLD AUTO: 2 %
ERYTHROCYTE [DISTWIDTH] IN BLOOD BY AUTOMATED COUNT: 13.5 % (ref 10–15)
GLUCOSE SERPL-MCNC: 156 MG/DL (ref 70–99)
HCO3 SERPL-SCNC: 24 MMOL/L (ref 22–29)
HCT VFR BLD AUTO: 33.3 % (ref 40–53)
HGB BLD-MCNC: 11.7 G/DL (ref 13.3–17.7)
IMM GRANULOCYTES # BLD: 0 10E3/UL
IMM GRANULOCYTES NFR BLD: 0 %
LDH SERPL L TO P-CCNC: 187 U/L (ref 0–250)
LYMPHOCYTES # BLD AUTO: 0.8 10E3/UL (ref 0.8–5.3)
LYMPHOCYTES NFR BLD AUTO: 17 %
MAGNESIUM SERPL-MCNC: 2.2 MG/DL (ref 1.7–2.3)
MCH RBC QN AUTO: 33.6 PG (ref 26.5–33)
MCHC RBC AUTO-ENTMCNC: 35.1 G/DL (ref 31.5–36.5)
MCV RBC AUTO: 96 FL (ref 78–100)
MONOCYTES # BLD AUTO: 0.4 10E3/UL (ref 0–1.3)
MONOCYTES NFR BLD AUTO: 9 %
NEUTROPHILS # BLD AUTO: 3.5 10E3/UL (ref 1.6–8.3)
NEUTROPHILS NFR BLD AUTO: 71 %
NRBC # BLD AUTO: 0 10E3/UL
NRBC BLD AUTO-RTO: 0 /100
PHOSPHATE SERPL-MCNC: 3 MG/DL (ref 2.5–4.5)
PLATELET # BLD AUTO: 140 10E3/UL (ref 150–450)
POTASSIUM SERPL-SCNC: 4.5 MMOL/L (ref 3.4–5.3)
PROT SERPL-MCNC: 6.1 G/DL (ref 6.4–8.3)
RBC # BLD AUTO: 3.48 10E6/UL (ref 4.4–5.9)
SODIUM SERPL-SCNC: 139 MMOL/L (ref 135–145)
WBC # BLD AUTO: 4.9 10E3/UL (ref 4–11)

## 2025-05-06 PROCEDURE — 83735 ASSAY OF MAGNESIUM: CPT

## 2025-05-06 PROCEDURE — 1126F AMNT PAIN NOTED NONE PRSNT: CPT

## 2025-05-06 PROCEDURE — 80053 COMPREHEN METABOLIC PANEL: CPT

## 2025-05-06 PROCEDURE — 3075F SYST BP GE 130 - 139MM HG: CPT

## 2025-05-06 PROCEDURE — 84100 ASSAY OF PHOSPHORUS: CPT

## 2025-05-06 PROCEDURE — 36415 COLL VENOUS BLD VENIPUNCTURE: CPT

## 2025-05-06 PROCEDURE — 83615 LACTATE (LD) (LDH) ENZYME: CPT

## 2025-05-06 PROCEDURE — 85025 COMPLETE CBC W/AUTO DIFF WBC: CPT

## 2025-05-06 PROCEDURE — 99213 OFFICE O/P EST LOW 20 MIN: CPT

## 2025-05-06 PROCEDURE — 3078F DIAST BP <80 MM HG: CPT

## 2025-05-06 RX ORDER — MUPIROCIN 20 MG/G
OINTMENT TOPICAL 3 TIMES DAILY
Qty: 30 G | Refills: 0 | Status: SHIPPED | OUTPATIENT
Start: 2025-05-06

## 2025-05-06 RX ORDER — METFORMIN HYDROCHLORIDE 500 MG/1
2000 TABLET, EXTENDED RELEASE ORAL
Qty: 360 TABLET | Refills: 0 | Status: SHIPPED | OUTPATIENT
Start: 2025-05-06

## 2025-05-06 ASSESSMENT — PAIN SCALES - GENERAL: PAINLEVEL_OUTOF10: NO PAIN (0)

## 2025-05-06 NOTE — PROGRESS NOTES
"  Assessment & Plan     Rash  - mupirocin (BACTROBAN) 2 % external ointment; Apply topically 3 times daily.  - Adult E-Consult to Dermatology (Outpt Provider to Specialist Written Question & Response)    ASSESSMENT & PLAN    Rash  The rash has been present for 10-12 years but worsened 6 weeks ago, with recent development of water blisters. Differential diagnosis includes possible poison ivy, though contact with it is not confirmed. Dermatologist consultation is needed for further evaluation.  Apply mupirocin ointment twice daily and cover with a bandage. Keep the rash clean and covered. Avoid popping blisters. An e-consult with dermatology has been initiated, and results will be communicated via phone call within one to two days. A handout with further rash care instructions will be provided.      BMI  Estimated body mass index is 39.22 kg/m  as calculated from the following:    Height as of this encounter: 1.854 m (6' 0.99\").    Weight as of this encounter: 134.8 kg (297 lb 3.2 oz).             Amy Wilkinson is a 75 year old, presenting for the following health issues:  Rash      5/6/2025    10:09 AM   Additional Questions   Roomed by Elsa         5/6/2025    10:09 AM   Patient Reported Additional Medications   Patient reports taking the following new medications none     Rash  Associated symptoms include a rash.   History of Present Illness       Reason for visit:  Rash  Symptom onset:  More than a month  Symptoms include:  Scaling, red, brown, blisters, itchy  Symptom intensity:  Moderate  Symptom progression:  Worsening  Had these symptoms before:  Yes  Has tried/received treatment for these symptoms:  No  What makes it worse:  Friction  What makes it better:  Lotion   He is taking medications regularly.      AMY Wilkinson, a 75-year-old male, reports having a rash for the past 10 to 12 years, which has not been problematic until recently. Approximately six weeks ago, the rash worsened, coinciding " "with the use of an eczema lotion. Over the past weekend, he noticed the development of water blisters on the rash, which he attempted to pop. He describes the rash as somewhat itchy, particularly in the mornings, but notes that it is not itchy at the moment.    He attempted to alleviate the rash by applying hot water using a face cloth for about 30 seconds, but did not wash it off. Minh mentions that he has not been in contact with poison ivy and does not have any pets. He recently visited a hunt camp but did not venture outside due to broken water pipes. He was wearing pants before the rash exacerbated and only started wearing shorts yesterday.  OBJECTIVE    Physical exam:    - Rash with blisters on both legs    - Left leg rash appears better than right leg    Test results:    - Dermatology consult requested    - Pictures of the rash sent to dermatology on May 6, 2025                    Objective    /74 (BP Location: Right arm, Patient Position: Sitting, Cuff Size: Adult Large)   Pulse 80   Temp 98.4  F (36.9  C) (Temporal)   Resp 21   Ht 1.854 m (6' 0.99\")   Wt 134.8 kg (297 lb 3.2 oz)   SpO2 97%   BMI 39.22 kg/m    Body mass index is 39.22 kg/m .  Physical Exam                     Signed Electronically by: Jeniffer Rayo PA-C    "

## 2025-05-06 NOTE — PATIENT INSTRUCTIONS
ASSESSMENT & PLAN    Rash  The rash has been present for 10-12 years but worsened 6 weeks ago, with recent development of water blisters. Differential diagnosis includes possible poison ivy, though contact with it is not confirmed. Dermatologist consultation is needed for further evaluation.  Apply mupirocin ointment twice daily and cover with a bandage. Keep the rash clean and covered. Avoid popping blisters. An e-consult with dermatology has been initiated, and results will be communicated via phone call within one to two days. A handout with further rash care instructions will be provided.    Mupirocin twice a day ovver the legs    E consult dermatology    Keep rash clean and covered    The treatment for a rash depends on the underlying cause and the specific type of rash. Here s a general overview of approaches to managing rashes:    ### 1. **Identify and Remove the Trigger**    - **Allergens or Irritants**: If the rash is due to an allergic reaction (e.g., to foods, medications, or skincare products), identify and avoid the trigger.    ### 2. **Symptomatic Relief**    #### **Topical Treatments**    - **Moisturizers**: Keep the affected area moisturized to prevent dryness and itching.  - **Topical Corticosteroids**: Reduce inflammation and itching for certain types of rashes. Available in various strengths and forms (cream, ointment, lotion).  - **Calamine Lotion**: Soothes itching and irritation for mild rashes.  - **Antihistamine Creams**: Relieve itching associated with allergic reactions.    #### **Oral Medications**    - **Antihistamines**: Reduce itching and allergic reactions (e.g., diphenhydramine, loratadine).  - **Oral Corticosteroids**: Prescribed for severe or widespread rashes to reduce inflammation (e.g., prednisone).    ### 3. **Avoidance of Irritants**    - **Clothing**: Wear loose-fitting, breathable clothing.  - **Skin Care**: Use mild, fragrance-free soaps and detergents.    ### 4. **Cool  Compresses**    - **Application**: Apply cool, damp compresses to the affected area to reduce itching and inflammation.    ### 5. **Hydration**    - **Drink plenty of water**: Helps maintain skin hydration and overall health.    ### 6. **Avoid Scratching**    - **Trim Nails**: Keep nails short to prevent breaking the skin and causing infections.    ### 7. **Medical Evaluation**    - **Consultation**: If the rash persists, worsens, or is accompanied by other symptoms (such as fever, pain, or difficulty breathing), seek medical attention promptly.  - **Diagnosis**: A healthcare provider may perform tests (such as allergy testing or skin biopsy) to determine the cause of the rash.    ### 8. **Treatment for Specific Types of Rashes**    - **Infections**: Antibiotics or antiviral medications may be prescribed for rashes caused by bacterial or viral infections.  - **Fungal Infections**: Antifungal creams or oral medications to treat fungal rashes (e.g., ringworm).    ### 9. **Chronic or Severe Rashes**    - **Dermatologist Consultation**: Referral to a dermatologist for further evaluation and management of chronic or severe rashes.    ### Summary    Treatment for a rash varies widely based on its cause and severity. For mild rashes, symptomatic relief with topical treatments and avoidance of triggers may be sufficient. However, if the rash persists, worsens, or is accompanied by other symptoms, it s essential to seek medical advice for proper diagnosis and appropriate treatment. Effective management often involves a combination of lifestyle adjustments, topical treatments, and sometimes oral medications under the guidance of a healthcare provider.

## 2025-05-07 ENCOUNTER — MYC MEDICAL ADVICE (OUTPATIENT)
Dept: PHARMACY | Facility: CLINIC | Age: 75
End: 2025-05-07

## 2025-05-14 ENCOUNTER — PATIENT OUTREACH (OUTPATIENT)
Dept: CARE COORDINATION | Facility: CLINIC | Age: 75
End: 2025-05-14

## 2025-05-14 ENCOUNTER — ONCOLOGY VISIT (OUTPATIENT)
Dept: ONCOLOGY | Facility: CLINIC | Age: 75
End: 2025-05-14
Attending: INTERNAL MEDICINE
Payer: MEDICARE

## 2025-05-14 VITALS
OXYGEN SATURATION: 97 % | DIASTOLIC BLOOD PRESSURE: 87 MMHG | BODY MASS INDEX: 38.14 KG/M2 | HEART RATE: 68 BPM | SYSTOLIC BLOOD PRESSURE: 129 MMHG | WEIGHT: 289 LBS

## 2025-05-14 DIAGNOSIS — C85.98 NON-HODGKIN'S LYMPHOMA OF MULTIPLE SITES (H): ICD-10-CM

## 2025-05-14 DIAGNOSIS — D49.2 LUMBAR SPINE TUMOR: Primary | ICD-10-CM

## 2025-05-14 DIAGNOSIS — C92.10 CML (CHRONIC MYELOCYTIC LEUKEMIA) (H): ICD-10-CM

## 2025-05-14 PROCEDURE — G0463 HOSPITAL OUTPT CLINIC VISIT: HCPCS | Performed by: INTERNAL MEDICINE

## 2025-05-14 ASSESSMENT — PAIN SCALES - GENERAL: PAINLEVEL_OUTOF10: NO PAIN (0)

## 2025-05-14 NOTE — PROGRESS NOTES
HEMATOLOGY ONCOLOGY FOLLOW-UP VISIT NOTE    PATIENT NAME: Deandre RAE Mayur MRN # [id number]  DATE OF VISIT: May 14, 2025 YOB: 1950        CANCER TYPE:Follicular Lymphoma  STAGE: IV( axillary, mediastinal, retroperitoneal, stomach, bone marrow)    CML- Diagnosed March/April 2024    ONCOLOGY HISTORY:    Patient was being followed by Dr. Krishnamurthy but now she has transferred his care to me.  I reviewed his previous records and have copied and updated from prior notes.  75 year old  male who in 2012 presented with mediastinal, retroperitoneal and mesenteric lymph nodes. Was clinically asymptomatic with normal blood counts. CT-guided lymph node biopsy on 6/21/2012 was consistent with follicular lymphoma, grade 1-2. He was observed at that point.    In 2013, presented to the hospital with ileus and abdominal distention. EGD showed gastric antrum and duodenal ulcers which were biopsied and showed follicular lymphoma. CT scans showed bulky  lymphadenopathy with increasing size. 04/13 Bone marrow biopsy was positive with evidence of follicular lymphoma.   He received bendamustine and Rituxan regimen April 2014- -September 2014.   In December 14 , he was started on maintenance Rituxan every 2 months for 2 years which he completed in October 2016.     In remission and on observation since then.    7/14/2020 - established care with me.    CT scan in July 2020 was stable apart from an indeterminate L2 vertebral body lesion.    MRI of the lumbar spine showed Nonspecific enhancing lesion in the right aspect of the L2 inferior endplate which remains indeterminate.    12/7/2020.  Repeat MRI of the lumbar spine is the same.    We proceeded with a PET scan on 1/8/2021 which shows mild FDG uptake in the L2 sclerotic lesion.  It was measuring about 1.5 x 1.2 x 1.4 cm and was stable.  Of note previously the lymphoma was FDG avid.      In December 2022, he had MRI of the lumbar spine which had shown some growth of the L2  vertebral body lesion which now measures 23 x 24 x 21 mm so he was evaluated by Dr. Alston from neurosurgery and I reviewed the notes.  He had core biopsy done on 1/12/2022 which was benign.       He was also seen by urologist Dr. Rick on 5/4/2022 and cystoscopy was unremarkable.     When he saw me in March 2024, we noticed that he had leukocytosis with left shift as well as monocytosis so we repeated blood work which showed further increase in leukocytosis as well as left shift including presence of myelocytes and promyelocytes.  Other testing showed that BCR/ABL is positive.     Bone marrow biopsy 4/29/2024   Chronic myeloid leukemia (CML) with the following features:  - Hypercellular marrow (cellularity estimated at 90-95%) with left-shifted and hyperplastic granulopoiesis, relatively diminished erythropoiesis, subset of atypical (small) megakaryocytes, and ~1% blasts  - No morphologic or immunophenotypic evidence of B-cell lymphoma  - Peripheral blood showing slight normochromic normocytic anemia; moderate leukocytosis with neutrophilia (left-shifted), monocytosis, and basophilia; rare circulating blast (<1%)      FISH testing on bone marrow 4/29/2024  ABNORMAL  - BCR::ABL1 fusion (91%)    Bone Marrow Chromosome Analysis 4/29/2024  46,XY,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        Peripheral blood FISH 4/29/2024  ABNORMAL  - BCR::ABL1 fusion (86%)    3/28/2024  BCR::ABL1/ABL1 Major ( p210): 57%      He was diagnosed with Chronic phase CML. Positive for t(9;22)      Intermediate ELTS Score      5/17/2024.  Started imatinib.      SUBJECTIVE   He comes into the clinic today.  Overall he is tolerating imatinib well with some issues with bowel movements but he takes half Imodium every day and this regulates his bowel movements.  Denies nausea and vomiting.  No new swellings.   No B symptoms.  He is using mupirocin ointment for his skin infection but no other infections.  No fevers.  Denies any back pain apart from mild back discomfort on exertion which is a chronic issue.       ECOG 0    ROS:  Rest of the comprehensive review of the system was negative.      I reviewed other history in epic as below.      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      atorvastatin (LIPITOR) 20 MG tablet TAKE 1 TABLET (20 MG) BY MOUTH DAILY 90 tablet 4    blood glucose (NO BRAND SPECIFIED) lancets standard Use to test blood sugar once daily. (Patient not taking: Reported on 5/6/2025) 1 each 3    blood glucose (NO BRAND SPECIFIED) test strip Use to test blood sugar once daily. (Patient not taking: Reported on 1/15/2025) 50 strip 4    clobetasol (TEMOVATE) 0.05 % external ointment Apply topically 2 times daily for 14 days. 60 g 0    fluticasone (FLONASE) 50 MCG/ACT nasal spray Spray 1 spray into both nostrils daily (Patient not taking: Reported on 5/6/2025) 18.2 mL 1    fluticasone (FLONASE) 50 MCG/ACT nasal spray Spray 2 sprays into both nostrils daily (Patient not taking: Reported on 5/6/2025) 16 g 11    imatinib (GLEEVEC) 400 MG tablet Take 1 tablet (400 mg) by mouth daily Take with a meal and a large glass of water. 30 tablet 0    loperamide (IMODIUM) 1 MG/5ML liquid Take by mouth 4 times daily as needed for diarrhea.      loratadine (CLARITIN REDITABS) 10 MG ODT Take 10 mg by mouth daily.      metFORMIN (GLUCOPHAGE XR) 500 MG 24 hr tablet TAKE 4  TABLETS (2,000 MG) BY MOUTH DAILY (WITH DINNER) 360 tablet 0    metoprolol succinate ER (TOPROL XL) 50 MG 24 hr tablet Take 1 tablet (50 mg) by mouth daily. 90 tablet 1    mupirocin (BACTROBAN) 2 % external ointment Apply topically 3 times daily. 30 g 0    order for DME Equipment ordered: RESMED Auto PAP Mask type: Full face  Settings: 5-15 cm h2o (Patient not taking: Reported on 1/15/2025)      prochlorperazine (COMPAZINE) 10 MG tablet Take 1 tablet (10 mg) by mouth every 6 hours as needed for nausea or vomiting (Patient not taking: Reported on 5/6/2025) 30 tablet 2    semaglutide (OZEMPIC) 2 MG/3ML pen Inject 0.25 mg Subcutaneous every 7 days Increase to 0.5 mg  every 7 days after 4 weeks. (Patient not taking: Reported on 5/6/2025) 3 mL 1    tamsulosin (FLOMAX) 0.4 MG capsule Take 1 capsule (0.4 mg) by mouth daily. (Patient not taking: Reported on 5/6/2025) 90 capsule 3    triamcinolone (KENALOG) 0.1 % external ointment Apply sparingly to affected area twice daily as needed. (Patient not taking: Reported on 5/6/2025) 15 g 1        ALLERGIES     Allergies   Allergen Reactions    Allopurinol Rash     FAMILY HISTORY:  Family History   Problem Relation Age of Onset    Cancer Father         liver/kidney    C.A.D. Father     Pacemaker Brother     Arthritis Mother         RA     Dad had liver cancer at 74.  He has 2 children and they are healthy.    Social History     Socioeconomic History    Marital status:      Spouse name: Cristel    Number of children: 2    Years of education: Not on file    Highest education level: Not on file   Occupational History    Not on file   Tobacco Use    Smoking status: Never     Passive exposure: Never    Smokeless tobacco: Never   Vaping Use    Vaping status: Never Used   Substance and Sexual Activity    Alcohol use: Yes     Alcohol/week: 3.3 standard drinks of alcohol     Comment: occasionally weekends    Drug use: No    Sexual activity: Yes   Other Topics Concern      Service Not Asked    Blood Transfusions Not Asked    Caffeine Concern No    Occupational Exposure Not Asked    Hobby Hazards Not Asked    Sleep Concern No    Stress Concern No    Weight Concern No    Special Diet Not Asked    Back Care Not Asked    Exercise Yes    Bike Helmet Not Asked    Seat Belt Yes    Self-Exams Not Asked    Parent/sibling w/ CABG, MI or angioplasty before 65F 55M? Not Asked   Social History Narrative    Not on file     Social Drivers of Health     Financial Resource Strain: Low Risk  (9/19/2024)    Financial Resource Strain     Within the past 12 months, have you or your family members you live with been unable to get utilities (heat, electricity) when it was really needed?: No   Food Insecurity: Low Risk  (9/19/2024)    Food Insecurity     Within the past 12 months, did you worry that your food would run out before you got money to buy more?: No     Within the past 12 months, did the food you bought just not last and you didn t have money to get more?: No   Transportation Needs: Low Risk  (9/19/2024)    Transportation Needs     Within the past 12 months, has lack of transportation kept you from medical appointments, getting your medicines, non-medical meetings or appointments, work, or from getting things that you need?: No   Physical Activity: Unknown (9/19/2024)    Exercise Vital Sign     Days of Exercise per Week: 2 days     Minutes of Exercise per Session: Not on file   Stress: No Stress Concern Present (9/19/2024)    [employer] [place] [employer] - Occupational Stress Questionnaire     Feeling of Stress : Not at all   Social Connections: Unknown (9/19/2024)    Social Connection and Isolation Panel [NHANES]     Frequency of Communication with Friends and Family: Not on file     Frequency of Social Gatherings with Friends and Family: Once a week     Attends Congregation Services: Not on file     Active Member of Clubs or Organizations: Not on file     Attends Club or Organization  Meetings: Not on file     Marital Status: Not on file   Interpersonal Safety: Low Risk  (5/6/2025)    Interpersonal Safety     Do you feel physically and emotionally safe where you currently live?: Yes     Within the past 12 months, have you been hit, slapped, kicked or otherwise physically hurt by someone?: No     Within the past 12 months, have you been humiliated or emotionally abused in other ways by your partner or ex-partner?: No   Housing Stability: Low Risk  (9/19/2024)    Housing Stability     Do you have housing? : Yes     Are you worried about losing your housing?: No     Denies smoking. 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     PHYSICAL EXAM     There were no vitals taken for this visit.  Wt Readings from Last 4 Encounters:   05/06/25 134.8 kg (297 lb 3.2 oz)   03/05/25 133.8 kg (295 lb)   01/15/25 133.4 kg (294 lb)   10/01/24 130.8 kg (288 lb 5 oz)       CONSTITUTIONAL: no acute distress  EYES: Right pupil is asymmetric. no palor or icterus.   ENT/MOUTH: no mouth lesions. Ears normal  CVS: s1s2 no m r g .   RESPIRATORY: clear to auscultation b/l  GI: soft non tender no hepatosplenomegaly  NEURO: AAOX3  Grossly non focal neuro exam  INTEGUMENT: Small scabs in the left leg.  LYMPHATIC: no palpable cervical, supraclavicular, axillary or inguinal LAD  MUSCULOSKELETAL: Unremarkable. No bony tenderness.   EXTREMITIES: Trace bilateral edema  PSYCH: Mentation, mood and affect are normal. Decision making capacity is intact       LABORATORY AND IMAGING STUDIES       I reviewed   5/6/2025  CBC showed WBC 4.9.  Hemoglobin 11.7.  Platelets 140.   Normal WBC differential.    Chemistry unremarkable except creatinine 1.25.    LDH was 187    3/31/2025.    FISH testing: No evidence of BCR::ABL1 fusion   BCR::ABL1 MAJOR(p210) QUANTITATION RESULTS: Undetectable    8/20/2024.  FISH testing-   ABNORMAL  - BCR::ABL1 fusion (0.8%)    1/25/2025-BCR::ABL1 MAJOR(p210) QUANTITATION RESULTS:    This sample is  positive for BCR::ABL1 transcripts of the major (p210) breakpoint cluster regions with a BCR::ABL1/ABL1 ratio of <0.453%. The limit of sensitivity of the quantitative BCR::ABL1 major (p210) assay is 10 copies of the BCR::ABL1 transcripts. This sample contains less than 10 copies of BCR::ABL1 transcripts, therefore; accurate quantitation is not possible.     8/20/2024.    BCR::ABL1/ABL1 ratio of 1.20%     3/28/2024  BCR::ABL1/ABL1 Major ( p210): 57%      MRI lumbar spine 3/13/2025.  FINDINGS: Nomenclature is based on 5 lumbar vertebral bodies. Normal vertebral body heights. Straightening of the normal lumbar lordosis. This is unchanged. Sagittal alignment otherwise appears unremarkable. Interval increase in size/extent of a zone of   heterogeneous predominantly T2/STIR hyperintense, T1 intermediate to hypointense marrow signal change with corresponding ill-defined/patchy postcontrast enhancement in the right aspect of the L2 vertebral body. This area now measures approximately 35 mm   AP x 25 mm craniocaudal (series 104, image 13) compared to 22 mm x 21 mm on the previous exam. This lesion remains indeterminate.      New/increased small STIR hyperintense, T1 intermediate to hypointense, enhancing lesion in the right aspect of the L1 vertebral body measuring approximately 8 mm AP x 9 mm craniocaudal (series 104, image 10). There is also a slightly more conspicuous   nonspecific small area of STIR hyperintense signal/rim enhancement in the left aspect of the L4 vertebral body without significant corresponding T1 hypointensity measuring approximately 6 mm AP x 9 mm craniocaudal (series 109, image 21). Marrow signal   otherwise appears unremarkable. Diffuse intervertebral disc desiccation. Normal appearance of the distal spinal cord with the conus terminating at L2. The visualized paraspinous soft tissues and bony pelvis appear grossly unremarkable.     Segmental Analysis:     T12-L1: Normal disc height. Disc bulge  eccentric to the left with suggestion of superimposed left foraminal/extraforaminal disc protrusion component. Normal facets. No spinal canal stenosis. There is at least mild left neural foraminal stenosis. The   right neural foramen is patent. Overall, no change.     L1-L2: Normal disc height. Symmetric disc bulge. Mild to moderate facet arthropathy. No significant spinal canal stenosis. Mild left neural foraminal stenosis. The right neural foramen is patent. Overall, no change.     L2-L3: Moderate to severe disc height loss. Disc bulge slightly eccentric to the right with posterior endplate osteophytic ridging and mild facet arthropathy. No significant spinal canal stenosis. Mild to moderate left and mild right neural foraminal   stenosis. Overall, no significant change.     L3-L4: Mild disc height loss. Symmetric disc bulge. Mild to moderate facet arthropathy. Mild right lateral recess encroachment without significant central spinal canal stenosis. There is at least moderate/moderate to severe right neural foraminal   stenosis. Mild to moderate left neural foraminal stenosis. Overall, no significant change.     L4-L5: Moderate disc height loss. Symmetric disc bulge with posterior endplate osteophytic ridging and moderate facet arthropathy. There is at least mild right lateral recess stenosis. No central spinal canal stenosis. Moderate to severe right and mild   to moderate left neural foraminal stenosis. Overall, no significant change.     L5-S1: Moderate disc height loss. Symmetric disc bulge with posterior endplate osteophytic ridging. Mild to moderate facet arthropathy. No spinal canal stenosis. Mild to moderate bilateral neural foraminal stenosis. Overall, no significant change.                                                                      IMPRESSION:  1. Increased size of an indeterminate heterogeneously enhancing marrow lesion in the right aspect of the L2 vertebral body compared to prior. Clinical  correlation and consider MR imaging follow-up, as indicated.  2. Couple of small nonspecific lesions in the right aspect of the L1 vertebral body and the left aspect of the L4 vertebral body are new or increased from prior.  3. No other significant marrow signal abnormality identified.  4. Multilevel degenerative changes appear relatively similar to the prior exam, as described.  5. No high-grade central spinal canal stenosis.  6. Moderate/moderate to severe right L3-4 and L4-5 neural foraminal stenosis with lesser degrees of foraminal narrowing elsewhere, as detailed.        ASSESSMENT AND PLAN     CML.  Chronic phase CML. Positive for t(9;22)  Peripheral Blood FISH- BCR::ABL1 fusion (86%)  Bone Marrow FISH - BCR::ABL1 fusion (91%)  Peripheral Blood BCR::ABL1/ABL1 Major ( p210): 57%    Intermediate ELTS Score    Patient started imatinib on 5/17/2024.      We have noticed improvement and WBC and differential has normalized.  He still bicytopenia and has mild anemia and thrombocytopenia.    On 3/31/2025,  BCR::ABL1/ABL1 is undetectable.      Continue imatinib.    Monitor serial labs.    Repeat molecular test in 3 months.    Diarrhea.  From imatinib.  Overall this is well-managed with half Imodium daily.  Continue the same.    Stage IV follicular lymphoma- status post treatment with bendamustine/Rituxan (04-09/2014) followed by maintenance rituximab for two years- completed in October 2016.    He had repeat CT chest abdomen and pelvis on 3/3/2025 which was overall stable and showed minimal prominence of a 10 mm lymph node above the aortic bifurcation.  Plan to repeat CT scan in September.         L2 sclerotic lesion.  He was seen by Dr. Alston from neurosurgery and I reviewed the notes.  Most likely this is a benign lesion and recommendation was to follow it up serially with repeat MRI in 6 months.  MRI in December 2022 showed slight growth in the lesion so he had biopsy of this on 1/12/2023 which was benign.  Repeat CT  chest abdomen and pelvis on 3/4/2024 showed overall stable findings.  CT scan from 3/3/2025 showed slight increase in the lesion.  MRI lumbar spine on 3/13/2025 showed slight increased size of this lesion in the right aspect of the L2 vertebral body.  I discussed with Dr. Alston from neurosurgery who recommends continued surveillance as benign pathologies like hemangioma can evolve over time as well, and he does not believe that at this time repeat biopsy is warranted.  I would plan to repeat CT chest abdomen and pelvis in September.         We did not address the following today.    Bilateral hydroureter and bladder wall thickening.  He had cystourethroscopy in May 2022 which was unremarkable.  I reviewed notes from urology.  CT scan from 3/4/2024 shows mild dilation of left distal ureter which is stable.  Continue to observe.      Interstitial lung disease.  Repeat CT scan continues to show slight worsening of the interstitial lung disease.  I advised him to follow-up with pulmonology.  I gave him a referral for pulmonology.    I will see him back in about 4 months.    All questions answered.  He is agreeable and comfortable with the plan.    Eduardo Crespo MD    The longitudinal plan of care for the CML and NHL as documented were addressed during this visit. Due to the added complexity in care, I will continue to support Minh in the subsequent management and with ongoing continuity of care.

## 2025-05-14 NOTE — NURSING NOTE
"Oncology Rooming Note    May 14, 2025 7:58 AM   Deandre Merchant is a 75 year old male who presents for:    Chief Complaint   Patient presents with    Oncology Clinic Visit     Initial Vitals: /87 (BP Location: Left arm)   Pulse 68   Wt 131.1 kg (289 lb)   SpO2 97%   BMI 38.14 kg/m   Estimated body mass index is 38.14 kg/m  as calculated from the following:    Height as of 5/6/25: 1.854 m (6' 0.99\").    Weight as of this encounter: 131.1 kg (289 lb). Body surface area is 2.6 meters squared.  No Pain (0) Comment: Data Unavailable   No LMP for male patient.  Allergies reviewed: Yes  Medications reviewed: Yes    Medications: Medication refills not needed today.  Pharmacy name entered into EPIC: [id number] - [place], [place] - [address] [place] [address]    Frailty Screening:   Is the patient here for a new oncology consult visit in cancer care? 2. No    PHQ9:  Did this patient require a PHQ9?: No      Clinical concerns: No Concerns        Yvrose Spann MA            "

## 2025-05-14 NOTE — PATIENT INSTRUCTIONS
Continue imatinib.    Monthly labs.    In September, repeat molecular test for CML and also CT chest abdomen and pelvis.    See me 2 weeks after that.

## 2025-05-14 NOTE — LETTER
5/14/2025      Deandre Merchant  [id number]  [facility] [address]      Dear Colleague,    Thank you for referring your patient, Deandre Merchant, to the [facility]. Please see a copy of my visit note below.    HEMATOLOGY ONCOLOGY FOLLOW-UP VISIT NOTE    PATIENT NAME: Deandre Merchant MRN # [id number]  DATE OF VISIT: May 14, 2025 YOB: 1950        CANCER TYPE:Follicular Lymphoma  STAGE: IV( axillary, mediastinal, retroperitoneal, stomach, bone marrow)    CML- Diagnosed March/April 2024    ONCOLOGY HISTORY:    Patient was being followed by Dr. Krishnamurthy but now she has transferred his care to me.  I reviewed his previous records and have copied and updated from prior notes.  75 year old  male who in 2012 presented with mediastinal, retroperitoneal and mesenteric lymph nodes. Was clinically asymptomatic with normal blood counts. CT-guided lymph node biopsy on 6/21/2012 was consistent with follicular lymphoma, grade 1-2. He was observed at that point.    In 2013, presented to the hospital with ileus and abdominal distention. EGD showed gastric antrum and duodenal ulcers which were biopsied and showed follicular lymphoma. CT scans showed bulky  lymphadenopathy with increasing size. 04/13 Bone marrow biopsy was positive with evidence of follicular lymphoma.   He received bendamustine and Rituxan regimen April 2014- -September 2014.   In December 14 , he was started on maintenance Rituxan every 2 months for 2 years which he completed in October 2016.     In remission and on observation since then.    7/14/2020 - established care with me.    CT scan in July 2020 was stable apart from an indeterminate L2 vertebral body lesion.    MRI of the lumbar spine showed Nonspecific enhancing lesion in the right aspect of the L2 inferior endplate which remains indeterminate.    12/7/2020.  Repeat MRI of the lumbar spine is the same.    We proceeded with a PET scan on 1/8/2021 which shows mild  FDG uptake in the L2 sclerotic lesion.  It was measuring about 1.5 x 1.2 x 1.4 cm and was stable.  Of note previously the lymphoma was FDG avid.      In December 2022, he had MRI of the lumbar spine which had shown some growth of the L2 vertebral body lesion which now measures 23 x 24 x 21 mm so he was evaluated by Dr. Alston from neurosurgery and I reviewed the notes.  He had core biopsy done on 1/12/2022 which was benign.       He was also seen by urologist Dr. Rick on 5/4/2022 and cystoscopy was unremarkable.     When he saw me in March 2024, we noticed that he had leukocytosis with left shift as well as monocytosis so we repeated blood work which showed further increase in leukocytosis as well as left shift including presence of myelocytes and promyelocytes.  Other testing showed that BCR/ABL is positive.     Bone marrow biopsy 4/29/2024   Chronic myeloid leukemia (CML) with the following features:  - Hypercellular marrow (cellularity estimated at 90-95%) with left-shifted and hyperplastic granulopoiesis, relatively diminished erythropoiesis, subset of atypical (small) megakaryocytes, and ~1% blasts  - No morphologic or immunophenotypic evidence of B-cell lymphoma  - Peripheral blood showing slight normochromic normocytic anemia; moderate leukocytosis with neutrophilia (left-shifted), monocytosis, and basophilia; rare circulating blast (<1%)      FISH testing on bone marrow 4/29/2024  ABNORMAL  - BCR::ABL1 fusion (91%)    Bone Marrow Chromosome Analysis 4/29/2024  46,XY,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        Peripheral blood FISH 4/29/2024  ABNORMAL  - BCR::ABL1 fusion (86%)    3/28/2024  BCR::ABL1/ABL1 Major ( p210): 57%      He was diagnosed with Chronic phase CML. Positive for t(9;22)      Intermediate ELTS Score      5/17/2024.  Started imatinib.      SUBJECTIVE    He comes into the clinic today.  Overall he is tolerating imatinib well with some issues with bowel movements but he takes half Imodium every day and this regulates his bowel movements.  Denies nausea and vomiting.  No new swellings.  No B symptoms.  He is using mupirocin ointment for his skin infection but no other infections.  No fevers.  Denies any back pain apart from mild back discomfort on exertion which is a chronic issue.       ECOG 0    ROS:  Rest of the comprehensive review of the system was negative.      I reviewed other history in epic as below.      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       atorvastatin (LIPITOR) 20 MG tablet TAKE 1 TABLET (20 MG) BY MOUTH DAILY 90 tablet 4     blood glucose (NO BRAND SPECIFIED) lancets standard Use to test blood sugar once daily. (Patient not taking: Reported on 5/6/2025) 1 each 3     blood glucose (NO BRAND SPECIFIED) test strip Use to test blood sugar once daily. (Patient not taking: Reported on 1/15/2025) 50 strip 4     clobetasol (TEMOVATE) 0.05 % external ointment Apply topically 2 times daily for 14 days. 60 g 0     fluticasone (FLONASE) 50 MCG/ACT nasal spray Spray 1 spray into both nostrils daily (Patient not taking: Reported on 5/6/2025) 18.2 mL 1     fluticasone (FLONASE) 50 MCG/ACT nasal spray Spray 2 sprays into both nostrils daily (Patient not taking: Reported on 5/6/2025) 16 g 11     imatinib (GLEEVEC) 400 MG tablet Take 1 tablet (400 mg) by mouth daily Take with a meal and a large  glass of water. 30 tablet 0     loperamide (IMODIUM) 1 MG/5ML liquid Take by mouth 4 times daily as needed for diarrhea.       loratadine (CLARITIN REDITABS) 10 MG ODT Take 10 mg by mouth daily.       metFORMIN (GLUCOPHAGE XR) 500 MG 24 hr tablet TAKE 4 TABLETS (2,000 MG) BY MOUTH DAILY (WITH DINNER) 360 tablet 0     metoprolol succinate ER (TOPROL XL) 50 MG 24 hr tablet Take 1 tablet (50 mg) by mouth daily. 90 tablet 1     mupirocin (BACTROBAN) 2 % external ointment Apply topically 3 times daily. 30 g 0     order for DME Equipment ordered: [employer] Auto PAP Mask type: Full face  Settings: 5-15 cm h2o (Patient not taking: Reported on 1/15/2025)       prochlorperazine (COMPAZINE) 10 MG tablet Take 1 tablet (10 mg) by mouth every 6 hours as needed for nausea or vomiting (Patient not taking: Reported on 5/6/2025) 30 tablet 2     semaglutide (OZEMPIC) 2 MG/3ML pen Inject 0.25 mg Subcutaneous every 7 days Increase to 0.5 mg  every 7 days after 4 weeks. (Patient not taking: Reported on 5/6/2025) 3 mL 1     tamsulosin (FLOMAX) 0.4 MG capsule Take 1 capsule (0.4 mg) by mouth daily. (Patient not taking: Reported on 5/6/2025) 90 capsule 3     triamcinolone (KENALOG) 0.1 % external ointment Apply sparingly to affected area twice daily as needed. (Patient not taking: Reported on 5/6/2025) 15 g 1        ALLERGIES     Allergies   Allergen Reactions     Allopurinol Rash     FAMILY HISTORY:  Family History   Problem Relation Age of Onset     Cancer Father         liver/kidney     C.A.D. Father      Pacemaker Brother      Arthritis Mother         RA     Dad had liver cancer at 74.  He has 2 children and they are healthy.    Social History     Socioeconomic History     Marital status:      Spouse name: Cristel     Number of children: 2     Years of education: Not on file     Highest education level: Not on file   Occupational History     Not on file   Tobacco Use     Smoking status: Never     Passive exposure: Never     Smokeless  tobacco: Never   Vaping Use     Vaping status: Never Used   Substance and Sexual Activity     Alcohol use: Yes     Alcohol/week: 3.3 standard drinks of alcohol     Comment: occasionally weekends     Drug use: No     Sexual activity: Yes   Other Topics Concern      Service Not Asked     Blood Transfusions Not Asked     Caffeine Concern No     Occupational Exposure Not Asked     Hobby Hazards Not Asked     Sleep Concern No     Stress Concern No     Weight Concern No     Special Diet Not Asked     Back Care Not Asked     Exercise Yes     Bike Helmet Not Asked     Seat Belt Yes     Self-Exams Not Asked     Parent/sibling w/ CABG, MI or angioplasty before 65F 55M? Not Asked   Social History Narrative     Not on file     Social Drivers of Health     Financial Resource Strain: Low Risk  (9/19/2024)    Financial Resource Strain      Within the past 12 months, have you or your family members you live with been unable to get utilities (heat, electricity) when it was really needed?: No   Food Insecurity: Low Risk  (9/19/2024)    Food Insecurity      Within the past 12 months, did you worry that your food would run out before you got money to buy more?: No      Within the past 12 months, did the food you bought just not last and you didn t have money to get more?: No   Transportation Needs: Low Risk  (9/19/2024)    Transportation Needs      Within the past 12 months, has lack of transportation kept you from medical appointments, getting your medicines, non-medical meetings or appointments, work, or from getting things that you need?: No   Physical Activity: Unknown (9/19/2024)    Exercise Vital Sign      Days of Exercise per Week: 2 days      Minutes of Exercise per Session: Not on file   Stress: No Stress Concern Present (9/19/2024)    [employer] - Occupational Stress Questionnaire      Feeling of Stress : Not at all   Social Connections: Unknown (9/19/2024)    Social Connection and  Isolation Panel [NHANES]      Frequency of Communication with Friends and Family: Not on file      Frequency of Social Gatherings with Friends and Family: Once a week      Attends Adventism Services: Not on file      Active Member of Clubs or Organizations: Not on file      Attends Club or Organization Meetings: Not on file      Marital Status: Not on file   Interpersonal Safety: Low Risk  (5/6/2025)    Interpersonal Safety      Do you feel physically and emotionally safe where you currently live?: Yes      Within the past 12 months, have you been hit, slapped, kicked or otherwise physically hurt by someone?: No      Within the past 12 months, have you been humiliated or emotionally abused in other ways by your partner or ex-partner?: No   Housing Stability: Low Risk  (9/19/2024)    Housing Stability      Do you have housing? : Yes      Are you worried about losing your housing?: No     Denies smoking. 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     PHYSICAL EXAM     There were no vitals taken for this visit.  Wt Readings from Last 4 Encounters:   05/06/25 134.8 kg (297 lb 3.2 oz)   03/05/25 133.8 kg (295 lb)   01/15/25 133.4 kg (294 lb)   10/01/24 130.8 kg (288 lb 5 oz)       CONSTITUTIONAL: no acute distress  EYES: Right pupil is asymmetric. no palor or icterus.   ENT/MOUTH: no mouth lesions. Ears normal  CVS: s1s2 no m r g .   RESPIRATORY: clear to auscultation b/l  GI: soft non tender no hepatosplenomegaly  NEURO: AAOX3  Grossly non focal neuro exam  INTEGUMENT: Small scabs in the left leg.  LYMPHATIC: no palpable cervical, supraclavicular, axillary or inguinal LAD  MUSCULOSKELETAL: Unremarkable. No bony tenderness.   EXTREMITIES: Trace bilateral edema  PSYCH: Mentation, mood and affect are normal. Decision making capacity is intact       LABORATORY AND IMAGING STUDIES       I reviewed   5/6/2025  CBC showed WBC 4.9.  Hemoglobin 11.7.  Platelets 140.   Normal WBC differential.    Chemistry  unremarkable except creatinine 1.25.    LDH was 187    3/31/2025.    FISH testing: No evidence of BCR::ABL1 fusion   BCR::ABL1 MAJOR(p210) QUANTITATION RESULTS: Undetectable    8/20/2024.  FISH testing-   ABNORMAL  - BCR::ABL1 fusion (0.8%)    1/25/2025-BCR::ABL1 MAJOR(p210) QUANTITATION RESULTS:    This sample is positive for BCR::ABL1 transcripts of the major (p210) breakpoint cluster regions with a BCR::ABL1/ABL1 ratio of <0.453%. The limit of sensitivity of the quantitative BCR::ABL1 major (p210) assay is 10 copies of the BCR::ABL1 transcripts. This sample contains less than 10 copies of BCR::ABL1 transcripts, therefore; accurate quantitation is not possible.     8/20/2024.    BCR::ABL1/ABL1 ratio of 1.20%     3/28/2024  BCR::ABL1/ABL1 Major ( p210): 57%      MRI lumbar spine 3/13/2025.  FINDINGS: Nomenclature is based on 5 lumbar vertebral bodies. Normal vertebral body heights. Straightening of the normal lumbar lordosis. This is unchanged. Sagittal alignment otherwise appears unremarkable. Interval increase in size/extent of a zone of   heterogeneous predominantly T2/STIR hyperintense, T1 intermediate to hypointense marrow signal change with corresponding ill-defined/patchy postcontrast enhancement in the right aspect of the L2 vertebral body. This area now measures approximately 35 mm   AP x 25 mm craniocaudal (series 104, image 13) compared to 22 mm x 21 mm on the previous exam. This lesion remains indeterminate.      New/increased small STIR hyperintense, T1 intermediate to hypointense, enhancing lesion in the right aspect of the L1 vertebral body measuring approximately 8 mm AP x 9 mm craniocaudal (series 104, image 10). There is also a slightly more conspicuous   nonspecific small area of STIR hyperintense signal/rim enhancement in the left aspect of the L4 vertebral body without significant corresponding T1 hypointensity measuring approximately 6 mm AP x 9 mm craniocaudal (series 109, image 21). Marrow  signal   otherwise appears unremarkable. Diffuse intervertebral disc desiccation. Normal appearance of the distal spinal cord with the conus terminating at L2. The visualized paraspinous soft tissues and bony pelvis appear grossly unremarkable.     Segmental Analysis:     T12-L1: Normal disc height. Disc bulge eccentric to the left with suggestion of superimposed left foraminal/extraforaminal disc protrusion component. Normal facets. No spinal canal stenosis. There is at least mild left neural foraminal stenosis. The   right neural foramen is patent. Overall, no change.     L1-L2: Normal disc height. Symmetric disc bulge. Mild to moderate facet arthropathy. No significant spinal canal stenosis. Mild left neural foraminal stenosis. The right neural foramen is patent. Overall, no change.     L2-L3: Moderate to severe disc height loss. Disc bulge slightly eccentric to the right with posterior endplate osteophytic ridging and mild facet arthropathy. No significant spinal canal stenosis. Mild to moderate left and mild right neural foraminal   stenosis. Overall, no significant change.     L3-L4: Mild disc height loss. Symmetric disc bulge. Mild to moderate facet arthropathy. Mild right lateral recess encroachment without significant central spinal canal stenosis. There is at least moderate/moderate to severe right neural foraminal   stenosis. Mild to moderate left neural foraminal stenosis. Overall, no significant change.     L4-L5: Moderate disc height loss. Symmetric disc bulge with posterior endplate osteophytic ridging and moderate facet arthropathy. There is at least mild right lateral recess stenosis. No central spinal canal stenosis. Moderate to severe right and mild   to moderate left neural foraminal stenosis. Overall, no significant change.     L5-S1: Moderate disc height loss. Symmetric disc bulge with posterior endplate osteophytic ridging. Mild to moderate facet arthropathy. No spinal canal stenosis. Mild to  moderate bilateral neural foraminal stenosis. Overall, no significant change.                                                                      IMPRESSION:  1. Increased size of an indeterminate heterogeneously enhancing marrow lesion in the right aspect of the L2 vertebral body compared to prior. Clinical correlation and consider MR imaging follow-up, as indicated.  2. Couple of small nonspecific lesions in the right aspect of the L1 vertebral body and the left aspect of the L4 vertebral body are new or increased from prior.  3. No other significant marrow signal abnormality identified.  4. Multilevel degenerative changes appear relatively similar to the prior exam, as described.  5. No high-grade central spinal canal stenosis.  6. Moderate/moderate to severe right L3-4 and L4-5 neural foraminal stenosis with lesser degrees of foraminal narrowing elsewhere, as detailed.        ASSESSMENT AND PLAN     CML.  Chronic phase CML. Positive for t(9;22)  Peripheral Blood FISH- BCR::ABL1 fusion (86%)  Bone Marrow FISH - BCR::ABL1 fusion (91%)  Peripheral Blood BCR::ABL1/ABL1 Major ( p210): 57%    Intermediate ELTS Score    Patient started imatinib on 5/17/2024.      We have noticed improvement and WBC and differential has normalized.  He still bicytopenia and has mild anemia and thrombocytopenia.    On 3/31/2025,  BCR::ABL1/ABL1 is undetectable.      Continue imatinib.    Monitor serial labs.    Repeat molecular test in 3 months.    Diarrhea.  From imatinib.  Overall this is well-managed with half Imodium daily.  Continue the same.    Stage IV follicular lymphoma- status post treatment with bendamustine/Rituxan (04-09/2014) followed by maintenance rituximab for two years- completed in October 2016.    He had repeat CT chest abdomen and pelvis on 3/3/2025 which was overall stable and showed minimal prominence of a 10 mm lymph node above the aortic bifurcation.  Plan to repeat CT scan in September.         L2 sclerotic  lesion.  He was seen by Dr. Alston from neurosurgery and I reviewed the notes.  Most likely this is a benign lesion and recommendation was to follow it up serially with repeat MRI in 6 months.  MRI in December 2022 showed slight growth in the lesion so he had biopsy of this on 1/12/2023 which was benign.  Repeat CT chest abdomen and pelvis on 3/4/2024 showed overall stable findings.  CT scan from 3/3/2025 showed slight increase in the lesion.  MRI lumbar spine on 3/13/2025 showed slight increased size of this lesion in the right aspect of the L2 vertebral body.  I discussed with Dr. Alston from neurosurgery who recommends continued surveillance as benign pathologies like hemangioma can evolve over time as well, and he does not believe that at this time repeat biopsy is warranted.  I would plan to repeat CT chest abdomen and pelvis in September.         We did not address the following today.    Bilateral hydroureter and bladder wall thickening.  He had cystourethroscopy in May 2022 which was unremarkable.  I reviewed notes from urology.  CT scan from 3/4/2024 shows mild dilation of left distal ureter which is stable.  Continue to observe.      Interstitial lung disease.  Repeat CT scan continues to show slight worsening of the interstitial lung disease.  I advised him to follow-up with pulmonology.  I gave him a referral for pulmonology.    I will see him back in about 4 months.    All questions answered.  He is agreeable and comfortable with the plan.    Eduardo Crespo MD    The longitudinal plan of care for the CML and NHL as documented were addressed during this visit. Due to the added complexity in care, I will continue to support Minh in the subsequent management and with ongoing continuity of care.        Again, thank you for allowing me to participate in the care of your patient.        Sincerely,        Eduardo Crespo MD    Electronically signed

## 2025-05-20 DIAGNOSIS — C92.10 CML (CHRONIC MYELOCYTIC LEUKEMIA) (H): Primary | ICD-10-CM

## 2025-05-27 ENCOUNTER — TELEPHONE (OUTPATIENT)
Dept: DERMATOLOGY | Facility: CLINIC | Age: 75
End: 2025-05-27
Payer: MEDICARE

## 2025-05-27 NOTE — TELEPHONE ENCOUNTER
I called and spoke to Minh. I informed him that he is already scheduled for our soonest available appointment at this time. Patient is on the wait list.

## 2025-05-27 NOTE — TELEPHONE ENCOUNTER
[facility] Call Center    Phone Message    May a detailed message be left on voicemail: no     Reason for Call: Symptoms or Concerns     If patient has red-flag symptoms, warm transfer to triage line    Current symptom or concern: PtMinh saw FP Provider at  on 5/6 was put on Bactroban & Clobetsol.  Rash moved from Left Leg to Right Leg.  Water Blisters and lost a patch of skin about silver dollar size.  Looks really bad.  Call [phone] to get sooner/ [facility] appt    Symptoms have been present for:  FEW  month(s)    Has patient previously been seen for this? Yes    By:  Jeniffer Rayo/ [facility]    Date: 5/6    Are there any new or worsening symptoms? Yes: Right Leg is worse    Action Taken: Message routed to:  Adult Clinics: Dermatology p [id number]/ [place]    Travel Screening: Not Applicable     Date of Service:

## 2025-06-03 ENCOUNTER — LAB (OUTPATIENT)
Dept: LAB | Facility: CLINIC | Age: 75
End: 2025-06-03
Payer: MEDICARE

## 2025-06-03 DIAGNOSIS — E11.69 TYPE 2 DIABETES MELLITUS WITH OTHER SPECIFIED COMPLICATION, WITHOUT LONG-TERM CURRENT USE OF INSULIN (H): ICD-10-CM

## 2025-06-03 DIAGNOSIS — C92.10 CML (CHRONIC MYELOCYTIC LEUKEMIA) (H): ICD-10-CM

## 2025-06-03 LAB
ALBUMIN SERPL BCG-MCNC: 4.1 G/DL (ref 3.5–5.2)
ALP SERPL-CCNC: 87 U/L (ref 40–150)
ALT SERPL W P-5'-P-CCNC: 16 U/L (ref 0–70)
ANION GAP SERPL CALCULATED.3IONS-SCNC: 13 MMOL/L (ref 7–15)
AST SERPL W P-5'-P-CCNC: 23 U/L (ref 0–45)
BASOPHILS # BLD AUTO: 0 10E3/UL (ref 0–0.2)
BASOPHILS NFR BLD AUTO: 1 %
BILIRUB SERPL-MCNC: 1.1 MG/DL
BUN SERPL-MCNC: 15.2 MG/DL (ref 8–23)
CALCIUM SERPL-MCNC: 8.6 MG/DL (ref 8.8–10.4)
CHLORIDE SERPL-SCNC: 103 MMOL/L (ref 98–107)
CHOLEST SERPL-MCNC: 74 MG/DL
CREAT SERPL-MCNC: 1.1 MG/DL (ref 0.67–1.17)
EGFRCR SERPLBLD CKD-EPI 2021: 70 ML/MIN/1.73M2
EOSINOPHIL # BLD AUTO: 0.1 10E3/UL (ref 0–0.7)
EOSINOPHIL NFR BLD AUTO: 2 %
ERYTHROCYTE [DISTWIDTH] IN BLOOD BY AUTOMATED COUNT: 13.4 % (ref 10–15)
FASTING STATUS PATIENT QL REPORTED: YES
FASTING STATUS PATIENT QL REPORTED: YES
GLUCOSE SERPL-MCNC: 142 MG/DL (ref 70–99)
HCO3 SERPL-SCNC: 22 MMOL/L (ref 22–29)
HCT VFR BLD AUTO: 35.1 % (ref 40–53)
HDLC SERPL-MCNC: 31 MG/DL
HGB BLD-MCNC: 12.2 G/DL (ref 13.3–17.7)
IMM GRANULOCYTES # BLD: 0 10E3/UL
IMM GRANULOCYTES NFR BLD: 0 %
LDH SERPL L TO P-CCNC: 192 U/L (ref 0–250)
LDLC SERPL CALC-MCNC: 6 MG/DL
LYMPHOCYTES # BLD AUTO: 0.9 10E3/UL (ref 0.8–5.3)
LYMPHOCYTES NFR BLD AUTO: 16 %
MAGNESIUM SERPL-MCNC: 2.2 MG/DL (ref 1.7–2.3)
MCH RBC QN AUTO: 33.2 PG (ref 26.5–33)
MCHC RBC AUTO-ENTMCNC: 34.8 G/DL (ref 31.5–36.5)
MCV RBC AUTO: 96 FL (ref 78–100)
MONOCYTES # BLD AUTO: 0.5 10E3/UL (ref 0–1.3)
MONOCYTES NFR BLD AUTO: 10 %
NEUTROPHILS # BLD AUTO: 4 10E3/UL (ref 1.6–8.3)
NEUTROPHILS NFR BLD AUTO: 72 %
NONHDLC SERPL-MCNC: 43 MG/DL
NRBC # BLD AUTO: 0 10E3/UL
NRBC BLD AUTO-RTO: 0 /100
PHOSPHATE SERPL-MCNC: 2.7 MG/DL (ref 2.5–4.5)
PLATELET # BLD AUTO: 145 10E3/UL (ref 150–450)
POTASSIUM SERPL-SCNC: 4.2 MMOL/L (ref 3.4–5.3)
PROT SERPL-MCNC: 6 G/DL (ref 6.4–8.3)
RBC # BLD AUTO: 3.67 10E6/UL (ref 4.4–5.9)
SODIUM SERPL-SCNC: 138 MMOL/L (ref 135–145)
TRIGL SERPL-MCNC: 187 MG/DL
WBC # BLD AUTO: 5.5 10E3/UL (ref 4–11)

## 2025-06-03 PROCEDURE — 85025 COMPLETE CBC W/AUTO DIFF WBC: CPT

## 2025-06-03 PROCEDURE — 80053 COMPREHEN METABOLIC PANEL: CPT

## 2025-06-03 PROCEDURE — 84100 ASSAY OF PHOSPHORUS: CPT

## 2025-06-03 PROCEDURE — 3048F LDL-C <100 MG/DL: CPT

## 2025-06-03 PROCEDURE — 83615 LACTATE (LD) (LDH) ENZYME: CPT

## 2025-06-03 PROCEDURE — 83735 ASSAY OF MAGNESIUM: CPT

## 2025-06-03 PROCEDURE — 80061 LIPID PANEL: CPT

## 2025-06-03 PROCEDURE — 36415 COLL VENOUS BLD VENIPUNCTURE: CPT

## 2025-06-04 ENCOUNTER — RESULTS FOLLOW-UP (OUTPATIENT)
Dept: FAMILY MEDICINE | Facility: CLINIC | Age: 75
End: 2025-06-04

## 2025-06-24 DIAGNOSIS — C92.10 CML (CHRONIC MYELOCYTIC LEUKEMIA) (H): Primary | ICD-10-CM

## 2025-06-24 RX ORDER — IMATINIB MESYLATE 400 MG/1
400 TABLET, FILM COATED ORAL DAILY
Qty: 30 TABLET | Refills: 0 | OUTPATIENT
Start: 2025-06-24 | End: 2025-07-24

## 2025-07-01 ENCOUNTER — APPOINTMENT (OUTPATIENT)
Dept: GENERAL RADIOLOGY | Facility: CLINIC | Age: 75
End: 2025-07-01
Attending: EMERGENCY MEDICINE
Payer: MEDICARE

## 2025-07-01 ENCOUNTER — HOSPITAL ENCOUNTER (EMERGENCY)
Facility: CLINIC | Age: 75
Discharge: HOME | End: 2025-07-01
Attending: EMERGENCY MEDICINE | Admitting: EMERGENCY MEDICINE
Payer: MEDICARE

## 2025-07-01 VITALS
BODY MASS INDEX: 39.28 KG/M2 | OXYGEN SATURATION: 98 % | HEART RATE: 62 BPM | TEMPERATURE: 98.2 F | WEIGHT: 290 LBS | RESPIRATION RATE: 20 BRPM | DIASTOLIC BLOOD PRESSURE: 84 MMHG | SYSTOLIC BLOOD PRESSURE: 141 MMHG | HEIGHT: 72 IN

## 2025-07-01 DIAGNOSIS — M79.18 RIGHT BUTTOCK PAIN: ICD-10-CM

## 2025-07-01 PROCEDURE — 250N000011 HC RX IP 250 OP 636: Performed by: EMERGENCY MEDICINE

## 2025-07-01 PROCEDURE — 99284 EMERGENCY DEPT VISIT MOD MDM: CPT | Performed by: EMERGENCY MEDICINE

## 2025-07-01 PROCEDURE — 96372 THER/PROPH/DIAG INJ SC/IM: CPT | Performed by: EMERGENCY MEDICINE

## 2025-07-01 PROCEDURE — 99284 EMERGENCY DEPT VISIT MOD MDM: CPT

## 2025-07-01 PROCEDURE — 73502 X-RAY EXAM HIP UNI 2-3 VIEWS: CPT

## 2025-07-01 RX ORDER — KETOROLAC TROMETHAMINE 30 MG/ML
30 INJECTION, SOLUTION INTRAMUSCULAR; INTRAVENOUS ONCE
Status: COMPLETED | OUTPATIENT
Start: 2025-07-01 | End: 2025-07-01

## 2025-07-01 RX ORDER — CYCLOBENZAPRINE HCL 5 MG
10 TABLET ORAL 3 TIMES DAILY PRN
Qty: 14 TABLET | Refills: 0 | Status: SHIPPED | OUTPATIENT
Start: 2025-07-01 | End: 2025-07-08

## 2025-07-01 RX ADMIN — KETOROLAC TROMETHAMINE 30 MG: 30 INJECTION, SOLUTION INTRAMUSCULAR at 11:22

## 2025-07-01 ASSESSMENT — COLUMBIA-SUICIDE SEVERITY RATING SCALE - C-SSRS
6. HAVE YOU EVER DONE ANYTHING, STARTED TO DO ANYTHING, OR PREPARED TO DO ANYTHING TO END YOUR LIFE?: NO
1. IN THE PAST MONTH, HAVE YOU WISHED YOU WERE DEAD OR WISHED YOU COULD GO TO SLEEP AND NOT WAKE UP?: NO
2. HAVE YOU ACTUALLY HAD ANY THOUGHTS OF KILLING YOURSELF IN THE PAST MONTH?: NO

## 2025-07-01 ASSESSMENT — ACTIVITIES OF DAILY LIVING (ADL)
ADLS_ACUITY_SCORE: 41
ADLS_ACUITY_SCORE: 41

## 2025-07-01 NOTE — ED TRIAGE NOTES
Patient reports muscle pain to his right buttock starting Friday. States the pain is 3/10 when sitting but standing and walking increase the pain to 9-10/10.

## 2025-07-01 NOTE — ED PROVIDER NOTES
History     Chief Complaint   Patient presents with    Muscle Pain     HPI  Deandre Merchant is a 75 year old male who presents for evaluation of some pain in the right buttock.  This began 3 days ago.  He states it is okay lying still but with movement he has significant pain.  No trauma.  He was moving some decking material the day this began sliding this around.  Also had mowed the lawn on a riding mower.  He has tried Tylenol without relief.  No history of hip or pelvic problems.    Allergies:  Allergies   Allergen Reactions    Allopurinol Rash       Problem List:    Patient Active Problem List    Diagnosis Date Noted    CML (chronic myelocytic leukemia) (H) 05/08/2024     Priority: Medium    ILD (interstitial lung disease) (H) 02/20/2024     Priority: Medium    Pulmonary nodules 02/20/2024     Priority: Medium    Lesion of lumbar spine 01/02/2023     Priority: Medium    Type 2 diabetes mellitus with other specified complication, without long-term current use of insulin (H) 07/18/2022     Priority: Medium    History of cataract 07/08/2022     Priority: Medium    Trigger finger, left middle finger 08/05/2021     Priority: Medium    Severe obesity (BMI 35.0-39.9) with comorbidity (H) 07/07/2020     Priority: Medium    Essential hypertension 07/07/2020     Priority: Medium    Ascending aorta dilatation 07/07/2020     Priority: Medium    WINSTON (obstructive sleep apnea) 08/31/2019     Priority: Medium     8/29/2019 [facility] Sleep Study (288.0 lbs) - AHI 13.9, RDI 34.0, Supine AHI 14.3, REM AHI 25.8, Low O2 86.0%, Time Spent <=88% 1.5 minutes / Time Spent <=89% 3.3 minutes.      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 Provider to review and confirm.      BPH (benign prostatic hyperplasia) 02/24/2010     Priority: Medium    Impotence of organic origin 03/14/2006     Priority: Medium    Retinal detachment 03/14/2006     Priority: Medium     Problem list name updated by automated process. 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    Lymphadenopathy     Non Hodgkin's lymphoma (H)     Non-Hodgkin's lymphoma of multiple sites (H) 6/26/2012    Polyps, colonic     Retinal detachment        Past Surgical History:    Past Surgical History:   Procedure Laterality Date    COLONOSCOPY N/A 3/1/2017    Procedure: COLONOSCOPY;  Surgeon: Dakota Wall MD;  Location:  GI    COLONOSCOPY N/A 10/3/2022    Procedure: COLONOSCOPY;  Surgeon: Jose J Bee MD;  Location:  GI    ESOPHAGOSCOPY, GASTROSCOPY, DUODENOSCOPY (EGD), COMBINED  4/21/2013    Procedure: COMBINED ESOPHAGOSCOPY, GASTROSCOPY, DUODENOSCOPY (EGD), BIOPSY SINGLE OR MULTIPLE;;  Surgeon: Torrey Cosme MD;  Location:  GI    ESOPHAGOSCOPY, GASTROSCOPY, DUODENOSCOPY (EGD), COMBINED  10/9/2013    Procedure: COMBINED ESOPHAGOSCOPY, GASTROSCOPY, DUODENOSCOPY (EGD), BIOPSY SINGLE OR MULTIPLE;  ESOPHAGOSCOPY, GASTROSCOPY, DUODENOSCOPY (EGD) with multiple biopsies;  Surgeon: Shay Sauer MD;  Location:  GI    IR BONE BIOPSY VERTEBRAL  1/12/2023    LAPAROTOMY EXPLORATORY  4/24/2013    Procedure: LAPAROTOMY EXPLORATORY;  Exploratory Laparotomy and lysis of adhesions.;  Surgeon: Genevieve Barros MD;  Location:  OR    [facility] COLONOSCOPY W/WO BRUSH/WASH  07/12/06       Family History:    Family History   Problem Relation Age of Onset    Cancer  Father         liver/kidney    C.A.D. Father     Pacemaker Brother     Arthritis Mother         RA       Social History:  Marital Status:   [2]  Social History     Tobacco Use    Smoking status: Never     Passive exposure: Never    Smokeless tobacco: Never   Vaping Use    Vaping status: Never Used   Substance Use Topics    Alcohol use: Yes     Alcohol/week: 3.3 standard drinks of alcohol     Comment: occasionally weekends    Drug use: No        Medications:    Acetaminophen (TYLENOL PO)  atorvastatin (LIPITOR) 20 MG tablet  blood glucose (NO BRAND SPECIFIED) lancets standard  blood glucose (NO BRAND SPECIFIED) test strip  cyclobenzaprine (FLEXERIL) 5 MG tablet  fluticasone (FLONASE) 50 MCG/ACT nasal spray  fluticasone (FLONASE) 50 MCG/ACT nasal spray  imatinib (GLEEVEC) 400 MG tablet  loperamide (IMODIUM) 1 MG/5ML liquid  loratadine (CLARITIN REDITABS) 10 MG ODT  metFORMIN (GLUCOPHAGE XR) 500 MG 24 hr tablet  metoprolol succinate ER (TOPROL XL) 50 MG 24 hr tablet  mupirocin (BACTROBAN) 2 % external ointment  order for DME  prochlorperazine (COMPAZINE) 10 MG tablet  semaglutide (OZEMPIC) 2 MG/3ML pen  tamsulosin (FLOMAX) 0.4 MG capsule  triamcinolone (KENALOG) 0.1 % external ointment          Review of Systems  All other systems are reviewed and are negative    Physical Exam   BP: (!) 142/78  Pulse: 67  Temp: 98.2  F (36.8  C)  Resp: 20  Height: 182.9 cm (6')  Weight: 131.5 kg (290 lb)  SpO2: 98 %      Physical Exam  Vitals reviewed.   Constitutional:       General: He is not in acute distress.     Appearance: He is not diaphoretic.   HENT:      Head: Normocephalic and atraumatic.   Eyes:      General: No scleral icterus.        Right eye: No discharge.         Left eye: No discharge.      Conjunctiva/sclera: Conjunctivae normal.   Pulmonary:      Effort: Pulmonary effort is normal.      Breath sounds: No stridor.   Musculoskeletal:      Cervical back: Normal range of motion.      Comments: Area of concern and  pain is examined.  There is no ecchymosis, erythema, deformity or reproducible pain with palpation.  There is some pain reproduced with flexion of the hip.  Good range of motion overall to the hip region.  Distal CMS intact.   Skin:     General: Skin is warm and dry.      Findings: No rash.   Neurological:      Mental Status: He is alert.      Comments: Normal speech and mentation   Psychiatric:         Judgment: Judgment normal.         ED Course        Procedures                  Recent Results (from the past 24 hours)   XR Pelvis and Hip Right 2 Views    Narrative    EXAM: XR PELVIS AND HIP RIGHT 2 VIEWS  LOCATION: [facility]  DATE: 7/1/2025    INDICATION: pain  COMPARISON: None.      Impression    IMPRESSION: No acute displaced fracture or subluxation. Mild bilateral hip osteoarthritis. Lower lumbar degenerative disc disease.        Medications   ketorolac (TORADOL) injection 30 mg (30 mg Intramuscular $Given 7/1/25 1129)       Assessments & Plan (with Medical Decision Making)  75-year-old male with some atraumatic right buttock pain over the last 3 to 4 days.  Examination reveals no signs of infection or other emergency process.  X-rays are negative.  I have independently reviewed the images.  Appears may be more muscular.  Have prescribed Flexeril at his request.  Have recommended continuing with some Tylenol, topical heat and/or Salonpas patch as needed.  Follow-up in clinic if not improving over the next week.     I have reviewed the nursing notes.    I have reviewed the findings, diagnosis, plan and need for follow up with the patient.          New Prescriptions    CYCLOBENZAPRINE (FLEXERIL) 5 MG TABLET    Take 2 tablets (10 mg) by mouth 3 times daily as needed for muscle spasms.       Final diagnoses:   Right buttock pain       7/1/2025   [facility] EMERGENCY DEPT       Isiah Voss MD  07/01/25 1067

## 2025-07-01 NOTE — DISCHARGE INSTRUCTIONS
Recommend continuing with heating pad to the area as needed.  Tylenol up to 2 extra strength 4 times a day may be used.  May also try a Lidoderm patch over the area.

## 2025-07-05 ENCOUNTER — HEALTH MAINTENANCE LETTER (OUTPATIENT)
Age: 75
End: 2025-07-05

## 2025-07-09 ENCOUNTER — LAB (OUTPATIENT)
Dept: LAB | Facility: CLINIC | Age: 75
End: 2025-07-09
Payer: MEDICARE

## 2025-07-09 DIAGNOSIS — C92.10 CML (CHRONIC MYELOCYTIC LEUKEMIA) (H): ICD-10-CM

## 2025-07-09 DIAGNOSIS — E11.69 TYPE 2 DIABETES MELLITUS WITH OTHER SPECIFIED COMPLICATION, WITHOUT LONG-TERM CURRENT USE OF INSULIN (H): Primary | ICD-10-CM

## 2025-07-09 LAB
ALBUMIN SERPL BCG-MCNC: 4.2 G/DL (ref 3.5–5.2)
ALP SERPL-CCNC: 86 U/L (ref 40–150)
ALT SERPL W P-5'-P-CCNC: 15 U/L (ref 0–70)
ANION GAP SERPL CALCULATED.3IONS-SCNC: 14 MMOL/L (ref 7–15)
AST SERPL W P-5'-P-CCNC: 22 U/L (ref 0–45)
BASOPHILS # BLD AUTO: 0 10E3/UL (ref 0–0.2)
BASOPHILS NFR BLD AUTO: 1 %
BILIRUB SERPL-MCNC: 1 MG/DL
BUN SERPL-MCNC: 19.8 MG/DL (ref 8–23)
CALCIUM SERPL-MCNC: 8.8 MG/DL (ref 8.8–10.4)
CHLORIDE SERPL-SCNC: 103 MMOL/L (ref 98–107)
CREAT SERPL-MCNC: 1.12 MG/DL (ref 0.67–1.17)
CREAT UR-MCNC: 157.4 MG/DL
EGFRCR SERPLBLD CKD-EPI 2021: 69 ML/MIN/1.73M2
EOSINOPHIL # BLD AUTO: 0.1 10E3/UL (ref 0–0.7)
EOSINOPHIL NFR BLD AUTO: 2 %
ERYTHROCYTE [DISTWIDTH] IN BLOOD BY AUTOMATED COUNT: 13.4 % (ref 10–15)
EST. AVERAGE GLUCOSE BLD GHB EST-MCNC: 117 MG/DL
GLUCOSE SERPL-MCNC: 121 MG/DL (ref 70–99)
HBA1C MFR BLD: 5.7 %
HCO3 SERPL-SCNC: 21 MMOL/L (ref 22–29)
HCT VFR BLD AUTO: 33.2 % (ref 40–53)
HGB BLD-MCNC: 11.5 G/DL (ref 13.3–17.7)
IMM GRANULOCYTES # BLD: 0 10E3/UL
IMM GRANULOCYTES NFR BLD: 0 %
LDH SERPL L TO P-CCNC: 176 U/L (ref 0–250)
LYMPHOCYTES # BLD AUTO: 0.8 10E3/UL (ref 0.8–5.3)
LYMPHOCYTES NFR BLD AUTO: 16 %
MCH RBC QN AUTO: 33.5 PG (ref 26.5–33)
MCHC RBC AUTO-ENTMCNC: 34.6 G/DL (ref 31.5–36.5)
MCV RBC AUTO: 97 FL (ref 78–100)
MICROALBUMIN UR-MCNC: <12 MG/L
MICROALBUMIN/CREAT UR: NORMAL MG/G{CREAT}
MONOCYTES # BLD AUTO: 0.5 10E3/UL (ref 0–1.3)
MONOCYTES NFR BLD AUTO: 10 %
NEUTROPHILS # BLD AUTO: 3.8 10E3/UL (ref 1.6–8.3)
NEUTROPHILS NFR BLD AUTO: 71 %
NRBC # BLD AUTO: 0 10E3/UL
NRBC BLD AUTO-RTO: 0 /100
PLATELET # BLD AUTO: 133 10E3/UL (ref 150–450)
POTASSIUM SERPL-SCNC: 4.4 MMOL/L (ref 3.4–5.3)
PROT SERPL-MCNC: 6.1 G/DL (ref 6.4–8.3)
RBC # BLD AUTO: 3.43 10E6/UL (ref 4.4–5.9)
SODIUM SERPL-SCNC: 138 MMOL/L (ref 135–145)
WBC # BLD AUTO: 5.4 10E3/UL (ref 4–11)

## 2025-07-09 PROCEDURE — 82570 ASSAY OF URINE CREATININE: CPT

## 2025-07-09 PROCEDURE — 85025 COMPLETE CBC W/AUTO DIFF WBC: CPT

## 2025-07-09 PROCEDURE — 36415 COLL VENOUS BLD VENIPUNCTURE: CPT

## 2025-07-09 PROCEDURE — 83615 LACTATE (LD) (LDH) ENZYME: CPT

## 2025-07-09 PROCEDURE — 82043 UR ALBUMIN QUANTITATIVE: CPT

## 2025-07-09 PROCEDURE — 3044F HG A1C LEVEL LT 7.0%: CPT

## 2025-07-09 PROCEDURE — 83036 HEMOGLOBIN GLYCOSYLATED A1C: CPT

## 2025-07-09 PROCEDURE — 80053 COMPREHEN METABOLIC PANEL: CPT

## 2025-07-24 DIAGNOSIS — C92.10 CML (CHRONIC MYELOCYTIC LEUKEMIA) (H): Primary | ICD-10-CM

## 2025-07-24 RX ORDER — IMATINIB MESYLATE 400 MG/1
400 TABLET, FILM COATED ORAL DAILY
Qty: 30 TABLET | Refills: 0 | OUTPATIENT
Start: 2025-07-24 | End: 2025-08-23

## 2025-08-06 ENCOUNTER — LAB (OUTPATIENT)
Dept: LAB | Facility: CLINIC | Age: 75
End: 2025-08-06
Payer: MEDICARE

## 2025-08-06 DIAGNOSIS — C92.10 CML (CHRONIC MYELOCYTIC LEUKEMIA) (H): ICD-10-CM

## 2025-08-06 LAB
ALBUMIN SERPL BCG-MCNC: 4 G/DL (ref 3.5–5.2)
ALP SERPL-CCNC: 91 U/L (ref 40–150)
ALT SERPL W P-5'-P-CCNC: 15 U/L (ref 0–70)
ANION GAP SERPL CALCULATED.3IONS-SCNC: 14 MMOL/L (ref 7–15)
AST SERPL W P-5'-P-CCNC: 22 U/L (ref 0–45)
BASOPHILS # BLD AUTO: 0 10E3/UL (ref 0–0.2)
BASOPHILS NFR BLD AUTO: 1 %
BILIRUB SERPL-MCNC: 0.8 MG/DL
BUN SERPL-MCNC: 13.8 MG/DL (ref 8–23)
CALCIUM SERPL-MCNC: 8.6 MG/DL (ref 8.8–10.4)
CHLORIDE SERPL-SCNC: 103 MMOL/L (ref 98–107)
CREAT SERPL-MCNC: 1.17 MG/DL (ref 0.67–1.17)
EGFRCR SERPLBLD CKD-EPI 2021: 65 ML/MIN/1.73M2
EOSINOPHIL # BLD AUTO: 0.1 10E3/UL (ref 0–0.7)
EOSINOPHIL NFR BLD AUTO: 2 %
ERYTHROCYTE [DISTWIDTH] IN BLOOD BY AUTOMATED COUNT: 13.3 % (ref 10–15)
GLUCOSE SERPL-MCNC: 142 MG/DL (ref 70–99)
HCO3 SERPL-SCNC: 21 MMOL/L (ref 22–29)
HCT VFR BLD AUTO: 33.2 % (ref 40–53)
HGB BLD-MCNC: 11.6 G/DL (ref 13.3–17.7)
IMM GRANULOCYTES # BLD: 0 10E3/UL
IMM GRANULOCYTES NFR BLD: 1 %
LDH SERPL L TO P-CCNC: 177 U/L (ref 0–250)
LYMPHOCYTES # BLD AUTO: 1.1 10E3/UL (ref 0.8–5.3)
LYMPHOCYTES NFR BLD AUTO: 20 %
MAGNESIUM SERPL-MCNC: 1.9 MG/DL (ref 1.7–2.3)
MCH RBC QN AUTO: 33.9 PG (ref 26.5–33)
MCHC RBC AUTO-ENTMCNC: 34.9 G/DL (ref 31.5–36.5)
MCV RBC AUTO: 97 FL (ref 78–100)
MONOCYTES # BLD AUTO: 0.5 10E3/UL (ref 0–1.3)
MONOCYTES NFR BLD AUTO: 10 %
NEUTROPHILS # BLD AUTO: 3.6 10E3/UL (ref 1.6–8.3)
NEUTROPHILS NFR BLD AUTO: 66 %
NRBC # BLD AUTO: 0 10E3/UL
NRBC BLD AUTO-RTO: 0 /100
PHOSPHATE SERPL-MCNC: 3.2 MG/DL (ref 2.5–4.5)
PLATELET # BLD AUTO: 131 10E3/UL (ref 150–450)
POTASSIUM SERPL-SCNC: 4.3 MMOL/L (ref 3.4–5.3)
PROT SERPL-MCNC: 5.8 G/DL (ref 6.4–8.3)
RBC # BLD AUTO: 3.42 10E6/UL (ref 4.4–5.9)
SODIUM SERPL-SCNC: 138 MMOL/L (ref 135–145)
WBC # BLD AUTO: 5.4 10E3/UL (ref 4–11)

## 2025-08-06 PROCEDURE — 36415 COLL VENOUS BLD VENIPUNCTURE: CPT

## 2025-08-06 PROCEDURE — 80053 COMPREHEN METABOLIC PANEL: CPT

## 2025-08-06 PROCEDURE — 85025 COMPLETE CBC W/AUTO DIFF WBC: CPT

## 2025-08-06 PROCEDURE — 83615 LACTATE (LD) (LDH) ENZYME: CPT

## 2025-08-06 PROCEDURE — 84100 ASSAY OF PHOSPHORUS: CPT

## 2025-08-06 PROCEDURE — 83735 ASSAY OF MAGNESIUM: CPT

## 2025-08-20 ENCOUNTER — DOCUMENTATION ONLY (OUTPATIENT)
Dept: SLEEP MEDICINE | Facility: CLINIC | Age: 75
End: 2025-08-20
Payer: MEDICARE

## 2025-08-20 DIAGNOSIS — G47.33 OBSTRUCTIVE SLEEP APNEA (ADULT) (PEDIATRIC): Primary | ICD-10-CM

## (undated) DEVICE — TUBING SUCTION 12"X1/4" N612

## (undated) DEVICE — LUBRICATING JELLY 4.25OZ

## (undated) DEVICE — SOL WATER IRRIG 1000ML BOTTLE 07139-09

## (undated) DEVICE — KIT ENDO TURNOVER/PROCEDURE CARRY-ON 101822

## (undated) DEVICE — GLOVE EXAM NITRILE LG

## (undated) RX ORDER — LIDOCAINE HYDROCHLORIDE 10 MG/ML
INJECTION, SOLUTION EPIDURAL; INFILTRATION; INTRACAUDAL; PERINEURAL
Status: DISPENSED
Start: 2017-03-01

## (undated) RX ORDER — FENTANYL CITRATE 50 UG/ML
INJECTION, SOLUTION INTRAMUSCULAR; INTRAVENOUS
Status: DISPENSED
Start: 2023-01-12

## (undated) RX ORDER — FENTANYL CITRATE 50 UG/ML
INJECTION, SOLUTION INTRAMUSCULAR; INTRAVENOUS
Status: DISPENSED
Start: 2017-03-01